# Patient Record
Sex: FEMALE | Race: BLACK OR AFRICAN AMERICAN | NOT HISPANIC OR LATINO | Employment: FULL TIME | URBAN - METROPOLITAN AREA
[De-identification: names, ages, dates, MRNs, and addresses within clinical notes are randomized per-mention and may not be internally consistent; named-entity substitution may affect disease eponyms.]

---

## 2017-05-05 ENCOUNTER — HOSPITAL ENCOUNTER (EMERGENCY)
Facility: OTHER | Age: 37
Discharge: HOME | End: 2017-05-05
Attending: EMERGENCY MEDICINE
Payer: COMMERCIAL

## 2017-05-05 VITALS
OXYGEN SATURATION: 100 % | HEIGHT: 66 IN | SYSTOLIC BLOOD PRESSURE: 208 MMHG | BODY MASS INDEX: 22.82 KG/M2 | RESPIRATION RATE: 20 BRPM | HEART RATE: 89 BPM | TEMPERATURE: 98 F | DIASTOLIC BLOOD PRESSURE: 126 MMHG | WEIGHT: 142 LBS

## 2017-05-05 DIAGNOSIS — F41.0 PANIC ATTACK: Primary | ICD-10-CM

## 2017-05-05 DIAGNOSIS — I10 ESSENTIAL HYPERTENSION: ICD-10-CM

## 2017-05-05 DIAGNOSIS — R20.0 NUMBNESS: ICD-10-CM

## 2017-05-05 LAB
B-HCG UR QL: NEGATIVE
CTP QC/QA: YES
POCT GLUCOSE: 105 MG/DL (ref 70–110)

## 2017-05-05 PROCEDURE — 82962 GLUCOSE BLOOD TEST: CPT

## 2017-05-05 PROCEDURE — 25000003 PHARM REV CODE 250: Performed by: EMERGENCY MEDICINE

## 2017-05-05 PROCEDURE — 99284 EMERGENCY DEPT VISIT MOD MDM: CPT | Mod: 25

## 2017-05-05 PROCEDURE — 81025 URINE PREGNANCY TEST: CPT | Performed by: EMERGENCY MEDICINE

## 2017-05-05 RX ORDER — LISINOPRIL 10 MG/1
10 TABLET ORAL
Status: COMPLETED | OUTPATIENT
Start: 2017-05-05 | End: 2017-05-05

## 2017-05-05 RX ADMIN — LISINOPRIL 10 MG: 10 TABLET ORAL at 07:05

## 2017-05-05 NOTE — ED PROVIDER NOTES
Encounter Date: 5/5/2017    SCRIBE #1 NOTE: I, Jingslime Garcia, am scribing for, and in the presence of,  Dr. Haines. I have scribed the entire note.       History     Chief Complaint   Patient presents with    Panic Attack     woke up and I felt panicky and tearful. pt reports her father was in a car accident yesteday.      Review of patient's allergies indicates:  No Known Allergies  HPI Comments: Time seen by provider: 6:56 AM    This is a 37 y.o. female who presents with complaint of panic attack. She reports onset of symptoms was about 1 hr ago. The patient states she woke this morning feeling anxious and tingling. She notes she has increased stress recently. The patient denies any associated chest pain, shortness of breath, dizziness, light headedness, nausea or vomiting. She denies any SI or HI. The patient denies any auditory or visual hallucinations. She reports she has experienced similar symptoms in the past. The patient denies taking medications in the past for anxiety.     The history is provided by the patient.     Past Medical History:   Diagnosis Date    Encounter for blood transfusion     Iron deficiency anemia     Menorrhagia      Past Surgical History:   Procedure Laterality Date    c section       Family History   Problem Relation Age of Onset    Hypertension Father     Hypertension Sister     Hypertension Maternal Grandmother     Cancer Maternal Grandfather     Hypertension Paternal Grandmother      Social History   Substance Use Topics    Smoking status: Never Smoker    Smokeless tobacco: None    Alcohol use Yes      Comment: Occasional; 3x /month     Review of Systems   Constitutional: Negative for chills and fever.   HENT: Negative for congestion and sore throat.    Eyes: Negative for redness and visual disturbance.   Respiratory: Negative for cough and shortness of breath.    Cardiovascular: Negative for chest pain and palpitations.   Gastrointestinal: Negative for abdominal  pain, diarrhea, nausea and vomiting.   Genitourinary: Negative for dysuria.   Musculoskeletal: Negative for back pain.   Skin: Negative for rash.   Neurological: Negative for weakness and headaches.   Psychiatric/Behavioral: Negative for confusion. The patient is nervous/anxious.        Physical Exam   Initial Vitals   BP Pulse Resp Temp SpO2   05/05/17 0620 05/05/17 0620 05/05/17 0620 05/05/17 0620 05/05/17 0620   208/126 89 20 97.9 °F (36.6 °C) 100 %     Physical Exam    Nursing note and vitals reviewed.  Constitutional: She appears well-developed and well-nourished. She is not diaphoretic. No distress.   HENT:   Head: Normocephalic and atraumatic.   Right Ear: External ear normal.   Left Ear: External ear normal.   Eyes: Conjunctivae and EOM are normal.   Neck: Normal range of motion. Neck supple.   Cardiovascular: Normal rate, regular rhythm and normal heart sounds. Exam reveals no gallop and no friction rub.    No murmur heard.  Pulmonary/Chest: Breath sounds normal. She has no wheezes. She has no rhonchi. She has no rales.   Abdominal: Soft. Bowel sounds are normal. There is no tenderness. There is no rebound and no guarding.   Musculoskeletal: Normal range of motion. She exhibits no edema or tenderness.   Lymphadenopathy:     She has no cervical adenopathy.   Neurological: She is alert and oriented to person, place, and time. She has normal strength.   Cranial nerves II through XII grossly intact.  5/5 motor strength all 4 extremities.  Sensation is normal.  Finger to nose normal.  Gait normal.  Speech and cognition is normal.  No focal neurologic deficit.   Skin: Skin is warm and dry. No rash noted.         ED Course   Procedures  Labs Reviewed   POCT URINE PREGNANCY   POCT GLUCOSE MONITORING CONTINUOUS             Medical Decision Making:   ED Management:  Tearful patient presents complaining of whole body numbness similar to her prior panic attacks.  She does have a history of paresthesias, have reviewed  "her chart.  She reports she is under "a lot of stress ".  She reports her father was in the proximal left leg.  She is not suicidal or homicidal.  She is not gravely disabled.  She denies any intoxicating drugs.  Her neuro exam is completely normal except for her subjective whole-body numbness.  No further workup indicated here.    I did have an extensive talk regarding signs to return for and need for follow up. Patient expressed understanding and will monitor symptoms closely and follow-up as needed.      JOHN PAUL Haines M.D.  05/05/2017  7:43 AM                Scribe Attestation:   Scribe #1: I performed the above scribed service and the documentation accurately describes the services I performed. I attest to the accuracy of the note.    Attending Attestation:           Physician Attestation for Scribe:  Physician Attestation Statement for Scribe #1: I, Dr. Haines, reviewed documentation, as scribed by Jing Garcia in my presence, and it is both accurate and complete.                 ED Course     Clinical Impression:     1. Panic attack    2. Essential hypertension    3. Numbness                Davon Haines MD  05/05/17 0743    "

## 2017-05-05 NOTE — ED AVS SNAPSHOT
[facility]  [address]  [facility] [address]               Pernell Ly   2017  6:20 AM   ED    Description:  Female : 1980   Department:  [facility]           Your Care was Coordinated By:     Provider Role From To    Cyndi Jones MD Attending Provider 17 0632 17 [phone]      Reason for Visit     Panic Attack           Diagnoses this Visit        Comments    Panic attack    -  Primary     Essential hypertension         Numbness           ED Disposition     None           To Do List           Follow-up Information     Follow up with [facility]. Schedule an appointment as soon as possible for a visit in 3 days.    Contact information:    [address]  [place] [address]  [phone]          Follow up with [facility].    Specialty:  Emergency Medicine    Why:  If symptoms worsen    Contact information:    [address]  [facility] [id number] [phone]      [facility] On Call     [facility] Nurse Care Line -  Assistance  Unless otherwise directed by your provider, please contact [facility], our nurse care line that is available for  assistance.     Registered nurses in the [facility] provide: appointment scheduling, clinical advisement, health education, and other advisory services.  Call: [phone] (toll free)               Medications           Message regarding Medications     Verify the changes and/or additions to your medication regime listed below are the same as discussed with your clinician today.  If any of these changes or additions are incorrect, please notify your healthcare provider.        These medications were administered today        Dose Freq    lisinopril tablet 10 mg 10 mg ED 1 Time    Sig: Take 1 tablet (10 mg total) by mouth ED 1 Time.    Class: Normal    Route: Oral      STOP taking these medications   "   amitriptyline (ELAVIL) 25 MG tablet Take 25 mg by mouth nightly as needed for Insomnia.    lisinopril (PRINIVIL,ZESTRIL) 5 MG tablet Take 5 mg by mouth once daily.    multivit-min-iron-CA-FA 27-0.4 mg Tab Take 1 tablet by mouth once daily.    ferrous sulfate 325 (65 FE) MG EC tablet Take 1 tablet (325 mg total) by mouth once daily.           Verify that the below list of medications is an accurate representation of the medications you are currently taking.  If none reported, the list may be blank. If incorrect, please contact your healthcare provider. Carry this list with you in case of emergency.           Current Medications     lisinopril tablet 10 mg Take 1 tablet (10 mg total) by mouth ED 1 Time.           Clinical Reference Information           Your Vitals Were     BP Pulse Temp Resp Height Weight    208/126 (BP Location: Left arm, Patient Position: Sitting, BP Method: Automatic) 89 97.9 °F (36.6 °C) (Oral) 20 5' 6" (1.676 m) 64.4 kg (142 lb)    Last Period SpO2 BMI          05/02/2017 100% 22.92 kg/m2        Allergies as of 5/5/2017     No Known Allergies      Immunizations Administered on Date of Encounter - 5/5/2017     None      ED Micro, Lab, POCT     Start Ordered       Status Ordering Provider    05/05/17 0703 05/05/17 0703  POCT glucose  Once      Final result     05/05/17 0640 05/05/17 0639  POCT urine pregnancy  Once      Final result     05/05/17 0640 05/05/17 0639  POCT glucose  Once      Acknowledged       ED Imaging Orders     None      Discharge References/Attachments     ANXIETY REACTION (ENGLISH)    PARAESTHESIAS (ENGLISH)    HIGH BLOOD PRESSURE, ESTABLISHED, OUT OF CONTROL (ENGLISH)      Your Scheduled Appointments     May 08, 2017  1:00 PM CDT   New Patient - Open Scheduling with Zoey Nelson MD   [facility] ([facility])    [address]  [facility] [address] [phone]              [facility] Sign-Up     Activating your [facility] account is as easy as " 1-2-3!     1) Visit [email or web address], select Sign Up Now, enter this activation code and your date of birth, then select Next.  [email or web address]  Expires: 6/15/2017  4:08 PM      2) Create a username and password to use when you visit [facility] in the future and select a security question in case you lose your password and select Next.    3) Enter your e-mail address and click Sign Up!    Additional Information  If you have questions, please e-mail [email or web address] or call [phone] to talk to our [employer] staff. Remember, [facility] is NOT to be used for urgent needs. For medical emergencies, dial 911.          [facility] complies with applicable Federal civil rights laws and does not discriminate on the basis of race, color, national origin, age, disability, or sex.        Language Assistance Services     ATTENTION: Language assistance services are available, free of charge. Please call [phone].      ATENCIÓN: Si habla español, tiene a williamson disposición servicios gratuitos de asistencia lingüística. Llame al [phone].     CHÚ Ý: N?u b?n nói Ti?ng Vi?t, có các d?ch v? h? tr? ngôn ng? mi?n phí dành cho b?n. G?i s? [phone].

## 2017-05-08 ENCOUNTER — OFFICE VISIT (OUTPATIENT)
Dept: INTERNAL MEDICINE | Facility: CLINIC | Age: 37
End: 2017-05-08
Attending: FAMILY MEDICINE
Payer: COMMERCIAL

## 2017-05-08 VITALS
HEART RATE: 78 BPM | SYSTOLIC BLOOD PRESSURE: 178 MMHG | WEIGHT: 157.63 LBS | HEIGHT: 65 IN | DIASTOLIC BLOOD PRESSURE: 120 MMHG | BODY MASS INDEX: 26.26 KG/M2

## 2017-05-08 DIAGNOSIS — Z23 NEED FOR TDAP VACCINATION: ICD-10-CM

## 2017-05-08 DIAGNOSIS — G43.511 INTRACTABLE PERSISTENT MIGRAINE AURA WITHOUT CEREBRAL INFARCTION AND WITH STATUS MIGRAINOSUS: ICD-10-CM

## 2017-05-08 DIAGNOSIS — D25.0 SUBMUCOUS LEIOMYOMA OF UTERUS: ICD-10-CM

## 2017-05-08 DIAGNOSIS — D64.9 ANEMIA, UNSPECIFIED TYPE: ICD-10-CM

## 2017-05-08 DIAGNOSIS — I10 ESSENTIAL HYPERTENSION: ICD-10-CM

## 2017-05-08 DIAGNOSIS — Z00.00 ANNUAL PHYSICAL EXAM: Primary | ICD-10-CM

## 2017-05-08 PROCEDURE — 3077F SYST BP >= 140 MM HG: CPT | Mod: S$GLB,,, | Performed by: FAMILY MEDICINE

## 2017-05-08 PROCEDURE — 99999 PR PBB SHADOW E&M-EST. PATIENT-LVL III: CPT | Mod: PBBFAC,,, | Performed by: FAMILY MEDICINE

## 2017-05-08 PROCEDURE — 99385 PREV VISIT NEW AGE 18-39: CPT | Mod: 25,S$GLB,, | Performed by: FAMILY MEDICINE

## 2017-05-08 PROCEDURE — 90715 TDAP VACCINE 7 YRS/> IM: CPT | Mod: S$GLB,,, | Performed by: FAMILY MEDICINE

## 2017-05-08 PROCEDURE — 90471 IMMUNIZATION ADMIN: CPT | Mod: S$GLB,,, | Performed by: FAMILY MEDICINE

## 2017-05-08 PROCEDURE — 3080F DIAST BP >= 90 MM HG: CPT | Mod: S$GLB,,, | Performed by: FAMILY MEDICINE

## 2017-05-08 RX ORDER — FERROUS SULFATE 324(65)MG
324 TABLET, DELAYED RELEASE (ENTERIC COATED) ORAL 2 TIMES DAILY
COMMUNITY
End: 2023-11-03

## 2017-05-08 RX ORDER — LISINOPRIL 20 MG/1
20 TABLET ORAL DAILY
Qty: 30 TABLET | Refills: 0 | Status: SHIPPED | OUTPATIENT
Start: 2017-05-08 | End: 2017-06-23

## 2017-05-08 RX ORDER — LISINOPRIL 20 MG/1
20 TABLET ORAL DAILY
COMMUNITY
End: 2017-05-08 | Stop reason: SDUPTHER

## 2017-05-08 RX ORDER — TIZANIDINE HYDROCHLORIDE 4 MG/1
CAPSULE, GELATIN COATED ORAL
Refills: 3 | COMMUNITY
Start: 2017-04-22 | End: 2017-06-23

## 2017-05-08 NOTE — MR AVS SNAPSHOT
[facility]  [address]  [place] [address]  Phone: [phone]  Fax: [phone]                  Pernell Ly   2017 1:00 PM   Office Visit    Description:  Female : 1980   Provider:  Zoey Nelson MD   Department:  [facility]           Reason for Visit     Establish Care           Diagnoses this Visit        Comments    Annual physical exam    -  Primary     Essential hypertension         Need for Tdap vaccination                To Do List           Future Appointments        Provider Department Dept Phone    2017 11:00 AM Agustin Hutson MD [facility] [address] [phone]      Goals (5 Years of Data)     None       These Medications        Disp Refills Start End    lisinopril (PRINIVIL,ZESTRIL) 20 MG tablet 30 tablet 0 2017     Take 1 tablet (20 mg total) by mouth once daily. - Oral    Pharmacy: [facility] [address] [place] [address] [place]  [place] Ph #: [phone]         [facility] On Call     [facility] On Call Nurse Care Line -  Assistance  Unless otherwise directed by your provider, please contact [facility], our nurse care line that is available for  assistance.     Registered nurses in the [facility] provide: appointment scheduling, clinical advisement, health education, and other advisory services.  Call: [phone] (toll free)               Medications           Message regarding Medications     Verify the changes and/or additions to your medication regime listed below are the same as discussed with your clinician today.  If any of these changes or additions are incorrect, please notify your healthcare provider.        START taking these NEW medications        Refills    lisinopril (PRINIVIL,ZESTRIL) 20 MG tablet 0    Sig: Take 1 tablet (20 mg total) by mouth once daily.    Class: Normal    Route: Oral           Verify that the below list of  "medications is an accurate representation of the medications you are currently taking.  If none reported, the list may be blank. If incorrect, please contact your healthcare provider. Carry this list with you in case of emergency.           Current Medications     ferrous sulfate 324 mg (65 mg iron) TbEC Take 324 mg by mouth.    MULTIVIT-IRON-MIN-FOLIC ACID 3,500-18-0.4 UNIT-MG-MG ORAL CHEW Take by mouth.    tizanidine 4 mg Cap TK ONE C PO NIGHTLY    lisinopril (PRINIVIL,ZESTRIL) 20 MG tablet Take 1 tablet (20 mg total) by mouth once daily.           Clinical Reference Information           Your Vitals Were     BP Pulse Height Weight Last Period BMI    178/120 (BP Location: Left arm, Patient Position: Sitting, BP Method: Manual) 78 5' 5" (1.651 m) 71.5 kg (157 lb 10.1 oz) 05/02/2017 26.23 kg/m2      Blood Pressure          Most Recent Value    BP  (!)  178/120      Allergies as of 5/8/2017     No Known Allergies      Immunizations Administered on Date of Encounter - 5/8/2017     Name Date Dose VIS Date Route    TDAP 5/8/2017 0.5 mL 2/24/2015 Intramuscular      Orders Placed During Today's Visit      Normal Orders This Visit    Tdap Vaccine     Future Labs/Procedures Expected by Expires    CBC auto differential  5/8/2017 (Approximate) 7/7/2018    Comprehensive metabolic panel  5/8/2017 5/8/2018    Hemoglobin A1c  5/8/2017 5/8/2018    Lipid panel  5/8/2017 7/7/2018    TSH  5/8/2017 7/7/2018      [facility] Sign-Up     Activating your [facility] account is as easy as 1-2-3!     1) Visit [email or web address], select Sign Up Now, enter this activation code and your date of birth, then select Next.  [email or web address]  Expires: 6/15/2017  4:08 PM      2) Create a username and password to use when you visit [facility] in the future and select a security question in case you lose your password and select Next.    3) Enter your e-mail address and click Sign Up!    Additional Information  If you have questions, please e-mail " [email or web address] or call [phone] to talk to our [facility] staff. Remember, [facility] is NOT to be used for urgent needs. For medical emergencies, dial 911.         Instructions    Your test results will be communicated to you via: [facility], Telephone or Letter.  If you have not received your test results within one week. Please contact the clinic.         Language Assistance Services     ATTENTION: Language assistance services are available, free of charge. Please call [phone].      ATENCIÓN: Si habla español, tiene a williamson disposición servicios gratuitos de asistencia lingüística. Llame al [phone].     CHÚ Ý: N?u b?n nói Ti?ng Vi?t, có các d?ch v? h? tr? ngôn ng? mi?n phí dành cho b?n. G?i s? [phone].         [facility] complies with applicable Federal civil rights laws and does not discriminate on the basis of race, color, national origin, age, disability, or sex.

## 2017-05-08 NOTE — PROGRESS NOTES
Patient given Tdap IM in the LD. Patient tolerated well and Band-Aid was applied. Lot#[id number] Exp:04/11/2019. Patient advised to wait in the lobby for 15 min to make sure no adverse reactions occur. Patient states verbal understanding and has no further questions.

## 2017-05-08 NOTE — PROGRESS NOTES
Subjective:       Patient ID: Pernell Ly is a 37 y.o. female.    Chief Complaint: Establish Care    HPI Comments: Pt presents today for annual exam and to [facility] care. Pt previously seen at [facility]. Here because I see her friend Magy Dyer. Pt states that she has a HA and thinks its bc of her elev BP. Pt did have a cousin that just passed from colon CA and pt thinks its also related to the stress of the death. Pt was on lisinopril 10 mg once daily. Had stopped taking it for over 4 mos due to no appts available w PCP per pt  Pt also needs labs, TDAP   fmhx unremarkable exc for dad with DM and HTN      Review of Systems   Constitutional: Negative for activity change, appetite change, chills, fatigue and fever.   HENT: Negative for congestion, ear pain, sinus pressure, sore throat and trouble swallowing.    Eyes: Negative for photophobia, pain and visual disturbance.   Respiratory: Negative for apnea, cough, chest tightness, shortness of breath and wheezing.    Cardiovascular: Negative for chest pain, palpitations and leg swelling.   Gastrointestinal: Negative for abdominal distention, abdominal pain, constipation, diarrhea, nausea and vomiting.   Genitourinary: Negative.    Musculoskeletal: Negative for back pain, joint swelling and neck pain.   Skin: Negative.    Neurological: Positive for headaches. Negative for dizziness, tremors, weakness and numbness.   Psychiatric/Behavioral: Negative for behavioral problems, decreased concentration and sleep disturbance. The patient is not nervous/anxious.        Objective:      Physical Exam   Constitutional: She is oriented to person, place, and time. She appears well-developed and well-nourished.   HENT:   Head: Normocephalic and atraumatic.   Right Ear: External ear normal.   Left Ear: External ear normal.   Nose: Nose normal.   Mouth/Throat: Oropharynx is clear and moist. No oropharyngeal exudate.   Eyes: EOM are normal. Pupils are equal, round, and reactive to light.    Neck: Normal range of motion. Neck supple. No thyromegaly present.   Cardiovascular: Normal rate, regular rhythm, normal heart sounds and intact distal pulses.    No murmur heard.  Pulmonary/Chest: Effort normal and breath sounds normal. No respiratory distress.   Abdominal: Soft. Bowel sounds are normal. She exhibits no distension and no mass. There is no tenderness. There is no rebound and no guarding.       Musculoskeletal: Normal range of motion. She exhibits no edema or tenderness.   Lymphadenopathy:     She has no cervical adenopathy.   Neurological: She is alert and oriented to person, place, and time. She has normal reflexes. No cranial nerve deficit. She exhibits normal muscle tone. Coordination normal.   Skin: Skin is warm and dry. No erythema.   Psychiatric: She has a normal mood and affect. Her behavior is normal. Judgment and thought content normal.       Assessment:       1. Annual physical exam    2. Essential hypertension    3. Need for Tdap vaccination      4. HTN  Plan:     annual PE wnl exc for fibroids noted at umbilicus --pt to see GYN here  HTN uncontrolled. Resume lisinopril at 20 mg once daily and RTC in 2 weeks for nurse visit BP check. If elev will increase to 40 mg daily. Pt amenable  Anemia: labs pending  Annual physical exam  -     Lipid panel; Future; Expected date: 5/8/17  -     CBC auto differential; Future; Expected date: 5/8/17  -     Comprehensive metabolic panel; Future; Expected date: 5/8/17  -     TSH; Future; Expected date: 5/8/17  -     Hemoglobin A1c; Future; Expected date: 5/8/17    Essential hypertension  -     lisinopril (PRINIVIL,ZESTRIL) 20 MG tablet; Take 1 tablet (20 mg total) by mouth once daily.  Dispense: 30 tablet; Refill: 0    Need for Tdap vaccination  -     Tdap Vaccine      Labs pending  Med resumed

## 2017-05-30 ENCOUNTER — OFFICE VISIT (OUTPATIENT)
Dept: OBSTETRICS AND GYNECOLOGY | Facility: CLINIC | Age: 37
End: 2017-05-30
Attending: OBSTETRICS & GYNECOLOGY
Payer: COMMERCIAL

## 2017-05-30 VITALS
DIASTOLIC BLOOD PRESSURE: 90 MMHG | WEIGHT: 157.63 LBS | BODY MASS INDEX: 26.26 KG/M2 | SYSTOLIC BLOOD PRESSURE: 130 MMHG | HEIGHT: 65 IN

## 2017-05-30 DIAGNOSIS — Z12.4 PAP SMEAR FOR CERVICAL CANCER SCREENING: ICD-10-CM

## 2017-05-30 DIAGNOSIS — Z01.419 WELL WOMAN EXAM WITH ROUTINE GYNECOLOGICAL EXAM: Primary | ICD-10-CM

## 2017-05-30 DIAGNOSIS — D25.1 INTRAMURAL LEIOMYOMA OF UTERUS: ICD-10-CM

## 2017-05-30 DIAGNOSIS — N93.9 ABNORMAL UTERINE BLEEDING (AUB): ICD-10-CM

## 2017-05-30 LAB
B-HCG UR QL: NEGATIVE
CTP QC/QA: YES

## 2017-05-30 PROCEDURE — 99385 PREV VISIT NEW AGE 18-39: CPT | Mod: S$GLB,,, | Performed by: OBSTETRICS & GYNECOLOGY

## 2017-05-30 PROCEDURE — 99999 PR PBB SHADOW E&M-EST. PATIENT-LVL III: CPT | Mod: PBBFAC,,, | Performed by: OBSTETRICS & GYNECOLOGY

## 2017-05-30 PROCEDURE — 88175 CYTOPATH C/V AUTO FLUID REDO: CPT

## 2017-05-30 PROCEDURE — 81025 URINE PREGNANCY TEST: CPT | Mod: QW,S$GLB,, | Performed by: OBSTETRICS & GYNECOLOGY

## 2017-05-30 NOTE — PROGRESS NOTES
Pernell Ly is a 37 y.o. female  who presents as a new patient for annual exam.  Patient's last menstrual period was 2017.  She has uterine fibroids and describes having heavy, irregular bleeding.  Recently, periods have been occurring every 2-3 weeks with bleeding lasting 10 days in duration.  The first two days are very heavy with clots - requiring Depends at night.  S/P transfusion several years ago secondary to severe anemia.  Endometrial ablation was attempted at [place] , but was unable to be performed secondary to the fibroids.  S/P BTL.  In the past, she had tried the Mirena IUD to help with cycle control but had prolonged bleeding.  Now, she feels that her bleeding is excessive and desire treatment.       Past Medical History:   Diagnosis Date    Encounter for blood transfusion     Hypertension     Iron deficiency anemia     Menorrhagia        Past Surgical History:   Procedure Laterality Date     SECTION      x1    TUBAL LIGATION         OB History      Para Term  AB Living    1 1    1    SAB TAB Ectopic Multiple Live Births        1          ROS:  GENERAL: Reports feeling tired with her menses.   SKIN: Denies rash or lesions.   HEAD: Denies head injury or headache.   NODES: Denies enlarged lymph nodes.   CHEST: Denies chest pain or shortness of breath.   CARDIOVASCULAR: Denies palpitations or left sided chest pain.   ABDOMEN: No abdominal pain, nausea, vomiting or rectal bleeding.   URINARY: No dysuria or hematuria.  REPRODUCTIVE: See HPI.   BREASTS: Denies pain, lumps, or nipple discharge.   HEMATOLOGIC: Reports excessive bleeding with her periods.   MUSCULOSKELETAL: Denies joint pain or swelling.   NEUROLOGIC: Denies syncope or weakness.   PSYCHIATRIC: Denies depression.    PE:   (chaperone present during entire exam)  APPEARANCE: Well nourished, well developed, in no acute distress.  BREASTS: Symmetrical, no skin changes or visible lesions. No palpable  masses, nipple discharge or adenopathy bilaterally.  ABDOMEN: Soft. No tenderness.  Fibroids palpable in lower abdomen.  No CVA tenderness.  VULVA: No lesions. Normal female genitalia.  URETHRAL MEATUS: Normal size and location, no lesions, no prolapse.  URETHRA: No masses, tenderness, prolapse or scarring.  VAGINA: Moist and well rugated, no discharge, no significant cystocele or rectocele.  CERVIX: No lesions and discharge. PAP done.  UTERUS: Enlarged in size with fibroids.  Non-tender, bladder base nontender.  ADNEXA: No masses, tenderness or CDS nodularity.  ANUS PERINEUM: Normal.      Diagnosis:  1. Well woman exam with routine gynecological exam    2. Pap smear for cervical cancer screening    3. Abnormal uterine bleeding (AUB)    4. Intramural leiomyoma of uterus          PLAN:    Orders Placed This Encounter    US Pelvis Comp with Transvag NON-OB (xpd    POCT Urine Pregnancy    Liquid-based pap smear, screening       Patient was counseled today on the need for annual gyn exams.  Patient was counseled today on hypermenorrhea and fibroids.  We reviewed the various treatment options:  1) no treatment  2) medical treatment (OCPs, Provera, Depo-Provera,  Mirena IUD, Lysteda).  With hypertension, she is not a good candidate for OCPs.  Likewise, she has used the Mirena and had prolonged bleeding with this devise.  3) Surgical treatment: D&C, hysteroscopy, endometrial ablation, myomectomy, hysterectomy.  With the size of her uterus / fibroids, she is not a good candidate for an ablation.  She is interested in definitive surgical treatment with hysterectomy.  She will have a pelvic sono performed for evaluation of fibroid size, then return for EMBX and to discuss treatment plan.      Follow-up after pelvic sono for EMBX and to discuss treatment plan.

## 2017-06-01 ENCOUNTER — HOSPITAL ENCOUNTER (OUTPATIENT)
Dept: RADIOLOGY | Facility: OTHER | Age: 37
Discharge: HOME | End: 2017-06-01
Attending: OBSTETRICS & GYNECOLOGY
Payer: COMMERCIAL

## 2017-06-01 DIAGNOSIS — D25.1 INTRAMURAL LEIOMYOMA OF UTERUS: ICD-10-CM

## 2017-06-01 DIAGNOSIS — N93.9 ABNORMAL UTERINE BLEEDING (AUB): ICD-10-CM

## 2017-06-01 PROCEDURE — 76830 TRANSVAGINAL US NON-OB: CPT | Mod: 26,,, | Performed by: RADIOLOGY

## 2017-06-01 PROCEDURE — 76856 US EXAM PELVIC COMPLETE: CPT | Mod: 26,,, | Performed by: RADIOLOGY

## 2017-06-01 PROCEDURE — 76856 US EXAM PELVIC COMPLETE: CPT | Mod: TC

## 2017-06-02 ENCOUNTER — PATIENT MESSAGE (OUTPATIENT)
Dept: OBSTETRICS AND GYNECOLOGY | Facility: CLINIC | Age: 37
End: 2017-06-02

## 2017-06-02 ENCOUNTER — TELEPHONE (OUTPATIENT)
Dept: OBSTETRICS AND GYNECOLOGY | Facility: CLINIC | Age: 37
End: 2017-06-02

## 2017-06-08 ENCOUNTER — TELEPHONE (OUTPATIENT)
Dept: OBSTETRICS AND GYNECOLOGY | Facility: CLINIC | Age: 37
End: 2017-06-08

## 2017-06-08 NOTE — TELEPHONE ENCOUNTER
Called patient:    Discussed results of pelvic sono from 6/1/17:    Results: Transabdominal followed by transvaginal ultrasound of the pelvis obtained.  The uterus  measures  13.2 x 7.3 x 8.3 cm .  The endometrium is obscured and cannot be measured.  There are to large uterine fibroid, one measures 9.6 x 9.3 cm lower uterine segment and one at the fundus measures 5.8 x 5.7 cm.  The right ovary measures 3.8 x 2.2 x 3.2 cm.     The left ovary measures 4.7 x 3.3 x 2.8 cm.     Normal flow is seen to the bilateral ovaries.  Trace of free fluid noted in the pelvis..      Impression       1.  2 sizable uterine fibroids.      We discussed the need for EMBX to rule out endometrial pathology for her abnormal bleeding- she needs to check her schedule and will call us.    We also discussed treatment options - she is interested in myomectomy- will refer to Dr. Francisco for evaluation.

## 2017-06-08 NOTE — TELEPHONE ENCOUNTER
----- Message from Jerzy Johnson sent at 6/8/2017 12:02 PM CDT -----  Contact: Pt  X_ 1st Request  _ 2nd Request  _ 3rd Request    Who:ETHAN PEREZ [[id number]]    Why: Patient is returning a call; states Thursday or Friday would be good days to schedule her appointment    What Number to Call Back: [phone]    When to Expect a call back: (Before the end of the day)  -- if call after 3:00 call back will be tomorrow.

## 2017-06-23 ENCOUNTER — PATIENT MESSAGE (OUTPATIENT)
Dept: INTERNAL MEDICINE | Facility: CLINIC | Age: 37
End: 2017-06-23

## 2017-06-23 ENCOUNTER — TELEPHONE (OUTPATIENT)
Dept: OBSTETRICS AND GYNECOLOGY | Facility: CLINIC | Age: 37
End: 2017-06-23

## 2017-06-23 ENCOUNTER — PROCEDURE VISIT (OUTPATIENT)
Dept: OBSTETRICS AND GYNECOLOGY | Facility: CLINIC | Age: 37
End: 2017-06-23
Attending: OBSTETRICS & GYNECOLOGY
Payer: COMMERCIAL

## 2017-06-23 VITALS
HEIGHT: 65 IN | DIASTOLIC BLOOD PRESSURE: 90 MMHG | WEIGHT: 149.94 LBS | BODY MASS INDEX: 24.98 KG/M2 | SYSTOLIC BLOOD PRESSURE: 138 MMHG

## 2017-06-23 DIAGNOSIS — N76.0 ACUTE VAGINITIS: ICD-10-CM

## 2017-06-23 DIAGNOSIS — D25.1 INTRAMURAL LEIOMYOMA OF UTERUS: ICD-10-CM

## 2017-06-23 DIAGNOSIS — I10 ESSENTIAL HYPERTENSION: ICD-10-CM

## 2017-06-23 DIAGNOSIS — N93.9 ABNORMAL UTERINE BLEEDING (AUB): Primary | ICD-10-CM

## 2017-06-23 LAB
CANDIDA RRNA VAG QL PROBE: NEGATIVE
G VAGINALIS RRNA GENITAL QL PROBE: NEGATIVE
T VAGINALIS RRNA GENITAL QL PROBE: NEGATIVE

## 2017-06-23 PROCEDURE — 87591 N.GONORRHOEAE DNA AMP PROB: CPT

## 2017-06-23 PROCEDURE — 88305 TISSUE EXAM BY PATHOLOGIST: CPT | Mod: 26,,, | Performed by: PATHOLOGY

## 2017-06-23 PROCEDURE — 58100 BIOPSY OF UTERUS LINING: CPT | Mod: S$GLB,,, | Performed by: OBSTETRICS & GYNECOLOGY

## 2017-06-23 PROCEDURE — 81025 URINE PREGNANCY TEST: CPT | Mod: QW,S$GLB,, | Performed by: OBSTETRICS & GYNECOLOGY

## 2017-06-23 PROCEDURE — 88305 TISSUE EXAM BY PATHOLOGIST: CPT | Performed by: PATHOLOGY

## 2017-06-23 PROCEDURE — 87480 CANDIDA DNA DIR PROBE: CPT

## 2017-06-23 RX ORDER — FERROUS SULFATE 325(65) MG
324 TABLET, DELAYED RELEASE (ENTERIC COATED) ORAL
COMMUNITY
End: 2017-06-23

## 2017-06-23 NOTE — TELEPHONE ENCOUNTER
----- Message from Agustin Hutson MD sent at 6/23/2017 12:31 PM CDT -----  Please refer patient to Dr. Francisco for evaluation for fibroids.

## 2017-06-23 NOTE — PROCEDURES
CC: ENDOMETRIAL BIOPSPY    Pernell Ly is a 37 y.o. female  presents for an endometrial biopsy secondary to abnormal bleeding.   She has uterine fibroids and describes having heavy, irregular bleeding.  Recently, periods have been occurring every 2-3 weeks with bleeding lasting 10 days in duration.  The first two days are very heavy with clots - requiring Depends at night.  S/P transfusion several years ago secondary to severe anemia.  Endometrial ablation was attempted at [facility] 2015, but was unable to be performed secondary to the fibroids.  S/P BTL.  In the past, she had tried the Mirena IUD to help with cycle control but had prolonged bleeding.  Now, she feels that her bleeding is excessive and desires treatment with myomectomy.    Pelvic sono 17:  Results: Transabdominal followed by transvaginal ultrasound of the pelvis obtained.  The uterus  measures  13.2 x 7.3 x 8.3 cm .  The endometrium is obscured and cannot be measured.  There are to large uterine fibroid, one measures 9.6 x 9.3 cm lower uterine segment and one at the fundus measures 5.8 x 5.7 cm.  The right ovary measures 3.8 x 2.2 x 3.2 cm.     The left ovary measures 4.7 x 3.3 x 2.8 cm.     Normal flow is seen to the bilateral ovaries.  Trace of free fluid noted in the pelvis..   Impression   1.  2 sizable uterine fibroids.          UPT is negative    PRE ENDOMETRIAL BIOPSY COUNSELING   The patient was informed of the risk of bleeding, infection, uterine perforation and pain and that the test will rule-out endometrial cancer with accuracy greater than 95%. She was counseled on the alternatives to endometrial biopsy and agrees to proceed.    TIME OUT PERFORMED.  The cervix was visualized with a speculum and prepped with betadine  A sterile endometrial pipelle was passed without difficulty to a depth of 9 cm.  Endometrial tissue was obtained.  The specimen was placed in formalyn and sent to Pathology for histology evaluation.   The  patient tolerated the procedure well.    After the procedure, she described noting an increased vaginal discharge monthly after her menses - Affirm performed.    ASSESSMENT and PLAN    1. Abnormal uterine bleeding (AUB)    2. Intramural leiomyoma of uterus          POST ENDOMETRIAL BIOPSY COUNSELING:  Manage post biopsy cramping with NSAIDs or Tylenol.  Expect spotting or light bleeding for a few days.  Report bleeding heavier than a period, fever > 101.0 F, worsening pain or a foul smelling vaginal discharge.    We discussed her abnormal bleeding and fibroids- treatment options.  She has considered her options and wants to discuss myomectomy with Dr. Francisco    FOLLOW-UP: Pending biopsy results.  We will contact her in several days to discuss biopsy results and treatment plan.  Refer to Dr. Francisco.

## 2017-06-23 NOTE — TELEPHONE ENCOUNTER
Called pt to schedule her consult for fibroids.  No answer.  Left VM message to return call to clinic.

## 2017-06-24 ENCOUNTER — PATIENT MESSAGE (OUTPATIENT)
Dept: OBSTETRICS AND GYNECOLOGY | Facility: CLINIC | Age: 37
End: 2017-06-24

## 2017-06-24 LAB
C TRACH DNA SPEC QL NAA+PROBE: NOT DETECTED
N GONORRHOEA DNA SPEC QL NAA+PROBE: NOT DETECTED

## 2017-06-24 NOTE — TELEPHONE ENCOUNTER
Your recent vaginal screening was normal - no sign of yeast or bacterial imbalance.    Also, your screening for cervical infection was also negative.    -Dr. Hutson

## 2017-06-26 RX ORDER — LISINOPRIL 20 MG/1
20 TABLET ORAL DAILY
Qty: 30 TABLET | Refills: 0 | Status: SHIPPED | OUTPATIENT
Start: 2017-06-26 | End: 2017-07-26

## 2017-06-29 ENCOUNTER — PATIENT MESSAGE (OUTPATIENT)
Dept: OBSTETRICS AND GYNECOLOGY | Facility: CLINIC | Age: 37
End: 2017-06-29

## 2017-06-29 ENCOUNTER — TELEPHONE (OUTPATIENT)
Dept: OBSTETRICS AND GYNECOLOGY | Facility: CLINIC | Age: 37
End: 2017-06-29

## 2017-06-29 NOTE — TELEPHONE ENCOUNTER
Called patient:    Message left to call us.    [facility] message sent to patient:  I have been unable to reach you by phone.  Your recent endometrial biopsy was normal.  Dr. Francisco's office has also been trying to contact you to set up the appointment to discuss the myomectomy.  Please give Dr. Francisco's office or our office a call to set this up.  -Dr. Hutson

## 2017-07-05 ENCOUNTER — TELEPHONE (OUTPATIENT)
Dept: OBSTETRICS AND GYNECOLOGY | Facility: CLINIC | Age: 37
End: 2017-07-05

## 2017-07-05 NOTE — TELEPHONE ENCOUNTER
Called pt to schedule a consult for fibroids.  No answer.  Left VM message to return call to clinic.

## 2017-07-07 ENCOUNTER — TELEPHONE (OUTPATIENT)
Dept: OBSTETRICS AND GYNECOLOGY | Facility: CLINIC | Age: 37
End: 2017-07-07

## 2017-07-07 NOTE — TELEPHONE ENCOUNTER
Returned pt call regarding scheduling her consult fibroids.  Informed pt of appt date and time.  Pt verbalized understanding.  Letter sent out.

## 2017-07-07 NOTE — TELEPHONE ENCOUNTER
----- Message from Sayra Posada sent at 7/6/2017  1:00 PM CDT -----  Contact: pt  X_  1st Request  _  2nd Request  _  3rd Request    Who:ETHAN PEREZ [[id number]]    Why: Patient states she is returning a call     What Number to Call Back: [phone]    When to Expect a call back: (Before the end of the day)   -- if call after 3:00 call back will be tomorrow.

## 2017-07-26 ENCOUNTER — LAB VISIT (OUTPATIENT)
Dept: LAB | Facility: OTHER | Age: 37
End: 2017-07-26
Attending: FAMILY MEDICINE
Payer: COMMERCIAL

## 2017-07-26 ENCOUNTER — OFFICE VISIT (OUTPATIENT)
Dept: INTERNAL MEDICINE | Facility: CLINIC | Age: 37
End: 2017-07-26
Attending: FAMILY MEDICINE
Payer: COMMERCIAL

## 2017-07-26 VITALS
BODY MASS INDEX: 25.97 KG/M2 | HEIGHT: 65 IN | WEIGHT: 155.88 LBS | DIASTOLIC BLOOD PRESSURE: 98 MMHG | SYSTOLIC BLOOD PRESSURE: 148 MMHG | HEART RATE: 74 BPM

## 2017-07-26 DIAGNOSIS — I10 ESSENTIAL HYPERTENSION: Primary | ICD-10-CM

## 2017-07-26 DIAGNOSIS — Z00.00 ANNUAL PHYSICAL EXAM: ICD-10-CM

## 2017-07-26 LAB
ALBUMIN SERPL BCP-MCNC: 3.7 G/DL
ALP SERPL-CCNC: 58 U/L
ALT SERPL W/O P-5'-P-CCNC: 13 U/L
ANION GAP SERPL CALC-SCNC: 10 MMOL/L
AST SERPL-CCNC: 16 U/L
BASOPHILS # BLD AUTO: 0.01 K/UL
BASOPHILS NFR BLD: 0.3 %
BILIRUB SERPL-MCNC: 0.3 MG/DL
BUN SERPL-MCNC: 10 MG/DL
CALCIUM SERPL-MCNC: 9.3 MG/DL
CHLORIDE SERPL-SCNC: 106 MMOL/L
CHOLEST/HDLC SERPL: 2.4 {RATIO}
CO2 SERPL-SCNC: 22 MMOL/L
CREAT SERPL-MCNC: 0.6 MG/DL
DIFFERENTIAL METHOD: ABNORMAL
EOSINOPHIL # BLD AUTO: 0.1 K/UL
EOSINOPHIL NFR BLD: 1.5 %
ERYTHROCYTE [DISTWIDTH] IN BLOOD BY AUTOMATED COUNT: 17.1 %
EST. GFR  (AFRICAN AMERICAN): >60 ML/MIN/1.73 M^2
EST. GFR  (NON AFRICAN AMERICAN): >60 ML/MIN/1.73 M^2
GLUCOSE SERPL-MCNC: 90 MG/DL
HCT VFR BLD AUTO: 31.9 %
HDL/CHOLESTEROL RATIO: 42.2 %
HDLC SERPL-MCNC: 147 MG/DL
HDLC SERPL-MCNC: 62 MG/DL
HGB BLD-MCNC: 10.1 G/DL
LDLC SERPL CALC-MCNC: 74.6 MG/DL
LYMPHOCYTES # BLD AUTO: 1.5 K/UL
LYMPHOCYTES NFR BLD: 37 %
MCH RBC QN AUTO: 27.2 PG
MCHC RBC AUTO-ENTMCNC: 31.7 G/DL
MCV RBC AUTO: 86 FL
MONOCYTES # BLD AUTO: 0.4 K/UL
MONOCYTES NFR BLD: 8.8 %
NEUTROPHILS # BLD AUTO: 2.1 K/UL
NEUTROPHILS NFR BLD: 52.4 %
NONHDLC SERPL-MCNC: 85 MG/DL
PLATELET # BLD AUTO: 319 K/UL
PMV BLD AUTO: 8.6 FL
POTASSIUM SERPL-SCNC: 4.2 MMOL/L
PROT SERPL-MCNC: 7.8 G/DL
RBC # BLD AUTO: 3.72 M/UL
SODIUM SERPL-SCNC: 138 MMOL/L
TRIGL SERPL-MCNC: 52 MG/DL
TSH SERPL DL<=0.005 MIU/L-ACNC: 0.85 UIU/ML
WBC # BLD AUTO: 4 K/UL

## 2017-07-26 PROCEDURE — 99999 PR PBB SHADOW E&M-EST. PATIENT-LVL III: CPT | Mod: PBBFAC,,, | Performed by: FAMILY MEDICINE

## 2017-07-26 PROCEDURE — 80061 LIPID PANEL: CPT

## 2017-07-26 PROCEDURE — 85025 COMPLETE CBC W/AUTO DIFF WBC: CPT

## 2017-07-26 PROCEDURE — 36415 COLL VENOUS BLD VENIPUNCTURE: CPT

## 2017-07-26 PROCEDURE — 83036 HEMOGLOBIN GLYCOSYLATED A1C: CPT

## 2017-07-26 PROCEDURE — 80053 COMPREHEN METABOLIC PANEL: CPT

## 2017-07-26 PROCEDURE — 84443 ASSAY THYROID STIM HORMONE: CPT

## 2017-07-26 PROCEDURE — 99213 OFFICE O/P EST LOW 20 MIN: CPT | Mod: S$GLB,,, | Performed by: FAMILY MEDICINE

## 2017-07-26 RX ORDER — AMLODIPINE BESYLATE 5 MG/1
5 TABLET ORAL DAILY
Qty: 30 TABLET | Refills: 1 | Status: SHIPPED | OUTPATIENT
Start: 2017-07-26 | End: 2017-09-22 | Stop reason: SDUPTHER

## 2017-07-26 NOTE — PROGRESS NOTES
Subjective:       Patient ID: Pernell Ly is a 37 y.o. female.    Chief Complaint: Hypertension and Medication Problem (hair loss)    Pt presents today for BP check. Per pt, she has noticed a hair loss while on the ACEI and prefers to try another med instead. BP'ss are lower from 170's to 140's. Pt states that her log at home has been in 140-150's/80's.  Pt feels well otherwise        Hypertension   This is a recurrent problem. The current episode started more than 1 year ago. The problem is unchanged. The problem is resistant. Associated symptoms include anxiety, headaches and sweats. Pertinent negatives include no chest pain, palpitations or shortness of breath. Agents associated with hypertension include NSAIDs. There are no known risk factors for coronary artery disease. Past treatments include nothing. The current treatment provides no improvement. There are no compliance problems.      Review of Systems   Constitutional: Negative.    Eyes: Negative.    Respiratory: Negative for cough, chest tightness and shortness of breath.    Cardiovascular: Negative for chest pain, palpitations and leg swelling.   Gastrointestinal: Negative.    Musculoskeletal: Negative.  Negative for joint swelling.   Skin: Negative.    Neurological: Positive for headaches. Negative for dizziness, weakness and light-headedness.       Objective:      Physical Exam   Constitutional: She is oriented to person, place, and time. She appears well-developed and well-nourished.   HENT:   Head: Normocephalic and atraumatic.   Eyes: Conjunctivae and EOM are normal. Pupils are equal, round, and reactive to light.   Neck: Normal range of motion. Neck supple. No thyromegaly present.   Cardiovascular: Normal rate, regular rhythm, normal heart sounds and intact distal pulses.    No murmur heard.  Pulmonary/Chest: Effort normal and breath sounds normal. No respiratory distress. She has no wheezes. She has no rales. She exhibits no tenderness.    Musculoskeletal: Normal range of motion. She exhibits no edema.   Lymphadenopathy:     She has no cervical adenopathy.   Neurological: She is alert and oriented to person, place, and time.   Skin: Skin is warm. No erythema.   Psychiatric: She has a normal mood and affect. Her behavior is normal. Judgment and thought content normal.       Assessment:       HTN  Plan:       HTN uncontrolled. Will stop ACEI and do trial of norvasc 5 mg daily. SE's d/w pt  RTC for 2 wk nurse visit

## 2017-07-27 LAB
ESTIMATED AVG GLUCOSE: 100 MG/DL
HBA1C MFR BLD HPLC: 5.1 %

## 2017-08-09 ENCOUNTER — TELEPHONE (OUTPATIENT)
Dept: INTERNAL MEDICINE | Facility: CLINIC | Age: 37
End: 2017-08-09

## 2017-08-09 ENCOUNTER — CLINICAL SUPPORT (OUTPATIENT)
Dept: INTERNAL MEDICINE | Facility: CLINIC | Age: 37
End: 2017-08-09
Payer: COMMERCIAL

## 2017-08-09 VITALS — HEART RATE: 81 BPM | DIASTOLIC BLOOD PRESSURE: 84 MMHG | SYSTOLIC BLOOD PRESSURE: 148 MMHG

## 2017-08-09 PROCEDURE — 99999 PR PBB SHADOW E&M-EST. PATIENT-LVL II: CPT | Mod: PBBFAC,,,

## 2017-08-09 NOTE — PROGRESS NOTES
Pernell Ly 37 y.o. female is here today for Blood Pressure check.   History of HTN yes.    Review of patient's allergies indicates:  No Known Allergies  Creatinine   Date Value Ref Range Status   07/26/2017 0.6 0.5 - 1.4 mg/dL Final     Sodium   Date Value Ref Range Status   07/26/2017 138 136 - 145 mmol/L Final     Potassium   Date Value Ref Range Status   07/26/2017 4.2 3.5 - 5.1 mmol/L Final   ]  Patient verifies taking blood pressure medications on a regular bases at the same time of the day.     Current Outpatient Prescriptions:     amlodipine (NORVASC) 5 MG tablet, Take 1 tablet (5 mg total) by mouth once daily., Disp: 30 tablet, Rfl: 1    ferrous sulfate 324 mg (65 mg iron) TbEC, Take 324 mg by mouth., Disp: , Rfl:     MULTIVIT-IRON-MIN-FOLIC ACID 3,500-18-0.4 UNIT-MG-MG ORAL CHEW, Take by mouth., Disp: , Rfl:   Does patient have record of home blood pressure readings yes. Readings have been averaging 130/80.   Last dose of blood pressure medication was taken at 8/9/2017.  Patient is asymptomatic.     BP: (!) 148/84 , Pulse: 81.    Blood pressure reading after 15 minutes was 134/86, Pulse 84.  Dr. Nelson notified.

## 2017-08-09 NOTE — TELEPHONE ENCOUNTER
Pt came into clinic for a scheduled bp check. Result was 134/86 Pulse 84. Please see nurse's note for further detail.

## 2017-08-14 ENCOUNTER — OFFICE VISIT (OUTPATIENT)
Dept: OBSTETRICS AND GYNECOLOGY | Facility: CLINIC | Age: 37
End: 2017-08-14
Attending: OBSTETRICS & GYNECOLOGY
Payer: COMMERCIAL

## 2017-08-14 ENCOUNTER — TELEPHONE (OUTPATIENT)
Dept: OBSTETRICS AND GYNECOLOGY | Facility: CLINIC | Age: 37
End: 2017-08-14

## 2017-08-14 VITALS
DIASTOLIC BLOOD PRESSURE: 98 MMHG | WEIGHT: 153 LBS | BODY MASS INDEX: 26.12 KG/M2 | HEIGHT: 64 IN | SYSTOLIC BLOOD PRESSURE: 142 MMHG

## 2017-08-14 DIAGNOSIS — D25.1 FIBROIDS, INTRAMURAL: Primary | ICD-10-CM

## 2017-08-14 DIAGNOSIS — N92.1 MENORRHAGIA WITH IRREGULAR CYCLE: ICD-10-CM

## 2017-08-14 DIAGNOSIS — D25.0 SUBMUCOUS LEIOMYOMA OF UTERUS: ICD-10-CM

## 2017-08-14 PROCEDURE — 99999 PR PBB SHADOW E&M-EST. PATIENT-LVL II: CPT | Mod: PBBFAC,,, | Performed by: OBSTETRICS & GYNECOLOGY

## 2017-08-14 PROCEDURE — 99244 OFF/OP CNSLTJ NEW/EST MOD 40: CPT | Mod: S$GLB,,, | Performed by: OBSTETRICS & GYNECOLOGY

## 2017-08-14 NOTE — LETTER
August 17, 2017      Agustin Hutson MD  [address]  [facility] [address]           [facility] - OB/GYN [address]  [address]  [facility] [address]  Phone: [phone]  Fax: [phone]          Patient: Pernell Ly   MR Number: [id number]   YOB: 1980   Date of Visit: 8/14/2017       Dear Dr. Agustin Hutson:    Thank you for referring Pernell Ly to me for evaluation. Attached you will find relevant portions of my assessment and plan of care.    If you have questions, please do not hesitate to call me. I look forward to following Pernell Ly along with you.    Sincerely,        Enclosure  CC:  No Recipients    If you would like to receive this communication electronically, please contact [email or web address] or [phone] to request more information on [facility] Link access.    For providers and/or their staff who would like to refer a patient to [facility], please contact us through our one-stop-shop provider referral line, [facility], at [phone].    If you feel you have received this communication in error or would no longer like to receive these types of communications, please e-mail [email or web address]

## 2017-08-14 NOTE — PROGRESS NOTES
CC: Symptoms related to fibroids    Pernell Ly is a 37 y.o. female  presents for a consultation from Dr. Hutson for management of fibroids.  She reports pelvic pressure.  She reports heavy bleeding to the point that she has to wear Depends.  She has a history of anemia requiring blood transfusion.  Reports clots with her cycles.  Endometrial ablation was attempted at [facility] but was unsuccessful due to the size of her fibroids.  Though ablation was not performed, they did a tubal ligation.      Ultrasound:    Results: Transabdominal followed by transvaginal ultrasound of the pelvis obtained.  The uterus  measures  13.2 x 7.3 x 8.3 cm .  The endometrium is obscured and cannot be measured.  There are to large uterine fibroid, one measures 9.6 x 9.3 cm lower uterine segment and one at the fundus measures 5.8 x 5.7 cm.  The right ovary measures 3.8 x 2.2 x 3.2 cm.     The left ovary measures 4.7 x 3.3 x 2.8 cm.     Normal flow is seen to the bilateral ovaries.  Trace of free fluid noted in the pelvis.    Patient is s/p tubal ligation.  She does not desire future fertility but her boyfriend is interested in them conceiving.        Past Medical History:   Diagnosis Date    Encounter for blood transfusion     Fibroids     Hypertension     Iron deficiency anemia     Menorrhagia        Past Surgical History:   Procedure Laterality Date     SECTION      x1    TUBAL LIGATION         Family History   Problem Relation Age of Onset    Hypertension Father     Hypertension Sister     Hypertension Maternal Grandmother     Breast cancer Maternal Grandmother     Cancer Maternal Grandfather     Hypertension Paternal Grandmother     Colon cancer Maternal Cousin     Ovarian cancer Neg Hx        Social History   Substance Use Topics    Smoking status: Never Smoker    Smokeless tobacco: Never Used    Alcohol use Yes      Comment: Occasional; 3x /month       OB History    Para Term  AB Living  "  1 1       1   SAB TAB Ectopic Multiple Live Births           1      # Outcome Date GA Lbr Joe/2nd Weight Sex Delivery Anes PTL Lv   1 Para 03/07/01   2.665 kg (5 lb 14 oz) M CS-Unspec   LEIDY          BP (!) 142/98   Ht 5' 4" (1.626 m)   Wt 69.4 kg (153 lb)   LMP 08/04/2017 (Exact Date)   BMI 26.26 kg/m²     ROS:  GENERAL: Denies weight gain or weight loss. Feeling well overall.   SKIN: Denies rash or lesions.   HEAD: Denies head injury or headache.   NODES: Denies enlarged lymph nodes.   CHEST: Denies chest pain or shortness of breath.   CARDIOVASCULAR: Denies palpitations or left sided chest pain.   ABDOMEN: No abdominal pain, constipation, diarrhea, nausea, vomiting or rectal bleeding.   URINARY: No frequency, dysuria, hematuria, or burning on urination.  REPRODUCTIVE: See HPI.   HEMATOLOGIC: No easy bruisability or excessive bleeding with the exception of menstrual cycles.  MUSCULOSKELETAL: Denies joint pain or swelling.   NEUROLOGIC: Denies syncope or weakness.   PSYCHIATRIC: Denies depression, anxiety or mood swings.    PHYSICAL EXAM:  APPEARANCE: Well nourished, well developed, in no acute distress.  AFFECT: WNL, alert and oriented x 3  SKIN: No acne or hirsutism  NECK: Neck symmetric without masses or thyromegaly  NODES: No inguinal, cervical, axillary, or femoral lymph node enlargement  CHEST: Good respiratory effect  ABDOMEN: Soft.  No tenderness or masses.  No hepatosplenomegaly.  No hernias.  PELVIC: Normal external genitalia without lesions.  Normal hair distribution.  Adequate perineal body, normal urethral meatus.  Vagina moist and well rugated without lesions or discharge.  Cervix pink, without lesions, discharge or tenderness.  No significant cystocele or rectocele.  Bimanual exam shows uterus to be approximately 16 week size, irregular, mobile and nontender.  Adnexa without masses or tenderness.    EXTREMITIES: No edema.      ICD-10-CM ICD-9-CM    1. Fibroids, intramural D25.1 218.1    2. " Menorrhagia with irregular cycle N92.1 626.2          Patient was counseled today on treatment options for fibroids including low dose oral contraceptive pills, DepoLupron, UFE, DaVinci assisted myomectomy, abdominal myomectomy, and hysterectomy.  Discussed that because of the tubal ligation, her only means of conceiving would be a tubal reversal or IVF.  Patient does not desire to pursue tubal reversal of IVF.  I have discussed UFE as another means of uterine conservation.  Patient, however, desires hysterectomy.  Recommended a robotic approach as the most likely option for achieving a minimally invasive approach given the size of her uterus.  Patient agrees with management choice.      Surgery scheduled Sept 19

## 2017-08-14 NOTE — TELEPHONE ENCOUNTER
Called pt to offer her a Sx date of 9/5 or 9/19.  Pt agreed to Sx on 9/19.  Informed pt of pre-op appt dates and times.  Pt verbalized understanding.  Letter sent out.      Please place orders for Sx on 9/19

## 2017-08-16 ENCOUNTER — PATIENT MESSAGE (OUTPATIENT)
Dept: OBSTETRICS AND GYNECOLOGY | Facility: CLINIC | Age: 37
End: 2017-08-16

## 2017-08-16 ENCOUNTER — TELEPHONE (OUTPATIENT)
Dept: OBSTETRICS AND GYNECOLOGY | Facility: CLINIC | Age: 37
End: 2017-08-16

## 2017-08-16 NOTE — TELEPHONE ENCOUNTER
I will share the message with Dr Hutson. Radha  ===View-only below this line===      ----- Message -----     From: Sabinendamerica Ly     Sent: 8/16/2017  1:29 PM CDT       To: Agustin Hutson MD  Subject: Visit Follow-Up    Hi Dr. Hutson how are you?  I met wit Dr. Porter and she suggested that I have a hysterectomy.  I decided to go through with it, just a little nervous.    
Statement Selected

## 2017-08-20 DIAGNOSIS — N92.1 MENORRHAGIA WITH IRREGULAR CYCLE: Primary | ICD-10-CM

## 2017-08-20 DIAGNOSIS — N92.0 MENORRHAGIA: ICD-10-CM

## 2017-08-20 RX ORDER — SODIUM CHLORIDE, SODIUM LACTATE, POTASSIUM CHLORIDE, CALCIUM CHLORIDE 600; 310; 30; 20 MG/100ML; MG/100ML; MG/100ML; MG/100ML
INJECTION, SOLUTION INTRAVENOUS CONTINUOUS
Status: CANCELLED | OUTPATIENT
Start: 2017-08-20

## 2017-09-15 ENCOUNTER — OFFICE VISIT (OUTPATIENT)
Dept: OBSTETRICS AND GYNECOLOGY | Facility: CLINIC | Age: 37
End: 2017-09-15
Attending: OBSTETRICS & GYNECOLOGY
Payer: COMMERCIAL

## 2017-09-15 ENCOUNTER — ANESTHESIA EVENT (OUTPATIENT)
Dept: SURGERY | Facility: OTHER | Age: 37
End: 2017-09-15
Payer: COMMERCIAL

## 2017-09-15 ENCOUNTER — HOSPITAL ENCOUNTER (OUTPATIENT)
Dept: PREADMISSION TESTING | Facility: OTHER | Age: 37
Discharge: HOME | End: 2017-09-15
Attending: OBSTETRICS & GYNECOLOGY
Payer: COMMERCIAL

## 2017-09-15 VITALS
BODY MASS INDEX: 26.34 KG/M2 | WEIGHT: 154.31 LBS | TEMPERATURE: 98 F | HEIGHT: 64 IN | OXYGEN SATURATION: 97 % | HEART RATE: 78 BPM

## 2017-09-15 VITALS
WEIGHT: 154.56 LBS | SYSTOLIC BLOOD PRESSURE: 148 MMHG | BODY MASS INDEX: 26.39 KG/M2 | HEIGHT: 64 IN | DIASTOLIC BLOOD PRESSURE: 100 MMHG

## 2017-09-15 DIAGNOSIS — I10 ESSENTIAL HYPERTENSION: ICD-10-CM

## 2017-09-15 DIAGNOSIS — N92.1 MENORRHAGIA WITH IRREGULAR CYCLE: ICD-10-CM

## 2017-09-15 DIAGNOSIS — Z01.818 PREOPERATIVE EXAM FOR GYNECOLOGIC SURGERY: Primary | ICD-10-CM

## 2017-09-15 DIAGNOSIS — D25.0 SUBMUCOUS LEIOMYOMA OF UTERUS: ICD-10-CM

## 2017-09-15 LAB
ABO + RH BLD: NORMAL
BLD GP AB SCN CELLS X3 SERPL QL: NORMAL
HCT VFR BLD AUTO: 33.9 %
HGB BLD-MCNC: 10.9 G/DL

## 2017-09-15 PROCEDURE — 99499 UNLISTED E&M SERVICE: CPT | Mod: S$GLB,,, | Performed by: OBSTETRICS & GYNECOLOGY

## 2017-09-15 PROCEDURE — 86850 RBC ANTIBODY SCREEN: CPT

## 2017-09-15 PROCEDURE — 85018 HEMOGLOBIN: CPT

## 2017-09-15 PROCEDURE — 85014 HEMATOCRIT: CPT

## 2017-09-15 PROCEDURE — 86900 BLOOD TYPING SEROLOGIC ABO: CPT

## 2017-09-15 PROCEDURE — 99999 PR PBB SHADOW E&M-EST. PATIENT-LVL II: CPT | Mod: PBBFAC,,, | Performed by: OBSTETRICS & GYNECOLOGY

## 2017-09-15 RX ORDER — LIDOCAINE HYDROCHLORIDE 10 MG/ML
1 INJECTION, SOLUTION EPIDURAL; INFILTRATION; INTRACAUDAL; PERINEURAL ONCE
Status: CANCELLED | OUTPATIENT
Start: 2017-09-15 | End: 2017-09-15

## 2017-09-15 RX ORDER — FAMOTIDINE 20 MG/1
20 TABLET, FILM COATED ORAL
Status: CANCELLED | OUTPATIENT
Start: 2017-09-15 | End: 2017-09-15

## 2017-09-15 RX ORDER — OXYCODONE HYDROCHLORIDE 5 MG/1
5 TABLET ORAL ONCE AS NEEDED
Status: CANCELLED | OUTPATIENT
Start: 2017-09-15 | End: 2017-09-15

## 2017-09-15 RX ORDER — SODIUM CHLORIDE, SODIUM LACTATE, POTASSIUM CHLORIDE, CALCIUM CHLORIDE 600; 310; 30; 20 MG/100ML; MG/100ML; MG/100ML; MG/100ML
INJECTION, SOLUTION INTRAVENOUS CONTINUOUS
Status: CANCELLED | OUTPATIENT
Start: 2017-09-15

## 2017-09-15 RX ORDER — MIDAZOLAM HYDROCHLORIDE 5 MG/ML
4 INJECTION INTRAMUSCULAR; INTRAVENOUS
Status: CANCELLED | OUTPATIENT
Start: 2017-09-15

## 2017-09-15 NOTE — ANESTHESIA PREPROCEDURE EVALUATION
09/15/2017  Pernell Ly is a 37 y.o., female.    Anesthesia Evaluation    I have reviewed the Patient Summary Reports.    I have reviewed the Nursing Notes.   I have reviewed the Medications.     Review of Systems  Anesthesia Hx:  No problems with previous Anesthesia    Social:  Social Alcohol Use, Non-Smoker    Hematology/Oncology:     Oncology Normal    -- Anemia:   EENT/Dental:EENT/Dental Normal   Cardiovascular:   Exercise tolerance: good Hypertension, well controlled    Pulmonary:  Pulmonary Normal    Renal/:  Renal/ Normal     Hepatic/GI:  Hepatic/GI Normal    Musculoskeletal:  Musculoskeletal Normal    Neurological:   Headaches Seizures, well controlled Childhood    Endocrine:  Endocrine Normal    Dermatological:  Skin Normal    Psych:  Psychiatric Normal           Physical Exam  General:  Well nourished    Airway/Jaw/Neck:  Airway Findings: Mouth Opening: Normal Tongue: Normal  General Airway Assessment: Adult, Good  Mallampati: I  TM Distance: Normal, at least 6 cm  Jaw/Neck Findings:  Neck ROM: Normal ROM      Dental:  Dental Findings: In tact        Mental Status:  Mental Status Findings:  Cooperative, Alert and Oriented         Anesthesia Plan  Type of Anesthesia, risks & benefits discussed:  Anesthesia Type:  general  Patient's Preference:   Intra-op Monitoring Plan: standard ASA monitors  Intra-op Monitoring Plan Comments:   Post Op Pain Control Plan:   Post Op Pain Control Plan Comments:   Induction:    Beta Blocker:         Informed Consent: Patient understands risks and agrees with Anesthesia plan.  Questions answered. Anesthesia consent signed with patient.  ASA Score: 2     Day of Surgery Review of History & Physical:    H&P update referred to the surgeon.     Anesthesia Plan Notes: H/H T&S.        Ready For Surgery From Anesthesia Perspective.

## 2017-09-15 NOTE — DISCHARGE INSTRUCTIONS
PRE-ADMIT TESTING -  [phone]    [address]  [facility]        OUTPATIENT SURGERY UNIT - [phone]    Your surgery has been scheduled at [facility]. We are pleased to have the opportunity to serve you. For Further Information please call [phone].    On the day of surgery please report to the Information Desk on the 1st floor.    · CONTACT YOUR PHYSICIAN'S OFFICE THE DAY PRIOR TO YOUR SURGERY TO OBTAIN YOUR ARRIVAL TIME.     · The evening before surgery do not eat anything after 9 p.m. ( this includes hard candy, chewing gum and mints).  You may only have GATORADE, POWERADE AND WATER  from 9 p.m. until you leave your home.   DO NOT DRINK ANY LIQUIDS ON THE WAY TO THE HOSPITAL.      SPECIAL MEDICATION INSTRUCTIONS: TAKE medications checked off by the Anesthesiologist on your Medication List.    Angiogram Patients: Take medications as instructed by your physician, including aspirin.     Surgery Patients:    If you take ASPIRIN - Your PHYSICIAN/SURGEON will need to inform you IF/OR when you need to stop taking aspirin prior to your surgery.     Do Not take any medications containing IBUPROFEN.  Do Not Wear any make-up or dark nail polish   (especially eye make-up) to surgery. If you come to surgery with makeup on you will be required to remove the makeup or nail polish.    Do not shave your surgical area at least 5 days prior to your surgery. The surgical prep will be performed at the hospital according to Infection Control regulations.    Leave all valuables at home.   Do Not wear any jewelry or watches, including any metal in body piercings.  Contact Lens must be removed before surgery. Either do not wear the contact lens or bring a case and solution for storage.  Please bring a container for eyeglasses or dentures as required.  Bring any paperwork your physician has provided, such as consent forms,  history and physicals, doctor's orders, etc.   Bring comfortable clothes  that are loose fitting to wear upon discharge. Take into consideration the type of surgery being performed.  Maintain your diet as advised per your physician the day prior to surgery.      Adequate rest the night before surgery is advised.   Park in the Parking lot behind the hospital or in the [facility] across the street from the parking lot. Parking is complimentary.  If you will be discharged the same day as your procedure, please arrange for a responsible adult to drive you home or to accompany you if traveling by taxi.   YOU WILL NOT BE PERMITTED TO DRIVE OR TO LEAVE THE HOSPITAL ALONE AFTER SURGERY.   It is strongly recommended that you arrange for someone to remain with you for the first 24 hrs following your surgery.       Thank you for your cooperation.  The Staff of [facility].        Bathing Instructions                                                                 Please shower the evening before and morning of your procedure with    ANTIBACTERIAL SOAP. ( DIAL, etc )  Concentrate on the surgical area   for at least 3 minutes and rinse completely. Dry off as usual.   Do not use any deodorant, powder, body lotions, perfume, after shave or    cologne.

## 2017-09-15 NOTE — PROGRESS NOTES
CC: Preop exam    Pernell Ly is a 37 y.o. female  presents for a pre-op exam for a DVH, bilateral salpingectomy scheduled on .    She reports pelvic pressure.  She reports heavy bleeding to the point that she has to wear Depends.  She has a history of anemia requiring blood transfusion.  Reports clots with her cycles.  Endometrial ablation was attempted at [facility] but was unsuccessful due to the size of her fibroids.  Though ablation was not performed, they did a tubal ligation.       Ultrasound:     Results: Transabdominal followed by transvaginal ultrasound of the pelvis obtained.  The uterus  measures  13.2 x 7.3 x 8.3 cm .  The endometrium is obscured and cannot be measured.  There are to large uterine fibroid, one measures 9.6 x 9.3 cm lower uterine segment and one at the fundus measures 5.8 x 5.7 cm.  The right ovary measures 3.8 x 2.2 x 3.2 cm.     The left ovary measures 4.7 x 3.3 x 2.8 cm.     Normal flow is seen to the bilateral ovaries.  Trace of free fluid noted in the pelvis.     Patient is s/p tubal ligation.  She does not desire future fertility.      Today BP is elevated.  She has been seeing her PCP.  Recently had BP medication change in July with normal BP.  She takes BP at home and reports they are normal.      Past Medical History:   Diagnosis Date    Encounter for blood transfusion     Fibroids     Hypertension     Iron deficiency anemia     Menorrhagia     Migraine     Seizures     as baby       Past Surgical History:   Procedure Laterality Date     SECTION      x1    TUBAL LIGATION         OB History    Para Term  AB Living   1 1       1   SAB TAB Ectopic Multiple Live Births           1      # Outcome Date GA Lbr Joe/2nd Weight Sex Delivery Anes PTL Lv   1 Para 01   2.665 kg (5 lb 14 oz) M CS-Unspec   LEIDY          Family History   Problem Relation Age of Onset    Hypertension Father     Hypertension Sister     Hypertension  "Maternal Grandmother     Breast cancer Maternal Grandmother     Cancer Maternal Grandfather     Hypertension Paternal Grandmother     Colon cancer Maternal Cousin     Ovarian cancer Neg Hx        Social History   Substance Use Topics    Smoking status: Never Smoker    Smokeless tobacco: Never Used    Alcohol use No      Comment: Occasional; 3x /month       BP (!) 148/100 (BP Location: Left arm, Patient Position: Sitting, BP Method: Large (Manual))   Ht 5' 4" (1.626 m)   Wt 70.1 kg (154 lb 8.7 oz)   LMP 08/30/2017 (Exact Date)   BMI 26.53 kg/m²     ROS:  GENERAL: Denies weight gain or weight loss. Feeling well overall.   SKIN: Denies rash or lesions.   HEAD: Denies head injury or headache.   NODES: Denies enlarged lymph nodes.   CHEST: Denies chest pain or shortness of breath.   CARDIOVASCULAR: Denies palpitations or left sided chest pain.   ABDOMEN: No abdominal pain, constipation, diarrhea, nausea, vomiting or rectal bleeding.   URINARY: No frequency, dysuria, hematuria, or burning on urination.  REPRODUCTIVE: See HPI.   HEMATOLOGIC: No easy bruisability or excessive bleeding with the exception of menstrual cycles.  MUSCULOSKELETAL: Denies joint pain or swelling.   NEUROLOGIC: Denies syncope or weakness.   PSYCHIATRIC: Denies depression, anxiety or mood swings.    PHYSICAL EXAM:  APPEARANCE: Well nourished, well developed, in no acute distress.  AFFECT: WNL, alert and oriented x 3  SKIN: No acne or hirsutism  NECK: Neck symmetric without masses or thyromegaly  NODES: No inguinal, cervical, axillary, or femoral lymph node enlargement  CHEST: Good respiratory effect  ABDOMEN: Soft.  No tenderness or masses.  No hepatosplenomegaly.  No hernias.  PELVIC: Deferred  EXTREMITIES: No edema.      ICD-10-CM ICD-9-CM    1. Preoperative exam for gynecologic surgery Z01.818 V72.83    2. Submucous leiomyoma of uterus D25.0 218.0    3. Menorrhagia with irregular cycle N92.1 626.2    4. Essential hypertension I10 401.9 "            I have discussed the risks, benefits, indications, and alternatives of the procedure in detail.  The patient verbalizes her understanding.  All questions answered.  Consents signed.  The patient agrees to proceed to proceed as planned.    Again discussed options for fibroids including low dose oral contraceptive pills, DepoLupron, UFE, DaVinci assisted myomectomy, abdominal myomectomy, and hysterectomy.  Discussed that because of the tubal ligation, her only means of conceiving would be a tubal reversal or IVF.  Patient does not desire to pursue tubal reversal of IVF.  I have discussed UFE as another means of uterine conservation.  Patient, however, desires hysterectomy.  Recommended a robotic approach as the most likely option for achieving a minimally invasive approach given the size of her uterus.  Patient agrees with management choice.      Also discussed the risks and alternatives to power morcellation.

## 2017-09-19 ENCOUNTER — HOSPITAL ENCOUNTER (OUTPATIENT)
Facility: OTHER | Age: 37
Discharge: HOME | End: 2017-09-20
Attending: OBSTETRICS & GYNECOLOGY | Admitting: OBSTETRICS & GYNECOLOGY
Payer: COMMERCIAL

## 2017-09-19 ENCOUNTER — ANESTHESIA (OUTPATIENT)
Dept: SURGERY | Facility: OTHER | Age: 37
End: 2017-09-19
Payer: COMMERCIAL

## 2017-09-19 DIAGNOSIS — N92.1 MENORRHAGIA WITH IRREGULAR CYCLE: ICD-10-CM

## 2017-09-19 DIAGNOSIS — N92.0 MENORRHAGIA: ICD-10-CM

## 2017-09-19 DIAGNOSIS — Z90.710 S/P LAPAROSCOPIC HYSTERECTOMY: Primary | ICD-10-CM

## 2017-09-19 LAB
B-HCG UR QL: NEGATIVE
CTP QC/QA: YES
POCT GLUCOSE: 106 MG/DL (ref 70–110)

## 2017-09-19 PROCEDURE — 25000003 PHARM REV CODE 250: Performed by: NURSE ANESTHETIST, CERTIFIED REGISTERED

## 2017-09-19 PROCEDURE — 82962 GLUCOSE BLOOD TEST: CPT | Performed by: OBSTETRICS & GYNECOLOGY

## 2017-09-19 PROCEDURE — 71000039 HC RECOVERY, EACH ADD'L HOUR: Performed by: OBSTETRICS & GYNECOLOGY

## 2017-09-19 PROCEDURE — C1782 MORCELLATOR: HCPCS | Performed by: OBSTETRICS & GYNECOLOGY

## 2017-09-19 PROCEDURE — 88307 TISSUE EXAM BY PATHOLOGIST: CPT | Mod: 26,,, | Performed by: PATHOLOGY

## 2017-09-19 PROCEDURE — 63600175 PHARM REV CODE 636 W HCPCS: Performed by: OBSTETRICS & GYNECOLOGY

## 2017-09-19 PROCEDURE — 94799 UNLISTED PULMONARY SVC/PX: CPT

## 2017-09-19 PROCEDURE — 25000003 PHARM REV CODE 250: Performed by: OBSTETRICS & GYNECOLOGY

## 2017-09-19 PROCEDURE — 63600175 PHARM REV CODE 636 W HCPCS: Performed by: SPECIALIST

## 2017-09-19 PROCEDURE — 58662 LAPAROSCOPY EXCISE LESIONS: CPT | Mod: 51,,, | Performed by: OBSTETRICS & GYNECOLOGY

## 2017-09-19 PROCEDURE — 25000003 PHARM REV CODE 250: Performed by: SPECIALIST

## 2017-09-19 PROCEDURE — 37000008 HC ANESTHESIA 1ST 15 MINUTES: Performed by: OBSTETRICS & GYNECOLOGY

## 2017-09-19 PROCEDURE — 36000712 HC OR TIME LEV V 1ST 15 MIN: Performed by: OBSTETRICS & GYNECOLOGY

## 2017-09-19 PROCEDURE — 71000033 HC RECOVERY, INTIAL HOUR: Performed by: OBSTETRICS & GYNECOLOGY

## 2017-09-19 PROCEDURE — 63600175 PHARM REV CODE 636 W HCPCS: Performed by: ANESTHESIOLOGY

## 2017-09-19 PROCEDURE — 86920 COMPATIBILITY TEST SPIN: CPT

## 2017-09-19 PROCEDURE — 25000003 PHARM REV CODE 250: Performed by: ANESTHESIOLOGY

## 2017-09-19 PROCEDURE — 58573 TLH W/T/O UTERUS OVER 250 G: CPT | Mod: ,,, | Performed by: OBSTETRICS & GYNECOLOGY

## 2017-09-19 PROCEDURE — 94761 N-INVAS EAR/PLS OXIMETRY MLT: CPT

## 2017-09-19 PROCEDURE — 36000713 HC OR TIME LEV V EA ADD 15 MIN: Performed by: OBSTETRICS & GYNECOLOGY

## 2017-09-19 PROCEDURE — 88307 TISSUE EXAM BY PATHOLOGIST: CPT | Performed by: PATHOLOGY

## 2017-09-19 PROCEDURE — 37000009 HC ANESTHESIA EA ADD 15 MINS: Performed by: OBSTETRICS & GYNECOLOGY

## 2017-09-19 PROCEDURE — 81025 URINE PREGNANCY TEST: CPT | Performed by: SPECIALIST

## 2017-09-19 PROCEDURE — 27201423 OPTIME MED/SURG SUP & DEVICES STERILE SUPPLY: Performed by: OBSTETRICS & GYNECOLOGY

## 2017-09-19 PROCEDURE — 63600175 PHARM REV CODE 636 W HCPCS: Performed by: NURSE ANESTHETIST, CERTIFIED REGISTERED

## 2017-09-19 RX ORDER — KETOROLAC TROMETHAMINE 30 MG/ML
INJECTION, SOLUTION INTRAMUSCULAR; INTRAVENOUS
Status: DISCONTINUED | OUTPATIENT
Start: 2017-09-19 | End: 2017-09-19

## 2017-09-19 RX ORDER — VECURONIUM BROMIDE FOR INJECTION 1 MG/ML
INJECTION, POWDER, LYOPHILIZED, FOR SOLUTION INTRAVENOUS
Status: DISCONTINUED | OUTPATIENT
Start: 2017-09-19 | End: 2017-09-19

## 2017-09-19 RX ORDER — MIDAZOLAM HYDROCHLORIDE 5 MG/ML
4 INJECTION INTRAMUSCULAR; INTRAVENOUS
Status: DISCONTINUED | OUTPATIENT
Start: 2017-09-19 | End: 2017-09-19 | Stop reason: HOSPADM

## 2017-09-19 RX ORDER — FAMOTIDINE 20 MG/1
20 TABLET, FILM COATED ORAL
Status: COMPLETED | OUTPATIENT
Start: 2017-09-19 | End: 2017-09-19

## 2017-09-19 RX ORDER — FENTANYL CITRATE 50 UG/ML
INJECTION, SOLUTION INTRAMUSCULAR; INTRAVENOUS
Status: DISCONTINUED | OUTPATIENT
Start: 2017-09-19 | End: 2017-09-19

## 2017-09-19 RX ORDER — PROPOFOL 10 MG/ML
VIAL (ML) INTRAVENOUS
Status: DISCONTINUED | OUTPATIENT
Start: 2017-09-19 | End: 2017-09-19

## 2017-09-19 RX ORDER — ONDANSETRON 2 MG/ML
INJECTION INTRAMUSCULAR; INTRAVENOUS
Status: DISCONTINUED | OUTPATIENT
Start: 2017-09-19 | End: 2017-09-19

## 2017-09-19 RX ORDER — CEFAZOLIN SODIUM 2 G/50ML
2 SOLUTION INTRAVENOUS
Status: COMPLETED | OUTPATIENT
Start: 2017-09-19 | End: 2017-09-19

## 2017-09-19 RX ORDER — LIDOCAINE HYDROCHLORIDE 10 MG/ML
1 INJECTION, SOLUTION EPIDURAL; INFILTRATION; INTRACAUDAL; PERINEURAL ONCE
Status: DISCONTINUED | OUTPATIENT
Start: 2017-09-19 | End: 2017-09-19 | Stop reason: HOSPADM

## 2017-09-19 RX ORDER — IBUPROFEN 600 MG/1
600 TABLET ORAL EVERY 6 HOURS PRN
Status: DISCONTINUED | OUTPATIENT
Start: 2017-09-19 | End: 2017-09-20 | Stop reason: HOSPADM

## 2017-09-19 RX ORDER — CALCIUM CARBONATE 200(500)MG
500 TABLET,CHEWABLE ORAL DAILY PRN
Status: DISCONTINUED | OUTPATIENT
Start: 2017-09-19 | End: 2017-09-20 | Stop reason: HOSPADM

## 2017-09-19 RX ORDER — SODIUM CHLORIDE 0.9 % (FLUSH) 0.9 %
3 SYRINGE (ML) INJECTION
Status: DISCONTINUED | OUTPATIENT
Start: 2017-09-19 | End: 2017-09-20 | Stop reason: HOSPADM

## 2017-09-19 RX ORDER — ROCURONIUM BROMIDE 10 MG/ML
INJECTION, SOLUTION INTRAVENOUS
Status: DISCONTINUED | OUTPATIENT
Start: 2017-09-19 | End: 2017-09-19

## 2017-09-19 RX ORDER — ONDANSETRON 8 MG/1
8 TABLET, ORALLY DISINTEGRATING ORAL EVERY 8 HOURS PRN
Status: DISCONTINUED | OUTPATIENT
Start: 2017-09-19 | End: 2017-09-20 | Stop reason: HOSPADM

## 2017-09-19 RX ORDER — GLYCOPYRROLATE 0.2 MG/ML
INJECTION INTRAMUSCULAR; INTRAVENOUS
Status: DISCONTINUED | OUTPATIENT
Start: 2017-09-19 | End: 2017-09-19

## 2017-09-19 RX ORDER — DEXTROSE MONOHYDRATE, SODIUM CHLORIDE, AND POTASSIUM CHLORIDE 50; 1.49; 4.5 G/1000ML; G/1000ML; G/1000ML
INJECTION, SOLUTION INTRAVENOUS CONTINUOUS
Status: DISCONTINUED | OUTPATIENT
Start: 2017-09-19 | End: 2017-09-20 | Stop reason: HOSPADM

## 2017-09-19 RX ORDER — DEXAMETHASONE SODIUM PHOSPHATE 4 MG/ML
INJECTION, SOLUTION INTRA-ARTICULAR; INTRALESIONAL; INTRAMUSCULAR; INTRAVENOUS; SOFT TISSUE
Status: DISCONTINUED | OUTPATIENT
Start: 2017-09-19 | End: 2017-09-19

## 2017-09-19 RX ORDER — AMOXICILLIN 250 MG
1 CAPSULE ORAL 2 TIMES DAILY
Status: DISCONTINUED | OUTPATIENT
Start: 2017-09-19 | End: 2017-09-20 | Stop reason: HOSPADM

## 2017-09-19 RX ORDER — MEPERIDINE HYDROCHLORIDE 50 MG/ML
12.5 INJECTION INTRAMUSCULAR; INTRAVENOUS; SUBCUTANEOUS ONCE AS NEEDED
Status: DISCONTINUED | OUTPATIENT
Start: 2017-09-19 | End: 2017-09-19 | Stop reason: HOSPADM

## 2017-09-19 RX ORDER — NEOSTIGMINE METHYLSULFATE 1 MG/ML
INJECTION, SOLUTION INTRAVENOUS
Status: DISCONTINUED | OUTPATIENT
Start: 2017-09-19 | End: 2017-09-19

## 2017-09-19 RX ORDER — OXYCODONE HYDROCHLORIDE 5 MG/1
5 TABLET ORAL
Status: DISCONTINUED | OUTPATIENT
Start: 2017-09-19 | End: 2017-09-19 | Stop reason: HOSPADM

## 2017-09-19 RX ORDER — HYDROCODONE BITARTRATE AND ACETAMINOPHEN 500; 5 MG/1; MG/1
TABLET ORAL
Status: DISCONTINUED | OUTPATIENT
Start: 2017-09-19 | End: 2017-09-19 | Stop reason: HOSPADM

## 2017-09-19 RX ORDER — SODIUM CHLORIDE, SODIUM LACTATE, POTASSIUM CHLORIDE, CALCIUM CHLORIDE 600; 310; 30; 20 MG/100ML; MG/100ML; MG/100ML; MG/100ML
INJECTION, SOLUTION INTRAVENOUS CONTINUOUS
Status: DISCONTINUED | OUTPATIENT
Start: 2017-09-19 | End: 2017-09-19

## 2017-09-19 RX ORDER — HYDROCODONE BITARTRATE AND ACETAMINOPHEN 10; 325 MG/1; MG/1
1 TABLET ORAL EVERY 4 HOURS PRN
Status: DISCONTINUED | OUTPATIENT
Start: 2017-09-19 | End: 2017-09-20 | Stop reason: HOSPADM

## 2017-09-19 RX ORDER — PHENYLEPHRINE HYDROCHLORIDE 10 MG/ML
INJECTION INTRAVENOUS
Status: DISCONTINUED | OUTPATIENT
Start: 2017-09-19 | End: 2017-09-19

## 2017-09-19 RX ORDER — LIDOCAINE HCL/PF 100 MG/5ML
SYRINGE (ML) INTRAVENOUS
Status: DISCONTINUED | OUTPATIENT
Start: 2017-09-19 | End: 2017-09-19

## 2017-09-19 RX ORDER — SIMETHICONE 80 MG
1 TABLET,CHEWABLE ORAL 3 TIMES DAILY PRN
Status: DISCONTINUED | OUTPATIENT
Start: 2017-09-19 | End: 2017-09-20 | Stop reason: HOSPADM

## 2017-09-19 RX ORDER — FENTANYL CITRATE 50 UG/ML
25 INJECTION, SOLUTION INTRAMUSCULAR; INTRAVENOUS EVERY 5 MIN PRN
Status: DISCONTINUED | OUTPATIENT
Start: 2017-09-19 | End: 2017-09-19 | Stop reason: HOSPADM

## 2017-09-19 RX ORDER — HYDROMORPHONE HYDROCHLORIDE 2 MG/ML
0.4 INJECTION, SOLUTION INTRAMUSCULAR; INTRAVENOUS; SUBCUTANEOUS EVERY 5 MIN PRN
Status: DISCONTINUED | OUTPATIENT
Start: 2017-09-19 | End: 2017-09-19 | Stop reason: HOSPADM

## 2017-09-19 RX ORDER — HYDROMORPHONE HYDROCHLORIDE 1 MG/ML
0.5 INJECTION, SOLUTION INTRAMUSCULAR; INTRAVENOUS; SUBCUTANEOUS
Status: DISCONTINUED | OUTPATIENT
Start: 2017-09-19 | End: 2017-09-20 | Stop reason: HOSPADM

## 2017-09-19 RX ORDER — HYDROCODONE BITARTRATE AND ACETAMINOPHEN 5; 325 MG/1; MG/1
1 TABLET ORAL EVERY 4 HOURS PRN
Status: DISCONTINUED | OUTPATIENT
Start: 2017-09-19 | End: 2017-09-20 | Stop reason: HOSPADM

## 2017-09-19 RX ADMIN — OXYCODONE HYDROCHLORIDE 5 MG: 5 TABLET ORAL at 02:09

## 2017-09-19 RX ADMIN — DEXTROSE MONOHYDRATE, SODIUM CHLORIDE, AND POTASSIUM CHLORIDE: 50; 4.5; 1.49 INJECTION, SOLUTION INTRAVENOUS at 03:09

## 2017-09-19 RX ADMIN — VECURONIUM BROMIDE FOR INJECTION 1 MG: 1 INJECTION, POWDER, LYOPHILIZED, FOR SOLUTION INTRAVENOUS at 12:09

## 2017-09-19 RX ADMIN — CEFAZOLIN SODIUM 2 G: 2 SOLUTION INTRAVENOUS at 12:09

## 2017-09-19 RX ADMIN — ONDANSETRON 8 MG: 8 TABLET, ORALLY DISINTEGRATING ORAL at 04:09

## 2017-09-19 RX ADMIN — SODIUM CHLORIDE, SODIUM LACTATE, POTASSIUM CHLORIDE, AND CALCIUM CHLORIDE: 600; 310; 30; 20 INJECTION, SOLUTION INTRAVENOUS at 11:09

## 2017-09-19 RX ADMIN — PHENYLEPHRINE HYDROCHLORIDE 100 MCG: 10 INJECTION INTRAVENOUS at 10:09

## 2017-09-19 RX ADMIN — HYDROMORPHONE HYDROCHLORIDE 0.4 MG: 2 INJECTION INTRAMUSCULAR; INTRAVENOUS; SUBCUTANEOUS at 02:09

## 2017-09-19 RX ADMIN — CARBOXYMETHYLCELLULOSE SODIUM 2 DROP: 2.5 SOLUTION/ DROPS OPHTHALMIC at 09:09

## 2017-09-19 RX ADMIN — FENTANYL CITRATE 50 MCG: 50 INJECTION, SOLUTION INTRAMUSCULAR; INTRAVENOUS at 12:09

## 2017-09-19 RX ADMIN — CEFAZOLIN SODIUM 2 G: 2 SOLUTION INTRAVENOUS at 09:09

## 2017-09-19 RX ADMIN — HYDROCODONE BITARTRATE AND ACETAMINOPHEN 1 TABLET: 10; 325 TABLET ORAL at 04:09

## 2017-09-19 RX ADMIN — ROCURONIUM BROMIDE 50 MG: 10 INJECTION, SOLUTION INTRAVENOUS at 09:09

## 2017-09-19 RX ADMIN — ONDANSETRON 4 MG: 2 INJECTION INTRAMUSCULAR; INTRAVENOUS at 09:09

## 2017-09-19 RX ADMIN — HYDROMORPHONE HYDROCHLORIDE 0.5 MG: 1 INJECTION, SOLUTION INTRAMUSCULAR; INTRAVENOUS; SUBCUTANEOUS at 06:09

## 2017-09-19 RX ADMIN — SODIUM CHLORIDE, SODIUM LACTATE, POTASSIUM CHLORIDE, AND CALCIUM CHLORIDE: 600; 310; 30; 20 INJECTION, SOLUTION INTRAVENOUS at 09:09

## 2017-09-19 RX ADMIN — SIMETHICONE CHEW TAB 80 MG 80 MG: 80 TABLET ORAL at 04:09

## 2017-09-19 RX ADMIN — PROMETHAZINE HYDROCHLORIDE 6.25 MG: 25 INJECTION INTRAMUSCULAR; INTRAVENOUS at 09:09

## 2017-09-19 RX ADMIN — KETOROLAC TROMETHAMINE 30 MG: 30 INJECTION, SOLUTION INTRAMUSCULAR; INTRAVENOUS at 12:09

## 2017-09-19 RX ADMIN — FENTANYL CITRATE 100 MCG: 50 INJECTION, SOLUTION INTRAMUSCULAR; INTRAVENOUS at 09:09

## 2017-09-19 RX ADMIN — NEOSTIGMINE METHYLSULFATE 5 MG: 1 INJECTION INTRAVENOUS at 01:09

## 2017-09-19 RX ADMIN — IBUPROFEN 600 MG: 600 TABLET, FILM COATED ORAL at 04:09

## 2017-09-19 RX ADMIN — FENTANYL CITRATE 50 MCG: 50 INJECTION, SOLUTION INTRAMUSCULAR; INTRAVENOUS at 10:09

## 2017-09-19 RX ADMIN — DEXTROSE MONOHYDRATE, SODIUM CHLORIDE, AND POTASSIUM CHLORIDE: 50; 4.5; 1.49 INJECTION, SOLUTION INTRAVENOUS at 11:09

## 2017-09-19 RX ADMIN — DEXAMETHASONE SODIUM PHOSPHATE 8 MG: 4 INJECTION, SOLUTION INTRAMUSCULAR; INTRAVENOUS at 09:09

## 2017-09-19 RX ADMIN — LIDOCAINE HYDROCHLORIDE 100 MG: 20 INJECTION, SOLUTION INTRAVENOUS at 09:09

## 2017-09-19 RX ADMIN — MIDAZOLAM HYDROCHLORIDE 4 MG: 5 INJECTION, SOLUTION INTRAMUSCULAR; INTRAVENOUS at 08:09

## 2017-09-19 RX ADMIN — FAMOTIDINE 20 MG: 20 TABLET, FILM COATED ORAL at 08:09

## 2017-09-19 RX ADMIN — PROPOFOL 150 MG: 10 INJECTION, EMULSION INTRAVENOUS at 09:09

## 2017-09-19 RX ADMIN — VECURONIUM BROMIDE FOR INJECTION 3 MG: 1 INJECTION, POWDER, LYOPHILIZED, FOR SOLUTION INTRAVENOUS at 10:09

## 2017-09-19 RX ADMIN — GLYCOPYRROLATE 0.6 MG: 0.2 INJECTION, SOLUTION INTRAMUSCULAR; INTRAVENOUS at 01:09

## 2017-09-19 NOTE — ANESTHESIA POSTPROCEDURE EVALUATION
"Anesthesia Post Evaluation    Patient: Pernell Ly    Procedure(s) Performed: Procedure(s) (LRB):  ROBOTIC ASSISTED LAPAROSCOPIC HYSTERECTOMY (N/A)  SALPINGECTOMY-ROBOTIC ASSISTED (Bilateral)  CYSTOSCOPY (N/A)    Final Anesthesia Type: general  Patient location during evaluation: PACU  Patient participation: Yes- Able to Participate  Level of consciousness: awake and alert  Post-procedure vital signs: reviewed and stable  Pain management: adequate  Airway patency: patent  PONV status at discharge: No PONV  Anesthetic complications: no      Cardiovascular status: blood pressure returned to baseline  Respiratory status: unassisted  Hydration status: euvolemic  Follow-up not needed.        Visit Vitals  /81   Pulse 71   Temp 36.7 °C (98 °F) (Oral)   Resp 16   Ht 5' 4" (1.626 m)   Wt 70 kg (154 lb 5.2 oz)   LMP 09/05/2017   SpO2 100%   Breastfeeding? No   BMI 26.49 kg/m²       Pain/Jimenez Score: Pain Assessment Performed: Yes (9/19/2017  3:00 PM)  Presence of Pain: complains of pain/discomfort (9/19/2017  3:00 PM)  Pain Rating Prior to Med Admin: 5 (9/19/2017  2:50 PM)  Pain Rating Post Med Admin: 4 (9/19/2017  3:00 PM)  Jimenez Score: 9 (9/19/2017  3:00 PM)      "

## 2017-09-19 NOTE — OR NURSING
Pt personal belongings in black [employer] bag, @ bedside, will transport with pt to inpatient room.    Pt sister, July, updated on pt status and wait for inpatient room, verbalized understanding

## 2017-09-19 NOTE — TRANSFER OF CARE
"Anesthesia Transfer of Care Note    Patient: Pernell Ly    Procedure(s) Performed: Procedure(s) (LRB):  ROBOTIC ASSISTED LAPAROSCOPIC HYSTERECTOMY (N/A)  SALPINGECTOMY-ROBOTIC ASSISTED (Bilateral)  CYSTOSCOPY (N/A)    Patient location: PACU    Anesthesia Type: general    Transport from OR: Transported from OR on 2-3 L/min O2 by NC with adequate spontaneous ventilation    Post pain: adequate analgesia    Post assessment: no apparent anesthetic complications    Post vital signs: stable    Level of consciousness: awake    Nausea/Vomiting: no nausea/vomiting    Complications: none    Transfer of care protocol was followed      Last vitals:   Visit Vitals  BP (!) 143/89 (BP Location: Left arm, Patient Position: Lying)   Pulse 83   Temp 36.8 °C (98.2 °F) (Oral)   Resp 18   Ht 5' 4" (1.626 m)   Wt 70 kg (154 lb 5.2 oz)   LMP 09/05/2017   SpO2 100%   Breastfeeding? No   BMI 26.49 kg/m²     "

## 2017-09-19 NOTE — OR NURSING
"Pt resting with eyes closed, awakens easily to verbal stimuli, states pain is "getting better", no c/o nausea and VSS, ready for transfer to inpatient room. Family updated and sent to room 370 and report called to JOSE[facility]. Upon arrival to floor: Side rails up, bed in lowest position, and call light within reach.  "

## 2017-09-19 NOTE — BRIEF OP NOTE
BRIEF OPERATIVE NOTE       SUMMARY      Surgery Date: 09/19/2017     SURGEON: Maryellen Francisco MD    ASSISTANT: Jason Aguiar, PGY-3    PREOP DIAGNOSIS:   1. Fibroids,   2.menorrhagia,   3. Anemia  POSTOP DIAGNOSIS:    1. Same  2. Left ovarian cyst (removed)  3. Adhesions of omentum to the anterior abdominal wall    PROCEDURES:   1. DaVinci assisted laparoscopic hysterectomy with bilateral salpingectomy, left-sided cystectomy  2. Cystoscopy  3. Lysis of adhesions    ANESTHESIA: general    FINDINGS/KEY COMPONENTS:   1. 20 week sized uterus, multiple fibroids  2. Ovarian cyst likely c/w hemorrhagic cyst  3. Grossly normal cystoscopy with bilateral ureteral efflux    ESTIMATED BLOOD LOSS: 250cc  Urine output: 300cc  IVF: 2000cc  COMPLICATIONS: None    PATHOLOGY:   Specimens     Start     Ordered    09/19/17 1243  Specimen to Pathology - Surgery  Once      09/19/17 1242

## 2017-09-19 NOTE — OP NOTE
OPERATIVE REPORT    DATE: (date: 2017)    PREOPERATIVE DIAGNOSIS  1. Fibroids  2. Menorrhagia  3. History of     POSTOPERATIVE DIAGNOSIS  1. Fibroids  2. Menorrhagia  3. History of   4. Left ovarian cyst  5. Adhesions of the omentum to the anterior abdominal wall    PROCEDURE:  1. Robotic assisted laparoscopic hysterectomy with bilateral salpingectomy  2. Cystoscopy  3. Left ovarian cystectomy  4. Lysis of adhesions (less than 5 minutes)    SURGEON: Dr. Maryellen Francisco    ASSISTANT: Dr. Jason Aguiar    ANESTHESIA: General    COMPLICATIONS: None    EBL: 250 cc    IV FLUIDS: 2000 cc    URINE OUTPUT: 300 cc    FINDINGS: Enlarged uterus measuring approximately 20 week size with multiple fibroids, including a large cervical fibroid.  Large left ovarian cyst most likely c/w hemorrhagic corpus luteal cyst.  Normal right ovary.  Bladder intact on cystoscopy with both ureters effluxing urine.      PROCEDURE: Patient was taking to the operating room where general anesthesia was administered and found to be adequate.  She was prepped and draped in the dorsal lithotomy position.  A weighted sterile speculum was placed in the vagina.  The anterior lip of the cervix was grasped with a single tooth tenaculum.  The uterus was sounded to approximately 12 cm.  A stay suture of 0-Vicryl was placed at 12 o'clock and 6 o'clock on the cervix.  A YOHANA manipulator with a 3.5 cm cup was placed inside the endometrial cavity.      Gloves were changed.  A Veress needle was inserted into the umbilicus under tenting of the anterior abdominal wall.  Placement into the peritoneal cavity was confirmed via saline drop test.  The abdomen was insufflated to 15mm Hg using Carbon dioxide.  A 8 mm supraumbilical skin incision was made with the scalpel approximately 8-10 cm above the fundus.  A 8 mm trocar was advanced through this incision.  The camera was placed through the trocar.  Excellent hemostasis was noted.  The patient  was placed in deep Trendelenburg.  An 8 mm left lateral skin incision was made with a scalpel and an 8 mm trocar was advanced through this incision under visualization of the camera.  A 12 mm left upper quadrant incision was made with the scalpel.  A 12 mm Air Seal trocar was advanced through the incision under visualization of the camera.  An 8 mm right lateral skin incision was made with the scalpel.  An 8 mm trocar was advanced through this incision under visualization of the camera.  Excellent hemostasis was noted.  The robot was docked into place.  Instruments were placed.  The surgeon moved to the console for the procedure.      The uterus was inspected and found to be quite large and a little difficult to manipulate.  However, posteriorly, the YOHANA cup was visible.  Anteriorly, the YOHANA cup was difficult to visualize due to large fibroid.  Attention was turned to the left round ligament.  This was cauterized and transected and continued anteriorly which was going over the anterior fibroid.  The bladder flap was unable to be created at that time as it was difficult to delineate where the bladder was and it appeared to be adherent in that area.  Attention was turned to the left fallopian tube.  The fimbria was identified.  The mesosalpinx was cauterized and transected to the level of the utero-ovarian ligament.  This was cauterized and transected and carried down to the level of the uterine vessels.  The vessels were cauterized but not transected.  It was difficult to carry this through the cardinal ligament due to distortion of the anatomy.  Attention was turned to the right round ligament.  It was cauterized and transected and continued anteriorly to finish creating the bladder flap.  Again, this was done with some difficulty due to the large anterior fibroid.  Attention was turned to the right fallopian tube.  The fimbria was identified.  The mesosalpinx was cauterized and transected to the level of the  utero-ovarian ligament.  This was cauterized and transected and carried down to the level of the uterine vessels.  The vessels were cauterized but not transected.  It was difficult to carry this through the cardinal ligament due to distortion of the anatomy.  Attention was turned to the posterior portion of the uterus.  The posterior colpotomy was created and carried around to the level of the uterine vessels bilaterally.  Dissection was continued with some difficulty until the serosa of the uterus overlying the fibroid was dissected down bilaterally.  Once the uterine vessels were better identified, they were cauterized and transected.  The anterior cervix was now visible.  The right portion of the bladder was dissected from the cervix while the left was still found to be adherent.  The posterior colpotomy was continued anteriorly on the right to the mid-line.  Careful dissection of the bladder was performed on the left.  Once this was believed to be adequately dissected, the anterior colpotomy was completed.      The robot was undocked.  The 12 mm umbilical trocar was removed and a morcellator was advanced through this incision under visualization of the camera.  It was determined that visualization was suboptimal so decision was made to remove the uterus vaginally.    Using the YOHANA, the uterus was pulled into the vagina. Using Leyhey clamps, the uterus was manipulated until it could be removed intact.  Decision was made to close the vaginal cuff vaginally.  The anterior and posterior portions of the cuff was grasped with Allis clamps.  The vaginal cuff was reapproximated in an interrupted fashion using 0-Vicryl suture.      Attention was turned to the abdomen.  The pelvis was copiously irrigated and suctioned.  Excellent hemostasis was noted.  Vitagel was placed over the vaginal cuff.      Attention was turned to the left ovary.  An incision was made over the ovary with the scissors.  Using blunt dissection, the  ovarian cyst was dissected away from the ovary.  This was sent to pathology.  Excellent hemostasis was noted but Sugiflow was placed over the ovary to ensure hemostasis.      The perez catheter was removed.  A cystoscope was advanced through the urethra while the robotic laparoscope was in the abdomen.  The bladder was inspected and found to be intact.  Both ureters were seen effluxing urine.  The cystoscope was removed and the perez catheter was again inserted through the urethra.      Attention was turned to the anterior abdominal wall.  The fascia of the 12mm trocars was reapproximated using 0-Vicryl suture with the Waldemar-Aye technique.    The abdomen was deflated and all trocars were removed.  The skin was closed with 4-0 Monocryl in a subcuticular fashion.  Sponge, lap, and needle counts were correct x 2.  The patient was taken to the recovery room in stable condition with the perez draining urine.

## 2017-09-19 NOTE — PLAN OF CARE
Problem: Patient Care Overview  Goal: Plan of Care Review  Outcome: Ongoing (interventions implemented as appropriate)  Patient on RA. IS instructed by nurse.  Patient not tolerating at this time asked to return per nurse.

## 2017-09-19 NOTE — PLAN OF CARE
5:00 PM  Received to room from PACU, RN w/ family at side.  Ox4, still slightly sedated--arousable to voice. Oriented to room, POC, & all equipment. Lap Sites x5l dressing CDI w/ Chan secure. Bowel sounds active, clear liquid diet initiated. Fall precautions implemented & call light in reach. Resting comfortably in low bed, in NAD.     6:07 PM  Resting comfortably in bed at this time.  HTN on RA and afebrile this shift.  CO pain, N/V tolerated w PRNs per MAR.  Lap sites x5 CDI with bandaids and steristrips and only scant red smudge to peripad.   WO appetite Good UOP this shift, dependent to Chan catheter- to be removed in AM.  Repositioned with encouragement in bed and has not ambulated this shift.   Free from injury or skin breakdown; Fall precautions maintained and call light in reach.   POC updated questions answered and comments acknowledged.

## 2017-09-19 NOTE — H&P (VIEW-ONLY)
CC: Preop exam    Pernell Ly is a 37 y.o. female  presents for a pre-op exam for a DVH, bilateral salpingectomy scheduled on .    She reports pelvic pressure.  She reports heavy bleeding to the point that she has to wear Depends.  She has a history of anemia requiring blood transfusion.  Reports clots with her cycles.  Endometrial ablation was attempted at [facility] but was unsuccessful due to the size of her fibroids.  Though ablation was not performed, they did a tubal ligation.       Ultrasound:     Results: Transabdominal followed by transvaginal ultrasound of the pelvis obtained.  The uterus  measures  13.2 x 7.3 x 8.3 cm .  The endometrium is obscured and cannot be measured.  There are to large uterine fibroid, one measures 9.6 x 9.3 cm lower uterine segment and one at the fundus measures 5.8 x 5.7 cm.  The right ovary measures 3.8 x 2.2 x 3.2 cm.     The left ovary measures 4.7 x 3.3 x 2.8 cm.     Normal flow is seen to the bilateral ovaries.  Trace of free fluid noted in the pelvis.     Patient is s/p tubal ligation.  She does not desire future fertility.      Today BP is elevated.  She has been seeing her PCP.  Recently had BP medication change in July with normal BP.  She takes BP at home and reports they are normal.      Past Medical History:   Diagnosis Date    Encounter for blood transfusion     Fibroids     Hypertension     Iron deficiency anemia     Menorrhagia     Migraine     Seizures     as baby       Past Surgical History:   Procedure Laterality Date     SECTION      x1    TUBAL LIGATION         OB History    Para Term  AB Living   1 1       1   SAB TAB Ectopic Multiple Live Births           1      # Outcome Date GA Lbr Joe/2nd Weight Sex Delivery Anes PTL Lv   1 Para 01   2.665 kg (5 lb 14 oz) M CS-Unspec   LEIDY          Family History   Problem Relation Age of Onset    Hypertension Father     Hypertension Sister     Hypertension  "Maternal Grandmother     Breast cancer Maternal Grandmother     Cancer Maternal Grandfather     Hypertension Paternal Grandmother     Colon cancer Maternal Cousin     Ovarian cancer Neg Hx        Social History   Substance Use Topics    Smoking status: Never Smoker    Smokeless tobacco: Never Used    Alcohol use No      Comment: Occasional; 3x /month       BP (!) 148/100 (BP Location: Left arm, Patient Position: Sitting, BP Method: Large (Manual))   Ht 5' 4" (1.626 m)   Wt 70.1 kg (154 lb 8.7 oz)   LMP 08/30/2017 (Exact Date)   BMI 26.53 kg/m²     ROS:  GENERAL: Denies weight gain or weight loss. Feeling well overall.   SKIN: Denies rash or lesions.   HEAD: Denies head injury or headache.   NODES: Denies enlarged lymph nodes.   CHEST: Denies chest pain or shortness of breath.   CARDIOVASCULAR: Denies palpitations or left sided chest pain.   ABDOMEN: No abdominal pain, constipation, diarrhea, nausea, vomiting or rectal bleeding.   URINARY: No frequency, dysuria, hematuria, or burning on urination.  REPRODUCTIVE: See HPI.   HEMATOLOGIC: No easy bruisability or excessive bleeding with the exception of menstrual cycles.  MUSCULOSKELETAL: Denies joint pain or swelling.   NEUROLOGIC: Denies syncope or weakness.   PSYCHIATRIC: Denies depression, anxiety or mood swings.    PHYSICAL EXAM:  APPEARANCE: Well nourished, well developed, in no acute distress.  AFFECT: WNL, alert and oriented x 3  SKIN: No acne or hirsutism  NECK: Neck symmetric without masses or thyromegaly  NODES: No inguinal, cervical, axillary, or femoral lymph node enlargement  CHEST: Good respiratory effect  ABDOMEN: Soft.  No tenderness or masses.  No hepatosplenomegaly.  No hernias.  PELVIC: Deferred  EXTREMITIES: No edema.      ICD-10-CM ICD-9-CM    1. Preoperative exam for gynecologic surgery Z01.818 V72.83    2. Submucous leiomyoma of uterus D25.0 218.0    3. Menorrhagia with irregular cycle N92.1 626.2    4. Essential hypertension I10 401.9 "            I have discussed the risks, benefits, indications, and alternatives of the procedure in detail.  The patient verbalizes her understanding.  All questions answered.  Consents signed.  The patient agrees to proceed to proceed as planned.    Again discussed options for fibroids including low dose oral contraceptive pills, DepoLupron, UFE, DaVinci assisted myomectomy, abdominal myomectomy, and hysterectomy.  Discussed that because of the tubal ligation, her only means of conceiving would be a tubal reversal or IVF.  Patient does not desire to pursue tubal reversal of IVF.  I have discussed UFE as another means of uterine conservation.  Patient, however, desires hysterectomy.  Recommended a robotic approach as the most likely option for achieving a minimally invasive approach given the size of her uterus.  Patient agrees with management choice.      Also discussed the risks and alternatives to power morcellation.

## 2017-09-20 VITALS
HEART RATE: 79 BPM | SYSTOLIC BLOOD PRESSURE: 138 MMHG | OXYGEN SATURATION: 100 % | HEIGHT: 64 IN | WEIGHT: 155.19 LBS | TEMPERATURE: 98 F | DIASTOLIC BLOOD PRESSURE: 71 MMHG | RESPIRATION RATE: 18 BRPM | BODY MASS INDEX: 26.49 KG/M2

## 2017-09-20 LAB
BASOPHILS # BLD AUTO: 0 K/UL
BASOPHILS NFR BLD: 0 %
DIFFERENTIAL METHOD: ABNORMAL
EOSINOPHIL # BLD AUTO: 0 K/UL
EOSINOPHIL NFR BLD: 0 %
ERYTHROCYTE [DISTWIDTH] IN BLOOD BY AUTOMATED COUNT: 16 %
HCT VFR BLD AUTO: 32.6 %
HGB BLD-MCNC: 10.6 G/DL
LYMPHOCYTES # BLD AUTO: 0.9 K/UL
LYMPHOCYTES NFR BLD: 8.1 %
MCH RBC QN AUTO: 26.8 PG
MCHC RBC AUTO-ENTMCNC: 32.5 G/DL
MCV RBC AUTO: 83 FL
MONOCYTES # BLD AUTO: 1 K/UL
MONOCYTES NFR BLD: 8.9 %
NEUTROPHILS # BLD AUTO: 9.5 K/UL
NEUTROPHILS NFR BLD: 82.8 %
PLATELET # BLD AUTO: 326 K/UL
PMV BLD AUTO: 8.7 FL
RBC # BLD AUTO: 3.95 M/UL
WBC # BLD AUTO: 11.52 K/UL

## 2017-09-20 PROCEDURE — 99024 POSTOP FOLLOW-UP VISIT: CPT | Mod: ,,, | Performed by: OBSTETRICS & GYNECOLOGY

## 2017-09-20 PROCEDURE — 85025 COMPLETE CBC W/AUTO DIFF WBC: CPT

## 2017-09-20 PROCEDURE — 36415 COLL VENOUS BLD VENIPUNCTURE: CPT

## 2017-09-20 PROCEDURE — 25000003 PHARM REV CODE 250: Performed by: OBSTETRICS & GYNECOLOGY

## 2017-09-20 RX ORDER — IBUPROFEN 600 MG/1
600 TABLET ORAL EVERY 6 HOURS PRN
Qty: 60 TABLET | Refills: 0 | Status: ON HOLD | OUTPATIENT
Start: 2017-09-20 | End: 2017-09-30

## 2017-09-20 RX ORDER — HYDROCODONE BITARTRATE AND ACETAMINOPHEN 5; 325 MG/1; MG/1
1 TABLET ORAL EVERY 4 HOURS PRN
Qty: 20 TABLET | Refills: 0 | Status: ON HOLD | OUTPATIENT
Start: 2017-09-20 | End: 2017-09-30

## 2017-09-20 RX ORDER — AMLODIPINE BESYLATE 5 MG/1
5 TABLET ORAL DAILY
Status: DISCONTINUED | OUTPATIENT
Start: 2017-09-20 | End: 2017-09-20 | Stop reason: HOSPADM

## 2017-09-20 RX ADMIN — STANDARDIZED SENNA CONCENTRATE AND DOCUSATE SODIUM 1 TABLET: 8.6; 5 TABLET, FILM COATED ORAL at 09:09

## 2017-09-20 RX ADMIN — HYDROMORPHONE HYDROCHLORIDE 0.5 MG: 1 INJECTION, SOLUTION INTRAMUSCULAR; INTRAVENOUS; SUBCUTANEOUS at 05:09

## 2017-09-20 RX ADMIN — IBUPROFEN 600 MG: 600 TABLET, FILM COATED ORAL at 04:09

## 2017-09-20 RX ADMIN — HYDROCODONE BITARTRATE AND ACETAMINOPHEN 1 TABLET: 10; 325 TABLET ORAL at 04:09

## 2017-09-20 RX ADMIN — HYDROCODONE BITARTRATE AND ACETAMINOPHEN 1 TABLET: 5; 325 TABLET ORAL at 02:09

## 2017-09-20 RX ADMIN — SIMETHICONE CHEW TAB 80 MG 80 MG: 80 TABLET ORAL at 04:09

## 2017-09-20 RX ADMIN — HYDROCODONE BITARTRATE AND ACETAMINOPHEN 1 TABLET: 5; 325 TABLET ORAL at 09:09

## 2017-09-20 RX ADMIN — SIMETHICONE CHEW TAB 80 MG 80 MG: 80 TABLET ORAL at 09:09

## 2017-09-20 RX ADMIN — CALCIUM CARBONATE 500 MG: 500 TABLET, CHEWABLE ORAL at 09:09

## 2017-09-20 RX ADMIN — AMLODIPINE BESYLATE 5 MG: 5 TABLET ORAL at 09:09

## 2017-09-20 RX ADMIN — ONDANSETRON 8 MG: 8 TABLET, ORALLY DISINTEGRATING ORAL at 04:09

## 2017-09-20 RX ADMIN — IBUPROFEN 600 MG: 600 TABLET, FILM COATED ORAL at 09:09

## 2017-09-20 NOTE — NURSING
OG/GYN med student & Dr. Rolon at bedside. Notified both of pts pain, BP and drainage to lap site. BP reassessed; returned to pt's baseline. Dr. Rolon ok w/ temp and new BP. Also notified MD of pt's home Norvasc not ordered. No new order received. Will d/c perez when they have completed assessment. Will cont to monitor.

## 2017-09-20 NOTE — ASSESSMENT & PLAN NOTE
- discontinue perez this AM  - ambulate TID ordered  - pain well controlled with po medication  - incisions c/d/i  - regular diet ordered  - Temp:  [98 °F (36.7 °C)-100.9 °F (38.3 °C)] 100.9 °F (38.3 °c)  - will continue to monitor temperatures   - CBC showed WBC 11.2  - H/H: 10/32 this Am, stable,   - BP: (133-173)/() 141/76, will restart home amlodipine pending discharge  - goal to void spontaneously, tolerate PO diet, and pass flatus with discharge planned for afternoon

## 2017-09-20 NOTE — SUBJECTIVE & OBJECTIVE
Interval History:     Patient states she is in a little pain this Am. States oral pain medications control her pain. States she vomited last night. Has not been able to tolerate Po. Chan still in, not ambulating. Denies passing flatus. Denies fever, chills, Cp, SOB, or vaginal bleeding.    Scheduled Meds:   senna-docusate 8.6-50 mg  1 tablet Oral BID     Continuous Infusions:   dextrose 5 % and 0.45 % NaCl with KCl 20 mEq 125 mL/hr at 09/19/17 2342     PRN Meds:calcium carbonate, hydrocodone-acetaminophen 10-325mg, hydrocodone-acetaminophen 5-325mg, HYDROmorphone, ibuprofen, ondansetron, promethazine (PHENERGAN) IVPB, simethicone, sodium chloride 0.9%    Review of patient's allergies indicates:  No Known Allergies    Objective:     Vital Signs (Most Recent):  Temp: (!) 100.9 °F (38.3 °C) (09/20/17 0501)  Pulse: 98 (09/20/17 0501)  Resp: 18 (09/20/17 0501)  BP: (!) 141/76 (09/20/17 0557)  SpO2: 97 % (09/20/17 0501) Vital Signs (24h Range):  Temp:  [98 °F (36.7 °C)-100.9 °F (38.3 °C)] 100.9 °F (38.3 °C)  Pulse:  [69-98] 98  Resp:  [16-20] 18  SpO2:  [96 %-100 %] 97 %  BP: (133-173)/() 141/76     Weight: 70.4 kg (155 lb 3.3 oz)  Body mass index is 26.64 kg/m².  Patient's last menstrual period was 09/05/2017.    I&O (Last 24H):      Intake/Output Summary (Last 24 hours) at 09/20/17 0635  Last data filed at 09/20/17 0500    Laboratory:  CBC:   Recent Labs  Lab 09/20/17  0440   WBC 11.52   RBC 3.95*   HGB 10.6*   HCT 32.6*      MCV 83   MCH 26.8*   MCHC 32.5         Physical Exam:   Constitutional: She is oriented to person, place, and time. She appears well-developed and well-nourished. No distress.    HENT:   Head: Normocephalic and atraumatic.      Cardiovascular: Normal rate, regular rhythm and normal heart sounds.     Pulmonary/Chest: Effort normal and breath sounds normal.        Abdominal: Soft. Bowel sounds are normal. She exhibits abdominal incision (c/d/i). She exhibits no distension. There is no  tenderness (appropriate post operatively).             Musculoskeletal: Normal range of motion. She exhibits no edema.       Neurological: She is alert and oriented to person, place, and time.    Skin: Skin is warm and dry.    Psychiatric: She has a normal mood and affect.

## 2017-09-20 NOTE — PROGRESS NOTES
[facility]  Obstetrics & Gynecology  Progress Note    Patient Name: Pernell Ly  MRN: [id number]  Admission Date: 9/19/2017  Primary Care Provider: Zoey Nelson MD  Principal Problem: S/P laparoscopic hysterectomy    Subjective:     HPI:  Patient admitted for RALVD-BS with Left ovarian cystectomy.     Interval History:   Patient states she is in a little pain this Am. States oral pain medications control her pain. States she vomited last night. Has not been able to tolerate Po. Chan still in, not ambulating. Denies passing flatus. Denies fever, chills, Cp, SOB, or vaginal bleeding.    Scheduled Meds:   senna-docusate 8.6-50 mg  1 tablet Oral BID     Continuous Infusions:   dextrose 5 % and 0.45 % NaCl with KCl 20 mEq 125 mL/hr at 09/19/17 2342     PRN Meds:calcium carbonate, hydrocodone-acetaminophen 10-325mg, hydrocodone-acetaminophen 5-325mg, HYDROmorphone, ibuprofen, ondansetron, promethazine (PHENERGAN) IVPB, simethicone, sodium chloride 0.9%    Review of patient's allergies indicates:  No Known Allergies    Objective:     Vital Signs (Most Recent):  Temp: (!) 100.9 °F (38.3 °C) (09/20/17 0501)  Pulse: 98 (09/20/17 0501)  Resp: 18 (09/20/17 0501)  BP: (!) 141/76 (09/20/17 0557)  SpO2: 97 % (09/20/17 0501) Vital Signs (24h Range):  Temp:  [98 °F (36.7 °C)-100.9 °F (38.3 °C)] 100.9 °F (38.3 °C)  Pulse:  [69-98] 98  Resp:  [16-20] 18  SpO2:  [96 %-100 %] 97 %  BP: (133-173)/() 141/76     Weight: 70.4 kg (155 lb 3.3 oz)  Body mass index is 26.64 kg/m².  Patient's last menstrual period was 09/05/2017.    I&O (Last 24H):      Intake/Output Summary (Last 24 hours) at 09/20/17 0604  Last data filed at 09/20/17 0500    Laboratory:  CBC:   Recent Labs  Lab 09/20/17  0440   WBC 11.52   RBC 3.95*   HGB 10.6*   HCT 32.6*      MCV 83   MCH 26.8*   MCHC 32.5         Physical Exam:   Constitutional: She is oriented to person, place, and time. She appears well-developed and well-nourished.  No distress.    HENT:   Head: Normocephalic and atraumatic.      Cardiovascular: Normal rate, regular rhythm and normal heart sounds.     Pulmonary/Chest: Effort normal and breath sounds normal.        Abdominal: Soft. Bowel sounds are normal. She exhibits abdominal incision (c/d/i). She exhibits no distension. There is no tenderness (appropriate post operatively).             Musculoskeletal: Normal range of motion. She exhibits no edema.       Neurological: She is alert and oriented to person, place, and time.    Skin: Skin is warm and dry.    Psychiatric: She has a normal mood and affect.       Assessment/Plan:     * S/P laparoscopic hysterectomy    - discontinue perez this AM  - ambulate TID ordered  - pain well controlled with po medication  - incisions c/d/i  - regular diet ordered  - Temp:  [98 °F (36.7 °C)-100.9 °F (38.3 °C)] 100.9 °F (38.3 °c)  - will continue to monitor temperatures   - CBC showed WBC 11.2  - H/H: 10/32 this Am, stable,   - BP: (133-173)/() 141/76, will restart home amlodipine pending discharge  - goal to void spontaneously, tolerate PO diet, and pass flatus with discharge planned for afternoon            Tere Madrigal MD  Obstetrics & Gynecology  [facility]

## 2017-09-20 NOTE — HOSPITAL COURSE
9/19/17 Patient s/p RALVD-BS with left ovarian cystectomy. Admitted overnight for pain control and observation.  09/20/2017 POD#1 Patient reports vomiting overnight. Not tolerating PO diet. Denies flatus, denies ambulating. Pain controlled with po medication. Plan to discontinue perez. Pending ambulating, tolerating po diet, and voiding spontaneously, plan to discharge later today.

## 2017-09-20 NOTE — NURSING
Called to pt's room. Sanguinous drainage noted to umbilical lap site. Steri-strip dressing reinforced with gauze and mepore island dressing. Pt tolerated well. Pt c/o abdominal discomfort; will admin prn meds per order. Will cont to monitor.

## 2017-09-20 NOTE — PLAN OF CARE
Problem: Patient Care Overview  Goal: Plan of Care Review  Outcome: Ongoing (interventions implemented as appropriate)  Patient in no apparent distress. Sat's  96 % on room air. IS done . Will continue to monitor.

## 2017-09-20 NOTE — PLAN OF CARE
3:18 PM  In agreement and eager for DC.  Tolerating good PO and ambulating well.  Lap sites CDI and no vaginal bleeding to peripad.  Voiding with good UOP.  VU of DC instructions; paperwork and prescriptions passed. IV removed with cath tip intact, WNL.  To be DCd home with family.  Will be escorted downstairs via  transport team once dressed and ready.  Free from falls or skin breakdown this hospital admission.

## 2017-09-20 NOTE — MEDICAL/APP STUDENT
The pt is a 38 yo F presented with heavy vaginal bleeding 2/2 to uterine fibroids.  Pt is POD #1 s/p RA laparoscopic hysterectomy with bilateral salpingectomy. Overnight, pt reports N/V, tolerated clear liquids but unable to tolerate advancing diet.  Pain controlled with Norco and Dilaudid.  Pt unable to ambulate much due to Perez.  Pt not passing flatus, no BM. Umbilical wound was draining sanguineous, dressing changed.     Exam  NAD  Heart: Normal HS  Lungs: Clear  ABD: LUQ tenderness, normal BS  Wounds: c/d/i    A/P  Continue Norco and Dilaudid for pain relief  PRN Phenergan for nausea  Start ambulating  d/c perez  Advance diet    Dispo: pending meeting milestones

## 2017-09-20 NOTE — DISCHARGE INSTRUCTIONS
Discharge Instructions for Laparoscopic Hysterectomy  You had a procedure called laparoscopic hysterectomy. A surgeon removed your uterus using instruments inserted through small incisions in your abdomen. These incisions may be tender or sore. You may also have pain in your upper back or shoulders. This is from the gas used to enlarge your abdomen to allow your doctor to see inside your pelvis and perform the procedure. This pain usually goes away in a day or two. It usually takes from 1 to 4 weeks to recover from laparoscopic hysterectomy. Remember, though, that recovery time varies from woman to woman. Here's what you can do to speed your recovery following surgery.  Home care   · Continue the coughing and deep breathing exercises that you learned in the hospital.  · Take your medications exactly as directed by your doctor.  · Avoid constipation.  ¨ Eat fruits, vegetables, and whole grains.  ¨ Drink 6 to 8 glasses of water a day, unless told to do otherwise.  ¨ Use a laxative or a mild stool softener if your doctor says it's OK.  · Shower as usual. Wash your incisions with mild soap and water. Pat dry.  · Don't use oils, powders, or lotions on your incisions.  · Don't put anything in your vagina until your doctor says it's safe to do so. Don't use tampons or douches. Don't have sex.  · If you had both ovaries removed, report hot flashes, mood swings, and irritability to your doctor. There may be medications that can help you.  Activity  · Ask your doctor when you can start driving again. It's usually okay to drive as soon as you are free of pain and able to move comfortably from side to side. Don't drive while you are still taking opioid pain medications.  · Ask others to help with chores and errands while you recover.  · Dont lift anything heavier than 10 pounds for 6 weeks.  · Dont vacuum or do other strenuous activities until the doctor says it's OK.  · Walk as often as you feel able.  · Don't drive for a  few days after the surgery. You may drive as soon as you are able to move comfortably from side to side and when you are no longer taking narcotics.  · Climb stairs slowly and pause after every few steps.  Follow-up care  Make a follow-up appointment as directed by our staff.     When to call your doctor  Call your doctor right away if you have any of the following:  · Fever above 100.4°F (38°C) or chills  · Bright red vaginal bleeding or vaginal bleeding that soaks more than one sanitary pad per hour  · A foul smelling discharge from the vagina  · Trouble urinating or burning when you urinate  · Severe pain or bloating in your abdomen  · Redness, swelling, or drainage at your incision sites  · Shortness of breath or chest pain  · Nausea and vomiting   Date Last Reviewed: 5/19/2015 © 2000-2017 [employer]. [address] [place] [address]. All rights reserved. This information is not intended as a substitute for professional medical care. Always follow your healthcare professional's instructions.      Acetaminophen; Hydrocodone tablets or capsules  What is this medicine?  ACETAMINOPHEN; HYDROCODONE (a set a KRYSTYNA kelly fen; benjamin droe KOE done) is a pain reliever. It is used to treat moderate to severe pain.  How should I use this medicine?  Take this medicine by mouth with a glass of water. Follow the directions on the prescription label. You can take it with or without food. If it upsets your stomach, take it with food. Do not take your medicine more often than directed.  A special MedGuide will be given to you by the pharmacist with each prescription and refill. Be sure to read this information carefully each time.  Talk to your pediatrician regarding the use of this medicine in children. Special care may be needed.  What side effects may I notice from receiving this medicine?  Side effects that you should report to your doctor or health care professional as soon as possible:  · allergic  reactions like skin rash, itching or hives, swelling of the face, lips, or tongue  · breathing problems  · confusion  · redness, blistering, peeling or loosening of the skin, including inside the mouth  · signs and symptoms of low blood pressure like dizziness; feeling faint or lightheaded, falls; unusually weak or tired  · trouble passing urine or change in the amount of urine  · yellowing of the eyes or skin  Side effects that usually do not require medical attention (report to your doctor or health care professional if they continue or are bothersome):  · constipation  · dry mouth  · nausea, vomiting  · tiredness  What may interact with this medicine?  This medicine may interact with the following medications:  · alcohol  · antiviral medicines for HIV or AIDS  · atropine  · antihistamines for allergy, cough and cold  · certain antibiotics like erythromycin, clarithromycin  · certain medicines for anxiety or sleep  · certain medicines for bladder problems like oxybutynin, tolterodine  · certain medicines for depression like amitriptyline, fluoxetine, sertraline  · certain medicines for fungal infections like ketoconazole and itraconazole  · certain medicines for Parkinson's disease like benztropine, trihexyphenidyl  · certain medicines for seizures like carbamazepine, phenobarbital, phenytoin, primidone  · certain medicines for stomach problems like dicyclomine, hyoscyamine  · certain medicines for travel sickness like scopolamine  · general anesthetics like halothane, isoflurane, methoxyflurane, propofol  · ipratropium  · local anesthetics like lidocaine, pramoxine, tetracaine  · MAOIs like Carbex, Eldepryl, Marplan, Nardil, and Parnate  · medicines that relax muscles for surgery  · other medicines with acetaminophen  · other narcotic medicines for pain or cough  · phenothiazines like chlorpromazine, mesoridazine, prochlorperazine, thioridazine  · rifampin  What if I miss a dose?  If you miss a dose, take it as  soon as you can. If it is almost time for your next dose, take only that dose. Do not take double or extra doses.  Where should I keep my medicine?  Keep out of the reach of children. This medicine can be abused. Keep your medicine in a safe place to protect it from theft. Do not share this medicine with anyone. Selling or giving away this medicine is dangerous and against the law.  This medicine may cause accidental overdose and death if it taken by other adults, children, or pets. Mix any unused medicine with a substance like cat litter or coffee grounds. Then throw the medicine away in a sealed container like a sealed bag or a coffee can with a lid. Do not use the medicine after the expiration date.  Store at room temperature between 15 and 30 degrees C (59 and 86 degrees F).  What should I tell my health care provider before I take this medicine?  They need to know if you have any of these conditions:  · brain tumor  · Crohn's disease, inflammatory bowel disease, or ulcerative colitis  · drug abuse or addiction  · head injury  · heart or circulation problems  · if you often drink alcohol  · kidney disease or problems going to the bathroom  · liver disease  · lung disease, asthma, or breathing problems  · an unusual or allergic reaction to acetaminophen, hydrocodone, other opioid analgesics, other medicines, foods, dyes, or preservatives  · pregnant or trying to get pregnant  · breast-feeding  What should I watch for while using this medicine?  Tell your doctor or health care professional if your pain does not go away, if it gets worse, or if you have new or a different type of pain. You may develop tolerance to the medicine. Tolerance means that you will need a higher dose of the medicine for pain relief. Tolerance is normal and is expected if you take the medicine for a long time.  Do not suddenly stop taking your medicine because you may develop a severe reaction. Your body becomes used to the medicine. This  does NOT mean you are addicted. Addiction is a behavior related to getting and using a drug for a non-medical reason. If you have pain, you have a medical reason to take pain medicine. Your doctor will tell you how much medicine to take. If your doctor wants you to stop the medicine, the dose will be slowly lowered over time to avoid any side effects.  There are different types of narcotic medicines (opiates). If you take more than one type at the same time or if you are taking another medicine that also causes drowsiness, you may have more side effects. Give your health care provider a list of all medicines you use. Your doctor will tell you how much medicine to take. Do not take more medicine than directed. Call emergency for help if you have problems breathing or unusual sleepiness.  Do not take other medicines that contain acetaminophen with this medicine. Always read labels carefully. If you have questions, ask your doctor or pharmacist.  If you take too much acetaminophen get medical help right away. Too much acetaminophen can be very dangerous and cause liver damage. Even if you do not have symptoms, it is important to get help right away.  You may get drowsy or dizzy. Do not drive, use machinery, or do anything that needs mental alertness until you know how this medicine affects you. Do not stand or sit up quickly, especially if you are an older patient. This reduces the risk of dizzy or fainting spells. Alcohol may interfere with the effect of this medicine. Avoid alcoholic drinks.  The medicine will cause constipation. Try to have a bowel movement at least every 2 to 3 days. If you do not have a bowel movement for 3 days, call your doctor or health care professional.  Your mouth may get dry. Chewing sugarless gum or sucking hard candy, and drinking plenty of water may help. Contact your doctor if the problem does not go away or is severe.  NOTE:This sheet is a summary. It may not cover all possible  information. If you have questions about this medicine, talk to your doctor, pharmacist, or health care provider. Copyright© 2017 [employer]        Ibuprofen tablets and capsules  What is this medicine?  IBUPROFEN (eye BYOO proe fen) is a non-steroidal anti-inflammatory drug (NSAID). It is used for dental pain, fever, headaches or migraines, osteoarthritis, rheumatoid arthritis, or painful monthly periods. It can also relieve minor aches and pains caused by a cold, flu, or sore throat.  How should I use this medicine?  Take this medicine by mouth with a glass of water. Follow the directions on the prescription label. Take this medicine with food if your stomach gets upset. Try to not lie down for at least 10 minutes after you take the medicine. Take your medicine at regular intervals. Do not take your medicine more often than directed.  A special MedGuide will be given to you by the pharmacist with each prescription and refill. Be sure to read this information carefully each time.  Talk to your pediatrician regarding the use of this medicine in children. Special care may be needed.  What side effects may I notice from receiving this medicine?  Side effects that you should report to your doctor or health care professional as soon as possible:  · allergic reactions like skin rash, itching or hives, swelling of the face, lips, or tongue  · severe stomach pain  · signs and symptoms of bleeding such as bloody or black, tarry stools; red or dark-brown urine; spitting up blood or brown material that looks like coffee grounds; red spots on the skin; unusual bruising or bleeding from the eye, gums, or nose  · signs and symptoms of a blood clot such as changes in vision; chest pain; severe, sudden headache; trouble speaking; sudden numbness or weakness of the face, arm, or leg  · unexplained weight gain or swelling  · unusually weak or tired  · yellowing of eyes or skin  Side effects that usually do not require medical  attention (report to your doctor or health care professional if they continue or are bothersome):  · bruising  · diarrhea  · dizziness, drowsiness  · headache  · nausea, vomiting  What may interact with this medicine?  Do not take this medicine with any of the following medications:  · cidofovir  · ketorolac  · methotrexate  · pemetrexed  This medicine may also interact with the following medications:  · alcohol  · aspirin  · diuretics  · lithium  · other drugs for inflammation like prednisone  · warfarin  What if I miss a dose?  If you miss a dose, take it as soon as you can. If it is almost time for your next dose, take only that dose. Do not take double or extra doses.  Where should I keep my medicine?  Keep out of the reach of children.  Store at room temperature between 15 and 30 degrees C (59 and 86 degrees F). Keep container tightly closed. Throw away any unused medicine after the expiration date.  What should I tell my health care provider before I take this medicine?  They need to know if you have any of these conditions:  · asthma  · cigarette smoker  · drink more than 3 alcohol containing drinks a day  · heart disease or circulation problems such as heart failure or leg edema (fluid retention)  · high blood pressure  · kidney disease  · liver disease  · stomach bleeding or ulcers  · an unusual or allergic reaction to ibuprofen, aspirin, other NSAIDS, other medicines, foods, dyes, or preservatives  · pregnant or trying to get pregnant  · breast-feeding  What should I watch for while using this medicine?  Tell your doctor or healthcare professional if your symptoms do not start to get better or if they get worse.  This medicine does not prevent heart attack or stroke. In fact, this medicine may increase the chance of a heart attack or stroke. The chance may increase with longer use of this medicine and in people who have heart disease. If you take aspirin to prevent heart attack or stroke, talk with your  doctor or health care professional.  Do not take other medicines that contain aspirin, ibuprofen, or naproxen with this medicine. Side effects such as stomach upset, nausea, or ulcers may be more likely to occur. Many medicines available without a prescription should not be taken with this medicine.  This medicine can cause ulcers and bleeding in the stomach and intestines at any time during treatment. Ulcers and bleeding can happen without warning symptoms and can cause death. To reduce your risk, do not smoke cigarettes or drink alcohol while you are taking this medicine.  You may get drowsy or dizzy. Do not drive, use machinery, or do anything that needs mental alertness until you know how this medicine affects you. Do not stand or sit up quickly, especially if you are an older patient. This reduces the risk of dizzy or fainting spells.  This medicine can cause you to bleed more easily. Try to avoid damage to your teeth and gums when you brush or floss your teeth.  This medicine may be used to treat migraines. If you take migraine medicines for 10 or more days a month, your migraines may get worse. Keep a diary of headache days and medicine use. Contact your healthcare professional if your migraine attacks occur more frequently.  NOTE:This sheet is a summary. It may not cover all possible information. If you have questions about this medicine, talk to your doctor, pharmacist, or health care provider. Copyright© 2017 [employer]

## 2017-09-20 NOTE — DISCHARGE SUMMARY
[facility]  Obstetrics & Gynecology  Discharge Summary    Patient Name: Pernell Ly  MRN: [id number]  Admission Date: 9/19/2017  Hospital Length of Stay: 0 days  Discharge Date and Time:  09/20/2017 4:30 PM  Attending Physician: Maryellen Francisco MD   Discharging Provider: Tere Madrigal MD  Primary Care Provider: Zoey Nelson MD    HPI:  Patient admitted for RALVD-BS with Left ovarian cystectomy.     Hospital Course:  9/19/17 Patient s/p RALVD-BS with left ovarian cystectomy. Admitted overnight for pain control and observation.  09/20/2017 POD#1 Patient reports vomiting overnight. Not tolerating PO diet. Denies flatus, denies ambulating. Pain controlled with po medication. Plan to discontinue perez. Pending ambulating, tolerating po diet, and voiding spontaneously, plan to discharge later today.     Procedure(s) (LRB):  ROBOTIC ASSISTED LAPAROSCOPIC HYSTERECTOMY (N/A)  SALPINGECTOMY-ROBOTIC ASSISTED (Bilateral)  CYSTOSCOPY (N/A)         Significant Diagnostic Studies: Labs:   CBC   Recent Labs  Lab 09/20/17  0440   WBC 11.52   HGB 10.6*   HCT 32.6*          Pending Diagnostic Studies:     None        Final Active Diagnoses:    Diagnosis Date Noted POA    PRINCIPAL PROBLEM:  S/P laparoscopic hysterectomy [Z90.710] 09/19/2017 No    Menorrhagia [N92.0] 08/31/2014 Yes      Problems Resolved During this Admission:    Diagnosis Date Noted Date Resolved POA        Discharged Condition: good    Disposition: Home or Self Care    Follow Up:  Follow-up Information     Maryellen Francisco MD In 6 weeks.    Specialties:  Obstetrics, Obstetrics and Gynecology  Why:  Post op  Contact information:  [address] [address]  [phone]                 Patient Instructions:     Diet general     Activity as tolerated     Call MD for:  temperature >100.4     Call MD for:  persistent nausea and vomiting or diarrhea     Call MD for:  severe uncontrolled pain     Call MD  for:  redness, tenderness, or signs of infection (pain, swelling, redness, odor or green/yellow discharge around incision site)     Call MD for:  difficulty breathing or increased cough     Call MD for:  severe persistent headache     Call MD for:  increased confusion or weakness       Medications:  Reconciled Home Medications:   Current Discharge Medication List      START taking these medications    Details   hydrocodone-acetaminophen 5-325mg (NORCO) 5-325 mg per tablet Take 1 tablet by mouth every 4 (four) hours as needed.  Qty: 20 tablet, Refills: 0      ibuprofen (ADVIL,MOTRIN) 600 MG tablet Take 1 tablet (600 mg total) by mouth every 6 (six) hours as needed.  Qty: 60 tablet, Refills: 0         CONTINUE these medications which have NOT CHANGED    Details   amlodipine (NORVASC) 5 MG tablet Take 1 tablet (5 mg total) by mouth once daily.  Qty: 30 tablet, Refills: 1      ferrous sulfate 324 mg (65 mg iron) TbEC Take 324 mg by mouth.      MULTIVIT-IRON-MIN-FOLIC ACID 3,500-18-0.4 UNIT-MG-MG ORAL CHEW Take by mouth.             eTre Madrigal MD  Obstetrics & Gynecology  [facility]

## 2017-09-22 RX ORDER — AMLODIPINE BESYLATE 5 MG/1
5 TABLET ORAL DAILY
Qty: 30 TABLET | Refills: 1 | Status: SHIPPED | OUTPATIENT
Start: 2017-09-22 | End: 2017-11-01

## 2017-09-23 LAB
BLD PROD TYP BPU: NORMAL
BLD PROD TYP BPU: NORMAL
BLOOD UNIT EXPIRATION DATE: NORMAL
BLOOD UNIT EXPIRATION DATE: NORMAL
BLOOD UNIT TYPE CODE: 5100
BLOOD UNIT TYPE CODE: 5100
BLOOD UNIT TYPE: NORMAL
BLOOD UNIT TYPE: NORMAL
CODING SYSTEM: NORMAL
CODING SYSTEM: NORMAL
DISPENSE STATUS: NORMAL
DISPENSE STATUS: NORMAL
TRANS ERYTHROCYTES VOL PATIENT: NORMAL ML
TRANS ERYTHROCYTES VOL PATIENT: NORMAL ML

## 2017-09-25 ENCOUNTER — PATIENT MESSAGE (OUTPATIENT)
Dept: INTERNAL MEDICINE | Facility: CLINIC | Age: 37
End: 2017-09-25

## 2017-09-26 ENCOUNTER — HOSPITAL ENCOUNTER (INPATIENT)
Facility: OTHER | Age: 37
LOS: 4 days | Discharge: HOME | DRG: 855 | End: 2017-10-01
Attending: EMERGENCY MEDICINE | Admitting: OBSTETRICS & GYNECOLOGY
Payer: COMMERCIAL

## 2017-09-26 DIAGNOSIS — R50.9 ACUTE FEBRILE ILLNESS: Primary | ICD-10-CM

## 2017-09-26 DIAGNOSIS — Z90.710 STATUS POST HYSTERECTOMY: ICD-10-CM

## 2017-09-26 PROBLEM — D25.0 SUBMUCOUS LEIOMYOMA OF UTERUS: Status: RESOLVED | Noted: 2017-05-08 | Resolved: 2017-09-26

## 2017-09-26 LAB
ALBUMIN SERPL BCP-MCNC: 3.3 G/DL
ALP SERPL-CCNC: 61 U/L
ALT SERPL W/O P-5'-P-CCNC: 9 U/L
ANION GAP SERPL CALC-SCNC: 12 MMOL/L
AST SERPL-CCNC: 12 U/L
BACTERIA #/AREA URNS HPF: ABNORMAL /HPF
BACTERIA GENITAL QL WET PREP: ABNORMAL
BASOPHILS # BLD AUTO: 0.02 K/UL
BASOPHILS NFR BLD: 0.2 %
BILIRUB SERPL-MCNC: 0.3 MG/DL
BILIRUB UR QL STRIP: NEGATIVE
BUN SERPL-MCNC: 9 MG/DL
CALCIUM SERPL-MCNC: 9.4 MG/DL
CHLORIDE SERPL-SCNC: 106 MMOL/L
CLARITY UR: CLEAR
CLUE CELLS VAG QL WET PREP: ABNORMAL
CO2 SERPL-SCNC: 19 MMOL/L
COLOR UR: YELLOW
CREAT SERPL-MCNC: 0.7 MG/DL
DIFFERENTIAL METHOD: ABNORMAL
EOSINOPHIL # BLD AUTO: 0.2 K/UL
EOSINOPHIL NFR BLD: 1.5 %
ERYTHROCYTE [DISTWIDTH] IN BLOOD BY AUTOMATED COUNT: 15.5 %
EST. GFR  (AFRICAN AMERICAN): >60 ML/MIN/1.73 M^2
EST. GFR  (NON AFRICAN AMERICAN): >60 ML/MIN/1.73 M^2
FILAMENT FUNGI VAG WET PREP-#/AREA: ABNORMAL
FLUAV AG SPEC QL IA: NEGATIVE
FLUBV AG SPEC QL IA: NEGATIVE
GLUCOSE SERPL-MCNC: 107 MG/DL
GLUCOSE UR QL STRIP: NEGATIVE
HCT VFR BLD AUTO: 29.4 %
HGB BLD-MCNC: 9.6 G/DL
HGB UR QL STRIP: ABNORMAL
KETONES UR QL STRIP: ABNORMAL
LEUKOCYTE ESTERASE UR QL STRIP: ABNORMAL
LYMPHOCYTES # BLD AUTO: 1.2 K/UL
LYMPHOCYTES NFR BLD: 10.4 %
MCH RBC QN AUTO: 26.4 PG
MCHC RBC AUTO-ENTMCNC: 32.7 G/DL
MCV RBC AUTO: 81 FL
MICROSCOPIC COMMENT: ABNORMAL
MONOCYTES # BLD AUTO: 1 K/UL
MONOCYTES NFR BLD: 8 %
NEUTROPHILS # BLD AUTO: 9.4 K/UL
NEUTROPHILS NFR BLD: 79.6 %
NITRITE UR QL STRIP: NEGATIVE
PH UR STRIP: 6 [PH] (ref 5–8)
PLATELET # BLD AUTO: 361 K/UL
PMV BLD AUTO: 8.1 FL
POTASSIUM SERPL-SCNC: 3.6 MMOL/L
PROT SERPL-MCNC: 8.3 G/DL
PROT UR QL STRIP: NEGATIVE
RBC # BLD AUTO: 3.63 M/UL
RBC #/AREA URNS HPF: 1 /HPF (ref 0–4)
SODIUM SERPL-SCNC: 137 MMOL/L
SP GR UR STRIP: 1.02 (ref 1–1.03)
SPECIMEN SOURCE: ABNORMAL
SPECIMEN SOURCE: NORMAL
T VAGINALIS GENITAL QL WET PREP: ABNORMAL
URN SPEC COLLECT METH UR: ABNORMAL
UROBILINOGEN UR STRIP-ACNC: NEGATIVE EU/DL
WBC # BLD AUTO: 11.82 K/UL
WBC #/AREA URNS HPF: 6 /HPF (ref 0–5)
WBC #/AREA VAG WET PREP: ABNORMAL
YEAST GENITAL QL WET PREP: ABNORMAL

## 2017-09-26 PROCEDURE — 99285 EMERGENCY DEPT VISIT HI MDM: CPT | Mod: 25

## 2017-09-26 PROCEDURE — 25000003 PHARM REV CODE 250: Performed by: STUDENT IN AN ORGANIZED HEALTH CARE EDUCATION/TRAINING PROGRAM

## 2017-09-26 PROCEDURE — G0378 HOSPITAL OBSERVATION PER HR: HCPCS

## 2017-09-26 PROCEDURE — 25500020 PHARM REV CODE 255: Performed by: EMERGENCY MEDICINE

## 2017-09-26 PROCEDURE — 96365 THER/PROPH/DIAG IV INF INIT: CPT

## 2017-09-26 PROCEDURE — 85025 COMPLETE CBC W/AUTO DIFF WBC: CPT

## 2017-09-26 PROCEDURE — 80053 COMPREHEN METABOLIC PANEL: CPT

## 2017-09-26 PROCEDURE — 36415 COLL VENOUS BLD VENIPUNCTURE: CPT

## 2017-09-26 PROCEDURE — 87040 BLOOD CULTURE FOR BACTERIA: CPT

## 2017-09-26 PROCEDURE — 99222 1ST HOSP IP/OBS MODERATE 55: CPT | Mod: ,,, | Performed by: OBSTETRICS & GYNECOLOGY

## 2017-09-26 PROCEDURE — 63600175 PHARM REV CODE 636 W HCPCS: Performed by: STUDENT IN AN ORGANIZED HEALTH CARE EDUCATION/TRAINING PROGRAM

## 2017-09-26 PROCEDURE — 81000 URINALYSIS NONAUTO W/SCOPE: CPT

## 2017-09-26 PROCEDURE — 87210 SMEAR WET MOUNT SALINE/INK: CPT

## 2017-09-26 PROCEDURE — 87400 INFLUENZA A/B EACH AG IA: CPT | Mod: 59

## 2017-09-26 PROCEDURE — 87086 URINE CULTURE/COLONY COUNT: CPT

## 2017-09-26 PROCEDURE — 25000003 PHARM REV CODE 250: Performed by: EMERGENCY MEDICINE

## 2017-09-26 PROCEDURE — 11000001 HC ACUTE MED/SURG PRIVATE ROOM

## 2017-09-26 PROCEDURE — 96361 HYDRATE IV INFUSION ADD-ON: CPT

## 2017-09-26 RX ORDER — FERROUS SULFATE 325(65) MG
325 TABLET, DELAYED RELEASE (ENTERIC COATED) ORAL DAILY
Status: DISCONTINUED | OUTPATIENT
Start: 2017-09-27 | End: 2017-10-01 | Stop reason: HOSPADM

## 2017-09-26 RX ORDER — OXYCODONE AND ACETAMINOPHEN 5; 325 MG/1; MG/1
1 TABLET ORAL EVERY 4 HOURS PRN
Status: DISCONTINUED | OUTPATIENT
Start: 2017-09-26 | End: 2017-09-29 | Stop reason: SDUPTHER

## 2017-09-26 RX ORDER — METRONIDAZOLE 250 MG/1
500 TABLET ORAL EVERY 8 HOURS
Status: DISCONTINUED | OUTPATIENT
Start: 2017-09-26 | End: 2017-10-01 | Stop reason: HOSPADM

## 2017-09-26 RX ORDER — HYDROMORPHONE HYDROCHLORIDE 1 MG/ML
0.5 INJECTION, SOLUTION INTRAMUSCULAR; INTRAVENOUS; SUBCUTANEOUS
Status: COMPLETED | OUTPATIENT
Start: 2017-09-26 | End: 2017-09-26

## 2017-09-26 RX ORDER — IBUPROFEN 600 MG/1
600 TABLET ORAL EVERY 6 HOURS
Status: DISCONTINUED | OUTPATIENT
Start: 2017-09-26 | End: 2017-09-27

## 2017-09-26 RX ORDER — AMLODIPINE BESYLATE 5 MG/1
5 TABLET ORAL DAILY
Status: DISCONTINUED | OUTPATIENT
Start: 2017-09-27 | End: 2017-10-01 | Stop reason: HOSPADM

## 2017-09-26 RX ORDER — ACETAMINOPHEN 500 MG
1000 TABLET ORAL
Status: COMPLETED | OUTPATIENT
Start: 2017-09-26 | End: 2017-09-26

## 2017-09-26 RX ORDER — ONDANSETRON 8 MG/1
8 TABLET, ORALLY DISINTEGRATING ORAL EVERY 8 HOURS PRN
Status: DISCONTINUED | OUTPATIENT
Start: 2017-09-26 | End: 2017-10-01 | Stop reason: HOSPADM

## 2017-09-26 RX ORDER — DOCUSATE SODIUM 100 MG/1
100 CAPSULE, LIQUID FILLED ORAL DAILY
Status: DISCONTINUED | OUTPATIENT
Start: 2017-09-26 | End: 2017-10-01 | Stop reason: HOSPADM

## 2017-09-26 RX ORDER — DIPHENHYDRAMINE HCL 25 MG
25 TABLET ORAL NIGHTLY PRN
COMMUNITY
End: 2017-11-01

## 2017-09-26 RX ORDER — HYDROCODONE BITARTRATE AND ACETAMINOPHEN 5; 325 MG/1; MG/1
1 TABLET ORAL EVERY 4 HOURS PRN
Status: DISCONTINUED | OUTPATIENT
Start: 2017-09-26 | End: 2017-09-26

## 2017-09-26 RX ORDER — DIPHENHYDRAMINE HCL 25 MG
25 CAPSULE ORAL EVERY 6 HOURS PRN
Status: DISCONTINUED | OUTPATIENT
Start: 2017-09-26 | End: 2017-10-01 | Stop reason: HOSPADM

## 2017-09-26 RX ORDER — SIMETHICONE 80 MG
1 TABLET,CHEWABLE ORAL 3 TIMES DAILY PRN
Status: DISCONTINUED | OUTPATIENT
Start: 2017-09-26 | End: 2017-10-01 | Stop reason: HOSPADM

## 2017-09-26 RX ORDER — OXYCODONE HYDROCHLORIDE 5 MG/1
5 TABLET ORAL ONCE AS NEEDED
Status: ACTIVE | OUTPATIENT
Start: 2017-09-26 | End: 2017-09-26

## 2017-09-26 RX ADMIN — AMPICILLIN SODIUM 2 G: 2 INJECTION, POWDER, FOR SOLUTION INTRAMUSCULAR; INTRAVENOUS at 08:09

## 2017-09-26 RX ADMIN — IBUPROFEN 600 MG: 600 TABLET, FILM COATED ORAL at 05:09

## 2017-09-26 RX ADMIN — ACETAMINOPHEN 1000 MG: 500 TABLET ORAL at 09:09

## 2017-09-26 RX ADMIN — OXYCODONE HYDROCHLORIDE AND ACETAMINOPHEN 1 TABLET: 5; 325 TABLET ORAL at 08:09

## 2017-09-26 RX ADMIN — SODIUM CHLORIDE 1000 ML: 0.9 INJECTION, SOLUTION INTRAVENOUS at 09:09

## 2017-09-26 RX ADMIN — OXYCODONE HYDROCHLORIDE AND ACETAMINOPHEN 1 TABLET: 5; 325 TABLET ORAL at 02:09

## 2017-09-26 RX ADMIN — IBUPROFEN 600 MG: 600 TABLET, FILM COATED ORAL at 11:09

## 2017-09-26 RX ADMIN — DOCUSATE SODIUM 100 MG: 100 CAPSULE, LIQUID FILLED ORAL at 02:09

## 2017-09-26 RX ADMIN — METRONIDAZOLE 500 MG: 250 TABLET ORAL at 11:09

## 2017-09-26 RX ADMIN — SIMETHICONE CHEW TAB 80 MG 80 MG: 80 TABLET ORAL at 06:09

## 2017-09-26 RX ADMIN — IOHEXOL 75 ML: 350 INJECTION, SOLUTION INTRAVENOUS at 10:09

## 2017-09-26 RX ADMIN — HYDROMORPHONE HYDROCHLORIDE 0.5 MG: 1 INJECTION, SOLUTION INTRAMUSCULAR; INTRAVENOUS; SUBCUTANEOUS at 11:09

## 2017-09-26 RX ADMIN — GENTAMICIN SULFATE 304 MG: 40 INJECTION, SOLUTION INTRAMUSCULAR; INTRAVENOUS at 07:09

## 2017-09-26 RX ADMIN — DIPHENHYDRAMINE HYDROCHLORIDE 25 MG: 25 CAPSULE ORAL at 08:09

## 2017-09-26 NOTE — H&P
"[facility]  Obstetrics & Gynecology  History & Physical    Patient Name: Pernell Ly  MRN: [id number]  Admission Date: 2017  Primary Care Provider: Zoey Nelson MD    Subjective:     Chief Complaint/Reason for Admission: Lower abdominal pain and fever.     History of Present Illness:  37 y.o. Female POD#6 s/p RA-TLH BS with left ovarian cystectomy who presents with complaint of fever that began yesterday. She reports "yellow and brown" malodorous discharge (began two days ago), pain to the lower abdomen and buttocks, dysuria, and light-headedness, but denies chills, nausea, vomiting, diarrhea, constipation, cough, SOB, vaginal bleeding, hematuria, frequency, urgency, drainage from incision sites, or myalgias. Highest fever at home was 100.9 Pain unchanged since discharge, well controlled with po pain medication. Symptoms are described as constant. Pt did not engage in sexual activity after surgery. Patient denies any sick contacts. She has been ambulating, voiding spontaneously, tolerating po, and passing flatus.          Obstetric History       T0      L1     SAB0   TAB0   Ectopic0   Multiple0   Live Births1       # Outcome Date GA Lbr Joe/2nd Weight Sex Delivery Anes PTL Lv   1 Para 01   2.665 kg (5 lb 14 oz) M CS-Unspec   LEIDY        Past Medical History:   Diagnosis Date    Encounter for blood transfusion     Fibroids     Hypertension     Iron deficiency anemia     Menorrhagia     Migraine     Seizures     as baby     Past Surgical History:   Procedure Laterality Date     SECTION      x1    TUBAL LIGATION           (Not in a hospital admission)    Review of patient's allergies indicates:  No Known Allergies     Family History     Problem Relation (Age of Onset)    Breast cancer Maternal Grandmother    Cancer Maternal Grandfather    Colon cancer Maternal Cousin    Hypertension Father, Sister, Maternal Grandmother, Paternal Grandmother        Social " History Main Topics    Smoking status: Never Smoker    Smokeless tobacco: Never Used    Alcohol use No      Comment: Occasional; 3x /month    Drug use: No    Sexual activity: Yes     Partners: Male     Birth control/ protection: See Surgical Hx     Review of Systems   Constitutional: Positive for appetite change and fever. Negative for chills.   Respiratory: Negative for cough and shortness of breath.    Cardiovascular: Negative for chest pain and leg swelling.   Gastrointestinal: Positive for abdominal pain (lower abdomen). Negative for bloating, constipation, diarrhea, nausea and vomiting.   Genitourinary: Positive for dysuria, vaginal discharge, vaginal pain and vaginal odor. Negative for vaginal bleeding.   Musculoskeletal: Negative for back pain and myalgias.        Buttock pain     Neurological: Negative for headaches.   Hematological: Does not bruise/bleed easily.      Objective:     Vital Signs (Most Recent):  Temp: 99.4 °F (37.4 °C) (09/26/17 1320)  Pulse: 96 (09/26/17 1340)  Resp: 18 (09/26/17 1340)  BP: (!) 150/84 (09/26/17 1341)  SpO2: 97 % (09/26/17 1341) Vital Signs (24h Range):  Temp:  [99.3 °F (37.4 °C)-101.9 °F (38.8 °C)] 99.4 °F (37.4 °C)  Pulse:  [] 96  Resp:  [13-21] 18  SpO2:  [97 %-100 %] 97 %  BP: (116-150)/(74-93) 150/84     Weight: 69.4 kg (153 lb)  Body mass index is 26.26 kg/m².    Patient's last menstrual period was 09/05/2017.    Physical Exam:   Constitutional: She is oriented to person, place, and time. She appears well-developed and well-nourished. No distress.    HENT:   Head: Normocephalic and atraumatic.      Cardiovascular: Normal rate, mild tachycardia, low 100s, and normal heart sounds.     Pulmonary/Chest: Effort normal and breath sounds normal.   CTAB.     Abdominal: Soft. Bowel sounds are normal. She exhibits laparoscopic port site abdominal incisions (c/d/i). She exhibits no distension. There is no tenderness. There is no rebound.     Genitourinary: Cervix  exhibits absence (vaginal cuff intact to cotton swab palpation and on bimanual, scant discharge seen). Pt with obvious tenderness on palpation of the cuff with both a cotton swab and on bimanual exam. There was no fullness evident during bimanual.  Musculoskeletal: Normal range of motion. She exhibits no edema.       Neurological: She is alert and oriented to person, place, and time.    Skin: Skin is warm and dry.    Psychiatric: She has a normal mood and affect.       Laboratory:  CBC:   Recent Labs  Lab 09/26/17  0947   WBC 11.82   RBC 3.63*   HGB 9.6*   HCT 29.4*   *   MCV 81*   MCH 26.4*   MCHC 32.7       Diagnostic Results:    Peritoneal Space: Small amount of free fluid is scattered throughout the abdomen or dependent within the pelvis.  Absence of oral contrast makes a focal fluid collection difficult to clearly delineate from bowel loops in the pelvis; no definite fluid collection.  There is a moderate amount of scattered extraperitoneal air with question of a few small dots of free intraperitoneal air    Postsurgical findings of recent hysterectomy. Absence of oral contrast makes a focal fluid collection difficult to clearly delineate from bowel loops in the pelvis; no definite fluid collection.     Assessment/Plan:     * Acute febrile illness - Fever unknown origin in the post op period    - POD#6 s/p RA TLH BS with left ovarian cystectomy  - vaginal cuff intact  - CT scan negative for free fluid in the pelvis/abdomen.  - vaginosis screen showed only scant clue cells  - patient denies anything per vagina since surgery  - patient complains of dysuria with UA showing few bacteria. Urine culture pending  - flu swab negative  - no evidence of DVT  - will get CXR  - Tmax/last 101.9 @0950  - Temp:  [99.3 °F (37.4 °C)-101.9 °F (38.8 °C)] 99.4 °F (37.4 °C)  Pulse:  [] 96  BP: (116-150)/(74-93) 150/84  - VSS  - WBC 11.82 unchanged from 6 days ago  - tylenol PRN fever, if pt spikes again would consider  blood cultures  - will continue to monitor         S/P laparoscopic hysterectomy    - POD#6 s/p RA-LTH  BS with left ovarian cystectomy  - meeting all post operative milestones  - passing flatus, voiding, tolerating po, pain well controlled with po pain medication, and ambulating  - will continue to monitor         Anemia    H/H today 9.6/30 down from 10/32 6 days ago  VSS  Will continue 325 fe daily             Tere Madrigal MD  Obstetrics & Gynecology  [facility]    I was present for patient evaluation and performed exam with Dr. Madrigal.    Suri Last MD  PGY 4 OB/GYN  [phone]

## 2017-09-26 NOTE — ED TRIAGE NOTES
Pt reports to ED c/o 6/10 lower pelvic discomfort with malodorous brown vaginal discharge x 2 days and fever. Pt had hysterectomy x 1 week ago with Dr. Sinclair. Pt took tylenol last night for fever, admits pain has increased over past 2 days. Pt denies chest pain, SOB, vaginal bleeding or discharge to abd incisions. 5 laparoscopic incision noted to abd, no discharge, small blood noted to naval incision. AAO x  4.

## 2017-09-26 NOTE — ED NOTES
Pt AAO X 3 & resting quietly, in no acute distress. Pt reports pain relief with decreased temp after receiving antipyretic. EKG NSR. VS stable with non labored resp. Family member at bedside. Pt & family inquiring as to care plan. Informed pt that she will be admitted to observation for further evaluation. Pt verbalized understanding. Side rails up X 2 with call bed placed within reach.

## 2017-09-26 NOTE — ED NOTES
Pt reporting 6/10 abd pain. Norco prescribed PRN for pt but pt requesting a different pain medication because the norco was making her feel jittery. Will discuss with OBGYN before admin.

## 2017-09-26 NOTE — PLAN OF CARE
Problem: Patient Care Overview  Goal: Plan of Care Review  Outcome: Ongoing (interventions implemented as appropriate)  AAOX4. Oral temperature (102.8) elevated; GYN notified, no new orders given at this time.Pt free of trauma, falls, injury and skin breakdown. Pt pain moderately controlled with PO analgesic.Pt tolerated evening meal. IV abx initiated. Pt ambulates and repositions self independently. Purposeful hourly rounding. Pt has call light in reach, side rails up X2, TEDs, bed in low position and nonskid socks on. Pt lying in bed in no distress. Will continue to monitor.

## 2017-09-26 NOTE — ASSESSMENT & PLAN NOTE
- POD#6 s/p RA-[facility]  BS with left ovarian cystectomy  - meeting all post operative milestones  - passing flatus, voiding, tolerating po, pain well controlled with po pain medication, and ambulating  - will continue to monitor

## 2017-09-26 NOTE — ED NOTES
"Pt reporting 9/10 abd pain, states "it feels like gas". Administered PO pain medications per order. VSS.   "

## 2017-09-26 NOTE — ED NOTES
Rounding completed on pt, pt reporting 2/10 abd pain. NAD with even, unlabored respirations. Bed in lowest, locked position, side rails up x 2. Call light within reach.

## 2017-09-26 NOTE — HPI
"37 y.o. Female POD#6 s/p RA-[facility] BS with left ovarian cystectomy who presents with complaint of fever that began yesterday. She reports "yellow and brown" malodorous discharge (began two days ago), pain to the lower abdomen and buttocks, dysuria, and light-headedness, but denies chills, nausea, vomiting, diarrhea, constipation, cough, SOB, vaginal bleeding, hematuria, frequency, urgency, drainage from incision sites, or myalgias. Highest fever at home was 100.9 Pain unchanged since discharge, well controlled with po pain medication. Symptoms are described as constant. Pt did not engage in sexual activity after surgery. Patient denies any sick contacts. She has been ambulating, voiding spontaneously, tolerating po, and passing flatus.    "

## 2017-09-26 NOTE — HOSPITAL COURSE
"09/26/2017 Admitted from the ED secondary to post operative fever of unknown origin. Patient stable. Last fever 101.9 @ 0950.  09/27/2017 POD#7 s/p RA-TLH-BS with left ovarian cystectomy. Spiked a fever of 102.9 @ 1952. Empiric abx started. C XRAY ordered. Patient pain improving this Am. Meeting all other post operative milestones.   09/28/2017 POD#8. Fever of 102.7 @2000 last night. Pain similar to previous day. Continues to meet post op milestones. Can tolerate PO.  9/29/2017: Afebrile, underwent diagnostic laparoscopy 2/2 CT scan with read as abscess however no abscesses noted, general surgery consulted and agreed  9/30/17: Pt feels "much better" today. Meeting post op milestones, afebrile.  10/1/17: Continues to remain afebrile, meeting milestones, minimal pain. Discharged home  "

## 2017-09-26 NOTE — ED PROVIDER NOTES
"Encounter Date: 2017    SCRIBE #1 NOTE: I, Monet Marcus , am scribing for, and in the presence of, Dr. Jiménez .       History     Chief Complaint   Patient presents with    Post-op Problem     hysterectomy last Tuesday with pelvic pain, smelly discharge, and 100.9 temp at home. last pain pill  last night. Dr. [place] perfomed sx.     Time seen by provider: 9:37 AM    This is a 37 y.o. female who presents with complaint of fever that began yesterday. Pt was discharged six days ago after undergoing hysterectomy one week ago. She reports "yellow and brown" malodorous discharge (began two days ago), pain to the lower abdomen and buttocks, dysuria, and light-headedness, but denies chills, nausea, vomiting, diarrhea, constipation, cough, SOB, vaginal bleeding, hematuria, frequency, urgency, drainage from incision sites, or myalgias. Symptoms are described as constant. Pt did not engage in sexual activity after surgery, and is compliant with HTN medication. She denies use of tobacco or illicit drugs, but admits to occasional use of alcohol.      The history is provided by the patient.     Review of patient's allergies indicates:  No Known Allergies  Past Medical History:   Diagnosis Date    Encounter for blood transfusion     Fibroids     Hypertension     Iron deficiency anemia     Menorrhagia     Migraine     Seizures     as baby     Past Surgical History:   Procedure Laterality Date     SECTION      x1    TUBAL LIGATION       Family History   Problem Relation Age of Onset    Hypertension Father     Hypertension Sister     Hypertension Maternal Grandmother     Breast cancer Maternal Grandmother     Cancer Maternal Grandfather     Hypertension Paternal Grandmother     Colon cancer Maternal Cousin     Ovarian cancer Neg Hx      Social History   Substance Use Topics    Smoking status: Never Smoker    Smokeless tobacco: Never Used    Alcohol use No      Comment: Occasional; 3x /month "     Review of Systems   Constitutional: Positive for fever. Negative for chills.   HENT: Negative for congestion and sore throat.    Eyes: Negative for photophobia and redness.   Respiratory: Negative for cough and shortness of breath.    Cardiovascular: Negative for chest pain.   Gastrointestinal: Positive for abdominal pain. Negative for constipation, diarrhea, nausea and vomiting.   Genitourinary: Positive for dysuria and vaginal discharge. Negative for frequency, hematuria, urgency and vaginal bleeding.   Musculoskeletal: Negative for back pain and myalgias.        Positive for pain to the buttocks.    Skin: Negative for rash.        Negative for drainage from incision sites.    Neurological: Positive for light-headedness. Negative for weakness and headaches.   Psychiatric/Behavioral: Negative for confusion.       Physical Exam     Initial Vitals [09/26/17 0920]   BP Pulse Resp Temp SpO2   (!) 140/87 (!) 127 18 99.4 °F (37.4 °C) 100 %      MAP       104.67         Physical Exam    Nursing note and vitals reviewed.  Constitutional: She appears well-developed and well-nourished. She is not diaphoretic. No distress.   Uncomfortable appearing, but not acutely ill appearing.    HENT:   Head: Normocephalic and atraumatic.   Right Ear: External ear normal.   Left Ear: External ear normal.   Mucous membranes are mildly dry.    Eyes: EOM are normal. Pupils are equal, round, and reactive to light. Right eye exhibits no discharge. Left eye exhibits no discharge. No scleral icterus.   No conjunctival pallor.    Neck: Normal range of motion.   Cardiovascular: Regular rhythm and normal heart sounds. Tachycardia present.  Exam reveals no gallop and no friction rub.    No murmur heard.  Pulmonary/Chest: Breath sounds normal. No respiratory distress. She has no wheezes. She has no rhonchi. She has no rales.   Abdominal: Soft. There is tenderness. There is no rebound and no guarding.   Laproscopic incisions are C/D/I. Mild  tenderness throughout the lower abdomen.   Genitourinary:   Genitourinary Comments: Deferred to OB-GYN.   Musculoskeletal: Normal range of motion. She exhibits no edema or tenderness.   Neurological: She is alert and oriented to person, place, and time.   Skin: Skin is warm and dry. No rash and no abscess noted. No erythema. No pallor.   Psychiatric: She has a normal mood and affect. Her behavior is normal. Judgment and thought content normal.         ED Course   Procedures  Labs Reviewed   URINALYSIS - Abnormal; Notable for the following:        Result Value    Ketones, UA Trace (*)     Occult Blood UA Trace (*)     Leukocytes, UA Trace (*)     All other components within normal limits   URINALYSIS MICROSCOPIC - Abnormal; Notable for the following:     WBC, UA 6 (*)     Bacteria, UA Few (*)     All other components within normal limits   CBC W/ AUTO DIFFERENTIAL - Abnormal; Notable for the following:     RBC 3.63 (*)     Hemoglobin 9.6 (*)     Hematocrit 29.4 (*)     MCV 81 (*)     MCH 26.4 (*)     RDW 15.5 (*)     Platelets 361 (*)     MPV 8.1 (*)     Gran # 9.4 (*)     Gran% 79.6 (*)     Lymph% 10.4 (*)     All other components within normal limits   COMPREHENSIVE METABOLIC PANEL - Abnormal; Notable for the following:     CO2 19 (*)     Albumin 3.3 (*)     ALT 9 (*)     All other components within normal limits   VAGINAL SCREEN - Abnormal; Notable for the following:     Clue Cells, Wet Prep Few (*)     WBC - Vaginal Screen Occasional (*)     Bacteria - Vaginal Screen Few (*)     All other components within normal limits   CULTURE, URINE   CULTURE, BLOOD   CULTURE, BLOOD   CULTURE, URINE   INFLUENZA A AND B ANTIGEN             Medical Decision Making:   Clinical Tests:   Lab Tests: Ordered and Reviewed  Radiological Study: Ordered and Reviewed  ED Management:  9:47 AM- Discussed and consulted case with Dr. Madrigal, who will discuss with upper level.     10:03 AM- GYN residents at the bedside.    12:54 PM- GYN called  back and will admit the patient for observation.             Scribe Attestation:   Scribe #1: I performed the above scribed service and the documentation accurately describes the services I performed. I attest to the accuracy of the note.    Attending Attestation:           Physician Attestation for Scribe:  Physician Attestation Statement for Scribe #1: I, Dr. Jiménez , reviewed documentation, as scribed by Monet Marcus  in my presence, and it is both accurate and complete.                 ED Course      Patient with recent laparoscopic hysterectomy presents with onset of malodorous yellowish discharge 2 days ago, some brown component but she has not noticed bright red blood or clots.  Then developed fevers yesterday.  Continue general malaise and fever today.  Exam appears mildly dehydrated but also tachycardic.  Abdominal wall incisions clean dry and intact.  Discharge present on exam her OB/GYN laboratory studies show mild anemia but no significant leukocytosis or other acute abnormalities urinalysis does not suggest UTI because of postoperative state, fever, concern for intra-abdominal or pelvic abscess CAT scan obtained this does not show any worrisome acute findings.  After discussion with GYN the decision was made to admit patient for observation, hold antibiotics in the absence of source of fever, is send a rapid flu test.  Patient hemodynamically stable while in the emergency department    Clinical Impression:     1. Acute febrile illness    2. Status post hysterectomy                                 Neeraj Jiménez II, MD  09/26/17 [id number]

## 2017-09-26 NOTE — ASSESSMENT & PLAN NOTE
- POD#6 s/p RA TLH BS with left ovarian cystectomy  - vaginal cuff intact  - vaginosis screen showed scant clue cells  - patient denies anything per vagina since surgery  - patient complains of dysuria with UA showing few bacteria. Urine culture pending  - flu swab negative  - no evidence of DVT  - Tmax/last 101.9 @0950  - Temp:  [99.3 °F (37.4 °C)-101.9 °F (38.8 °C)] 99.4 °F (37.4 °C)  Pulse:  [] 96  BP: (116-150)/(74-93) 150/84  - VSS  - WBC 11.82 unchanged from 6 days ago  - will continue to monitor

## 2017-09-26 NOTE — SUBJECTIVE & OBJECTIVE
Obstetric History       T0      L1     SAB0   TAB0   Ectopic0   Multiple0   Live Births1       # Outcome Date GA Lbr Joe/2nd Weight Sex Delivery Anes PTL Lv   1 Para 01   2.665 kg (5 lb 14 oz) M CS-Unspec   LEIDY        Past Medical History:   Diagnosis Date    Encounter for blood transfusion     Fibroids     Hypertension     Iron deficiency anemia     Menorrhagia     Migraine     Seizures     as baby     Past Surgical History:   Procedure Laterality Date     SECTION      x1    TUBAL LIGATION           (Not in a hospital admission)    Review of patient's allergies indicates:  No Known Allergies     Family History     Problem Relation (Age of Onset)    Breast cancer Maternal Grandmother    Cancer Maternal Grandfather    Colon cancer Maternal Cousin    Hypertension Father, Sister, Maternal Grandmother, Paternal Grandmother        Social History Main Topics    Smoking status: Never Smoker    Smokeless tobacco: Never Used    Alcohol use No      Comment: Occasional; 3x /month    Drug use: No    Sexual activity: Yes     Partners: Male     Birth control/ protection: See Surgical Hx     Review of Systems   Constitutional: Positive for appetite change and fever. Negative for chills.   Respiratory: Negative for cough and shortness of breath.    Cardiovascular: Negative for chest pain and leg swelling.   Gastrointestinal: Positive for abdominal pain (lower abdomen). Negative for bloating, constipation, diarrhea, nausea and vomiting.   Genitourinary: Positive for dysuria, vaginal discharge, vaginal pain and vaginal odor. Negative for vaginal bleeding.   Musculoskeletal: Negative for back pain and myalgias.        Buttock pain     Neurological: Negative for headaches.   Hematological: Does not bruise/bleed easily.      Objective:     Vital Signs (Most Recent):  Temp: 99.4 °F (37.4 °C) (17 1320)  Pulse: 96 (17 1340)  Resp: 18 (17 1340)  BP: (!) 150/84 (17  1341)  SpO2: 97 % (09/26/17 1341) Vital Signs (24h Range):  Temp:  [99.3 °F (37.4 °C)-101.9 °F (38.8 °C)] 99.4 °F (37.4 °C)  Pulse:  [] 96  Resp:  [13-21] 18  SpO2:  [97 %-100 %] 97 %  BP: (116-150)/(74-93) 150/84     Weight: 69.4 kg (153 lb)  Body mass index is 26.26 kg/m².    Patient's last menstrual period was 09/05/2017.    Physical Exam:   Constitutional: She is oriented to person, place, and time. She appears well-developed and well-nourished. No distress.    HENT:   Head: Normocephalic and atraumatic.      Cardiovascular: Normal rate, regular rhythm and normal heart sounds.     Pulmonary/Chest: Effort normal and breath sounds normal.        Abdominal: Soft. Bowel sounds are normal. She exhibits abdominal incision (c/d/i). She exhibits no distension. There is no tenderness. There is no rebound.     Genitourinary: Cervix exhibits absence (vaginal cuff intact, scant discharge seen).           Musculoskeletal: Normal range of motion. She exhibits no edema.       Neurological: She is alert and oriented to person, place, and time.    Skin: Skin is warm and dry.    Psychiatric: She has a normal mood and affect.       Laboratory:  CBC:   Recent Labs  Lab 09/26/17  0947   WBC 11.82   RBC 3.63*   HGB 9.6*   HCT 29.4*   *   MCV 81*   MCH 26.4*   MCHC 32.7       Diagnostic Results:    Peritoneal Space: Small amount of free fluid is scattered throughout the abdomen or dependent within the pelvis.  Absence of oral contrast makes a focal fluid collection difficult to clearly delineate from bowel loops in the pelvis; no definite fluid collection.  There is a moderate amount of scattered extraperitoneal air with question of a few small dots of free intraperitoneal air    Postsurgical findings of recent hysterectomy. Absence of oral contrast makes a focal fluid collection difficult to clearly delineate from bowel loops in the pelvis; no definite fluid collection.

## 2017-09-26 NOTE — PLAN OF CARE
09/26/17 1016   ED Admissions Case Approval   ED Admissions Case Approval CM Approved  (IP )

## 2017-09-26 NOTE — ED NOTES
Rounding completed on pt, pt reporting tolerable 5/10 lower abd pain. Restroom needs addressed. Pt ambulated to restroom with slow and steady gait. Bed in lowest, locked position, side rails up x 2. Call light within reach.

## 2017-09-27 LAB
ALBUMIN SERPL BCP-MCNC: 2.8 G/DL
ALP SERPL-CCNC: 60 U/L
ALT SERPL W/O P-5'-P-CCNC: 8 U/L
ANION GAP SERPL CALC-SCNC: 10 MMOL/L
ANISOCYTOSIS BLD QL SMEAR: SLIGHT
AST SERPL-CCNC: 9 U/L
BACTERIA UR CULT: NO GROWTH
BASOPHILS # BLD AUTO: 0.01 K/UL
BASOPHILS NFR BLD: 0.1 %
BILIRUB SERPL-MCNC: 0.4 MG/DL
BUN SERPL-MCNC: 7 MG/DL
CALCIUM SERPL-MCNC: 8.9 MG/DL
CHLORIDE SERPL-SCNC: 104 MMOL/L
CO2 SERPL-SCNC: 23 MMOL/L
CREAT SERPL-MCNC: 0.7 MG/DL
DIFFERENTIAL METHOD: ABNORMAL
EOSINOPHIL # BLD AUTO: 0.2 K/UL
EOSINOPHIL NFR BLD: 1.8 %
ERYTHROCYTE [DISTWIDTH] IN BLOOD BY AUTOMATED COUNT: 15.5 %
EST. GFR  (AFRICAN AMERICAN): >60 ML/MIN/1.73 M^2
EST. GFR  (NON AFRICAN AMERICAN): >60 ML/MIN/1.73 M^2
GLUCOSE SERPL-MCNC: 111 MG/DL
HCT VFR BLD AUTO: 27.4 %
HGB BLD-MCNC: 8.9 G/DL
HYPOCHROMIA BLD QL SMEAR: ABNORMAL
LYMPHOCYTES # BLD AUTO: 1 K/UL
LYMPHOCYTES NFR BLD: 8.6 %
MCH RBC QN AUTO: 26.3 PG
MCHC RBC AUTO-ENTMCNC: 32.5 G/DL
MCV RBC AUTO: 81 FL
MONOCYTES # BLD AUTO: 1.2 K/UL
MONOCYTES NFR BLD: 10.1 %
NEUTROPHILS # BLD AUTO: 9.3 K/UL
NEUTROPHILS NFR BLD: 79.4 %
OVALOCYTES BLD QL SMEAR: ABNORMAL
PLATELET # BLD AUTO: 301 K/UL
PLATELET BLD QL SMEAR: ABNORMAL
PMV BLD AUTO: ABNORMAL FL
POIKILOCYTOSIS BLD QL SMEAR: SLIGHT
POTASSIUM SERPL-SCNC: 3.4 MMOL/L
PROT SERPL-MCNC: 7.3 G/DL
RBC # BLD AUTO: 3.38 M/UL
SODIUM SERPL-SCNC: 137 MMOL/L
WBC # BLD AUTO: 11.73 K/UL

## 2017-09-27 PROCEDURE — 25000003 PHARM REV CODE 250: Performed by: STUDENT IN AN ORGANIZED HEALTH CARE EDUCATION/TRAINING PROGRAM

## 2017-09-27 PROCEDURE — 11000001 HC ACUTE MED/SURG PRIVATE ROOM

## 2017-09-27 PROCEDURE — 85025 COMPLETE CBC W/AUTO DIFF WBC: CPT

## 2017-09-27 PROCEDURE — G0378 HOSPITAL OBSERVATION PER HR: HCPCS

## 2017-09-27 PROCEDURE — 63600175 PHARM REV CODE 636 W HCPCS: Performed by: STUDENT IN AN ORGANIZED HEALTH CARE EDUCATION/TRAINING PROGRAM

## 2017-09-27 PROCEDURE — 80053 COMPREHEN METABOLIC PANEL: CPT

## 2017-09-27 PROCEDURE — 36415 COLL VENOUS BLD VENIPUNCTURE: CPT

## 2017-09-27 RX ORDER — BISACODYL 10 MG
10 SUPPOSITORY, RECTAL RECTAL DAILY PRN
Status: DISCONTINUED | OUTPATIENT
Start: 2017-09-27 | End: 2017-10-01 | Stop reason: HOSPADM

## 2017-09-27 RX ORDER — IBUPROFEN 600 MG/1
600 TABLET ORAL EVERY 6 HOURS PRN
Status: DISCONTINUED | OUTPATIENT
Start: 2017-09-27 | End: 2017-10-01 | Stop reason: HOSPADM

## 2017-09-27 RX ADMIN — AMPICILLIN SODIUM 2 G: 2 INJECTION, POWDER, FOR SOLUTION INTRAMUSCULAR; INTRAVENOUS at 03:09

## 2017-09-27 RX ADMIN — IBUPROFEN 600 MG: 600 TABLET ORAL at 08:09

## 2017-09-27 RX ADMIN — OXYCODONE HYDROCHLORIDE AND ACETAMINOPHEN 1 TABLET: 5; 325 TABLET ORAL at 01:09

## 2017-09-27 RX ADMIN — IBUPROFEN 600 MG: 600 TABLET, FILM COATED ORAL at 06:09

## 2017-09-27 RX ADMIN — AMPICILLIN SODIUM 2 G: 2 INJECTION, POWDER, FOR SOLUTION INTRAMUSCULAR; INTRAVENOUS at 08:09

## 2017-09-27 RX ADMIN — METRONIDAZOLE 500 MG: 250 TABLET ORAL at 10:09

## 2017-09-27 RX ADMIN — AMLODIPINE BESYLATE 5 MG: 5 TABLET ORAL at 08:09

## 2017-09-27 RX ADMIN — DOCUSATE SODIUM 100 MG: 100 CAPSULE, LIQUID FILLED ORAL at 08:09

## 2017-09-27 RX ADMIN — METRONIDAZOLE 500 MG: 250 TABLET ORAL at 08:09

## 2017-09-27 RX ADMIN — OXYCODONE HYDROCHLORIDE AND ACETAMINOPHEN 1 TABLET: 5; 325 TABLET ORAL at 06:09

## 2017-09-27 RX ADMIN — DIPHENHYDRAMINE HYDROCHLORIDE 25 MG: 25 CAPSULE ORAL at 08:09

## 2017-09-27 RX ADMIN — SIMETHICONE CHEW TAB 80 MG 80 MG: 80 TABLET ORAL at 11:09

## 2017-09-27 RX ADMIN — ONDANSETRON 8 MG: 8 TABLET, ORALLY DISINTEGRATING ORAL at 08:09

## 2017-09-27 RX ADMIN — FERROUS SULFATE TAB EC 324 MG (65 MG FE EQUIVALENT) 325 MG: 324 (65 FE) TABLET DELAYED RESPONSE at 08:09

## 2017-09-27 RX ADMIN — SIMETHICONE CHEW TAB 80 MG 80 MG: 80 TABLET ORAL at 06:09

## 2017-09-27 RX ADMIN — SIMETHICONE CHEW TAB 80 MG 80 MG: 80 TABLET ORAL at 08:09

## 2017-09-27 RX ADMIN — METRONIDAZOLE 500 MG: 250 TABLET ORAL at 06:09

## 2017-09-27 RX ADMIN — METRONIDAZOLE 500 MG: 250 TABLET ORAL at 01:09

## 2017-09-27 RX ADMIN — GENTAMICIN SULFATE 304 MG: 40 INJECTION, SOLUTION INTRAMUSCULAR; INTRAVENOUS at 06:09

## 2017-09-27 NOTE — PROGRESS NOTES
"[facility]  Obstetrics & Gynecology  Progress Note    Patient Name: Pernell Ly  MRN: [id number]  Admission Date: 9/26/2017  Primary Care Provider: Zoey Nelson MD  Principal Problem: Acute febrile illness    Subjective:     HPI:  37 y.o. Female POD#6 s/p -[facility] BS with left ovarian cystectomy who presents with complaint of fever that began yesterday. She reports "yellow and brown" malodorous discharge (began two days ago), pain to the lower abdomen and buttocks, dysuria, and light-headedness, but denies chills, nausea, vomiting, diarrhea, constipation, cough, SOB, vaginal bleeding, hematuria, frequency, urgency, drainage from incision sites, or myalgias. Highest fever at home was 100.9 Pain unchanged since discharge, well controlled with po pain medication. Symptoms are described as constant. Pt did not engage in sexual activity after surgery. Patient denies any sick contacts. She has been ambulating, voiding spontaneously, tolerating po, and passing flatus.      Interval History:   Patient sleeping comfortably upon arrival to room. Patient states she was able to sleep through the night. Her pain is improved from yesterday and she states she is feeling better. She has been able to tolerate po. She is ambulating, voiding spontaneous, and passing flatus. She denies any fever, chills, nausea/ vomiting, Cp, SOB or leg pain overnight. Dysuria improving. Denies hematuria.     Scheduled Meds:   amlodipine  5 mg Oral Daily    ampicillin IVPB  2 g Intravenous Q6H    docusate sodium  100 mg Oral Daily    ferrous sulfate  325 mg Oral Daily    gentamicin  5 mg/kg (Adjusted) Intravenous Q24H    ibuprofen  600 mg Oral Q6H    metronidazole  500 mg Oral Q8H     Continuous Infusions:   PRN Meds:diphenhydrAMINE, influenza, ondansetron, oxycodone-acetaminophen, simethicone    Review of patient's allergies indicates:  No Known Allergies    Objective:     Vital Signs (Most Recent):  Temp: 98.7 °F (37.1 " °C) (09/27/17 0435)  Pulse: 86 (09/27/17 0435)  Resp: 18 (09/27/17 0435)  BP: 107/71 (09/27/17 0435)  SpO2: 97 % (09/27/17 0435) Vital Signs (24h Range):  Temp:  [98.7 °F (37.1 °C)-102.9 °F (39.4 °C)] 98.7 °F (37.1 °C)  Pulse:  [] 86  Resp:  [12-21] 18  SpO2:  [97 %-100 %] 97 %  BP: (107-151)/(65-97) 107/71     Weight: 70.4 kg (155 lb 1.6 oz)  Body mass index is 26.62 kg/m².  Patient's last menstrual period was 09/05/2017.    I&O (Last 24H):      Intake/Output Summary (Last 24 hours) at 09/27/17 0618  Last data filed at 09/26/17 1149    Laboratory:  CBC:   Recent Labs  Lab 09/26/17  0947   WBC 11.82   RBC 3.63*   HGB 9.6*   HCT 29.4*   *   MCV 81*   MCH 26.4*   MCHC 32.7       Diagnostic Results:  CT Pelvis 9/26/17    Postsurgical findings of recent hysterectomy. Absence of oral contrast makes a focal fluid collection difficult to clearly delineate from bowel loops in the pelvis; no definite fluid collection.    CXR 9/26/17  Normal radiographic appearance of the chest.    Physical Exam:   Constitutional: She is oriented to person, place, and time. She appears well-developed and well-nourished. No distress.    HENT:   Head: Normocephalic and atraumatic.      Cardiovascular: Normal rate, regular rhythm and normal heart sounds.     Pulmonary/Chest: Effort normal and breath sounds normal. No respiratory distress. She has no wheezes.        Abdominal: Soft. Bowel sounds are normal. She exhibits abdominal incision (c /d/i). She exhibits no distension. There is no tenderness. There is no guarding.             Musculoskeletal: Normal range of motion. She exhibits no edema.       Neurological: She is alert and oriented to person, place, and time.    Skin: Skin is warm and dry.    Psychiatric: She has a normal mood and affect.       Assessment/Plan:     * Acute febrile illness    - POD#6 s/p RA TLH BS with left ovarian cystectomy  - vaginal cuff intact with tenderness but no fullness noted  - vaginosis screen  showed scant clue cells  - patient denies anything per vagina since surgery  - patient complains of dysuria with UA showing few bacteria. Urine culture pending  - flu swab negative  - no evidence of DVT  - Tmax/last 102.9 @1952  - Temp:  [98.7 °F (37.1 °C)-102.9 °F (39.4 °C)] 98.7 °F (37.1 °C)  Pulse:  [] 86  BP: (107-151)/(65-97) 107/71  - WBC 11.82 unchanged from 6 days ago  - empiric abx treatment started after last fever of 102.9. Currently on amp, gent, flagyl   - Patient improving with abx   - will continue to monitor until afebrile for 24 hours, pending urine culture         S/P laparoscopic hysterectomy    - POD#7 s/p RA-LT  BS with left ovarian cystectomy  - meeting all post operative milestones  - passing flatus, voiding, tolerating po, pain well controlled with po pain medication, and ambulating  - will continue to monitor         Anemia    H/H today 9.6/30 down from 10/32 6 days ago  Temp:  [98.7 °F (37.1 °C)-102.9 °F (39.4 °C)] 98.7 °F (37.1 °C)  Pulse:  [] 86  BP: (107-151)/(65-97) 107/71  VSS  Will continue 325 fe daily             Tere Madrigal MD  Obstetrics & Gynecology  [facility]

## 2017-09-27 NOTE — ASSESSMENT & PLAN NOTE
H/H today 9.6/30 down from 10/32 6 days ago  Temp:  [98.7 °F (37.1 °C)-102.9 °F (39.4 °C)] 98.7 °F (37.1 °C)  Pulse:  [] 86  BP: (107-151)/(65-97) 107/71  VSS  Will continue 325 fe daily

## 2017-09-27 NOTE — PROGRESS NOTES
Seen this Pm. Resting comfortably in bed texting her son. Patient states pain is much improved from this Am. She complains of gas pain. Patient has not been ambulating. She passed a BM this morning. Encouraged ambulation. Continue po pain medication and IV abx. Tmax/last 102.9@1952.

## 2017-09-27 NOTE — PLAN OF CARE
Problem: Patient Care Overview  Goal: Plan of Care Review  Outcome: Ongoing (interventions implemented as appropriate)  Reviewed plan of care with pt and family at bedside. IV & PO abx admin per order. Pain well controlled w/ po meds. Temp monitored and controlled w/ prn meds. All safety precautions in place, bed alarm on, side rails up x2, call light within reach/ Instructed pt to call for assistance. Pt remains free from falls/injury. Will continue to monitor.

## 2017-09-27 NOTE — ASSESSMENT & PLAN NOTE
- POD#6 s/p RA TLH BS with left ovarian cystectomy  - vaginal cuff intact with tenderness but no fullness noted  - vaginosis screen showed scant clue cells  - patient denies anything per vagina since surgery  - patient complains of dysuria with UA showing few bacteria. Urine culture pending  - flu swab negative  - no evidence of DVT  - Tmax/last 102.9 @1952  - Temp:  [98.7 °F (37.1 °C)-102.9 °F (39.4 °C)] 98.7 °F (37.1 °C)  Pulse:  [] 86  BP: (107-151)/(65-97) 107/71  - WBC 11.82 unchanged from 6 days ago  - empiric abx treatment started after last fever of 102.9. Currently on amp, gent, flagyl   - Patient improving with abx   - will continue to monitor until afebrile for 24 hours, pending urine culture

## 2017-09-27 NOTE — ASSESSMENT & PLAN NOTE
- POD#7 s/p RA-[facility]  BS with left ovarian cystectomy  - meeting all post operative milestones  - passing flatus, voiding, tolerating po, pain well controlled with po pain medication, and ambulating  - will continue to monitor

## 2017-09-27 NOTE — MEDICAL/APP STUDENT
Miss Henderson is a 38 yo female   POD #7 S/P RA-TL BS     S:    No acute overnight events.  Patient able to sleep ok.  No bowel movements, gas +  Able to urinate  Ambulates when going to bathroom  Pain controlled with PO meds    O:    T: 98.7  P 86  Resp 18  bp 107/71  SPO2 97    Patient alert, oriented x3    Heart: R/R/R, no added sounds, no murmurs  Lungs: Clear to auscultation    Abdomen: Bowel sounds present on auscultation  No distension, no tenderness.  Dressings c/d/i    No new labs    A/P:

## 2017-09-27 NOTE — PLAN OF CARE
Problem: Patient Care Overview  Goal: Plan of Care Review  Outcome: Ongoing (interventions implemented as appropriate)  Reviewed plan of care with pt and family at bedside. IV & po abx admin per order. Pain well controlled w/ po prn meds. Pt was febrile in beginning of shift; medicated per orders, temp down to 98.7 and has remained afebrile since. All safety precautions in place, side rails up x2, call light within reach/ Instructed pt to call for assistance. Pt remains free from falls/injury. Will continue to monitor.

## 2017-09-27 NOTE — PLAN OF CARE
SW met with pt at bedside to complete discharge assessment.  Pt lives with her 17 yo son, Luis.  No needs identified at this time by SW or pt.     09/27/17 1279   Discharge Assessment   Assessment Type Discharge Planning Assessment   Confirmed/corrected address and phone number on facesheet? Yes   Assessment information obtained from? Patient   Communicated expected length of stay with patient/caregiver no   Prior to hospitilization cognitive status: Alert/Oriented   Prior to hospitalization functional status: Independent   Current cognitive status: Alert/Oriented   Current Functional Status: Independent   Lives With child(lalo), dependent   Able to Return to Prior Arrangements yes   Is patient able to care for self after discharge? Yes   Patient's perception of discharge disposition home or selfcare   Readmission Within The Last 30 Days no previous admission in last 30 days   Patient currently being followed by outpatient case management? No   Patient currently receives any other outside agency services? No   Equipment Currently Used at Home none   Do you have any problems affording any of your prescribed medications? No   Is the patient taking medications as prescribed? yes   Does the patient have transportation home? Yes   Transportation Available family or friend will provide  ([place])   Does the patient receive services at the [facility]? No   Discharge Plan A Home   Patient/Family In Agreement With Plan yes

## 2017-09-27 NOTE — SUBJECTIVE & OBJECTIVE
Interval History:   Patient sleeping comfortably upon arrival to room. Patient states she was able to sleep through the night. Her pain is improved from yesterday and she states she is feeling better. She has been able to tolerate po. She is ambulating, voiding spontaneous, and passing flatus. She denies any fever, chills, nausea/ vomiting, Cp, SOB or leg pain overnight. Dysuria improving. Denies hematuria.     Scheduled Meds:   amlodipine  5 mg Oral Daily    ampicillin IVPB  2 g Intravenous Q6H    docusate sodium  100 mg Oral Daily    ferrous sulfate  325 mg Oral Daily    gentamicin  5 mg/kg (Adjusted) Intravenous Q24H    ibuprofen  600 mg Oral Q6H    metronidazole  500 mg Oral Q8H     Continuous Infusions:   PRN Meds:diphenhydrAMINE, influenza, ondansetron, oxycodone-acetaminophen, simethicone    Review of patient's allergies indicates:  No Known Allergies    Objective:     Vital Signs (Most Recent):  Temp: 98.7 °F (37.1 °C) (09/27/17 0435)  Pulse: 86 (09/27/17 0435)  Resp: 18 (09/27/17 0435)  BP: 107/71 (09/27/17 0435)  SpO2: 97 % (09/27/17 0435) Vital Signs (24h Range):  Temp:  [98.7 °F (37.1 °C)-102.9 °F (39.4 °C)] 98.7 °F (37.1 °C)  Pulse:  [] 86  Resp:  [12-21] 18  SpO2:  [97 %-100 %] 97 %  BP: (107-151)/(65-97) 107/71     Weight: 70.4 kg (155 lb 1.6 oz)  Body mass index is 26.62 kg/m².  Patient's last menstrual period was 09/05/2017.    I&O (Last 24H):      Intake/Output Summary (Last 24 hours) at 09/27/17 0618  Last data filed at 09/26/17 4992    Laboratory:  CBC:   Recent Labs  Lab 09/26/17  0947   WBC 11.82   RBC 3.63*   HGB 9.6*   HCT 29.4*   *   MCV 81*   MCH 26.4*   MCHC 32.7       Diagnostic Results:  CT Pelvis 9/26/17    Postsurgical findings of recent hysterectomy. Absence of oral contrast makes a focal fluid collection difficult to clearly delineate from bowel loops in the pelvis; no definite fluid collection.    CXR 9/26/17  Normal radiographic appearance of the  chest.    Physical Exam:   Constitutional: She is oriented to person, place, and time. She appears well-developed and well-nourished. No distress.    HENT:   Head: Normocephalic and atraumatic.      Cardiovascular: Normal rate, regular rhythm and normal heart sounds.     Pulmonary/Chest: Effort normal and breath sounds normal. No respiratory distress. She has no wheezes.        Abdominal: Soft. Bowel sounds are normal. She exhibits abdominal incision (c /d/i). She exhibits no distension. There is no tenderness. There is no guarding.             Musculoskeletal: Normal range of motion. She exhibits no edema.       Neurological: She is alert and oriented to person, place, and time.    Skin: Skin is warm and dry.    Psychiatric: She has a normal mood and affect.

## 2017-09-28 LAB
ALBUMIN SERPL BCP-MCNC: 2.7 G/DL
ALP SERPL-CCNC: 64 U/L
ALT SERPL W/O P-5'-P-CCNC: 9 U/L
ANION GAP SERPL CALC-SCNC: 9 MMOL/L
AST SERPL-CCNC: 10 U/L
BASOPHILS # BLD AUTO: 0.01 K/UL
BASOPHILS NFR BLD: 0.1 %
BILIRUB SERPL-MCNC: 0.3 MG/DL
BUN SERPL-MCNC: 5 MG/DL
CALCIUM SERPL-MCNC: 8.8 MG/DL
CHLORIDE SERPL-SCNC: 103 MMOL/L
CO2 SERPL-SCNC: 27 MMOL/L
CREAT SERPL-MCNC: 0.7 MG/DL
DIFFERENTIAL METHOD: ABNORMAL
EOSINOPHIL # BLD AUTO: 0.2 K/UL
EOSINOPHIL NFR BLD: 2.4 %
ERYTHROCYTE [DISTWIDTH] IN BLOOD BY AUTOMATED COUNT: 15.7 %
EST. GFR  (AFRICAN AMERICAN): >60 ML/MIN/1.73 M^2
EST. GFR  (NON AFRICAN AMERICAN): >60 ML/MIN/1.73 M^2
GLUCOSE SERPL-MCNC: 115 MG/DL
HCT VFR BLD AUTO: 25.8 %
HGB BLD-MCNC: 8.5 G/DL
LYMPHOCYTES # BLD AUTO: 0.8 K/UL
LYMPHOCYTES NFR BLD: 8.7 %
MCH RBC QN AUTO: 26.5 PG
MCHC RBC AUTO-ENTMCNC: 32.9 G/DL
MCV RBC AUTO: 80 FL
MONOCYTES # BLD AUTO: 1.2 K/UL
MONOCYTES NFR BLD: 12.6 %
NEUTROPHILS # BLD AUTO: 7.2 K/UL
NEUTROPHILS NFR BLD: 75.9 %
PLATELET # BLD AUTO: 298 K/UL
PMV BLD AUTO: 7.8 FL
POTASSIUM SERPL-SCNC: 3 MMOL/L
PROT SERPL-MCNC: 7.3 G/DL
RBC # BLD AUTO: 3.21 M/UL
SODIUM SERPL-SCNC: 139 MMOL/L
WBC # BLD AUTO: 9.49 K/UL

## 2017-09-28 PROCEDURE — 80053 COMPREHEN METABOLIC PANEL: CPT

## 2017-09-28 PROCEDURE — 85025 COMPLETE CBC W/AUTO DIFF WBC: CPT

## 2017-09-28 PROCEDURE — 25000003 PHARM REV CODE 250: Performed by: STUDENT IN AN ORGANIZED HEALTH CARE EDUCATION/TRAINING PROGRAM

## 2017-09-28 PROCEDURE — 36415 COLL VENOUS BLD VENIPUNCTURE: CPT

## 2017-09-28 PROCEDURE — 63600175 PHARM REV CODE 636 W HCPCS: Performed by: STUDENT IN AN ORGANIZED HEALTH CARE EDUCATION/TRAINING PROGRAM

## 2017-09-28 PROCEDURE — 11000001 HC ACUTE MED/SURG PRIVATE ROOM

## 2017-09-28 PROCEDURE — 99232 SBSQ HOSP IP/OBS MODERATE 35: CPT | Mod: ,,, | Performed by: OBSTETRICS & GYNECOLOGY

## 2017-09-28 RX ORDER — ACETAMINOPHEN 500 MG
1000 TABLET ORAL ONCE AS NEEDED
Status: ACTIVE | OUTPATIENT
Start: 2017-09-28 | End: 2017-09-28

## 2017-09-28 RX ADMIN — AMPICILLIN SODIUM 2 G: 2 INJECTION, POWDER, FOR SOLUTION INTRAMUSCULAR; INTRAVENOUS at 03:09

## 2017-09-28 RX ADMIN — OXYCODONE HYDROCHLORIDE AND ACETAMINOPHEN 1 TABLET: 5; 325 TABLET ORAL at 09:09

## 2017-09-28 RX ADMIN — AMPICILLIN SODIUM 2 G: 2 INJECTION, POWDER, FOR SOLUTION INTRAMUSCULAR; INTRAVENOUS at 09:09

## 2017-09-28 RX ADMIN — FERROUS SULFATE TAB EC 324 MG (65 MG FE EQUIVALENT) 325 MG: 324 (65 FE) TABLET DELAYED RESPONSE at 09:09

## 2017-09-28 RX ADMIN — AMLODIPINE BESYLATE 5 MG: 5 TABLET ORAL at 09:09

## 2017-09-28 RX ADMIN — METRONIDAZOLE 500 MG: 250 TABLET ORAL at 09:09

## 2017-09-28 RX ADMIN — DIPHENHYDRAMINE HYDROCHLORIDE 25 MG: 25 CAPSULE ORAL at 09:09

## 2017-09-28 RX ADMIN — DOCUSATE SODIUM 100 MG: 100 CAPSULE, LIQUID FILLED ORAL at 09:09

## 2017-09-28 RX ADMIN — METRONIDAZOLE 500 MG: 250 TABLET ORAL at 06:09

## 2017-09-28 RX ADMIN — GENTAMICIN SULFATE 304 MG: 40 INJECTION, SOLUTION INTRAMUSCULAR; INTRAVENOUS at 05:09

## 2017-09-28 RX ADMIN — METRONIDAZOLE 500 MG: 250 TABLET ORAL at 01:09

## 2017-09-28 RX ADMIN — IBUPROFEN 600 MG: 600 TABLET ORAL at 03:09

## 2017-09-28 NOTE — PLAN OF CARE
ATTN: TEAM DC PLANNING       PT HAS WOUND VAC - TO OR TODAY WASH OUT      PER WOUND CARE RN -Discussed patient with Dr. Alvarado.  He plans to continue to do vac changes with goal to close wound surgically.    NO NEEDS @ THIS TIME    IF ANY ARISE PLEASE CONTACT RN SUE /ABDIRIZAK Gerard RN  Case management 9/28/201712:44 PM  # [phone] (FAX) [phone]     09/28/17 1304   Discharge Assessment   Assessment Type Discharge Planning Reassessment

## 2017-09-28 NOTE — PLAN OF CARE
Problem: Patient Care Overview  Goal: Plan of Care Review  Pt has been stable today. All Iv antibiotics infused. Afebrile today. Pelvic exam done at bedside by Dr Sinclair this am. Pt has small amt of vaginal drainage and incisions to abdomen healing well. Medicated PRN for pain. Up ad ruchi ambulating frequently.

## 2017-09-28 NOTE — PLAN OF CARE
Problem: Patient Care Overview  Goal: Plan of Care Review  Outcome: Ongoing (interventions implemented as appropriate)  Reviewed plan of care with pt and family at bedside. IV & po abx admin per order. Pain well controlled w/ po prn meds. Max temp 102.9; medicated per orders, temp down to 98.7 and has remained afebrile since. All safety precautions in place, side rails up x2, call light within reach/ Instructed pt to call for assistance. Pt remains free from falls/injury. Will continue to monitor.

## 2017-09-28 NOTE — SUBJECTIVE & OBJECTIVE
Interval History:    Upon entering the room, Ms. Ly is sleeping with aunt at bedside. She reports a fever up to 102.7 overnight with occasional chills. She reports an on and off gas pain, bloating, and constipation. Last BM was yesterday am. She endorses dysuria at the end of her stream and denies hematuria. She reports a nonodorous scant brown vaginal discharge that she notices when she goes to the bathroom. Appetite has been good and food has stayed down with no n/v. She states that she walked around the unit for 15 minutes last night without problem.   Scheduled Meds:   amlodipine  5 mg Oral Daily    ampicillin IVPB  2 g Intravenous Q6H    docusate sodium  100 mg Oral Daily    ferrous sulfate  325 mg Oral Daily    gentamicin  5 mg/kg (Adjusted) Intravenous Q24H    metronidazole  500 mg Oral Q8H     Continuous Infusions:   PRN Meds:bisacodyl, diphenhydrAMINE, ibuprofen, influenza, ondansetron, oxycodone-acetaminophen, simethicone    Review of patient's allergies indicates:  No Known Allergies    Objective:     Vital Signs (Most Recent):  Temp: 98.7 °F (37.1 °C) (09/28/17 0349)  Pulse: 90 (09/28/17 0349)  Resp: 16 (09/28/17 0349)  BP: 113/67 (09/28/17 0349)  SpO2: 98 % (09/28/17 0349) Vital Signs (24h Range):  Temp:  [98.7 °F (37.1 °C)-102.7 °F (39.3 °C)] 98.7 °F (37.1 °C)  Pulse:  [] 90  Resp:  [16-18] 16  SpO2:  [98 %-100 %] 98 %  BP: (113-128)/(67-83) 113/67     Weight: 70.4 kg (155 lb 1.6 oz)  Body mass index is 26.62 kg/m².  Patient's last menstrual period was 09/05/2017.    I&O (Last 24H):  No intake or output data in the 24 hours ending 09/28/17 0605    Laboratory:  CBC:   Recent Labs  Lab 09/27/17  0855   WBC 11.73   RBC 3.38*   HGB 8.9*   HCT 27.4*      MCV 81*   MCH 26.3*   MCHC 32.5       Physical Exam:   Constitutional: She is oriented to person, place, and time. She appears well-developed and well-nourished. No distress.    HENT:   Head: Normocephalic and atraumatic.       Cardiovascular: Normal rate, regular rhythm and normal heart sounds.     Pulmonary/Chest: Effort normal and breath sounds normal.        Abdominal: Soft. Bowel sounds are normal. She exhibits abdominal incision (c/d/i). She exhibits no distension. There is tenderness (appropriate post operatively with deep palpation).   Possible crepitus felt and auscultated in right upper qudrant             Musculoskeletal: Normal range of motion. She exhibits no edema.       Neurological: She is alert and oriented to person, place, and time.    Skin: Skin is warm and dry.    Psychiatric: She has a normal mood and affect.

## 2017-09-28 NOTE — ASSESSMENT & PLAN NOTE
- POD#7s/p RA TLH BS with left ovarian cystectomy  - vaginosis screen showed scant clue cells  - patient denies anything per vagina since surgery  - flu swab negative  - no evidence of DVT  - Tmax/last 102.7 @2000  -Temp:  [98.7 °F (37.1 °C)-102.7 °F (39.3 °C)] 98.7 °F (37.1 °C)  -  WBC 11.82 unchanged from 6 days ago   - CBC and CMP this AM pending  - empiric abx treatment started after last fever of 102.9 on 9/26/17 Currently on amp, gent, flagyl   - urine culture negative  - prelim blood cultures negative   - CXR negative   - consider repeat CT scan but will discuss with staff

## 2017-09-28 NOTE — PROGRESS NOTES
"[facility]  Obstetrics & Gynecology  Progress Note    Patient Name: Pernell Ly  MRN: [id number]  Admission Date: 9/26/2017  Primary Care Provider: Zoey Nelson MD  Principal Problem: Acute febrile illness    Subjective:     HPI:  37 y.o. Female POD#6 s/p -[facility] BS with left ovarian cystectomy who presents with complaint of fever that began yesterday. She reports "yellow and brown" malodorous discharge (began two days ago), pain to the lower abdomen and buttocks, dysuria, and light-headedness, but denies chills, nausea, vomiting, diarrhea, constipation, cough, SOB, vaginal bleeding, hematuria, frequency, urgency, drainage from incision sites, or myalgias. Highest fever at home was 100.9 Pain unchanged since discharge, well controlled with po pain medication. Symptoms are described as constant. Pt did not engage in sexual activity after surgery. Patient denies any sick contacts. She has been ambulating, voiding spontaneously, tolerating po, and passing flatus.       Interval History:    Upon entering the room, Ms. Ly is sleeping with aunt at bedside. She reports a fever up to 102.7 overnight with occasional chills. She reports an on and off gas pain, bloating, and constipation. Last BM was yesterday am. She endorses dysuria at the end of her stream and denies hematuria. She reports a nonodorous scant brown vaginal discharge that she notices when she goes to the bathroom. Appetite has been good and food has stayed down with no n/v. She states that she walked around the unit for 15 minutes last night without problem.   Scheduled Meds:   amlodipine  5 mg Oral Daily    ampicillin IVPB  2 g Intravenous Q6H    docusate sodium  100 mg Oral Daily    ferrous sulfate  325 mg Oral Daily    gentamicin  5 mg/kg (Adjusted) Intravenous Q24H    metronidazole  500 mg Oral Q8H     Continuous Infusions:   PRN Meds:bisacodyl, diphenhydrAMINE, ibuprofen, influenza, ondansetron, oxycodone-acetaminophen, " simethicone    Review of patient's allergies indicates:  No Known Allergies    Objective:     Vital Signs (Most Recent):  Temp: 98.7 °F (37.1 °C) (09/28/17 0349)  Pulse: 90 (09/28/17 0349)  Resp: 16 (09/28/17 0349)  BP: 113/67 (09/28/17 0349)  SpO2: 98 % (09/28/17 0349) Vital Signs (24h Range):  Temp:  [98.7 °F (37.1 °C)-102.7 °F (39.3 °C)] 98.7 °F (37.1 °C)  Pulse:  [] 90  Resp:  [16-18] 16  SpO2:  [98 %-100 %] 98 %  BP: (113-128)/(67-83) 113/67     Weight: 70.4 kg (155 lb 1.6 oz)  Body mass index is 26.62 kg/m².  Patient's last menstrual period was 09/05/2017.    I&O (Last 24H):  No intake or output data in the 24 hours ending 09/28/17 0605    Laboratory:  CBC:   Recent Labs  Lab 09/27/17  0855   WBC 11.73   RBC 3.38*   HGB 8.9*   HCT 27.4*      MCV 81*   MCH 26.3*   MCHC 32.5       Physical Exam:   Constitutional: She is oriented to person, place, and time. She appears well-developed and well-nourished. No distress.    HENT:   Head: Normocephalic and atraumatic.      Cardiovascular: Normal rate, regular rhythm and normal heart sounds.     Pulmonary/Chest: Effort normal and breath sounds normal.        Abdominal: Soft. Bowel sounds are normal. She exhibits abdominal incision (c/d/i). She exhibits no distension. There is tenderness (appropriate post operatively with deep palpation).   Possible crepitus felt and auscultated in right upper qudrant             Musculoskeletal: Normal range of motion. She exhibits no edema.       Neurological: She is alert and oriented to person, place, and time.    Skin: Skin is warm and dry.    Psychiatric: She has a normal mood and affect.       Assessment/Plan:     * Acute febrile illness    - POD#8s/p RA [facility] BS with left ovarian cystectomy  - vaginosis screen showed scant clue cells  - patient denies anything per vagina since surgery  - flu swab negative  - no evidence of DVT  - Tmax/last 102.7 @2000  -Temp:  [98.7 °F (37.1 °C)-102.7 °F (39.3 °C)] 98.7 °F (37.1 °C)  -   WBC 11.82 unchanged from 6 days ago   - CBC and CMP this AM pending  - empiric abx treatment started after last fever of 102.9 on 9/26/17 Currently on amp, gent, flagyl   - urine culture negative  - prelim blood cultures negative   - CXR negative   - consider repeat CT scan but will discuss with staff         S/P laparoscopic hysterectomy    - POD#8 s/p RA-LT  BS with left ovarian cystectomy  - meeting all post operative milestones  - passing flatus, voiding, tolerating po, pain well controlled with po pain medication, and ambulating  - will continue to monitor         Anemia    H/H stable at 8.9/27.4  On 325 fe daily            Tere Madrigal MD  Obstetrics & Gynecology  [facility]

## 2017-09-29 ENCOUNTER — ANESTHESIA (OUTPATIENT)
Dept: SURGERY | Facility: OTHER | Age: 37
DRG: 855 | End: 2017-09-29
Payer: COMMERCIAL

## 2017-09-29 ENCOUNTER — ANESTHESIA EVENT (OUTPATIENT)
Dept: SURGERY | Facility: OTHER | Age: 37
DRG: 855 | End: 2017-09-29
Payer: COMMERCIAL

## 2017-09-29 ENCOUNTER — SURGERY (OUTPATIENT)
Age: 37
End: 2017-09-29

## 2017-09-29 ENCOUNTER — PATIENT MESSAGE (OUTPATIENT)
Dept: ADMINISTRATIVE | Facility: OTHER | Age: 37
End: 2017-09-29

## 2017-09-29 LAB
ABO GROUP BLD: NORMAL
ALBUMIN SERPL BCP-MCNC: 2.6 G/DL
ALP SERPL-CCNC: 66 U/L
ALT SERPL W/O P-5'-P-CCNC: 7 U/L
ANION GAP SERPL CALC-SCNC: 11 MMOL/L
AST SERPL-CCNC: 9 U/L
BASOPHILS # BLD AUTO: 0.02 K/UL
BASOPHILS NFR BLD: 0.3 %
BILIRUB SERPL-MCNC: 0.2 MG/DL
BLD GP AB SCN CELLS X3 SERPL QL: NORMAL
BUN SERPL-MCNC: 5 MG/DL
CALCIUM SERPL-MCNC: 8.9 MG/DL
CHLORIDE SERPL-SCNC: 103 MMOL/L
CO2 SERPL-SCNC: 25 MMOL/L
CREAT SERPL-MCNC: 0.7 MG/DL
DIFFERENTIAL METHOD: ABNORMAL
EOSINOPHIL # BLD AUTO: 0.3 K/UL
EOSINOPHIL NFR BLD: 3.8 %
ERYTHROCYTE [DISTWIDTH] IN BLOOD BY AUTOMATED COUNT: 15.9 %
EST. GFR  (AFRICAN AMERICAN): >60 ML/MIN/1.73 M^2
EST. GFR  (NON AFRICAN AMERICAN): >60 ML/MIN/1.73 M^2
GLUCOSE SERPL-MCNC: 104 MG/DL
HCT VFR BLD AUTO: 25.4 %
HGB BLD-MCNC: 8.3 G/DL
LYMPHOCYTES # BLD AUTO: 1.3 K/UL
LYMPHOCYTES NFR BLD: 17.2 %
MAGNESIUM SERPL-MCNC: 1.8 MG/DL
MCH RBC QN AUTO: 26.2 PG
MCHC RBC AUTO-ENTMCNC: 32.7 G/DL
MCV RBC AUTO: 80 FL
MONOCYTES # BLD AUTO: 0.9 K/UL
MONOCYTES NFR BLD: 11.5 %
NEUTROPHILS # BLD AUTO: 5.1 K/UL
NEUTROPHILS NFR BLD: 66.9 %
PHOSPHATE SERPL-MCNC: 3.2 MG/DL
PLATELET # BLD AUTO: 340 K/UL
PMV BLD AUTO: 8.1 FL
POTASSIUM SERPL-SCNC: 3.3 MMOL/L
PROT SERPL-MCNC: 7.2 G/DL
RBC # BLD AUTO: 3.17 M/UL
RH BLD: NORMAL
SODIUM SERPL-SCNC: 139 MMOL/L
WBC # BLD AUTO: 7.63 K/UL

## 2017-09-29 PROCEDURE — 25000003 PHARM REV CODE 250: Performed by: STUDENT IN AN ORGANIZED HEALTH CARE EDUCATION/TRAINING PROGRAM

## 2017-09-29 PROCEDURE — 87015 SPECIMEN INFECT AGNT CONCNTJ: CPT

## 2017-09-29 PROCEDURE — 36000709 HC OR TIME LEV III EA ADD 15 MIN: Performed by: OBSTETRICS & GYNECOLOGY

## 2017-09-29 PROCEDURE — 11000001 HC ACUTE MED/SURG PRIVATE ROOM

## 2017-09-29 PROCEDURE — 87205 SMEAR GRAM STAIN: CPT

## 2017-09-29 PROCEDURE — 63600175 PHARM REV CODE 636 W HCPCS: Performed by: NURSE ANESTHETIST, CERTIFIED REGISTERED

## 2017-09-29 PROCEDURE — 37000009 HC ANESTHESIA EA ADD 15 MINS: Performed by: OBSTETRICS & GYNECOLOGY

## 2017-09-29 PROCEDURE — 87116 MYCOBACTERIA CULTURE: CPT

## 2017-09-29 PROCEDURE — 37000008 HC ANESTHESIA 1ST 15 MINUTES: Performed by: OBSTETRICS & GYNECOLOGY

## 2017-09-29 PROCEDURE — 36000708 HC OR TIME LEV III 1ST 15 MIN: Performed by: OBSTETRICS & GYNECOLOGY

## 2017-09-29 PROCEDURE — 71000033 HC RECOVERY, INTIAL HOUR: Performed by: OBSTETRICS & GYNECOLOGY

## 2017-09-29 PROCEDURE — 49320 DIAG LAPARO SEPARATE PROC: CPT | Mod: 78,,, | Performed by: OBSTETRICS & GYNECOLOGY

## 2017-09-29 PROCEDURE — 36415 COLL VENOUS BLD VENIPUNCTURE: CPT

## 2017-09-29 PROCEDURE — 63600175 PHARM REV CODE 636 W HCPCS: Performed by: STUDENT IN AN ORGANIZED HEALTH CARE EDUCATION/TRAINING PROGRAM

## 2017-09-29 PROCEDURE — 86850 RBC ANTIBODY SCREEN: CPT

## 2017-09-29 PROCEDURE — 80053 COMPREHEN METABOLIC PANEL: CPT

## 2017-09-29 PROCEDURE — 0WJJ4ZZ INSPECTION OF PELVIC CAVITY, PERCUTANEOUS ENDOSCOPIC APPROACH: ICD-10-PCS | Performed by: OBSTETRICS & GYNECOLOGY

## 2017-09-29 PROCEDURE — 25500020 PHARM REV CODE 255: Performed by: OBSTETRICS & GYNECOLOGY

## 2017-09-29 PROCEDURE — 25000003 PHARM REV CODE 250: Performed by: NURSE ANESTHETIST, CERTIFIED REGISTERED

## 2017-09-29 PROCEDURE — 87070 CULTURE OTHR SPECIMN AEROBIC: CPT

## 2017-09-29 PROCEDURE — 25000003 PHARM REV CODE 250: Performed by: ANESTHESIOLOGY

## 2017-09-29 PROCEDURE — 87075 CULTR BACTERIA EXCEPT BLOOD: CPT

## 2017-09-29 PROCEDURE — 84100 ASSAY OF PHOSPHORUS: CPT

## 2017-09-29 PROCEDURE — 27201423 OPTIME MED/SURG SUP & DEVICES STERILE SUPPLY: Performed by: OBSTETRICS & GYNECOLOGY

## 2017-09-29 PROCEDURE — 83735 ASSAY OF MAGNESIUM: CPT

## 2017-09-29 PROCEDURE — 86900 BLOOD TYPING SEROLOGIC ABO: CPT

## 2017-09-29 PROCEDURE — 85025 COMPLETE CBC W/AUTO DIFF WBC: CPT

## 2017-09-29 RX ORDER — OXYCODONE HYDROCHLORIDE 5 MG/1
5 TABLET ORAL
Status: CANCELLED | OUTPATIENT
Start: 2017-09-29

## 2017-09-29 RX ORDER — PHENYLEPHRINE HYDROCHLORIDE 10 MG/ML
INJECTION INTRAVENOUS
Status: DISCONTINUED | OUTPATIENT
Start: 2017-09-29 | End: 2017-09-29

## 2017-09-29 RX ORDER — SODIUM CHLORIDE 0.9 % (FLUSH) 0.9 %
3 SYRINGE (ML) INJECTION
Status: DISCONTINUED | OUTPATIENT
Start: 2017-09-29 | End: 2017-09-29 | Stop reason: SDUPTHER

## 2017-09-29 RX ORDER — POTASSIUM CHLORIDE 20 MEQ/1
20 TABLET, EXTENDED RELEASE ORAL 2 TIMES DAILY
Status: COMPLETED | OUTPATIENT
Start: 2017-09-29 | End: 2017-09-29

## 2017-09-29 RX ORDER — ONDANSETRON 2 MG/ML
4 INJECTION INTRAMUSCULAR; INTRAVENOUS DAILY PRN
Status: DISCONTINUED | OUTPATIENT
Start: 2017-09-29 | End: 2017-09-29 | Stop reason: HOSPADM

## 2017-09-29 RX ORDER — NEOSTIGMINE METHYLSULFATE 1 MG/ML
INJECTION, SOLUTION INTRAVENOUS
Status: DISCONTINUED | OUTPATIENT
Start: 2017-09-29 | End: 2017-09-29

## 2017-09-29 RX ORDER — HYDROMORPHONE HYDROCHLORIDE 2 MG/ML
0.4 INJECTION, SOLUTION INTRAMUSCULAR; INTRAVENOUS; SUBCUTANEOUS EVERY 5 MIN PRN
Status: CANCELLED | OUTPATIENT
Start: 2017-09-29

## 2017-09-29 RX ORDER — ONDANSETRON 2 MG/ML
INJECTION INTRAMUSCULAR; INTRAVENOUS
Status: DISCONTINUED | OUTPATIENT
Start: 2017-09-29 | End: 2017-09-29

## 2017-09-29 RX ORDER — FENTANYL CITRATE 50 UG/ML
25 INJECTION, SOLUTION INTRAMUSCULAR; INTRAVENOUS EVERY 5 MIN PRN
Status: DISCONTINUED | OUTPATIENT
Start: 2017-09-29 | End: 2017-09-29 | Stop reason: HOSPADM

## 2017-09-29 RX ORDER — ACETAMINOPHEN 10 MG/ML
INJECTION, SOLUTION INTRAVENOUS
Status: DISCONTINUED | OUTPATIENT
Start: 2017-09-29 | End: 2017-09-29

## 2017-09-29 RX ORDER — FENTANYL CITRATE 50 UG/ML
INJECTION, SOLUTION INTRAMUSCULAR; INTRAVENOUS
Status: DISCONTINUED | OUTPATIENT
Start: 2017-09-29 | End: 2017-09-29

## 2017-09-29 RX ORDER — ONDANSETRON 2 MG/ML
4 INJECTION INTRAMUSCULAR; INTRAVENOUS DAILY PRN
Status: CANCELLED | OUTPATIENT
Start: 2017-09-29

## 2017-09-29 RX ORDER — SODIUM CHLORIDE 0.9 % (FLUSH) 0.9 %
3 SYRINGE (ML) INJECTION
Status: DISCONTINUED | OUTPATIENT
Start: 2017-09-29 | End: 2017-10-01 | Stop reason: HOSPADM

## 2017-09-29 RX ORDER — MEPERIDINE HYDROCHLORIDE 50 MG/ML
12.5 INJECTION INTRAMUSCULAR; INTRAVENOUS; SUBCUTANEOUS ONCE AS NEEDED
Status: DISCONTINUED | OUTPATIENT
Start: 2017-09-29 | End: 2017-09-29 | Stop reason: HOSPADM

## 2017-09-29 RX ORDER — PROPOFOL 10 MG/ML
VIAL (ML) INTRAVENOUS
Status: DISCONTINUED | OUTPATIENT
Start: 2017-09-29 | End: 2017-09-29

## 2017-09-29 RX ORDER — HYDROMORPHONE HYDROCHLORIDE 2 MG/ML
0.4 INJECTION, SOLUTION INTRAMUSCULAR; INTRAVENOUS; SUBCUTANEOUS EVERY 5 MIN PRN
Status: DISCONTINUED | OUTPATIENT
Start: 2017-09-29 | End: 2017-09-29 | Stop reason: HOSPADM

## 2017-09-29 RX ORDER — MEPERIDINE HYDROCHLORIDE 50 MG/ML
12.5 INJECTION INTRAMUSCULAR; INTRAVENOUS; SUBCUTANEOUS ONCE AS NEEDED
Status: CANCELLED | OUTPATIENT
Start: 2017-09-29 | End: 2017-09-29

## 2017-09-29 RX ORDER — ROCURONIUM BROMIDE 10 MG/ML
INJECTION, SOLUTION INTRAVENOUS
Status: DISCONTINUED | OUTPATIENT
Start: 2017-09-29 | End: 2017-09-29

## 2017-09-29 RX ORDER — LIDOCAINE HYDROCHLORIDE 10 MG/ML
INJECTION, SOLUTION INTRAVENOUS
Status: DISCONTINUED | OUTPATIENT
Start: 2017-09-29 | End: 2017-09-29

## 2017-09-29 RX ORDER — SUCCINYLCHOLINE CHLORIDE 20 MG/ML
INJECTION INTRAMUSCULAR; INTRAVENOUS
Status: DISCONTINUED | OUTPATIENT
Start: 2017-09-29 | End: 2017-09-29

## 2017-09-29 RX ORDER — GLYCOPYRROLATE 0.2 MG/ML
INJECTION INTRAMUSCULAR; INTRAVENOUS
Status: DISCONTINUED | OUTPATIENT
Start: 2017-09-29 | End: 2017-09-29

## 2017-09-29 RX ORDER — OXYCODONE AND ACETAMINOPHEN 5; 325 MG/1; MG/1
1 TABLET ORAL EVERY 4 HOURS PRN
Status: DISCONTINUED | OUTPATIENT
Start: 2017-09-29 | End: 2017-10-01 | Stop reason: HOSPADM

## 2017-09-29 RX ORDER — DEXAMETHASONE SODIUM PHOSPHATE 4 MG/ML
INJECTION, SOLUTION INTRA-ARTICULAR; INTRALESIONAL; INTRAMUSCULAR; INTRAVENOUS; SOFT TISSUE
Status: DISCONTINUED | OUTPATIENT
Start: 2017-09-29 | End: 2017-09-29

## 2017-09-29 RX ORDER — SODIUM CHLORIDE, SODIUM LACTATE, POTASSIUM CHLORIDE, CALCIUM CHLORIDE 600; 310; 30; 20 MG/100ML; MG/100ML; MG/100ML; MG/100ML
INJECTION, SOLUTION INTRAVENOUS CONTINUOUS
Status: DISCONTINUED | OUTPATIENT
Start: 2017-09-29 | End: 2017-10-01 | Stop reason: HOSPADM

## 2017-09-29 RX ORDER — FENTANYL CITRATE 50 UG/ML
25 INJECTION, SOLUTION INTRAMUSCULAR; INTRAVENOUS EVERY 5 MIN PRN
Status: CANCELLED | OUTPATIENT
Start: 2017-09-29

## 2017-09-29 RX ORDER — POTASSIUM CHLORIDE 14.9 MG/ML
INJECTION INTRAVENOUS CONTINUOUS PRN
Status: DISCONTINUED | OUTPATIENT
Start: 2017-09-29 | End: 2017-09-29

## 2017-09-29 RX ORDER — OXYCODONE HYDROCHLORIDE 5 MG/1
5 TABLET ORAL
Status: DISCONTINUED | OUTPATIENT
Start: 2017-09-29 | End: 2017-09-29 | Stop reason: HOSPADM

## 2017-09-29 RX ORDER — SODIUM CHLORIDE, SODIUM LACTATE, POTASSIUM CHLORIDE, CALCIUM CHLORIDE 600; 310; 30; 20 MG/100ML; MG/100ML; MG/100ML; MG/100ML
INJECTION, SOLUTION INTRAVENOUS CONTINUOUS PRN
Status: DISCONTINUED | OUTPATIENT
Start: 2017-09-29 | End: 2017-09-29

## 2017-09-29 RX ORDER — MIDAZOLAM HYDROCHLORIDE 1 MG/ML
INJECTION INTRAMUSCULAR; INTRAVENOUS
Status: DISCONTINUED | OUTPATIENT
Start: 2017-09-29 | End: 2017-09-29

## 2017-09-29 RX ADMIN — OXYCODONE HYDROCHLORIDE AND ACETAMINOPHEN 1 TABLET: 5; 325 TABLET ORAL at 04:09

## 2017-09-29 RX ADMIN — POTASSIUM CHLORIDE 20 MEQ: 1500 TABLET, EXTENDED RELEASE ORAL at 08:09

## 2017-09-29 RX ADMIN — ROCURONIUM BROMIDE 30 MG: 10 INJECTION, SOLUTION INTRAVENOUS at 04:09

## 2017-09-29 RX ADMIN — ACETAMINOPHEN 1000 MG: 10 INJECTION, SOLUTION INTRAVENOUS at 04:09

## 2017-09-29 RX ADMIN — FENTANYL CITRATE 50 MCG: 50 INJECTION, SOLUTION INTRAMUSCULAR; INTRAVENOUS at 05:09

## 2017-09-29 RX ADMIN — ROCURONIUM BROMIDE 10 MG: 10 INJECTION, SOLUTION INTRAVENOUS at 05:09

## 2017-09-29 RX ADMIN — SUCCINYLCHOLINE CHLORIDE 140 MG: 20 INJECTION, SOLUTION INTRAMUSCULAR; INTRAVENOUS at 04:09

## 2017-09-29 RX ADMIN — ONDANSETRON 4 MG: 2 INJECTION INTRAMUSCULAR; INTRAVENOUS at 05:09

## 2017-09-29 RX ADMIN — PROPOFOL 150 MG: 10 INJECTION, EMULSION INTRAVENOUS at 04:09

## 2017-09-29 RX ADMIN — MIDAZOLAM HYDROCHLORIDE 2 MG: 1 INJECTION, SOLUTION INTRAMUSCULAR; INTRAVENOUS at 03:09

## 2017-09-29 RX ADMIN — ROCURONIUM BROMIDE 5 MG: 10 INJECTION, SOLUTION INTRAVENOUS at 04:09

## 2017-09-29 RX ADMIN — FENTANYL CITRATE 50 MCG: 50 INJECTION, SOLUTION INTRAMUSCULAR; INTRAVENOUS at 04:09

## 2017-09-29 RX ADMIN — METRONIDAZOLE 500 MG: 250 TABLET ORAL at 05:09

## 2017-09-29 RX ADMIN — SODIUM CHLORIDE, SODIUM LACTATE, POTASSIUM CHLORIDE, AND CALCIUM CHLORIDE: 600; 310; 30; 20 INJECTION, SOLUTION INTRAVENOUS at 06:09

## 2017-09-29 RX ADMIN — OXYCODONE HYDROCHLORIDE AND ACETAMINOPHEN 1 TABLET: 5; 325 TABLET ORAL at 01:09

## 2017-09-29 RX ADMIN — IOHEXOL 15 ML: 350 INJECTION, SOLUTION INTRAVENOUS at 06:09

## 2017-09-29 RX ADMIN — NEOSTIGMINE METHYLSULFATE 5 MG: 1 INJECTION INTRAVENOUS at 05:09

## 2017-09-29 RX ADMIN — AMPICILLIN SODIUM 2 G: 2 INJECTION, POWDER, FOR SOLUTION INTRAMUSCULAR; INTRAVENOUS at 09:09

## 2017-09-29 RX ADMIN — OXYCODONE HYDROCHLORIDE 5 MG: 5 TABLET ORAL at 06:09

## 2017-09-29 RX ADMIN — POTASSIUM CHLORIDE: 14.9 INJECTION, SOLUTION INTRAVENOUS at 04:09

## 2017-09-29 RX ADMIN — AMPICILLIN SODIUM 2 G: 2 INJECTION, POWDER, FOR SOLUTION INTRAMUSCULAR; INTRAVENOUS at 04:09

## 2017-09-29 RX ADMIN — FENTANYL CITRATE 100 MCG: 50 INJECTION, SOLUTION INTRAMUSCULAR; INTRAVENOUS at 04:09

## 2017-09-29 RX ADMIN — AMLODIPINE BESYLATE 5 MG: 5 TABLET ORAL at 09:09

## 2017-09-29 RX ADMIN — FERROUS SULFATE TAB EC 324 MG (65 MG FE EQUIVALENT) 325 MG: 324 (65 FE) TABLET DELAYED RESPONSE at 09:09

## 2017-09-29 RX ADMIN — PHENYLEPHRINE HYDROCHLORIDE 200 MCG: 10 INJECTION INTRAVENOUS at 04:09

## 2017-09-29 RX ADMIN — DOCUSATE SODIUM 100 MG: 100 CAPSULE, LIQUID FILLED ORAL at 09:09

## 2017-09-29 RX ADMIN — LIDOCAINE HYDROCHLORIDE 75 MG: 10 INJECTION, SOLUTION INTRAVENOUS at 04:09

## 2017-09-29 RX ADMIN — IOHEXOL 75 ML: 350 INJECTION, SOLUTION INTRAVENOUS at 08:09

## 2017-09-29 RX ADMIN — GENTAMICIN SULFATE 304 MG: 40 INJECTION, SOLUTION INTRAMUSCULAR; INTRAVENOUS at 10:09

## 2017-09-29 RX ADMIN — SODIUM CHLORIDE, SODIUM LACTATE, POTASSIUM CHLORIDE, AND CALCIUM CHLORIDE: 600; 310; 30; 20 INJECTION, SOLUTION INTRAVENOUS at 03:09

## 2017-09-29 RX ADMIN — METRONIDAZOLE 500 MG: 250 TABLET ORAL at 10:09

## 2017-09-29 RX ADMIN — IBUPROFEN 600 MG: 600 TABLET ORAL at 08:09

## 2017-09-29 RX ADMIN — GLYCOPYRROLATE 0.8 MG: 0.2 INJECTION, SOLUTION INTRAMUSCULAR; INTRAVENOUS at 05:09

## 2017-09-29 RX ADMIN — AMPICILLIN SODIUM 2 G: 2 INJECTION, POWDER, FOR SOLUTION INTRAMUSCULAR; INTRAVENOUS at 03:09

## 2017-09-29 RX ADMIN — OXYCODONE HYDROCHLORIDE AND ACETAMINOPHEN 1 TABLET: 5; 325 TABLET ORAL at 10:09

## 2017-09-29 RX ADMIN — DEXAMETHASONE SODIUM PHOSPHATE 8 MG: 4 INJECTION, SOLUTION INTRAMUSCULAR; INTRAVENOUS at 04:09

## 2017-09-29 RX ADMIN — AMPICILLIN SODIUM 2 G: 2 INJECTION, POWDER, FOR SOLUTION INTRAMUSCULAR; INTRAVENOUS at 08:09

## 2017-09-29 RX ADMIN — DIPHENHYDRAMINE HYDROCHLORIDE 25 MG: 25 CAPSULE ORAL at 08:09

## 2017-09-29 RX ADMIN — POTASSIUM CHLORIDE 20 MEQ: 1500 TABLET, EXTENDED RELEASE ORAL at 09:09

## 2017-09-29 NOTE — TRANSFER OF CARE
"Anesthesia Transfer of Care Note    Patient: Pernell Ly    Procedure(s) Performed: Procedure(s) (LRB):  LAPAROSCOPY-DIAGNOSTIC (N/A)    Patient location: PACU    Anesthesia Type: general    Transport from OR: Transported from OR on 2-3 L/min O2 by NC with adequate spontaneous ventilation    Post pain: adequate analgesia    Post assessment: no apparent anesthetic complications    Post vital signs: stable    Level of consciousness: awake    Nausea/Vomiting: no nausea/vomiting    Complications: none    Transfer of care protocol was followed      Last vitals:   Visit Vitals  BP (!) 168/97 (BP Location: Right arm, Patient Position: Lying)   Pulse (!) 118   Temp 37.4 °C (99.4 °F) (Oral)   Resp 18   Ht 5' 4" (1.626 m)   Wt 70.4 kg (155 lb 1.6 oz)   LMP 09/05/2017   SpO2 95%   Breastfeeding? No   BMI 26.62 kg/m²     "

## 2017-09-29 NOTE — ANESTHESIA POSTPROCEDURE EVALUATION
"Anesthesia Post Evaluation    Patient: Pernell Ly    Procedure(s) Performed: Procedure(s) (LRB):  LAPAROSCOPY-DIAGNOSTIC (N/A)    Final Anesthesia Type: general  Patient location during evaluation: PACU  Patient participation: Yes- Able to Participate  Level of consciousness: awake and alert  Post-procedure vital signs: reviewed and stable  Pain management: adequate  Airway patency: patent  PONV status at discharge: No PONV  Anesthetic complications: no      Cardiovascular status: blood pressure returned to baseline  Respiratory status: unassisted  Hydration status: euvolemic  Follow-up not needed.        Visit Vitals  BP (!) 140/87   Pulse 98   Temp 36.6 °C (97.9 °F) (Oral)   Resp 16   Ht 5' 4" (1.626 m)   Wt 70.4 kg (155 lb 1.6 oz)   LMP 09/05/2017   SpO2 100%   Breastfeeding? No   BMI 26.62 kg/m²       Pain/Jimenez Score: Pain Assessment Performed: Yes (9/29/2017  5:52 PM)  Presence of Pain: non-verbal indicators absent (9/29/2017  5:52 PM)  Pain Rating Prior to Med Admin: 7 (9/29/2017  6:05 PM)  Pain Rating Post Med Admin: 0 (9/28/2017  4:57 PM)  Jimenez Score: 8 (9/29/2017  5:52 PM)  Modified Jimenez Score: 18 (9/29/2017  5:52 PM)      "

## 2017-09-29 NOTE — PLAN OF CARE
Problem: Patient Care Overview  Goal: Plan of Care Review  Outcome: Ongoing (interventions implemented as appropriate)  Pt free of trauma, falls, and injury. Pt VSS and afebrile throughout shift. Pt free of skin breakdown. Pt pain has been moderately controlled by PO pain meds and tolerated well. Pt has been eating and voiding adequately throughout shift. Purposeful rounding done. Pt has call light in reach, bed brakes on, side rails up x2, bed in low position, TEDs/SCDs on, and nonskid socks on. Pt lying in bed in no distress. Family at bedside. Will continue to monitor.

## 2017-09-29 NOTE — PROGRESS NOTES
Reviewed images with staff. Plan to take patient back to the OR for a diagnostic lap. General surgery notified and is aware. Pt NPO. Dr Francisco to discuss with patient.

## 2017-09-29 NOTE — OP NOTE
Operative Report    Procedure: Diagnostic Laparoscopy     Date of Surgery 09/29/2017    Surgeon: Dr. Maryellen Francisco    Assistant: Dr. Suri Last (res)    Intraoperative consult to general surgery: Dr. Albarran    Pre-Op Diagnosis: Continued fevers in the post op period despite tx with Amp/Gent/Flagyl, concern for loculated fluid collections on CT of pelvis    Post-op Diagnosis:  same    EBL: 10     IVF: 500    Urine Output: 160     Specimen:   1. Peritoneal exudate, for culture    Findings:    Very small amount of fluid was present in the pelvis on entry. Abdomen and pelvis with normal postoperative appearance. Though there was some appearance of inflammation, there was no obvious signs of infection and no purulent material. Dr. Albarran was consulted intraoperatively for evaluation. He did take a small sample of exudate for culture.          Procedure in Detail:  Pt was taken back to OR where general anesthesia was found to be adequate. Pt was placed in dorsal lithotomy position in yellow-fin stirrups. The patient was prepared in normal sterile fashion. A speculum was placed into the vagina.     An incision was made into the previous incision site 2cm superior to the umbilicus. The pneumoperitoneum was established with CO2 gas to a pressure of 15 mmHg. A 10 mm trocar was inserted into the abdomen. Intraabdominal placement was confirmed with laparoscope. A second 5 mm port was placed 3 cm above the left iliac crest and 3 cm medial. A third 5 mm port was placed 3 cm above the right iliac crest and 3 cm medial. Pt was placed in Trendelenburg position to aid visualization.     Very small amount of fluid was present in the pelvis on entry. Abdomen and pelvis with normal postoperative appearance. Adhesions involving the colon and pelvic sidewall were lysed with blunt dissection to allow for visualization into the pelvis to the cuff. Both ovaries were visualized and were adherent to the sidewalls. The left  ovary was adherent to both the sidewall and the colon. Though there was some appearance of inflammation, there was no obvious signs of infection and no purulent material. Dr. Albarran was consulted intraoperatively for evaluation. He agreed that no further lysis of bowel adhesions would be necessary. He did take a small sample of exudate for culture. Appendix was visualized and was normal. Bowel in the upper abdomen examined by Dr. Albarran was normal in appearance.    Trocars were removed. Hemostasis of incision was obtained with Bovie. Skin was closed with 4-0 Monocryl in subcuticular fashion. A speculum was placed in the vagina. The vaginal cuff was further evaluated. No purulent material noted. The cuff was noted to be intact and cotton swab could not be passed through the suture line. Chan was removed prior to patient being moved to recovery. Patient tolerated the procedure well. All instruments, needles and sponge counts were correct times 2.      Suri Last MD

## 2017-09-29 NOTE — PROGRESS NOTES
"[facility]  Obstetrics & Gynecology  Progress Note    Patient Name: Pernell Ly  MRN: [id number]  Admission Date: 9/26/2017  Primary Care Provider: Zoey Nelson MD  Principal Problem: Acute febrile illness    Subjective:     HPI:  37 y.o. Female POD#6 s/p -[facility] BS with left ovarian cystectomy who presents with complaint of fever that began yesterday. She reports "yellow and brown" malodorous discharge (began two days ago), pain to the lower abdomen and buttocks, dysuria, and light-headedness, but denies chills, nausea, vomiting, diarrhea, constipation, cough, SOB, vaginal bleeding, hematuria, frequency, urgency, drainage from incision sites, or myalgias. Highest fever at home was 100.9 Pain unchanged since discharge, well controlled with po pain medication. Symptoms are described as constant. Pt did not engage in sexual activity after surgery. Patient denies any sick contacts. She has been ambulating, voiding spontaneously, tolerating po, and passing flatus.       Interval History:   Ms. Ly is a 36 yo POD#10 s/p RATLH and BS with left ovarian cystectomy admitted 9/26 for lower abdominal pain and fever of unknown origin. She is resting comfortably in bed with aunt at bedside. [place] at bedside. She states that she had no fever overnight but is still experiencing lower abdominal pain, jd with movement. She rates her pain 5/10 which is relieved with norco 5-325. She endorses dysuria, fatigue, headache, and dizziness when getting up to use the bathroom. She reports feeling bloated and had a small, firm BM yesterday but finds it hard to pass flatus today. She is still having scant brown vaginal d/c which she says is lightening in color. Her appetite is good and she is able to eat a regular diet without n/v. She walked for 10 mins around the unit yesterday.    Scheduled Meds:   amlodipine  5 mg Oral Daily    ampicillin IVPB  2 g Intravenous Q6H    docusate sodium  100 mg Oral Daily    " ferrous sulfate  325 mg Oral Daily    gentamicin  5 mg/kg (Adjusted) Intravenous Q24H    metronidazole  500 mg Oral Q8H     Continuous Infusions:   PRN Meds:bisacodyl, diphenhydrAMINE, ibuprofen, influenza, ondansetron, oxycodone-acetaminophen, simethicone    Review of patient's allergies indicates:  No Known Allergies    Objective:     Vital Signs (Most Recent):  Temp: 98.7 °F (37.1 °C) (09/28/17 0349)  Pulse: 90 (09/28/17 0349)  Resp: 16 (09/28/17 0349)  BP: 113/67 (09/28/17 0349)  SpO2: 98 % (09/28/17 0349) Vital Signs (24h Range):  Temp:  [98.7 °F (37.1 °C)-102.7 °F (39.3 °C)] 98.7 °F (37.1 °C)  Pulse:  [] 90  Resp:  [16-18] 16  SpO2:  [98 %-100 %] 98 %  BP: (113-128)/(67-83) 113/67     Weight: 70.4 kg (155 lb 1.6 oz)  Body mass index is 26.62 kg/m².  Patient's last menstrual period was 09/05/2017.    I&O (Last 24H):  No intake or output data in the 24 hours ending 09/28/17 0605    Laboratory:  CBC:   Recent Labs    Recent Labs  Lab 09/29/17  0430   WBC 7.63   RBC 3.17*   HGB 8.3*   HCT 25.4*      MCV 80*   MCH 26.2*   MCHC 32.7         Physical Exam:   Constitutional: She is oriented to person, place, and time. She appears well-developed and well-nourished. No distress.    HENT:   Head: Normocephalic and atraumatic.      Cardiovascular: Tachycardic (104) regular rhythm and normal heart sounds.     Pulmonary/Chest: Effort normal and breath sounds normal.        Abdominal: Soft. Bowel sounds are normal. She exhibits abdominal incision (c/d/i). She exhibits no distension. There is tenderness (appropriate post operatively with deep palpation).   Crepitus palpated and auscultated in right upper quadrant extending to the midline         Musculoskeletal: Normal range of motion. She exhibits no edema.       Neurological: She is alert and oriented to person, place, and time.    Skin: Skin is warm and dry.    Psychiatric: She has a normal mood and affect.       Assessment/Plan:     * Acute febrile illness     - POD#10 s/p RA [facility] BS with left ovarian cystectomy  - vaginosis screen showed scant clue cells  - patient denies anything per vagina since surgery  - flu swab negative  - no evidence of DVT  - Tmax/last 102.4 @ 1653 9/28/17. MD not notified. Nursing note states patient was afebrile from 7am-7pm on 9/28/17. Will discuss with charge nurse.   -  WBC down to 7.63 from 11.62  - CMP showed K at 3.3, no replacement at this time.   -Currently on amp, gent, flagyl contineue abx  - urine culture negative  - prelim blood cultures negative   - CXR negative   - CT abdomen and pelvis ordered with contrast. Patient made NPO.   - will consider repeat blood cultures at the time of another fever.         S/P laparoscopic hysterectomy    - POD#10 s/p RA-[facility]  BS with left ovarian cystectomy  - meeting all post operative milestones  - passing flatus, voiding, tolerating po, pain well controlled with po pain medication, and ambulating  - will continue to monitor         Anemia    H/H stable at 8.9/27.4  On 325 fe daily            Tere Madrigal MD  Obstetrics & Gynecology  [facility]

## 2017-09-29 NOTE — PLAN OF CARE
ATTN: TEAM DC PLANNING       NO NEEDS @ THIS TIME    IF ANY ARISE PLEASE CONTACT RN SUE /ABDIRIZAK Gerard RN  Case management  # [phone] (FAX) [phone]     09/29/17 1130   Discharge Assessment   Assessment Type Discharge Planning Reassessment

## 2017-09-29 NOTE — ANESTHESIA PREPROCEDURE EVALUATION
09/29/2017  Pernell Ly is a 37 y.o., female.    Pre-op Assessment    I have reviewed the Patient Summary Reports.     I have reviewed the Nursing Notes.   I have reviewed the Medications.     Review of Systems  Anesthesia Hx:  No problems with previous Anesthesia    Social:  Social Alcohol Use, Non-Smoker    Hematology/Oncology:     Oncology Normal    -- Anemia:   EENT/Dental:EENT/Dental Normal   Cardiovascular:   Exercise tolerance: good Hypertension, well controlled    Pulmonary:  Pulmonary Normal    Renal/:  Renal/ Normal     Hepatic/GI:  Hepatic/GI Normal    Musculoskeletal:  Musculoskeletal Normal    Neurological:   Headaches Seizures, well controlled Childhood    Endocrine:  Endocrine Normal    Dermatological:  Skin Normal    Psych:  Psychiatric Normal           Physical Exam  General:  Well nourished    Airway/Jaw/Neck:  Airway Findings: Mouth Opening: Normal Tongue: Normal  General Airway Assessment: Adult, Good  Mallampati: I  TM Distance: Normal, at least 6 cm  Jaw/Neck Findings:  Neck ROM: Normal ROM      Dental:  Dental Findings: In tact        Mental Status:  Mental Status Findings:  Cooperative, Alert and Oriented         Anesthesia Plan  Type of Anesthesia, risks & benefits discussed:  Anesthesia Type:  general  Patient's Preference:   Intra-op Monitoring Plan: standard ASA monitors  Intra-op Monitoring Plan Comments:   Post Op Pain Control Plan:   Post Op Pain Control Plan Comments:   Induction:    Beta Blocker:         Informed Consent: Patient understands risks and agrees with Anesthesia plan.  Questions answered. Anesthesia consent signed with patient.  ASA Score: 2     Day of Surgery Review of History & Physical:    H&P update referred to the surgeon.     Anesthesia Plan Notes: H/H T&S.        Ready For Surgery From Anesthesia Perspective.

## 2017-09-29 NOTE — BRIEF OP NOTE
[facility]  Brief Operative Note     SUMMARY     Surgery Date: 9/29/2017     Surgeon(s) and Role:     * Maryellen Francisco MD - Primary     * Faisal Albarran MD - Consult    Assisting Surgeon: None    Pre-op Diagnosis:  Acute febrile illness [R50.9]    Post-op Diagnosis:  Post-Op Diagnosis Codes:     * Acute febrile illness [R50.9]    Procedure(s) (LRB):  LAPAROSCOPY-DIAGNOSTIC (N/A)    Anesthesia: General    Description of the findings of the procedure: Very small amount of fluid was present in the pelvis on entry. Abdomen and pelvis with normal postoperative appearance. Though there was some appearance of inflammation, there was no obvious signs of infection and no purulent material. Dr. Albarran was consulted intraoperatively for evaluation. He did take a small sample of exudate for culture.     Estimated Blood Loss: 10 mL         Specimens:   Specimen (12h ago through future)    None

## 2017-09-30 LAB
ALBUMIN SERPL BCP-MCNC: 2.6 G/DL
ALP SERPL-CCNC: 67 U/L
ALT SERPL W/O P-5'-P-CCNC: 6 U/L
ANION GAP SERPL CALC-SCNC: 8 MMOL/L
AST SERPL-CCNC: 9 U/L
BASOPHILS # BLD AUTO: 0 K/UL
BASOPHILS NFR BLD: 0 %
BILIRUB SERPL-MCNC: 0.1 MG/DL
BUN SERPL-MCNC: 7 MG/DL
CALCIUM SERPL-MCNC: 9.1 MG/DL
CHLORIDE SERPL-SCNC: 105 MMOL/L
CO2 SERPL-SCNC: 23 MMOL/L
CREAT SERPL-MCNC: 0.7 MG/DL
DIFFERENTIAL METHOD: ABNORMAL
EOSINOPHIL # BLD AUTO: 0 K/UL
EOSINOPHIL NFR BLD: 0 %
ERYTHROCYTE [DISTWIDTH] IN BLOOD BY AUTOMATED COUNT: 16.3 %
EST. GFR  (AFRICAN AMERICAN): >60 ML/MIN/1.73 M^2
EST. GFR  (NON AFRICAN AMERICAN): >60 ML/MIN/1.73 M^2
GLUCOSE SERPL-MCNC: 165 MG/DL
GRAM STN SPEC: NORMAL
GRAM STN SPEC: NORMAL
HCT VFR BLD AUTO: 25.5 %
HGB BLD-MCNC: 8.2 G/DL
LYMPHOCYTES # BLD AUTO: 0.6 K/UL
LYMPHOCYTES NFR BLD: 6.4 %
MCH RBC QN AUTO: 25.7 PG
MCHC RBC AUTO-ENTMCNC: 32.2 G/DL
MCV RBC AUTO: 80 FL
MONOCYTES # BLD AUTO: 0.2 K/UL
MONOCYTES NFR BLD: 2.3 %
NEUTROPHILS # BLD AUTO: 9 K/UL
NEUTROPHILS NFR BLD: 91.1 %
PLATELET # BLD AUTO: 395 K/UL
PMV BLD AUTO: 8 FL
POTASSIUM SERPL-SCNC: 4.3 MMOL/L
POTASSIUM SERPL-SCNC: 4.3 MMOL/L
PROT SERPL-MCNC: 7.7 G/DL
RBC # BLD AUTO: 3.19 M/UL
SODIUM SERPL-SCNC: 136 MMOL/L
WBC # BLD AUTO: 9.92 K/UL

## 2017-09-30 PROCEDURE — 99900035 HC TECH TIME PER 15 MIN (STAT)

## 2017-09-30 PROCEDURE — 11000001 HC ACUTE MED/SURG PRIVATE ROOM

## 2017-09-30 PROCEDURE — 99024 POSTOP FOLLOW-UP VISIT: CPT | Mod: ,,, | Performed by: OBSTETRICS & GYNECOLOGY

## 2017-09-30 PROCEDURE — 94761 N-INVAS EAR/PLS OXIMETRY MLT: CPT

## 2017-09-30 PROCEDURE — 84132 ASSAY OF SERUM POTASSIUM: CPT

## 2017-09-30 PROCEDURE — 85025 COMPLETE CBC W/AUTO DIFF WBC: CPT

## 2017-09-30 PROCEDURE — 80053 COMPREHEN METABOLIC PANEL: CPT

## 2017-09-30 PROCEDURE — 25000003 PHARM REV CODE 250: Performed by: STUDENT IN AN ORGANIZED HEALTH CARE EDUCATION/TRAINING PROGRAM

## 2017-09-30 PROCEDURE — 36415 COLL VENOUS BLD VENIPUNCTURE: CPT

## 2017-09-30 PROCEDURE — 63600175 PHARM REV CODE 636 W HCPCS: Performed by: STUDENT IN AN ORGANIZED HEALTH CARE EDUCATION/TRAINING PROGRAM

## 2017-09-30 RX ORDER — HYDROCODONE BITARTRATE AND ACETAMINOPHEN 5; 325 MG/1; MG/1
1 TABLET ORAL EVERY 4 HOURS PRN
Qty: 20 TABLET | Refills: 0 | Status: SHIPPED | OUTPATIENT
Start: 2017-09-30 | End: 2017-11-01

## 2017-09-30 RX ORDER — IBUPROFEN 600 MG/1
600 TABLET ORAL EVERY 6 HOURS PRN
Qty: 30 TABLET | Refills: 2 | Status: SHIPPED | OUTPATIENT
Start: 2017-09-30 | End: 2018-03-27 | Stop reason: SDUPTHER

## 2017-09-30 RX ADMIN — METRONIDAZOLE 500 MG: 250 TABLET ORAL at 02:09

## 2017-09-30 RX ADMIN — AMPICILLIN SODIUM 2 G: 2 INJECTION, POWDER, FOR SOLUTION INTRAMUSCULAR; INTRAVENOUS at 09:09

## 2017-09-30 RX ADMIN — OXYCODONE HYDROCHLORIDE AND ACETAMINOPHEN 1 TABLET: 5; 325 TABLET ORAL at 09:09

## 2017-09-30 RX ADMIN — METRONIDAZOLE 500 MG: 250 TABLET ORAL at 05:09

## 2017-09-30 RX ADMIN — METRONIDAZOLE 500 MG: 250 TABLET ORAL at 09:09

## 2017-09-30 RX ADMIN — AMPICILLIN SODIUM 2 G: 2 INJECTION, POWDER, FOR SOLUTION INTRAMUSCULAR; INTRAVENOUS at 02:09

## 2017-09-30 RX ADMIN — AMPICILLIN SODIUM 2 G: 2 INJECTION, POWDER, FOR SOLUTION INTRAMUSCULAR; INTRAVENOUS at 08:09

## 2017-09-30 RX ADMIN — DOCUSATE SODIUM 100 MG: 100 CAPSULE, LIQUID FILLED ORAL at 09:09

## 2017-09-30 RX ADMIN — BISACODYL 10 MG: 10 SUPPOSITORY RECTAL at 06:09

## 2017-09-30 RX ADMIN — SODIUM CHLORIDE, SODIUM LACTATE, POTASSIUM CHLORIDE, AND CALCIUM CHLORIDE: 600; 310; 30; 20 INJECTION, SOLUTION INTRAVENOUS at 02:09

## 2017-09-30 RX ADMIN — OXYCODONE HYDROCHLORIDE AND ACETAMINOPHEN 1 TABLET: 5; 325 TABLET ORAL at 12:09

## 2017-09-30 RX ADMIN — SIMETHICONE CHEW TAB 80 MG 80 MG: 80 TABLET ORAL at 09:09

## 2017-09-30 RX ADMIN — DIPHENHYDRAMINE HYDROCHLORIDE 25 MG: 25 CAPSULE ORAL at 10:09

## 2017-09-30 RX ADMIN — GENTAMICIN SULFATE 304 MG: 40 INJECTION, SOLUTION INTRAMUSCULAR; INTRAVENOUS at 06:09

## 2017-09-30 RX ADMIN — AMPICILLIN SODIUM 2 G: 2 INJECTION, POWDER, FOR SOLUTION INTRAMUSCULAR; INTRAVENOUS at 03:09

## 2017-09-30 RX ADMIN — FERROUS SULFATE TAB EC 324 MG (65 MG FE EQUIVALENT) 325 MG: 324 (65 FE) TABLET DELAYED RESPONSE at 09:09

## 2017-09-30 RX ADMIN — SIMETHICONE CHEW TAB 80 MG 80 MG: 80 TABLET ORAL at 12:09

## 2017-09-30 RX ADMIN — SODIUM CHLORIDE, SODIUM LACTATE, POTASSIUM CHLORIDE, AND CALCIUM CHLORIDE: 600; 310; 30; 20 INJECTION, SOLUTION INTRAVENOUS at 05:09

## 2017-09-30 NOTE — PROGRESS NOTES
"[facility]  Obstetrics & Gynecology  Progress Note    Patient Name: Pernell Ly  MRN: [id number]  Admission Date: 9/26/2017  Primary Care Provider: Zoey Nelson MD  Principal Problem: Acute febrile illness    Subjective:     HPI:  37 y.o. Female POD#6 s/p -[facility] BS with left ovarian cystectomy who presents with complaint of fever that began yesterday. She reports "yellow and brown" malodorous discharge (began two days ago), pain to the lower abdomen and buttocks, dysuria, and light-headedness, but denies chills, nausea, vomiting, diarrhea, constipation, cough, SOB, vaginal bleeding, hematuria, frequency, urgency, drainage from incision sites, or myalgias. Highest fever at home was 100.9 Pain unchanged since discharge, well controlled with po pain medication. Symptoms are described as constant. Pt did not engage in sexual activity after surgery. Patient denies any sick contacts. She has been ambulating, voiding spontaneously, tolerating po, and passing flatus.      Interval History:   Patient report she feels "much better" today. Tolerating PO, voiding, ambulating without difficulty. Reports stomach pain is minimal. Denies fevers, chills, malaise.    Scheduled Meds:   amlodipine  5 mg Oral Daily    ampicillin IVPB  2 g Intravenous Q6H    docusate sodium  100 mg Oral Daily    ferrous sulfate  325 mg Oral Daily    gentamicin  5 mg/kg (Adjusted) Intravenous Q24H    metronidazole  500 mg Oral Q8H     Continuous Infusions:   lactated ringers 125 mL/hr at 09/30/17 0533     PRN Meds:bisacodyl, diphenhydrAMINE, ibuprofen, influenza, ondansetron, oxycodone-acetaminophen, simethicone, sodium chloride 0.9%    Review of patient's allergies indicates:  No Known Allergies    Objective:     Vital Signs (Most Recent):  Temp: 98.2 °F (36.8 °C) (09/30/17 0448)  Pulse: 94 (09/30/17 0448)  Resp: 18 (09/29/17 2011)  BP: (!) 143/85 (09/30/17 0448)  SpO2: 98 % (09/30/17 0448) Vital Signs (24h Range):  Temp:  " "[97.9 °F (36.6 °C)-99.4 °F (37.4 °C)] 98.2 °F (36.8 °C)  Pulse:  [] 94  Resp:  [16-18] 18  SpO2:  [93 %-100 %] 98 %  BP: (122-168)/(75-97) 143/85     Weight: 70.4 kg (155 lb 1.6 oz)  Body mass index is 26.62 kg/m².  Patient's last menstrual period was 09/05/2017.    I&O (Last 24H):      Intake/Output Summary (Last 24 hours) at 09/30/17 0652  Last data filed at 09/29/17 9188    <table CELLSPACING=0 CELLPADDING=0 BORDER=1>  <tr><td TCGJS=330%></td>  <td TUILY=077%><b>Gross per 24 hour</b></td></tr>  <tr><td KZQAN=900%>Intake</td>  <td UNWVN=618%>              500 ml</td></tr>  <tr><td NOWNC=437%>Output</td>  <td SZXEE=046%>              170 ml</td></tr>  <tr><td ETPCQ=073%><b>Net</b></td>  <td PHGRL=295%>              330 ml</td></tr>  </table>      Laboratory:  CBC:   Recent Labs  Lab 09/30/17  0431   WBC 9.92   RBC 3.19*   HGB 8.2*   HCT 25.5*   *   MCV 80*   MCH 25.7*   MCHC 32.2         Physical Exam:   Constitutional: She is oriented to person, place, and time. She appears well-developed and well-nourished. No distress.    HENT:   Head: Normocephalic and atraumatic.      Cardiovascular: Normal rate, regular rhythm and normal heart sounds.     Pulmonary/Chest: Effort normal and breath sounds normal.        Abdominal: Soft. Bowel sounds are normal. She exhibits no distension. Abdominal incision: c/d/i.             Musculoskeletal: Normal range of motion. She exhibits no edema.       Neurological: She is alert and oriented to person, place, and time.    Skin: Skin is warm and dry.    Psychiatric: She has a normal mood and affect.       Assessment/Plan:     * Acute febrile illness    - No leukocystosis today on CBC 9.92  - last fever of 102.4 on 9/28/17 @1653 Currently on amp, gent, flagyl, will be afebrile for 48 hours this afternoon at 1700, will discuss possible de-escalation of antibiotics today with staff, patient reports she "feels much better", looks subjectively improved as well  - urine culture " negative  - prelim blood cultures negative   - Gram stain negative from abdominal washings, AFB aerobic and anaerobic cultures pending          S/P laparoscopic hysterectomy    - POD#9 s/p RA-[facility]  BS with left ovarian cystectomy, POD#1 s/p diagnostic laparoscopy  - meeting all post operative milestones  - passing flatus, voiding, tolerating po, pain well controlled with po pain medication, and ambulating  - will continue to monitor         Anemia    H/h stable 8.2/25.5 (8.3/25.5 yesterday)  VSS, asymptomatic  Will continue 325 fe daily             MAICOL Aguiar MD  Obstetrics & Gynecology  [facility]

## 2017-09-30 NOTE — PLAN OF CARE
Problem: Patient Care Overview  Goal: Plan of Care Review  Outcome: Ongoing (interventions implemented as appropriate)  Remains free from fall, injury, and skin breakdown. Ambulates independently to bathroom. Voided spontaneously clear yellow urine. VS stable on RA and afebrile. Positions self independently.  Pain controlled with PO PRN meds. Lap sitesx3, CDI. TEDs/SCDs maintained.Tolerating ordered diet. IV site WNL. Plan of care reviewed with patient and all questions answered. Bed low, locked w/ bed alarm on. Call light within reach. Purposeful rounding performed. No other complaints at this time.

## 2017-09-30 NOTE — ASSESSMENT & PLAN NOTE
"- No leukocystosis today on CBC 9.92  - last fever of 102.4 on 9/28/17 @1653 Currently on amp, gent, flagyl, will be afebrile for 48 hours this afternoon at 1700, will discuss possible de-escalation of antibiotics today with staff, patient reports she "feels much better", looks subjectively improved as well  - urine culture negative  - prelim blood cultures negative   - Gram stain negative from abdominal washings, AFB aerobic and anaerobic cultures pending    "

## 2017-09-30 NOTE — SUBJECTIVE & OBJECTIVE
"Interval History:   Patient report she feels "much better" today. Tolerating PO, voiding, ambulating without difficulty. Reports stomach pain is minimal. Denies fevers, chills, malaise.    Scheduled Meds:   amlodipine  5 mg Oral Daily    ampicillin IVPB  2 g Intravenous Q6H    docusate sodium  100 mg Oral Daily    ferrous sulfate  325 mg Oral Daily    gentamicin  5 mg/kg (Adjusted) Intravenous Q24H    metronidazole  500 mg Oral Q8H     Continuous Infusions:   lactated ringers 125 mL/hr at 09/30/17 0533     PRN Meds:bisacodyl, diphenhydrAMINE, ibuprofen, influenza, ondansetron, oxycodone-acetaminophen, simethicone, sodium chloride 0.9%    Review of patient's allergies indicates:  No Known Allergies    Objective:     Vital Signs (Most Recent):  Temp: 98.2 °F (36.8 °C) (09/30/17 0448)  Pulse: 94 (09/30/17 0448)  Resp: 18 (09/29/17 2011)  BP: (!) 143/85 (09/30/17 0448)  SpO2: 98 % (09/30/17 0448) Vital Signs (24h Range):  Temp:  [97.9 °F (36.6 °C)-99.4 °F (37.4 °C)] 98.2 °F (36.8 °C)  Pulse:  [] 94  Resp:  [16-18] 18  SpO2:  [93 %-100 %] 98 %  BP: (122-168)/(75-97) 143/85     Weight: 70.4 kg (155 lb 1.6 oz)  Body mass index is 26.62 kg/m².  Patient's last menstrual period was 09/05/2017.    I&O (Last 24H):      Intake/Output Summary (Last 24 hours) at 09/30/17 0608  Last data filed at 09/29/17 2171    <table CELLSPACING=0 CELLPADDING=0 BORDER=1>  <tr><td KHCIQ=784%></td>  <td DBKWO=629%><b>Gross per 24 hour</b></td></tr>  <tr><td MBGHE=184%>Intake</td>  <td OOQMY=937%>              500 ml</td></tr>  <tr><td NQXZD=486%>Output</td>  <td ZOUUN=006%>              170 ml</td></tr>  <tr><td AJIIU=060%><b>Net</b></td>  <td IBYOS=892%>              330 ml</td></tr>  </table>      Laboratory:  CBC:   Recent Labs  Lab 09/30/17  0431   WBC 9.92   RBC 3.19*   HGB 8.2*   HCT 25.5*   *   MCV 80*   MCH 25.7*   MCHC 32.2         Physical Exam:   Constitutional: She is oriented to person, place, and time. She appears " well-developed and well-nourished. No distress.    HENT:   Head: Normocephalic and atraumatic.      Cardiovascular: Normal rate, regular rhythm and normal heart sounds.     Pulmonary/Chest: Effort normal and breath sounds normal.        Abdominal: Soft. Bowel sounds are normal. She exhibits no distension. Abdominal incision: c/d/i.             Musculoskeletal: Normal range of motion. She exhibits no edema.       Neurological: She is alert and oriented to person, place, and time.    Skin: Skin is warm and dry.    Psychiatric: She has a normal mood and affect.

## 2017-09-30 NOTE — OP NOTE
DATE OF PROCEDURE:  09/29/2017.    This patient had been taken to surgery by Dr. Francisco for laparoscopy   following a hysterectomy.  I was called in to evaluate for possible abscess   fluid collection reported on CT, procedure had been started by Dr. Porter   who had advance laparoscope and inspection of the abdomen and the pelvis   revealed changes due to the hysterectomy that the patient had undergone.    Inspection of the area revealed no evidence of significant inflammatory changes.    There was minimal exudate present in the pelvis.  Some of this exudate was   removed and sent for culture and sensitivity.  Inspection in the right abdomen   revealed a normal-appearing appendix and cecum.  There was no evidence of any   inflammatory changes in the upper abdomen out of the pelvis.  The rectosigmoid   was identified and appeared normal.  There was one loop of the sigmoid colon   that was into the pelvis; however, this appeared completely normal with no   associated fluid collection or inflammatory changes.  At this point, the area   was irrigated.  Dr. Francisco then finished the procedure and the closure.      AMANDA/NIKOLAY  dd: 09/29/2017 17:37:03 (CDT)  td: 09/30/2017 01:21:22 (CDT)  Doc ID   #[id number]  Job ID #[id number]    CC:

## 2017-09-30 NOTE — ASSESSMENT & PLAN NOTE
- POD#9 s/p RA-[facility]  BS with left ovarian cystectomy, POD#1 s/p diagnostic laparoscopy  - meeting all post operative milestones  - passing flatus, voiding, tolerating po, pain well controlled with po pain medication, and ambulating  - will continue to monitor

## 2017-10-01 VITALS
OXYGEN SATURATION: 94 % | RESPIRATION RATE: 18 BRPM | SYSTOLIC BLOOD PRESSURE: 159 MMHG | WEIGHT: 155.13 LBS | HEIGHT: 64 IN | TEMPERATURE: 97 F | DIASTOLIC BLOOD PRESSURE: 103 MMHG | BODY MASS INDEX: 26.49 KG/M2 | HEART RATE: 89 BPM

## 2017-10-01 PROBLEM — E87.6 HYPOKALEMIA: Status: ACTIVE | Noted: 2017-09-30

## 2017-10-01 PROBLEM — E87.6 HYPOKALEMIA: Status: ACTIVE | Noted: 2017-10-01

## 2017-10-01 LAB
ALBUMIN SERPL BCP-MCNC: 2.4 G/DL
ALP SERPL-CCNC: 60 U/L
ALT SERPL W/O P-5'-P-CCNC: 7 U/L
ANION GAP SERPL CALC-SCNC: 10 MMOL/L
AST SERPL-CCNC: 9 U/L
BACTERIA BLD CULT: NORMAL
BACTERIA BLD CULT: NORMAL
BASOPHILS # BLD AUTO: 0.01 K/UL
BASOPHILS NFR BLD: 0.1 %
BILIRUB SERPL-MCNC: 0.1 MG/DL
BUN SERPL-MCNC: 4 MG/DL
CALCIUM SERPL-MCNC: 8.6 MG/DL
CHLORIDE SERPL-SCNC: 106 MMOL/L
CO2 SERPL-SCNC: 23 MMOL/L
CREAT SERPL-MCNC: 0.6 MG/DL
DIFFERENTIAL METHOD: ABNORMAL
EOSINOPHIL # BLD AUTO: 0.2 K/UL
EOSINOPHIL NFR BLD: 2.5 %
ERYTHROCYTE [DISTWIDTH] IN BLOOD BY AUTOMATED COUNT: 16.6 %
EST. GFR  (AFRICAN AMERICAN): >60 ML/MIN/1.73 M^2
EST. GFR  (NON AFRICAN AMERICAN): >60 ML/MIN/1.73 M^2
GLUCOSE SERPL-MCNC: 103 MG/DL
HCT VFR BLD AUTO: 24.6 %
HGB BLD-MCNC: 7.8 G/DL
LYMPHOCYTES # BLD AUTO: 2.1 K/UL
LYMPHOCYTES NFR BLD: 23.1 %
MAGNESIUM SERPL-MCNC: 1.5 MG/DL
MCH RBC QN AUTO: 25.3 PG
MCHC RBC AUTO-ENTMCNC: 31.7 G/DL
MCV RBC AUTO: 80 FL
MONOCYTES # BLD AUTO: 0.7 K/UL
MONOCYTES NFR BLD: 7.4 %
NEUTROPHILS # BLD AUTO: 6.1 K/UL
NEUTROPHILS NFR BLD: 66.7 %
PLATELET # BLD AUTO: 427 K/UL
PMV BLD AUTO: 8 FL
POTASSIUM SERPL-SCNC: 3.2 MMOL/L
PROT SERPL-MCNC: 6.8 G/DL
RBC # BLD AUTO: 3.08 M/UL
SODIUM SERPL-SCNC: 139 MMOL/L
WBC # BLD AUTO: 9.08 K/UL

## 2017-10-01 PROCEDURE — 63600175 PHARM REV CODE 636 W HCPCS: Performed by: OBSTETRICS & GYNECOLOGY

## 2017-10-01 PROCEDURE — 90686 IIV4 VACC NO PRSV 0.5 ML IM: CPT | Performed by: OBSTETRICS & GYNECOLOGY

## 2017-10-01 PROCEDURE — 99900035 HC TECH TIME PER 15 MIN (STAT)

## 2017-10-01 PROCEDURE — 90471 IMMUNIZATION ADMIN: CPT | Performed by: OBSTETRICS & GYNECOLOGY

## 2017-10-01 PROCEDURE — 94761 N-INVAS EAR/PLS OXIMETRY MLT: CPT

## 2017-10-01 PROCEDURE — 25000003 PHARM REV CODE 250: Performed by: STUDENT IN AN ORGANIZED HEALTH CARE EDUCATION/TRAINING PROGRAM

## 2017-10-01 PROCEDURE — 84100 ASSAY OF PHOSPHORUS: CPT

## 2017-10-01 PROCEDURE — 80053 COMPREHEN METABOLIC PANEL: CPT

## 2017-10-01 PROCEDURE — 85025 COMPLETE CBC W/AUTO DIFF WBC: CPT

## 2017-10-01 PROCEDURE — 3E0234Z INTRODUCTION OF SERUM, TOXOID AND VACCINE INTO MUSCLE, PERCUTANEOUS APPROACH: ICD-10-PCS | Performed by: OBSTETRICS & GYNECOLOGY

## 2017-10-01 PROCEDURE — 83735 ASSAY OF MAGNESIUM: CPT

## 2017-10-01 PROCEDURE — 63600175 PHARM REV CODE 636 W HCPCS: Performed by: STUDENT IN AN ORGANIZED HEALTH CARE EDUCATION/TRAINING PROGRAM

## 2017-10-01 PROCEDURE — 36415 COLL VENOUS BLD VENIPUNCTURE: CPT

## 2017-10-01 PROCEDURE — 99024 POSTOP FOLLOW-UP VISIT: CPT | Mod: ,,, | Performed by: OBSTETRICS & GYNECOLOGY

## 2017-10-01 RX ORDER — MAGNESIUM SULFATE HEPTAHYDRATE 40 MG/ML
2 INJECTION, SOLUTION INTRAVENOUS ONCE
Status: COMPLETED | OUTPATIENT
Start: 2017-10-01 | End: 2017-10-01

## 2017-10-01 RX ORDER — POTASSIUM CHLORIDE 20 MEQ/1
20 TABLET, EXTENDED RELEASE ORAL 2 TIMES DAILY
Status: DISCONTINUED | OUTPATIENT
Start: 2017-10-01 | End: 2017-10-01 | Stop reason: HOSPADM

## 2017-10-01 RX ADMIN — AMPICILLIN SODIUM 2 G: 2 INJECTION, POWDER, FOR SOLUTION INTRAMUSCULAR; INTRAVENOUS at 04:10

## 2017-10-01 RX ADMIN — METRONIDAZOLE 500 MG: 250 TABLET ORAL at 06:10

## 2017-10-01 RX ADMIN — POTASSIUM CHLORIDE 20 MEQ: 1500 TABLET, EXTENDED RELEASE ORAL at 11:10

## 2017-10-01 RX ADMIN — METRONIDAZOLE 500 MG: 250 TABLET ORAL at 01:10

## 2017-10-01 RX ADMIN — OXYCODONE HYDROCHLORIDE AND ACETAMINOPHEN 1 TABLET: 5; 325 TABLET ORAL at 11:10

## 2017-10-01 RX ADMIN — DOCUSATE SODIUM 100 MG: 100 CAPSULE, LIQUID FILLED ORAL at 08:10

## 2017-10-01 RX ADMIN — INFLUENZA A VIRUS A/MICHIGAN/45/2015 X-275 (H1N1) ANTIGEN (FORMALDEHYDE INACTIVATED), INFLUENZA A VIRUS A/HONG KONG/4801/2014 X-263B (H3N2) ANTIGEN (FORMALDEHYDE INACTIVATED), INFLUENZA B VIRUS B/PHUKET/3073/2013 ANTIGEN (FORMALDEHYDE INACTIVATED), AND INFLUENZA B VIRUS B/BRISBANE/60/2008 ANTIGEN (FORMALDEHYDE INACTIVATED) 0.5 ML: 15; 15; 15; 15 INJECTION, SUSPENSION INTRAMUSCULAR at 05:10

## 2017-10-01 RX ADMIN — AMLODIPINE BESYLATE 5 MG: 5 TABLET ORAL at 08:10

## 2017-10-01 RX ADMIN — AMPICILLIN SODIUM 2 G: 2 INJECTION, POWDER, FOR SOLUTION INTRAMUSCULAR; INTRAVENOUS at 08:10

## 2017-10-01 RX ADMIN — AMPICILLIN SODIUM 2 G: 2 INJECTION, POWDER, FOR SOLUTION INTRAMUSCULAR; INTRAVENOUS at 03:10

## 2017-10-01 RX ADMIN — MAGNESIUM SULFATE IN WATER 2 G: 40 INJECTION, SOLUTION INTRAVENOUS at 01:10

## 2017-10-01 RX ADMIN — FERROUS SULFATE TAB EC 324 MG (65 MG FE EQUIVALENT) 325 MG: 324 (65 FE) TABLET DELAYED RESPONSE at 08:10

## 2017-10-01 NOTE — CARE UPDATE
Patient discharge instructions given. Pt verbalized understanding. 22g dc'd from left wrist. Vital signs stable.

## 2017-10-01 NOTE — CARE UPDATE
Patient lying in bed watching television. Magnesium 2gm IV in progress to 22g left wrist without difficulty noted. Denies any c/o discomfort @ this time. Vital signs stable. Safety maintained. Call light in reach.

## 2017-10-01 NOTE — SUBJECTIVE & OBJECTIVE
Interval History:  who is POD#13 s/p RATLH and POD#2 s/p diagnostic scope. Pt reports she is feeling some increase in abdominal discomfort. She is eating regular without nausea or vomiting, but reports abdominal bloating. Pain is well controlled. Ambulating without difficulty. Denies fevers, chills, malaise. She is passing flatus since diagnostic scope.    Scheduled Meds:   amlodipine  5 mg Oral Daily    ampicillin IVPB  2 g Intravenous Q6H    docusate sodium  100 mg Oral Daily    ferrous sulfate  325 mg Oral Daily    gentamicin  5 mg/kg (Adjusted) Intravenous Q24H    metronidazole  500 mg Oral Q8H    potassium chloride  20 mEq Oral BID     Continuous Infusions:   lactated ringers 125 mL/hr at 17 1402     PRN Meds:bisacodyl, diphenhydrAMINE, ibuprofen, influenza, ondansetron, oxycodone-acetaminophen, simethicone, sodium chloride 0.9%    Review of patient's allergies indicates:  No Known Allergies    Objective:     Vital Signs (Most Recent):  Temp: 97.8 °F (36.6 °C) (10/01/17 0408)  Pulse: 84 (10/01/17 0737)  Resp: 16 (10/01/17 0737)  BP: (!) 155/97 (10/01/17 0737)  SpO2: 99 % (10/01/17 0737) Vital Signs (24h Range):  Temp:  [97.8 °F (36.6 °C)-98.7 °F (37.1 °C)] 97.8 °F (36.6 °C)  Pulse:  [84-95] 84  Resp:  [16-20] 16  SpO2:  [96 %-100 %] 99 %  BP: (117-155)/(68-97) 155/97     Weight: 70.4 kg (155 lb 1.6 oz)  Body mass index is 26.62 kg/m².  Patient's last menstrual period was 2017.    I&O (Last 24H):      Intake/Output Summary (Last 24 hours) at 10/01/17 2798  Last data filed at 17 8023    <table CELLSPACING=0 CELLPADDING=0 BORDER=1>  <tr><td NGPAX=466%></td>  <td VNVHQ=461%><b>Gross per 24 hour</b></td></tr>  <tr><td XUMVD=475%>Intake</td>  <td CXEXY=961%>             2672 ml</td></tr>  <tr><td EFTVQ=703%>Output</td>  <td YHLKA=742%>             1200 ml</td></tr>  <tr><td RFEZO=115%><b>Net</b></td>  <td BJKCR=451%>             1472 ml</td></tr>  </table>      Laboratory:  CBC:   Recent  Labs  Lab 10/01/17  0431   WBC 9.08   RBC 3.08*   HGB 7.8*   HCT 24.6*   *   MCV 80*   MCH 25.3*   MCHC 31.7*       Recent Labs  Lab 09/29/17  0430 09/30/17  0431 10/01/17  0431   WBC 7.63 9.92 9.08   HGB 8.3* 8.2* 7.8*   HCT 25.4* 25.5* 24.6*   MCV 80* 80* 80*    395* 427*        Diagnostic Results:  None    Physical Exam:   Constitutional: She is oriented to person, place, and time. She appears well-developed and well-nourished. No distress.    HENT:   Head: Normocephalic and atraumatic.     Neck: Normal range of motion. Neck supple.    Cardiovascular: Normal rate and regular rhythm.     Pulmonary/Chest: Effort normal and breath sounds normal.        Abdominal: Soft. Bowel sounds are normal. She exhibits abdominal incision (c/d/i). She exhibits no distension. There is no tenderness. There is no guarding.                 Neurological: She is alert and oriented to person, place, and time.    Skin: Skin is warm and dry.    Psychiatric: She has a normal mood and affect. Her behavior is normal.

## 2017-10-01 NOTE — PLAN OF CARE
Problem: Patient Care Overview  Goal: Plan of Care Review  Outcome: Ongoing (interventions implemented as appropriate)  Patient in no apparent distress. Sat's  99 % on room air . Will continue to monitor.

## 2017-10-01 NOTE — PROGRESS NOTES
"[facility]  Obstetrics & Gynecology  Progress Note    Patient Name: Pernell Ly  MRN: [id number]  Admission Date: 2017  Primary Care Provider: Zoey Nelson MD  Principal Problem: Acute febrile illness    Subjective:     HPI:  37 y.o. Female POD#6 s/p RA-TLH BS with left ovarian cystectomy who presents with complaint of fever that began yesterday. She reports "yellow and brown" malodorous discharge (began two days ago), pain to the lower abdomen and buttocks, dysuria, and light-headedness, but denies chills, nausea, vomiting, diarrhea, constipation, cough, SOB, vaginal bleeding, hematuria, frequency, urgency, drainage from incision sites, or myalgias. Highest fever at home was 100.9 Pain unchanged since discharge, well controlled with po pain medication. Symptoms are described as constant. Pt did not engage in sexual activity after surgery. Patient denies any sick contacts. She has been ambulating, voiding spontaneously, tolerating po, and passing flatus.      Interval History:  who is POD#13 s/p RATLH and POD#2 s/p diagnostic scope. Pt reports she is feeling some increase in abdominal discomfort. She is eating regular without nausea or vomiting, but reports abdominal bloating. Pain is well controlled. Ambulating without difficulty. Denies fevers, chills, malaise. She is passing flatus since diagnostic scope.    Scheduled Meds:   amlodipine  5 mg Oral Daily    ampicillin IVPB  2 g Intravenous Q6H    docusate sodium  100 mg Oral Daily    ferrous sulfate  325 mg Oral Daily    gentamicin  5 mg/kg (Adjusted) Intravenous Q24H    metronidazole  500 mg Oral Q8H    potassium chloride  20 mEq Oral BID     Continuous Infusions:   lactated ringers 125 mL/hr at 17 1402     PRN Meds:bisacodyl, diphenhydrAMINE, ibuprofen, influenza, ondansetron, oxycodone-acetaminophen, simethicone, sodium chloride 0.9%    Review of patient's allergies indicates:  No Known Allergies    Objective: "     Vital Signs (Most Recent):  Temp: 97.8 °F (36.6 °C) (10/01/17 0408)  Pulse: 84 (10/01/17 0737)  Resp: 16 (10/01/17 0737)  BP: (!) 155/97 (10/01/17 0737)  SpO2: 99 % (10/01/17 0737) Vital Signs (24h Range):  Temp:  [97.8 °F (36.6 °C)-98.7 °F (37.1 °C)] 97.8 °F (36.6 °C)  Pulse:  [84-95] 84  Resp:  [16-20] 16  SpO2:  [96 %-100 %] 99 %  BP: (117-155)/(68-97) 155/97     Weight: 70.4 kg (155 lb 1.6 oz)  Body mass index is 26.62 kg/m².  Patient's last menstrual period was 09/05/2017.    I&O (Last 24H):      Intake/Output Summary (Last 24 hours) at 10/01/17 0758  Last data filed at 09/30/17 1816    <table CELLSPACING=0 CELLPADDING=0 BORDER=1>  <tr><td OCZNU=789%></td>  <td YODLR=888%><b>Gross per 24 hour</b></td></tr>  <tr><td VZMMC=372%>Intake</td>  <td FLLJE=846%>             2672 ml</td></tr>  <tr><td RDMZC=094%>Output</td>  <td ATGWD=306%>             1200 ml</td></tr>  <tr><td VINKJ=336%><b>Net</b></td>  <td AOGGL=983%>             1472 ml</td></tr>  </table>      Laboratory:  CBC:   Recent Labs  Lab 10/01/17  0431   WBC 9.08   RBC 3.08*   HGB 7.8*   HCT 24.6*   *   MCV 80*   MCH 25.3*   MCHC 31.7*       Recent Labs  Lab 09/29/17  0430 09/30/17  0431 10/01/17  0431   WBC 7.63 9.92 9.08   HGB 8.3* 8.2* 7.8*   HCT 25.4* 25.5* 24.6*   MCV 80* 80* 80*    395* 427*        Diagnostic Results:  None    Physical Exam:   Constitutional: She is oriented to person, place, and time. She appears well-developed and well-nourished. No distress.    HENT:   Head: Normocephalic and atraumatic.     Neck: Normal range of motion. Neck supple.    Cardiovascular: Normal rate and regular rhythm.     Pulmonary/Chest: Effort normal and breath sounds normal.        Abdominal: Soft. Bowel sounds are normal. She exhibits abdominal incision (c/d/i). She exhibits no distension. There is no tenderness. There is no guarding.                 Neurological: She is alert and oriented to person, place, and time.    Skin: Skin is warm and  dry.    Psychiatric: She has a normal mood and affect. Her behavior is normal.       Assessment/Plan:         * Acute febrile illness    - No leukocystosis today on CBC   - last fever of 102.4 on 9/28/17 @1653 Currently on amp, gent, flagyl, afebrile x 60, will discuss possible de-escalation of antibiotics today with staff  - urine culture negative  - prelim blood cultures negative x4d  - Gram stain negative from abdominal washings, AFB aerobic and anaerobic cultures pending          S/P laparoscopic hysterectomy    - POD#9 s/p RA-[facility]  BS with left ovarian cystectomy, POD#1 s/p diagnostic laparoscopy  - meeting all post operative milestones  - passing flatus, voiding, tolerating po, pain well controlled with po pain medication, and ambulating  - will continue to monitor         Anemia    H/h stable 8.2/25.5>7.8/24  VSS, asymptomatic  Will continue 325 fe daily         Hypokalemia  - will replace         Suri Last MD  Obstetrics & Gynecology  [facility]

## 2017-10-01 NOTE — ASSESSMENT & PLAN NOTE
- No leukocystosis today on CBC   - last fever of 102.4 on 9/28/17 @1653 Currently on amp, gent, flagyl, afebrile x 60, will discuss possible de-escalation of antibiotics today with staff  - urine culture negative  - prelim blood cultures negative x4d  - Gram stain negative from abdominal washings, AFB aerobic and anaerobic cultures pending

## 2017-10-01 NOTE — PLAN OF CARE
Problem: Patient Care Overview  Goal: Plan of Care Review  Outcome: Ongoing (interventions implemented as appropriate)  Pt received on room air with adequate saturation. No acute distress noted.

## 2017-10-01 NOTE — DISCHARGE SUMMARY
"[facility]  Obstetrics & Gynecology  Discharge Summary    Patient Name: Pernell Ly  MRN: [id number]  Admission Date: 9/26/2017  Hospital Length of Stay: 4 days  Discharge Date and Time:  10/01/2017 5:33 PM  Attending Physician: Maryellen Francisco MD   Discharging Provider: MAICOL Aguiar MD  Primary Care Provider: Zoey Nelson MD    HPI:  37 y.o. Female POD#6 s/p RA-TLH BS with left ovarian cystectomy who presents with complaint of fever that began yesterday. She reports "yellow and brown" malodorous discharge (began two days ago), pain to the lower abdomen and buttocks, dysuria, and light-headedness, but denies chills, nausea, vomiting, diarrhea, constipation, cough, SOB, vaginal bleeding, hematuria, frequency, urgency, drainage from incision sites, or myalgias. Highest fever at home was 100.9 Pain unchanged since discharge, well controlled with po pain medication. Symptoms are described as constant. Pt did not engage in sexual activity after surgery. Patient denies any sick contacts. She has been ambulating, voiding spontaneously, tolerating po, and passing flatus.      Hospital Course:  09/26/2017 Admitted from the ED secondary to post operative fever of unknown origin. Patient stable. Last fever 101.9 @ 0950.  09/27/2017 POD#7 s/p RA-TLH-BS with left ovarian cystectomy. Spiked a fever of 102.9 @ 1952. Empiric abx started. C XRAY ordered. Patient pain improving this Am. Meeting all other post operative milestones.   09/28/2017 POD#8. Fever of 102.7 @2000 last night. Pain similar to previous day. Continues to meet post op milestones. Can tolerate PO.  9/29/2017: Afebrile, underwent diagnostic laparoscopy 2/2 CT scan with read as abscess however no abscesses noted, general surgery consulted and agreed  9/30/17: Pt feels "much better" today. Meeting post op milestones, afebrile.  10/1/17: Continues to remain afebrile, meeting milestones, minimal pain. Discharged home    Procedure(s) " (LRB):  LAPAROSCOPY-DIAGNOSTIC (N/A)           Pending Diagnostic Studies:     None        Final Active Diagnoses:    Diagnosis Date Noted POA    PRINCIPAL PROBLEM:  Acute febrile illness [R50.9] 09/26/2017 Yes    Hypokalemia [E87.6] 10/01/2017 No    S/P laparoscopic hysterectomy [Z90.710] 09/19/2017 Yes    Anemia [D64.9] 08/31/2014 Yes      Problems Resolved During this Admission:    Diagnosis Date Noted Date Resolved POA        Discharged Condition: good    Disposition: Home or Self Care    Follow Up:  Follow-up Information     Maryellen Francisco MD In 4 weeks.    Specialties:  Obstetrics, Obstetrics and Gynecology  Why:  Post op visit  Contact information:  [address] [place] [id number]  [phone]             Schedule an appointment as soon as possible for a visit with Maryellen Francisco MD.    Specialties:  Obstetrics, Obstetrics and Gynecology  Why:  F/U hospital visit  Contact information:  [address] [address]  [phone]                 Patient Instructions:     Diet general     Call MD for:  temperature >100.4     Call MD for:  persistent nausea and vomiting or diarrhea     Call MD for:  severe uncontrolled pain     Call MD for:  redness, tenderness, or signs of infection (pain, swelling, redness, odor or green/yellow discharge around incision site)     Call MD for:  difficulty breathing or increased cough     Call MD for:  severe persistent headache     Call MD for:  increased confusion or weakness     Lifting restrictions   Order Comments: Nothing heavy x 4 weeks     Other restrictions (specify):   Order Comments: Pelvic rest for 6 weeks (no tampons, intercourse, etc.)       Medications:  Reconciled Home Medications:   Current Discharge Medication List      CONTINUE these medications which have CHANGED    Details   hydrocodone-acetaminophen 5-325mg (NORCO) 5-325 mg per tablet Take 1 tablet by mouth every 4 (four) hours as needed.  Qty: 20 tablet,  Refills: 0      ibuprofen (ADVIL,MOTRIN) 600 MG tablet Take 1 tablet (600 mg total) by mouth every 6 (six) hours as needed.  Qty: 30 tablet, Refills: 2         CONTINUE these medications which have NOT CHANGED    Details   amlodipine (NORVASC) 5 MG tablet Take 1 tablet (5 mg total) by mouth once daily.  Qty: 30 tablet, Refills: 1      diphenhydrAMINE (SOMINEX) 25 mg tablet Take 25 mg by mouth nightly as needed for Insomnia.      ferrous sulfate 324 mg (65 mg iron) TbEC Take 324 mg by mouth 2 (two) times daily.       MULTIVIT-IRON-MIN-FOLIC ACID 3,500-18-0.4 UNIT-MG-MG ORAL CHEW Take 1 tablet by mouth once daily.              MAICOL Aguiar MD  Obstetrics & Gynecology  [facility]

## 2017-10-02 ENCOUNTER — TELEPHONE (OUTPATIENT)
Dept: OBSTETRICS AND GYNECOLOGY | Facility: CLINIC | Age: 37
End: 2017-10-02

## 2017-10-02 LAB — PHOSPHATE SERPL-MCNC: 3.4 MG/DL

## 2017-10-02 NOTE — TELEPHONE ENCOUNTER
----- Message from Magaly Vásquez sent at 10/2/2017  9:45 AM CDT -----  _x  1st Request  _  2nd Request  _  3rd Request        Who: marii    Why:  Pt. Returning phone call to speak with nurse.please call to discuss    What Number to Call Back:[phone]    When to Expect a call back: (Before the end of the day)   -- if the call is after 12:00, the call back will be tomorrow.

## 2017-10-02 NOTE — TELEPHONE ENCOUNTER
Returned the pt's call to the clinic. Pt informed me that she spoke with Dr. Francisco and that she told her to call and speak with her nurse to get a appointment this week for a Sx f/u. Informed the pt that I was Dr. Francisco's MA and gave the pt a appointment for Wednesday, October 5th. Pt agreed to appointment. Pt given appointment location, date, and time. Pt verbalized understanding. Letter sent out.

## 2017-10-03 LAB — BACTERIA SPEC AEROBE CULT: NO GROWTH

## 2017-10-05 ENCOUNTER — OFFICE VISIT (OUTPATIENT)
Dept: OBSTETRICS AND GYNECOLOGY | Facility: CLINIC | Age: 37
End: 2017-10-05
Attending: OBSTETRICS & GYNECOLOGY
Payer: COMMERCIAL

## 2017-10-05 VITALS
WEIGHT: 148.38 LBS | BODY MASS INDEX: 25.47 KG/M2 | DIASTOLIC BLOOD PRESSURE: 82 MMHG | SYSTOLIC BLOOD PRESSURE: 122 MMHG

## 2017-10-05 DIAGNOSIS — Z09 POSTOP CHECK: Primary | ICD-10-CM

## 2017-10-05 PROCEDURE — 99999 PR PBB SHADOW E&M-EST. PATIENT-LVL II: CPT | Mod: PBBFAC,,, | Performed by: OBSTETRICS & GYNECOLOGY

## 2017-10-05 PROCEDURE — 99024 POSTOP FOLLOW-UP VISIT: CPT | Mod: S$GLB,,, | Performed by: OBSTETRICS & GYNECOLOGY

## 2017-10-05 NOTE — PROGRESS NOTES
"CC: Postoperative visit    Pernell Ly is a 37 y.o. female  presents for a postoperative visit s/p DVH, bilateral salpingectomy, left ovarian cystectomy, cystoscopy on 2017.  Her postoperative course was complicated by development of fever on POD#6.      Hospital Course:  2017 Admitted from the ED secondary to post operative fever of unknown origin. Patient stable. Last fever 101.9 @ 0950.  2017 POD#7 s/p RA-TLH-BS with left ovarian cystectomy. Spiked a fever of 102.9 @ 1952. Empiric abx started. C XRAY ordered. Patient pain improving this Am. Meeting all other post operative milestones.   2017 POD#8. Fever of 102.7 @2000 last night. Pain similar to previous day. Continues to meet post op milestones. Can tolerate PO.  2017: CT Scan was obtained as she continued to have fevers.  It was read as: Ill-defined, multiloculated fluid abscess is in the deep left and right pelvis. Due to the location and ill-defined, multiloculated nature of the collections, this is not amenable to percutaneous drainage.  She underwent diagnostic laparoscopy 2/2 CT scan with read as abscess however no abscesses were noted.  General surgery was consulted and agreed  17: Pt feels "much better" today. Meeting post op milestones, afebrile.  10/1/17: Continues to remain afebrile, meeting milestones, minimal pain. Discharged home    She is doing well postoperative.    Pathology showed:  FINAL PATHOLOGIC DIAGNOSIS  UTERUS, 846g: CERVIX-SQUAMOUS METAPLASIA; NO OTHER SIGNIFICANT HISTOLOGICAL  ABNORMALITY, NO EVIDENCE OF DYSPLASIA;  ENDOMETRIUM-SECRETORY PHASE; SUBMUCOUS LEIOMYOMATA;  MYOMETRIUM-LEIOMYOMATA  SEROSA-SUBSEROSAL LEIOMYOMATA;  FALLOPIAN TUBES (2): PERITUBAL CYSTS, NO OTHER SIGNIFICANT HISTOLOGICAL ABNORMALITY;  OVARY (SEPARATE): HEMORRHAGIC CORPUS LUTEUM.  [facility] [place] [place]    /82   Wt 67.3 kg (148 lb 5.9 oz)   LMP 2017 (Exact Date)   BMI 25.47 kg/m²     ROS:  GENERAL: No fever, chills, " fatigability or weight loss.  VULVAR: No pain, no lesions and no itching.  VAGINAL: No relaxation, no itching, no discharge, no abnormal bleeding and no lesions.  ABDOMEN: No abdominal pain. Denies nausea. Denies vomiting. No diarrhea. No constipation  BREAST: Denies pain. No lumps. No discharge.  URINARY: No incontinence, no nocturia, no frequency and no dysuria.  CARDIOVASCULAR: No chest pain. No shortness of breath. No leg cramps.  NEUROLOGICAL: No headaches. No vision changes.    Physical Exam    INCISIONS: Clean, dry, well healed.    PELVIC: Normal external genitalia without lesions.  Normal hair distribution.  Adequate perineal body, normal urethral meatus.  Vagina moist and well rugated without lesions or discharge.  Vaginal cuff intact with no discharge, erythema.  Bimanual exam shows uterus and cervix to be surgically absent.  Adnexa without masses or tenderness.      1. Postop check       Continue current postop care.  RTC as scheduled.

## 2017-10-09 LAB — BACTERIA SPEC ANAEROBE CULT: NORMAL

## 2017-10-11 ENCOUNTER — PATIENT MESSAGE (OUTPATIENT)
Dept: OBSTETRICS AND GYNECOLOGY | Facility: CLINIC | Age: 37
End: 2017-10-11

## 2017-10-12 ENCOUNTER — TELEPHONE (OUTPATIENT)
Dept: OBSTETRICS AND GYNECOLOGY | Facility: CLINIC | Age: 37
End: 2017-10-12

## 2017-10-12 NOTE — TELEPHONE ENCOUNTER
She can't return to work until I see her for her postop visit which should be 4-6 weeks after surgery

## 2017-10-12 NOTE — TELEPHONE ENCOUNTER
Patient need a letter for her job that states when she can return to work.    Can you write a letter for this patient and I will inform her that it is ready?

## 2017-10-12 NOTE — LETTER
October 15, 2017    Pernell Ly  [address] [facility] [address] [facility] [address]         [facility] [address]  [address] [place] [address] [address]  [facility] [address]  Phone: [phone]  Fax: [phone] October 15, 2017     Patient: Pernell Ly   YOB: 1980   Date of Visit: 10/12/2017       To Whom It May Concern:    It is my medical opinion that Pernell Ly is under my medical supervision.  She underwent a surgery on September 19 and will require 6 weeks of recovery time.    If you have any questions or concerns, please don't hesitate to call.    Sincerely,        Maryellen Francisco MD

## 2017-11-01 ENCOUNTER — OFFICE VISIT (OUTPATIENT)
Dept: INTERNAL MEDICINE | Facility: CLINIC | Age: 37
End: 2017-11-01
Attending: FAMILY MEDICINE
Payer: COMMERCIAL

## 2017-11-01 ENCOUNTER — OFFICE VISIT (OUTPATIENT)
Dept: OBSTETRICS AND GYNECOLOGY | Facility: CLINIC | Age: 37
End: 2017-11-01
Attending: OBSTETRICS & GYNECOLOGY
Payer: COMMERCIAL

## 2017-11-01 VITALS
WEIGHT: 151.69 LBS | HEART RATE: 84 BPM | SYSTOLIC BLOOD PRESSURE: 152 MMHG | BODY MASS INDEX: 25.9 KG/M2 | HEIGHT: 64 IN | DIASTOLIC BLOOD PRESSURE: 90 MMHG

## 2017-11-01 VITALS — WEIGHT: 152.13 LBS | HEIGHT: 64 IN | BODY MASS INDEX: 25.97 KG/M2

## 2017-11-01 DIAGNOSIS — I10 ESSENTIAL HYPERTENSION: Primary | ICD-10-CM

## 2017-11-01 DIAGNOSIS — R51.9 WORSENING HEADACHES: ICD-10-CM

## 2017-11-01 DIAGNOSIS — Z09 POSTOP CHECK: Primary | ICD-10-CM

## 2017-11-01 PROCEDURE — 99999 PR PBB SHADOW E&M-EST. PATIENT-LVL II: CPT | Mod: PBBFAC,,, | Performed by: OBSTETRICS & GYNECOLOGY

## 2017-11-01 PROCEDURE — 99999 PR PBB SHADOW E&M-EST. PATIENT-LVL III: CPT | Mod: PBBFAC,,, | Performed by: FAMILY MEDICINE

## 2017-11-01 PROCEDURE — 99213 OFFICE O/P EST LOW 20 MIN: CPT | Mod: S$GLB,,, | Performed by: FAMILY MEDICINE

## 2017-11-01 PROCEDURE — 99024 POSTOP FOLLOW-UP VISIT: CPT | Mod: S$GLB,,, | Performed by: OBSTETRICS & GYNECOLOGY

## 2017-11-01 RX ORDER — AMLODIPINE BESYLATE 10 MG/1
10 TABLET ORAL DAILY
Qty: 30 TABLET | Refills: 2 | Status: SHIPPED | OUTPATIENT
Start: 2017-11-01 | End: 2018-03-27

## 2017-11-01 NOTE — PROGRESS NOTES
Subjective:       Patient ID: Pernell Ly is a 37 y.o. female.    Chief Complaint: Low-back Pain (Very Intense) and Migraine (Headache)    Pt presents today for headaches. Pt denies any n/v blurry vision. Pt is not aware that her BP's are up. States that this could be the reason and has not checked her BP's at home        Low-back Pain   Associated symptoms include headaches. Pertinent negatives include no arthralgias, chest pain, joint swelling, neck pain, vomiting or weakness.   Migraine    Pertinent negatives include no hearing loss, neck pain, rhinorrhea, vomiting or weakness.     Review of Systems   Constitutional: Negative for activity change and unexpected weight change.   HENT: Negative for hearing loss, rhinorrhea and trouble swallowing.    Eyes: Negative for discharge and visual disturbance.   Respiratory: Negative for chest tightness and wheezing.    Cardiovascular: Negative for chest pain and palpitations.   Gastrointestinal: Positive for constipation. Negative for blood in stool, diarrhea and vomiting.   Endocrine: Negative for polydipsia and polyuria.   Genitourinary: Negative for difficulty urinating, dysuria, hematuria and menstrual problem.   Musculoskeletal: Negative for arthralgias, joint swelling and neck pain.   Neurological: Positive for headaches. Negative for weakness.   Psychiatric/Behavioral: Negative for confusion and dysphoric mood.   All other systems reviewed and are negative.      Objective:      Physical Exam   Constitutional: She is oriented to person, place, and time. She appears well-developed and well-nourished.   HENT:   Head: Normocephalic and atraumatic.   Eyes: Conjunctivae and EOM are normal. Pupils are equal, round, and reactive to light.   Neck: Normal range of motion. Neck supple. No thyromegaly present.   Cardiovascular: Normal rate, regular rhythm, normal heart sounds and intact distal pulses.    No murmur heard.  Pulmonary/Chest: Effort normal and breath sounds  normal. No respiratory distress. She has no wheezes. She has no rales. She exhibits no tenderness.   Musculoskeletal: Normal range of motion. She exhibits no edema.   Lymphadenopathy:     She has no cervical adenopathy.   Neurological: She is alert and oriented to person, place, and time. She displays normal reflexes. No cranial nerve deficit or sensory deficit. She exhibits normal muscle tone. Coordination normal.   Skin: Skin is warm. No erythema.   Psychiatric: She has a normal mood and affect. Her behavior is normal. Judgment and thought content normal.       Assessment:       Headaches  HTN  Plan:     suspect that CROOK is related to her uncontrolled HTN  HTN trial of increasing norvasc 10 mg daily   RTC 2 weeks for BP check with nurse

## 2017-11-01 NOTE — LETTER
November 1, 2017    Pernell Ly  [address] [address]         [facility] [address]  [address]  [place] [address]  Phone: [phone]  Fax: [phone] November 1, 2017     Patient: Pernell Ly   YOB: 1980   Date of Visit: 11/1/2017       To Whom It May Concern:    It is my medical opinion that Pernell Ly may return to full duty immediately with no restrictions.    If you have any questions or concerns, please don't hesitate to call.    Sincerely,        Maryellen Francisco MD

## 2017-11-01 NOTE — PROGRESS NOTES
"CC: Postoperative visit    Pernell Ly is a 37 y.o. female  presents for a postoperative visit s/p DVH, bilateral salpingectomy, left ovarian cystectomy, cystoscopy on 2017.  Her postoperative course was complicated by development of fever on POD#6.       Hospital Course:  2017 Admitted from the ED secondary to post operative fever of unknown origin. Patient stable. Last fever 101.9 @ 0950.  2017 POD#7 s/p RA-TLH-BS with left ovarian cystectomy. Spiked a fever of 102.9 @ 1952. Empiric abx started. C XRAY ordered. Patient pain improving this Am. Meeting all other post operative milestones.   2017 POD#8. Fever of 102.7 @2000 last night. Pain similar to previous day. Continues to meet post op milestones. Can tolerate PO.  2017: CT Scan was obtained as she continued to have fevers.  It was read as: Ill-defined, multiloculated fluid abscess is in the deep left and right pelvis. Due to the location and ill-defined, multiloculated nature of the collections, this is not amenable to percutaneous drainage.  She underwent diagnostic laparoscopy 2/2 CT scan with read as abscess however no abscesses were noted.  General surgery was consulted and agreed  17: Pt feels "much better" today. Meeting post op milestones, afebrile.  10/1/17: Continues to remain afebrile, meeting milestones, minimal pain. Discharged home     She is doing well postoperative.  She reports some hot flashes and headaches, similar to her migraines.      Pathology shows    FINAL PATHOLOGIC DIAGNOSIS  UTERUS, 846g: CERVIX-SQUAMOUS METAPLASIA; NO OTHER SIGNIFICANT HISTOLOGICAL  ABNORMALITY, NO EVIDENCE OF DYSPLASIA;  ENDOMETRIUM-SECRETORY PHASE; SUBMUCOUS LEIOMYOMATA;  MYOMETRIUM-LEIOMYOMATA  SEROSA-SUBSEROSAL LEIOMYOMATA;  FALLOPIAN TUBES (2): PERITUBAL CYSTS, NO OTHER SIGNIFICANT HISTOLOGICAL ABNORMALITY;  OVARY (SEPARATE): HEMORRHAGIC CORPUS LUTEUM.    Ht 5' 4" (1.626 m)   Wt 69 kg (152 lb 1.9 oz)   LMP 2017 " (Exact Date)   BMI 26.11 kg/m²     ROS:  GENERAL: No fever, chills, fatigability or weight loss.  VULVAR: No pain, no lesions and no itching.  VAGINAL: No relaxation, no itching, no discharge, no abnormal bleeding and no lesions.  ABDOMEN: No abdominal pain. Denies nausea. Denies vomiting. No diarrhea. No constipation  BREAST: Denies pain. No lumps. No discharge.  URINARY: No incontinence, no nocturia, no frequency and no dysuria.  CARDIOVASCULAR: No chest pain. No shortness of breath. No leg cramps.  NEUROLOGICAL: No headaches. No vision changes.    Physical Exam    INCISIONS: Clean, dry, intact. Well healed.    PELVIC: Normal external genitalia without lesions.  Normal hair distribution.  Adequate perineal body, normal urethral meatus.  Vagina moist and well rugated without lesions or discharge.  Cervix pink, without lesions, discharge or tenderness.  No significant cystocele or rectocele.  Bimanual exam shows uterus to be surgically absent.  Adnexa without masses or tenderness.      1. Postop check         Patient can return to normal activities.  Discussed that if symptoms have not resolved in 2-3 months, contact the office as they may be due to exposure to anesthesia as we did not take ovaries during surgery.    Return to clinic in 1 year for well woman exam.

## 2017-11-16 ENCOUNTER — CLINICAL SUPPORT (OUTPATIENT)
Dept: INTERNAL MEDICINE | Facility: CLINIC | Age: 37
End: 2017-11-16
Payer: COMMERCIAL

## 2017-11-16 PROCEDURE — 99999 PR PBB SHADOW E&M-EST. PATIENT-LVL II: CPT | Mod: PBBFAC,,,

## 2017-11-16 RX ORDER — LOSARTAN POTASSIUM 50 MG/1
50 TABLET ORAL DAILY
Qty: 30 TABLET | Refills: 1 | Status: SHIPPED | OUTPATIENT
Start: 2017-11-16 | End: 2017-11-21

## 2017-11-16 RX ORDER — LANOLIN ALCOHOL/MO/W.PET/CERES
400 CREAM (GRAM) TOPICAL NIGHTLY
Qty: 90 TABLET | Refills: 1 | Status: SHIPPED | OUTPATIENT
Start: 2017-11-16 | End: 2018-03-27

## 2017-11-16 NOTE — PROGRESS NOTES
Kaylynnngoziamerica POLO Malachi 37 y.o. female is here today for Blood Pressure check.   History of HTN yes.    Review of patient's allergies indicates:  No Known Allergies  Creatinine   Date Value Ref Range Status   10/01/2017 0.6 0.5 - 1.4 mg/dL Final     Sodium   Date Value Ref Range Status   10/01/2017 139 136 - 145 mmol/L Final     Potassium   Date Value Ref Range Status   10/01/2017 3.2 (L) 3.5 - 5.1 mmol/L Final   ]  Patient verifies taking blood pressure medications on a regular bases at the same time of the day.     Current Outpatient Prescriptions:     amLODIPine (NORVASC) 10 MG tablet, Take 1 tablet (10 mg total) by mouth once daily., Disp: 30 tablet, Rfl: 2    ferrous sulfate 324 mg (65 mg iron) TbEC, Take 324 mg by mouth 2 (two) times daily. , Disp: , Rfl:     ibuprofen (ADVIL,MOTRIN) 600 MG tablet, Take 1 tablet (600 mg total) by mouth every 6 (six) hours as needed., Disp: 30 tablet, Rfl: 2    MULTIVIT-IRON-MIN-FOLIC ACID 3,500-18-0.4 UNIT-MG-MG ORAL CHEW, Take 1 tablet by mouth once daily. , Disp: , Rfl:   Does patient have record of home blood pressure readings yes. Readings have been averaging 140/97 - 150's.   Last dose of blood pressure medication was taken at 7:00.    Patient is symptomatic.   Complains of headaches since last office visit 11/01/17.    /88, Pulse: 90    Blood pressure reading after 15 minutes was 138/88 Pulse 88.  Dr. Mishra notified.

## 2017-11-21 ENCOUNTER — PATIENT MESSAGE (OUTPATIENT)
Dept: INTERNAL MEDICINE | Facility: CLINIC | Age: 37
End: 2017-11-21

## 2017-11-27 ENCOUNTER — TELEPHONE (OUTPATIENT)
Dept: INTERNAL MEDICINE | Facility: CLINIC | Age: 37
End: 2017-11-27

## 2017-11-27 NOTE — TELEPHONE ENCOUNTER
----- Message from Pooja Bianchi sent at 11/27/2017  9:20 AM CST -----  Contact: pt  _  1st Request  _  2nd Request  _  3rd Request        Who: pt    Why: Requesting a call back in regards to returned the nurse's phone call. Please call pt     What Number to Call Back:[phone]    When to Expect a call back: (Within 24 hours)    Please return the call at earliest convenience. Thanks!

## 2017-11-27 NOTE — TELEPHONE ENCOUNTER
Left another message for the pt informing her that I put her back in the same appointment slot on 11/29.

## 2017-12-02 LAB
ACID FAST MOD KINY STN SPEC: NORMAL
MYCOBACTERIUM SPEC QL CULT: NORMAL

## 2017-12-13 ENCOUNTER — LAB VISIT (OUTPATIENT)
Dept: LAB | Facility: OTHER | Age: 37
End: 2017-12-13
Attending: FAMILY MEDICINE
Payer: COMMERCIAL

## 2017-12-13 ENCOUNTER — OFFICE VISIT (OUTPATIENT)
Dept: INTERNAL MEDICINE | Facility: CLINIC | Age: 37
End: 2017-12-13
Attending: FAMILY MEDICINE
Payer: COMMERCIAL

## 2017-12-13 VITALS
HEIGHT: 64 IN | BODY MASS INDEX: 27.1 KG/M2 | DIASTOLIC BLOOD PRESSURE: 110 MMHG | WEIGHT: 158.75 LBS | SYSTOLIC BLOOD PRESSURE: 160 MMHG | HEART RATE: 64 BPM

## 2017-12-13 DIAGNOSIS — I10 ESSENTIAL HYPERTENSION: Primary | ICD-10-CM

## 2017-12-13 DIAGNOSIS — R53.83 FATIGUE, UNSPECIFIED TYPE: ICD-10-CM

## 2017-12-13 DIAGNOSIS — D64.9 ANEMIA, UNSPECIFIED TYPE: ICD-10-CM

## 2017-12-13 LAB
BASOPHILS # BLD AUTO: 0.01 K/UL
BASOPHILS NFR BLD: 0.3 %
DIFFERENTIAL METHOD: ABNORMAL
EOSINOPHIL # BLD AUTO: 0.1 K/UL
EOSINOPHIL NFR BLD: 2.1 %
ERYTHROCYTE [DISTWIDTH] IN BLOOD BY AUTOMATED COUNT: 17.4 %
HCT VFR BLD AUTO: 34.8 %
HGB BLD-MCNC: 11.3 G/DL
LYMPHOCYTES # BLD AUTO: 1.7 K/UL
LYMPHOCYTES NFR BLD: 45.8 %
MCH RBC QN AUTO: 26.5 PG
MCHC RBC AUTO-ENTMCNC: 32.5 G/DL
MCV RBC AUTO: 82 FL
MONOCYTES # BLD AUTO: 0.3 K/UL
MONOCYTES NFR BLD: 8.8 %
NEUTROPHILS # BLD AUTO: 1.6 K/UL
NEUTROPHILS NFR BLD: 43 %
PLATELET # BLD AUTO: 281 K/UL
PMV BLD AUTO: 8.8 FL
RBC # BLD AUTO: 4.26 M/UL
WBC # BLD AUTO: 3.73 K/UL

## 2017-12-13 PROCEDURE — 99999 PR PBB SHADOW E&M-EST. PATIENT-LVL III: CPT | Mod: PBBFAC,,, | Performed by: FAMILY MEDICINE

## 2017-12-13 PROCEDURE — 85025 COMPLETE CBC W/AUTO DIFF WBC: CPT

## 2017-12-13 PROCEDURE — 99213 OFFICE O/P EST LOW 20 MIN: CPT | Mod: S$GLB,,, | Performed by: FAMILY MEDICINE

## 2017-12-13 PROCEDURE — 36415 COLL VENOUS BLD VENIPUNCTURE: CPT

## 2017-12-13 RX ORDER — VALSARTAN 80 MG/1
80 TABLET ORAL DAILY
Qty: 90 TABLET | Refills: 0 | Status: SHIPPED | OUTPATIENT
Start: 2017-12-13 | End: 2018-01-02 | Stop reason: SDUPTHER

## 2017-12-19 ENCOUNTER — CLINICAL SUPPORT (OUTPATIENT)
Dept: INTERNAL MEDICINE | Facility: CLINIC | Age: 37
End: 2017-12-19
Payer: COMMERCIAL

## 2017-12-19 ENCOUNTER — TELEPHONE (OUTPATIENT)
Dept: INTERNAL MEDICINE | Facility: CLINIC | Age: 37
End: 2017-12-19

## 2017-12-19 DIAGNOSIS — B35.1 TOENAIL FUNGUS: Primary | ICD-10-CM

## 2017-12-19 NOTE — PROGRESS NOTES
Pernell Ly 37 y.o. female is here today for Blood Pressure check.   History of HTN yes.    Review of patient's allergies indicates:  No Active Allergies  Creatinine   Date Value Ref Range Status   10/01/2017 0.6 0.5 - 1.4 mg/dL Final     Sodium   Date Value Ref Range Status   10/01/2017 139 136 - 145 mmol/L Final     Potassium   Date Value Ref Range Status   10/01/2017 3.2 (L) 3.5 - 5.1 mmol/L Final   ]  Patient verifies taking blood pressure medications on a regular bases at the same time of the day.     Current Outpatient Prescriptions:     amLODIPine (NORVASC) 10 MG tablet, Take 1 tablet (10 mg total) by mouth once daily., Disp: 30 tablet, Rfl: 2    ferrous sulfate 324 mg (65 mg iron) TbEC, Take 324 mg by mouth 2 (two) times daily. , Disp: , Rfl:     ibuprofen (ADVIL,MOTRIN) 600 MG tablet, Take 1 tablet (600 mg total) by mouth every 6 (six) hours as needed., Disp: 30 tablet, Rfl: 2    magnesium oxide (MAG-OX) 400 mg tablet, Take 1 tablet (400 mg total) by mouth every evening., Disp: 90 tablet, Rfl: 1    MULTIVIT-IRON-MIN-FOLIC ACID 3,500-18-0.4 UNIT-MG-MG ORAL CHEW, Take 1 tablet by mouth once daily. , Disp: , Rfl:     valsartan (DIOVAN) 80 MG tablet, Take 1 tablet (80 mg total) by mouth once daily., Disp: 90 tablet, Rfl: 0  Does patient have record of home blood pressure readings yes. Readings have been averaging BP: 121/70  PULSE 78.   Last dose of blood pressure medication was taken at 5:00 am.  Patient is asymptomatic.   Complains of none.     /100 ,   PULSE  88.    Blood pressure reading after 15 minutes was 138/100, Pulse 82.  Dr. Nelson notified.

## 2017-12-19 NOTE — TELEPHONE ENCOUNTER
Pt seen here today for HTn f/u with nurse. BP's are high but home readings are good. Pt stable. Will ask her to RTC in 2 wks for nurse visit with BP log and cuff. Will compare and then increase if needed her meds. Pt also wants referral to podiatry for toenail fungus. Referral pl help pt make appt  ED prompts d/w pt  Pt amenable  PV

## 2017-12-19 NOTE — PROGRESS NOTES
Subjective:       Patient ID: Pernell Ly is a 37 y.o. female.    Chief Complaint: Hypertension and Discuss medication (D/C 11/21 losartan  , Norvasc )    Pt presents today for f/u on BP. Stopped ARB due to lip swelling, though pt states that she did not have this issue when she took ACEI. However, had a cough when she took ACEI.  Pt already on max dose of amlodipine        Hypertension   Pertinent negatives include no chest pain, headaches, palpitations or shortness of breath.     Review of Systems   Constitutional: Negative.    Eyes: Negative.    Respiratory: Negative for cough, chest tightness and shortness of breath.    Cardiovascular: Negative for chest pain, palpitations and leg swelling.   Gastrointestinal: Negative.    Musculoskeletal: Negative.  Negative for joint swelling.   Skin: Negative.    Neurological: Negative for dizziness, weakness, light-headedness and headaches.       Objective:      Physical Exam   Constitutional: She is oriented to person, place, and time. She appears well-developed and well-nourished.   HENT:   Head: Normocephalic and atraumatic.   Eyes: Conjunctivae and EOM are normal. Pupils are equal, round, and reactive to light.   Neck: Normal range of motion. Neck supple. No thyromegaly present.   Cardiovascular: Normal rate, regular rhythm, normal heart sounds and intact distal pulses.    No murmur heard.  Pulmonary/Chest: Effort normal and breath sounds normal. No respiratory distress. She has no wheezes. She has no rales. She exhibits no tenderness.   Musculoskeletal: Normal range of motion. She exhibits no edema.   Lymphadenopathy:     She has no cervical adenopathy.   Neurological: She is alert and oriented to person, place, and time.   Skin: Skin is warm. No erythema.   Psychiatric: She has a normal mood and affect. Her behavior is normal. Judgment and thought content normal.       Assessment:       1. Fatigue, unspecified type        Plan:       HTN elevated and  uncontrolled. Trial of diovan. Unclear if maybe this was allergic reaction with just the generic? SE's of med d/w pt. Willing to try valsartan  Pt c/o at end of visit of being tired. May be related to high BP's but will also check a CBC since pt is anemic and is taking iron tabs daily    Fatigue, unspecified type  -     CBC auto differential; Future; Expected date: 12/13/2017    Other orders  -     valsartan (DIOVAN) 80 MG tablet; Take 1 tablet (80 mg total) by mouth once daily.  Dispense: 90 tablet; Refill: 0    RTC 2 weeks for nurse visit for BP check

## 2017-12-20 NOTE — TELEPHONE ENCOUNTER
appts have been scheduled. Pt demonstrated verbal understanding of information and had no further questions or concerns at this time.

## 2017-12-26 ENCOUNTER — OFFICE VISIT (OUTPATIENT)
Dept: PODIATRY | Facility: CLINIC | Age: 37
End: 2017-12-26
Payer: COMMERCIAL

## 2017-12-26 VITALS
HEART RATE: 73 BPM | DIASTOLIC BLOOD PRESSURE: 92 MMHG | HEIGHT: 64 IN | BODY MASS INDEX: 26.95 KG/M2 | SYSTOLIC BLOOD PRESSURE: 147 MMHG | WEIGHT: 157.88 LBS

## 2017-12-26 DIAGNOSIS — S99.922A INJURY OF TOENAIL OF LEFT FOOT, INITIAL ENCOUNTER: ICD-10-CM

## 2017-12-26 DIAGNOSIS — M79.675 PAIN OF TOE OF LEFT FOOT: Primary | ICD-10-CM

## 2017-12-26 PROCEDURE — 99203 OFFICE O/P NEW LOW 30 MIN: CPT | Mod: 25,S$GLB,, | Performed by: PODIATRIST

## 2017-12-26 PROCEDURE — 11730 AVULSION NAIL PLATE SIMPLE 1: CPT | Mod: TA,S$GLB,, | Performed by: PODIATRIST

## 2017-12-26 PROCEDURE — 99999 PR PBB SHADOW E&M-EST. PATIENT-LVL III: CPT | Mod: PBBFAC,,, | Performed by: PODIATRIST

## 2017-12-26 NOTE — PROGRESS NOTES
"CC:    left great toe pain    HPI:   Patient is a 37 y.o. female with complaints of painful toenail on the left great toe.  The pain started about a week ago.  She noticed yellow pus.  She states that she frequently hit her foot on her bed frame.   Home treatment:  soaking    Past Medical History:   Diagnosis Date    Encounter for blood transfusion     Fibroids     Hypertension     Iron deficiency anemia     Menorrhagia     Migraine     Seizures     as baby       Current Outpatient Prescriptions on File Prior to Visit   Medication Sig Dispense Refill    amLODIPine (NORVASC) 10 MG tablet Take 1 tablet (10 mg total) by mouth once daily. 30 tablet 2    ferrous sulfate 324 mg (65 mg iron) TbEC Take 324 mg by mouth 2 (two) times daily.       ibuprofen (ADVIL,MOTRIN) 600 MG tablet Take 1 tablet (600 mg total) by mouth every 6 (six) hours as needed. 30 tablet 2    MULTIVIT-IRON-MIN-FOLIC ACID 3,500-18-0.4 UNIT-MG-MG ORAL CHEW Take 1 tablet by mouth once daily.       valsartan (DIOVAN) 80 MG tablet Take 1 tablet (80 mg total) by mouth once daily. 90 tablet 0    magnesium oxide (MAG-OX) 400 mg tablet Take 1 tablet (400 mg total) by mouth every evening. 90 tablet 1     No current facility-administered medications on file prior to visit.         ALLG:  Review of patient's allergies indicates:  No Known Allergies          ROS:  General ROS: negative for chills, fatigue or fever  Cardiovascular ROS: no chest pain or dyspnea on exertion  Musculoskeletal ROS: negative for - joint pain or joint stiffness.  Negative for loss of strength  Neuro ROS: Negative for syncope, numbness, or muscle weakness  Skin ROS: Negative for rash, itching or hair changes.  +Toenail changes      EXAM:   Vitals:    12/26/17 0756   BP: (!) 147/92   Pulse: 73   Weight: 71.6 kg (157 lb 14.4 oz)   Height: 5' 4" (1.626 m)       Left LOWER EXTREMITY EXAM:    Vascular:  Dorsalis pedis and posterior tibial pulses are palpable. capillary refill time " is within normal limits and toes are warm to touch.  There is  presence of digital hair.  There is  no localized edema to the affected area.     Neurological:  Light touch, proprioception, and sharp/dull sensation are all intact.     Dermatological:  The toenail is dystrophic, slightly loose from the nail bed, there appears to be some dried blood underneath the toe.   There is tenderness to palpation  no erythema noted to the affected area.     Absent paronychia.     Absent abscess    Musculoskeletal:  Muscle strength is 5/5 in all groups .    normal toes without deformities        ASSESSMENT/PLAN     I counseled the patient on the patient's  conditions, their implications and medical management.       Injury of great toenail - Left Foot    Pain of toe of left foot           Nail Removal  Date/Time: 12/26/2017 8:42 AM  Performed by: KAROL FERNANDEZ  Authorized by: KAROL FERNANDEZ     Consent Done?:  Yes (Written)    Location:  Left foot  Location detail:  Left big toe  Anesthesia:  Local infiltration  Local anesthetic: lidocaine 1% without epinephrine and bupivacaine 0.5% without epinephrine  Anesthetic total (ml):  3  Preparation:  Skin prepped with Betadine    Amount removed:  Complete  Wedge excision of skin of nail fold: No    Nail bed sutured?: No    Nail matrix removed:  None  Dressing applied:  Antibiotic ointment and dressing applied  Patient tolerance:  Patient tolerated the procedure well with no immediate complications          Verbal and written post operative instructions were provided to the patient.     Patient to monitor for any adverse reactions and follow up in 10-14 days.

## 2018-01-02 ENCOUNTER — CLINICAL SUPPORT (OUTPATIENT)
Dept: INTERNAL MEDICINE | Facility: CLINIC | Age: 38
End: 2018-01-02
Payer: COMMERCIAL

## 2018-01-02 VITALS — SYSTOLIC BLOOD PRESSURE: 142 MMHG | HEART RATE: 74 BPM | DIASTOLIC BLOOD PRESSURE: 92 MMHG

## 2018-01-02 PROCEDURE — 99999 PR PBB SHADOW E&M-EST. PATIENT-LVL III: CPT | Mod: PBBFAC,,,

## 2018-01-02 RX ORDER — VALSARTAN 80 MG/1
160 TABLET ORAL DAILY
Qty: 90 TABLET | Refills: 0
Start: 2018-01-02 | End: 2018-03-14 | Stop reason: SDUPTHER

## 2018-01-02 NOTE — PROGRESS NOTES
Pernell Ly 37 y.o. female is here today for Blood Pressure check.   History of HTN yes.    Review of patient's allergies indicates:  No Known Allergies  Creatinine   Date Value Ref Range Status   10/01/2017 0.6 0.5 - 1.4 mg/dL Final     Sodium   Date Value Ref Range Status   10/01/2017 139 136 - 145 mmol/L Final     Potassium   Date Value Ref Range Status   10/01/2017 3.2 (L) 3.5 - 5.1 mmol/L Final   ]  Patient verifies taking blood pressure medications on a regular bases at the same time of the day.     Current Outpatient Prescriptions:     amLODIPine (NORVASC) 10 MG tablet, Take 1 tablet (10 mg total) by mouth once daily., Disp: 30 tablet, Rfl: 2    valsartan (DIOVAN) 80 MG tablet, Take 1 tablet (80 mg total) by mouth once daily., Disp: 90 tablet, Rfl: 0    ferrous sulfate 324 mg (65 mg iron) TbEC, Take 324 mg by mouth 2 (two) times daily. , Disp: , Rfl:     ibuprofen (ADVIL,MOTRIN) 600 MG tablet, Take 1 tablet (600 mg total) by mouth every 6 (six) hours as needed., Disp: 30 tablet, Rfl: 2    magnesium oxide (MAG-OX) 400 mg tablet, Take 1 tablet (400 mg total) by mouth every evening., Disp: 90 tablet, Rfl: 1    MULTIVIT-IRON-MIN-FOLIC ACID 3,500-18-0.4 UNIT-MG-MG ORAL CHEW, Take 1 tablet by mouth once daily. , Disp: , Rfl:   Does patient have record of home blood pressure readings yes. Readings have been averaging 114-130/73-70-90.   Last dose of blood pressure medication was taken at 5:45am.  Patient is asymptomatic. Pt denies:numbness or weakness in the face, arm, or leg, especially on one side of the body, confusion, trouble speaking, or difficulty understanding speech,trouble seeing in one or both eyes,trouble walking, dizziness, loss of balance, or lack of coordination,or tingling     Complains of Headache rated as 7/10. Has hx of migraines.  1st BP: 150/94, P:72  Pt home BP monitor:148/105(relion arm cuff)  BP: (!) 142/92 , Pulse: 74.    Blood pressure reading after 15 minutes was 142/92, Pulse  74.    Pt request call to relate advise. Pt states she has medication to take for HA. Please advise/authorize?

## 2018-01-02 NOTE — PROGRESS NOTES
BP uncontrolled. Rec to increase valsartan to 160mg and f/u in 2 weeks for nurse visit for BP check.

## 2018-02-11 NOTE — PLAN OF CARE
Problem: Patient Care Overview  Goal: Plan of Care Review  Outcome: Ongoing (interventions implemented as appropriate)  Patient received on RA SAT 99%, will continue to monitor. No distress noted.       ADHD

## 2018-03-14 DIAGNOSIS — I10 ESSENTIAL HYPERTENSION: Primary | ICD-10-CM

## 2018-03-14 RX ORDER — VALSARTAN 80 MG/1
160 TABLET ORAL DAILY
Qty: 30 TABLET | Refills: 0 | Status: SHIPPED | OUTPATIENT
Start: 2018-03-14 | End: 2018-03-27

## 2018-03-27 ENCOUNTER — OFFICE VISIT (OUTPATIENT)
Dept: INTERNAL MEDICINE | Facility: CLINIC | Age: 38
End: 2018-03-27
Attending: FAMILY MEDICINE
Payer: COMMERCIAL

## 2018-03-27 VITALS
HEIGHT: 65 IN | BODY MASS INDEX: 26.96 KG/M2 | SYSTOLIC BLOOD PRESSURE: 162 MMHG | DIASTOLIC BLOOD PRESSURE: 102 MMHG | HEART RATE: 67 BPM | WEIGHT: 161.81 LBS

## 2018-03-27 DIAGNOSIS — F41.9 ANXIETY: ICD-10-CM

## 2018-03-27 DIAGNOSIS — I10 ESSENTIAL HYPERTENSION: Primary | ICD-10-CM

## 2018-03-27 PROCEDURE — 3077F SYST BP >= 140 MM HG: CPT | Mod: CPTII,S$GLB,, | Performed by: FAMILY MEDICINE

## 2018-03-27 PROCEDURE — 99214 OFFICE O/P EST MOD 30 MIN: CPT | Mod: S$GLB,,, | Performed by: FAMILY MEDICINE

## 2018-03-27 PROCEDURE — 99999 PR PBB SHADOW E&M-EST. PATIENT-LVL III: CPT | Mod: PBBFAC,,, | Performed by: FAMILY MEDICINE

## 2018-03-27 PROCEDURE — 3080F DIAST BP >= 90 MM HG: CPT | Mod: CPTII,S$GLB,, | Performed by: FAMILY MEDICINE

## 2018-03-27 RX ORDER — IBUPROFEN 600 MG/1
600 TABLET ORAL EVERY 6 HOURS PRN
Qty: 30 TABLET | Refills: 2 | Status: SHIPPED | OUTPATIENT
Start: 2018-03-27 | End: 2018-07-11 | Stop reason: SDUPTHER

## 2018-03-27 RX ORDER — VALSARTAN 80 MG/1
160 TABLET ORAL DAILY
Qty: 60 TABLET | Refills: 3 | Status: CANCELLED | OUTPATIENT
Start: 2018-03-27 | End: 2019-03-27

## 2018-03-27 RX ORDER — SERTRALINE HYDROCHLORIDE 50 MG/1
50 TABLET, FILM COATED ORAL DAILY
Qty: 30 TABLET | Refills: 1 | Status: SHIPPED | OUTPATIENT
Start: 2018-03-27 | End: 2018-05-01 | Stop reason: SINTOL

## 2018-03-27 RX ORDER — AMLODIPINE BESYLATE 10 MG/1
10 TABLET ORAL DAILY
Qty: 30 TABLET | Refills: 2 | Status: CANCELLED | OUTPATIENT
Start: 2018-03-27 | End: 2019-03-27

## 2018-03-27 RX ORDER — VALSARTAN 320 MG/1
320 TABLET ORAL DAILY
Qty: 90 TABLET | Refills: 0 | Status: SHIPPED | OUTPATIENT
Start: 2018-03-27 | End: 2018-09-24

## 2018-03-27 NOTE — PROGRESS NOTES
Subjective:       Patient ID: Pernell Ly is a 37 y.o. female.    Chief Complaint: Hypertension; Medication Refill; and Anxiety (Discuss)    Pt presents today for BP check. Per pt, she has noticed slight weight gain and thinks that this is due to her BP med, the amlodipine. Prefers to try another med instead or come up with a different regiment. However, today's BP's are still at 160/102.    Pt feels well otherwise and denies any HA.,SOB, CP, n,v        Hypertension   This is a recurrent problem. The current episode started more than 1 year ago. The problem is unchanged. The problem is resistant. Associated symptoms include anxiety, headaches and sweats. Pertinent negatives include no chest pain, palpitations or shortness of breath. Agents associated with hypertension include NSAIDs. There are no known risk factors for coronary artery disease. Past treatments include nothing. The current treatment provides no improvement. There are no compliance problems.    Medication Refill   Associated symptoms include headaches. Pertinent negatives include no chest pain, coughing, joint swelling or weakness.   Anxiety   Patient reports no chest pain, dizziness, palpitations or shortness of breath.         Review of Systems   Constitutional: Negative.    Eyes: Negative.    Respiratory: Negative for cough, chest tightness and shortness of breath.    Cardiovascular: Negative for chest pain, palpitations and leg swelling.   Gastrointestinal: Negative.    Musculoskeletal: Negative.  Negative for joint swelling.   Skin: Negative.    Neurological: Positive for headaches. Negative for dizziness, weakness and light-headedness.   All other systems reviewed and are negative.      Objective:      Physical Exam   Constitutional: She is oriented to person, place, and time. She appears well-developed and well-nourished.   HENT:   Head: Normocephalic and atraumatic.   Eyes: Conjunctivae and EOM are normal. Pupils are equal, round, and  reactive to light.   Neck: Normal range of motion. Neck supple. No thyromegaly present.   Cardiovascular: Normal rate, regular rhythm, normal heart sounds and intact distal pulses.    No murmur heard.  Pulmonary/Chest: Effort normal and breath sounds normal. No respiratory distress. She has no wheezes. She has no rales. She exhibits no tenderness.   Musculoskeletal: Normal range of motion. She exhibits no edema.   Lymphadenopathy:     She has no cervical adenopathy.   Neurological: She is alert and oriented to person, place, and time. She displays normal reflexes. No cranial nerve deficit or sensory deficit. She exhibits normal muscle tone. Coordination normal.   Skin: Skin is warm. No erythema.   Psychiatric: She has a normal mood and affect. Her behavior is normal. Judgment and thought content normal.       Assessment:       HTN  Plan:       HTN uncontrolled. Will stop ACEI and stop norvasc if pt suspects weight gain is linked. Will instead increase her diovan to 320 mg daily.  SE's d/w pt  RTC for 2 wk nurse visit Bp check

## 2018-04-30 ENCOUNTER — TELEPHONE (OUTPATIENT)
Dept: INTERNAL MEDICINE | Facility: CLINIC | Age: 38
End: 2018-04-30

## 2018-04-30 NOTE — TELEPHONE ENCOUNTER
Left a detailed message for the pt to call back and hugh her 8:40 appointment tomorrow due to Dr. Nelson will not be  In the office. Will send a message via my chart as well.

## 2018-05-01 ENCOUNTER — PATIENT MESSAGE (OUTPATIENT)
Dept: ADMINISTRATIVE | Facility: OTHER | Age: 38
End: 2018-05-01

## 2018-05-01 ENCOUNTER — OFFICE VISIT (OUTPATIENT)
Dept: INTERNAL MEDICINE | Facility: CLINIC | Age: 38
End: 2018-05-01
Attending: FAMILY MEDICINE
Payer: COMMERCIAL

## 2018-05-01 DIAGNOSIS — F41.9 ANXIETY: ICD-10-CM

## 2018-05-01 DIAGNOSIS — I10 ESSENTIAL HYPERTENSION: Primary | ICD-10-CM

## 2018-05-01 PROCEDURE — 99213 OFFICE O/P EST LOW 20 MIN: CPT | Mod: S$GLB,,, | Performed by: FAMILY MEDICINE

## 2018-05-01 PROCEDURE — 3077F SYST BP >= 140 MM HG: CPT | Mod: CPTII,S$GLB,, | Performed by: FAMILY MEDICINE

## 2018-05-01 PROCEDURE — 3080F DIAST BP >= 90 MM HG: CPT | Mod: CPTII,S$GLB,, | Performed by: FAMILY MEDICINE

## 2018-05-02 NOTE — PROGRESS NOTES
Subjective:       Patient ID: Pernell Ly is a 38 y.o. female.    Chief Complaint: No chief complaint on file.    Pt presents today for f/u on HTN. States that her med is working well and at home her readings are all in 120/80's. Only here does she seem to have higher values.  Pt denies any SOB/HA/vision changes/CP    Pt also stopped zoloft due to SE's. Prefers at this time to be off these meds and cope with stress on her own        Review of Systems   Constitutional: Negative.    Eyes: Negative.    Respiratory: Negative for cough, chest tightness and shortness of breath.    Cardiovascular: Negative for chest pain, palpitations and leg swelling.   Gastrointestinal: Negative.    Musculoskeletal: Negative.  Negative for joint swelling.   Skin: Negative.    Neurological: Negative for dizziness, weakness, light-headedness and headaches.   All other systems reviewed and are negative.      Objective:      Physical Exam   Constitutional: She is oriented to person, place, and time. She appears well-developed and well-nourished.   HENT:   Head: Normocephalic and atraumatic.   Eyes: Conjunctivae and EOM are normal. Pupils are equal, round, and reactive to light.   Neck: Normal range of motion. Neck supple. No thyromegaly present.   Cardiovascular: Normal rate, regular rhythm, normal heart sounds and intact distal pulses.    No murmur heard.  Pulmonary/Chest: Effort normal and breath sounds normal. No respiratory distress. She has no wheezes. She has no rales. She exhibits no tenderness.   Musculoskeletal: Normal range of motion. She exhibits no edema.   Lymphadenopathy:     She has no cervical adenopathy.   Neurological: She is alert and oriented to person, place, and time.   Skin: Skin is warm. No erythema.   Psychiatric: She has a normal mood and affect. Her behavior is normal. Judgment and thought content normal.       Assessment:       HTN  anxiety  Plan:       Based on pt home readings, BP's are controlled.  However, I would suggest that we have a repeat BP check with nurse in 2 weeks. Pt amenable    Anxity: stop zoloft and encouraged coping mechs for anxiety to be used    ED prompts dw pt  RTC prn symptoms or 3 mos if BP normal. Otherwise, make appt with me soon after to add another BP med

## 2018-05-09 ENCOUNTER — OFFICE VISIT (OUTPATIENT)
Dept: URGENT CARE | Facility: CLINIC | Age: 38
End: 2018-05-09
Payer: COMMERCIAL

## 2018-05-09 VITALS
SYSTOLIC BLOOD PRESSURE: 168 MMHG | OXYGEN SATURATION: 100 % | WEIGHT: 163 LBS | HEIGHT: 65 IN | DIASTOLIC BLOOD PRESSURE: 90 MMHG | HEART RATE: 80 BPM | TEMPERATURE: 98 F | RESPIRATION RATE: 18 BRPM | BODY MASS INDEX: 27.16 KG/M2

## 2018-05-09 DIAGNOSIS — N39.0 URINARY TRACT INFECTION WITHOUT HEMATURIA, SITE UNSPECIFIED: Primary | ICD-10-CM

## 2018-05-09 DIAGNOSIS — R30.0 DYSURIA: ICD-10-CM

## 2018-05-09 DIAGNOSIS — R03.0 ELEVATED BLOOD PRESSURE READING: ICD-10-CM

## 2018-05-09 LAB
B-HCG UR QL: NEGATIVE
BILIRUB UR QL STRIP: NEGATIVE
CTP QC/QA: YES
GLUCOSE UR QL STRIP: NEGATIVE
KETONES UR QL STRIP: NEGATIVE
LEUKOCYTE ESTERASE UR QL STRIP: POSITIVE
PH, POC UA: 7 (ref 5–8)
POC BLOOD, URINE: NEGATIVE
POC NITRATES, URINE: NEGATIVE
PROT UR QL STRIP: POSITIVE
SP GR UR STRIP: 1.01 (ref 1–1.03)
UROBILINOGEN UR STRIP-ACNC: NORMAL (ref 0.1–1.1)

## 2018-05-09 PROCEDURE — 3008F BODY MASS INDEX DOCD: CPT | Mod: CPTII,S$GLB,, | Performed by: NURSE PRACTITIONER

## 2018-05-09 PROCEDURE — 81025 URINE PREGNANCY TEST: CPT | Mod: S$GLB,,, | Performed by: NURSE PRACTITIONER

## 2018-05-09 PROCEDURE — 3080F DIAST BP >= 90 MM HG: CPT | Mod: CPTII,S$GLB,, | Performed by: NURSE PRACTITIONER

## 2018-05-09 PROCEDURE — 3077F SYST BP >= 140 MM HG: CPT | Mod: CPTII,S$GLB,, | Performed by: NURSE PRACTITIONER

## 2018-05-09 PROCEDURE — 87086 URINE CULTURE/COLONY COUNT: CPT

## 2018-05-09 PROCEDURE — 81003 URINALYSIS AUTO W/O SCOPE: CPT | Mod: QW,S$GLB,, | Performed by: NURSE PRACTITIONER

## 2018-05-09 PROCEDURE — 99214 OFFICE O/P EST MOD 30 MIN: CPT | Mod: 25,S$GLB,, | Performed by: NURSE PRACTITIONER

## 2018-05-09 RX ORDER — PHENAZOPYRIDINE HYDROCHLORIDE 200 MG/1
200 TABLET, FILM COATED ORAL 3 TIMES DAILY PRN
Qty: 6 TABLET | Refills: 0 | Status: SHIPPED | OUTPATIENT
Start: 2018-05-09 | End: 2018-05-11

## 2018-05-09 RX ORDER — FLUCONAZOLE 150 MG/1
150 TABLET ORAL ONCE
Qty: 1 TABLET | Refills: 1 | Status: SHIPPED | OUTPATIENT
Start: 2018-05-09 | End: 2018-05-09

## 2018-05-09 RX ORDER — NITROFURANTOIN 25; 75 MG/1; MG/1
100 CAPSULE ORAL 2 TIMES DAILY
Qty: 14 CAPSULE | Refills: 0 | Status: SHIPPED | OUTPATIENT
Start: 2018-05-09 | End: 2018-05-16

## 2018-05-09 NOTE — PROGRESS NOTES
"Subjective:       Patient ID: Pernell Ly is a 38 y.o. female.    Vitals:  height is 5' 5" (1.651 m) and weight is 73.9 kg (163 lb). Her oral temperature is 98.3 °F (36.8 °C). Her blood pressure is 168/90 (abnormal) and her pulse is 80. Her respiration is 18 and oxygen saturation is 100%.     Chief Complaint: Female  Problem and Abdominal Pain    Patient presents with c/o dysuria x 1 week, worse over the last 2 days with bladder/pelvic pressure.  She states that she went to her PCP 1 week ago with similar symptoms and was told that her urine was normal at that time.  Pt states that she is prone to yeast infections with antibiotics, she is requesting Diflucan prn.  She denies vaginal discharge or odor.      She states that she forgot to take her BP pill this morning before leaving for work.  She is normally compliant and does not need a refill on her medication.  BP reading in office last week within normal range.        Female  Problem   The patient's primary symptoms include pelvic pain. The patient's pertinent negatives include no genital itching, genital lesions, genital odor, genital rash, missed menses, vaginal bleeding or vaginal discharge. This is a new problem. The current episode started in the past 7 days (2 days ago). The problem occurs intermittently. The problem has been gradually worsening. The patient is experiencing no pain. The problem affects both sides. She is not pregnant. Associated symptoms include dysuria. Pertinent negatives include no abdominal pain, back pain, chills, constipation, diarrhea, discolored urine, fever, flank pain, frequency, hematuria, nausea, rash, urgency or vomiting. Nothing aggravates the symptoms. She has tried warm baths for the symptoms. The treatment provided no relief (made it worse). She is sexually active. No, her partner does not have an STD. She uses hysterectomy for contraception.     Review of Systems   Constitution: Negative for chills, fever and " malaise/fatigue.   Cardiovascular: Negative for chest pain.   Respiratory: Negative for shortness of breath.    Skin: Negative for itching, rash and suspicious lesions.   Musculoskeletal: Negative for back pain.   Gastrointestinal: Negative for abdominal pain, constipation, diarrhea, hematochezia, melena, nausea and vomiting.   Genitourinary: Positive for dysuria and pelvic pain. Negative for flank pain, frequency, genital sores, hematuria, missed menses, non-menstrual bleeding, urgency and vaginal discharge.       Objective:      Physical Exam   Constitutional: She is oriented to person, place, and time. She appears well-developed and well-nourished.   HENT:   Head: Normocephalic and atraumatic.   Right Ear: External ear normal.   Left Ear: External ear normal.   Nose: Nose normal. No nasal deformity. No epistaxis.   Mouth/Throat: Oropharynx is clear and moist and mucous membranes are normal.   Eyes: Conjunctivae and lids are normal.   Neck: Trachea normal, normal range of motion and phonation normal. Neck supple.   Cardiovascular: Normal rate, regular rhythm, normal heart sounds and normal pulses.    Pulmonary/Chest: Effort normal and breath sounds normal.   Abdominal: Soft. Normal appearance and bowel sounds are normal. She exhibits no distension and no mass. There is tenderness in the suprapubic area. There is no rigidity, no rebound, no guarding and no CVA tenderness.   Mild tenderness over bladder.   Genitourinary:   Genitourinary Comments: Patient deferred a pelvic exam at this time.   Neurological: She is alert and oriented to person, place, and time.   Skin: Skin is warm, dry and intact.   Psychiatric: She has a normal mood and affect. Her speech is normal and behavior is normal. Cognition and memory are normal.   Nursing note and vitals reviewed.      Results for orders placed or performed in visit on 05/09/18   POCT urine pregnancy   Result Value Ref Range    POC Preg Test, Ur Negative Negative    Quality  Control Acceptable Yes    POCT Urinalysis, Dipstick, Automated, W/O Scope   Result Value Ref Range    POC Blood, Urine Negative Negative    POC Bilirubin, Urine Negative Negative    POC Urobilinogen, Urine Normal 0.1 - 1.1    POC Ketones, Urine Negative Negative    POC Protein, Urine Positive (A) Negative    POC Nitrates, Urine Negative Negative    POC Glucose, Urine Negative Negative    pH, UA 7.0 5 - 8    POC Specific Gravity, Urine 1.015 1.003 - 1.029    POC Leukocytes, Urine Positive (A) Negative     Assessment:       1. Urinary tract infection without hematuria, site unspecified    2. Dysuria    3. Elevated blood pressure reading        Plan:         Urinary tract infection without hematuria, site unspecified  -     Urine culture  -     nitrofurantoin, macrocrystal-monohydrate, (MACROBID) 100 MG capsule; Take 1 capsule (100 mg total) by mouth 2 (two) times daily.  Dispense: 14 capsule; Refill: 0  -     fluconazole (DIFLUCAN) 150 MG Tab; Take 1 tablet (150 mg total) by mouth once. May repeat in 1 week if not improved  Dispense: 1 tablet; Refill: 1    Dysuria  -     POCT urine pregnancy  -     POCT Urinalysis, Dipstick, Automated, W/O Scope  -     phenazopyridine (PYRIDIUM) 200 MG tablet; Take 1 tablet (200 mg total) by mouth 3 (three) times daily as needed for Pain.  Dispense: 6 tablet; Refill: 0    Elevated blood pressure reading      Patient Instructions   Take your blood pressure medication when you get home.  Macrobid as directed, take until completion.  We will call you with results of your culture in the next 3-5 days.  Pyridium as directed.  Do not wear contacts with this medication.  Increase water intake.    Avoid caffeine, alcohol, and spicy foods as they may further irritate the bladder.  Follow up with OBGYN for continued symptoms.  Go to the ER for worsening symptoms.  Bladder Infection, Female (Adult)    Urine is normally doesn't have any bacteria in it. But bacteria can get into the urinary tract  "from the skin around the rectum. Or they can travel in the blood from elsewhere in the body. Once they are in your urinary tract, they can cause infection in the urethra (urethritis), the bladder (cystitis), or the kidneys (pyelonephritis).  The most common place for an infection is in the bladder. This is called a bladder infection. This is one of the most common infections in women. Most bladder infections are easily treated. They are not serious unless the infection spreads to the kidney.  The phrases "bladder infection," "UTI," and "cystitis" are often used to describe the same thing. But they are not always the same. Cystitis is an inflammation of the bladder. The most common cause of cystitis is an infection.  Symptoms  The infection causes inflammation in the urethra and bladder. This causes many of the symptoms. The most common symptoms of a bladder infection are:  · Pain or burning when urinating  · Having to urinate more often than usual  · Urgent need to urinate  · Only a small amount of urine comes out  · Blood in urine  · Abdominal discomfort. This is usually in the lower abdomen above the pubic bone.  · Cloudy urine  · Strong- or bad-smelling urine  · Unable to urinate (urinary retention)  · Unable to hold urine in (urinary incontinence)  · Fever  · Loss of appetite  · Confusion (in older adults)  Causes  Bladder infections are not contagious. You can't get one from someone else, from a toilet seat, or from sharing a bath.  The most common cause of bladder infections is bacteria from the bowels. The bacteria get onto the skin around the opening of the urethra. From there, they can get into the urine and travel up to the bladder, causing inflammation and infection. This usually happens because of:  · Wiping improperly after urinating. Always wipe from front to back.  · Bowel incontinence  · Pregnancy  · Procedures such as having a catheter inserted  · Older age  · Not emptying your bladder. This can " allow bacteria a chance to grow in your urine.  · Dehydration  · Constipation  · Sex  · Use of a diaphragm for birth control   Treatment  Bladder infections are diagnosed by a urine test. They are treated with antibiotics and usually clear up quickly without complications. Treatment helps prevent a more serious kidney infection.  Medicines  Medicines can help in the treatment of a bladder infection:  · Take antibiotics until they are used up, even if you feel better. It is important to finish them to make sure the infection has cleared.  · You can use acetaminophen or ibuprofen for pain, fever, or discomfort, unless another medicine was prescribed. If you have chronic liver or kidney disease, talk with your healthcare provider before using these medicines. Also talk with your provider if you've ever had a stomach ulcer or gastrointestinal bleeding, or are taking blood-thinner medicines.  · If you are given phenazopydridine to reduce burning with urination, it will cause your urine to become a bright orange color. This can stain clothing.  Care and prevention  These self-care steps can help prevent future infections:  · Drink plenty of fluids to prevent dehydration and flush out your bladder. Do this unless you must restrict fluids for other health reasons, or your doctor told you not to.  · Proper cleaning after going to the bathroom is important. Wipe from front to back after using the toilet to prevent the spread of bacteria.  · Urinate more often. Don't try to hold urine in for a long time.  · Wear loose-fitting clothes and cotton underwear. Avoid tight-fitting pants.  · Improve your diet and prevent constipation. Eat more fresh fruit and vegetables, and fiber, and less junk and fatty foods.  · Avoid sex until your symptoms are gone.  · Avoid caffeine, alcohol, and spicy foods. These can irritate your bladder.  · Urinate right after intercourse to flush out your bladder.  · If you use birth control pills and have  frequent bladder infections, discuss it with your doctor.  Follow-up care  Call your healthcare provider if all symptoms are not gone after 3 days of treatment. This is especially important if you have repeat infections.  If a culture was done, you will be told if your treatment needs to be changed. If directed, you can call to find out the results.  If X-rays were done, you will be told if the results will affect your treatment.  Call 911  Call 911 if any of the following occur:  · Trouble breathing  · Hard to wake up or confusion  · Fainting or loss of consciousness  · Rapid heart rate  When to seek medical advice  Call your healthcare provider right away if any of these occur:  · Fever of 100.4ºF (38.0ºC) or higher, or as directed by your healthcare provider  · Symptoms are not better by the third day of treatment  · Back or belly (abdominal) pain that gets worse  · Repeated vomiting, or unable to keep medicine down  · Weakness or dizziness  · Vaginal discharge  · Pain, redness, or swelling in the outer vaginal area (labia)  Date Last Reviewed: 10/1/2016  © 6612-9348 [employer]. [address] [place] [address]. All rights reserved. This information is not intended as a substitute for professional medical care. Always follow your healthcare professional's instructions.

## 2018-05-09 NOTE — PATIENT INSTRUCTIONS
"Take your blood pressure medication when you get home.  Macrobid as directed, take until completion.  We will call you with results of your culture in the next 3-5 days.  Pyridium as directed.  Do not wear contacts with this medication.  Increase water intake.    Avoid caffeine, alcohol, and spicy foods as they may further irritate the bladder.  Follow up with OBGYN for continued symptoms.  Go to the ER for worsening symptoms.  Bladder Infection, Female (Adult)    Urine is normally doesn't have any bacteria in it. But bacteria can get into the urinary tract from the skin around the rectum. Or they can travel in the blood from elsewhere in the body. Once they are in your urinary tract, they can cause infection in the urethra (urethritis), the bladder (cystitis), or the kidneys (pyelonephritis).  The most common place for an infection is in the bladder. This is called a bladder infection. This is one of the most common infections in women. Most bladder infections are easily treated. They are not serious unless the infection spreads to the kidney.  The phrases "bladder infection," "UTI," and "cystitis" are often used to describe the same thing. But they are not always the same. Cystitis is an inflammation of the bladder. The most common cause of cystitis is an infection.  Symptoms  The infection causes inflammation in the urethra and bladder. This causes many of the symptoms. The most common symptoms of a bladder infection are:  · Pain or burning when urinating  · Having to urinate more often than usual  · Urgent need to urinate  · Only a small amount of urine comes out  · Blood in urine  · Abdominal discomfort. This is usually in the lower abdomen above the pubic bone.  · Cloudy urine  · Strong- or bad-smelling urine  · Unable to urinate (urinary retention)  · Unable to hold urine in (urinary incontinence)  · Fever  · Loss of appetite  · Confusion (in older adults)  Causes  Bladder infections are not contagious. You " can't get one from someone else, from a toilet seat, or from sharing a bath.  The most common cause of bladder infections is bacteria from the bowels. The bacteria get onto the skin around the opening of the urethra. From there, they can get into the urine and travel up to the bladder, causing inflammation and infection. This usually happens because of:  · Wiping improperly after urinating. Always wipe from front to back.  · Bowel incontinence  · Pregnancy  · Procedures such as having a catheter inserted  · Older age  · Not emptying your bladder. This can allow bacteria a chance to grow in your urine.  · Dehydration  · Constipation  · Sex  · Use of a diaphragm for birth control   Treatment  Bladder infections are diagnosed by a urine test. They are treated with antibiotics and usually clear up quickly without complications. Treatment helps prevent a more serious kidney infection.  Medicines  Medicines can help in the treatment of a bladder infection:  · Take antibiotics until they are used up, even if you feel better. It is important to finish them to make sure the infection has cleared.  · You can use acetaminophen or ibuprofen for pain, fever, or discomfort, unless another medicine was prescribed. If you have chronic liver or kidney disease, talk with your healthcare provider before using these medicines. Also talk with your provider if you've ever had a stomach ulcer or gastrointestinal bleeding, or are taking blood-thinner medicines.  · If you are given phenazopydridine to reduce burning with urination, it will cause your urine to become a bright orange color. This can stain clothing.  Care and prevention  These self-care steps can help prevent future infections:  · Drink plenty of fluids to prevent dehydration and flush out your bladder. Do this unless you must restrict fluids for other health reasons, or your doctor told you not to.  · Proper cleaning after going to the bathroom is important. Wipe from front  to back after using the toilet to prevent the spread of bacteria.  · Urinate more often. Don't try to hold urine in for a long time.  · Wear loose-fitting clothes and cotton underwear. Avoid tight-fitting pants.  · Improve your diet and prevent constipation. Eat more fresh fruit and vegetables, and fiber, and less junk and fatty foods.  · Avoid sex until your symptoms are gone.  · Avoid caffeine, alcohol, and spicy foods. These can irritate your bladder.  · Urinate right after intercourse to flush out your bladder.  · If you use birth control pills and have frequent bladder infections, discuss it with your doctor.  Follow-up care  Call your healthcare provider if all symptoms are not gone after 3 days of treatment. This is especially important if you have repeat infections.  If a culture was done, you will be told if your treatment needs to be changed. If directed, you can call to find out the results.  If X-rays were done, you will be told if the results will affect your treatment.  Call 911  Call 911 if any of the following occur:  · Trouble breathing  · Hard to wake up or confusion  · Fainting or loss of consciousness  · Rapid heart rate  When to seek medical advice  Call your healthcare provider right away if any of these occur:  · Fever of 100.4ºF (38.0ºC) or higher, or as directed by your healthcare provider  · Symptoms are not better by the third day of treatment  · Back or belly (abdominal) pain that gets worse  · Repeated vomiting, or unable to keep medicine down  · Weakness or dizziness  · Vaginal discharge  · Pain, redness, or swelling in the outer vaginal area (labia)  Date Last Reviewed: 10/1/2016  © 4072-1499 [employer]. [address] [place] [address]. All rights reserved. This information is not intended as a substitute for professional medical care. Always follow your healthcare professional's instructions.

## 2018-05-10 ENCOUNTER — PATIENT MESSAGE (OUTPATIENT)
Dept: ADMINISTRATIVE | Facility: OTHER | Age: 38
End: 2018-05-10

## 2018-05-10 LAB — BACTERIA UR CULT: NORMAL

## 2018-05-11 ENCOUNTER — TELEPHONE (OUTPATIENT)
Dept: URGENT CARE | Facility: CLINIC | Age: 38
End: 2018-05-11

## 2018-05-11 NOTE — TELEPHONE ENCOUNTER
Called patient to let her know that her culture report showed no growth.  She did not start the medication and is already feeling somewhat better.  Advised against starting and to follow up with OBGYN.

## 2018-07-11 DIAGNOSIS — G43.511 INTRACTABLE PERSISTENT MIGRAINE AURA WITHOUT CEREBRAL INFARCTION AND WITH STATUS MIGRAINOSUS: Primary | ICD-10-CM

## 2018-07-11 RX ORDER — IBUPROFEN 600 MG/1
600 TABLET ORAL EVERY 6 HOURS PRN
Qty: 30 TABLET | Refills: 2 | Status: SHIPPED | OUTPATIENT
Start: 2018-07-11 | End: 2018-10-09 | Stop reason: SDUPTHER

## 2018-09-10 ENCOUNTER — PATIENT MESSAGE (OUTPATIENT)
Dept: ADMINISTRATIVE | Facility: HOSPITAL | Age: 38
End: 2018-09-10

## 2018-09-24 ENCOUNTER — OFFICE VISIT (OUTPATIENT)
Dept: PRIMARY CARE CLINIC | Facility: CLINIC | Age: 38
End: 2018-09-24
Attending: FAMILY MEDICINE
Payer: COMMERCIAL

## 2018-09-24 VITALS
HEIGHT: 65 IN | WEIGHT: 164.56 LBS | HEART RATE: 75 BPM | SYSTOLIC BLOOD PRESSURE: 180 MMHG | BODY MASS INDEX: 27.42 KG/M2 | DIASTOLIC BLOOD PRESSURE: 108 MMHG

## 2018-09-24 DIAGNOSIS — G43.511 INTRACTABLE PERSISTENT MIGRAINE AURA WITHOUT CEREBRAL INFARCTION AND WITH STATUS MIGRAINOSUS: ICD-10-CM

## 2018-09-24 DIAGNOSIS — I10 ESSENTIAL HYPERTENSION: ICD-10-CM

## 2018-09-24 DIAGNOSIS — Z91.148 NONCOMPLIANCE WITH MEDICATION REGIMEN: ICD-10-CM

## 2018-09-24 DIAGNOSIS — Z00.00 ANNUAL PHYSICAL EXAM: Primary | ICD-10-CM

## 2018-09-24 PROCEDURE — 90471 IMMUNIZATION ADMIN: CPT | Mod: S$GLB,,, | Performed by: FAMILY MEDICINE

## 2018-09-24 PROCEDURE — 3080F DIAST BP >= 90 MM HG: CPT | Mod: CPTII,S$GLB,, | Performed by: FAMILY MEDICINE

## 2018-09-24 PROCEDURE — 90686 IIV4 VACC NO PRSV 0.5 ML IM: CPT | Mod: S$GLB,,, | Performed by: FAMILY MEDICINE

## 2018-09-24 PROCEDURE — 3077F SYST BP >= 140 MM HG: CPT | Mod: CPTII,S$GLB,, | Performed by: FAMILY MEDICINE

## 2018-09-24 PROCEDURE — 99999 PR PBB SHADOW E&M-EST. PATIENT-LVL III: CPT | Mod: PBBFAC,,, | Performed by: FAMILY MEDICINE

## 2018-09-24 PROCEDURE — 99395 PREV VISIT EST AGE 18-39: CPT | Mod: 25,S$GLB,, | Performed by: FAMILY MEDICINE

## 2018-09-24 RX ORDER — CLONIDINE HYDROCHLORIDE 0.2 MG/1
0.2 TABLET ORAL
Status: COMPLETED | OUTPATIENT
Start: 2018-09-24 | End: 2018-09-24

## 2018-09-24 RX ORDER — IRBESARTAN 150 MG/1
150 TABLET ORAL NIGHTLY
Qty: 90 TABLET | Refills: 0 | Status: SHIPPED | OUTPATIENT
Start: 2018-09-24 | End: 2018-10-09

## 2018-09-24 RX ADMIN — CLONIDINE HYDROCHLORIDE 0.2 MG: 0.2 TABLET ORAL at 01:09

## 2018-09-24 NOTE — PATIENT INSTRUCTIONS
Your test results will be communicated to you via: [facility], Telephone or Letter.  If you have not received your test results within one week. Please contact the clinic.    [facility] policy,advises that you take your blood pressure medication two hours prior to coming to your scheduled appointment.  If at that time your blood pressure is elevated you will have to return within 2 -4 weeks for a nurse blood pressure follow up until control.    Thanks for choosing [facility].

## 2018-09-24 NOTE — PROGRESS NOTES
"Patient was given vaccine information sheet for the Flu Vaccine. The area of injection was palpated using the acromion process as a landmark. This area was cleaned with alcohol. Using a 25g 1" safety needle, 0.5mL of the vaccine was placed into the Left Deltoid muscle. The injection site was dressed with a bandage. Patient experienced no complications and was discharged in stable condition. Fluzone vaccine Lot: [id number] Exp: 06/30/19    "

## 2018-09-26 PROBLEM — R50.9 ACUTE FEBRILE ILLNESS: Status: RESOLVED | Noted: 2017-09-26 | Resolved: 2018-09-26

## 2018-09-26 PROBLEM — Z91.148 NONCOMPLIANCE WITH MEDICATION REGIMEN: Status: ACTIVE | Noted: 2018-09-26

## 2018-09-26 NOTE — PROGRESS NOTES
Subjective:       Patient ID: Pernell Ly is a 38 y.o. female.    Chief Complaint: Hypertension (no bp meds since recall); Anxiety; and Flu Vaccine    Pt presents today for annual exam. Pt states that she has a HA and thinks its bc of her elev BP. Pt is out of all her meds. This is not new and had happened in past. Pt with h/o noncompliance with meds and follow ups.  Pt also needs labs and flu shot  fmhx unremarkable exc for dad with DM and HTN      Review of Systems   Constitutional: Negative for activity change, appetite change, chills, fatigue and fever.   HENT: Negative for congestion, ear pain, sinus pressure, sore throat and trouble swallowing.    Eyes: Negative for photophobia, pain and visual disturbance.   Respiratory: Negative for apnea, cough, chest tightness, shortness of breath and wheezing.    Cardiovascular: Negative for chest pain, palpitations and leg swelling.   Gastrointestinal: Negative for abdominal distention, abdominal pain, constipation, diarrhea, nausea and vomiting.   Genitourinary: Negative.    Musculoskeletal: Negative for back pain, joint swelling and neck pain.   Skin: Negative.    Neurological: Positive for headaches. Negative for dizziness, tremors, weakness and numbness.   Psychiatric/Behavioral: Negative for behavioral problems, decreased concentration and sleep disturbance. The patient is not nervous/anxious.    All other systems reviewed and are negative.      Objective:      Physical Exam   Constitutional: She is oriented to person, place, and time. She appears well-developed and well-nourished.   HENT:   Head: Normocephalic and atraumatic.   Right Ear: External ear normal.   Left Ear: External ear normal.   Nose: Nose normal.   Mouth/Throat: Oropharynx is clear and moist. No oropharyngeal exudate.   Eyes: EOM are normal. Pupils are equal, round, and reactive to light.   Neck: Normal range of motion. Neck supple. No thyromegaly present.   Cardiovascular: Normal rate,  regular rhythm, normal heart sounds and intact distal pulses.   No murmur heard.  Pulmonary/Chest: Effort normal and breath sounds normal. No respiratory distress.   Abdominal: Soft. Bowel sounds are normal. She exhibits no distension and no mass. There is no tenderness. There is no rebound and no guarding.   Musculoskeletal: Normal range of motion. She exhibits no edema or tenderness.   Lymphadenopathy:     She has no cervical adenopathy.   Neurological: She is alert and oriented to person, place, and time. She has normal reflexes.   Skin: Skin is warm and dry. No erythema.   Psychiatric: She has a normal mood and affect. Her behavior is normal. Judgment and thought content normal.       Assessment:       1. Annual physical exam        Plan:       Annual physical exam  -     CBC auto differential; Future; Expected date: 09/24/2018  -     Comprehensive metabolic panel; Future; Expected date: 09/24/2018  -     Lipid panel; Future; Expected date: 09/24/2018  -     Hemoglobin A1c; Future; Expected date: 09/24/2018  -     TSH; Future; Expected date: 09/24/2018    Other orders  -     irbesartan (AVAPRO) 150 MG tablet; Take 1 tablet (150 mg total) by mouth every evening.  Dispense: 90 tablet; Refill: 0  -     Influenza - Quadrivalent (3 years & older) (PF)  -     cloNIDine tablet 0.2 mg; Take 1 tablet (0.2 mg total) by mouth one time.      HA's might be related to her BP's.  Will give clonidine in office to bring down BP's. SEs of med d/w pt  PT ADVISED TO MAKE APPTS AND TAKE HER MEDS DAILY.  ED prompts d/w pt  RTC 2 weeks nurse visit for BP check, if nl RTC 3 mos to see me  Annual PE otw wnl  Labs pending  Well woman UTD

## 2018-09-26 NOTE — PROGRESS NOTES
Subjective:       Patient ID: Pernell Ly is a 38 y.o. female.    Chief Complaint: Hypertension (no bp meds since recall); Anxiety; and Flu Vaccine            Hypertension   Associated symptoms include anxiety and headaches. Pertinent negatives include no chest pain, neck pain, palpitations or shortness of breath.   Anxiety   Patient reports no chest pain, decreased concentration, dizziness, nausea, nervous/anxious behavior, palpitations or shortness of breath.         Review of Systems   Constitutional: Negative for activity change, appetite change, chills, fatigue and fever.   HENT: Negative for congestion, ear pain, sinus pressure, sore throat and trouble swallowing.    Eyes: Negative for photophobia, pain and visual disturbance.   Respiratory: Negative for apnea, cough, chest tightness, shortness of breath and wheezing.    Cardiovascular: Negative for chest pain, palpitations and leg swelling.   Gastrointestinal: Negative for abdominal distention, abdominal pain, constipation, diarrhea, nausea and vomiting.   Genitourinary: Negative.    Musculoskeletal: Negative for back pain, joint swelling and neck pain.   Skin: Negative.    Neurological: Positive for headaches. Negative for dizziness, tremors, weakness and numbness.   Psychiatric/Behavioral: Negative for behavioral problems, decreased concentration and sleep disturbance. The patient is not nervous/anxious.    All other systems reviewed and are negative.        Objective:          Assessment:       1. Annual physical exam      4. HTN  Plan:     annual PE wnl exc for fibroids noted at umbilicus --pt to see GYN here  HTN uncontrolled. Resume lisinopril at 20 mg once daily and RTC in 2 weeks for nurse visit BP check. If elev will increase to 40 mg daily. Pt amenable  Anemia: labs pending  Annual physical exam  -     CBC auto differential; Future; Expected date: 09/24/2018  -     Comprehensive metabolic panel; Future; Expected date: 09/24/2018  -     Lipid  panel; Future; Expected date: 09/24/2018  -     Hemoglobin A1c; Future; Expected date: 09/24/2018  -     TSH; Future; Expected date: 09/24/2018    Other orders  -     irbesartan (AVAPRO) 150 MG tablet; Take 1 tablet (150 mg total) by mouth every evening.  Dispense: 90 tablet; Refill: 0  -     Influenza - Quadrivalent (3 years & older) (PF)  -     cloNIDine tablet 0.2 mg; Take 1 tablet (0.2 mg total) by mouth one time.      Labs pending  Med resumed

## 2018-10-02 ENCOUNTER — LAB VISIT (OUTPATIENT)
Dept: LAB | Facility: OTHER | Age: 38
End: 2018-10-02
Attending: FAMILY MEDICINE
Payer: COMMERCIAL

## 2018-10-02 DIAGNOSIS — Z00.00 ANNUAL PHYSICAL EXAM: ICD-10-CM

## 2018-10-02 LAB
ALBUMIN SERPL BCP-MCNC: 3.9 G/DL
ALP SERPL-CCNC: 64 U/L
ALT SERPL W/O P-5'-P-CCNC: 11 U/L
ANION GAP SERPL CALC-SCNC: 9 MMOL/L
AST SERPL-CCNC: 14 U/L
BASOPHILS # BLD AUTO: 0.01 K/UL
BASOPHILS NFR BLD: 0.3 %
BILIRUB SERPL-MCNC: 0.4 MG/DL
BUN SERPL-MCNC: 13 MG/DL
CALCIUM SERPL-MCNC: 9.7 MG/DL
CHLORIDE SERPL-SCNC: 105 MMOL/L
CHOLEST SERPL-MCNC: 140 MG/DL
CHOLEST/HDLC SERPL: 2.4 {RATIO}
CO2 SERPL-SCNC: 24 MMOL/L
CREAT SERPL-MCNC: 0.7 MG/DL
DIFFERENTIAL METHOD: ABNORMAL
EOSINOPHIL # BLD AUTO: 0 K/UL
EOSINOPHIL NFR BLD: 0.8 %
ERYTHROCYTE [DISTWIDTH] IN BLOOD BY AUTOMATED COUNT: 13.9 %
EST. GFR  (AFRICAN AMERICAN): >60 ML/MIN/1.73 M^2
EST. GFR  (NON AFRICAN AMERICAN): >60 ML/MIN/1.73 M^2
ESTIMATED AVG GLUCOSE: 103 MG/DL
GLUCOSE SERPL-MCNC: 94 MG/DL
HBA1C MFR BLD HPLC: 5.2 %
HCT VFR BLD AUTO: 39.1 %
HDLC SERPL-MCNC: 59 MG/DL
HDLC SERPL: 42.1 %
HGB BLD-MCNC: 12.9 G/DL
LDLC SERPL CALC-MCNC: 71.4 MG/DL
LYMPHOCYTES # BLD AUTO: 1.5 K/UL
LYMPHOCYTES NFR BLD: 42.5 %
MCH RBC QN AUTO: 29.1 PG
MCHC RBC AUTO-ENTMCNC: 33 G/DL
MCV RBC AUTO: 88 FL
MONOCYTES # BLD AUTO: 0.3 K/UL
MONOCYTES NFR BLD: 6.9 %
NEUTROPHILS # BLD AUTO: 1.8 K/UL
NEUTROPHILS NFR BLD: 49.5 %
NONHDLC SERPL-MCNC: 81 MG/DL
PLATELET # BLD AUTO: 269 K/UL
PMV BLD AUTO: 9.2 FL
POTASSIUM SERPL-SCNC: 3.8 MMOL/L
PROT SERPL-MCNC: 8.4 G/DL
RBC # BLD AUTO: 4.44 M/UL
SODIUM SERPL-SCNC: 138 MMOL/L
TRIGL SERPL-MCNC: 48 MG/DL
TSH SERPL DL<=0.005 MIU/L-ACNC: 1.03 UIU/ML
WBC # BLD AUTO: 3.62 K/UL

## 2018-10-02 PROCEDURE — 84443 ASSAY THYROID STIM HORMONE: CPT

## 2018-10-02 PROCEDURE — 85025 COMPLETE CBC W/AUTO DIFF WBC: CPT

## 2018-10-02 PROCEDURE — 80061 LIPID PANEL: CPT

## 2018-10-02 PROCEDURE — 36415 COLL VENOUS BLD VENIPUNCTURE: CPT

## 2018-10-02 PROCEDURE — 83036 HEMOGLOBIN GLYCOSYLATED A1C: CPT

## 2018-10-02 PROCEDURE — 80053 COMPREHEN METABOLIC PANEL: CPT

## 2018-10-03 ENCOUNTER — PATIENT MESSAGE (OUTPATIENT)
Dept: PRIMARY CARE CLINIC | Facility: CLINIC | Age: 38
End: 2018-10-03

## 2018-10-03 DIAGNOSIS — E87.6 HYPOKALEMIA: Primary | ICD-10-CM

## 2018-10-03 RX ORDER — POTASSIUM CHLORIDE 750 MG/1
10 CAPSULE, EXTENDED RELEASE ORAL ONCE
Qty: 90 CAPSULE | Refills: 0 | Status: SHIPPED | OUTPATIENT
Start: 2018-10-03 | End: 2018-12-29 | Stop reason: SDUPTHER

## 2018-10-03 NOTE — TELEPHONE ENCOUNTER
Informed pt about lower end of normal potassium. Would advise her to take low dose potassium at 10 MEQ daily. Can repeat in 8-12 weeks lab NONFASTING.  Please schedule for pt.  Thanks,  PV

## 2018-10-05 NOTE — TELEPHONE ENCOUNTER
Spoke wit the pt and scheduled lab per Dr. Nelson's note. Appointment scheduled for November 30,2018 8:00 per pt request, will mail reminder.

## 2018-10-09 ENCOUNTER — CLINICAL SUPPORT (OUTPATIENT)
Dept: PRIMARY CARE CLINIC | Facility: CLINIC | Age: 38
End: 2018-10-09
Payer: COMMERCIAL

## 2018-10-09 ENCOUNTER — TELEPHONE (OUTPATIENT)
Dept: PRIMARY CARE CLINIC | Facility: CLINIC | Age: 38
End: 2018-10-09

## 2018-10-09 VITALS — HEART RATE: 75 BPM | DIASTOLIC BLOOD PRESSURE: 95 MMHG | SYSTOLIC BLOOD PRESSURE: 154 MMHG

## 2018-10-09 DIAGNOSIS — G43.511 INTRACTABLE PERSISTENT MIGRAINE AURA WITHOUT CEREBRAL INFARCTION AND WITH STATUS MIGRAINOSUS: ICD-10-CM

## 2018-10-09 PROCEDURE — 99999 PR PBB SHADOW E&M-EST. PATIENT-LVL II: CPT | Mod: PBBFAC,,,

## 2018-10-09 RX ORDER — IBUPROFEN 600 MG/1
600 TABLET ORAL EVERY 6 HOURS PRN
Qty: 30 TABLET | Refills: 2 | Status: SHIPPED | OUTPATIENT
Start: 2018-10-09 | End: 2019-03-11

## 2018-10-09 RX ORDER — IRBESARTAN 300 MG/1
300 TABLET ORAL NIGHTLY
Qty: 90 TABLET | Refills: 1 | Status: SHIPPED | OUTPATIENT
Start: 2018-10-09 | End: 2018-10-24

## 2018-10-09 NOTE — TELEPHONE ENCOUNTER
----- Message from Zoey Nelson MD sent at 10/9/2018 11:31 AM CDT -----      ----- Message -----  From: Laureen Tobin LPN  Sent: 10/9/2018  11:16 AM  To: Zoey Nelson MD    Please advise. Pt also requests Ibuprofen refill for headache.

## 2018-10-09 NOTE — PROGRESS NOTES
Please let pt know that we are doubling her avapro to 300 mg daily. I sent in the script.  Thanks,  PV

## 2018-10-09 NOTE — PROGRESS NOTES
Pernell Ly 38 y.o. female is here today for Blood Pressure check.   History of HTN yes.    Review of patient's allergies indicates:  No Known Allergies  Creatinine   Date Value Ref Range Status   10/02/2018 0.7 0.5 - 1.4 mg/dL Final     Sodium   Date Value Ref Range Status   10/02/2018 138 136 - 145 mmol/L Final     Potassium   Date Value Ref Range Status   10/02/2018 3.8 3.5 - 5.1 mmol/L Final   ]  Patient verifies taking blood pressure medications on a regular bases at the same time of the day.     Current Outpatient Medications:     ferrous sulfate 324 mg (65 mg iron) TbEC, Take 324 mg by mouth 2 (two) times daily. , Disp: , Rfl:     ibuprofen (ADVIL,MOTRIN) 600 MG tablet, Take 1 tablet (600 mg total) by mouth every 6 (six) hours as needed., Disp: 30 tablet, Rfl: 2    irbesartan (AVAPRO) 150 MG tablet, Take 1 tablet (150 mg total) by mouth every evening., Disp: 90 tablet, Rfl: 0    MULTIVIT-IRON-MIN-FOLIC ACID 3,500-18-0.4 UNIT-MG-MG ORAL CHEW, Take by mouth., Disp: , Rfl:   Does patient have record of home blood pressure readings yes. Readings have been averaging 140/90.   Last dose of blood pressure medication was taken at 7am.  Patient is symptomatic.   Complains of Headache.    BP: 157/100 , PULSE: 67.    Blood pressure reading after 15 minutes was 154/95, Pulse 75.    Dr. Nelson notified.

## 2018-10-24 ENCOUNTER — OFFICE VISIT (OUTPATIENT)
Dept: PRIMARY CARE CLINIC | Facility: CLINIC | Age: 38
End: 2018-10-24
Attending: FAMILY MEDICINE
Payer: COMMERCIAL

## 2018-10-24 VITALS
BODY MASS INDEX: 27.56 KG/M2 | DIASTOLIC BLOOD PRESSURE: 102 MMHG | HEIGHT: 65 IN | WEIGHT: 165.44 LBS | SYSTOLIC BLOOD PRESSURE: 154 MMHG

## 2018-10-24 DIAGNOSIS — I10 ESSENTIAL HYPERTENSION: Primary | ICD-10-CM

## 2018-10-24 DIAGNOSIS — E87.6 HYPOKALEMIA: ICD-10-CM

## 2018-10-24 PROBLEM — Z91.148 NONCOMPLIANCE WITH MEDICATION REGIMEN: Status: RESOLVED | Noted: 2018-09-26 | Resolved: 2018-10-24

## 2018-10-24 PROCEDURE — 3008F BODY MASS INDEX DOCD: CPT | Mod: CPTII,S$GLB,, | Performed by: FAMILY MEDICINE

## 2018-10-24 PROCEDURE — 99213 OFFICE O/P EST LOW 20 MIN: CPT | Mod: S$GLB,,, | Performed by: FAMILY MEDICINE

## 2018-10-24 PROCEDURE — 3077F SYST BP >= 140 MM HG: CPT | Mod: CPTII,S$GLB,, | Performed by: FAMILY MEDICINE

## 2018-10-24 PROCEDURE — 3080F DIAST BP >= 90 MM HG: CPT | Mod: CPTII,S$GLB,, | Performed by: FAMILY MEDICINE

## 2018-10-24 PROCEDURE — 99999 PR PBB SHADOW E&M-EST. PATIENT-LVL III: CPT | Mod: PBBFAC,,, | Performed by: FAMILY MEDICINE

## 2018-10-24 RX ORDER — NIFEDIPINE 30 MG/1
60 TABLET, EXTENDED RELEASE ORAL DAILY
Qty: 60 TABLET | Refills: 1 | Status: SHIPPED | OUTPATIENT
Start: 2018-10-24 | End: 2018-11-20

## 2018-10-24 NOTE — PROGRESS NOTES
Subjective:       Patient ID: Pernell Ly is a 38 y.o. female.    Chief Complaint: No chief complaint on file.    Pt presents today for f/u on HTN. States that her med is working okay and at home her readings are all in 140/80's. Only here does she seem to have higher values than that. Pt does state that her HA's have diminished however.    Pt denies any SOB/HA/vision changes/CP          Review of Systems   Constitutional: Negative.    Eyes: Negative.    Respiratory: Negative for cough, chest tightness and shortness of breath.    Cardiovascular: Negative for chest pain, palpitations and leg swelling.   Gastrointestinal: Negative.    Musculoskeletal: Negative.  Negative for joint swelling.   Skin: Negative.    Neurological: Negative for dizziness, weakness, light-headedness and headaches.   All other systems reviewed and are negative.      Objective:      Physical Exam   Constitutional: She is oriented to person, place, and time. She appears well-developed and well-nourished.   HENT:   Head: Normocephalic and atraumatic.   Eyes: Conjunctivae and EOM are normal. Pupils are equal, round, and reactive to light.   Neck: Normal range of motion. Neck supple. No thyromegaly present.   Cardiovascular: Normal rate, regular rhythm, normal heart sounds and intact distal pulses.   No murmur heard.  Pulmonary/Chest: Effort normal and breath sounds normal. No respiratory distress. She has no wheezes. She has no rales. She exhibits no tenderness.   Musculoskeletal: Normal range of motion. She exhibits no edema.   Lymphadenopathy:     She has no cervical adenopathy.   Neurological: She is alert and oriented to person, place, and time.   Skin: Skin is warm. No erythema.   Psychiatric: She has a normal mood and affect. Her behavior is normal. Judgment and thought content normal.       Assessment:       HTN  anxiety  Plan:       Based on pt home readings, BP's are controlled. However, I would suggest that we change to procardia  and see how that works for pt. Pt amenable since having switched from valsartan does note higher readings with avapro. Will have a repeat BP check with nurse in 2 weeks. Pt amenable    Repeat potassium this week      ED prompts dw pt  RTC prn symptoms or 3 mos if BP normal. Otherwise, make appt with me soon after to add another BP med

## 2018-11-13 ENCOUNTER — HOSPITAL ENCOUNTER (EMERGENCY)
Facility: OTHER | Age: 38
Discharge: HOME | End: 2018-11-13
Attending: EMERGENCY MEDICINE
Payer: COMMERCIAL

## 2018-11-13 VITALS
SYSTOLIC BLOOD PRESSURE: 155 MMHG | BODY MASS INDEX: 27.31 KG/M2 | TEMPERATURE: 98 F | DIASTOLIC BLOOD PRESSURE: 91 MMHG | OXYGEN SATURATION: 100 % | RESPIRATION RATE: 13 BRPM | HEIGHT: 64 IN | WEIGHT: 160 LBS | HEART RATE: 104 BPM

## 2018-11-13 DIAGNOSIS — R56.9 SEIZURE: ICD-10-CM

## 2018-11-13 DIAGNOSIS — I10 HYPERTENSION, UNSPECIFIED TYPE: Primary | ICD-10-CM

## 2018-11-13 DIAGNOSIS — R51.9 ACUTE NONINTRACTABLE HEADACHE, UNSPECIFIED HEADACHE TYPE: ICD-10-CM

## 2018-11-13 LAB
ALBUMIN SERPL BCP-MCNC: 4.1 G/DL
ALP SERPL-CCNC: 73 U/L
ALT SERPL W/O P-5'-P-CCNC: 15 U/L
AMPHET+METHAMPHET UR QL: NEGATIVE
ANION GAP SERPL CALC-SCNC: 12 MMOL/L
AST SERPL-CCNC: 19 U/L
BACTERIA #/AREA URNS HPF: ABNORMAL /HPF
BARBITURATES UR QL SCN>200 NG/ML: NEGATIVE
BASOPHILS # BLD AUTO: 0.01 K/UL
BASOPHILS NFR BLD: 0.1 %
BENZODIAZ UR QL SCN>200 NG/ML: NEGATIVE
BILIRUB SERPL-MCNC: 0.2 MG/DL
BILIRUB UR QL STRIP: NEGATIVE
BUN SERPL-MCNC: 10 MG/DL
BZE UR QL SCN: NEGATIVE
CALCIUM SERPL-MCNC: 9.6 MG/DL
CANNABINOIDS UR QL SCN: NEGATIVE
CHLORIDE SERPL-SCNC: 102 MMOL/L
CLARITY UR: CLEAR
CO2 SERPL-SCNC: 23 MMOL/L
COLOR UR: YELLOW
CREAT SERPL-MCNC: 0.7 MG/DL
CREAT UR-MCNC: 52.5 MG/DL
DIFFERENTIAL METHOD: ABNORMAL
EOSINOPHIL # BLD AUTO: 0.1 K/UL
EOSINOPHIL NFR BLD: 0.6 %
ERYTHROCYTE [DISTWIDTH] IN BLOOD BY AUTOMATED COUNT: 14.2 %
EST. GFR  (AFRICAN AMERICAN): >60 ML/MIN/1.73 M^2
EST. GFR  (NON AFRICAN AMERICAN): >60 ML/MIN/1.73 M^2
GLUCOSE SERPL-MCNC: 105 MG/DL
GLUCOSE UR QL STRIP: NEGATIVE
HCT VFR BLD AUTO: 39.3 %
HGB BLD-MCNC: 13.3 G/DL
HGB UR QL STRIP: ABNORMAL
HYALINE CASTS #/AREA URNS LPF: 0 /LPF
KETONES UR QL STRIP: NEGATIVE
LEUKOCYTE ESTERASE UR QL STRIP: NEGATIVE
LYMPHOCYTES # BLD AUTO: 2.1 K/UL
LYMPHOCYTES NFR BLD: 26.1 %
MAGNESIUM SERPL-MCNC: 2.2 MG/DL
MCH RBC QN AUTO: 29.1 PG
MCHC RBC AUTO-ENTMCNC: 33.8 G/DL
MCV RBC AUTO: 86 FL
METHADONE UR QL SCN>300 NG/ML: NEGATIVE
MICROSCOPIC COMMENT: ABNORMAL
MONOCYTES # BLD AUTO: 0.6 K/UL
MONOCYTES NFR BLD: 7.2 %
NEUTROPHILS # BLD AUTO: 5.4 K/UL
NEUTROPHILS NFR BLD: 66 %
NITRITE UR QL STRIP: NEGATIVE
NON-SQ EPI CELLS #/AREA URNS HPF: 0 /HPF
OPIATES UR QL SCN: NEGATIVE
PCP UR QL SCN>25 NG/ML: NEGATIVE
PH UR STRIP: 6 [PH] (ref 5–8)
PLATELET # BLD AUTO: 340 K/UL
PMV BLD AUTO: 9 FL
POTASSIUM SERPL-SCNC: 3.6 MMOL/L
PROT SERPL-MCNC: 9 G/DL
PROT UR QL STRIP: ABNORMAL
RBC # BLD AUTO: 4.57 M/UL
RBC #/AREA URNS HPF: 2 /HPF (ref 0–4)
SODIUM SERPL-SCNC: 137 MMOL/L
SP GR UR STRIP: >=1.03 (ref 1–1.03)
SQUAMOUS #/AREA URNS HPF: 6 /HPF
TOXICOLOGY INFORMATION: NORMAL
TSH SERPL DL<=0.005 MIU/L-ACNC: 0.85 UIU/ML
URN SPEC COLLECT METH UR: ABNORMAL
UROBILINOGEN UR STRIP-ACNC: NEGATIVE EU/DL
WBC # BLD AUTO: 8.21 K/UL
WBC #/AREA URNS HPF: 8 /HPF (ref 0–5)
WBC CLUMPS URNS QL MICRO: ABNORMAL
YEAST URNS QL MICRO: ABNORMAL

## 2018-11-13 PROCEDURE — 85025 COMPLETE CBC W/AUTO DIFF WBC: CPT

## 2018-11-13 PROCEDURE — 96360 HYDRATION IV INFUSION INIT: CPT

## 2018-11-13 PROCEDURE — 84443 ASSAY THYROID STIM HORMONE: CPT

## 2018-11-13 PROCEDURE — 93010 ELECTROCARDIOGRAM REPORT: CPT | Mod: ,,, | Performed by: INTERNAL MEDICINE

## 2018-11-13 PROCEDURE — 36415 COLL VENOUS BLD VENIPUNCTURE: CPT

## 2018-11-13 PROCEDURE — 80053 COMPREHEN METABOLIC PANEL: CPT

## 2018-11-13 PROCEDURE — 80307 DRUG TEST PRSMV CHEM ANLYZR: CPT

## 2018-11-13 PROCEDURE — 99285 EMERGENCY DEPT VISIT HI MDM: CPT | Mod: 25

## 2018-11-13 PROCEDURE — 93005 ELECTROCARDIOGRAM TRACING: CPT

## 2018-11-13 PROCEDURE — 25000003 PHARM REV CODE 250: Performed by: EMERGENCY MEDICINE

## 2018-11-13 PROCEDURE — 83735 ASSAY OF MAGNESIUM: CPT

## 2018-11-13 PROCEDURE — 96361 HYDRATE IV INFUSION ADD-ON: CPT

## 2018-11-13 PROCEDURE — 81000 URINALYSIS NONAUTO W/SCOPE: CPT | Mod: 59

## 2018-11-13 RX ORDER — ACETAMINOPHEN 500 MG
1000 TABLET ORAL
Status: COMPLETED | OUTPATIENT
Start: 2018-11-13 | End: 2018-11-13

## 2018-11-13 RX ADMIN — SODIUM CHLORIDE 1000 ML: 0.9 INJECTION, SOLUTION INTRAVENOUS at 02:11

## 2018-11-13 RX ADMIN — ACETAMINOPHEN 1000 MG: 500 TABLET ORAL at 02:11

## 2018-11-13 NOTE — ED TRIAGE NOTES
"Pt presents to ED via EMS s/p witnessed seizure-like activity with subsequent fall hitting head. Pt denies Hx of seizures. Pt hit left forehead, no wounds noted, minor localized swelling noted to left superior forehead at hairline. Pt denies other s/s - denies CP, SOB, fever, chills, cough, N/V/D, dizziness. Pt states "I finished eating my lunch and opened up my window and that's all I remember." Pt reports change in BP medications from amlodipine to nifedipine 2 weeks ago and reports missing her F/U apptmt last week to evaluate change. Pt denies taking BP readings at home. Pt is AAOx4, tearful, cooperative, responding appropriately to questions, speaking in full sentences, respirations even and unlabored, skin warm and dry.  "

## 2018-11-13 NOTE — ED NOTES
Pt transferred to BSC with standby assist, returned back to bed, bed locked and in lowest position, side rails x 2, call light in reach. Pt remains connected to cardiac, continuous pulse ox and BP monitoring.

## 2018-11-13 NOTE — ED PROVIDER NOTES
Encounter Date: 2018    SCRIBE #1 NOTE: I, Soy Ale, am scribing for, and in the presence of, Dr. Rogers.       History     Chief Complaint   Patient presents with    Seizures     per EMs, pt had a witnessed seizure at work, pt hit her head on the ground. No hx of seizures.      Time seen by provider: 2:04 PM    This is a 38 y.o. Female, with a history of iron deficiency, and HTN, who presents with complaint of, per EMS, the patient's coworkers witnessed her have a seizure. The coworkers also told EMS the patient began foaming at the mouth, and hit her head after blacking out. EMS reports patient was post-ictal upon arrival. The patient reports the last thing she remembers is eating her lunch. The patient denies feeling dizzy or lightheaded before seizure occurred. She reports to currently have a headache. The patient reports the only medications she takes is her HTN medicine, and her iron deficiency medication, but she has not taken her iron deficiency medication in two days. The patient reports she has been waking up in sweats recently, but denies any other changes in health. The patient denies having seizures in the past.  No history of alcohol abuse. The patient denies recent cold, sickness, or any type of stress. The patient denies recent weight change. She also denies cocaine or marijuana use. The patient denies a family history of seizures.      The history is provided by the patient and the EMS personnel.     Review of patient's allergies indicates:  No Known Allergies  Past Medical History:   Diagnosis Date    Encounter for blood transfusion     Fibroids     Hypertension     Iron deficiency anemia     Menorrhagia     Migraine     Seizures     as baby     Past Surgical History:   Procedure Laterality Date     SECTION      x1    CYSTOSCOPY N/A 2017    [facility]    HYSTERECTOMY  2017    LAPAROSCOPY-DIAGNOSTIC (ADD ON) N/A  9/29/2017    [facility]    ROBOTIC ASSISTED LAPAROSCOPIC HYSTERECTOMY N/A 9/19/2017    [facility] OR    SALPINGECTOMY-ROBOTIC ASSISTED Bilateral 9/19/2017    [facility] OR    TUBAL LIGATION       Family History   Problem Relation Age of Onset    Hypertension Father     Hypertension Sister     Hypertension Maternal Grandmother     Breast cancer Maternal Grandmother     Cancer Maternal Grandfather     Hypertension Paternal Grandmother     Colon cancer Maternal Cousin     Ovarian cancer Neg Hx      Social History     Tobacco Use    Smoking status: Never Smoker    Smokeless tobacco: Never Used   Substance Use Topics    Alcohol use: No     Comment: Occasional; 3x /month    Drug use: No     Review of Systems   Constitutional: Negative for chills, diaphoresis, fever and unexpected weight change.   HENT: Negative for congestion and sore throat.    Respiratory: Negative for cough and shortness of breath.    Cardiovascular: Negative for chest pain.   Gastrointestinal: Negative for diarrhea, nausea and vomiting.   Genitourinary: Negative for dysuria.   Musculoskeletal: Negative for back pain.   Skin: Negative for pallor and rash.   Neurological: Positive for seizures and headaches. Negative for dizziness, weakness, light-headedness and numbness.   Hematological: Does not bruise/bleed easily.       Physical Exam     Initial Vitals   BP Pulse Resp Temp SpO2   11/13/18 1352 11/13/18 1404 11/13/18 1352 11/13/18 1352 11/13/18 1352   (!) 181/100 (!) 136 18 98.2 °F (36.8 °C) 98 %      MAP       --                Physical Exam    Nursing note and vitals reviewed.  Constitutional: She is not diaphoretic. She appears distressed.   Distressed. Tearful. Non-toxic.   HENT:   Head: Normocephalic and atraumatic.   Eyes: Conjunctivae and EOM are normal. Pupils are equal, round, and reactive to light.   Neck: Normal  range of motion. Neck supple.   No meningismus.   Cardiovascular: Tachycardia present.    Pulmonary/Chest: Breath sounds normal. No respiratory distress.   Abdominal: Soft.   Musculoskeletal: Normal range of motion.   Lymphadenopathy:     She has no cervical adenopathy.   Neurological: She is alert and oriented to person, place, and time. She has normal strength. GCS score is 15. GCS eye subscore is 4. GCS verbal subscore is 5. GCS motor subscore is 6.   Normal finger to nose. No facial droop. Symmetric strength in upper and lower extremities.   Skin: Skin is warm and dry.   Psychiatric: Her behavior is normal.         ED Course   Procedures  Labs Reviewed   CBC W/ AUTO DIFFERENTIAL - Abnormal; Notable for the following components:       Result Value    MPV 9.0 (*)     All other components within normal limits   COMPREHENSIVE METABOLIC PANEL - Abnormal; Notable for the following components:    Total Protein 9.0 (*)     All other components within normal limits    Narrative:     Recoll. [id number] by JACK at 11/13/2018 14:44, reason: Specimen   hemolyzed, Called Estela Marshall RN   URINALYSIS, REFLEX TO URINE CULTURE - Abnormal; Notable for the following components:    Specific Gravity, UA >=1.030 (*)     Protein, UA 2+ (*)     Occult Blood UA 2+ (*)     All other components within normal limits    Narrative:     Preferred Collection Type->Urine, Clean Catch   URINALYSIS MICROSCOPIC - Abnormal; Notable for the following components:    WBC, UA 8 (*)     Bacteria, UA Moderate (*)     All other components within normal limits    Narrative:     Preferred Collection Type->Urine, Clean Catch   TSH    Narrative:     Recoll. [id number] by JACK at 11/13/2018 14:44, reason: Specimen   hemolyzed, Called Estela Marshall RN   MAGNESIUM    Narrative:     Recoll. [id number] by JACK at 11/13/2018 14:44, reason: Specimen   hemolyzed, Called Estela Marshall RN   DRUG SCREEN PANEL, URINE EMERGENCY    Narrative:     Preferred  "Collection Type->Urine, Clean Catch     EKG Readings: (Independently Interpreted)   Sinus tachycardia with a heart rate of 129 bpm. Normal axis. No STEMI. Compared to 2015, unchanged.       Imaging Results          CT Head Without Contrast (Final result)  Result time 11/13/18 15:49:10    Final result by Russel Smith MD (11/13/18 15:49:10)                 Impression:      Left frontal extracranial soft tissue swelling.  No evidence of underlying fracture or acute intracranial hemorrhage.      Electronically signed by: Russel Smith MD  Date:    11/13/2018  Time:    15:49             Narrative:    EXAMINATION:  CT HEAD WITHOUT CONTRAST    CLINICAL HISTORY:  Confusion/delirium, altered LOC, unexplained;    TECHNIQUE:  Low dose axial CT images obtained throughout the head without the use of intravenous contrast.  Axial, sagittal and coronal reconstructions were performed.    COMPARISON:  11/02/2014    FINDINGS:  Intracranial compartment:    Ventricles and sulci are normal in size for age without evidence of hydrocephalus.    The brain parenchyma appears within normal limits.  No parenchymal mass, hemorrhage, edema or major vascular distribution infarct.    No extra-axial blood or fluid collections.    Skull/extracranial contents (limited evaluation):    Left frontal extracranial soft tissue swelling.  No subjacent calvarial fracture.  Mastoid air cells and paranasal sinuses are essentially clear.                                 Medical Decision Making:   Initial Assessment:   Urgent evaluation after a 38-year-old female with hypertension and anemia here brought in by EMS given concern for witnessed reported seizure activity.  On arrival patient is tearful, reports that she "does not remember what happened" denies preceding symptoms, had returned from lunch prior to reported fall from her chair and tonic clonic movements.  Patient denies illicit drug use, has started Procardia after stopping avapro, but has felt " generalized fatigue over the last 2 days and not taking her iron pills.  Patient denies sick contacts, no recent travel, no fevers.  Patient does endorse mild frontal headache, on exam no meningismus, patient is tachycardic, non focal neuro exam. Broad ddx for first time seizure v syncope episode, dehydration, adverse medication reaction, metabolic disturbance. Do not suspect menningitis or SAH, but will obtain CTH and reasses.  Clinical Tests:   Lab Tests: Ordered and Reviewed  Radiological Study: Ordered and Reviewed  Medical Tests: Ordered and Reviewed  ED Management:  Labs with no gross metabolic disturbances, urine without drugs, no UTI, no anemia, normal TSH, normal magnesium 2.2  CT without intracranial hemorrhage    Patient had no seizure activity in the emergency department, heart rate improved 104 prior to discharge home.  Patient given seizure precautions including no driving or operating machinery, no swimming alone, and agreeable to plan to follow up with her PCP and Neurology.            Scribe Attestation:   Scribe #1: I performed the above scribed service and the documentation accurately describes the services I performed. I attest to the accuracy of the note.    Attending Attestation:           Physician Attestation for Scribe:  Physician Attestation Statement for Scribe #1: I, Dr. De Paz, reviewed documentation, as scribed by Soy Aguilera in my presence, and it is both accurate and complete.                    Clinical Impression:     1. Hypertension, unspecified type    2. Seizure    3. Acute nonintractable headache, unspecified headache type            Disposition:   Disposition: Discharged  Condition: Stable                        Marycarmen De Paz MD  11/13/18 [id number]

## 2018-11-13 NOTE — DISCHARGE INSTRUCTIONS
Patient was instructed about seizure precautions:     -Do not drive in 6 month from the last seizure episode.   -If you ride a bicycle use a helmet  -Never swim alone   -Use showers, do not use bath tube when unsupervised.  -If possible cook with someone else in the nearby. Use the back burners.  -Avoid operating heavy machinery or equipment.

## 2018-11-14 ENCOUNTER — PATIENT MESSAGE (OUTPATIENT)
Dept: PRIMARY CARE CLINIC | Facility: CLINIC | Age: 38
End: 2018-11-14

## 2018-11-14 DIAGNOSIS — R56.9 SEIZURES: Primary | ICD-10-CM

## 2018-11-15 ENCOUNTER — OFFICE VISIT (OUTPATIENT)
Dept: NEUROLOGY | Facility: CLINIC | Age: 38
End: 2018-11-15
Payer: COMMERCIAL

## 2018-11-15 VITALS
BODY MASS INDEX: 28.86 KG/M2 | HEART RATE: 80 BPM | DIASTOLIC BLOOD PRESSURE: 95 MMHG | SYSTOLIC BLOOD PRESSURE: 142 MMHG | WEIGHT: 169.06 LBS | HEIGHT: 64 IN

## 2018-11-15 DIAGNOSIS — R56.9 SEIZURE: Primary | ICD-10-CM

## 2018-11-15 DIAGNOSIS — G40.409 EPILEPSY SYMPTOMATIC, GENERALIZED: ICD-10-CM

## 2018-11-15 PROCEDURE — 3008F BODY MASS INDEX DOCD: CPT | Mod: CPTII,S$GLB,, | Performed by: PSYCHIATRY & NEUROLOGY

## 2018-11-15 PROCEDURE — 3077F SYST BP >= 140 MM HG: CPT | Mod: CPTII,S$GLB,, | Performed by: PSYCHIATRY & NEUROLOGY

## 2018-11-15 PROCEDURE — 99999 PR PBB SHADOW E&M-EST. PATIENT-LVL III: CPT | Mod: PBBFAC,,, | Performed by: PSYCHIATRY & NEUROLOGY

## 2018-11-15 PROCEDURE — 3080F DIAST BP >= 90 MM HG: CPT | Mod: CPTII,S$GLB,, | Performed by: PSYCHIATRY & NEUROLOGY

## 2018-11-15 PROCEDURE — 99205 OFFICE O/P NEW HI 60 MIN: CPT | Mod: S$GLB,,, | Performed by: PSYCHIATRY & NEUROLOGY

## 2018-11-15 NOTE — PROGRESS NOTES
Name: Pernell Ly  MRN: [id number]   CSN: [id number]      Date: 11/15/2018    HISTORY OF PRESENT ILLNESS (HPI)  The patient is a 38 y.o.   The patient was initially referred for consultation by ED  The patient was unaccompanied today.     Pernell Ly presents today in clinic after being seen in the Emergency Room   two days ago with an apparent first seizure.  This was apparently a generalized   tonic-clonic seizure that had occurred at work with a loud sound followed by   stiffening and generalized shaking witnessed by coworkers.  She had loss of   consciousness and was either seizing or postictal for about 5 minutes.  She   reports waking up when the paramedics had arrived and remembers going to the   ambulance in the Emergency Room.  She was stable by the time she arrived in the   Emergency Room and not placed on medications.  She was given only a pain pill   and a CT scan of the head was unremarkable.  She did not lose continence of her   bowel or bladder during the seizure.    Regarding her prior history, she has no adult history of seizures, but there is   some question that she may have had some type of seizure disorder as a young   child, although she remembers very little about it.  She does not know if this   was a febrile seizure or series of febrile seizures or if she had ongoing   seizures.  According to her, it probably resolved by early grade school and she   is going to look into this with her family.  I have no further records of that   at this point.  There are no other known further seizure risk factors.      NBB/IN  dd: 11/20/2018 17:16:35 (CST)  td: 11/20/2018 17:28:04 (CST)  Doc ID   #[id number]  Job ID #[id number]    CC:       [id number]          Seizure Triggers  Sleep Deprivation - None  Other medications - None  Psych/stress - None  Photic stimulation - None  Hyperventilation - None  Medical Problems - None  Menses - No  Sensory Stimulation (light, sound, etc) - None  Missed dose of meds -  None    AED Treatments  Present regimen  None    Prior treatments      Not tried  acetazolamide (Diamox, AZM)  amantadine  carbamazepine (Tegretol, CBZ)  clobezam (Onfi or Frizium, CLB)  ethosuximide (Zarontin, ESM)  eslicarbazine (Aptiom, ESL)  felbamate (felbatol, FBM)  gabapentin (Neurontin, GBP)  lacosamide (Vimpat, LCM)   lamotrigine (Lamictal, LTG)   levetiracetam (Keppra, LEV)  methsuximide (Celontin, MSM)  methyphenytoin (Mesantion, MHT)  oxcarbazepine (Trileptal OXC)  perampanel (Fycompa, FCP)   phenobarbital (Pb)  phenytoin (Dilantin, PHT)  pregabalin (Lyrica, PGB)  primidone (Mysoline, PRM)  retigabine (Potiga, RTG)  rufinamide (Banzel, RUF)  tiagabine (Gabatril,  TGB)  topiramate (Topamax, TPM)  viagabatrin, (Sabril, VGB)  vagal nerve stimulator (VNS)  valproic acid (Depakote, VPA)  zonisamide (Zonegran, ZNS)  Benzodiazepines  diazepam - rectal (Diastatl)  diazepam - oral (Valium, DZ)  clonazepam (Klonopin, CZP)  clorazepate (Tranxene, CLZ)  Ativan  Brain Stimulation  Vagal Nerve Stimulation-n/a  DBS- n/a    Compliance method  Memory - yes  Mom or Spouse - Yes  Pill Box - no  Rex calendar - no  Turn over medication bottle - no  Phone alarm - no    Seizure Evaluation  EEG Routine -   EEG Ambulatory -   EEG\Video Monitoring -   MRI/MRA -   CT/CTA Scan -   PET Scan -   Neuropsychological evaluation -   DEXA Scan    Potential Epilepsy Risk Factors:   Pregnancy/Labor/Delivery - full term uncomplicated pregnancy labor and vaginal delivery  Febrile seizures - none  Head injury  - none  CNS infection - none     Stroke - none  Family Hx of Sz - none    PAST MEDICAL HISTORY:   Active Ambulatory Problems     Diagnosis Date Noted    Anemia 08/31/2014    Essential hypertension 05/08/2017    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 05/08/2017    S/P laparoscopic hysterectomy 09/19/2017    Hypokalemia 10/01/2017    Anxiety 03/27/2018     Resolved Ambulatory  Problems     Diagnosis Date Noted    Palpitations 2014    Menorrhagia 2014    Submucous leiomyoma of uterus 2017    Acute febrile illness 2017    Noncompliance with medication regimen 2018     Past Medical History:   Diagnosis Date    Encounter for blood transfusion     Fibroids     Hypertension     Iron deficiency anemia     Menorrhagia     Migraine     Seizures         PAST SURGICAL HISTORY:   Past Surgical History:   Procedure Laterality Date     SECTION      x1    CYSTOSCOPY N/A 2017    [facility]    HYSTERECTOMY  2017    LAPAROSCOPY-DIAGNOSTIC (ADD ON) N/A 2017    [facility]    ROBOTIC ASSISTED LAPAROSCOPIC HYSTERECTOMY N/A 2017    [facility]    SALPINGECTOMY-ROBOTIC ASSISTED Bilateral 2017    [facility]    TUBAL LIGATION          FAMILY HISTORY:   Family History   Problem Relation Age of Onset    Hypertension Father     Hypertension Sister     Hypertension Maternal Grandmother     Breast cancer Maternal Grandmother     Cancer Maternal Grandfather     Hypertension Paternal Grandmother     Colon cancer Maternal Cousin     Ovarian cancer Neg Hx          SOCIAL HISTORY:   Social History     Socioeconomic History    Marital status: Single     Spouse name: Not on file    Number of children: Not on file    Years of education: Not on file    Highest education level: Not on file   Social Needs    Financial resource strain: Not on file    Food insecurity - worry: Not on file    Food insecurity - inability: Not on file    Transportation needs - medical: Not on file    Transportation needs - non-medical: Not on file   Occupational History    Not on file   Tobacco Use    Smoking status: Never Smoker    Smokeless tobacco: Never Used   Substance and Sexual Activity     Alcohol use: No     Comment: Occasional; 3x /month    Drug use: No    Sexual activity: Yes     Partners: Male     Birth control/protection: See Surgical Hx   Other Topics Concern    Not on file   Social History Narrative    Not on file        SUBSTANCE USE:  Social History     Tobacco Use    Smoking status: Never Smoker    Smokeless tobacco: Never Used   Substance and Sexual Activity    Alcohol use: No     Comment: Occasional; 3x /month    Drug use: No    Sexual activity: Yes     Partners: Male     Birth control/protection: See Surgical Hx      Social History     Tobacco Use    Smoking status: Never Smoker    Smokeless tobacco: Never Used   Substance Use Topics    Alcohol use: No     Comment: Occasional; 3x /month        ALLERGIES: Patient has no known allergies.       Review of Systems   Constitutional: Negative for chills, diaphoresis, fever, malaise/fatigue and weight loss.   HENT: Negative for ear pain, hearing loss, nosebleeds and tinnitus.    Eyes: Negative for blurred vision, double vision, photophobia and pain.   Respiratory: Negative for cough, hemoptysis and shortness of breath.    Cardiovascular: Negative for chest pain, palpitations, orthopnea and leg swelling.   Gastrointestinal: Negative for abdominal pain, blood in stool, constipation, diarrhea, heartburn, nausea and vomiting.   Genitourinary: Negative for dysuria and hematuria.   Musculoskeletal: Negative for falls, joint pain and myalgias.   Skin: Negative for itching and rash.   Neurological: Negative for dizziness, tingling, tremors, sensory change, speech change, focal weakness, loss of consciousness, weakness and headaches.   Endo/Heme/Allergies: Negative for environmental allergies. Does not bruise/bleed easily.   Psychiatric/Behavioral: Negative for depression, hallucinations, memory loss and substance abuse. The patient is not nervous/anxious and does not have insomnia.          PHYSICAL EXAM:    BP (!) 142/95   Pulse 80   Ht 5'  "4" (1.626 m)   Wt 76.7 kg (169 lb 1.5 oz)   LMP 11/01/2017   BMI 29.02 kg/m²       Neurologic Exam      Higher Cortical Function:    Patient is a well developed, pleasant, well groomed individual appearing their stated age  Oriented - intact to person, place and time    Fund of knowledge was appropriate.    Language - Speech was fluent without evidence for an aphasia.  R-L Orientation - Intact   Agnosias, agraphesthesia, or astereognosis - not present.   Recent and remote memory intact  Attention span and concentration intact  Shoulder shrug normal   Cranial Nerves II - XII:    EOMs were intact with normal smooth and no nystagmus.    PERRLA. Funduscopic exam - disc were flat with normal A/V ratio and no exudates or hemorrhages.   Visual fields were full to confrontation.    Motor - facial movement was symmetrical and normal.    Facial sensory - Light touch and pin prick sensations were normal.    Hearing was normal.  Palate moved well and was symmetrical    Tongue movement was full & the patient could speak without difficulty.  Motor: Power, bulk and tone were normal in all extremities.  Coordination:  Normal  Gait:  Normal    Deep tendon reflexes:    Reflex L R   Bicpets 2+ 2+   Tricepts 2+ 2+   Brachio-radialis 2+ 2+   Knee 2+ 2+   Ankle 2+ 2+   Babinski No No     Tremor: resting, postural, intentional - none  Cardiovascular:  No edema  Skin: No rash         IMPRESSION  1. New onset spell. Apparent first seizure. GTC at work.      DISPOSITION:   EEG > 1hr  2. MRI brain  3. Maintain no treatment for now since single seizure. Consider treatment based on MRI/EEG if abnormal, or if 2nd seizure occurs.  4. Seizure precautions.  5. State driving laws explained to patient. Pt to not drive for 6 months min.    "

## 2018-11-15 NOTE — LETTER
November 20, 2018      Zoey Nelson MD  [address]   [address]  [facility] [address]           [facility] - Neurology Epilepsy  [address], [address]  [facility] [address]  Phone: [phone]  Fax: [phone]          Patient: Pernell Ly   MR Number: [id number]   YOB: 1980   Date of Visit: 11/15/2018       Dear Dr. Zoey Nelson:    Thank you for referring Pernell Ly to me for evaluation. Attached you will find relevant portions of my assessment and plan of care.    If you have questions, please do not hesitate to call me. I look forward to following Pernell Ly along with you.    Sincerely,    Damien Nolasco MD    Enclosure  CC:  No Recipients    If you would like to receive this communication electronically, please contact [email or web address] or [phone] to request more information on [employer] Link access.    For providers and/or their staff who would like to refer a patient to [facility], please contact us through our one-stop-shop provider referral line, [facility], at [phone].    If you feel you have received this communication in error or would no longer like to receive these types of communications, please e-mail [email or web address]

## 2018-11-15 NOTE — LETTER
November 15, 2018      [facility] - Neurology Epilepsy  [address]  [facility] [address]  Phone: [phone]  Fax: [phone]       Patient: Pernell Ly   YOB: 1980  Date of Visit: 11/15/2018    To Whom It May Concern:    Minh Ly  was at [facility] on 11/15/2018. She may return to work on 11/19/2018 with no restrictions. If you have any questions or concerns, or if I can be of further assistance, please do not hesitate to contact me.    Sincerely,    Damien Nolasco MD

## 2018-11-15 NOTE — LETTER
November 15, 2018      [facility] - Neurology Epilepsy  [address]  [facility] [address]  Phone: [phone]  Fax: [phone]       Patient: Pernell Ly   YOB: 1980  Date of Visit: 11/15/2018    To Whom It May Concern:    Pernell Ly  was at [facility] on 11/15/2018. She may return to work on 11/19/2018 with no restrictions. If you have any questions or concerns, or if I can be of further assistance, please do not hesitate to contact me.    Sincerely,    Damien Maldonado MD

## 2018-11-20 ENCOUNTER — OFFICE VISIT (OUTPATIENT)
Dept: PRIMARY CARE CLINIC | Facility: CLINIC | Age: 38
End: 2018-11-20
Attending: FAMILY MEDICINE
Payer: COMMERCIAL

## 2018-11-20 VITALS
HEART RATE: 70 BPM | BODY MASS INDEX: 27.77 KG/M2 | HEIGHT: 65 IN | WEIGHT: 166.69 LBS | DIASTOLIC BLOOD PRESSURE: 102 MMHG | SYSTOLIC BLOOD PRESSURE: 138 MMHG

## 2018-11-20 DIAGNOSIS — R56.9 SEIZURE: Primary | ICD-10-CM

## 2018-11-20 DIAGNOSIS — I10 ESSENTIAL HYPERTENSION: ICD-10-CM

## 2018-11-20 PROCEDURE — 3008F BODY MASS INDEX DOCD: CPT | Mod: CPTII,S$GLB,, | Performed by: FAMILY MEDICINE

## 2018-11-20 PROCEDURE — 3075F SYST BP GE 130 - 139MM HG: CPT | Mod: CPTII,S$GLB,, | Performed by: FAMILY MEDICINE

## 2018-11-20 PROCEDURE — 99999 PR PBB SHADOW E&M-EST. PATIENT-LVL III: CPT | Mod: PBBFAC,,, | Performed by: FAMILY MEDICINE

## 2018-11-20 PROCEDURE — 99214 OFFICE O/P EST MOD 30 MIN: CPT | Mod: S$GLB,,, | Performed by: FAMILY MEDICINE

## 2018-11-20 PROCEDURE — 3080F DIAST BP >= 90 MM HG: CPT | Mod: CPTII,S$GLB,, | Performed by: FAMILY MEDICINE

## 2018-11-20 RX ORDER — NIFEDIPINE 90 MG/1
90 TABLET, EXTENDED RELEASE ORAL DAILY
Qty: 90 TABLET | Refills: 1 | Status: SHIPPED | OUTPATIENT
Start: 2018-11-20 | End: 2019-06-21 | Stop reason: SDUPTHER

## 2018-11-20 RX ORDER — SULFAMETHOXAZOLE AND TRIMETHOPRIM 800; 160 MG/1; MG/1
1 TABLET ORAL 2 TIMES DAILY
Qty: 6 TABLET | Refills: 0 | Status: SHIPPED | OUTPATIENT
Start: 2018-11-20 | End: 2018-11-23

## 2018-11-20 NOTE — PROGRESS NOTES
Subjective:       Patient ID: Pernell Ly is a 38 y.o. female.    Chief Complaint: Follow-up and Seizures (last in childhood)    Pt presents today for f/u from ED visit for seziure that she had while at work last week. Per pt, she did have them in childhood but it has been years since last one. None as adult.  Pt worried and does not want to have another one. Met with neuro and has an EEG and MRI scheduled for next week. CT head was normal done in ED    Aware that her pressures are increased today. Denies any HA's, SOB, CP. States that she feels good today.          Review of Systems   All other systems reviewed and are negative.      Objective:      Physical Exam   Constitutional: She is oriented to person, place, and time. She appears well-developed and well-nourished.   HENT:   Head: Normocephalic and atraumatic.   Right Ear: External ear normal.   Left Ear: External ear normal.   Nose: Nose normal.   Mouth/Throat: Oropharynx is clear and moist. No oropharyngeal exudate.   Eyes: EOM are normal. Pupils are equal, round, and reactive to light.   Neck: Normal range of motion. Neck supple. No thyromegaly present.   Cardiovascular: Normal rate, regular rhythm, normal heart sounds and intact distal pulses.   No murmur heard.  Pulmonary/Chest: Effort normal and breath sounds normal. No respiratory distress.   Abdominal: Soft. Bowel sounds are normal. She exhibits no distension and no mass. There is no tenderness. There is no rebound and no guarding.   Musculoskeletal: Normal range of motion. She exhibits no edema or tenderness.   Lymphadenopathy:     She has no cervical adenopathy.   Neurological: She is alert and oriented to person, place, and time. She has normal reflexes. She displays normal reflexes. No cranial nerve deficit. She exhibits normal muscle tone. Coordination normal.   Skin: Skin is warm and dry. No erythema.   Psychiatric: She has a normal mood and affect. Her behavior is normal. Judgment and  thought content normal.       Assessment:   Seizures  Uncontrolled HTN  Plan:       Seizures: followed by neuro--stable  elev BP's: increase procardia to 90 mg daily  UTI noted on tests done in ED: will treat with bactrim x 3 days. Pt is presently asymptomatic      ED prompts d/w pt  RTC 1 month, pt will let me know her BP's at neuro appt next week so we can adjust meds accordingly

## 2018-11-23 ENCOUNTER — HOSPITAL ENCOUNTER (OUTPATIENT)
Dept: RADIOLOGY | Facility: HOSPITAL | Age: 38
Discharge: HOME | End: 2018-11-23
Attending: PSYCHIATRY & NEUROLOGY
Payer: COMMERCIAL

## 2018-11-23 ENCOUNTER — HOSPITAL ENCOUNTER (OUTPATIENT)
Dept: NEUROLOGY | Facility: CLINIC | Age: 38
Discharge: HOME | End: 2018-11-23
Payer: COMMERCIAL

## 2018-11-23 DIAGNOSIS — R56.9 SEIZURE: ICD-10-CM

## 2018-11-23 DIAGNOSIS — G40.409 EPILEPSY SYMPTOMATIC, GENERALIZED: ICD-10-CM

## 2018-11-23 PROCEDURE — 70551 MRI BRAIN STEM W/O DYE: CPT | Mod: TC

## 2018-11-23 PROCEDURE — 70551 MRI BRAIN STEM W/O DYE: CPT | Mod: 26,,, | Performed by: RADIOLOGY

## 2018-11-23 PROCEDURE — 95813 EEG EXTND MNTR 61-119 MIN: CPT | Mod: S$GLB,,, | Performed by: PSYCHIATRY & NEUROLOGY

## 2018-11-26 ENCOUNTER — PATIENT MESSAGE (OUTPATIENT)
Dept: NEUROLOGY | Facility: CLINIC | Age: 38
End: 2018-11-26

## 2018-11-26 NOTE — PROCEDURES
EEG >1 hr  Date/Time: 11/26/2018 4:36 PM  Performed by: Yoana Dunham MD  Authorized by: Damien Nolasco MD       EXTENDED  ELECTROENCEPHALOGRAM  REPORT    Pernell Ly  [id number]  1980    DATE OF SERVICE: 11/23/18    DATE OF ADMISSION: 11/23/2018  9:55 AM    REQUESTING PROVIDER: Damien Nolasco MD    REASON FOR CONSULT: 37yo F with 1st seizure    METHODOLOGY   Electroencephalographic (EEG) recording is with electrodes placed according to the International 10-20 placement system.  Thirty two (32) channels of digital signal (sampling rate of 512/sec) including T1 and T2 was simultaneously recorded from the scalp and may include  EKG, EMG, and/or eye monitors.  Recording band pass was 0.1 to 512 hz.  Digital video recording of the patient is simultaneously recorded with the EEG.  The patient is instructed report clinical symptoms which may occur during the recording session.  EEG and video recording is stored and archived in digital format.  Activation procedures which include photic stimulation, hyperventilation and instructing patients to perform simple task are done in selected patients.   The EEG is displayed on a monitor screen and can be reviewed using different montages.  Computer assisted analysis is employed to detect spike and electrographic seizure activity.   The entire record is submitted for computer analysis.  The entire recording is visually reviewed and the times identified by computer analysis as being spikes or seizures are reviewed again.  Compresses spectral analysis (CSA) is also performed on the activity recorded from each individual channel.  This is displayed as a power display of frequencies from 0 to 30 Hz over time.   The CSA is reviewed looking for asymmetries in power between homologous areas of the scalp and then compared with the original EEG recording.     [employer] software was also utilized in the review of this study.  This software suite analyzes the EEG recording in multiple  domains.  Coherence and rhythmicity is computed to identify EEG sections which may contain organized seizures.  Each channel undergoes analysis to detect presence of spike and sharp waves which have special and morphological characteristic of epileptic activity.  The routine EEG recording is converted from spacial into frequency domain.  This is then displayed comparing homologous areas to identify areas of significant asymmetry.  Algorithm to identify non-cortically generated artifact is used to separate eye movement, EMG and other artifact from the EEG.      RECORDING TIMES  Start on 11/23/18, 11:01  Stop on 11/23/18, 12:14    A total of 1 hour and 13 minutes of EEG recording was obtained.    EEG FINDINGS  Background activity:   The background rhythm was characterized by theta-alpha (7-11 Hz) activity with a 11 Hz posterior dominant alpha rhythm at 30-70 microvolts.   Symmetry and continuity:  the background was continuous and symmetric    Sleep:    Normal sleep transients including sleep spindles, K complexes, vertex waves and POSTS were seen.    Activation procedures:   Hyperventilation was performed with no abnormalities seen  Photic stimulation was performed with no abnormalities seen    Abnormal activity:   No epileptiform discharges, periodic discharges, lateralized rhythmic delta activity or electrographic seizures were seen.    IMPRESSION:   This is a normal extended (1 hour and 13 minutes) EEG in awake and asleep states.    A normal EEG does not rule out seizures/epilepsy.    CLINICAL CORRELATION IS RECOMMENDED    Yoana Dunham MD, CRISTINE(), GUTIERREZ HARDING.  Neurology-Epilepsy.  [facility].

## 2018-11-28 ENCOUNTER — OFFICE VISIT (OUTPATIENT)
Dept: NEUROLOGY | Facility: CLINIC | Age: 38
End: 2018-11-28
Payer: COMMERCIAL

## 2018-11-28 VITALS
SYSTOLIC BLOOD PRESSURE: 143 MMHG | HEIGHT: 65 IN | HEART RATE: 69 BPM | DIASTOLIC BLOOD PRESSURE: 100 MMHG | WEIGHT: 164.44 LBS | BODY MASS INDEX: 27.4 KG/M2

## 2018-11-28 DIAGNOSIS — R56.9 SEIZURE: Primary | ICD-10-CM

## 2018-11-28 PROCEDURE — 3077F SYST BP >= 140 MM HG: CPT | Mod: CPTII,S$GLB,, | Performed by: PHYSICIAN ASSISTANT

## 2018-11-28 PROCEDURE — 3080F DIAST BP >= 90 MM HG: CPT | Mod: CPTII,S$GLB,, | Performed by: PHYSICIAN ASSISTANT

## 2018-11-28 PROCEDURE — 99214 OFFICE O/P EST MOD 30 MIN: CPT | Mod: S$GLB,,, | Performed by: PHYSICIAN ASSISTANT

## 2018-11-28 PROCEDURE — 99999 PR PBB SHADOW E&M-EST. PATIENT-LVL III: CPT | Mod: PBBFAC,,, | Performed by: PHYSICIAN ASSISTANT

## 2018-11-28 PROCEDURE — 3008F BODY MASS INDEX DOCD: CPT | Mod: CPTII,S$GLB,, | Performed by: PHYSICIAN ASSISTANT

## 2018-11-28 RX ORDER — LAMOTRIGINE 25 MG/1
200 TABLET, EXTENDED RELEASE ORAL DAILY
Qty: 240 TABLET | Refills: 1 | Status: SHIPPED | OUTPATIENT
Start: 2018-11-28 | End: 2019-06-27

## 2018-11-28 NOTE — PATIENT INSTRUCTIONS
Lamotrigine (Lamictal) schedule:    Take 25 mg (1 tablet) once daily for 1 week, then take   50 mg (2 tablets) once daily for 1 week, then take   100 mg (4 tablets) once daily for 1 week, then take   150 mg (6 tablets) once daily for 1 week, then take   200 mg (8 tablets) once daily and stay on this dose

## 2018-11-28 NOTE — PROGRESS NOTES
"Name: Pernell Ly  MRN: [id number]   [facility]: [id number]      Date: 11/28/2018     Interval History  Ms. Ly presents as follow up from prior clinic visit. Since last visit, she has had MRI brain and EEG completed. EEG normal, MRI brain showed evidence of left mesial temporal sclerosis. She reports no further episodes since clinic visit. States she occasionally has periods of "wandering off/staring" that last a few seconds. She does not drive. 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 She does not believe she was treated with an AED, and they spontaneously resolved. Family did not describe the events. Given MRI abnormality, discussed initiation of an AED with patient, and she is amenable.    HISTORY OF PRESENT ILLNESS (HPI)  The patient is a 38 y.o.   The patient was initially referred for consultation by ED  The patient was unaccompanied today.     Pernell Ly presents today in clinic after being seen in the Emergency Room   two days ago with an apparent first seizure.  This was apparently a generalized   tonic-clonic seizure that had occurred at work with a loud sound followed by   stiffening and generalized shaking witnessed by coworkers.  She had loss of   consciousness and was either seizing or postictal for about 5 minutes.  She   reports waking up when the paramedics had arrived and remembers going to the   ambulance in the Emergency Room.  She was stable by the time she arrived in the   Emergency Room and not placed on medications.  She was given only a pain pill   and a CT scan of the head was unremarkable.  She did not lose continence of her   bowel or bladder during the seizure.    Regarding her prior history, she has no adult history of seizures, but there is   some question that she may have had some type of seizure disorder as a young   child, although she remembers very little about it.  She does not know if this "   was a febrile seizure or series of febrile seizures or if she had ongoing   seizures.  According to her, it probably resolved by early grade school and she   is going to look into this with her family.  I have no further records of that   at this point.  There are no other known further seizure risk factors.      NBB/IN  dd: 11/20/2018 17:16:35 (CST)  td: 11/20/2018 17:28:04 (CST)  Doc ID   #[id number]  Job ID #[id number]    CC:       [id number]      Seizure Triggers  Sleep Deprivation - None  Other medications - None  Psych/stress - None  Photic stimulation - None  Hyperventilation - None  Medical Problems - None  Menses - No  Sensory Stimulation (light, sound, etc) - None  Missed dose of meds - None    AED Treatments  Present regimen  None    Prior treatments      Not tried  acetazolamide (Diamox, AZM)  amantadine  carbamazepine (Tegretol, CBZ)  clobezam (Onfi or Frizium, CLB)  ethosuximide (Zarontin, ESM)  eslicarbazine (Aptiom, ESL)  felbamate (felbatol, FBM)  gabapentin (Neurontin, GBP)  lacosamide (Vimpat, LCM)   lamotrigine (Lamictal, LTG)   levetiracetam (Keppra, LEV)  methsuximide (Celontin, MSM)  methyphenytoin (Mesantion, MHT)  oxcarbazepine (Trileptal OXC)  perampanel (Fycompa, FCP)   phenobarbital (Pb)  phenytoin (Dilantin, PHT)  pregabalin (Lyrica, PGB)  primidone (Mysoline, PRM)  retigabine (Potiga, RTG)  rufinamide (Banzel, RUF)  tiagabine (Gabatril,  TGB)  topiramate (Topamax, TPM)  viagabatrin, (Sabril, VGB)  vagal nerve stimulator (VNS)  valproic acid (Depakote, VPA)  zonisamide (Zonegran, ZNS)  Benzodiazepines  diazepam - rectal (Diastatl)  diazepam - oral (Valium, DZ)  clonazepam (Klonopin, CZP)  clorazepate (Tranxene, CLZ)  Ativan  Brain Stimulation  Vagal Nerve Stimulation-n/a  DBS- n/a    Compliance method  Memory - yes  Mom or Spouse - Yes  Pill Box - no  Rex calendar - no  Turn over medication bottle - no  Phone alarm - no    Seizure Evaluation  EEG Routine - 11/23/18: normal extended (1 hour and  13 minutes) EEG in awake and asleep states.  EEG Ambulatory -   EEG\Video Monitoring -   MRI/MRA - 18: Abnormal T2/FLAIR signal with volume loss in the left hippocampus when compared to the right which is suggestive of mesial temporal sclerosis in this patient with known history of seizures. 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 2017    S/P laparoscopic hysterectomy 2017    Hypokalemia 10/01/2017    Anxiety 2018     Resolved Ambulatory Problems     Diagnosis Date Noted    Palpitations 2014    Menorrhagia 2014    Submucous leiomyoma of uterus 2017    Acute febrile illness 2017    Noncompliance with medication regimen 2018     Past Medical History:   Diagnosis Date    Encounter for blood transfusion     Fibroids     Hypertension     Iron deficiency anemia     Menorrhagia     Migraine     Seizures         PAST SURGICAL HISTORY:   Past Surgical History:   Procedure Laterality Date     SECTION      x1    CYSTOSCOPY N/A 2017    [facility]    HYSTERECTOMY  2017    LAPAROSCOPY-DIAGNOSTIC (ADD ON) N/A 2017    [facility]    ROBOTIC ASSISTED LAPAROSCOPIC HYSTERECTOMY N/A 2017    [facility] OR    SALPINGECTOMY-ROBOTIC ASSISTED Bilateral 2017    [facility]    TUBAL LIGATION          FAMILY HISTORY:   Family History   Problem Relation Age of Onset    Hypertension Father     Hypertension Sister     Hypertension Maternal Grandmother     Breast cancer Maternal Grandmother     Cancer Maternal Grandfather     Hypertension Paternal Grandmother     Colon cancer Maternal Cousin     Ovarian cancer Neg Hx          SOCIAL HISTORY:   Social History     Socioeconomic History    Marital status: Single     Spouse name: Not on file    Number of children: Not on file    Years of education: Not on file    Highest education level: Not on file   Social Needs    Financial resource strain: Not on file    Food insecurity - worry: Not on file    Food insecurity - inability: Not on file    Transportation needs - medical: Not on file    Transportation needs - non-medical: Not on file   Occupational History    Not on file   Tobacco Use    Smoking status: Never Smoker    Smokeless tobacco: Never Used   Substance and Sexual Activity    Alcohol use: No     Comment: Occasional; 3x /month    Drug use: No    Sexual activity: Yes     Partners: Male     Birth control/protection: See Surgical Hx   Other Topics Concern    Not on file   Social History Narrative    Not on file        SUBSTANCE USE:  Social History     Tobacco Use    Smoking status: Never Smoker    Smokeless tobacco: Never Used   Substance and Sexual Activity    Alcohol use: No     Comment: Occasional; 3x /month    Drug use: No    Sexual activity: Yes     Partners: Male     Birth control/protection: See Surgical Hx      Social History     Tobacco Use    Smoking status: Never Smoker    Smokeless tobacco: Never Used   Substance Use Topics    Alcohol use: No     Comment: Occasional; 3x /month        ALLERGIES: Patient has no known allergies.       Review of Systems   Constitutional: Negative for chills, diaphoresis, fever, malaise/fatigue and weight loss.   HENT: Negative  "for ear pain, hearing loss, nosebleeds and tinnitus.    Eyes: Negative for blurred vision, double vision, photophobia and pain.   Respiratory: Negative for cough, hemoptysis and shortness of breath.    Cardiovascular: Negative for chest pain, palpitations, orthopnea and leg swelling.   Gastrointestinal: Negative for abdominal pain, blood in stool, constipation, diarrhea, heartburn, nausea and vomiting.   Genitourinary: Negative for dysuria and hematuria.   Musculoskeletal: Negative for falls, joint pain and myalgias.   Skin: Negative for itching and rash.   Neurological: Negative for dizziness, tingling, tremors, sensory change, speech change, focal weakness, loss of consciousness, weakness and headaches.   Endo/Heme/Allergies: Negative for environmental allergies. Does not bruise/bleed easily.   Psychiatric/Behavioral: Negative for depression, hallucinations, memory loss and substance abuse. The patient is not nervous/anxious and does not have insomnia.          PHYSICAL EXAM:    BP (!) 143/100   Pulse 69   Ht 5' 5" (1.651 m)   Wt 74.6 kg (164 lb 7.4 oz)   LMP 11/01/2017   BMI 27.37 kg/m²       Neurologic Exam      Higher Cortical Function:    Patient is a well developed, pleasant, well groomed individual appearing their stated age  Oriented - intact to person, place and time    Fund of knowledge was appropriate.    Language - Speech was fluent without evidence for an aphasia.  R-L Orientation - Intact    Recent and remote memory intact  Attention span and concentration intact  Shoulder shrug normal   Cranial Nerves II - XII:    EOMs were intact with normal smooth and no nystagmus.    PERRLA.   Visual fields were full to confrontation.    Motor - facial movement was symmetrical and normal.    Hearing was normal.  Palate moved well and was symmetrical    Tongue movement was full & the patient could speak without difficulty.  Motor: Power, bulk and tone were normal in all extremities.  Coordination:  " Normal  Gait:  Normal    Deep tendon reflexes:    Reflex L R   Bicpets 2+ 2+   Tricepts 2+ 2+   Brachio-radialis 2+ 2+   Knee 2+ 2+   Ankle 2+ 2+   Babinski No No     Tremor: resting, postural, intentional - none  Cardiovascular:  No edema  Skin: No rash         IMPRESSION  1. New onset spell. Apparent first seizure. GTC at work.      DISPOSITION:   1. MRI brain revealed left mesial temporal sclerosis - given increased risk for second seizure with noted MRI abnormality, plan to start AED  2. Start extended release Lamictal - taper schedule given to patient, plan to taper up to 200 mg daily goal dose  3. Check Lamictal level after at goal dose for a couple weeks  4. Seizure precautions.  5. State driving laws explained to patient. Pt to not drive for 6 months min. Patient does not drive.  6. Seizure triggers, precautions discussed with patient in detail  7. Follow up with Dr. Nolasco in 3 months, or sooner as needed  8. Patient seen with Dr. Nolasco

## 2018-11-29 ENCOUNTER — TELEPHONE (OUTPATIENT)
Dept: NEUROLOGY | Facility: CLINIC | Age: 38
End: 2018-11-29

## 2018-11-29 NOTE — TELEPHONE ENCOUNTER
----- Message from Beulah Boyle sent at 11/29/2018 10:53 AM CST -----  Contact: Matthew    [facility]    [phone]  Calling to get clarification on pts prescription of lamotrigine XR (LAMICTAL XR) 25 mg TR24.  Pls call asap.   This is a pt of Teresa Zavala.

## 2018-11-30 ENCOUNTER — LAB VISIT (OUTPATIENT)
Dept: LAB | Facility: OTHER | Age: 38
End: 2018-11-30
Attending: FAMILY MEDICINE
Payer: COMMERCIAL

## 2018-11-30 DIAGNOSIS — E87.6 HYPOKALEMIA: ICD-10-CM

## 2018-11-30 LAB — POTASSIUM SERPL-SCNC: 3.2 MMOL/L

## 2018-11-30 PROCEDURE — 84132 ASSAY OF SERUM POTASSIUM: CPT

## 2018-11-30 PROCEDURE — 36415 COLL VENOUS BLD VENIPUNCTURE: CPT

## 2018-12-03 ENCOUNTER — PATIENT MESSAGE (OUTPATIENT)
Dept: PRIMARY CARE CLINIC | Facility: CLINIC | Age: 38
End: 2018-12-03

## 2018-12-03 RX ORDER — POTASSIUM CHLORIDE 750 MG/1
10 CAPSULE, EXTENDED RELEASE ORAL ONCE
Qty: 90 CAPSULE | Refills: 1 | Status: SHIPPED | OUTPATIENT
Start: 2018-12-03 | End: 2018-12-03

## 2018-12-03 NOTE — TELEPHONE ENCOUNTER
Spoke with pt. Advised per Dr. Nelson. Patient states she still has potassium, and has still been taking it daily. I advised that she continue this, and will let her know if Dr. Nelson has any other advice. Pt. verbalized understanding.

## 2018-12-03 NOTE — TELEPHONE ENCOUNTER
Please let pt know that I have started her on potassium since it has gone back down again. One pill a day. We can repeat potassium again in 3 mos.  Thanks,  PV

## 2018-12-29 DIAGNOSIS — E87.6 HYPOKALEMIA: ICD-10-CM

## 2018-12-31 RX ORDER — NIFEDIPINE 30 MG/1
TABLET, EXTENDED RELEASE ORAL
Qty: 60 TABLET | Refills: 0 | OUTPATIENT
Start: 2018-12-31

## 2018-12-31 RX ORDER — POTASSIUM CHLORIDE 750 MG/1
TABLET, EXTENDED RELEASE ORAL
Qty: 90 TABLET | Refills: 0 | Status: SHIPPED | OUTPATIENT
Start: 2018-12-31 | End: 2019-05-06 | Stop reason: SDUPTHER

## 2018-12-31 RX ORDER — POTASSIUM CHLORIDE 750 MG/1
TABLET, EXTENDED RELEASE ORAL
Qty: 90 TABLET | Refills: 0 | OUTPATIENT
Start: 2018-12-31

## 2019-01-10 ENCOUNTER — TELEPHONE (OUTPATIENT)
Dept: PRIMARY CARE CLINIC | Facility: CLINIC | Age: 39
End: 2019-01-10

## 2019-01-10 DIAGNOSIS — R10.32 LEFT LOWER QUADRANT PAIN: ICD-10-CM

## 2019-01-10 DIAGNOSIS — K92.1 BLOOD IN STOOL: Primary | ICD-10-CM

## 2019-01-10 NOTE — TELEPHONE ENCOUNTER
Spoke with the in regards to appointment scheduled 1/16. Pt was asked how often she's having light red blood in stool every time she have a bowel movement x 1 week along with side abd pain and constipation. Have been using laxatives with minimal relief.

## 2019-01-16 ENCOUNTER — TELEPHONE (OUTPATIENT)
Dept: PRIMARY CARE CLINIC | Facility: CLINIC | Age: 39
End: 2019-01-16

## 2019-01-16 NOTE — TELEPHONE ENCOUNTER
Spoke with Pernell in regards to her still c/o blood in her stool. Asked what happen with trying to be seen at [facility] ? Pernell states that she missed a call from [facility] and then finally spoke with [facility] on yesterday scheduled for next Thursday. Pernell was advise that if she still c/o blood in stool and abd pain she needs to go the ED for evaluation. Conversation was understood and it ended.

## 2019-02-25 ENCOUNTER — PATIENT MESSAGE (OUTPATIENT)
Dept: PRIMARY CARE CLINIC | Facility: CLINIC | Age: 39
End: 2019-02-25

## 2019-02-27 ENCOUNTER — OFFICE VISIT (OUTPATIENT)
Dept: URGENT CARE | Facility: CLINIC | Age: 39
End: 2019-02-27
Payer: COMMERCIAL

## 2019-02-27 VITALS
RESPIRATION RATE: 17 BRPM | BODY MASS INDEX: 27.32 KG/M2 | DIASTOLIC BLOOD PRESSURE: 106 MMHG | OXYGEN SATURATION: 98 % | HEART RATE: 97 BPM | SYSTOLIC BLOOD PRESSURE: 157 MMHG | WEIGHT: 164 LBS | TEMPERATURE: 98 F | HEIGHT: 65 IN

## 2019-02-27 DIAGNOSIS — K59.00 CONSTIPATION, UNSPECIFIED CONSTIPATION TYPE: ICD-10-CM

## 2019-02-27 DIAGNOSIS — B34.9 VIRAL SYNDROME: ICD-10-CM

## 2019-02-27 DIAGNOSIS — R39.15 URGENCY OF URINATION: Primary | ICD-10-CM

## 2019-02-27 LAB
BILIRUB UR QL STRIP: NEGATIVE
GLUCOSE UR QL STRIP: NEGATIVE
KETONES UR QL STRIP: NEGATIVE
LEUKOCYTE ESTERASE UR QL STRIP: NEGATIVE
PH, POC UA: 6.5 (ref 5–8)
POC BLOOD, URINE: NEGATIVE
POC NITRATES, URINE: NEGATIVE
PROT UR QL STRIP: NEGATIVE
SP GR UR STRIP: 1.01 (ref 1–1.03)
UROBILINOGEN UR STRIP-ACNC: NORMAL (ref 0.1–1.1)

## 2019-02-27 PROCEDURE — 96372 THER/PROPH/DIAG INJ SC/IM: CPT | Mod: S$GLB,,, | Performed by: FAMILY MEDICINE

## 2019-02-27 PROCEDURE — 3008F PR BODY MASS INDEX (BMI) DOCUMENTED: ICD-10-PCS | Mod: CPTII,S$GLB,, | Performed by: NURSE PRACTITIONER

## 2019-02-27 PROCEDURE — 96372 PR INJECTION,THERAP/PROPH/DIAG2ST, IM OR SUBCUT: ICD-10-PCS | Mod: S$GLB,,, | Performed by: FAMILY MEDICINE

## 2019-02-27 PROCEDURE — 3080F PR MOST RECENT DIASTOLIC BLOOD PRESSURE >= 90 MM HG: ICD-10-PCS | Mod: CPTII,S$GLB,, | Performed by: NURSE PRACTITIONER

## 2019-02-27 PROCEDURE — 3080F DIAST BP >= 90 MM HG: CPT | Mod: CPTII,S$GLB,, | Performed by: NURSE PRACTITIONER

## 2019-02-27 PROCEDURE — 81003 POCT URINALYSIS, DIPSTICK, AUTOMATED, W/O SCOPE: ICD-10-PCS | Mod: QW,S$GLB,, | Performed by: NURSE PRACTITIONER

## 2019-02-27 PROCEDURE — 3008F BODY MASS INDEX DOCD: CPT | Mod: CPTII,S$GLB,, | Performed by: NURSE PRACTITIONER

## 2019-02-27 PROCEDURE — 3077F PR MOST RECENT SYSTOLIC BLOOD PRESSURE >= 140 MM HG: ICD-10-PCS | Mod: CPTII,S$GLB,, | Performed by: NURSE PRACTITIONER

## 2019-02-27 PROCEDURE — 99214 OFFICE O/P EST MOD 30 MIN: CPT | Mod: 25,S$GLB,, | Performed by: NURSE PRACTITIONER

## 2019-02-27 PROCEDURE — 81003 URINALYSIS AUTO W/O SCOPE: CPT | Mod: QW,S$GLB,, | Performed by: NURSE PRACTITIONER

## 2019-02-27 PROCEDURE — 99214 PR OFFICE/OUTPT VISIT, EST, LEVL IV, 30-39 MIN: ICD-10-PCS | Mod: 25,S$GLB,, | Performed by: NURSE PRACTITIONER

## 2019-02-27 PROCEDURE — 3077F SYST BP >= 140 MM HG: CPT | Mod: CPTII,S$GLB,, | Performed by: NURSE PRACTITIONER

## 2019-02-27 PROCEDURE — 87086 URINE CULTURE/COLONY COUNT: CPT

## 2019-02-27 RX ORDER — BETAMETHASONE SODIUM PHOSPHATE AND BETAMETHASONE ACETATE 3; 3 MG/ML; MG/ML
6 INJECTION, SUSPENSION INTRA-ARTICULAR; INTRALESIONAL; INTRAMUSCULAR; SOFT TISSUE
Status: COMPLETED | OUTPATIENT
Start: 2019-02-27 | End: 2019-02-27

## 2019-02-27 RX ORDER — POLYETHYLENE GLYCOL 3350 17 G/17G
17 POWDER, FOR SOLUTION ORAL DAILY PRN
Qty: 119 G | Refills: 0 | Status: SHIPPED | OUTPATIENT
Start: 2019-02-27 | End: 2021-05-25

## 2019-02-27 RX ORDER — LISINOPRIL 10 MG/1
10 TABLET ORAL
COMMUNITY
End: 2019-03-27

## 2019-02-27 RX ORDER — BENZONATATE 100 MG/1
100 CAPSULE ORAL EVERY 6 HOURS PRN
Qty: 30 CAPSULE | Refills: 1 | Status: SHIPPED | OUTPATIENT
Start: 2019-02-27 | End: 2019-03-27 | Stop reason: SDUPTHER

## 2019-02-27 RX ORDER — LEVOCETIRIZINE DIHYDROCHLORIDE 5 MG/1
5 TABLET, FILM COATED ORAL NIGHTLY
Qty: 30 TABLET | Refills: 0 | Status: SHIPPED | OUTPATIENT
Start: 2019-02-27 | End: 2022-06-01 | Stop reason: ALTCHOICE

## 2019-02-27 RX ORDER — TRIAMCINOLONE ACETONIDE 55 UG/1
2 SPRAY, METERED NASAL DAILY
Qty: 10.8 ML | Refills: 0 | Status: SHIPPED | OUTPATIENT
Start: 2019-02-27 | End: 2019-07-23 | Stop reason: ALTCHOICE

## 2019-02-27 RX ADMIN — BETAMETHASONE SODIUM PHOSPHATE AND BETAMETHASONE ACETATE 6 MG: 3; 3 INJECTION, SUSPENSION INTRA-ARTICULAR; INTRALESIONAL; INTRAMUSCULAR; SOFT TISSUE at 10:02

## 2019-02-27 NOTE — PATIENT INSTRUCTIONS
Constipation (Adult)  Constipation means that you have bowel movements that are less frequent than usual. Stools often become very hard and difficult to pass.  Constipation is very common. At some point in life it affects almost everyone. Since everyone's bowel habits are different, what is constipation to one person may not be to another. Your healthcare provider may do tests to diagnose constipation. It depends on what he or she finds when evaluating you.    Symptoms of constipation include:  · Abdominal pain  · Bloating  · Vomiting  · Painful bowel movements  · Itching, swelling, bleeding, or pain around the anus  Causes  Constipation can have many causes. These include:  · Diet low in fiber  · Too much dairy  · Not drinking enough liquids  · Lack of exercise or physical activity. This is especially true for older adults.  · Changes in lifestyle or daily routine, including pregnancy, aging, work, and travel  · Frequent use or misuse of laxatives  · Ignoring the urge to have a bowel movement or delaying it until later  · Medicines, such as certain prescription pain medicines, iron supplements, antacids, certain antidepressants, and calcium supplements  · Diseases like irritable bowel syndrome, bowel obstructions, stroke, diabetes, thyroid disease, Parkinson disease, hemorrhoids, and colon cancer  Complications  Potential complications of constipation can include:  · Hemorrhoids  · Rectal bleeding from hemorrhoids or anal fissures (skin tears)  · Hernias  · Dependency on laxatives  · Chronic constipation  · Fecal impaction  · Bowel obstruction or perforation  Home care  All treatment should be done after talking with your healthcare provider. This is especially true if you have another medical problems, are taking prescription medicines, or are an older adult. Treatment most often involves lifestyle changes. You may also need medicines. Your healthcare provider will tell you which will work best for you. Follow  the advice below to help avoid this problem in the future.  Lifestyle changes  These lifestyle changes can help prevent constipation:  · Diet. Eat a high-fiber diet, with fresh fruit and vegetables, and reduce dairy intake, meats, and processed foods  · Fluids. It's important to get enough fluids each day. Drink plenty of water when you eat more fiber. If you are on diet that limits the amount of fluid you can have, talk about this with your healthcare provider.  · Regular exercise. Check with your healthcare provider first.  Medications  Take any medicines as directed. Some laxatives are safe to use only every now and then. Others can be taken on a regular basis. Talk with your doctor or pharmacist if you have questions.  Prescription pain medicines can cause constipation. If you are taking this kind of medicine, ask your healthcare provider if you should also take a stool softener.  Medicines you may take to treat constipation include:  · Fiber supplements  · Stool softeners  · Laxatives  · Enemas  · Rectal suppositories  Follow-up care  Follow up with your healthcare provider if symptoms don't get better in the next few days. You may need to have more tests or see a specialist.  Call 911  Call 911 if any of these occur:  · Trouble breathing  · Stiff, rigid abdomen that is severely painful to touch  · Confusion  · Fainting or loss of consciousness  · Rapid heart rate  · Chest pain  When to seek medical advice  Call your healthcare provider right away if any of these occur:  · Fever over 100.4°F (38°C)  · Failure to resume normal bowel movements  · Pain in your abdomen or back gets worse  · Nausea or vomiting  · Swelling in your abdomen  · Blood in the stool  · Black, tarry stool  · Involuntary weight loss  · Weakness  Date Last Reviewed: 12/30/2015  © 7551-2489 [employer]. [address] [place] [address], [place] [address]. All rights reserved. This information is not intended as a substitute for  professional medical care. Always follow your healthcare professional's instructions.        Eating a High-Fiber Diet  Fiber is what gives strength and structure to plants. Most grains, beans, vegetables, and fruits contain fiber. Foods rich in fiber are often low in calories and fat, and they fill you up more. They may also reduce your risks for certain health problems. To find out the amount of fiber in canned, packaged, or frozen foods, read the Nutrition Facts label. It tells you how much fiber is in a serving.    Types of fiber and their benefits  There are two types of fiber: insoluble and soluble. They both aid digestion and help you maintain a healthy weight.  · Insoluble fiber. This is found in whole grains, cereals, certain fruits and vegetables such as apple skin, corn, and carrots. Insoluble fiber may prevent constipation and reduce the risk for certain types of cancer.  · Soluble fiber. This type of fiber is in oats, beans, and certain fruits and vegetables such as strawberries and peas. Soluble fiber can reduce cholesterol, which may help lower the risk for heart disease. It also helps control blood sugar levels.  Look for high-fiber foods  Try these foods to add fiber to your diet:  · Whole-grain breads and cereals. Try to eat 6 to 8 ounces a day. Include wheat and oat bran cereals, whole-wheat muffins or toast, and corn tortillas in your meals.  · Fruits. Try to eat 2 cups a day. Apples, oranges, strawberries, pears, and bananas are good sources. (Note: Fruit juice is low in fiber.)  · Vegetables. Try to eat at least 2.5 cups a day. Add asparagus, carrots, broccoli, peas, and corn to your meals.  · Beans. One cup of cooked lentils gives you over 15 grams of fiber. Try navy beans, lentils, and chickpeas.  · Seeds. A small handful of seeds gives you about 3 grams of fiber. Try sunflower seeds.  Keep track of your fiber  Keep track of how much fiber you eat. Start by reading food labels. Then eat a  "variety of foods high in fiber. As you begin to eat more fiber, ask your healthcare provider how much water you should be drinking to keep your digestive system working smoothly.  You should aim for a certain amount of fiber in your diet each day. If you are a woman, that amount is between 25 and 28 grams per day. Men should aim for 30 to 33 grams per day. After age 50, your daily fiber needs drop to 22 grams for women and 28 grams for men.  Before you reach for the fiber supplements, think about this. Fiber is found naturally in healthy whole foods. It gives you that feeling of fullness after you eat. Taking fiber supplements or eating fiber-enriched foods will not give you this full feeling.  Your fiber intake is a good measure for the quality of your overall diet. If you are missing out on your daily amount of fiber, you may be lacking other important nutrients as well.  Date Last Reviewed: 5/11/2015  © 2955-7036 [employer]. [address] [place] [address]. All rights reserved. This information is not intended as a substitute for professional medical care. Always follow your healthcare professional's instructions.        Viral Syndrome (Adult)  A viral illness may cause a number of symptoms. The symptoms depend on the part of the body that the virus affects. If it settles in your nose, throat, and lungs, it may cause cough, sore throat, congestion, and sometimes headache. If it settles in your stomach and intestinal tract, it may cause vomiting and diarrhea. Sometimes it causes vague symptoms like "aching all over," feeling tired, loss of appetite, or fever.  A viral illness usually lasts 1 to 2 weeks, but sometimes it lasts longer. In some cases, a more serious infection can look like a viral syndrome in the first few days of the illness. You may need another exam and additional tests to know the difference. Watch for the warning signs listed below.  Home care  Follow these guidelines " for taking care of yourself at home:  · If symptoms are severe, rest at home for the first 2 to 3 days.  · Stay away from cigarette smoke - both your smoke and the smoke from others.  · You may use over-the-counter acetaminophen or ibuprofen for fever, muscle aching, and headache, unless another medicine was prescribed for this. If you have chronic liver or kidney disease or ever had a stomach ulcer or GI bleeding, talk with your doctor before using these medicines. No one who is younger than 18 and ill with a fever should take aspirin. It may cause severe disease or death.  · Your appetite may be poor, so a light diet is fine. Avoid dehydration by drinking 8 to 12 8-ounce glasses of fluids each day. This may include water; orange juice; lemonade; apple, grape, and cranberry juice; clear fruit drinks; electrolyte replacement and sports drinks; and decaffeinated teas and coffee. If you have been diagnosed with a kidney disease, ask your doctor how much and what types of fluids you should drink to prevent dehydration. If you have kidney disease, drinking too much fluid can cause it build up in the your body and be dangerous to your health.  · Over-the-counter remedies won't shorten the length of the illness but may be helpful for cough, sore throat; and nasal and sinus congestion. Don't use decongestants if you have high blood pressure.  Follow-up care  Follow up with your healthcare provider if you do not improve over the next week.  Call 911  Get emergency medical care if any of the following occur:  · Convulsion  · Feeling weak, dizzy, or like you are going to faint  · Chest pain, shortness of breath, wheezing, or difficulty breathing  When to seek medical advice  Call your healthcare provider right away if any of these occur:  · Cough with lots of colored sputum (mucus) or blood in your sputum  · Chest pain, shortness of breath, wheezing, or difficulty breathing  · Severe headache; face, neck, or ear  pain  · Severe, constant pain in the lower right side of your belly (abdominal)  · Continued vomiting (cant keep liquids down)  · Frequent diarrhea (more than 5 times a day); blood (red or black color) or mucus in diarrhea  · Feeling weak, dizzy, or like you are going to faint  · Extreme thirst  · Fever of 100.4°F (38°C) or higher, or as directed by your healthcare provider  Date Last Reviewed: 9/25/2015 © 2000-2017 [employer]. [address] [place] [address]. All rights reserved. This information is not intended as a substitute for professional medical care. Always follow your healthcare professional's instructions.      Please return here or go to the Emergency Department for any concerns or worsening of condition.  If you were prescribed a narcotic medication, do not drive or operate heavy equipment or machinery while taking these medications.  Please follow up with your primary care doctor or specialist as needed.    If you  smoke, please stop smoking.

## 2019-02-27 NOTE — PROGRESS NOTES
"Subjective:       Patient ID: Pernell Ly is a 38 y.o. female.    Vitals:  height is 5' 5" (1.651 m) and weight is 74.4 kg (164 lb). Her temperature is 98.3 °F (36.8 °C). Her blood pressure is 157/106 (abnormal) and her pulse is 97. Her respiration is 17 and oxygen saturation is 98%.     Chief Complaint: Cough and Pelvic Pain (when urinating)    Pt presents with a productive cough since 2 weeks ago. She states that she has been taking dayquil, theraflu, vicks for her symptoms, but no change.  She also presents with pelvic pain when urinating, denies hematuria or dysuria. She states she does have an urgency/frequency to urinate. This started on 2 days ago. She has had a UTI before. She also reports constipation, pt was supposed to have a colonoscopy on last week but could not go due to personal reasons.       Cough   This is a new problem. The current episode started in the past 7 days. The problem has been unchanged. The cough is productive of sputum. Associated symptoms include nasal congestion, postnasal drip and rhinorrhea. Pertinent negatives include no chills, ear pain, eye redness, fever, headaches, hemoptysis, myalgias, rash, sore throat, shortness of breath or wheezing. Nothing aggravates the symptoms. There is no history of bronchitis or pneumonia.   Pelvic Pain   The patient's primary symptoms include pelvic pain. The current episode started yesterday. The problem has been unchanged. Associated symptoms include frequency and urgency. Pertinent negatives include no chills, diarrhea, dysuria, fever, headaches, nausea, rash, sore throat or vomiting.       Constitution: Negative for chills, sweating, fatigue and fever.   HENT: Positive for congestion and postnasal drip. Negative for ear pain, sinus pain, sinus pressure, sore throat and voice change.    Neck: Negative for painful lymph nodes.   Eyes: Negative for eye redness.   Respiratory: Positive for chest tightness and cough. Negative for sputum " production, bloody sputum, COPD, shortness of breath, stridor, wheezing and asthma.    Gastrointestinal: Negative for nausea, vomiting and diarrhea.   Genitourinary: Positive for frequency, urgency and pelvic pain. Negative for dysuria.   Musculoskeletal: Negative for muscle ache.   Skin: Negative for rash.   Allergic/Immunologic: Negative for seasonal allergies and asthma.   Neurological: Negative for headaches.   Hematologic/Lymphatic: Negative for swollen lymph nodes.       Results for orders placed or performed in visit on 02/27/19   POCT Urinalysis, Dipstick, Automated, W/O Scope   Result Value Ref Range    POC Blood, Urine Negative Negative    POC Bilirubin, Urine Negative Negative    POC Urobilinogen, Urine norm 0.1 - 1.1    POC Ketones, Urine Negative Negative    POC Protein, Urine Negative Negative    POC Nitrates, Urine Negative Negative    POC Glucose, Urine Negative Negative    pH, UA 6.5 5 - 8    POC Specific Gravity, Urine 1.015 1.003 - 1.029    POC Leukocytes, Urine Negative Negative       Objective:      Physical Exam   Constitutional: She is oriented to person, place, and time. She appears well-developed and well-nourished. She is cooperative.  Non-toxic appearance. She does not appear ill. No distress.   HENT:   Head: Normocephalic and atraumatic.   Right Ear: Hearing, external ear and ear canal normal. A middle ear effusion is present.   Left Ear: Hearing, external ear and ear canal normal. A middle ear effusion is present.   Nose: Rhinorrhea present. No mucosal edema or nasal deformity. No epistaxis. Right sinus exhibits no maxillary sinus tenderness and no frontal sinus tenderness. Left sinus exhibits no maxillary sinus tenderness and no frontal sinus tenderness.   Mouth/Throat: Uvula is midline, oropharynx is clear and moist and mucous membranes are normal. No trismus in the jaw. Normal dentition. No uvula swelling. No posterior oropharyngeal erythema.   PND, cough   Eyes: Conjunctivae and lids  are normal. No scleral icterus.   Sclera clear bilat   Neck: Trachea normal, full passive range of motion without pain and phonation normal. Neck supple.   Cardiovascular: Normal rate, regular rhythm, normal heart sounds, intact distal pulses and normal pulses.   Pulmonary/Chest: Effort normal and breath sounds normal. No stridor. No respiratory distress. She has no decreased breath sounds. She has no wheezes. She has no rhonchi. She has no rales.   Abdominal: Soft. Normal appearance and bowel sounds are normal. She exhibits no distension. There is no tenderness.   Musculoskeletal: Normal range of motion. She exhibits no edema or deformity.   Neurological: She is alert and oriented to person, place, and time. She exhibits normal muscle tone. Coordination normal.   Skin: Skin is warm, dry and intact. She is not diaphoretic. No pallor.   Psychiatric: She has a normal mood and affect. Her speech is normal and behavior is normal. Judgment and thought content normal. Cognition and memory are normal.   Nursing note and vitals reviewed.      Assessment:       1. Urgency of urination    2. Constipation, unspecified constipation type    3. Viral syndrome        Plan:         Urgency of urination  -     POCT Urinalysis, Dipstick, Automated, W/O Scope  -     Ambulatory referral to Urology  -     Urine culture    Constipation, unspecified constipation type  -     Cancel: XR ABDOMEN FLAT AND ERECT; Future; Expected date: 02/27/2019    Viral syndrome    Other orders  -     betamethasone acetate-betamethasone sodium phosphate injection 6 mg  -     levocetirizine (XYZAL) 5 MG tablet; Take 1 tablet (5 mg total) by mouth every evening.  Dispense: 30 tablet; Refill: 0  -     triamcinolone (NASACORT) 55 mcg nasal inhaler; 2 sprays by Nasal route once daily.  Dispense: 10.8 mL; Refill: 0  -     polyethylene glycol (GLYCOLAX) 17 gram/dose powder; Take 17 g by mouth daily as needed (constipation).  Dispense: 119 g; Refill: 0  -      benzonatate (TESSALON PERLES) 100 MG capsule; Take 1 capsule (100 mg total) by mouth every 6 (six) hours as needed for Cough.  Dispense: 30 capsule; Refill: 1        Constipation (Adult)  Constipation means that you have bowel movements that are less frequent than usual. Stools often become very hard and difficult to pass.  Constipation is very common. At some point in life it affects almost everyone. Since everyone's bowel habits are different, what is constipation to one person may not be to another. Your healthcare provider may do tests to diagnose constipation. It depends on what he or she finds when evaluating you.    Symptoms of constipation include:  · Abdominal pain  · Bloating  · Vomiting  · Painful bowel movements  · Itching, swelling, bleeding, or pain around the anus  Causes  Constipation can have many causes. These include:  · Diet low in fiber  · Too much dairy  · Not drinking enough liquids  · Lack of exercise or physical activity. This is especially true for older adults.  · Changes in lifestyle or daily routine, including pregnancy, aging, work, and travel  · Frequent use or misuse of laxatives  · Ignoring the urge to have a bowel movement or delaying it until later  · Medicines, such as certain prescription pain medicines, iron supplements, antacids, certain antidepressants, and calcium supplements  · Diseases like irritable bowel syndrome, bowel obstructions, stroke, diabetes, thyroid disease, Parkinson disease, hemorrhoids, and colon cancer  Complications  Potential complications of constipation can include:  · Hemorrhoids  · Rectal bleeding from hemorrhoids or anal fissures (skin tears)  · Hernias  · Dependency on laxatives  · Chronic constipation  · Fecal impaction  · Bowel obstruction or perforation  Home care  All treatment should be done after talking with your healthcare provider. This is especially true if you have another medical problems, are taking prescription medicines, or are an  older adult. Treatment most often involves lifestyle changes. You may also need medicines. Your healthcare provider will tell you which will work best for you. Follow the advice below to help avoid this problem in the future.  Lifestyle changes  These lifestyle changes can help prevent constipation:  · Diet. Eat a high-fiber diet, with fresh fruit and vegetables, and reduce dairy intake, meats, and processed foods  · Fluids. It's important to get enough fluids each day. Drink plenty of water when you eat more fiber. If you are on diet that limits the amount of fluid you can have, talk about this with your healthcare provider.  · Regular exercise. Check with your healthcare provider first.  Medications  Take any medicines as directed. Some laxatives are safe to use only every now and then. Others can be taken on a regular basis. Talk with your doctor or pharmacist if you have questions.  Prescription pain medicines can cause constipation. If you are taking this kind of medicine, ask your healthcare provider if you should also take a stool softener.  Medicines you may take to treat constipation include:  · Fiber supplements  · Stool softeners  · Laxatives  · Enemas  · Rectal suppositories  Follow-up care  Follow up with your healthcare provider if symptoms don't get better in the next few days. You may need to have more tests or see a specialist.  Call 911  Call 911 if any of these occur:  · Trouble breathing  · Stiff, rigid abdomen that is severely painful to touch  · Confusion  · Fainting or loss of consciousness  · Rapid heart rate  · Chest pain  When to seek medical advice  Call your healthcare provider right away if any of these occur:  · Fever over 100.4°F (38°C)  · Failure to resume normal bowel movements  · Pain in your abdomen or back gets worse  · Nausea or vomiting  · Swelling in your abdomen  · Blood in the stool  · Black, tarry stool  · Involuntary weight loss  · Weakness  Date Last Reviewed:  12/30/2015 © 2000-2017 [employer] [address] [place] [address], [place] [address]. All rights reserved. This information is not intended as a substitute for professional medical care. Always follow your healthcare professional's instructions.        Eating a High-Fiber Diet  Fiber is what gives strength and structure to plants. Most grains, beans, vegetables, and fruits contain fiber. Foods rich in fiber are often low in calories and fat, and they fill you up more. They may also reduce your risks for certain health problems. To find out the amount of fiber in canned, packaged, or frozen foods, read the Nutrition Facts label. It tells you how much fiber is in a serving.    Types of fiber and their benefits  There are two types of fiber: insoluble and soluble. They both aid digestion and help you maintain a healthy weight.  · Insoluble fiber. This is found in whole grains, cereals, certain fruits and vegetables such as apple skin, corn, and carrots. Insoluble fiber may prevent constipation and reduce the risk for certain types of cancer.  · Soluble fiber. This type of fiber is in oats, beans, and certain fruits and vegetables such as strawberries and peas. Soluble fiber can reduce cholesterol, which may help lower the risk for heart disease. It also helps control blood sugar levels.  Look for high-fiber foods  Try these foods to add fiber to your diet:  · Whole-grain breads and cereals. Try to eat 6 to 8 ounces a day. Include wheat and oat bran cereals, whole-wheat muffins or toast, and corn tortillas in your meals.  · Fruits. Try to eat 2 cups a day. Apples, oranges, strawberries, pears, and bananas are good sources. (Note: Fruit juice is low in fiber.)  · Vegetables. Try to eat at least 2.5 cups a day. Add asparagus, carrots, broccoli, peas, and corn to your meals.  · Beans. One cup of cooked lentils gives you over 15 grams of fiber. Try navy beans, lentils, and chickpeas.  · Seeds. A small handful of  "seeds gives you about 3 grams of fiber. Try sunflower seeds.  Keep track of your fiber  Keep track of how much fiber you eat. Start by reading food labels. Then eat a variety of foods high in fiber. As you begin to eat more fiber, ask your healthcare provider how much water you should be drinking to keep your digestive system working smoothly.  You should aim for a certain amount of fiber in your diet each day. If you are a woman, that amount is between 25 and 28 grams per day. Men should aim for 30 to 33 grams per day. After age 50, your daily fiber needs drop to 22 grams for women and 28 grams for men.  Before you reach for the fiber supplements, think about this. Fiber is found naturally in healthy whole foods. It gives you that feeling of fullness after you eat. Taking fiber supplements or eating fiber-enriched foods will not give you this full feeling.  Your fiber intake is a good measure for the quality of your overall diet. If you are missing out on your daily amount of fiber, you may be lacking other important nutrients as well.  Date Last Reviewed: 5/11/2015  © 3142-6665 [employer] [address] [place] [address]. All rights reserved. This information is not intended as a substitute for professional medical care. Always follow your healthcare professional's instructions.        Viral Syndrome (Adult)  A viral illness may cause a number of symptoms. The symptoms depend on the part of the body that the virus affects. If it settles in your nose, throat, and lungs, it may cause cough, sore throat, congestion, and sometimes headache. If it settles in your stomach and intestinal tract, it may cause vomiting and diarrhea. Sometimes it causes vague symptoms like "aching all over," feeling tired, loss of appetite, or fever.  A viral illness usually lasts 1 to 2 weeks, but sometimes it lasts longer. In some cases, a more serious infection can look like a viral syndrome in the first few days " of the illness. You may need another exam and additional tests to know the difference. Watch for the warning signs listed below.  Home care  Follow these guidelines for taking care of yourself at home:  · If symptoms are severe, rest at home for the first 2 to 3 days.  · Stay away from cigarette smoke - both your smoke and the smoke from others.  · You may use over-the-counter acetaminophen or ibuprofen for fever, muscle aching, and headache, unless another medicine was prescribed for this. If you have chronic liver or kidney disease or ever had a stomach ulcer or GI bleeding, talk with your doctor before using these medicines. No one who is younger than 18 and ill with a fever should take aspirin. It may cause severe disease or death.  · Your appetite may be poor, so a light diet is fine. Avoid dehydration by drinking 8 to 12 8-ounce glasses of fluids each day. This may include water; orange juice; lemonade; apple, grape, and cranberry juice; clear fruit drinks; electrolyte replacement and sports drinks; and decaffeinated teas and coffee. If you have been diagnosed with a kidney disease, ask your doctor how much and what types of fluids you should drink to prevent dehydration. If you have kidney disease, drinking too much fluid can cause it build up in the your body and be dangerous to your health.  · Over-the-counter remedies won't shorten the length of the illness but may be helpful for cough, sore throat; and nasal and sinus congestion. Don't use decongestants if you have high blood pressure.  Follow-up care  Follow up with your healthcare provider if you do not improve over the next week.  Call 911  Get emergency medical care if any of the following occur:  · Convulsion  · Feeling weak, dizzy, or like you are going to faint  · Chest pain, shortness of breath, wheezing, or difficulty breathing  When to seek medical advice  Call your healthcare provider right away if any of these occur:  · Cough with lots of  colored sputum (mucus) or blood in your sputum  · Chest pain, shortness of breath, wheezing, or difficulty breathing  · Severe headache; face, neck, or ear pain  · Severe, constant pain in the lower right side of your belly (abdominal)  · Continued vomiting (cant keep liquids down)  · Frequent diarrhea (more than 5 times a day); blood (red or black color) or mucus in diarrhea  · Feeling weak, dizzy, or like you are going to faint  · Extreme thirst  · Fever of 100.4°F (38°C) or higher, or as directed by your healthcare provider  Date Last Reviewed: 9/25/2015  © 3880-7661 [employer] [address] [place] [address], [place] [address]. All rights reserved. This information is not intended as a substitute for professional medical care. Always follow your healthcare professional's instructions.      Please return here or go to the Emergency Department for any concerns or worsening of condition.  If you were prescribed a narcotic medication, do not drive or operate heavy equipment or machinery while taking these medications.  Please follow up with your primary care doctor or specialist as needed.    If you  smoke, please stop smoking.

## 2019-02-28 LAB — BACTERIA UR CULT: NO GROWTH

## 2019-03-08 ENCOUNTER — OFFICE VISIT (OUTPATIENT)
Dept: URGENT CARE | Facility: CLINIC | Age: 39
End: 2019-03-08
Payer: COMMERCIAL

## 2019-03-08 ENCOUNTER — PATIENT MESSAGE (OUTPATIENT)
Dept: PRIMARY CARE CLINIC | Facility: CLINIC | Age: 39
End: 2019-03-08

## 2019-03-08 VITALS
OXYGEN SATURATION: 99 % | WEIGHT: 164 LBS | DIASTOLIC BLOOD PRESSURE: 77 MMHG | BODY MASS INDEX: 27.32 KG/M2 | HEART RATE: 74 BPM | SYSTOLIC BLOOD PRESSURE: 124 MMHG | HEIGHT: 65 IN | TEMPERATURE: 98 F | RESPIRATION RATE: 17 BRPM

## 2019-03-08 DIAGNOSIS — J40 BRONCHITIS: ICD-10-CM

## 2019-03-08 DIAGNOSIS — R05.9 COUGH: Primary | ICD-10-CM

## 2019-03-08 PROCEDURE — 99214 OFFICE O/P EST MOD 30 MIN: CPT | Mod: 25,S$GLB,, | Performed by: NURSE PRACTITIONER

## 2019-03-08 PROCEDURE — 3078F DIAST BP <80 MM HG: CPT | Mod: CPTII,S$GLB,, | Performed by: NURSE PRACTITIONER

## 2019-03-08 PROCEDURE — 3008F BODY MASS INDEX DOCD: CPT | Mod: CPTII,S$GLB,, | Performed by: NURSE PRACTITIONER

## 2019-03-08 PROCEDURE — 94640 AIRWAY INHALATION TREATMENT: CPT | Mod: S$GLB,,, | Performed by: FAMILY MEDICINE

## 2019-03-08 PROCEDURE — 3078F PR MOST RECENT DIASTOLIC BLOOD PRESSURE < 80 MM HG: ICD-10-PCS | Mod: CPTII,S$GLB,, | Performed by: NURSE PRACTITIONER

## 2019-03-08 PROCEDURE — 3074F PR MOST RECENT SYSTOLIC BLOOD PRESSURE < 130 MM HG: ICD-10-PCS | Mod: CPTII,S$GLB,, | Performed by: NURSE PRACTITIONER

## 2019-03-08 PROCEDURE — 96372 THER/PROPH/DIAG INJ SC/IM: CPT | Mod: 59,S$GLB,, | Performed by: FAMILY MEDICINE

## 2019-03-08 PROCEDURE — 96372 PR INJECTION,THERAP/PROPH/DIAG2ST, IM OR SUBCUT: ICD-10-PCS | Mod: 59,S$GLB,, | Performed by: FAMILY MEDICINE

## 2019-03-08 PROCEDURE — 99214 PR OFFICE/OUTPT VISIT, EST, LEVL IV, 30-39 MIN: ICD-10-PCS | Mod: 25,S$GLB,, | Performed by: NURSE PRACTITIONER

## 2019-03-08 PROCEDURE — 94640 PR INHAL RX, AIRWAY OBST/DX SPUTUM INDUCT: ICD-10-PCS | Mod: S$GLB,,, | Performed by: FAMILY MEDICINE

## 2019-03-08 PROCEDURE — 3074F SYST BP LT 130 MM HG: CPT | Mod: CPTII,S$GLB,, | Performed by: NURSE PRACTITIONER

## 2019-03-08 PROCEDURE — 3008F PR BODY MASS INDEX (BMI) DOCUMENTED: ICD-10-PCS | Mod: CPTII,S$GLB,, | Performed by: NURSE PRACTITIONER

## 2019-03-08 RX ORDER — DEXAMETHASONE SODIUM PHOSPHATE 100 MG/10ML
8 INJECTION INTRAMUSCULAR; INTRAVENOUS ONCE
Status: COMPLETED | OUTPATIENT
Start: 2019-03-08 | End: 2019-03-08

## 2019-03-08 RX ORDER — AMOXICILLIN AND CLAVULANATE POTASSIUM 875; 125 MG/1; MG/1
1 TABLET, FILM COATED ORAL 2 TIMES DAILY
Qty: 20 TABLET | Refills: 0 | Status: SHIPPED | OUTPATIENT
Start: 2019-03-08 | End: 2019-03-18

## 2019-03-08 RX ORDER — CEFTRIAXONE 500 MG/1
500 INJECTION, POWDER, FOR SOLUTION INTRAMUSCULAR; INTRAVENOUS ONCE
Status: COMPLETED | OUTPATIENT
Start: 2019-03-08 | End: 2019-03-08

## 2019-03-08 RX ORDER — ALBUTEROL SULFATE 90 UG/1
2 AEROSOL, METERED RESPIRATORY (INHALATION) EVERY 6 HOURS PRN
Qty: 6.7 INHALER | Refills: 1 | Status: SHIPPED | OUTPATIENT
Start: 2019-03-08 | End: 2019-06-27

## 2019-03-08 RX ORDER — IPRATROPIUM BROMIDE 0.5 MG/2.5ML
0.5 SOLUTION RESPIRATORY (INHALATION)
Status: COMPLETED | OUTPATIENT
Start: 2019-03-08 | End: 2019-03-08

## 2019-03-08 RX ORDER — ALBUTEROL SULFATE 0.83 MG/ML
2.5 SOLUTION RESPIRATORY (INHALATION)
Status: COMPLETED | OUTPATIENT
Start: 2019-03-08 | End: 2019-03-08

## 2019-03-08 RX ORDER — PROMETHAZINE HYDROCHLORIDE AND DEXTROMETHORPHAN HYDROBROMIDE 6.25; 15 MG/5ML; MG/5ML
5 SYRUP ORAL
Qty: 118 ML | Refills: 0 | Status: SHIPPED | OUTPATIENT
Start: 2019-03-08 | End: 2019-03-18

## 2019-03-08 RX ADMIN — CEFTRIAXONE 500 MG: 500 INJECTION, POWDER, FOR SOLUTION INTRAMUSCULAR; INTRAVENOUS at 02:03

## 2019-03-08 RX ADMIN — DEXAMETHASONE SODIUM PHOSPHATE 8 MG: 100 INJECTION INTRAMUSCULAR; INTRAVENOUS at 02:03

## 2019-03-08 RX ADMIN — ALBUTEROL SULFATE 2.5 MG: 0.83 SOLUTION RESPIRATORY (INHALATION) at 02:03

## 2019-03-08 RX ADMIN — IPRATROPIUM BROMIDE 0.5 MG: 0.5 SOLUTION RESPIRATORY (INHALATION) at 02:03

## 2019-03-08 NOTE — PROGRESS NOTES
"Subjective:       Patient ID: Pernell Ly is a 38 y.o. female.    Vitals:  height is 5' 5" (1.651 m) and weight is 74.4 kg (164 lb). Her temperature is 98 °F (36.7 °C). Her blood pressure is 124/77 and her pulse is 74. Her respiration is 17 and oxygen saturation is 99%.     Chief Complaint: Cough    Here not long ago with similar symptoms. Says she got better shortly but came back even worse. Took treatment plan and still using. Says cough is worse in the afternoon but constant all day./      Cough   Associated symptoms include shortness of breath and wheezing. Pertinent negatives include no chills, ear pain, eye redness, fever, hemoptysis, myalgias, rash or sore throat.       Constitution: Negative for chills, sweating, fatigue and fever.   HENT: Positive for congestion. Negative for ear pain, ear discharge, sinus pain, sinus pressure, sore throat and voice change.    Neck: Negative for painful lymph nodes.   Eyes: Negative for eye redness.   Respiratory: Positive for chest tightness, cough, sputum production, shortness of breath and wheezing. Negative for bloody sputum, COPD, stridor and asthma.    Gastrointestinal: Positive for abdominal pain. Negative for nausea, vomiting and constipation.   Genitourinary: Negative for dysuria, frequency and urgency.   Musculoskeletal: Negative for muscle ache.   Skin: Negative for rash.   Allergic/Immunologic: Negative for seasonal allergies and asthma.   Hematologic/Lymphatic: Negative for swollen lymph nodes.       Objective:      Physical Exam   Constitutional: She is oriented to person, place, and time. She appears well-developed and well-nourished. She is cooperative.  Non-toxic appearance. She does not appear ill. No distress.   HENT:   Head: Normocephalic and atraumatic.   Right Ear: Hearing, tympanic membrane, external ear and ear canal normal.   Left Ear: Hearing, tympanic membrane, external ear and ear canal normal.   Nose: Mucosal edema and rhinorrhea present. " No nasal deformity. No epistaxis. Right sinus exhibits maxillary sinus tenderness. Right sinus exhibits no frontal sinus tenderness. Left sinus exhibits maxillary sinus tenderness. Left sinus exhibits no frontal sinus tenderness.   Mouth/Throat: Uvula is midline and mucous membranes are normal. No trismus in the jaw. Normal dentition. No uvula swelling. Posterior oropharyngeal edema and posterior oropharyngeal erythema present.   Eyes: Conjunctivae, EOM and lids are normal. Pupils are equal, round, and reactive to light. No scleral icterus.   Sclera clear bilat   Neck: Trachea normal, normal range of motion, full passive range of motion without pain and phonation normal. Neck supple.   Cardiovascular: Normal rate, regular rhythm, normal heart sounds, intact distal pulses and normal pulses.   Pulmonary/Chest: Effort normal. No respiratory distress. She has wheezes. She has rhonchi.   coughing   Abdominal: Soft. Normal appearance and bowel sounds are normal. She exhibits no distension. There is no tenderness.   Musculoskeletal: Normal range of motion. She exhibits no edema or deformity.   Neurological: She is alert and oriented to person, place, and time. She exhibits normal muscle tone. Coordination normal.   Skin: Skin is warm, dry and intact. She is not diaphoretic. No pallor.   Psychiatric: She has a normal mood and affect. Her speech is normal and behavior is normal. Judgment and thought content normal. Cognition and memory are normal.   Nursing note and vitals reviewed.      Assessment:       1. Cough    2. Bronchitis        Plan:         Cough    Bronchitis  -     amoxicillin-clavulanate 875-125mg (AUGMENTIN) 875-125 mg per tablet; Take 1 tablet by mouth 2 (two) times daily. for 10 days  Dispense: 20 tablet; Refill: 0    Other orders  -     ipratropium 0.02 % nebulizer solution 0.5 mg  -     albuterol nebulizer solution 2.5 mg  -     promethazine-dextromethorphan (PROMETHAZINE-DM) 6.25-15 mg/5 mL Syrp; Take 5  mLs by mouth every 4 to 6 hours as needed.  Dispense: 118 mL; Refill: 0  -     albuterol (PROVENTIL/VENTOLIN HFA) 90 mcg/actuation inhaler; Inhale 2 puffs into the lungs every 6 (six) hours as needed for Wheezing. Cough and Shortness of Breath. Rescue  Dispense: 6.7 Inhaler; Refill: 1  -     cefTRIAXone injection 500 mg  -     dexamethasone injection 8 mg

## 2019-03-08 NOTE — PATIENT INSTRUCTIONS
RETURN TO CLINIC IF SYMPTOMS WORSEN OR CALL 911 IMMEDIATELY FOR SHORTNESS OF BREATH, CHEST PAIN, DIZZINESS, WORSENING PAIN, NAUSEA AND VOMITING, HEART PALPITATIONS, FEVER AND/OR NECK STIFFNESS.  Bronchitis, Antibiotic Treatment (Adult)    Bronchitis is an infection of the air passages (bronchial tubes) in your lungs. It often occurs when you have a cold. This illness is contagious during the first few days and is spread through the air by coughing and sneezing, or by direct contact (touching the sick person and then touching your own eyes, nose, or mouth).  Symptoms of bronchitis include cough with mucus (phlegm) and low-grade fever. Bronchitis usually lasts 7 to 14 days. Mild cases can be treated with simple home remedies. More severe infection is treated with an antibiotic.  Home care  Follow these guidelines when caring for yourself at home:  · If your symptoms are severe, rest at home for the first 2 to 3 days. When you go back to your usual activities, don't let yourself get too tired.  · Do not smoke. Also avoid being exposed to secondhand smoke.  · You may use over-the-counter medicines to control fever or pain, unless another medicine was prescribed. (Note: If you have chronic liver or kidney disease or have ever had a stomach ulcer or gastrointestinal bleeding, talk with your healthcare provider before using these medicines. Also talk to your provider if you are taking medicine to prevent blood clots.) Aspirin should never be given to anyone younger than 18 years of age who is ill with a viral infection or fever. It may cause severe liver or brain damage.  · Your appetite may be poor, so a light diet is fine. Avoid dehydration by drinking 6 to 8 glasses of fluids per day (such as water, soft drinks, sports drinks, juices, tea, or soup). Extra fluids will help loosen secretions in the nose and lungs.  · Over-the-counter cough, cold, and sore-throat medicines will not shorten the length of the illness, but  they may be helpful to reduce symptoms. (Note: Do not use decongestants if you have high blood pressure.)  · Finish all antibiotic medicine. Do this even if you are feeling better after only a few days.  Follow-up care  Follow up with your healthcare provider, or as advised. If you had an X-ray or ECG (electrocardiogram), a specialist will review it. You will be notified of any new findings that may affect your care.  Note: If you are age 65 or older, or if you have a chronic lung disease or condition that affects your immune system, or you smoke, talk to your healthcare provider about having pneumococcal vaccinations and a yearly influenza vaccination (flu shot).  When to seek medical advice  Call your healthcare provider right away if any of these occur:  · Fever of 100.4°F (38°C) or higher  · Coughing up increased amounts of colored sputum  · Weakness, drowsiness, headache, facial pain, ear pain, or a stiff neck  Call 911, or get immediate medical care  Contact emergency services right away if any of these occur.  · Coughing up blood  · Worsening weakness, drowsiness, headache, or stiff neck  · Trouble breathing, wheezing, or pain with breathing  Date Last Reviewed: 9/13/2015  © 5590-4396 [employer]. [address] [place] [address], [place] [address]. All rights reserved. This information is not intended as a substitute for professional medical care. Always follow your healthcare professional's instructions.

## 2019-03-08 NOTE — TELEPHONE ENCOUNTER
Pt called still c/o cough since her urgent care visit on 2/27/19 haven't gotten better it's worse. Pt was made aware that  isn't in on Thursday or Friday . Pt states she will go back to urgent care when she get off. Declined an appointment for today. Conversation was understood and it ended.

## 2019-03-10 ENCOUNTER — NURSE TRIAGE (OUTPATIENT)
Dept: ADMINISTRATIVE | Facility: CLINIC | Age: 39
End: 2019-03-10

## 2019-03-10 DIAGNOSIS — G43.511 INTRACTABLE PERSISTENT MIGRAINE AURA WITHOUT CEREBRAL INFARCTION AND WITH STATUS MIGRAINOSUS: ICD-10-CM

## 2019-03-10 NOTE — TELEPHONE ENCOUNTER
Reason for Disposition   Taking antibiotic for other infection   [1] Taking antibiotic < 72 hours (3 days) AND [2] symptoms are SAME (not improved)    Protocols used: RECENT MEDICAL VISIT FOR ILLNESS FOLLOW-UP CALL-P-, INFECTION ON ANTIBIOTIC FOLLOW-UP CALL-P-    -Been to urgent care x2 in the last 2 weeks  pain near ribs everytime cough it hurts  -Denies sob, fever  -Still able to go about activities  -Starting on augmentin on 3/8/19, tessalon perles q 6 h  -Phenergan dm is on back order  -Recommended she take robitussin dm until the phenergan comes in  -Advised if symptoms do not improve after 72 hours of antibiotic therapy or any worsening symptoms including fever, sob she should call back

## 2019-03-11 ENCOUNTER — HOSPITAL ENCOUNTER (EMERGENCY)
Facility: OTHER | Age: 39
Discharge: HOME | End: 2019-03-11
Attending: EMERGENCY MEDICINE
Payer: COMMERCIAL

## 2019-03-11 VITALS
HEIGHT: 64 IN | OXYGEN SATURATION: 99 % | HEART RATE: 80 BPM | DIASTOLIC BLOOD PRESSURE: 78 MMHG | SYSTOLIC BLOOD PRESSURE: 127 MMHG | WEIGHT: 170 LBS | RESPIRATION RATE: 18 BRPM | BODY MASS INDEX: 29.02 KG/M2 | TEMPERATURE: 99 F

## 2019-03-11 DIAGNOSIS — B34.9 ACUTE VIRAL SYNDROME: Primary | ICD-10-CM

## 2019-03-11 DIAGNOSIS — R05.9 COUGH: ICD-10-CM

## 2019-03-11 DIAGNOSIS — S29.011A CHEST WALL MUSCLE STRAIN, INITIAL ENCOUNTER: ICD-10-CM

## 2019-03-11 LAB
ALBUMIN SERPL BCP-MCNC: 3.8 G/DL
ALP SERPL-CCNC: 72 U/L
ALT SERPL W/O P-5'-P-CCNC: 16 U/L
ANION GAP SERPL CALC-SCNC: 8 MMOL/L
AST SERPL-CCNC: 15 U/L
B-HCG UR QL: NEGATIVE
BASOPHILS # BLD AUTO: 0.02 K/UL
BASOPHILS NFR BLD: 0.4 %
BILIRUB SERPL-MCNC: 0.3 MG/DL
BILIRUB UR QL STRIP: NEGATIVE
BUN SERPL-MCNC: 12 MG/DL
CALCIUM SERPL-MCNC: 9.5 MG/DL
CHLORIDE SERPL-SCNC: 99 MMOL/L
CLARITY UR: CLEAR
CO2 SERPL-SCNC: 31 MMOL/L
COLOR UR: YELLOW
CREAT SERPL-MCNC: 0.7 MG/DL
CTP QC/QA: YES
DIFFERENTIAL METHOD: ABNORMAL
EOSINOPHIL # BLD AUTO: 0.4 K/UL
EOSINOPHIL NFR BLD: 8 %
ERYTHROCYTE [DISTWIDTH] IN BLOOD BY AUTOMATED COUNT: 14.2 %
EST. GFR  (AFRICAN AMERICAN): >60 ML/MIN/1.73 M^2
EST. GFR  (NON AFRICAN AMERICAN): >60 ML/MIN/1.73 M^2
GLUCOSE SERPL-MCNC: 94 MG/DL
GLUCOSE UR QL STRIP: NEGATIVE
HCT VFR BLD AUTO: 37.6 %
HGB BLD-MCNC: 12.6 G/DL
HGB UR QL STRIP: NEGATIVE
KETONES UR QL STRIP: NEGATIVE
LEUKOCYTE ESTERASE UR QL STRIP: NEGATIVE
LIPASE SERPL-CCNC: 6 U/L
LYMPHOCYTES # BLD AUTO: 1.5 K/UL
LYMPHOCYTES NFR BLD: 29.9 %
MCH RBC QN AUTO: 29.4 PG
MCHC RBC AUTO-ENTMCNC: 33.5 G/DL
MCV RBC AUTO: 88 FL
MONOCYTES # BLD AUTO: 0.4 K/UL
MONOCYTES NFR BLD: 8.8 %
NEUTROPHILS # BLD AUTO: 2.6 K/UL
NEUTROPHILS NFR BLD: 52.7 %
NITRITE UR QL STRIP: NEGATIVE
PH UR STRIP: 7 [PH] (ref 5–8)
PLATELET # BLD AUTO: 370 K/UL
PMV BLD AUTO: 8.6 FL
POTASSIUM SERPL-SCNC: 3.3 MMOL/L
PROT SERPL-MCNC: 8.3 G/DL
PROT UR QL STRIP: NEGATIVE
RBC # BLD AUTO: 4.29 M/UL
SODIUM SERPL-SCNC: 138 MMOL/L
SP GR UR STRIP: 1.01 (ref 1–1.03)
URN SPEC COLLECT METH UR: NORMAL
UROBILINOGEN UR STRIP-ACNC: NEGATIVE EU/DL
WBC # BLD AUTO: 4.89 K/UL

## 2019-03-11 PROCEDURE — 81025 URINE PREGNANCY TEST: CPT | Performed by: EMERGENCY MEDICINE

## 2019-03-11 PROCEDURE — 99284 EMERGENCY DEPT VISIT MOD MDM: CPT | Mod: 25

## 2019-03-11 PROCEDURE — 96372 THER/PROPH/DIAG INJ SC/IM: CPT

## 2019-03-11 PROCEDURE — 80053 COMPREHEN METABOLIC PANEL: CPT

## 2019-03-11 PROCEDURE — 83690 ASSAY OF LIPASE: CPT

## 2019-03-11 PROCEDURE — 85025 COMPLETE CBC W/AUTO DIFF WBC: CPT

## 2019-03-11 PROCEDURE — 63600175 PHARM REV CODE 636 W HCPCS: Performed by: EMERGENCY MEDICINE

## 2019-03-11 PROCEDURE — 81003 URINALYSIS AUTO W/O SCOPE: CPT

## 2019-03-11 RX ORDER — IBUPROFEN 600 MG/1
TABLET ORAL
Qty: 30 TABLET | Refills: 0 | OUTPATIENT
Start: 2019-03-11

## 2019-03-11 RX ORDER — IBUPROFEN 600 MG/1
600 TABLET ORAL EVERY 6 HOURS PRN
Qty: 9 TABLET | Refills: 0 | Status: SHIPPED | OUTPATIENT
Start: 2019-03-11 | End: 2019-03-27 | Stop reason: SDUPTHER

## 2019-03-11 RX ORDER — KETOROLAC TROMETHAMINE 30 MG/ML
30 INJECTION, SOLUTION INTRAMUSCULAR; INTRAVENOUS
Status: COMPLETED | OUTPATIENT
Start: 2019-03-11 | End: 2019-03-11

## 2019-03-11 RX ADMIN — KETOROLAC TROMETHAMINE 30 MG: 30 INJECTION, SOLUTION INTRAMUSCULAR; INTRAVENOUS at 02:03

## 2019-03-11 NOTE — ED NOTES
RUQ pain, reproducible with movement or breathing, denies N/V    LOC: The patient is awake, alert and aware of environment with an appropriate affect, the patient is oriented x 3 and speaking appropriately.  APPEARANCE: Patient resting comfortably and in no acute distress, patient is clean and well groomed, patient's clothing is properly fastened.  SKIN: The skin is warm and dry, patient has normal skin turgor and moist mucus membranes, skin intact, no breakdown or brusing noted.  MUSKULOSKELETAL: Patient moving all extremities well, no obvious swelling or deformities noted.  RESPIRATORY: Airway is open and patent, respirations are spontaneous, patient has a normal effort and rate. Breath sounds are clear and equal bilaterally.  CARDIAC: Normal heart sounds. No peripheral edema.  ABDOMEN: tender to palpation RUQ, no distention noted. Bowel sounds present.

## 2019-03-11 NOTE — ED PROVIDER NOTES
Encounter Date: 3/11/2019    SCRIBE #1 NOTE: ISherwin, am scribing for, and in the presence of, Dr. Richardson.       History     Chief Complaint   Patient presents with    Abdominal Pain     + right sided upper abdominal pains w/ multiple episodes of diarrhea x 4 days. Denies fever or chills.      Seen by provider: 2:20 PM    Patient is a 38 y.o. female who presents to the ED with complaint of right lower chest wall pain, which began about three days ago. She states her pain is located just below her right breast and is worse when coughing. She reports an associated cough, which began around the same time her chest began to hurt. She denies nausea, vomiting, abdominal pain, fever, or chills.  She reports taking aleve without relief to chest pain. She admits to occasional use of alcohol. She denies use of tobacco or illicit drugs. She has no additional complaints at this time.       The history is provided by the patient.     Review of patient's allergies indicates:  No Known Allergies  Past Medical History:   Diagnosis Date    Encounter for blood transfusion     Fibroids     Hypertension     Iron deficiency anemia     Menorrhagia     Migraine     Seizures     as baby     Past Surgical History:   Procedure Laterality Date     SECTION      x1    CYSTOSCOPY N/A 2017    [facility]    HYSTERECTOMY  2017    LAPAROSCOPY-DIAGNOSTIC (ADD ON) N/A 2017    [facility]    ROBOTIC ASSISTED LAPAROSCOPIC HYSTERECTOMY N/A 2017    [facility]    SALPINGECTOMY-ROBOTIC ASSISTED Bilateral 2017    [facility]    TUBAL LIGATION       Family History   Problem Relation Age of Onset    Hypertension Father     Hypertension Sister     Hypertension Maternal Grandmother     Breast cancer Maternal Grandmother     Cancer Maternal  Grandfather     Hypertension Paternal Grandmother     Colon cancer Maternal Cousin     Ovarian cancer Neg Hx      Social History     Tobacco Use    Smoking status: Never Smoker    Smokeless tobacco: Never Used   Substance Use Topics    Alcohol use: No     Comment: Occasional; 3x /month    Drug use: No     Review of Systems   Constitutional: Negative for chills and fever.   HENT: Negative for congestion and sore throat.    Eyes: Negative for photophobia and redness.   Respiratory: Positive for cough. Negative for chest tightness, shortness of breath and wheezing.    Cardiovascular: Positive for chest pain (right lower chest wall). Negative for leg swelling.   Gastrointestinal: Negative for abdominal pain, nausea and vomiting.   Genitourinary: Negative for dysuria.   Musculoskeletal: Negative for back pain.   Skin: Negative for rash.   Neurological: Negative for weakness, light-headedness and headaches.   Psychiatric/Behavioral: Negative for confusion.       Physical Exam     Initial Vitals [03/11/19 1116]   BP Pulse Resp Temp SpO2   (!) 140/84 100 18 99.1 °F (37.3 °C) 100 %      MAP       --         Physical Exam    Nursing note and vitals reviewed.  Constitutional: She appears well-developed and well-nourished. She is not diaphoretic. No distress.   HENT:   Head: Normocephalic and atraumatic.   Mouth/Throat: Oropharynx is clear and moist.   Eyes: Conjunctivae and EOM are normal. Pupils are equal, round, and reactive to light. No scleral icterus.   Neck: Normal range of motion. Neck supple.   Cardiovascular: Normal rate, regular rhythm, S1 normal, S2 normal and normal heart sounds. Exam reveals no gallop and no friction rub.    No murmur heard.  Pulmonary/Chest: Breath sounds normal. No respiratory distress. She has no wheezes. She has no rhonchi. She has no rales.   Abdominal: Soft. Bowel sounds are normal. There is no tenderness. There is no rebound and no guarding.   Musculoskeletal: Normal range of motion.  She exhibits no edema or tenderness.   No lower extremity edema.    Lymphadenopathy:     She has no cervical adenopathy.   Neurological: She is alert and oriented to person, place, and time.   Skin: Skin is warm and dry. Capillary refill takes less than 2 seconds. No rash noted. No pallor.   No visible rashes or vesicular lesions.    Psychiatric: She has a normal mood and affect. Her behavior is normal. Judgment and thought content normal.         ED Course   Procedures  Labs Reviewed   CBC W/ AUTO DIFFERENTIAL - Abnormal; Notable for the following components:       Result Value    Platelets 370 (*)     MPV 8.6 (*)     All other components within normal limits    Narrative:     For upper or mid abdominal pain.   COMPREHENSIVE METABOLIC PANEL - Abnormal; Notable for the following components:    Potassium 3.3 (*)     CO2 31 (*)     All other components within normal limits    Narrative:     For upper or mid abdominal pain.   URINALYSIS, REFLEX TO URINE CULTURE    Narrative:     Preferred Collection Type->Urine, Clean Catch   LIPASE    Narrative:     For upper or mid abdominal pain.   POCT URINE PREGNANCY          Imaging Results          X-Ray Chest PA And Lateral (Final result)  Result time 03/11/19 14:42:14    Final result by Dennis Alberts MD (03/11/19 14:42:14)                 Impression:      1. No acute cardiopulmonary process.      Electronically signed by: Dennis Alberst MD  Date:    03/11/2019  Time:    14:42             Narrative:    EXAMINATION:  XR CHEST PA AND LATERAL    CLINICAL HISTORY:  Cough    TECHNIQUE:  PA and lateral views of the chest were performed.    COMPARISON:  09/26/2017    FINDINGS:  The cardiomediastinal silhouette is not enlarged.  There is no pleural effusion.  The trachea is midline.  The lungs are symmetrically expanded bilaterally without evidence of acute parenchymal process. No large focal consolidation seen.  There is no pneumothorax.  The osseous structures are  unremarkable.                              X-Rays:   Independently Interpreted Readings:   Chest X-Ray: Normal heart size.  No acute abnormalities.  No infiltrates.     Medical Decision Making:   Independently Interpreted Test(s):   I have ordered and independently interpreted X-rays - see prior notes.  Clinical Tests:   Lab Tests: Ordered and Reviewed  Radiological Study: Ordered and Reviewed            Scribe Attestation:   Scribe #1: I performed the above scribed service and the documentation accurately describes the services I performed. I attest to the accuracy of the note.    Attending Attestation:           Physician Attestation for Scribe:  Physician Attestation Statement for Scribe #1: I, Sherwin Walker, reviewed documentation, as scribed by Dr. Richardson in my presence, and it is both accurate and complete.         Attending ED Notes:   Emergent evaluation a 38-year-old female with complaint of cough, nasal congestion and right-sided chest pain.  Patient is afebrile, nontoxic-appearing stable vital signs. No acute findings in urinary analysis.  Patient has no flank tenderness to palpation. No elevation white blood cell count.  H&H within normal limits. Platelets level is 370.  On CMP patient has potassium of 3.3.  CO2 of 31.  Lipase is 6.  No acute findings on chest x-ray.  The patient is extensively counseled on her diagnosis and treatment including all diagnostic, laboratory and physical exam findings.  The patient discharged good condition and directed follow-up with her primary care physician in the next 24-48 hours.             Clinical Impression:     1. Acute viral syndrome    2. Cough    3. Chest wall muscle strain, initial encounter                                  Kyle Richardson MD  03/11/19 9522

## 2019-03-17 ENCOUNTER — NURSE TRIAGE (OUTPATIENT)
Dept: ADMINISTRATIVE | Facility: CLINIC | Age: 39
End: 2019-03-17

## 2019-03-17 NOTE — TELEPHONE ENCOUNTER
Reason for Disposition   Message left on identified voice mail    Protocols used: NO CONTACT OR DUPLICATE CONTACT CALL-A-AH

## 2019-03-26 ENCOUNTER — PATIENT OUTREACH (OUTPATIENT)
Dept: ADMINISTRATIVE | Facility: HOSPITAL | Age: 39
End: 2019-03-26

## 2019-03-26 PROBLEM — R56.9 CONVULSION: Status: ACTIVE | Noted: 2019-03-26

## 2019-03-27 ENCOUNTER — APPOINTMENT (OUTPATIENT)
Dept: RADIOLOGY | Facility: OTHER | Age: 39
End: 2019-03-27
Attending: FAMILY MEDICINE
Payer: COMMERCIAL

## 2019-03-27 ENCOUNTER — OFFICE VISIT (OUTPATIENT)
Dept: PRIMARY CARE CLINIC | Facility: CLINIC | Age: 39
End: 2019-03-27
Attending: FAMILY MEDICINE
Payer: COMMERCIAL

## 2019-03-27 VITALS
HEART RATE: 75 BPM | DIASTOLIC BLOOD PRESSURE: 112 MMHG | SYSTOLIC BLOOD PRESSURE: 166 MMHG | WEIGHT: 166.88 LBS | BODY MASS INDEX: 28.65 KG/M2

## 2019-03-27 DIAGNOSIS — R05.9 COUGH: Primary | ICD-10-CM

## 2019-03-27 DIAGNOSIS — E87.6 HYPOKALEMIA: ICD-10-CM

## 2019-03-27 DIAGNOSIS — I10 ESSENTIAL HYPERTENSION: ICD-10-CM

## 2019-03-27 DIAGNOSIS — R05.9 COUGH: ICD-10-CM

## 2019-03-27 PROCEDURE — 3008F PR BODY MASS INDEX (BMI) DOCUMENTED: ICD-10-PCS | Mod: CPTII,S$GLB,, | Performed by: FAMILY MEDICINE

## 2019-03-27 PROCEDURE — 3008F BODY MASS INDEX DOCD: CPT | Mod: CPTII,S$GLB,, | Performed by: FAMILY MEDICINE

## 2019-03-27 PROCEDURE — 99999 PR PBB SHADOW E&M-EST. PATIENT-LVL III: ICD-10-PCS | Mod: PBBFAC,,, | Performed by: FAMILY MEDICINE

## 2019-03-27 PROCEDURE — 99214 PR OFFICE/OUTPT VISIT, EST, LEVL IV, 30-39 MIN: ICD-10-PCS | Mod: S$GLB,,, | Performed by: FAMILY MEDICINE

## 2019-03-27 PROCEDURE — 99214 OFFICE O/P EST MOD 30 MIN: CPT | Mod: S$GLB,,, | Performed by: FAMILY MEDICINE

## 2019-03-27 PROCEDURE — 3080F PR MOST RECENT DIASTOLIC BLOOD PRESSURE >= 90 MM HG: ICD-10-PCS | Mod: CPTII,S$GLB,, | Performed by: FAMILY MEDICINE

## 2019-03-27 PROCEDURE — 3077F SYST BP >= 140 MM HG: CPT | Mod: CPTII,S$GLB,, | Performed by: FAMILY MEDICINE

## 2019-03-27 PROCEDURE — 3077F PR MOST RECENT SYSTOLIC BLOOD PRESSURE >= 140 MM HG: ICD-10-PCS | Mod: CPTII,S$GLB,, | Performed by: FAMILY MEDICINE

## 2019-03-27 PROCEDURE — 3080F DIAST BP >= 90 MM HG: CPT | Mod: CPTII,S$GLB,, | Performed by: FAMILY MEDICINE

## 2019-03-27 PROCEDURE — 71046 XR CHEST PA AND LATERAL: ICD-10-PCS | Mod: 26,,, | Performed by: RADIOLOGY

## 2019-03-27 PROCEDURE — 99999 PR PBB SHADOW E&M-EST. PATIENT-LVL III: CPT | Mod: PBBFAC,,, | Performed by: FAMILY MEDICINE

## 2019-03-27 PROCEDURE — 71046 X-RAY EXAM CHEST 2 VIEWS: CPT | Mod: TC

## 2019-03-27 PROCEDURE — 71046 X-RAY EXAM CHEST 2 VIEWS: CPT | Mod: 26,,, | Performed by: RADIOLOGY

## 2019-03-27 RX ORDER — PROMETHAZINE HYDROCHLORIDE AND CODEINE PHOSPHATE 6.25; 1 MG/5ML; MG/5ML
5 SOLUTION ORAL NIGHTLY PRN
Qty: 118 ML | Refills: 0 | Status: SHIPPED | OUTPATIENT
Start: 2019-03-27 | End: 2019-04-06

## 2019-03-27 RX ORDER — BENZONATATE 100 MG/1
100 CAPSULE ORAL EVERY 6 HOURS PRN
Qty: 60 CAPSULE | Refills: 1 | Status: SHIPPED | OUTPATIENT
Start: 2019-03-27 | End: 2019-06-27

## 2019-03-27 RX ORDER — IBUPROFEN 600 MG/1
600 TABLET ORAL EVERY 6 HOURS PRN
Qty: 30 TABLET | Refills: 1 | Status: SHIPPED | OUTPATIENT
Start: 2019-03-27 | End: 2019-09-03

## 2019-03-27 RX ORDER — AZITHROMYCIN 250 MG/1
TABLET, FILM COATED ORAL
Qty: 6 TABLET | Refills: 0 | Status: SHIPPED | OUTPATIENT
Start: 2019-03-27 | End: 2019-04-01

## 2019-03-27 NOTE — PROGRESS NOTES
Subjective:       Patient ID: Pernell Ly is a 38 y.o. female.    Vitals:  weight is 75.7 kg (166 lb 14.2 oz). Her blood pressure is 166/112 (abnormal) and her pulse is 75.     Chief Complaint: Cough; Nasal Congestion; and Hypertension (bp out of stock per pharm.)    Pt presents with cough and congestion that has still not resolved. Went to  x 2 for this and was treated 3/11 with steroid shot and augmentin x 10 days. States that she got better shortly but came back. Today cough is better but pt still feels congested. Chest xray done 3/1  Normal.  Took treatment plan and still using. Pt also states that she is taking all of her allergy meds as indicated    Pt STATES THAT PHARMACY HAS RUN OUT OF HER PROCARDIA. Did not take x 4 days    Cough   This is a recurrent problem. The current episode started 1 to 4 weeks ago. The problem has been waxing and waning. The problem occurs hourly. The cough is productive of sputum. Associated symptoms include nasal congestion, postnasal drip, shortness of breath and wheezing. Pertinent negatives include no chills, ear pain, eye redness, fever, hemoptysis, myalgias, rash or sore throat. The symptoms are aggravated by lying down and pollens. She has tried OTC cough suppressant, body position changes and cool air for the symptoms. The treatment provided moderate relief. Her past medical history is significant for bronchitis.   Hypertension   Associated symptoms include shortness of breath.       Constitution: Negative for chills, sweating, fatigue and fever.   HENT: Positive for congestion and postnasal drip. Negative for ear pain, ear discharge, sinus pain, sinus pressure, sore throat and voice change.    Neck: Negative for painful lymph nodes.   Eyes: Negative for eye redness.   Respiratory: Positive for chest tightness, cough, sputum production, shortness of breath and wheezing. Negative for bloody sputum, COPD, stridor and asthma.    Gastrointestinal: Positive for abdominal  pain. Negative for nausea, vomiting and constipation.   Genitourinary: Negative for dysuria, frequency and urgency.   Musculoskeletal: Negative for muscle ache.   Skin: Negative for rash.   Allergic/Immunologic: Negative for seasonal allergies and asthma.   Hematologic/Lymphatic: Negative for swollen lymph nodes.       Objective:      Physical Exam   Constitutional: She is oriented to person, place, and time. She appears well-developed and well-nourished. She is cooperative.  Non-toxic appearance. She does not appear ill. No distress.   HENT:   Head: Normocephalic and atraumatic.   Right Ear: Hearing, tympanic membrane, external ear and ear canal normal.   Left Ear: Hearing, tympanic membrane, external ear and ear canal normal.   Nose: Mucosal edema and rhinorrhea present. No nasal deformity. No epistaxis. Right sinus exhibits maxillary sinus tenderness. Right sinus exhibits no frontal sinus tenderness. Left sinus exhibits maxillary sinus tenderness. Left sinus exhibits no frontal sinus tenderness.   Mouth/Throat: Uvula is midline and mucous membranes are normal. No trismus in the jaw. Normal dentition. No uvula swelling. Posterior oropharyngeal edema and posterior oropharyngeal erythema present.   Eyes: Pupils are equal, round, and reactive to light. Conjunctivae, EOM and lids are normal. No scleral icterus.   Sclera clear bilat   Neck: Trachea normal, normal range of motion, full passive range of motion without pain and phonation normal. Neck supple.   Cardiovascular: Normal rate, regular rhythm, normal heart sounds, intact distal pulses and normal pulses.   Pulmonary/Chest: Effort normal. No respiratory distress. She has decreased breath sounds in the right lower field. She has wheezes in the right lower field. She has rhonchi in the right upper field.   coughing   Abdominal: Soft. Normal appearance and bowel sounds are normal. She exhibits no distension. There is no tenderness.   Musculoskeletal: Normal range of  motion. She exhibits no edema or deformity.   Neurological: She is alert and oriented to person, place, and time. She exhibits normal muscle tone. Coordination normal.   Skin: Skin is warm, dry and intact. She is not diaphoretic. No pallor.   Psychiatric: She has a normal mood and affect. Her speech is normal and behavior is normal. Judgment and thought content normal. Cognition and memory are normal.   Nursing note and vitals reviewed.      Assessment:       1. Hypokalemia    2. Essential hypertension    3. Cough        Plan:         Hypokalemia  -     Potassium; Future; Expected date: 03/27/2019    Essential hypertension  -     Hypertension Digital Medicine ([facility]) Enrollment Order  -     Hypertension Digital Medicine ([facility]): Assign Onboarding Questionnaires    Cough  -     X-Ray Chest PA And Lateral; Future; Expected date: 03/27/2019    Other orders  -     ibuprofen (ADVIL,MOTRIN) 600 MG tablet; Take 1 tablet (600 mg total) by mouth every 6 (six) hours as needed for Pain.  Dispense: 30 tablet; Refill: 1  -     benzonatate (TESSALON PERLES) 100 MG capsule; Take 1 capsule (100 mg total) by mouth every 6 (six) hours as needed for Cough.  Dispense: 60 capsule; Refill: 1  -     azithromycin (Z-LORRI) 250 MG tablet; Take 2 tablets by mouth on day 1; Take 1 tablet by mouth on days 2-5  Dispense: 6 tablet; Refill: 0  -     promethazine-codeine 6.25-10 mg/5 ml (PHENERGAN WITH CODEINE) 6.25-10 mg/5 mL syrup; Take 5 mLs by mouth nightly as needed for Cough.  Dispense: 118 mL; Refill: 0    PT STRONGLY ADVISED TO TAKE ALL OF HER MEDS AND IF PHARMACY OUT HAVE THEM DIRECT HER TO A [facility] THAT HAS THE MEDS!    Pt is non compliant and although BP control discussed at every visit, pt still not fully aware of adverse outcome from not taking her meds.     RTC 1 week for BP control

## 2019-04-09 ENCOUNTER — PATIENT MESSAGE (OUTPATIENT)
Dept: ADMINISTRATIVE | Facility: OTHER | Age: 39
End: 2019-04-09

## 2019-04-30 ENCOUNTER — PATIENT OUTREACH (OUTPATIENT)
Dept: OTHER | Facility: OTHER | Age: 39
End: 2019-04-30

## 2019-04-30 NOTE — LETTER
May 7, 2019     Pernell Ly  [address] [facility] [address]       Dear Pernell POLO,    Welcome to [facility]! Our goal is to make care effective, proactive and convenient by using data you send us from home to better treat your chronic conditions.          My name is Mireya Stringer, and I am your dedicated Digital Medicine clinician. As an expert in medication management, I will help ensure that the medications you are taking continue to provide the intended benefits and help you reach your goals. You can reach me directly at [phone] or by sending me a message directly through your [facility] account.      I am Tj Burroughs and I will be your health . My job is to help you identify lifestyle changes to improve your disease control. We will talk about nutrition, exercise, and other ways you may be able to adjust your current habits to better your health. Additionally, we will help ensure you are completing the tests and screenings that are necessary to help manage your conditions. You can reach me directly at [phone] or by sending me a message directly through your [facility] account.    Most importantly, YOU are at the center of this team. Together, we will work to improve your overall health and encourage you to meet your goals for a healthier lifestyle.     What we expect from YOU:  · Please take frequent home blood pressure measurements. We ask that you take at least 1 blood pressure reading per week, but more information will better help us get you know you. Be sure you rest for a few minutes before taking the reading in a quiet, comfortable place.     Be available to receive phone calls or [facility] messages, when appropriate, from your care team. Please let us know if there are any specific days or times that work best for us to reach you via phone.     Complete routine tests and screenings. Dont worry, we will help keep you on track!           What you should  expect from your Digital Medicine Care Team:   We will work with you to create a personalized plan of care and provide you with encouragement and education, including regarding lifestyle changes, that could help you manage your disease states.     We will adjust your current medications, if needed, and continue to monitor your long-term progress.     We will provide you and your physician with monthly progress reports after you have been in the program for more than 30 days.     We will send you reminders through [facility] and text messages to help ensure you do not miss any testing deadlines to help manage your disease states.    You will be able to reach us by phone or through your [facility] account by clicking our names under Care Team on the right side of the home screen.    I look forward to working with you to achieve your blood pressure goals!    We look forward to working with you to help manage your health,    Sincerely,    Your Digital Medicine Team    Please visit our websites to learn more:   · Hypertension: [email or web address]      Remember, we are not available for emergencies. If you have an emergency, please contact your doctors office directly or call [facility] on-call ([phone] or [phone]) or [phone].

## 2019-04-30 NOTE — LETTER
May 7, 2019     Pernell Ly  [address] [facility] [address]       Dear Pernell POLO,    Welcome to [email or web address]! Our goal is to make care effective, proactive and convenient by using data you send us from home to better treat your chronic conditions.          My name is Mireya Stringer, and I am your dedicated Digital Medicine clinician. As an expert in medication management, I will help ensure that the medications you are taking continue to provide the intended benefits and help you reach your goals. You can reach me directly at [phone] or by sending me a message directly through your [facility] account.      I am Tj Burroughs and I will be your health . My job is to help you identify lifestyle changes to improve your disease control. We will talk about nutrition, exercise, and other ways you may be able to adjust your current habits to better your health. Additionally, we will help ensure you are completing the tests and screenings that are necessary to help manage your conditions. You can reach me directly at [phone] or by sending me a message directly through your [facility] account.    Most importantly, YOU are at the center of this team. Together, we will work to improve your overall health and encourage you to meet your goals for a healthier lifestyle.     What we expect from YOU:  · Please take frequent home blood pressure measurements. We ask that you take at least 1 blood pressure reading per week, but more information will better help us get you know you. Be sure you rest for a few minutes before taking the reading in a quiet, comfortable place.     Be available to receive phone calls or [facility] messages, when appropriate, from your care team. Please let us know if there are any specific days or times that work best for us to reach you via phone.     Complete routine tests and screenings. Dont worry, we will help keep you on track!           What you should  expect from your Digital Medicine Care Team:   We will work with you to create a personalized plan of care and provide you with encouragement and education, including regarding lifestyle changes, that could help you manage your disease states.     We will adjust your current medications, if needed, and continue to monitor your long-term progress.     We will provide you and your physician with monthly progress reports after you have been in the program for more than 30 days.     We will send you reminders through [facility] and text messages to help ensure you do not miss any testing deadlines to help manage your disease states.    You will be able to reach us by phone or through your [facility] account by clicking our names under Care Team on the right side of the home screen.    I look forward to working with you to achieve your blood pressure goals!    We look forward to working with you to help manage your health,    Sincerely,    Your Digital Medicine Team    Please visit our websites to learn more:   · Hypertension: [email or web address]      Remember, we are not available for emergencies. If you have an emergency, please contact your doctors office directly or call [facility] on-call ([phone] or [phone]) or [phone].

## 2019-05-06 DIAGNOSIS — E87.6 HYPOKALEMIA: Primary | ICD-10-CM

## 2019-05-06 RX ORDER — POTASSIUM CHLORIDE 750 MG/1
10 TABLET, EXTENDED RELEASE ORAL DAILY
Qty: 90 TABLET | Refills: 1 | Status: SHIPPED | OUTPATIENT
Start: 2019-05-06 | End: 2020-10-13

## 2019-05-14 ENCOUNTER — PATIENT OUTREACH (OUTPATIENT)
Dept: OTHER | Facility: OTHER | Age: 39
End: 2019-05-14

## 2019-05-14 NOTE — PROGRESS NOTES
"Last 5 Patient Entered Readings                                      Current 30 Day Average: 134/89     Recent Readings 5/13/2019 5/9/2019 5/8/2019 5/7/2019 5/6/2019    SBP (mmHg) 122 142 124 137 136    DBP (mmHg) 81 87 81 83 85    Pulse 78 75 70 65 78          LVM @ 7:45 AM - patient request was to receive calls "the earlier the better". Will follow up in a week if patient doesn't return call.  5/22 @ 7:40 AM - LVM    Digital Medicine: Health  Follow Up    Lifestyle Modifications:    1.Dietary Modifications (Sodium intake <2,000mg/day, food labels, dining out): Patient noticed that her diastolic number has been higher, yet gradually getting lower. We discussed that diet can play a role in the diastolic number, and especially salt intake. She drinks cold peach iced tea drinks, and we discussed the sugar, caffeine, and possible salt content of it and that caffeine can affect the systolic number.     2.Physical Activity: deferred    3.Medication Therapy: Patient has been compliant with the medication regimen.    4.Patient has the following medication side effects/concerns: She didn't report any side effects or concerns.  (Frequency/Alleviating factors/Precipitating factors, etc.)     Patient reports that she is "up and down" because her "nerves" are affected by her job. She has been taking her blood pressure readings during a quick break at work. We discussed that stress management can affect the systolic number. She reported that she "takes a breather" to help herself to relax from her stress. We discussed taking her blood pressure reading during a time when she is more relaxed, after work. We also reviewed the "five minute" rule of resting before taking the reading.    Follow up with Ms. Kaylynnhilda Ly completed. No further questions or concerns. Will continue to follow up to achieve health goals.    "

## 2019-06-20 ENCOUNTER — PATIENT OUTREACH (OUTPATIENT)
Dept: OTHER | Facility: OTHER | Age: 39
End: 2019-06-20

## 2019-06-20 NOTE — PROGRESS NOTES
"Last 5 Patient Entered Readings                                      Current 30 Day Average: 135/85     Recent Readings 6/18/2019 6/13/2019 6/11/2019 6/9/2019 6/9/2019    SBP (mmHg) 135 135 143 122 126    DBP (mmHg) 87 84 95 85 80    Pulse 73 82 77 88 83        LVM to complete coaching check in call. - LVM to complete coaching follow up call 7/3@ 4:25pm. 7/24@3:00pm - ". 8/14@3:45pm - ".    "

## 2019-06-21 DIAGNOSIS — I10 ESSENTIAL HYPERTENSION: Primary | ICD-10-CM

## 2019-06-21 RX ORDER — NIFEDIPINE 90 MG/1
90 TABLET, EXTENDED RELEASE ORAL DAILY
Qty: 90 TABLET | Refills: 0 | Status: SHIPPED | OUTPATIENT
Start: 2019-06-21 | End: 2019-09-16 | Stop reason: SDUPTHER

## 2019-06-27 ENCOUNTER — OFFICE VISIT (OUTPATIENT)
Dept: PRIMARY CARE CLINIC | Facility: CLINIC | Age: 39
End: 2019-06-27
Attending: FAMILY MEDICINE
Payer: COMMERCIAL

## 2019-06-27 VITALS
DIASTOLIC BLOOD PRESSURE: 82 MMHG | HEART RATE: 84 BPM | BODY MASS INDEX: 27.88 KG/M2 | SYSTOLIC BLOOD PRESSURE: 132 MMHG | HEIGHT: 65 IN | WEIGHT: 167.31 LBS

## 2019-06-27 DIAGNOSIS — J32.1 CHRONIC FRONTAL SINUSITIS: Primary | ICD-10-CM

## 2019-06-27 DIAGNOSIS — I10 ESSENTIAL HYPERTENSION: ICD-10-CM

## 2019-06-27 PROCEDURE — 99999 PR PBB SHADOW E&M-EST. PATIENT-LVL IV: ICD-10-PCS | Mod: PBBFAC,,, | Performed by: FAMILY MEDICINE

## 2019-06-27 PROCEDURE — 3075F SYST BP GE 130 - 139MM HG: CPT | Mod: CPTII,S$GLB,, | Performed by: FAMILY MEDICINE

## 2019-06-27 PROCEDURE — 99999 PR PBB SHADOW E&M-EST. PATIENT-LVL IV: CPT | Mod: PBBFAC,,, | Performed by: FAMILY MEDICINE

## 2019-06-27 PROCEDURE — 96372 PR INJECTION,THERAP/PROPH/DIAG2ST, IM OR SUBCUT: ICD-10-PCS | Mod: S$GLB,,, | Performed by: FAMILY MEDICINE

## 2019-06-27 PROCEDURE — 3008F PR BODY MASS INDEX (BMI) DOCUMENTED: ICD-10-PCS | Mod: CPTII,S$GLB,, | Performed by: FAMILY MEDICINE

## 2019-06-27 PROCEDURE — 3075F PR MOST RECENT SYSTOLIC BLOOD PRESS GE 130-139MM HG: ICD-10-PCS | Mod: CPTII,S$GLB,, | Performed by: FAMILY MEDICINE

## 2019-06-27 PROCEDURE — 99214 OFFICE O/P EST MOD 30 MIN: CPT | Mod: 25,S$GLB,, | Performed by: FAMILY MEDICINE

## 2019-06-27 PROCEDURE — 3079F PR MOST RECENT DIASTOLIC BLOOD PRESSURE 80-89 MM HG: ICD-10-PCS | Mod: CPTII,S$GLB,, | Performed by: FAMILY MEDICINE

## 2019-06-27 PROCEDURE — 3079F DIAST BP 80-89 MM HG: CPT | Mod: CPTII,S$GLB,, | Performed by: FAMILY MEDICINE

## 2019-06-27 PROCEDURE — 99214 PR OFFICE/OUTPT VISIT, EST, LEVL IV, 30-39 MIN: ICD-10-PCS | Mod: 25,S$GLB,, | Performed by: FAMILY MEDICINE

## 2019-06-27 PROCEDURE — 3008F BODY MASS INDEX DOCD: CPT | Mod: CPTII,S$GLB,, | Performed by: FAMILY MEDICINE

## 2019-06-27 PROCEDURE — 96372 THER/PROPH/DIAG INJ SC/IM: CPT | Mod: S$GLB,,, | Performed by: FAMILY MEDICINE

## 2019-06-27 RX ORDER — TRIAMCINOLONE ACETONIDE 40 MG/ML
40 INJECTION, SUSPENSION INTRA-ARTICULAR; INTRAMUSCULAR
Status: COMPLETED | OUTPATIENT
Start: 2019-06-27 | End: 2019-06-27

## 2019-06-27 RX ADMIN — TRIAMCINOLONE ACETONIDE 40 MG: 40 INJECTION, SUSPENSION INTRA-ARTICULAR; INTRAMUSCULAR at 08:06

## 2019-06-27 NOTE — PROGRESS NOTES
"Per order, patient to receive 1mL of Kenalog (triamcinolone acetonide) 40mg/mL. The area of injection was palpated using the medial fold and the iliac crest as anatomical landmarks. Patient was advised to relax the muscle. The area was cleaned with alcohol and allowed to dry. Using a 25g 1.5" needle, 1mL of Kenalog (triamcinolone acetonide) 40mg/mL was placed intramuscularly into the left upper outer gluteal quadrant. Patient experienced no complications and was discharged in stable condition. Kenalog Lot: [id number] Exp: 01/2021.  Erin Bethea LPN      "

## 2019-06-27 NOTE — PATIENT INSTRUCTIONS
Allegra 12 hr    [facility] policy,advises that you take your blood pressure medication two hours prior to coming to your scheduled appointment.  If at that time your blood pressure is elevated you will have to return within 2 -4 weeks for a nurse blood pressure follow up until control.    Thanks for choosing [facility].

## 2019-06-27 NOTE — PROGRESS NOTES
"Subjective:       Patient ID: Pernell Ly is a 39 y.o. female.    Vitals:  height is 5' 5" (1.651 m) and weight is 75.9 kg (167 lb 5.3 oz). Her blood pressure is 132/82 and her pulse is 84.     Chief Complaint: Hypertension and Sinus Problem (nasal congestion x )    Here not long ago with similar symptoms. Says she got better shortly but came back even worse. Took treatment plan and still using. Says cough is worse in the afternoon but constant all day./    Cough   Associated symptoms include shortness of breath and wheezing. Pertinent negatives include no chills, ear pain, eye redness, fever, hemoptysis, myalgias, rash or sore throat.   Hypertension   The current episode started more than 1 year ago. The problem has been gradually improving since onset. The problem is uncontrolled. Associated symptoms include shortness of breath.   Sinus Problem   Associated symptoms include congestion, coughing and shortness of breath. Pertinent negatives include no chills, diaphoresis, ear pain, sinus pressure or sore throat.       Constitution: Negative for chills, sweating, fatigue and fever.   HENT: Positive for congestion. Negative for ear pain, ear discharge, sinus pain, sinus pressure, sore throat and voice change.    Neck: Negative for painful lymph nodes.   Eyes: Negative for eye redness.   Respiratory: Positive for chest tightness, cough, sputum production, shortness of breath and wheezing. Negative for bloody sputum, COPD, stridor and asthma.    Gastrointestinal: Positive for abdominal pain. Negative for nausea, vomiting and constipation.   Genitourinary: Negative for dysuria, frequency and urgency.   Musculoskeletal: Negative for muscle ache.   Skin: Negative for rash.   Allergic/Immunologic: Negative for seasonal allergies and asthma.   Hematologic/Lymphatic: Negative for swollen lymph nodes.       Objective:      Physical Exam   Constitutional: She is oriented to person, place, and time. She appears " well-developed and well-nourished. She is cooperative.  Non-toxic appearance. She does not appear ill. No distress.   HENT:   Head: Normocephalic and atraumatic.   Right Ear: Hearing, external ear and ear canal normal. Tympanic membrane is bulging. A middle ear effusion is present.   Left Ear: Hearing, external ear and ear canal normal. Tympanic membrane is bulging. A middle ear effusion is present.   Nose: Mucosal edema and rhinorrhea present. No nasal deformity. No epistaxis. Right sinus exhibits maxillary sinus tenderness. Right sinus exhibits no frontal sinus tenderness. Left sinus exhibits maxillary sinus tenderness. Left sinus exhibits no frontal sinus tenderness.   Mouth/Throat: Uvula is midline and mucous membranes are normal. No trismus in the jaw. Normal dentition. No uvula swelling. Posterior oropharyngeal edema and posterior oropharyngeal erythema present.   Eyes: Pupils are equal, round, and reactive to light. Conjunctivae, EOM and lids are normal. No scleral icterus.   Sclera clear bilat   Neck: Trachea normal, normal range of motion, full passive range of motion without pain and phonation normal. Neck supple.   Cardiovascular: Normal rate, regular rhythm, normal heart sounds, intact distal pulses and normal pulses.   Pulmonary/Chest: Effort normal. No respiratory distress. She has no wheezes. She has no rhonchi.   coughing   Abdominal: Soft. Normal appearance and bowel sounds are normal. She exhibits no distension. There is no tenderness.   Musculoskeletal: Normal range of motion. She exhibits no edema or deformity.   Neurological: She is alert and oriented to person, place, and time. She exhibits normal muscle tone. Coordination normal.   Skin: Skin is warm, dry and intact. She is not diaphoretic. No pallor.   Psychiatric: She has a normal mood and affect. Her speech is normal and behavior is normal. Judgment and thought content normal. Cognition and memory are normal.   Nursing note and vitals  reviewed.      Assessment:       1. Chronic frontal sinusitis    2. Essential hypertension        Plan:         Chronic frontal sinusitis  -     Ambulatory Referral to ENT  -     triamcinolone acetonide injection 40 mg    Essential hypertension    controlled on meds  RTC prn symptoms  encouraged to take allegra 12 hr BID as well as refer to ENT should she have this level of congestion again. No obvious ssx of an infection at this time

## 2019-07-23 ENCOUNTER — OFFICE VISIT (OUTPATIENT)
Dept: OTOLARYNGOLOGY | Facility: CLINIC | Age: 39
End: 2019-07-23
Payer: COMMERCIAL

## 2019-07-23 VITALS
WEIGHT: 165 LBS | BODY MASS INDEX: 27.46 KG/M2 | SYSTOLIC BLOOD PRESSURE: 145 MMHG | HEART RATE: 82 BPM | DIASTOLIC BLOOD PRESSURE: 101 MMHG

## 2019-07-23 DIAGNOSIS — K08.9 POOR DENTITION: ICD-10-CM

## 2019-07-23 DIAGNOSIS — J31.0 CHRONIC RHINITIS: Primary | ICD-10-CM

## 2019-07-23 PROCEDURE — 3080F PR MOST RECENT DIASTOLIC BLOOD PRESSURE >= 90 MM HG: ICD-10-PCS | Mod: CPTII,S$GLB,, | Performed by: NURSE PRACTITIONER

## 2019-07-23 PROCEDURE — 3008F BODY MASS INDEX DOCD: CPT | Mod: CPTII,S$GLB,, | Performed by: NURSE PRACTITIONER

## 2019-07-23 PROCEDURE — 3008F PR BODY MASS INDEX (BMI) DOCUMENTED: ICD-10-PCS | Mod: CPTII,S$GLB,, | Performed by: NURSE PRACTITIONER

## 2019-07-23 PROCEDURE — 3077F SYST BP >= 140 MM HG: CPT | Mod: CPTII,S$GLB,, | Performed by: NURSE PRACTITIONER

## 2019-07-23 PROCEDURE — 3077F PR MOST RECENT SYSTOLIC BLOOD PRESSURE >= 140 MM HG: ICD-10-PCS | Mod: CPTII,S$GLB,, | Performed by: NURSE PRACTITIONER

## 2019-07-23 PROCEDURE — 99999 PR PBB SHADOW E&M-EST. PATIENT-LVL III: CPT | Mod: PBBFAC,,, | Performed by: NURSE PRACTITIONER

## 2019-07-23 PROCEDURE — 99203 PR OFFICE/OUTPT VISIT, NEW, LEVL III, 30-44 MIN: ICD-10-PCS | Mod: S$GLB,,, | Performed by: NURSE PRACTITIONER

## 2019-07-23 PROCEDURE — 3080F DIAST BP >= 90 MM HG: CPT | Mod: CPTII,S$GLB,, | Performed by: NURSE PRACTITIONER

## 2019-07-23 PROCEDURE — 99999 PR PBB SHADOW E&M-EST. PATIENT-LVL III: ICD-10-PCS | Mod: PBBFAC,,, | Performed by: NURSE PRACTITIONER

## 2019-07-23 PROCEDURE — 99203 OFFICE O/P NEW LOW 30 MIN: CPT | Mod: S$GLB,,, | Performed by: NURSE PRACTITIONER

## 2019-07-23 RX ORDER — FLUTICASONE PROPIONATE 50 MCG
2 SPRAY, SUSPENSION (ML) NASAL DAILY
Qty: 1 BOTTLE | Refills: 5 | Status: SHIPPED | OUTPATIENT
Start: 2019-07-23 | End: 2019-08-22

## 2019-07-23 NOTE — LETTER
July 23, 2019      Zoey Nelson MD  [address] [address]           [facility] - Otorhinolaryngology  [address]  [place] [address]  Phone: [phone]  Fax: [phone]          Patient: Pernell Ly   MR Number: [id number]   YOB: 1980   Date of Visit: 7/23/2019       Dear Dr. Zoey Nelson:    Thank you for referring Pernell Ly to me for evaluation. Attached you will find relevant portions of my assessment and plan of care.    If you have questions, please do not hesitate to call me. I look forward to following Pernell Ly along with you.    Sincerely,    Antonio Oviedo, NP    Enclosure  CC:  No Recipients    If you would like to receive this communication electronically, please contact [email or web address] or [phone] to request more information on [facility] access.    For providers and/or their staff who would like to refer a patient to [facility], please contact us through our one-stop-shop provider referral line, [facility], at [phone].    If you feel you have received this communication in error or would no longer like to receive these types of communications, please e-mail [email or web address]

## 2019-07-23 NOTE — PROGRESS NOTES
Subjective:      Pernell Ly is a 39 y.o. female who was referred to me by Dr. Zoey Nelson in consultation for sinusitis.    Ms. Ly reports nasal congestion for the past 4 months. She has associated itchy nose and eyes, sneezing. She denies fever, sinus pressure or pain, hyposmia or halitosis. She has tried Nasacort, Allegra, Xyzal and Mucinex without benefit. She reports significant improvement following a steroid injection two weeks ago. She denies any recent antibiotics.    Current sinonasal medications as above.  She does not regularly use nasal decongestant sprays.    She does not recall previously having allergy testing.    She denies a history of asthma.    She relates a history of reflux symptoms which is currently managed with antacids as needed..  She has not previously had an EGD.    She denies have a diagnosis of obstructive sleep apnea.     She has not had sinonasal surgery.    She does not recall a prior history of nasal trauma.    SNOT-22 score = 36, NOSE score = 90%        Past Medical History  She has a past medical history of Encounter for blood transfusion, Fibroids, Hypertension, Iron deficiency anemia, Menorrhagia, Migraine, and Seizures.    Past Surgical History  She has a past surgical history that includes Tubal ligation;  section; and Hysterectomy (2017).    Family History  Her family history includes Breast cancer in her maternal grandmother; Cancer in her maternal grandfather; Colon cancer in her maternal cousin; Hypertension in her father, maternal grandmother, paternal grandmother, and sister.    Social History  She reports that she has never smoked. She has never used smokeless tobacco. She reports that she does not drink alcohol or use drugs.    Allergies  She has No Known Allergies.    Medications   She has a current medication list which includes the following prescription(s): ferrous sulfate, ibuprofen, levocetirizine, multivit-iron-min-folic acid,  nifedipine, polyethylene glycol, potassium chloride, and fluticasone propionate.    Review of Systems  Review of Systems   Constitutional: Negative for chills, fatigue and fever.   HENT: Positive for congestion, dental problem, postnasal drip, rhinorrhea and sneezing. Negative for facial swelling, nosebleeds, sinus pressure, sinus pain, sore throat and tinnitus.    Eyes: Positive for itching. Negative for photophobia, redness and visual disturbance.   Respiratory: Negative for apnea, cough, shortness of breath, wheezing and stridor.    Cardiovascular: Negative for chest pain and palpitations.   Gastrointestinal: Negative for diarrhea, nausea and vomiting.   Endocrine: Negative.    Genitourinary: Negative for decreased urine volume, dysuria and frequency.   Musculoskeletal: Negative for arthralgias, myalgias and neck stiffness.   Skin: Negative for rash and wound.   Allergic/Immunologic: Positive for environmental allergies. Negative for food allergies and immunocompromised state.   Neurological: Negative for dizziness, syncope, weakness, light-headedness and headaches.   Hematological: Negative for adenopathy. Does not bruise/bleed easily.   Psychiatric/Behavioral: Negative for confusion, decreased concentration and sleep disturbance.          Objective:     BP (!) 145/101   Pulse 82   Wt 74.8 kg (165 lb)   LMP 11/01/2017   BMI 27.46 kg/m²        Constitutional:   She is oriented to person, place, and time. Vital signs are normal. She appears well-developed and well-nourished. She appears alert. Normal speech.      Head:  Normocephalic and atraumatic.     Ears:    Right Ear: No lacerations. No drainage, swelling or tenderness. No foreign bodies. No mastoid tenderness. Tympanic membrane is not injected, not scarred, not perforated, not erythematous, not retracted and not bulging. Tympanic membrane mobility is normal. No middle ear effusion. No hemotympanum.   Left Ear: No lacerations. No drainage, swelling or  tenderness. No foreign bodies. No mastoid tenderness. Tympanic membrane is not injected, not scarred, not perforated, not erythematous, not retracted and not bulging. Tympanic membrane mobility is normal.  No middle ear effusion. No hemotympanum.     Nose:  Mucosal edema and rhinorrhea present. No nose lacerations, sinus tenderness, septal deviation, nasal septal hematoma or polyps. No epistaxis.  No foreign bodies. Turbinate hypertrophy.  Right sinus exhibits no maxillary sinus tenderness and no frontal sinus tenderness. Left sinus exhibits no maxillary sinus tenderness and no frontal sinus tenderness.     Mouth/Throat  Oropharynx clear and moist without lesions or asymmetry and normal uvula midline. Abnormal dentition. Dental caries present. No uvula swelling, oral lesions, trismus, mucous membrane lesions or xerostomia. No oropharyngeal exudate, posterior oropharyngeal edema or posterior oropharyngeal erythema.   Tonsils 1+ bilaterally    Multiple fractured teeth with erythematous gums      Neck:  Neck normal without thyromegaly masses, asymmetry, normal tracheal structure, crepitus, and tenderness and no adenopathy.     Cardiovascular:   Normal pulses.      Pulmonary/Chest:   Effort normal.     Psychiatric:   She has a normal mood and affect. Her speech is normal and behavior is normal.     Neurological:   She is alert and oriented to person, place, and time. No cranial nerve deficit.     Skin:   No abrasions, lacerations, lesions, or rashes.       Procedure    None      Data Reviewed    WBC (K/uL)   Date Value   03/11/2019 4.89     Eosinophil% (%)   Date Value   03/11/2019 8.0     Eos # (K/uL)   Date Value   03/11/2019 0.4     Platelets (K/uL)   Date Value   03/11/2019 370 (H)     Glucose (mg/dL)   Date Value   03/11/2019 94     No results found for: IGE    No sinus imaging available.       Assessment:     1. Chronic rhinitis         Plan:     I had a long discussion with the patient regarding her condition and  the further workup and management options.   Ms. Ly has chronic rhinitis.   I ordered fluticasone nasal spray - two sprays each nostrils once daily.  Stop Nasacort.  Continue Allegra for allergic rhinitis.  Continue Mucinex for thickened secretions.    I recommended she see a dentist for poor dentition.    Follow up in 1-2 months.    No follow-ups on file.

## 2019-08-17 ENCOUNTER — HOSPITAL ENCOUNTER (EMERGENCY)
Facility: OTHER | Age: 39
Discharge: HOME | End: 2019-08-17
Attending: EMERGENCY MEDICINE
Payer: COMMERCIAL

## 2019-08-17 VITALS
DIASTOLIC BLOOD PRESSURE: 83 MMHG | HEART RATE: 96 BPM | TEMPERATURE: 99 F | HEIGHT: 64 IN | OXYGEN SATURATION: 97 % | RESPIRATION RATE: 19 BRPM | BODY MASS INDEX: 27.31 KG/M2 | WEIGHT: 160 LBS | SYSTOLIC BLOOD PRESSURE: 128 MMHG

## 2019-08-17 DIAGNOSIS — R56.9 SEIZURE: Primary | ICD-10-CM

## 2019-08-17 LAB
ALBUMIN SERPL BCP-MCNC: 3.9 G/DL (ref 3.5–5.2)
ALP SERPL-CCNC: 92 U/L (ref 55–135)
ALT SERPL W/O P-5'-P-CCNC: 11 U/L (ref 10–44)
AMPHET+METHAMPHET UR QL: NEGATIVE
ANION GAP SERPL CALC-SCNC: 11 MMOL/L (ref 8–16)
AST SERPL-CCNC: 13 U/L (ref 10–40)
B-HCG UR QL: NEGATIVE
BARBITURATES UR QL SCN>200 NG/ML: NEGATIVE
BASOPHILS # BLD AUTO: 0.02 K/UL (ref 0–0.2)
BASOPHILS NFR BLD: 0.2 % (ref 0–1.9)
BENZODIAZ UR QL SCN>200 NG/ML: NEGATIVE
BILIRUB SERPL-MCNC: 0.5 MG/DL (ref 0.1–1)
BUN SERPL-MCNC: 12 MG/DL (ref 6–20)
BZE UR QL SCN: NEGATIVE
CALCIUM SERPL-MCNC: 9.6 MG/DL (ref 8.7–10.5)
CANNABINOIDS UR QL SCN: NEGATIVE
CHLORIDE SERPL-SCNC: 103 MMOL/L (ref 95–110)
CO2 SERPL-SCNC: 25 MMOL/L (ref 23–29)
CREAT SERPL-MCNC: 0.7 MG/DL (ref 0.5–1.4)
CREAT UR-MCNC: 76.5 MG/DL (ref 15–325)
CTP QC/QA: YES
DIFFERENTIAL METHOD: ABNORMAL
EOSINOPHIL # BLD AUTO: 0 K/UL (ref 0–0.5)
EOSINOPHIL NFR BLD: 0.3 % (ref 0–8)
ERYTHROCYTE [DISTWIDTH] IN BLOOD BY AUTOMATED COUNT: 14 % (ref 11.5–14.5)
EST. GFR  (AFRICAN AMERICAN): >60 ML/MIN/1.73 M^2
EST. GFR  (NON AFRICAN AMERICAN): >60 ML/MIN/1.73 M^2
GLUCOSE SERPL-MCNC: 122 MG/DL (ref 70–110)
HCT VFR BLD AUTO: 37.3 % (ref 37–48.5)
HGB BLD-MCNC: 12.4 G/DL (ref 12–16)
IMM GRANULOCYTES # BLD AUTO: 0.03 K/UL (ref 0–0.04)
IMM GRANULOCYTES NFR BLD AUTO: 0.3 % (ref 0–0.5)
LYMPHOCYTES # BLD AUTO: 1.2 K/UL (ref 1–4.8)
LYMPHOCYTES NFR BLD: 13.7 % (ref 18–48)
MCH RBC QN AUTO: 28.6 PG (ref 27–31)
MCHC RBC AUTO-ENTMCNC: 33.2 G/DL (ref 32–36)
MCV RBC AUTO: 86 FL (ref 82–98)
METHADONE UR QL SCN>300 NG/ML: NEGATIVE
MONOCYTES # BLD AUTO: 0.6 K/UL (ref 0.3–1)
MONOCYTES NFR BLD: 6.2 % (ref 4–15)
NEUTROPHILS # BLD AUTO: 7.1 K/UL (ref 1.8–7.7)
NEUTROPHILS NFR BLD: 79.3 % (ref 38–73)
NRBC BLD-RTO: 0 /100 WBC
OPIATES UR QL SCN: NEGATIVE
PCP UR QL SCN>25 NG/ML: NEGATIVE
PLATELET # BLD AUTO: 312 K/UL (ref 150–350)
PMV BLD AUTO: 8.5 FL (ref 9.2–12.9)
POTASSIUM SERPL-SCNC: 3.3 MMOL/L (ref 3.5–5.1)
PROT SERPL-MCNC: 8.5 G/DL (ref 6–8.4)
RBC # BLD AUTO: 4.33 M/UL (ref 4–5.4)
SODIUM SERPL-SCNC: 139 MMOL/L (ref 136–145)
TOXICOLOGY INFORMATION: NORMAL
WBC # BLD AUTO: 8.9 K/UL (ref 3.9–12.7)

## 2019-08-17 PROCEDURE — 85025 COMPLETE CBC W/AUTO DIFF WBC: CPT

## 2019-08-17 PROCEDURE — 80307 DRUG TEST PRSMV CHEM ANLYZR: CPT

## 2019-08-17 PROCEDURE — 81025 URINE PREGNANCY TEST: CPT | Performed by: EMERGENCY MEDICINE

## 2019-08-17 PROCEDURE — 96365 THER/PROPH/DIAG IV INF INIT: CPT

## 2019-08-17 PROCEDURE — 96366 THER/PROPH/DIAG IV INF ADDON: CPT

## 2019-08-17 PROCEDURE — 99284 EMERGENCY DEPT VISIT MOD MDM: CPT | Mod: 25

## 2019-08-17 PROCEDURE — 63600175 PHARM REV CODE 636 W HCPCS: Performed by: EMERGENCY MEDICINE

## 2019-08-17 PROCEDURE — 80053 COMPREHEN METABOLIC PANEL: CPT

## 2019-08-17 RX ORDER — LEVETIRACETAM 500 MG/1
500 TABLET ORAL 2 TIMES DAILY
Qty: 60 TABLET | Refills: 0 | Status: SHIPPED | OUTPATIENT
Start: 2019-08-17 | End: 2021-05-25

## 2019-08-17 RX ORDER — LEVETIRACETAM 10 MG/ML
1000 INJECTION INTRAVASCULAR
Status: COMPLETED | OUTPATIENT
Start: 2019-08-17 | End: 2019-08-17

## 2019-08-17 RX ADMIN — LEVETIRACETAM 1000 MG: 10 INJECTION INTRAVENOUS at 02:08

## 2019-08-17 NOTE — ED NOTES
Pt c/o seizure activity while trying to go to sleep. Pt's  states pt had the seizure in bed and no trauma. Pt is awake slightly confused. Pt states she had one other seizure Oct 2018. Pt states no source of seizure activity has been found. No respiratory distress observed. Skin is warm and dry w/ pink mucosa. VS. MICHAELA x 3mm. BBS- CTA. Abd- SNT. PSM x 4 exts. Pt is connected to the pulse ox and B/P cuff. Bed is locked and in the low position w/ the side rails up and locked for pt safety. Call bell and  at the BS. Pt is in direct line of view of the RN station. Will continue to monitor closely.

## 2019-08-17 NOTE — ED PROVIDER NOTES
Encounter Date: 2019    SCRIBE #1 NOTE: Sari FINCH, am scribing for, and in the presence of, Dr. Hinojosa.       History     Chief Complaint   Patient presents with    Seizures     Pt came to the ED fouzia c.o. seizure wittnessed by family. no hx. pt is postictal GCS 13     Time seen by provider: 2:31 AM    This is a 39 y.o. female who presents with complaint of s/p seizure via EMS. Pt states she woke up confused and unaware of what happened. She reports current headache, right arm pain, and left leg pain. She did not hit her head; family caught her before she fell. Pt reports she experienced seizures as a child and her last seizure prior to this episode was about 7 months ago. She was seen by a Neurologist and was prescribed medication, but she never filled the prescription.     The history is provided by the patient.     Review of patient's allergies indicates:  No Known Allergies  Past Medical History:   Diagnosis Date    Encounter for blood transfusion     Fibroids     Hypertension     Iron deficiency anemia     Menorrhagia     Migraine     Seizures     as baby     Past Surgical History:   Procedure Laterality Date     SECTION      x1    CYSTOSCOPY N/A 2017    [facility]    HYSTERECTOMY  2017    LAPAROSCOPY-DIAGNOSTIC (ADD ON) N/A 2017    [facility] at [facility] OR    ROBOTIC ASSISTED LAPAROSCOPIC HYSTERECTOMY N/A 2017    Performed by [facility] at [facility] OR    SALPINGECTOMY-ROBOTIC ASSISTED Bilateral 2017    [facility] at [facility] OR    TUBAL LIGATION       Family History   Problem Relation Age of Onset    Hypertension Father     Hypertension Sister     Hypertension Maternal Grandmother     Breast cancer Maternal Grandmother     Cancer Maternal Grandfather     Hypertension Paternal Grandmother     Colon cancer Maternal Cousin     Ovarian  cancer Neg Hx      Social History     Tobacco Use    Smoking status: Never Smoker    Smokeless tobacco: Never Used   Substance Use Topics    Alcohol use: No     Comment: Occasional; 3x /month    Drug use: No     Review of Systems   Constitutional: Negative for chills and fever.   HENT: Negative for congestion, rhinorrhea and sore throat.    Eyes: Negative for visual disturbance.   Respiratory: Negative for cough and shortness of breath.    Cardiovascular: Negative for chest pain.   Gastrointestinal: Negative for abdominal pain, diarrhea, nausea and vomiting.   Genitourinary: Negative for dysuria.   Musculoskeletal: Negative for back pain.        Positive right arm pain. Positive left leg pain.   Skin: Negative for rash.   Neurological: Positive for seizures and headaches. Negative for dizziness, weakness and light-headedness.   Psychiatric/Behavioral: Positive for confusion.   All other systems reviewed and are negative.      Physical Exam     Initial Vitals [08/17/19 0142]   BP Pulse Resp Temp SpO2   135/72 (!) 122 20 98.5 °F (36.9 °C) 99 %      MAP       --         Physical Exam    Nursing note and vitals reviewed.  Constitutional: She appears well-developed and well-nourished. She is not diaphoretic. No distress.   HENT:   Head: Normocephalic and atraumatic.   Nose: Nose normal.   Eyes: Conjunctivae and EOM are normal. Pupils are equal, round, and reactive to light.   Neck: Normal range of motion. Neck supple. No tracheal deviation present. No JVD present.   Cardiovascular: Normal rate, regular rhythm, normal heart sounds and intact distal pulses. Exam reveals no gallop and no friction rub.    No murmur heard.  Pulmonary/Chest: Breath sounds normal. No respiratory distress. She has no wheezes. She has no rhonchi. She has no rales. She exhibits no tenderness.   Abdominal: Soft. Bowel sounds are normal. She exhibits no distension and no mass. There is no tenderness. There is no rebound and no guarding.    Musculoskeletal: Normal range of motion. She exhibits no edema or tenderness.   Neurological: She is alert and oriented to person, place, and time. She has normal strength. She displays normal reflexes. No cranial nerve deficit or sensory deficit.   Skin: Skin is warm and dry. Capillary refill takes less than 2 seconds. No rash noted. No erythema.   Psychiatric: She has a normal mood and affect. Thought content normal.         ED Course   Procedures  Labs Reviewed   CBC W/ AUTO DIFFERENTIAL - Abnormal; Notable for the following components:       Result Value    MPV 8.5 (*)     Gran% 79.3 (*)     Lymph% 13.7 (*)     All other components within normal limits   COMPREHENSIVE METABOLIC PANEL - Abnormal; Notable for the following components:    Potassium 3.3 (*)     Glucose 122 (*)     Total Protein 8.5 (*)     All other components within normal limits   POCT URINE PREGNANCY - Normal   DRUG SCREEN PANEL, URINE EMERGENCY          Imaging Results    None          Medical Decision Making:   Differential Diagnosis:   CVA, TIA, epilepsy, status epilepticus, meningitis, electrolyte abnormality, hypoglycemia  Clinical Tests:   Lab Tests: Ordered and Reviewed            Scribe Attestation:   Scribe #1: I performed the above scribed service and the documentation accurately describes the services I performed. I attest to the accuracy of the note.    Attending Attestation:           Physician Attestation for Scribe:  Physician Attestation Statement for Scribe #1: I, Dr. Hinojosa, reviewed documentation, as scribed by Sari Celaya in my presence, and it is both accurate and complete.                 ED Course as of Aug 18 0607   Sat Aug 17, 2019   0340 Discussed with patient starting Keppra.  Patient is amenable to trying to medication.  Patient reports that she has been under large amount of stress recently.  Specifically her son left for college today.  Discussed with patient seizure triggers and other things which lower  seizure threshold.  Discussed with her seizure precautions.  Educated her on warning signs and symptoms which she must seek immediate medical attention.    [MA]      ED Course User Index  [MA] Ross Hinojosa MD     Clinical Impression:     1. Seizure                                   Ross Hinojosa MD  08/18/19 0608

## 2019-09-03 ENCOUNTER — OFFICE VISIT (OUTPATIENT)
Dept: PRIMARY CARE CLINIC | Facility: CLINIC | Age: 39
End: 2019-09-03
Attending: FAMILY MEDICINE
Payer: COMMERCIAL

## 2019-09-03 VITALS
DIASTOLIC BLOOD PRESSURE: 86 MMHG | SYSTOLIC BLOOD PRESSURE: 122 MMHG | WEIGHT: 161.69 LBS | HEART RATE: 77 BPM | HEIGHT: 65 IN | BODY MASS INDEX: 26.94 KG/M2

## 2019-09-03 DIAGNOSIS — I10 ESSENTIAL HYPERTENSION: ICD-10-CM

## 2019-09-03 DIAGNOSIS — R56.9 SEIZURES: Primary | ICD-10-CM

## 2019-09-03 PROCEDURE — 99999 PR PBB SHADOW E&M-EST. PATIENT-LVL III: CPT | Mod: PBBFAC,,, | Performed by: FAMILY MEDICINE

## 2019-09-03 PROCEDURE — 90686 FLU VACCINE (QUAD) GREATER THAN OR EQUAL TO 3YO PRESERVATIVE FREE IM: ICD-10-PCS | Mod: S$GLB,,, | Performed by: FAMILY MEDICINE

## 2019-09-03 PROCEDURE — 90686 IIV4 VACC NO PRSV 0.5 ML IM: CPT | Mod: S$GLB,,, | Performed by: FAMILY MEDICINE

## 2019-09-03 PROCEDURE — 90471 IMMUNIZATION ADMIN: CPT | Mod: S$GLB,,, | Performed by: FAMILY MEDICINE

## 2019-09-03 PROCEDURE — 99214 OFFICE O/P EST MOD 30 MIN: CPT | Mod: 25,S$GLB,, | Performed by: FAMILY MEDICINE

## 2019-09-03 PROCEDURE — 99214 PR OFFICE/OUTPT VISIT, EST, LEVL IV, 30-39 MIN: ICD-10-PCS | Mod: 25,S$GLB,, | Performed by: FAMILY MEDICINE

## 2019-09-03 PROCEDURE — 90471 FLU VACCINE (QUAD) GREATER THAN OR EQUAL TO 3YO PRESERVATIVE FREE IM: ICD-10-PCS | Mod: S$GLB,,, | Performed by: FAMILY MEDICINE

## 2019-09-03 PROCEDURE — 99999 PR PBB SHADOW E&M-EST. PATIENT-LVL III: ICD-10-PCS | Mod: PBBFAC,,, | Performed by: FAMILY MEDICINE

## 2019-09-03 RX ORDER — FLUTICASONE PROPIONATE 50 UG/1
POWDER, METERED RESPIRATORY (INHALATION)
COMMUNITY
End: 2019-10-22

## 2019-09-03 NOTE — PROGRESS NOTES
"Patient was given vaccine information sheet for the Flu Vaccine. The area of injection was palpated using the acromion process as a landmark. This area was cleaned with alcohol. Using a 25g 1" safety needle, 0.5mL of the vaccine was placed into the left muscle. The injection site was dressed with a bandage. Patient experienced no complications and was discharged in stable condition. Fluzone vaccine Lot: [id number] Exp: 06/30/2020.  Erin Bethea LPN      "

## 2019-09-03 NOTE — LETTER
September 3, 2019      [facility]  [address] [facility] [address]  Phone: [phone]  Fax: [phone]       Patient: Pernell Ly   YOB: 1980  Date of Visit: 09/03/2019    To Whom It May Concern:    Henry Ly  was at [facility] on 09/03/2019.  She  may return to work on 09/03/2019.With no restrictions. If you have any questions or concerns, or if I can be of further assistance, please do not hesitate to contact me.    Sincerely,     Dr. JENI Nelson

## 2019-09-03 NOTE — PROGRESS NOTES
Transitional Care Note  Subjective:       Patient ID: Pernell Ly is a 39 y.o. female.  Chief Complaint: Seizures (x 2 ) and Follow-up    Family and/or Caretaker present at visit?  No.  Diagnostic tests reviewed/disposition: I have reviewed all completed as well as pending diagnostic tests at the time of discharge.  Disease/illness education: reviewed  Home health/community services discussion/referrals: does not have home health.   Establishment or re-establishment of referral orders for community resources: No other necessary community resources.   Discussion with other health care providers: No discussion with other health care providers necessary.   Pt presents after being admitted for seizures. Pt does have child mata h/o this. Had one few mos back, saw neuro placed on Keppra and opted not to take this med.  Pt is now taking after recent hospitalization.    Pt denies any n/v/HA/cp/sob    Pt found by her BF to be having a seizure      Review of Systems   Constitutional: Negative for activity change, appetite change, chills, fatigue, fever and unexpected weight change.   HENT: Negative for congestion, ear pain, hearing loss, rhinorrhea, sinus pressure, sore throat and trouble swallowing.    Eyes: Negative for photophobia, pain, discharge and visual disturbance.   Respiratory: Negative for apnea, cough, chest tightness, shortness of breath and wheezing.    Cardiovascular: Negative for chest pain, palpitations and leg swelling.   Gastrointestinal: Negative for abdominal distention, abdominal pain, blood in stool, constipation, diarrhea, nausea and vomiting.   Endocrine: Negative for polydipsia and polyuria.   Genitourinary: Negative.  Negative for difficulty urinating, dysuria, hematuria and menstrual problem.   Musculoskeletal: Negative for arthralgias, back pain, joint swelling and neck pain.   Skin: Negative.    Neurological: Negative for dizziness, tremors, weakness, numbness and headaches.    Psychiatric/Behavioral: Positive for dysphoric mood. Negative for behavioral problems, confusion, decreased concentration and sleep disturbance. The patient is not nervous/anxious.    All other systems reviewed and are negative.      Objective:      Physical Exam   Constitutional: She is oriented to person, place, and time. She appears well-developed and well-nourished.   HENT:   Head: Normocephalic and atraumatic.   Right Ear: External ear normal.   Left Ear: External ear normal.   Nose: Nose normal.   Mouth/Throat: Oropharynx is clear and moist. No oropharyngeal exudate.   Eyes: Pupils are equal, round, and reactive to light. EOM are normal.   Neck: Normal range of motion. Neck supple. No thyromegaly present.   Cardiovascular: Normal rate, regular rhythm, normal heart sounds and intact distal pulses.   No murmur heard.  Pulmonary/Chest: Effort normal and breath sounds normal. No stridor. No respiratory distress. She has no wheezes. She has no rales. She exhibits no tenderness.   Abdominal: Soft. Bowel sounds are normal. She exhibits no distension and no mass. There is no tenderness. There is no rebound and no guarding.   Musculoskeletal: Normal range of motion. She exhibits no edema or tenderness.   Lymphadenopathy:     She has no cervical adenopathy.   Neurological: She is alert and oriented to person, place, and time. She has normal reflexes. She displays normal reflexes. No cranial nerve deficit or sensory deficit. She exhibits normal muscle tone. Coordination normal.   Skin: Skin is warm and dry. No erythema.   Psychiatric: She has a normal mood and affect. Her behavior is normal. Judgment and thought content normal.       Assessment:       1. Seizures        Plan:     seizures: pt advised to stay with keppra. Advised to see neuro that she has seen in the past, appt made today for pt  ED prompts d/w pt  Advised NOT to drive until cleared by neuro    Seizures  -     Ambulatory Referral to Neurology    Other  orders  -     Influenza - Quadrivalent (6 months+) (PF)      RTC prn 6 mos

## 2019-09-10 RX ORDER — IBUPROFEN 600 MG/1
TABLET ORAL
Qty: 30 TABLET | Refills: 0 | Status: SHIPPED | OUTPATIENT
Start: 2019-09-10 | End: 2019-10-22

## 2019-09-12 ENCOUNTER — OFFICE VISIT (OUTPATIENT)
Dept: NEUROLOGY | Facility: CLINIC | Age: 39
End: 2019-09-12
Payer: COMMERCIAL

## 2019-09-12 VITALS
HEART RATE: 84 BPM | HEIGHT: 65 IN | WEIGHT: 160.69 LBS | BODY MASS INDEX: 26.77 KG/M2 | SYSTOLIC BLOOD PRESSURE: 138 MMHG | DIASTOLIC BLOOD PRESSURE: 92 MMHG

## 2019-09-12 DIAGNOSIS — R56.9 SEIZURE: Primary | ICD-10-CM

## 2019-09-12 PROCEDURE — 3080F PR MOST RECENT DIASTOLIC BLOOD PRESSURE >= 90 MM HG: ICD-10-PCS | Mod: CPTII,S$GLB,, | Performed by: PSYCHIATRY & NEUROLOGY

## 2019-09-12 PROCEDURE — 3075F PR MOST RECENT SYSTOLIC BLOOD PRESS GE 130-139MM HG: ICD-10-PCS | Mod: CPTII,S$GLB,, | Performed by: PSYCHIATRY & NEUROLOGY

## 2019-09-12 PROCEDURE — 3008F PR BODY MASS INDEX (BMI) DOCUMENTED: ICD-10-PCS | Mod: CPTII,S$GLB,, | Performed by: PSYCHIATRY & NEUROLOGY

## 2019-09-12 PROCEDURE — 99999 PR PBB SHADOW E&M-EST. PATIENT-LVL III: CPT | Mod: PBBFAC,,, | Performed by: PSYCHIATRY & NEUROLOGY

## 2019-09-12 PROCEDURE — 3075F SYST BP GE 130 - 139MM HG: CPT | Mod: CPTII,S$GLB,, | Performed by: PSYCHIATRY & NEUROLOGY

## 2019-09-12 PROCEDURE — 99215 OFFICE O/P EST HI 40 MIN: CPT | Mod: S$GLB,,, | Performed by: PSYCHIATRY & NEUROLOGY

## 2019-09-12 PROCEDURE — 99999 PR PBB SHADOW E&M-EST. PATIENT-LVL III: ICD-10-PCS | Mod: PBBFAC,,, | Performed by: PSYCHIATRY & NEUROLOGY

## 2019-09-12 PROCEDURE — 3008F BODY MASS INDEX DOCD: CPT | Mod: CPTII,S$GLB,, | Performed by: PSYCHIATRY & NEUROLOGY

## 2019-09-12 PROCEDURE — 3080F DIAST BP >= 90 MM HG: CPT | Mod: CPTII,S$GLB,, | Performed by: PSYCHIATRY & NEUROLOGY

## 2019-09-12 PROCEDURE — 99215 PR OFFICE/OUTPT VISIT, EST, LEVL V, 40-54 MIN: ICD-10-PCS | Mod: S$GLB,,, | Performed by: PSYCHIATRY & NEUROLOGY

## 2019-09-12 RX ORDER — ZONISAMIDE 100 MG/1
300 CAPSULE ORAL NIGHTLY
Qty: 90 CAPSULE | Refills: 11 | Status: SHIPPED | OUTPATIENT
Start: 2019-09-12 | End: 2020-08-24

## 2019-09-12 NOTE — LETTER
September 17, 2019      Zoey Nelson MD  [address]   [address]  [facility] [address]           [facility] Epile[facility]  [address], [address]  [facility] [address]  Phone: [phone]  Fax: [phone]          Patient: Pernell Ly   MR Number: [id number]   YOB: 1980   Date of Visit: 9/12/2019       Dear Dr. Zoey Nelson:    Thank you for referring Pernell Ly to me for evaluation. Attached you will find relevant portions of my assessment and plan of care.    If you have questions, please do not hesitate to call me. I look forward to following Pernell Ly along with you.    Sincerely,    Damien Nolasco MD    Enclosure  CC:  No Recipients    If you would like to receive this communication electronically, please contact [email or web address] or [phone] to request more information on [employer] access.    For providers and/or their staff who would like to refer a patient to [facility], please contact us through our one-stop-shop provider referral line, [facility], at [phone].    If you feel you have received this communication in error or would no longer like to receive these types of communications, please e-mail [email or web address]

## 2019-09-12 NOTE — PROGRESS NOTES
Name: Pernell Ly  MRN: [id number]   CSN: [id number]      Date: 2019    HISTORY OF PRESENT ILLNESS (HPI)  The patient is a 39 y.o. BF  The patient was initially referred for consultation by ER    -----------------------------------------------------------------------------------------------------------------------------------------------------------------------------------------------------------------------------------------------------------------------   Seizures       Pt came to the ED [facility].o. seizure wittnessed by family. no hx. pt is postictal GCS 13      Time seen by provider: 2:31 AM     This is a 39 y.o. female who presents with complaint of s/p seizure via EMS. Pt states she woke up confused and unaware of what happened. She reports current headache, right arm pain, and left leg pain. She did not hit her head; family caught her before she fell. Pt reports she experienced seizures as a child and her last seizure prior to this episode was about 7 months ago. She was seen by a Neurologist and was prescribed medication, but she never filled the prescription.      The history is provided by the patient.      Review of patient's allergies indicates:  No Known Allergies       Past Medical History:   Diagnosis Date    Encounter for blood transfusion      Fibroids      Hypertension      Iron deficiency anemia      Menorrhagia      Migraine      Seizures       as baby            Past Surgical History:   Procedure Laterality Date     SECTION         x1    CYSTOSCOPY N/A 2017     [facility]    HYSTERECTOMY   2017    LAPAROSCOPY-DIAGNOSTIC (ADD ON) N/A 2017     [facility]    ROBOTIC ASSISTED LAPAROSCOPIC HYSTERECTOMY N/A 2017     [facility] OR    SALPINGECTOMY-ROBOTIC ASSISTED Bilateral 2017     [facility] at [facility] OR     TUBAL LIGATION                Family History   Problem Relation Age of Onset    Hypertension Father      Hypertension Sister      Hypertension Maternal Grandmother      Breast cancer Maternal Grandmother      Cancer Maternal Grandfather      Hypertension Paternal Grandmother      Colon cancer Maternal Cousin      Ovarian cancer Neg Hx        Social History            Tobacco Use    Smoking status: Never Smoker    Smokeless tobacco: Never Used   Substance Use Topics    Alcohol use: No       Comment: Occasional; 3x /month    Drug use: No      Review of Systems   Constitutional: Negative for chills and fever.   HENT: Negative for congestion, rhinorrhea and sore throat.    Eyes: Negative for visual disturbance.   Respiratory: Negative for cough and shortness of breath.    Cardiovascular: Negative for chest pain.   Gastrointestinal: Negative for abdominal pain, diarrhea, nausea and vomiting.   Genitourinary: Negative for dysuria.   Musculoskeletal: Negative for back pain.        Positive right arm pain. Positive left leg pain.   Skin: Negative for rash.   Neurological: Positive for seizures and headaches. Negative for dizziness, weakness and light-headedness.   Psychiatric/Behavioral: Positive for confusion.   All other systems reviewed and are negative.        Physical Exam             Initial Vitals [08/17/19 0142]   BP Pulse Resp Temp SpO2   135/72 (!) 122 20 98.5 °F (36.9 °C) 99 %       MAP           --              Physical Exam     Nursing note and vitals reviewed.  Constitutional: She appears well-developed and well-nourished. She is not diaphoretic. No distress.   HENT:   Head: Normocephalic and atraumatic.   Nose: Nose normal.   Eyes: Conjunctivae and EOM are normal. Pupils are equal, round, and reactive to light.   Neck: Normal range of motion. Neck supple. No tracheal deviation present. No JVD present.   Cardiovascular: Normal rate, regular rhythm, normal heart sounds and intact distal pulses. Exam  reveals no gallop and no friction rub.    No murmur heard.  Pulmonary/Chest: Breath sounds normal. No respiratory distress. She has no wheezes. She has no rhonchi. She has no rales. She exhibits no tenderness.   Abdominal: Soft. Bowel sounds are normal. She exhibits no distension and no mass. There is no tenderness. There is no rebound and no guarding.   Musculoskeletal: Normal range of motion. She exhibits no edema or tenderness.   Neurological: She is alert and oriented to person, place, and time. She has normal strength. She displays normal reflexes. No cranial nerve deficit or sensory deficit.   Skin: Skin is warm and dry. Capillary refill takes less than 2 seconds. No rash noted. No erythema.   Psychiatric: She has a normal mood and affect. Thought content normal.            ED Course   Procedures        Labs Reviewed   CBC W/ AUTO DIFFERENTIAL - Abnormal; Notable for the following components:       Result Value      MPV 8.5 (*)       Gran% 79.3 (*)       Lymph% 13.7 (*)       All other components within normal limits   COMPREHENSIVE METABOLIC PANEL - Abnormal; Notable for the following components:     Potassium 3.3 (*)       Glucose 122 (*)       Total Protein 8.5 (*)       All other components within normal limits   POCT URINE PREGNANCY - Normal   DRUG SCREEN PANEL, URINE EMERGENCY         Imaging Results    None            Medical Decision Making:   Differential Diagnosis:   CVA, TIA, epilepsy, status epilepticus, meningitis, electrolyte abnormality, hypoglycemia  Clinical Tests:   Lab Tests: Ordered and Reviewed              Scribe Attestation:   Scribe #1: I performed the above scribed service and the documentation accurately describes the services I performed. I attest to the accuracy of the note.     Attending Attestation:               Physician Attestation for Scribe:  Physician Attestation Statement for Scribe #1: I, Dr. Hinojosa, reviewed documentation, as scribed by Sari Celaya in my presence,  and it is both accurate and complete.                   ED Course as of Aug 18 0607   Sat Aug 17, 2019   0340 Discussed with patient starting Keppra.  Patient is amenable to trying to medication.  Patient reports that she has been under large amount of stress recently.  Specifically her son left for college today.  Discussed with patient seizure triggers and other things which lower seizure threshold.  Discussed with her seizure precautions.  Educated her on warning signs and symptoms which she must seek immediate medical attention.    [MA]       Confirmed above...  GTC 3 weeks ago unprovoked to ER as above  This was second such occurrence  I actually saw pt one year ago with similar episode  W/U was neg--no meds started because first event with clean W/U  Now on Keppra 500mg BID per ER  Agree with AED now that happened twice           Seizure Triggers  Sleep Deprivation - None  Other medications - None  Psych/stress - None  Photic stimulation - None  Hyperventilation - None  Medical Problems - None  Menses - No  Sensory Stimulation (light, sound, etc) - None  Missed dose of meds - None    AED Treatments  Present regimen      Prior treatments      Not tried  acetazolamide (Diamox, AZM)  amantadine  carbamazepine (Tegretol, CBZ)  clobezam (Onfi or Frizium, CLB)  ethosuximide (Zarontin, ESM)  eslicarbazine (Aptiom, ESL)  felbamate (felbatol, FBM)  gabapentin (Neurontin, GBP)  lacosamide (Vimpat, LCM)   lamotrigine (Lamictal, LTG)   levetiracetam (Keppra, LEV)  methsuximide (Celontin, MSM)  methyphenytoin (Mesantion, MHT)  oxcarbazepine (Trileptal OXC)  perampanel (Fycompa, FCP)   phenobarbital (Pb)  phenytoin (Dilantin, PHT)  pregabalin (Lyrica, PGB)  primidone (Mysoline, PRM)  retigabine (Potiga, RTG)  rufinamide (Banzel, RUF)  tiagabine (Gabatril,  TGB)  topiramate (Topamax, TPM)  viagabatrin, (Sabril, VGB)  vagal nerve stimulator (VNS)  valproic acid (Depakote, VPA)  zonisamide (Zonegran,  ZNS)  Benzodiazepines  diazepam - rectal (Diastatl)  diazepam - oral (Valium, DZ)  clonazepam (Klonopin, CZP)  clorazepate (Tranxene, CLZ)  Ativan  Brain Stimulation  Vagal Nerve Stimulation-n/a  DBS- n/a    Compliance method  Memory - yes  Mom or Spouse - Yes  Pill Box - no  Rex calendar - no  Turn over medication bottle - no  Phone alarm - no    Seizure Evaluation  EEG Routine -   EEG Ambulatory -   EEG\Video Monitoring -   MRI/MRA -   CT/CTA Scan -   PET Scan -   Neuropsychological evaluation -   DEXA Scan    Potential Epilepsy Risk Factors:   Pregnancy/Labor/Delivery - full term uncomplicated pregnancy labor and vaginal delivery  Febrile seizures - none  Head injury  - none  CNS infection - none     Stroke - none  Family Hx of Sz - none    PAST MEDICAL HISTORY:   Active Ambulatory Problems     Diagnosis Date Noted    Anemia 2014    Essential hypertension 2017    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 2017    S/P laparoscopic hysterectomy 2017    Hypokalemia 10/01/2017    Anxiety 2018    Seizure 2018    Atypical migraine 2015    Headache disorder 10/12/2016    HTN (hypertension) 2014    Menorrhagia 10/13/2014    Convulsion 2019     Resolved Ambulatory Problems     Diagnosis Date Noted    Palpitations 2014    Menorrhagia 2014    Submucous leiomyoma of uterus 2017    Acute febrile illness 2017    Noncompliance with medication regimen 2018     Past Medical History:   Diagnosis Date    Encounter for blood transfusion     Fibroids     Hypertension     Iron deficiency anemia     Menorrhagia     Migraine     Seizures         PAST SURGICAL HISTORY:   Past Surgical History:   Procedure Laterality Date     SECTION      x1    CYSTOSCOPY N/A 2017    [facility]    HYSTERECTOMY  2017     LAPAROSCOPY-DIAGNOSTIC (ADD ON) N/A 9/29/2017    [facility]    ROBOTIC ASSISTED LAPAROSCOPIC HYSTERECTOMY N/A 9/19/2017    [facility]    SALPINGECTOMY-ROBOTIC ASSISTED Bilateral 9/19/2017    [facility]    TUBAL LIGATION          FAMILY HISTORY:   Family History   Problem Relation Age of Onset    Hypertension Father     Hypertension Sister     Hypertension Maternal Grandmother     Breast cancer Maternal Grandmother     Cancer Maternal Grandfather     Hypertension Paternal Grandmother     Colon cancer Maternal Cousin     Ovarian cancer Neg Hx          SOCIAL HISTORY:   Social History     Socioeconomic History    Marital status: Single     Spouse name: Not on file    Number of children: Not on file    Years of education: Not on file    Highest education level: Not on file   Occupational History    Not on file   Social Needs    Financial resource strain: Not on file    Food insecurity:     Worry: Not on file     Inability: Not on file    Transportation needs:     Medical: Not on file     Non-medical: Not on file   Tobacco Use    Smoking status: Never Smoker    Smokeless tobacco: Never Used   Substance and Sexual Activity    Alcohol use: No     Comment: Occasional; 3x /month    Drug use: No    Sexual activity: Yes     Partners: Male     Birth control/protection: See Surgical Hx   Lifestyle    Physical activity:     Days per week: Not on file     Minutes per session: Not on file    Stress: Not on file   Relationships    Social connections:     Talks on phone: Not on file     Gets together: Not on file     Attends Religion service: Not on file     Active member of club or organization: Not on file     Attends meetings of clubs or organizations: Not on file     Relationship status: Not on file   Other Topics Concern    Not on file   Social History Narrative    Not on file         SUBSTANCE USE:  Social History     Tobacco Use    Smoking status: Never Smoker    Smokeless tobacco: Never Used   Substance and Sexual Activity    Alcohol use: No     Comment: Occasional; 3x /month    Drug use: No    Sexual activity: Yes     Partners: Male     Birth control/protection: See Surgical Hx      Social History     Tobacco Use    Smoking status: Never Smoker    Smokeless tobacco: Never Used   Substance Use Topics    Alcohol use: No     Comment: Occasional; 3x /month        ALLERGIES: Patient has no known allergies.       Review of Systems   Constitutional: Negative for chills, diaphoresis, fever, malaise/fatigue and weight loss.   HENT: Negative for ear pain, hearing loss, nosebleeds and tinnitus.    Eyes: Negative for blurred vision, double vision, photophobia and pain.   Respiratory: Negative for cough, hemoptysis and shortness of breath.    Cardiovascular: Negative for chest pain, palpitations, orthopnea and leg swelling.   Gastrointestinal: Negative for abdominal pain, blood in stool, constipation, diarrhea, heartburn, nausea and vomiting.   Genitourinary: Negative for dysuria and hematuria.   Musculoskeletal: Negative for falls, joint pain and myalgias.   Skin: Negative for itching and rash.   Neurological: Negative for dizziness, tingling, tremors, sensory change, speech change, focal weakness, loss of consciousness, weakness and headaches.   Endo/Heme/Allergies: Negative for environmental allergies. Does not bruise/bleed easily.   Psychiatric/Behavioral: Negative for depression, hallucinations, memory loss and substance abuse. The patient is not nervous/anxious and does not have insomnia.          PHYSICAL EXAM:    [facility] 11/01/2017       Neurologic Exam      Higher Cortical Function:    Patient is a well developed, pleasant, well groomed individual appearing their stated age  Oriented - intact to person, place and time    Fund of knowledge was appropriate.    Language - Speech was fluent  without evidence for an aphasia.  R-L Orientation - Intact   Agnosias, agraphesthesia, or astereognosis - not present.   Cranial Nerves II - XII:    EOMs were intact with normal smooth and no nystagmus.    PERRLA. Funduscopic exam - disc were flat with normal A/V ratio and no exudates or hemorrhages.   Visual fields were full to confrontation.    Motor - facial movement was symmetrical and normal.    Facial sensory - Light touch and pin prick sensations were normal.    Hearing was normal.  Palate moved well and was symmetrical    Tongue movement was full & the patient could speak without difficulty.  Motor: Power, bulk and tone were normal in all extremities.  Coordination:  Normal  Gait:  Normal    Deep tendon reflexes:    Reflex L R   Bicpets 2+ 2+   Tricepts 2+ 2+   Brachio-radialis 2+ 2+   Knee 2+ 2+   Ankle 2+ 2+   Babinski No No     Tremor: resting, postural, intentional - none  Cardiovascular:  No edema  Skin: No rash         IMPRESSION  GTC 3 weeks ago unprovoked to ER as above  This was second such occurrence  I actually saw pt one year ago with similar episode  W/U was neg--no meds started because first event with clean W/U  Now on Keppra 500mg BID per ER  Agree with AED now that happened twice  But drowsy on low dose Keppra so will convert to ZNS 300mg HS over 2 weeks...  Then titrate and check levels as needed  RTC 6m

## 2019-09-16 DIAGNOSIS — I10 ESSENTIAL HYPERTENSION: ICD-10-CM

## 2019-09-17 RX ORDER — NIFEDIPINE 90 MG/1
TABLET, EXTENDED RELEASE ORAL
Qty: 90 TABLET | Refills: 0 | Status: SHIPPED | OUTPATIENT
Start: 2019-09-17 | End: 2020-03-10 | Stop reason: SDUPTHER

## 2019-10-10 ENCOUNTER — PATIENT MESSAGE (OUTPATIENT)
Dept: PHARMACY | Facility: CLINIC | Age: 39
End: 2019-10-10

## 2019-10-22 ENCOUNTER — LAB VISIT (OUTPATIENT)
Dept: LAB | Facility: HOSPITAL | Age: 39
End: 2019-10-22
Payer: COMMERCIAL

## 2019-10-22 ENCOUNTER — OFFICE VISIT (OUTPATIENT)
Dept: PRIMARY CARE CLINIC | Facility: CLINIC | Age: 39
End: 2019-10-22
Attending: FAMILY MEDICINE
Payer: COMMERCIAL

## 2019-10-22 VITALS
HEIGHT: 65 IN | BODY MASS INDEX: 25.56 KG/M2 | DIASTOLIC BLOOD PRESSURE: 86 MMHG | SYSTOLIC BLOOD PRESSURE: 128 MMHG | HEART RATE: 72 BPM | WEIGHT: 153.44 LBS

## 2019-10-22 DIAGNOSIS — Z00.00 ANNUAL PHYSICAL EXAM: Primary | ICD-10-CM

## 2019-10-22 DIAGNOSIS — I10 ESSENTIAL HYPERTENSION: ICD-10-CM

## 2019-10-22 DIAGNOSIS — R53.82 CHRONIC FATIGUE: ICD-10-CM

## 2019-10-22 DIAGNOSIS — R56.9 SEIZURE: ICD-10-CM

## 2019-10-22 DIAGNOSIS — Z00.00 ANNUAL PHYSICAL EXAM: ICD-10-CM

## 2019-10-22 LAB
25(OH)D3+25(OH)D2 SERPL-MCNC: 9 NG/ML (ref 30–96)
ALBUMIN SERPL BCP-MCNC: 4.2 G/DL (ref 3.5–5.2)
ALP SERPL-CCNC: 106 U/L (ref 55–135)
ALT SERPL W/O P-5'-P-CCNC: 9 U/L (ref 10–44)
ANION GAP SERPL CALC-SCNC: 8 MMOL/L (ref 8–16)
AST SERPL-CCNC: 14 U/L (ref 10–40)
BASOPHILS # BLD AUTO: 0.02 K/UL (ref 0–0.2)
BASOPHILS NFR BLD: 0.6 % (ref 0–1.9)
BILIRUB SERPL-MCNC: 0.2 MG/DL (ref 0.1–1)
BUN SERPL-MCNC: 11 MG/DL (ref 6–20)
CALCIUM SERPL-MCNC: 10.1 MG/DL (ref 8.7–10.5)
CHLORIDE SERPL-SCNC: 106 MMOL/L (ref 95–110)
CHOLEST SERPL-MCNC: 189 MG/DL (ref 120–199)
CHOLEST/HDLC SERPL: 2.7 {RATIO} (ref 2–5)
CO2 SERPL-SCNC: 23 MMOL/L (ref 23–29)
CREAT SERPL-MCNC: 0.8 MG/DL (ref 0.5–1.4)
DIFFERENTIAL METHOD: ABNORMAL
EOSINOPHIL # BLD AUTO: 0.1 K/UL (ref 0–0.5)
EOSINOPHIL NFR BLD: 1.4 % (ref 0–8)
ERYTHROCYTE [DISTWIDTH] IN BLOOD BY AUTOMATED COUNT: 15.3 % (ref 11.5–14.5)
EST. GFR  (AFRICAN AMERICAN): >60 ML/MIN/1.73 M^2
EST. GFR  (NON AFRICAN AMERICAN): >60 ML/MIN/1.73 M^2
GLUCOSE SERPL-MCNC: 96 MG/DL (ref 70–110)
HCT VFR BLD AUTO: 40.8 % (ref 37–48.5)
HDLC SERPL-MCNC: 69 MG/DL (ref 40–75)
HDLC SERPL: 36.5 % (ref 20–50)
HGB BLD-MCNC: 12.9 G/DL (ref 12–16)
IMM GRANULOCYTES # BLD AUTO: 0.01 K/UL (ref 0–0.04)
IMM GRANULOCYTES NFR BLD AUTO: 0.3 % (ref 0–0.5)
LDLC SERPL CALC-MCNC: 107.2 MG/DL (ref 63–159)
LYMPHOCYTES # BLD AUTO: 1.6 K/UL (ref 1–4.8)
LYMPHOCYTES NFR BLD: 43.8 % (ref 18–48)
MCH RBC QN AUTO: 28.2 PG (ref 27–31)
MCHC RBC AUTO-ENTMCNC: 31.6 G/DL (ref 32–36)
MCV RBC AUTO: 89 FL (ref 82–98)
MONOCYTES # BLD AUTO: 0.3 K/UL (ref 0.3–1)
MONOCYTES NFR BLD: 7.8 % (ref 4–15)
NEUTROPHILS # BLD AUTO: 1.7 K/UL (ref 1.8–7.7)
NEUTROPHILS NFR BLD: 46.1 % (ref 38–73)
NONHDLC SERPL-MCNC: 120 MG/DL
NRBC BLD-RTO: 0 /100 WBC
PLATELET # BLD AUTO: 405 K/UL (ref 150–350)
PMV BLD AUTO: 9.2 FL (ref 9.2–12.9)
POTASSIUM SERPL-SCNC: 3.2 MMOL/L (ref 3.5–5.1)
PROT SERPL-MCNC: 8.9 G/DL (ref 6–8.4)
RBC # BLD AUTO: 4.57 M/UL (ref 4–5.4)
SODIUM SERPL-SCNC: 137 MMOL/L (ref 136–145)
TRIGL SERPL-MCNC: 64 MG/DL (ref 30–150)
TSH SERPL DL<=0.005 MIU/L-ACNC: 0.64 UIU/ML (ref 0.4–4)
WBC # BLD AUTO: 3.61 K/UL (ref 3.9–12.7)

## 2019-10-22 PROCEDURE — 99999 PR PBB SHADOW E&M-EST. PATIENT-LVL III: CPT | Mod: PBBFAC,,, | Performed by: FAMILY MEDICINE

## 2019-10-22 PROCEDURE — 99999 PR PBB SHADOW E&M-EST. PATIENT-LVL III: ICD-10-PCS | Mod: PBBFAC,,, | Performed by: FAMILY MEDICINE

## 2019-10-22 PROCEDURE — 3074F SYST BP LT 130 MM HG: CPT | Mod: CPTII,S$GLB,, | Performed by: FAMILY MEDICINE

## 2019-10-22 PROCEDURE — 85025 COMPLETE CBC W/AUTO DIFF WBC: CPT

## 2019-10-22 PROCEDURE — 99395 PREV VISIT EST AGE 18-39: CPT | Mod: S$GLB,,, | Performed by: FAMILY MEDICINE

## 2019-10-22 PROCEDURE — 3079F DIAST BP 80-89 MM HG: CPT | Mod: CPTII,S$GLB,, | Performed by: FAMILY MEDICINE

## 2019-10-22 PROCEDURE — 80061 LIPID PANEL: CPT

## 2019-10-22 PROCEDURE — 84443 ASSAY THYROID STIM HORMONE: CPT

## 2019-10-22 PROCEDURE — 3079F PR MOST RECENT DIASTOLIC BLOOD PRESSURE 80-89 MM HG: ICD-10-PCS | Mod: CPTII,S$GLB,, | Performed by: FAMILY MEDICINE

## 2019-10-22 PROCEDURE — 36415 COLL VENOUS BLD VENIPUNCTURE: CPT | Mod: PN

## 2019-10-22 PROCEDURE — 3074F PR MOST RECENT SYSTOLIC BLOOD PRESSURE < 130 MM HG: ICD-10-PCS | Mod: CPTII,S$GLB,, | Performed by: FAMILY MEDICINE

## 2019-10-22 PROCEDURE — 99395 PR PREVENTIVE VISIT,EST,18-39: ICD-10-PCS | Mod: S$GLB,,, | Performed by: FAMILY MEDICINE

## 2019-10-22 PROCEDURE — 82306 VITAMIN D 25 HYDROXY: CPT

## 2019-10-22 PROCEDURE — 80053 COMPREHEN METABOLIC PANEL: CPT

## 2019-10-22 NOTE — PATIENT INSTRUCTIONS
Your test results will be communicated to you via: [facility], Telephone or Letter.  If you have not received your test results within one week. Please contact the clinic.    [facility] policy,requires the patient to take your blood pressure medication two hours prior to coming to your scheduled appointment.  If at that time your blood pressure is elevated you will have to return within 2 -4 weeks for a nurse blood pressure follow up until blood pressure is control. And the provider clear you.    Thanks for choosing [facility].

## 2019-10-23 ENCOUNTER — PATIENT MESSAGE (OUTPATIENT)
Dept: PRIMARY CARE CLINIC | Facility: CLINIC | Age: 39
End: 2019-10-23

## 2019-10-23 DIAGNOSIS — E87.6 HYPOKALEMIA: Primary | ICD-10-CM

## 2019-10-25 ENCOUNTER — PATIENT MESSAGE (OUTPATIENT)
Dept: PRIMARY CARE CLINIC | Facility: CLINIC | Age: 39
End: 2019-10-25

## 2019-10-27 NOTE — PROGRESS NOTES
Subjective:       Patient ID: Pernell Ly is a 39 y.o. female.    Chief Complaint: Annual Exam    Pt presents today for annual exam. Pt states that she has been feeling very tired, has had weight loss and noticed more acne recently. She does think it might be her new meds for seizures.   Pt also needs labs and flu shot  fmhx unremarkable exc for dad with DM and HTN    Review of Systems   Constitutional: Positive for activity change, fatigue and unexpected weight change. Negative for appetite change, chills, diaphoresis and fever.   HENT: Negative for congestion, ear pain, hearing loss, rhinorrhea, sinus pressure, sore throat and trouble swallowing.    Eyes: Negative for photophobia, pain, discharge and visual disturbance.   Respiratory: Negative for apnea, cough, chest tightness, shortness of breath and wheezing.    Cardiovascular: Negative for chest pain, palpitations and leg swelling.   Gastrointestinal: Negative for abdominal distention, abdominal pain, blood in stool, constipation, diarrhea, nausea and vomiting.   Endocrine: Negative for polydipsia and polyuria.   Genitourinary: Negative.  Negative for difficulty urinating, dysuria, hematuria and menstrual problem.   Musculoskeletal: Negative for arthralgias, back pain, joint swelling and neck pain.   Skin: Negative.    Neurological: Negative for dizziness, tremors, weakness, numbness and headaches.   Psychiatric/Behavioral: Negative for behavioral problems, confusion, decreased concentration, dysphoric mood and sleep disturbance. The patient is not nervous/anxious.    All other systems reviewed and are negative.      Objective:      Physical Exam   Constitutional: She is oriented to person, place, and time. She appears well-developed and well-nourished. No distress.   HENT:   Head: Normocephalic and atraumatic.   Right Ear: External ear normal.   Left Ear: External ear normal.   Nose: Nose normal.   Mouth/Throat: Oropharynx is clear and moist. No  oropharyngeal exudate.   Eyes: Pupils are equal, round, and reactive to light. Conjunctivae and EOM are normal.   Neck: Normal range of motion. Neck supple. No thyromegaly present.   Cardiovascular: Normal rate, regular rhythm, normal heart sounds and intact distal pulses.   No murmur heard.  Pulmonary/Chest: Effort normal and breath sounds normal. No stridor. No respiratory distress. She has no wheezes. She has no rales. She exhibits no tenderness.   Abdominal: Soft. Bowel sounds are normal. She exhibits no distension and no mass. There is no tenderness. There is no rebound and no guarding.   Musculoskeletal: Normal range of motion. She exhibits no edema or tenderness.   Lymphadenopathy:     She has no cervical adenopathy.   Neurological: She is alert and oriented to person, place, and time. She has normal reflexes. She displays normal reflexes. No cranial nerve deficit or sensory deficit. She exhibits normal muscle tone. Coordination normal.   Skin: Skin is warm and dry. She is not diaphoretic. No erythema.   Psychiatric: She has a normal mood and affect. Her behavior is normal. Judgment and thought content normal.       Assessment:       1. Annual physical exam    2. Essential hypertension        Plan:       Annual physical exam  -     Lipid panel; Future; Expected date: 10/22/2019  -     CBC auto differential; Future; Expected date: 10/22/2019  -     Comprehensive metabolic panel; Future; Expected date: 10/22/2019  -     TSH; Future; Expected date: 10/22/2019  -     Vitamin D; Future; Expected date: 10/22/2019    Essential hypertension      Suspect that intense fatigue may be related to her Keppra. Pt asked to contact her neuro to advise her. In interim will order labs that pertain to fatigue work up.  ED prompts d/w pt  RTC 6 mos pending symptoms/labs  Annual PE otw wnl  Labs pending  Well woman UTD per pt

## 2019-10-30 ENCOUNTER — PATIENT MESSAGE (OUTPATIENT)
Dept: NEUROLOGY | Facility: CLINIC | Age: 39
End: 2019-10-30

## 2019-11-08 ENCOUNTER — PATIENT MESSAGE (OUTPATIENT)
Dept: PRIMARY CARE CLINIC | Facility: CLINIC | Age: 39
End: 2019-11-08

## 2019-11-11 ENCOUNTER — LAB VISIT (OUTPATIENT)
Dept: LAB | Facility: OTHER | Age: 39
End: 2019-11-11
Attending: FAMILY MEDICINE
Payer: COMMERCIAL

## 2019-11-11 DIAGNOSIS — E87.6 HYPOKALEMIA: ICD-10-CM

## 2019-11-11 LAB — POTASSIUM SERPL-SCNC: 3.8 MMOL/L (ref 3.5–5.1)

## 2019-11-11 PROCEDURE — 36415 COLL VENOUS BLD VENIPUNCTURE: CPT

## 2019-11-11 PROCEDURE — 84132 ASSAY OF SERUM POTASSIUM: CPT

## 2019-11-21 ENCOUNTER — TELEPHONE (OUTPATIENT)
Dept: OBSTETRICS AND GYNECOLOGY | Facility: CLINIC | Age: 39
End: 2019-11-21

## 2019-11-26 ENCOUNTER — OFFICE VISIT (OUTPATIENT)
Dept: OBSTETRICS AND GYNECOLOGY | Facility: CLINIC | Age: 39
End: 2019-11-26
Payer: COMMERCIAL

## 2019-11-26 ENCOUNTER — PATIENT MESSAGE (OUTPATIENT)
Dept: OBSTETRICS AND GYNECOLOGY | Facility: CLINIC | Age: 39
End: 2019-11-26

## 2019-11-26 VITALS
WEIGHT: 145.31 LBS | HEIGHT: 64 IN | SYSTOLIC BLOOD PRESSURE: 132 MMHG | DIASTOLIC BLOOD PRESSURE: 80 MMHG | BODY MASS INDEX: 24.81 KG/M2

## 2019-11-26 DIAGNOSIS — Z20.2 ENCOUNTER FOR ASSESSMENT OF SEXUALLY TRANSMITTED DISEASE EXPOSURE: ICD-10-CM

## 2019-11-26 DIAGNOSIS — R82.90 CLOUDY URINE: ICD-10-CM

## 2019-11-26 DIAGNOSIS — N76.1 SUBACUTE VAGINITIS: Primary | ICD-10-CM

## 2019-11-26 LAB
BILIRUB SERPL-MCNC: NEGATIVE MG/DL
BLOOD URINE, POC: NEGATIVE
COLOR, POC UA: NORMAL
GLUCOSE UR QL STRIP: NORMAL
KETONES UR QL STRIP: NEGATIVE
LEUKOCYTE ESTERASE URINE, POC: NEGATIVE
NITRITE, POC UA: NEGATIVE
PH, POC UA: 7
PROTEIN, POC: 30
SPECIFIC GRAVITY, POC UA: 1.01
UROBILINOGEN, POC UA: NORMAL

## 2019-11-26 PROCEDURE — 3079F PR MOST RECENT DIASTOLIC BLOOD PRESSURE 80-89 MM HG: ICD-10-PCS | Mod: CPTII,S$GLB,, | Performed by: NURSE PRACTITIONER

## 2019-11-26 PROCEDURE — 81002 POCT URINE DIPSTICK WITHOUT MICROSCOPE: ICD-10-PCS | Mod: S$GLB,,, | Performed by: NURSE PRACTITIONER

## 2019-11-26 PROCEDURE — 3079F DIAST BP 80-89 MM HG: CPT | Mod: CPTII,S$GLB,, | Performed by: NURSE PRACTITIONER

## 2019-11-26 PROCEDURE — 3008F PR BODY MASS INDEX (BMI) DOCUMENTED: ICD-10-PCS | Mod: CPTII,S$GLB,, | Performed by: NURSE PRACTITIONER

## 2019-11-26 PROCEDURE — 99999 PR PBB SHADOW E&M-EST. PATIENT-LVL III: ICD-10-PCS | Mod: PBBFAC,,, | Performed by: NURSE PRACTITIONER

## 2019-11-26 PROCEDURE — 87491 CHLMYD TRACH DNA AMP PROBE: CPT

## 2019-11-26 PROCEDURE — 99213 PR OFFICE/OUTPT VISIT, EST, LEVL III, 20-29 MIN: ICD-10-PCS | Mod: 25,S$GLB,, | Performed by: NURSE PRACTITIONER

## 2019-11-26 PROCEDURE — 87661 TRICHOMONAS VAGINALIS AMPLIF: CPT

## 2019-11-26 PROCEDURE — 99999 PR PBB SHADOW E&M-EST. PATIENT-LVL III: CPT | Mod: PBBFAC,,, | Performed by: NURSE PRACTITIONER

## 2019-11-26 PROCEDURE — 3075F SYST BP GE 130 - 139MM HG: CPT | Mod: CPTII,S$GLB,, | Performed by: NURSE PRACTITIONER

## 2019-11-26 PROCEDURE — 3008F BODY MASS INDEX DOCD: CPT | Mod: CPTII,S$GLB,, | Performed by: NURSE PRACTITIONER

## 2019-11-26 PROCEDURE — 3075F PR MOST RECENT SYSTOLIC BLOOD PRESS GE 130-139MM HG: ICD-10-PCS | Mod: CPTII,S$GLB,, | Performed by: NURSE PRACTITIONER

## 2019-11-26 PROCEDURE — 99213 OFFICE O/P EST LOW 20 MIN: CPT | Mod: 25,S$GLB,, | Performed by: NURSE PRACTITIONER

## 2019-11-26 PROCEDURE — 81002 URINALYSIS NONAUTO W/O SCOPE: CPT | Mod: S$GLB,,, | Performed by: NURSE PRACTITIONER

## 2019-11-26 RX ORDER — TINIDAZOLE 500 MG/1
2 TABLET ORAL
Qty: 8 TABLET | Refills: 0 | Status: SHIPPED | OUTPATIENT
Start: 2019-11-26 | End: 2019-11-28

## 2019-11-26 NOTE — PROGRESS NOTES
"Patient ID: Pernell Ly 39 y.o. female    CC: Vaginal discharge    HPI:  Pernell Ly is a 39 y.o. female  presents with complaint of vaginal discharge for 2 weeks. She reports the discharge is thin and white with a fishy odor. She also reports cloudy urine but denies dysuria, frequency, and urgency. She denies fever, chills, vaginal bleeding, vaginal pain, itching, pelvic/abodminal pain. She reports having a hysterectomy in 2017 (ovaries left intact). She is currently sexually active with 1 long term male partner, denies new partners. She intermittently uses condoms for protection and is requesting STD testing today.       Past Medical History:   Diagnosis Date    Encounter for blood transfusion     Fibroids     Hypertension     Iron deficiency anemia     Menorrhagia     Migraine     Seizures     as baby     Past Surgical History:   Procedure Laterality Date     SECTION      x1    HYSTERECTOMY  2017    TUBAL LIGATION       Social History     Tobacco Use    Smoking status: Never Smoker    Smokeless tobacco: Never Used   Substance Use Topics    Alcohol use: No     Comment: Occasional; 3x /month    Drug use: No     Family History   Problem Relation Age of Onset    Hypertension Father     Hypertension Sister     Hypertension Maternal Grandmother     Breast cancer Maternal Grandmother     Cancer Maternal Grandfather     Hypertension Paternal Grandmother     Colon cancer Maternal Cousin     Ovarian cancer Neg Hx      OB History    Para Term  AB Living   1 1       1   SAB TAB Ectopic Multiple Live Births           1      # Outcome Date GA Lbr Joe/2nd Weight Sex Delivery Anes PTL Lv   1 Para 01   2.665 kg (5 lb 14 oz) M CS-Unspec   LEIDY       /80   Ht 5' 4" (1.626 m)   Wt 65.9 kg (145 lb 4.5 oz)   LMP 2017   BMI 24.94 kg/m²     ROS:  GENERAL: No fever, chills, fatigability or weight loss.  VULVAR: No pain, no lesions and no " itching.  VAGINAL: No relaxation, no itching, discharge, no abnormal bleeding and no lesions.  ABDOMEN: No abdominal pain. Denies nausea. Denies vomiting. No diarrhea. No constipation  BREAST: Denies pain. No lumps. No discharge.  URINARY: Cloudy appearance. No incontinence, no nocturia, no frequency, no urgency, and no dysuria.  CARDIOVASCULAR: No chest pain. No shortness of breath. No leg cramps.  NEUROLOGICAL: No headaches. No vision changes.    PHYSICAL EXAM:  VULVA: normal appearing vulva with no masses, tenderness or lesions   VAGINA: normal appearing vagina with normal color. Thin, white discharge noted in vaginal vault, no lesions   CERVIX: not seen on exam (s/p hystectomy 2017)  UTERUS: not seen on exam (s/p hystectomy 2017)  ADNEXA: normal adnexa in size, nontender and no masses      ASSESSMENT:    ICD-10-CM ICD-9-CM    1. Subacute vaginitis N76.1 616.10 Vaginosis Screen by DNA Probe   2. Encounter for assessment of sexually transmitted disease exposure Z76.89 V72.85 C. trachomatis/N. gonorrhoeae by AMP DNA [facility]; Urine   3. Cloudy urine R82.90 791.9 POCT URINE DIPSTICK WITHOUT MICROSCOPE         PLAN:    Tinidazole 2g for 2 days   Affirm  Urine gonorrhea/chlamydia       Urine dip in clinic shows no signs of UTI. Complaints and physical exam consist with BV, will treat with Tinidamax x2 days. Will test urine for gonorrhea/chlamydia as patient had hysterectomy.       Patient was counseled today on vaginitis prevention including :  a. avoiding feminine products such as deoderant soaps, body wash, bubble bath, douches, scented toilet paper, deoderant tampons or pads, feminine wipes, chronic pad use, etc.  b. avoiding other vulvovaginal irritants such as long hot baths, humidity, tight, synthetic clothing, chlorine and sitting around in wet bathing suits  c. wearing cotton underwear, avoiding thong underwear and no underwear to bed  d. taking showers instead of baths and use a hair dryer on cool setting  afterwards to dry  e. wearing cotton to exercise and shower immediately after exercise and change clothes  f. using polyurethane condoms without spermicide if sexually active and symptoms are triggered by intercourse    FOLLOW UP:   RTC if symptoms worsen or do not improve  RTC as needed

## 2019-11-27 LAB
BACTERIAL VAGINOSIS DNA: ABNORMAL
C TRACH DNA SPEC QL NAA+PROBE: NOT DETECTED
CANDIDA RRNA VAG QL PROBE: NEGATIVE
G VAGINALIS RRNA GENITAL QL PROBE: POSITIVE
N GONORRHOEA DNA SPEC QL NAA+PROBE: NOT DETECTED
T VAGINALIS RRNA GENITAL QL PROBE: NEGATIVE

## 2019-12-24 ENCOUNTER — TELEPHONE (OUTPATIENT)
Dept: INTERNAL MEDICINE | Facility: CLINIC | Age: 39
End: 2019-12-24

## 2020-01-03 ENCOUNTER — PATIENT MESSAGE (OUTPATIENT)
Dept: NEUROLOGY | Facility: CLINIC | Age: 40
End: 2020-01-03

## 2020-01-03 DIAGNOSIS — R56.9 SEIZURES: Primary | ICD-10-CM

## 2020-01-21 ENCOUNTER — PATIENT MESSAGE (OUTPATIENT)
Dept: NEUROLOGY | Facility: CLINIC | Age: 40
End: 2020-01-21

## 2020-01-23 ENCOUNTER — TELEPHONE (OUTPATIENT)
Dept: NEUROLOGY | Facility: CLINIC | Age: 40
End: 2020-01-23

## 2020-01-23 ENCOUNTER — OFFICE VISIT (OUTPATIENT)
Dept: NEUROLOGY | Facility: CLINIC | Age: 40
End: 2020-01-23
Payer: COMMERCIAL

## 2020-01-23 VITALS
BODY MASS INDEX: 22.2 KG/M2 | DIASTOLIC BLOOD PRESSURE: 79 MMHG | HEIGHT: 64 IN | HEART RATE: 81 BPM | SYSTOLIC BLOOD PRESSURE: 116 MMHG | WEIGHT: 130 LBS

## 2020-01-23 DIAGNOSIS — G40.409 EPILEPSY SYMPTOMATIC, GENERALIZED: Primary | ICD-10-CM

## 2020-01-23 PROCEDURE — 3074F SYST BP LT 130 MM HG: CPT | Mod: CPTII,S$GLB,, | Performed by: PSYCHIATRY & NEUROLOGY

## 2020-01-23 PROCEDURE — 99999 PR PBB SHADOW E&M-EST. PATIENT-LVL III: ICD-10-PCS | Mod: PBBFAC,,, | Performed by: PSYCHIATRY & NEUROLOGY

## 2020-01-23 PROCEDURE — 3008F BODY MASS INDEX DOCD: CPT | Mod: CPTII,S$GLB,, | Performed by: PSYCHIATRY & NEUROLOGY

## 2020-01-23 PROCEDURE — 99999 PR PBB SHADOW E&M-EST. PATIENT-LVL III: CPT | Mod: PBBFAC,,, | Performed by: PSYCHIATRY & NEUROLOGY

## 2020-01-23 PROCEDURE — 3074F PR MOST RECENT SYSTOLIC BLOOD PRESSURE < 130 MM HG: ICD-10-PCS | Mod: CPTII,S$GLB,, | Performed by: PSYCHIATRY & NEUROLOGY

## 2020-01-23 PROCEDURE — 99213 OFFICE O/P EST LOW 20 MIN: CPT | Mod: S$GLB,,, | Performed by: PSYCHIATRY & NEUROLOGY

## 2020-01-23 PROCEDURE — 3078F DIAST BP <80 MM HG: CPT | Mod: CPTII,S$GLB,, | Performed by: PSYCHIATRY & NEUROLOGY

## 2020-01-23 PROCEDURE — 3008F PR BODY MASS INDEX (BMI) DOCUMENTED: ICD-10-PCS | Mod: CPTII,S$GLB,, | Performed by: PSYCHIATRY & NEUROLOGY

## 2020-01-23 PROCEDURE — 99213 PR OFFICE/OUTPT VISIT, EST, LEVL III, 20-29 MIN: ICD-10-PCS | Mod: S$GLB,,, | Performed by: PSYCHIATRY & NEUROLOGY

## 2020-01-23 PROCEDURE — 3078F PR MOST RECENT DIASTOLIC BLOOD PRESSURE < 80 MM HG: ICD-10-PCS | Mod: CPTII,S$GLB,, | Performed by: PSYCHIATRY & NEUROLOGY

## 2020-01-23 RX ORDER — ZOLPIDEM TARTRATE 5 MG/1
5 TABLET ORAL NIGHTLY PRN
Qty: 15 TABLET | Refills: 2 | Status: SHIPPED | OUTPATIENT
Start: 2020-01-23 | End: 2021-05-25

## 2020-01-23 NOTE — TELEPHONE ENCOUNTER
Per Dr. Nolasco, [facility]    lamictal 25mg-2 po BID plus 1 refill  lamictal 100mg 1 BID plus 5 refills  zonisamide 100mg daily plus 1 refill  ambien 5mg prn sleep for 15 tabs plus 1 refill
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
## 2020-03-10 DIAGNOSIS — I10 ESSENTIAL HYPERTENSION: ICD-10-CM

## 2020-03-10 RX ORDER — NIFEDIPINE 90 MG/1
90 TABLET, EXTENDED RELEASE ORAL DAILY
Qty: 90 TABLET | Refills: 0 | Status: SHIPPED | OUTPATIENT
Start: 2020-03-10 | End: 2020-07-16

## 2020-03-24 ENCOUNTER — PATIENT MESSAGE (OUTPATIENT)
Dept: ADMINISTRATIVE | Facility: OTHER | Age: 40
End: 2020-03-24

## 2020-03-24 ENCOUNTER — TELEPHONE (OUTPATIENT)
Dept: FAMILY MEDICINE | Facility: CLINIC | Age: 40
End: 2020-03-24

## 2020-03-24 ENCOUNTER — PATIENT MESSAGE (OUTPATIENT)
Dept: PRIMARY CARE CLINIC | Facility: CLINIC | Age: 40
End: 2020-03-24

## 2020-03-24 ENCOUNTER — OFFICE VISIT (OUTPATIENT)
Dept: PRIMARY CARE CLINIC | Facility: CLINIC | Age: 40
End: 2020-03-24
Attending: FAMILY MEDICINE
Payer: COMMERCIAL

## 2020-03-24 DIAGNOSIS — R07.9 LEFT-SIDED CHEST PAIN: Primary | ICD-10-CM

## 2020-03-24 DIAGNOSIS — I10 ESSENTIAL HYPERTENSION: ICD-10-CM

## 2020-03-24 PROCEDURE — 99213 OFFICE O/P EST LOW 20 MIN: CPT | Mod: 95,,, | Performed by: FAMILY MEDICINE

## 2020-03-24 PROCEDURE — 99213 PR OFFICE/OUTPT VISIT, EST, LEVL III, 20-29 MIN: ICD-10-PCS | Mod: 95,,, | Performed by: FAMILY MEDICINE

## 2020-03-24 RX ORDER — TIZANIDINE 4 MG/1
4 TABLET ORAL EVERY 8 HOURS
Qty: 30 TABLET | Refills: 0 | Status: SHIPPED | OUTPATIENT
Start: 2020-03-24 | End: 2020-04-03

## 2020-03-24 RX ORDER — IBUPROFEN 800 MG/1
TABLET ORAL
Qty: 60 TABLET | Refills: 1 | Status: SHIPPED | OUTPATIENT
Start: 2020-03-24 | End: 2021-03-08 | Stop reason: SDUPTHER

## 2020-03-24 NOTE — PROGRESS NOTES
Subjective:       Patient ID: Pernell Ly is a 39 y.o. female.    Chief Complaint: No chief complaint on file.    Pt presents today via telemed that she has had some pain under her left rib cage x 3 days. Pain worsens only when she lies down and when she gets up. Does notice it on movement and does improve when heat is on it and with ibu. Pt worried bc its been few days and denies any trauma.     Pt denies any CP/SOB/HA/urination issues/breast pain        Review of Systems   Constitutional: Negative.  Negative for activity change, appetite change, chills, diaphoresis, fatigue, fever and unexpected weight change.   HENT: Negative for congestion.    Respiratory: Negative.  Negative for apnea, cough, choking, chest tightness, shortness of breath, wheezing and stridor.    Cardiovascular: Negative.  Negative for chest pain, palpitations and leg swelling.   Gastrointestinal: Negative for abdominal pain, anal bleeding, blood in stool, constipation, diarrhea, nausea, rectal pain and vomiting.   Genitourinary: Negative.    Musculoskeletal: Positive for arthralgias. Negative for back pain, gait problem, joint swelling, myalgias, neck pain and neck stiffness.   Neurological: Negative.    Psychiatric/Behavioral: Negative for decreased concentration.   All other systems reviewed and are negative.      Objective:      Physical Exam   Constitutional: She is oriented to person, place, and time. She appears well-developed and well-nourished. No distress.   HENT:   Head: Normocephalic and atraumatic.   Eyes: Conjunctivae and EOM are normal.   Neck: Normal range of motion. Neck supple.   Pulmonary/Chest: Effort normal. No respiratory distress.   Musculoskeletal: Normal range of motion.   Pt asked to rotate her torso to the left and to the right. Pt c/o pain under her left rib cage that causes her to discomfort as she moves. Pt denies trauma and recent heavy lifting that she can recall   Neurological: She is alert and oriented to  person, place, and time.   Skin: She is not diaphoretic.   Psychiatric: She has a normal mood and affect. Her behavior is normal. Judgment and thought content normal.       Assessment:       Musculoskeletal sprain flank  Plan:       Suspect that this is either costochondritis versus muscle sprain. Lean towards a muscle sprain that seems to worsen with movement based on exam  Recommend ice, heat and NSAIDS prn along with muscle relaxants SE's of med d/w pt    HTN per pt appears to be controlled on meds    The patient location is: [facility]  The chief complaint leading to consultation is: left side rib/flank pain  Visit type: Virtual visit with synchronous audio and video  Total time spent with patient: 10 mins  Each patient to whom he or she provides medical services by telemedicine is:  (1) informed of the relationship between the physician and patient and the respective role of any other health care provider with respect to management of the patient; and (2) notified that he or she may decline to receive medical services by telemedicine and may withdraw from such care at any time.    Notes above       ED prompts d/w pt in depth

## 2020-04-24 RX ORDER — LAMOTRIGINE 100 MG/1
TABLET ORAL
COMMUNITY
Start: 2020-03-14 | End: 2020-08-24 | Stop reason: SDUPTHER

## 2020-04-29 ENCOUNTER — OFFICE VISIT (OUTPATIENT)
Dept: PRIMARY CARE CLINIC | Facility: CLINIC | Age: 40
End: 2020-04-29
Attending: FAMILY MEDICINE
Payer: MEDICAID

## 2020-04-29 ENCOUNTER — PATIENT MESSAGE (OUTPATIENT)
Dept: ADMINISTRATIVE | Facility: OTHER | Age: 40
End: 2020-04-29

## 2020-04-29 DIAGNOSIS — J30.2 SEASONAL ALLERGIES: ICD-10-CM

## 2020-04-29 DIAGNOSIS — I10 ESSENTIAL HYPERTENSION: Primary | ICD-10-CM

## 2020-04-29 PROCEDURE — 99213 OFFICE O/P EST LOW 20 MIN: CPT | Mod: 95,,, | Performed by: FAMILY MEDICINE

## 2020-04-29 PROCEDURE — 99213 PR OFFICE/OUTPT VISIT, EST, LEVL III, 20-29 MIN: ICD-10-PCS | Mod: 95,,, | Performed by: FAMILY MEDICINE

## 2020-04-29 RX ORDER — FLUTICASONE PROPIONATE 50 MCG
1 SPRAY, SUSPENSION (ML) NASAL DAILY
Qty: 9.9 ML | Refills: 2 | Status: SHIPPED | OUTPATIENT
Start: 2020-04-29 | End: 2021-11-23 | Stop reason: SDUPTHER

## 2020-05-05 NOTE — PROGRESS NOTES
Subjective:       Patient ID: Pernell Ly is a 40 y.o. female.    Chief Complaint: No chief complaint on file.    Pt presents today for f/u post COVID. States that she feels well overall. No complaints. Pt states that she is having still some sinus congestion but does think that this is more due to cough than anything else  Pt otw doing well and wants to return to work. Does not hv a fever, asymptomatic aside from cough, congestion    Review of Systems   Constitutional: Positive for activity change and fatigue. Negative for appetite change, chills, diaphoresis and fever.   HENT: Positive for congestion, postnasal drip, rhinorrhea, sinus pressure and sore throat.    Eyes: Negative.    Respiratory: Negative for cough, chest tightness and shortness of breath.    Cardiovascular: Negative for chest pain, palpitations and leg swelling.   Gastrointestinal: Negative.    Musculoskeletal: Negative.  Negative for arthralgias and joint swelling.   Skin: Negative.    Neurological: Negative for dizziness, weakness, light-headedness and headaches.   All other systems reviewed and are negative.      Objective:      Physical Exam   Constitutional: She is oriented to person, place, and time. She appears well-developed and well-nourished.   HENT:   Head: Normocephalic and atraumatic.   Eyes: Pupils are equal, round, and reactive to light. EOM are normal.   Neck: Neck supple.   Pulmonary/Chest: Effort normal.   Musculoskeletal: Normal range of motion.   Neurological: She is oriented to person, place, and time.   Psychiatric: She has a normal mood and affect. Her behavior is normal. Judgment and thought content normal.       Assessment:       1. Essential hypertension        Plan:       Essential hypertension    Other orders  -     fluticasone propionate (FLONASE) 50 mcg/actuation nasal spray; 1 spray (50 mcg total) by Each Nostril route once daily.  Dispense: 9.9 mL; Refill: 2    upper resp suspect that this is seasonal allergies  and wants to have flonase and will take OTC allergy meds  Pt asymptomatic and afebrile otw for COVID and can return to work since it has been greater than 3 weeks      The patient location is: HOME  The chief complaint leading to consultation is: HTN, seasonal allergies  Visit type: audiovisual  Total time spent with patient: 10 mins  Each patient to whom he or she provides medical services by telemedicine is:  (1) informed of the relationship between the physician and patient and the respective role of any other health care provider with respect to management of the patient; and (2) notified that he or she may decline to receive medical services by telemedicine and may withdraw from such care at any time.    Notes: above    HTN controlled

## 2020-05-27 ENCOUNTER — HOSPITAL ENCOUNTER (EMERGENCY)
Facility: OTHER | Age: 40
Discharge: HOME | End: 2020-05-27
Attending: EMERGENCY MEDICINE
Payer: MEDICAID

## 2020-05-27 VITALS
HEIGHT: 64 IN | TEMPERATURE: 98 F | HEART RATE: 91 BPM | WEIGHT: 160 LBS | RESPIRATION RATE: 18 BRPM | DIASTOLIC BLOOD PRESSURE: 100 MMHG | OXYGEN SATURATION: 99 % | SYSTOLIC BLOOD PRESSURE: 151 MMHG | BODY MASS INDEX: 27.31 KG/M2

## 2020-05-27 DIAGNOSIS — I10 ESSENTIAL HYPERTENSION: ICD-10-CM

## 2020-05-27 DIAGNOSIS — F41.1 ANXIETY REACTION: Primary | ICD-10-CM

## 2020-05-27 DIAGNOSIS — R73.9 HYPERGLYCEMIA: ICD-10-CM

## 2020-05-27 DIAGNOSIS — R07.89 CHEST TIGHTNESS: ICD-10-CM

## 2020-05-27 DIAGNOSIS — E87.6 HYPOKALEMIA: ICD-10-CM

## 2020-05-27 LAB
ALBUMIN SERPL BCP-MCNC: 4 G/DL (ref 3.5–5.2)
ALP SERPL-CCNC: 65 U/L (ref 55–135)
ALT SERPL W/O P-5'-P-CCNC: 12 U/L (ref 10–44)
ANION GAP SERPL CALC-SCNC: 12 MMOL/L (ref 8–16)
AST SERPL-CCNC: 13 U/L (ref 10–40)
B-HCG UR QL: NEGATIVE
BASOPHILS # BLD AUTO: 0.02 K/UL (ref 0–0.2)
BASOPHILS NFR BLD: 0.3 % (ref 0–1.9)
BILIRUB SERPL-MCNC: 0.1 MG/DL (ref 0.1–1)
BUN SERPL-MCNC: 9 MG/DL (ref 6–20)
CALCIUM SERPL-MCNC: 9 MG/DL (ref 8.7–10.5)
CHLORIDE SERPL-SCNC: 101 MMOL/L (ref 95–110)
CO2 SERPL-SCNC: 24 MMOL/L (ref 23–29)
CREAT SERPL-MCNC: 0.7 MG/DL (ref 0.5–1.4)
CTP QC/QA: YES
DIFFERENTIAL METHOD: ABNORMAL
EOSINOPHIL # BLD AUTO: 0 K/UL (ref 0–0.5)
EOSINOPHIL NFR BLD: 0.7 % (ref 0–8)
ERYTHROCYTE [DISTWIDTH] IN BLOOD BY AUTOMATED COUNT: 13.9 % (ref 11.5–14.5)
EST. GFR  (AFRICAN AMERICAN): >60 ML/MIN/1.73 M^2
EST. GFR  (NON AFRICAN AMERICAN): >60 ML/MIN/1.73 M^2
GLUCOSE SERPL-MCNC: 164 MG/DL (ref 70–110)
HCT VFR BLD AUTO: 37 % (ref 37–48.5)
HGB BLD-MCNC: 12.2 G/DL (ref 12–16)
IMM GRANULOCYTES # BLD AUTO: 0.02 K/UL (ref 0–0.04)
IMM GRANULOCYTES NFR BLD AUTO: 0.3 % (ref 0–0.5)
LYMPHOCYTES # BLD AUTO: 1.3 K/UL (ref 1–4.8)
LYMPHOCYTES NFR BLD: 22.6 % (ref 18–48)
MCH RBC QN AUTO: 29.2 PG (ref 27–31)
MCHC RBC AUTO-ENTMCNC: 33 G/DL (ref 32–36)
MCV RBC AUTO: 89 FL (ref 82–98)
MONOCYTES # BLD AUTO: 0.4 K/UL (ref 0.3–1)
MONOCYTES NFR BLD: 6.3 % (ref 4–15)
NEUTROPHILS # BLD AUTO: 4.1 K/UL (ref 1.8–7.7)
NEUTROPHILS NFR BLD: 69.8 % (ref 38–73)
NRBC BLD-RTO: 0 /100 WBC
PLATELET # BLD AUTO: 330 K/UL (ref 150–350)
PMV BLD AUTO: 8.5 FL (ref 9.2–12.9)
POTASSIUM SERPL-SCNC: 2.9 MMOL/L (ref 3.5–5.1)
PROT SERPL-MCNC: 8.3 G/DL (ref 6–8.4)
RBC # BLD AUTO: 4.18 M/UL (ref 4–5.4)
SODIUM SERPL-SCNC: 137 MMOL/L (ref 136–145)
TROPONIN I SERPL DL<=0.01 NG/ML-MCNC: 0.01 NG/ML (ref 0–0.03)
WBC # BLD AUTO: 5.89 K/UL (ref 3.9–12.7)

## 2020-05-27 PROCEDURE — 80053 COMPREHEN METABOLIC PANEL: CPT

## 2020-05-27 PROCEDURE — 25000003 PHARM REV CODE 250: Performed by: PHYSICIAN ASSISTANT

## 2020-05-27 PROCEDURE — 99285 EMERGENCY DEPT VISIT HI MDM: CPT | Mod: 25

## 2020-05-27 PROCEDURE — 93005 ELECTROCARDIOGRAM TRACING: CPT

## 2020-05-27 PROCEDURE — 96361 HYDRATE IV INFUSION ADD-ON: CPT

## 2020-05-27 PROCEDURE — 96360 HYDRATION IV INFUSION INIT: CPT

## 2020-05-27 PROCEDURE — 81025 URINE PREGNANCY TEST: CPT | Performed by: PHYSICIAN ASSISTANT

## 2020-05-27 PROCEDURE — 85025 COMPLETE CBC W/AUTO DIFF WBC: CPT

## 2020-05-27 PROCEDURE — 84484 ASSAY OF TROPONIN QUANT: CPT

## 2020-05-27 RX ORDER — HYDROXYZINE PAMOATE 25 MG/1
25 CAPSULE ORAL
Status: COMPLETED | OUTPATIENT
Start: 2020-05-27 | End: 2020-05-27

## 2020-05-27 RX ORDER — HYDROXYZINE HYDROCHLORIDE 25 MG/1
25 TABLET, FILM COATED ORAL EVERY 6 HOURS PRN
Qty: 10 TABLET | Refills: 0 | Status: SHIPPED | OUTPATIENT
Start: 2020-05-27 | End: 2021-05-25

## 2020-05-27 RX ORDER — POTASSIUM CHLORIDE 750 MG/1
TABLET, EXTENDED RELEASE ORAL
Qty: 30 TABLET | Refills: 0 | Status: SHIPPED | OUTPATIENT
Start: 2020-05-27 | End: 2020-06-26 | Stop reason: SDUPTHER

## 2020-05-27 RX ORDER — POTASSIUM CHLORIDE 20 MEQ/1
40 TABLET, EXTENDED RELEASE ORAL
Status: COMPLETED | OUTPATIENT
Start: 2020-05-27 | End: 2020-05-27

## 2020-05-27 RX ADMIN — POTASSIUM CHLORIDE 40 MEQ: 1500 TABLET, EXTENDED RELEASE ORAL at 07:05

## 2020-05-27 RX ADMIN — HYDROXYZINE PAMOATE 25 MG: 25 CAPSULE ORAL at 07:05

## 2020-05-27 RX ADMIN — SODIUM CHLORIDE 1000 ML: 0.9 INJECTION, SOLUTION INTRAVENOUS at 06:05

## 2020-05-27 NOTE — ED NOTES
"Pt to ER with c/o anxiety due to bestfriends son passing away last week. Pt reports " today I just broke down." Pt denies SOB,CP, weakness. Pt reports just feeling shaky.   "

## 2020-05-28 ENCOUNTER — TELEPHONE (OUTPATIENT)
Dept: INTERNAL MEDICINE | Facility: CLINIC | Age: 40
End: 2020-05-28

## 2020-05-28 NOTE — ED NOTES
Patient rounding complete. Pt reports pain 0/10. Restroom and comfort needs addressed. Pt updated on POC. Call light in reach. Will continue to monitor.

## 2020-05-28 NOTE — TELEPHONE ENCOUNTER
----- Message from Rufus Curtis PA-C sent at 5/27/2020  7:21 PM CDT -----  Please help patient schedule an appointment.  She was seen in the emergency department after a panic attack.  Has been feeling more sad lately.  Would like to discuss options of anxiolytics.   Thanks- Rufus Curtis

## 2020-05-28 NOTE — ED PROVIDER NOTES
Encounter Date: 2020       History     Chief Complaint   Patient presents with    Anxiety     called EMS for anxiety symptoms, hx of panic attacks, EMS gave IV metoprolol 5 mg.      Patient is a 40-year-old female with hypertension, anemia, iron deficiency anemia and anxiety presents to the emergency department by EMS with a possible panic attack.  Patient reports she began to feel very anxious and began to hyperventilate and feels that her heart was racing.  She asked her friend to call EMS.  Patient states she has been under stress lately, stating her friend's son recently passed away.  She also states that everyone is matted me .  She states that her sisters are not speaking to her because she did not go on a trip with some this weekend.  She denies suicidal or homicidal thoughts.  She denies hallucinations.  She has taken anxiolytics in the past but she did not like the way this makes her feel.  She is also stating she has been feeling more sad lately.  Per EMS, heart rate was in the 140s and she was given 5 of metoprolol IV.  She states she is feeling better but is still feeling slightly anxious.  She is reporting some mild chest tightness and tingling to her bilateral hands.  She denies shortness of breath, nausea, or vomiting.    The history is provided by the patient.     Review of patient's allergies indicates:  No Known Allergies  Past Medical History:   Diagnosis Date    Encounter for blood transfusion     Fibroids     Hypertension     Iron deficiency anemia     Menorrhagia     Migraine     Seizures     as baby     Past Surgical History:   Procedure Laterality Date     SECTION      x1    HYSTERECTOMY  2017    TUBAL LIGATION       Family History   Problem Relation Age of Onset    Hypertension Father     Hypertension Sister     Hypertension Maternal Grandmother     Breast cancer Maternal Grandmother     Cancer Maternal Grandfather     Hypertension Paternal Grandmother      Colon cancer Maternal Cousin     Ovarian cancer Neg Hx      Social History     Tobacco Use    Smoking status: Never Smoker    Smokeless tobacco: Never Used   Substance Use Topics    Alcohol use: No     Comment: Occasional; 3x /month    Drug use: No     Review of Systems   Constitutional: Negative for chills and fever.   HENT: Negative for congestion.    Eyes: Negative for visual disturbance.   Respiratory: Positive for chest tightness. Negative for shortness of breath.    Cardiovascular: Positive for palpitations.   Gastrointestinal: Negative for abdominal pain, nausea and vomiting.   Musculoskeletal: Negative for arthralgias.   Skin: Negative for rash.   Allergic/Immunologic: Negative for immunocompromised state.   Neurological: Negative for weakness and headaches.   Psychiatric/Behavioral: Negative for suicidal ideas. The patient is nervous/anxious.        Physical Exam     Initial Vitals [05/27/20 1822]   BP Pulse Resp Temp SpO2   (!) 175/115 107 20 97.8 °F (36.6 °C) 99 %      MAP       --         Physical Exam    Vitals reviewed.  Constitutional: She is cooperative. No distress.   HENT:   Head: Normocephalic and atraumatic.   Eyes: Conjunctivae and EOM are normal.   Neck: Normal range of motion. Neck supple.   Cardiovascular: Regular rhythm and intact distal pulses. Tachycardia present.    Pulmonary/Chest: Breath sounds normal. No respiratory distress. She has no wheezes. She has no rales.   Abdominal: Soft. Bowel sounds are normal. There is no tenderness.   Musculoskeletal: Normal range of motion.   Neurological: She is alert and oriented to person, place, and time. She has normal strength. GCS eye subscore is 4. GCS verbal subscore is 5. GCS motor subscore is 6.   Skin: Skin is warm and dry. No rash noted.   Psychiatric: She has a normal mood and affect. Her behavior is normal. Thought content normal.         ED Course   Procedures  Labs Reviewed   CBC W/ AUTO DIFFERENTIAL - Abnormal; Notable for the  following components:       Result Value    MPV 8.5 (*)     All other components within normal limits   COMPREHENSIVE METABOLIC PANEL - Abnormal; Notable for the following components:    Potassium 2.9 (*)     Glucose 164 (*)     All other components within normal limits   TROPONIN I   POCT URINE PREGNANCY     EKG Readings: (Independently Interpreted)   Sinus tachycardia rate of 103 beats per minute.  No STEMI. No ischemic changes. No acute changes from prior ekg from 2018.        Imaging Results          X-Ray Chest AP Portable (Final result)  Result time 05/27/20 19:54:37    Final result by Rosenda Gonzalez MD (05/27/20 19:54:37)                 Impression:      No acute intrathoracic abnormality detected.      Electronically signed by: Rosenda Gonzalez  Date:    05/27/2020  Time:    19:54             Narrative:    EXAMINATION:  AP PORTABLE CHEST    CLINICAL HISTORY:  Other chest pain    TECHNIQUE:  AP portable chest radiograph was submitted.    COMPARISON:  03/27/2019    FINDINGS:  AP portable chest radiograph demonstrates a cardiac silhouette within normal limits.  There is no focal consolidation, pneumothorax, or pleural effusion.                                 Medical Decision Making:   Initial Assessment:   Urgent evaluation of a 40 y.o. female presenting to the emergency department after activating EMS for hyperventilating, chest tightness, and feeling anxious. Patient is afebrile, nontoxic appearing and hemodynamically stable.  -She is mildly tachycardic and blood pressure is elevated.  She is calm and not tachypneic.  -will obtain basic labs screening troponin.  EKG without ischemic changes.  -patient's presentation is consistent with anxiety reaction.  Clinical Tests:   Radiological Study: Ordered and Reviewed  ED Management:  Blood work significant for hypokalemia of 2.9.  This is a chronic issue.  Patient been prescribed potassium supplements in the past. - She is taking 99 mg, equivalent to 2.5 meq,  over-the-counter.  Will give 40 mEq potassium here in the ER and sent home with a prescription.  Glucose is also elevated without history of diabetes.  Patient made aware of this finding.  -upon reassessment after patient received IV fluids and hydroxyzine, she is feeling better.  She is no longer tachycardic.  She is not experiencing chest pain.  -she is strongly encouraged follow-up with PCP or return to the ER for new worsening symptoms.                                 Clinical Impression:     1. Anxiety reaction    2. Chest tightness    3. Hypokalemia    4. Essential hypertension    5. Hyperglycemia                               Rufus Curtis PA-C  05/27/20 2031

## 2020-06-03 ENCOUNTER — PATIENT MESSAGE (OUTPATIENT)
Dept: PRIMARY CARE CLINIC | Facility: CLINIC | Age: 40
End: 2020-06-03

## 2020-06-05 ENCOUNTER — OFFICE VISIT (OUTPATIENT)
Dept: INTERNAL MEDICINE | Facility: CLINIC | Age: 40
End: 2020-06-05
Payer: MEDICAID

## 2020-06-05 VITALS
HEIGHT: 64 IN | OXYGEN SATURATION: 98 % | WEIGHT: 152.56 LBS | DIASTOLIC BLOOD PRESSURE: 85 MMHG | SYSTOLIC BLOOD PRESSURE: 130 MMHG | HEART RATE: 75 BPM | BODY MASS INDEX: 26.05 KG/M2

## 2020-06-05 DIAGNOSIS — R61 UNEXPLAINED NIGHT SWEATS: ICD-10-CM

## 2020-06-05 DIAGNOSIS — R07.89 LEFT-SIDED CHEST WALL PAIN: Primary | ICD-10-CM

## 2020-06-05 PROCEDURE — 99999 PR PBB SHADOW E&M-EST. PATIENT-LVL IV: CPT | Mod: PBBFAC,,, | Performed by: STUDENT IN AN ORGANIZED HEALTH CARE EDUCATION/TRAINING PROGRAM

## 2020-06-05 PROCEDURE — 99999 PR PBB SHADOW E&M-EST. PATIENT-LVL IV: ICD-10-PCS | Mod: PBBFAC,,, | Performed by: STUDENT IN AN ORGANIZED HEALTH CARE EDUCATION/TRAINING PROGRAM

## 2020-06-05 PROCEDURE — 99213 PR OFFICE/OUTPT VISIT, EST, LEVL III, 20-29 MIN: ICD-10-PCS | Mod: S$PBB,,, | Performed by: STUDENT IN AN ORGANIZED HEALTH CARE EDUCATION/TRAINING PROGRAM

## 2020-06-05 PROCEDURE — 99214 OFFICE O/P EST MOD 30 MIN: CPT | Mod: PBBFAC | Performed by: STUDENT IN AN ORGANIZED HEALTH CARE EDUCATION/TRAINING PROGRAM

## 2020-06-05 PROCEDURE — 99213 OFFICE O/P EST LOW 20 MIN: CPT | Mod: S$PBB,,, | Performed by: STUDENT IN AN ORGANIZED HEALTH CARE EDUCATION/TRAINING PROGRAM

## 2020-06-05 RX ORDER — DICLOFENAC SODIUM 10 MG/G
2 GEL TOPICAL 4 TIMES DAILY
Qty: 100 G | Refills: 0 | Status: SHIPPED | OUTPATIENT
Start: 2020-06-05 | End: 2022-06-01

## 2020-06-05 NOTE — PROGRESS NOTES
Clinic Note  6/5/2020      Subjective:       Patient ID:  Pernell is a 40 y.o. female being seen for an urgent care visit.    Chief Complaint: Flank Pain (left) and Night Sweats    Pernell Ly is a 40 year old F with seizure disorder, HTN presenting for an urgent care visit. Patient states she has been having left sided chest pain which started a month and half ago, described as sharp, intermittent in nature, sometimes aggravated when she lays on her back. She saw her PCP who prescribed ibuprofen 800mg and  A short course of flexeril, heat/cold packs however her symptoms persist. She also reports a month duration of constant drenching night sweats. She denies fever, weight changes, cough, chills, SOB, dysuria, diarrhea, skin rash. She has had no sick exposures, travel history or pets. Of note, the patient was seen in the ED on 05/27 for anxiety during which a CBC and CXR obtained were unremarkable; CMP revealed hypokalemia for which she was prescribed potassium supplements. She also had a normal TSH as of October 2019.       Review of Systems   Constitutional: Negative for chills, fever and malaise/fatigue.   Respiratory: Negative for cough, sputum production and shortness of breath.    Cardiovascular: Negative for chest pain, palpitations and leg swelling.        Left sided subcostal pain   Gastrointestinal: Negative for abdominal pain, diarrhea, nausea and vomiting.   Genitourinary: Negative for dysuria and urgency.   Musculoskeletal: Negative for joint pain.   Neurological: Negative for dizziness, weakness and headaches.   Psychiatric/Behavioral: The patient is not nervous/anxious.        Medication List with Changes/Refills   New Medications    DICLOFENAC SODIUM (VOLTAREN) 1 % GEL    Apply 2 g topically 4 (four) times daily.   Current Medications    FERROUS SULFATE 324 MG (65 MG IRON) TBEC    Take 324 mg by mouth 2 (two) times daily.     FLUTICASONE PROPIONATE (FLONASE) 50 MCG/ACTUATION NASAL SPRAY    1  "spray (50 mcg total) by Each Nostril route once daily.    HYDROXYZINE HCL (ATARAX) 25 MG TABLET    Take 1 tablet (25 mg total) by mouth every 6 (six) hours as needed for Anxiety.    IBUPROFEN (ADVIL,MOTRIN) 800 MG TABLET    One tab po TID prn pain    LAMOTRIGINE (LAMICTAL) 100 MG TABLET        LEVETIRACETAM (KEPPRA) 500 MG TAB    Take 1 tablet (500 mg total) by mouth 2 (two) times daily.    LEVOCETIRIZINE (XYZAL) 5 MG TABLET    Take 1 tablet (5 mg total) by mouth every evening.    MULTIVITAMIN WITH MINERALS TABLET    Take by mouth.    NIFEDIPINE (PROCARDIA-XL) 90 MG (OSM) 24 HR TABLET    Take 1 tablet (90 mg total) by mouth once daily.    POLYETHYLENE GLYCOL (GLYCOLAX) 17 GRAM/DOSE POWDER    Take 17 g by mouth daily as needed (constipation).    POTASSIUM CHLORIDE (KLOR-CON) 10 MEQ TBSR    Take 1 tablet (10 mEq total) by mouth once daily.    POTASSIUM CHLORIDE (KLOR-CON) 10 MEQ TBSR    Take 2 tablets for the next 3 days.  Then take 1 tablet daily.    ZOLPIDEM (AMBIEN) 5 MG TAB    Take 1 tablet (5 mg total) by mouth nightly as needed.    ZONISAMIDE (ZONEGRAN) 100 MG CAP    Take 3 capsules (300 mg total) by mouth every evening.       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    Weight as of this encounter: 69.2 kg (152 lb 8.9 oz).  Physical Exam   Constitutional: She is " oriented to person, place, and time and well-developed, well-nourished, and in no distress. No distress.   HENT:   Head: Normocephalic and atraumatic.   Eyes: EOM are normal.   Neck: Normal range of motion. No thyromegaly present.   Cardiovascular: Normal rate, regular rhythm, normal heart sounds and intact distal pulses. Exam reveals no friction rub.   No murmur heard.  Pulmonary/Chest: Effort normal and breath sounds normal. No respiratory distress. She has no wheezes. She has no rales. She exhibits no tenderness.   Abdominal: Soft. Bowel sounds are normal. She exhibits no distension. There is tenderness (Mild LUQ tenderness, just below the subcoastal area). There is no rebound and no guarding.   Musculoskeletal: Normal range of motion. She exhibits no edema or deformity.   Lymphadenopathy:     She has no cervical adenopathy.   Neurological: She is alert and oriented to person, place, and time.   Skin: Skin is warm. No rash noted. She is not diaphoretic. No pallor.   Psychiatric: Memory, affect and judgment normal.         Assessment and Plan:   Pernell POLO was seen today for flank pain and night sweats.    Diagnoses and all orders for this visit:    Left-sided chest wall pain   Likely 2/2 to costochondritis. Advised to continue conservative measures and begin topical voltaren.   -     diclofenac sodium (VOLTAREN) 1 % Gel; Apply 2 g topically 4 (four) times daily.    Unexplained night sweats   1 month duration of isolated night sweats without any other symptoms. No weight loss or other B-symptoms reported. CXR, CBC, CMP a week ago were unrevealing for any underlying illness. TSH level from last year was unremarkable. No thyromegaly or other symptoms reported. Reasonable to watch and wait for now. Advised the patient to take a temperature log at night and bring to next visit with PCP. Can consider repeating TSH and further work up then if night sweats persist.                Follow Up:   Follow up in about 1 month  (around 7/5/2020).        Plan discussed with attending Dr. Cooper, further recommendations as per attending addendum. Please feel free to call with any questions or concerns.        Margaux Taylor MD  Internal Medicine  Resident Physician - PGY1

## 2020-06-05 NOTE — PATIENT INSTRUCTIONS
Take your temperature every night and log in the numbers. Bring log to your follow up appointment with Dr. Nelson.     Begin applying diclofenac gel to the affected chest wall area.

## 2020-06-26 DIAGNOSIS — E87.6 HYPOKALEMIA: ICD-10-CM

## 2020-06-26 RX ORDER — POTASSIUM CHLORIDE 750 MG/1
10 TABLET, EXTENDED RELEASE ORAL DAILY
Qty: 90 TABLET | Refills: 1 | OUTPATIENT
Start: 2020-06-26

## 2020-06-29 RX ORDER — POTASSIUM CHLORIDE 750 MG/1
TABLET, EXTENDED RELEASE ORAL
Qty: 90 TABLET | Refills: 1 | Status: SHIPPED | OUTPATIENT
Start: 2020-06-29 | End: 2020-10-13

## 2020-07-29 ENCOUNTER — TELEPHONE (OUTPATIENT)
Dept: PRIMARY CARE CLINIC | Facility: CLINIC | Age: 40
End: 2020-07-29

## 2020-07-29 ENCOUNTER — OFFICE VISIT (OUTPATIENT)
Dept: PRIMARY CARE CLINIC | Facility: CLINIC | Age: 40
End: 2020-07-29
Attending: FAMILY MEDICINE
Payer: MEDICAID

## 2020-07-29 DIAGNOSIS — I10 ESSENTIAL HYPERTENSION: ICD-10-CM

## 2020-07-29 DIAGNOSIS — R10.9 LEFT-SIDED ABDOMINAL PAIN OF UNKNOWN ETIOLOGY: Primary | ICD-10-CM

## 2020-07-29 PROCEDURE — 99213 OFFICE O/P EST LOW 20 MIN: CPT | Mod: 95,,, | Performed by: FAMILY MEDICINE

## 2020-07-29 PROCEDURE — 99213 PR OFFICE/OUTPT VISIT, EST, LEVL III, 20-29 MIN: ICD-10-PCS | Mod: 95,,, | Performed by: FAMILY MEDICINE

## 2020-07-29 RX ORDER — PANTOPRAZOLE SODIUM 40 MG/1
40 TABLET, DELAYED RELEASE ORAL DAILY
Qty: 30 TABLET | Refills: 0 | Status: SHIPPED | OUTPATIENT
Start: 2020-07-29 | End: 2020-09-28

## 2020-07-29 NOTE — TELEPHONE ENCOUNTER
----- Message from Zoey Nelson MD sent at 7/29/2020  9:25 AM CDT -----  Pl schedule US-Aubree dietz

## 2020-07-29 NOTE — PROGRESS NOTES
Subjective:       Patient ID: Pernell Ly is a 40 y.o. female.    Chief Complaint: No chief complaint on file.    Pt presents today via telemed that she has had some pain under her left rib cage x 3 dmonth. Pain worsens only when she lies down and when she gets up. Does notice it on movement and does improve when heat is on it and with ibu. Pt worried bc its been few days and denies any trauma. This pain is NOT new and per pt, she feels that it does at times radiate to the right side as well    Pt did go to  6/5 and was diagnosed with costochondritis. Took NSAIDS and voltaren gel and but does not feel that it has helped    Pt denies any CP/SOB/HA/urination issues/breast pain        Review of Systems   Constitutional: Negative.  Negative for activity change, appetite change, chills, diaphoresis, fatigue, fever and unexpected weight change.   HENT: Negative for congestion, hearing loss, rhinorrhea and trouble swallowing.    Eyes: Negative for discharge and visual disturbance.   Respiratory: Negative.  Negative for apnea, cough, choking, chest tightness, shortness of breath, wheezing and stridor.    Cardiovascular: Negative.  Negative for chest pain, palpitations and leg swelling.   Gastrointestinal: Negative for abdominal pain, anal bleeding, blood in stool, constipation, diarrhea, nausea, rectal pain and vomiting.   Endocrine: Negative for polydipsia and polyuria.   Genitourinary: Negative.  Negative for difficulty urinating, dysuria, hematuria and menstrual problem.   Musculoskeletal: Positive for arthralgias. Negative for back pain, gait problem, joint swelling, myalgias, neck pain and neck stiffness.   Neurological: Negative.  Negative for weakness.   Psychiatric/Behavioral: Negative for confusion, decreased concentration and dysphoric mood.   All other systems reviewed and are negative.      Objective:      Physical Exam  Constitutional:       General: She is not in acute distress.     Appearance: She is  well-developed. She is not diaphoretic.   HENT:      Head: Normocephalic and atraumatic.   Eyes:      Conjunctiva/sclera: Conjunctivae normal.   Neck:      Musculoskeletal: Normal range of motion and neck supple.   Pulmonary:      Effort: Pulmonary effort is normal. No respiratory distress.   Musculoskeletal: Normal range of motion.         General: Tenderness (left side below ribcage TTP when pt presses down on area) present.      Comments: Pt asked to rotate her torso to the left and to the right. Pt c/o pain under her left rib cage that causes her to discomfort as she moves. Pt denies trauma and recent heavy lifting that she can recall   Neurological:      Mental Status: She is alert and oriented to person, place, and time.   Psychiatric:         Behavior: Behavior normal.         Thought Content: Thought content normal.         Judgment: Judgment normal.         Assessment:       Musculoskeletal sprain flank left  Plan:       Suspect that this is either costochondritis versus muscle sprain. Lean towards a muscle sprain that seems to worsen with movement based on exam  Recommend ice, heat and NSAIDS prn along with muscle relaxants SE's of med d/w pt    Left-sided abdominal pain of unknown etiology  -     US Abdomen Complete; Future; Expected date: 07/29/2020    Other orders  -     pantoprazole (PROTONIX) 40 MG tablet; Take 1 tablet (40 mg total) by mouth once daily.  Dispense: 30 tablet; Refill: 0    possible this could be GERD, low suspicion for GB but will order US to assess  ED prompts d/w pt      HTN per pt appears to be controlled on meds    The patient location is: [facility]  The chief complaint leading to consultation is: left side rib/flank pain  Visit type: Virtual visit with synchronous audio and video  Total time spent with patient: 10 mins  Each patient to whom he or she provides medical services by telemedicine is:  (1) informed of the relationship between the physician and patient and the respective role  of any other health care provider with respect to management of the patient; and (2) notified that he or she may decline to receive medical services by telemedicine and may withdraw from such care at any time.    Notes above       ED prompts d/w pt in depth

## 2020-08-03 ENCOUNTER — HOSPITAL ENCOUNTER (OUTPATIENT)
Dept: RADIOLOGY | Facility: OTHER | Age: 40
Discharge: HOME | End: 2020-08-03
Attending: FAMILY MEDICINE
Payer: MEDICAID

## 2020-08-03 DIAGNOSIS — R10.9 LEFT-SIDED ABDOMINAL PAIN OF UNKNOWN ETIOLOGY: ICD-10-CM

## 2020-08-03 PROCEDURE — 76700 US ABDOMEN COMPLETE: ICD-10-PCS | Mod: 26,,, | Performed by: RADIOLOGY

## 2020-08-03 PROCEDURE — 76700 US EXAM ABDOM COMPLETE: CPT | Mod: 26,,, | Performed by: RADIOLOGY

## 2020-08-03 PROCEDURE — 76700 US EXAM ABDOM COMPLETE: CPT | Mod: TC

## 2020-08-05 ENCOUNTER — OFFICE VISIT (OUTPATIENT)
Dept: PRIMARY CARE CLINIC | Facility: CLINIC | Age: 40
End: 2020-08-05
Attending: FAMILY MEDICINE
Payer: MEDICAID

## 2020-08-05 DIAGNOSIS — R93.89 ABNORMAL ULTRASOUND: Primary | ICD-10-CM

## 2020-08-05 DIAGNOSIS — B96.89 BACTERIAL SINUSITIS: ICD-10-CM

## 2020-08-05 DIAGNOSIS — J32.9 BACTERIAL SINUSITIS: ICD-10-CM

## 2020-08-05 PROCEDURE — 99213 PR OFFICE/OUTPT VISIT, EST, LEVL III, 20-29 MIN: ICD-10-PCS | Mod: 95,,, | Performed by: FAMILY MEDICINE

## 2020-08-05 PROCEDURE — 99213 OFFICE O/P EST LOW 20 MIN: CPT | Mod: 95,,, | Performed by: FAMILY MEDICINE

## 2020-08-05 RX ORDER — AZITHROMYCIN 250 MG/1
TABLET, FILM COATED ORAL
Qty: 6 TABLET | Refills: 0 | Status: SHIPPED | OUTPATIENT
Start: 2020-08-05 | End: 2020-08-10

## 2020-08-05 NOTE — PROGRESS NOTES
Established Patient - Audio Only Telehealth Visit     The patient location is: HOME  The chief complaint leading to consultation is: abnormal ultrasound  Visit type: Virtual visit with audio only (telephone)  Total time spent with patient: 10 mins       The reason for the audio only service rather than synchronous audio and video virtual visit was related to technical difficulties or patient preference/necessity.     Each patient to whom I provide medical services by telemedicine is:  (1) informed of the relationship between the physician and patient and the respective role of any other health care provider with respect to management of the patient; and (2) notified that they may decline to receive medical services by telemedicine and may withdraw from such care at any time. Patient verbally consented to receive this service via voice-only telephone call.       HPI: pt with abnormal results on US abd- for abd pain     Assessment and plan:  Pt reassured and plan discussed with pt. pending results but will still need GI consult  Pt also with sinus congestion. With h/o seizures and HTN will stay away from decogestants and have decided to do trial of zpak since symptoms have persisted for few weeks and pt has used all avail OTC options                        This service was not originating from a related E/M service provided within the previous 7 days nor will  to an E/M service or procedure within the next 24 hours or my soonest available appointment.  Prevailing standard of care was able to be met in this audio-only visit.

## 2020-08-10 ENCOUNTER — TELEPHONE (OUTPATIENT)
Dept: PRIMARY CARE CLINIC | Facility: CLINIC | Age: 40
End: 2020-08-10

## 2020-08-10 ENCOUNTER — HOSPITAL ENCOUNTER (OUTPATIENT)
Dept: RADIOLOGY | Facility: OTHER | Age: 40
Discharge: HOME | End: 2020-08-10
Attending: FAMILY MEDICINE
Payer: MEDICAID

## 2020-08-10 DIAGNOSIS — R93.89 ABNORMAL ULTRASOUND: Primary | ICD-10-CM

## 2020-08-10 DIAGNOSIS — R10.31 RIGHT LOWER QUADRANT ABDOMINAL PAIN: ICD-10-CM

## 2020-08-11 ENCOUNTER — HOSPITAL ENCOUNTER (OUTPATIENT)
Dept: RADIOLOGY | Facility: OTHER | Age: 40
Discharge: HOME | End: 2020-08-11
Attending: FAMILY MEDICINE
Payer: MEDICAID

## 2020-08-11 ENCOUNTER — PATIENT MESSAGE (OUTPATIENT)
Dept: PRIMARY CARE CLINIC | Facility: CLINIC | Age: 40
End: 2020-08-11

## 2020-08-11 DIAGNOSIS — K76.9 HEPATIC LESION: Primary | ICD-10-CM

## 2020-08-11 PROCEDURE — 25500020 PHARM REV CODE 255: Performed by: FAMILY MEDICINE

## 2020-08-11 PROCEDURE — 74178 CT ABDOMEN PELVIS W WO CONTRAST: ICD-10-PCS | Mod: 26,,, | Performed by: RADIOLOGY

## 2020-08-11 PROCEDURE — 74178 CT ABD&PLV WO CNTR FLWD CNTR: CPT | Mod: 26,,, | Performed by: RADIOLOGY

## 2020-08-11 PROCEDURE — 74178 CT ABD&PLV WO CNTR FLWD CNTR: CPT | Mod: TC

## 2020-08-11 RX ADMIN — IOHEXOL 75 ML: 350 INJECTION, SOLUTION INTRAVENOUS at 09:08

## 2020-08-12 ENCOUNTER — DOCUMENTATION ONLY (OUTPATIENT)
Dept: TRANSPLANT | Facility: CLINIC | Age: 40
End: 2020-08-12

## 2020-08-12 NOTE — NURSING
Pt records reviewed.   Pt will be referred to Hepatology.  Liver lesion    Initial referral received  from the [facility]que.   Referring Provider/diagnosis  Zoey Nelson MD      Referral letter sent to patient.

## 2020-08-12 NOTE — LETTER
August 12, 2020    Pernell Ly  [address] [facility] [id number]      Dear Pernell Ly:    Your doctor has referred you to the [facility]. We are sending this letter to remind you to make an appointment with us to complete the referral process.     Please call us at [phone] to schedule an appointment. We look forward to seeing you soon.     If you received a call and have been scheduled, please disregard this letter.       Sincerely,        [facility]   [address] [place] [id number]  [phone]

## 2020-08-24 ENCOUNTER — PATIENT MESSAGE (OUTPATIENT)
Dept: PRIMARY CARE CLINIC | Facility: CLINIC | Age: 40
End: 2020-08-24

## 2020-08-24 DIAGNOSIS — R56.9 SEIZURES: Primary | ICD-10-CM

## 2020-08-24 RX ORDER — LAMOTRIGINE 100 MG/1
100 TABLET ORAL 2 TIMES DAILY
Qty: 60 TABLET | Refills: 0 | Status: CANCELLED | OUTPATIENT
Start: 2020-08-24

## 2020-08-24 RX ORDER — LAMOTRIGINE 100 MG/1
100 TABLET ORAL 2 TIMES DAILY
Qty: 60 TABLET | Refills: 0 | Status: SHIPPED | OUTPATIENT
Start: 2020-08-24 | End: 2020-09-22 | Stop reason: SDUPTHER

## 2020-08-24 NOTE — TELEPHONE ENCOUNTER
----- Message from Nohemy Martinez sent at 8/24/2020  2:03 PM CDT -----  Pt need a refill on medication she was a pt of Dr. Nolasco asking for a call back.    lamoTRIgine (LAMICTAL) 100 MG tablet        Contact info [phone]      Pharmacy [facility]

## 2020-08-24 NOTE — TELEPHONE ENCOUNTER
----- Message from Nohemy Martinez sent at 8/24/2020  2:03 PM CDT -----  Pt need a refill on medication she was a pt of Dr. Nolasco asking for a call back.    lamoTRIgine (LAMICTAL) 100 MG tablet        Contact info [phone]      Pharmacy [facility]

## 2020-08-31 ENCOUNTER — LAB VISIT (OUTPATIENT)
Dept: LAB | Facility: HOSPITAL | Age: 40
End: 2020-08-31
Attending: STUDENT IN AN ORGANIZED HEALTH CARE EDUCATION/TRAINING PROGRAM
Payer: MEDICAID

## 2020-08-31 ENCOUNTER — OFFICE VISIT (OUTPATIENT)
Dept: HEPATOLOGY | Facility: CLINIC | Age: 40
End: 2020-08-31
Payer: MEDICAID

## 2020-08-31 ENCOUNTER — OFFICE VISIT (OUTPATIENT)
Dept: PRIMARY CARE CLINIC | Facility: CLINIC | Age: 40
End: 2020-08-31
Attending: FAMILY MEDICINE
Payer: MEDICAID

## 2020-08-31 VITALS
RESPIRATION RATE: 20 BRPM | SYSTOLIC BLOOD PRESSURE: 154 MMHG | HEART RATE: 72 BPM | DIASTOLIC BLOOD PRESSURE: 97 MMHG | BODY MASS INDEX: 26.61 KG/M2 | HEIGHT: 64 IN | OXYGEN SATURATION: 100 % | WEIGHT: 155.88 LBS

## 2020-08-31 DIAGNOSIS — R05.9 COUGH IN ADULT: Primary | ICD-10-CM

## 2020-08-31 DIAGNOSIS — R93.5 ABNORMAL CT OF THE ABDOMEN: ICD-10-CM

## 2020-08-31 DIAGNOSIS — J30.2 SEASONAL ALLERGIES: ICD-10-CM

## 2020-08-31 DIAGNOSIS — K76.9 HEPATIC LESION: ICD-10-CM

## 2020-08-31 LAB
AFP SERPL-MCNC: 3.2 NG/ML (ref 0–8.4)
ALBUMIN SERPL BCP-MCNC: 4.1 G/DL (ref 3.5–5.2)
ALP SERPL-CCNC: 68 U/L (ref 55–135)
ALT SERPL W/O P-5'-P-CCNC: 10 U/L (ref 10–44)
AST SERPL-CCNC: 15 U/L (ref 10–40)
BILIRUB DIRECT SERPL-MCNC: <0.1 MG/DL (ref 0.1–0.3)
BILIRUB SERPL-MCNC: 0.2 MG/DL (ref 0.1–1)
CANCER AG19-9 SERPL-ACNC: 31 U/ML (ref 2–40)
CEA SERPL-MCNC: 1.8 NG/ML (ref 0–5)
PROT SERPL-MCNC: 8.2 G/DL (ref 6–8.4)

## 2020-08-31 PROCEDURE — 82105 ALPHA-FETOPROTEIN SERUM: CPT

## 2020-08-31 PROCEDURE — 86301 IMMUNOASSAY TUMOR CA 19-9: CPT

## 2020-08-31 PROCEDURE — 99204 OFFICE O/P NEW MOD 45 MIN: CPT | Mod: S$PBB,,, | Performed by: STUDENT IN AN ORGANIZED HEALTH CARE EDUCATION/TRAINING PROGRAM

## 2020-08-31 PROCEDURE — 99215 OFFICE O/P EST HI 40 MIN: CPT | Mod: PBBFAC | Performed by: STUDENT IN AN ORGANIZED HEALTH CARE EDUCATION/TRAINING PROGRAM

## 2020-08-31 PROCEDURE — 82378 CARCINOEMBRYONIC ANTIGEN: CPT

## 2020-08-31 PROCEDURE — 99214 PR OFFICE/OUTPT VISIT, EST, LEVL IV, 30-39 MIN: ICD-10-PCS | Mod: 95,,, | Performed by: FAMILY MEDICINE

## 2020-08-31 PROCEDURE — 99214 OFFICE O/P EST MOD 30 MIN: CPT | Mod: 95,,, | Performed by: FAMILY MEDICINE

## 2020-08-31 PROCEDURE — 80076 HEPATIC FUNCTION PANEL: CPT

## 2020-08-31 PROCEDURE — 99204 PR OFFICE/OUTPT VISIT, NEW, LEVL IV, 45-59 MIN: ICD-10-PCS | Mod: S$PBB,,, | Performed by: STUDENT IN AN ORGANIZED HEALTH CARE EDUCATION/TRAINING PROGRAM

## 2020-08-31 PROCEDURE — 99999 PR PBB SHADOW E&M-EST. PATIENT-LVL V: CPT | Mod: PBBFAC,,, | Performed by: STUDENT IN AN ORGANIZED HEALTH CARE EDUCATION/TRAINING PROGRAM

## 2020-08-31 PROCEDURE — 36415 COLL VENOUS BLD VENIPUNCTURE: CPT

## 2020-08-31 PROCEDURE — 99999 PR PBB SHADOW E&M-EST. PATIENT-LVL V: ICD-10-PCS | Mod: PBBFAC,,, | Performed by: STUDENT IN AN ORGANIZED HEALTH CARE EDUCATION/TRAINING PROGRAM

## 2020-08-31 RX ORDER — ALBUTEROL SULFATE 90 UG/1
2 AEROSOL, METERED RESPIRATORY (INHALATION) EVERY 6 HOURS PRN
Qty: 18 G | Refills: 0 | Status: SHIPPED | OUTPATIENT
Start: 2020-08-31 | End: 2020-08-31 | Stop reason: SDUPTHER

## 2020-08-31 RX ORDER — ALBUTEROL SULFATE 90 UG/1
2 AEROSOL, METERED RESPIRATORY (INHALATION) EVERY 6 HOURS PRN
Qty: 18 G | Refills: 0 | Status: SHIPPED | OUTPATIENT
Start: 2020-08-31 | End: 2023-06-28

## 2020-08-31 RX ORDER — BENZONATATE 100 MG/1
100 CAPSULE ORAL 3 TIMES DAILY PRN
Qty: 90 CAPSULE | Refills: 0 | Status: SHIPPED | OUTPATIENT
Start: 2020-08-31 | End: 2020-09-10

## 2020-08-31 NOTE — PROGRESS NOTES
Hepatology Consult Note    Referring provider: Dr. Zoey Nelson  PCP: Zoey Nelson MD    Chief complaint: liver lesions    HPI:  Pernell Ly is a 40 y.o. female who was referred to Hepatology Clinic for consultation of liver lesions.  Approximately 4 months ago she developed intermittent LUQ pain. She saw her PCP and underwent ultrasound showing 3 liver masses.  She subsequently underwent CT showing multiple liver lesions the largest of which was 3.1 cm.  Prior to this she had never been told that she had liver disease.  She rarely drinks alcohol and has never been a heavy drinker.  She has had blood transfusions in the past for bleeding uterine fibroids.  She has 2 tattoos.  She denies history of IV drug use.  She has no known family history of liver disease. She denies recent hematemesis, coffee ground emesis, melena, hematochezia, jaundice, confusion, LE edema, abdominal distension.    Past Medical History:   Diagnosis Date    Encounter for blood transfusion     Fibroids     Hypertension     Iron deficiency anemia     Menorrhagia     Migraine     Seizures     as baby       Past Surgical History:   Procedure Laterality Date     SECTION      x1    HYSTERECTOMY  2017    TUBAL LIGATION         Family History   Problem Relation Age of Onset    Hypertension Father     Hypertension Sister     Hypertension Maternal Grandmother     Breast cancer Maternal Grandmother     Cancer Maternal Grandfather     Hypertension Paternal Grandmother     Colon cancer Maternal Cousin     Ovarian cancer Neg Hx        Social History     Socioeconomic History    Marital status: Single     Spouse name: Not on file    Number of children: Not on file    Years of education: Not on file    Highest education level: Not on file   Occupational History    Not on file   Social Needs    Financial resource strain: Not on file    Food insecurity     Worry: Not on file     Inability: Not on file     Transportation needs     Medical: Not on file     Non-medical: Not on file   Tobacco Use    Smoking status: Never Smoker    Smokeless tobacco: Never Used   Substance and Sexual Activity    Alcohol use: No     Comment: Occasional; 3x /month    Drug use: No    Sexual activity: Yes     Partners: Male     Birth control/protection: See Surgical Hx   Lifestyle    Physical activity     Days per week: Not on file     Minutes per session: Not on file    Stress: Not on file   Relationships    Social connections     Talks on phone: Not on file     Gets together: Not on file     Attends [employer] service: Not on file     Active member of club or organization: Not on file     Attends meetings of clubs or organizations: Not on file     Relationship status: Not on file   Other Topics Concern    Not on file   Social History Narrative    Not on file       Current Outpatient Medications   Medication Sig Dispense Refill    diclofenac sodium (VOLTAREN) 1 % Gel Apply 2 g topically 4 (four) times daily. 100 g 0    ferrous sulfate 324 mg (65 mg iron) TbEC Take 324 mg by mouth 2 (two) times daily.       fluticasone propionate (FLONASE) 50 mcg/actuation nasal spray 1 spray (50 mcg total) by Each Nostril route once daily. 9.9 mL 2    hydrOXYzine HCL (ATARAX) 25 MG tablet Take 1 tablet (25 mg total) by mouth every 6 (six) hours as needed for Anxiety. 10 tablet 0    ibuprofen (ADVIL,MOTRIN) 800 MG tablet One tab po TID prn pain 60 tablet 1    lamoTRIgine (LAMICTAL) 100 MG tablet Take 1 tablet (100 mg total) by mouth 2 (two) times daily. 60 tablet 0    levETIRAcetam (KEPPRA) 500 MG Tab Take 1 tablet (500 mg total) by mouth 2 (two) times daily. 60 tablet 0    levocetirizine (XYZAL) 5 MG tablet Take 1 tablet (5 mg total) by mouth every evening. 30 tablet 0    multivitamin with minerals tablet Take by mouth.      NIFEdipine (PROCARDIA-XL) 90 MG (OSM) 24 hr tablet TAKE 1 TABLET(90 MG) BY MOUTH EVERY DAY 90 tablet 0     "pantoprazole (PROTONIX) 40 MG tablet Take 1 tablet (40 mg total) by mouth once daily. 30 tablet 0    polyethylene glycol (GLYCOLAX) 17 gram/dose powder Take 17 g by mouth daily as needed (constipation). 119 g 0    potassium chloride (KLOR-CON) 10 MEQ TbSR Take 1 tablet (10 mEq total) by mouth once daily. 90 tablet 1    potassium chloride (KLOR-CON) 10 MEQ TbSR Take 2 tablets for the next 3 days.  Then take 1 tablet daily. 90 tablet 1    zolpidem (AMBIEN) 5 MG Tab Take 1 tablet (5 mg total) by mouth nightly as needed. 15 tablet 2     No current facility-administered medications for this visit.        Review of patient's allergies indicates:  No Known Allergies    Review of Systems   Constitutional: Negative for chills, fever and weight loss.   HENT: Negative for congestion and sore throat.    Eyes: Negative for blurred vision and double vision.   Respiratory: Negative for cough, sputum production and shortness of breath.    Cardiovascular: Negative for chest pain, orthopnea and leg swelling.   Gastrointestinal: Positive for abdominal pain (intermittent, LUQ). Negative for blood in stool, constipation, diarrhea, melena, nausea and vomiting.   Genitourinary: Negative for dysuria and hematuria.   Musculoskeletal: Negative for falls, joint pain and myalgias.   Skin: Negative for itching and rash.   Neurological: Negative for tingling, focal weakness, seizures and headaches.   Endo/Heme/Allergies: Does not bruise/bleed easily.   Psychiatric/Behavioral: Negative for memory loss and substance abuse. The patient does not have insomnia.        Vitals:    08/31/20 1435   BP: (!) 154/97   Pulse: 72   Resp: 20   SpO2: 100%   Weight: 70.7 kg (155 lb 13.8 oz)   Height: 5' 4" (1.626 m)       Physical Exam  Vitals signs reviewed.   Constitutional:       General: She is not in acute distress.  Eyes:      General: No scleral icterus.     Extraocular Movements: Extraocular movements intact.      Conjunctiva/sclera: Conjunctivae " normal.   Cardiovascular:      Rate and Rhythm: Normal rate and regular rhythm.   Pulmonary:      Effort: Pulmonary effort is normal.      Breath sounds: Normal breath sounds. No wheezing, rhonchi or rales.   Abdominal:      General: Bowel sounds are normal. There is no distension.      Palpations: Abdomen is soft.      Tenderness: There is no abdominal tenderness.   Musculoskeletal:         General: No swelling or deformity.      Right lower leg: No edema.      Left lower leg: No edema.   Skin:     General: Skin is warm and dry.      Coloration: Skin is not jaundiced.   Neurological:      General: No focal deficit present.      Mental Status: She is alert and oriented to person, place, and time.   Psychiatric:         Mood and Affect: Mood normal.         Behavior: Behavior normal.         LABS:     Lab Results   Component Value Date    WBC 5.89 05/27/2020    HGB 12.2 05/27/2020    HCT 37.0 05/27/2020    MCV 89 05/27/2020     05/27/2020       Lab Results   Component Value Date     08/11/2020    K 3.4 (L) 08/11/2020     08/11/2020    CO2 25 08/11/2020    BUN 10 08/11/2020    CREATININE 0.8 08/11/2020    CALCIUM 9.5 08/11/2020    ANIONGAP 13 08/11/2020    ESTGFRAFRICA >60 08/11/2020    EGFRNONAA >60 08/11/2020       Lab Results   Component Value Date    ALT 12 05/27/2020    AST 13 05/27/2020    ALKPHOS 65 05/27/2020    BILITOT 0.1 05/27/2020       IMAGING:   US abdomen 8/3/2020  Impression:     1. Three hepatic masses.  When reviewing the prior CTs from 2017, these findings are extremely subtle on those exams but present.  Dedicated CT or MRI of the liver is recommended to fully characterize these foci.  2. Sludge within the gallbladder lumen.  3.  This report was flagged in Epic as abnormal.    CT abdomen/pelvis 8/11/2020  Impression:     Multiple liver lesions with the larger lesions appearing hypodense on the noncontrast study with relatively homogeneous enhancement on the hepatic arterial phase  of enhancement which become isodense on the portal venous and delayed imaging.  At least the 2 larger lesions were present on a prior examination dated 09/29/2017.  Imaging findings are most consistent with probably benign liver lesions with differential including multiple FNH, hepatic adenomas, or rapidly filling hemangiomas.     S/p hysterectomy.     Probable subcentimeter left renal cyst.      Assessment:  40 y.o. female who was referred to Hepatology Clinic for consultation of liver lesions. She has no known underlying liver disease. CT showed multiple liver lesions the largest of which was 3.1 cm with DDx including FNH, hepatic adenomas, or rapidly filling hemangiomas.    Recommendation(s):  - Repeating LFTs and checking AFP, CA 19-9, and CEA  - Will review patient's images at multidisciplinary liver radiology conference  - Tentatively plan to repeat cross-sectional imaging in 3-6 months pending radiology conference consensus    Return to clinic in 3 months.    I have sent communication to the referring physician and/or primary care provider.    Luis Beltran MD  Staff Physician  Hepatology and Liver Transplant  [facility]  [facility] [place]

## 2020-08-31 NOTE — PROGRESS NOTES
Established Patient - Audio Only Telehealth Visit     The patient location is: HOME  The chief complaint leading to consultation is: cough, abnormal CT abd  Visit type: Virtual visit with audio only (telephone)  Total time spent with patient: 10 mins       The reason for the audio only service rather than synchronous audio and video virtual visit was related to technical difficulties or patient preference/necessity.     Each patient to whom I provide medical services by telemedicine is:  (1) informed of the relationship between the physician and patient and the respective role of any other health care provider with respect to management of the patient; and (2) notified that they may decline to receive medical services by telemedicine and may withdraw from such care at any time. Patient verbally consented to receive this service via voice-only telephone call.       HPI: pt today states that she has bee having a cough. Thinks its allergies but due to pressures and seizures stays away from decongestants.. pt has been taking flonase, allegra. Not helping. Pt states that she has been afebrile and cough has been nonproductive. Pt with long h.o allergies    Pt also updates me on hepatology appt. States that she was told most likely benign. But, needs to be followed. Pt feels relieved but still nervous         Assessment and plan:    Suspect that this is allergies and is not new to pt during this time of the year.  Trial of alb inh and tessalon perles  SE's of med d/w pt  If no improvement pt needs to come in to be seen    Hepatic lesions: followed by hepatology. Pt reassured and emphasized that she is in good hands    Cough in adult  -     Discontinue: albuterol (PROVENTIL HFA) 90 mcg/actuation inhaler; Inhale 2 puffs into the lungs every 6 (six) hours as needed for Wheezing. Rescue  Dispense: 18 g; Refill: 0  -     albuterol (PROVENTIL HFA) 90 mcg/actuation inhaler; Inhale 2 puffs into the lungs every 6 (six) hours as  needed for Wheezing. Rescue  Dispense: 18 g; Refill: 0    Other orders  -     benzonatate (TESSALON) 100 MG capsule; Take 1 capsule (100 mg total) by mouth 3 (three) times daily as needed for Cough.  Dispense: 90 capsule; Refill: 0      RTC prn                         This service was not originating from a related E/M service provided within the previous 7 days nor will  to an E/M service or procedure within the next 24 hours or my soonest available appointment.  Prevailing standard of care was able to be met in this audio-only visit.

## 2020-08-31 NOTE — LETTER
August 31, 2020      Zoey Nelson MD  [address]   [address]  [facility] [address]           [address]  [address]  [facility] [address]  Phone: [phone]  Fax: [phone]          Patient: Pernell Ly   MR Number: [id number]   YOB: 1980   Date of Visit: 8/31/2020       Dear Dr. Zoey Nelson:    Thank you for referring Pernell Ly to me for evaluation. Attached you will find relevant portions of my assessment and plan of care.    If you have questions, please do not hesitate to call me. I look forward to following Pernell Ly along with you.    Sincerely,    Luis Beltran MD    Enclosure  CC:  No Recipients    If you would like to receive this communication electronically, please contact [email or web address] or [phone] to request more information on [employer] Link access.    For providers and/or their staff who would like to refer a patient to [facility], please contact us through our one-stop-shop provider referral line, [facility], at [phone].    If you feel you have received this communication in error or would no longer like to receive these types of communications, please e-mail [email or web address]

## 2020-09-02 ENCOUNTER — TELEPHONE (OUTPATIENT)
Dept: HEPATOLOGY | Facility: CLINIC | Age: 40
End: 2020-09-02

## 2020-09-02 NOTE — TELEPHONE ENCOUNTER
Patient: Pernell Ly       MRN: [id number]      : 1980     Age: 40 y.o.  [address] [facility] [address]    Provider: Hepatologist Alta Beltran    Urgency of review: non-urgent    Patient Transplant Status: Other N/A    Reason for presentation: Reassessment    Clinical Summary: 40 y.o. female referred to Hepatology Clinic for liver lesions. No known underlying liver disease. CT done for LUQ abdominal pain incidentally showed multiple liver lesions the largest of which was 3.1 cm with DDx including FNH, hepatic adenomas, or rapidly filling hemangiomas. At least 2 were present on CT 2017 though were not commented on at that time.    Imaging to be reviewed: CT 2020    HCC Treatment History: NA    ABO: O    Platelets:   Lab Results   Component Value Date/Time     2020 07:04 PM     Creatinine:   Lab Results   Component Value Date/Time    CREATININE 0.8 2020 07:04 AM     Bilirubin:   Lab Results   Component Value Date/Time    BILITOT 0.2 2020 03:15 PM     AFP Last 3 each if available:   Lab Results   Component Value Date/Time    AFP 3.2 2020 03:15 PM       MELD: Computed MELD-Na score unavailable. Necessary lab results were not found in the last year.  Computed MELD score unavailable. Necessary lab results were not found in the last year.    Plan:     Follow-up Provider:

## 2020-09-07 ENCOUNTER — PATIENT MESSAGE (OUTPATIENT)
Dept: PRIMARY CARE CLINIC | Facility: CLINIC | Age: 40
End: 2020-09-07

## 2020-09-09 ENCOUNTER — HOSPITAL ENCOUNTER (EMERGENCY)
Facility: OTHER | Age: 40
Discharge: HOME | End: 2020-09-09
Attending: EMERGENCY MEDICINE
Payer: MEDICAID

## 2020-09-09 VITALS
WEIGHT: 157 LBS | DIASTOLIC BLOOD PRESSURE: 85 MMHG | RESPIRATION RATE: 18 BRPM | OXYGEN SATURATION: 100 % | HEIGHT: 64 IN | HEART RATE: 92 BPM | BODY MASS INDEX: 26.8 KG/M2 | SYSTOLIC BLOOD PRESSURE: 129 MMHG | TEMPERATURE: 98 F

## 2020-09-09 DIAGNOSIS — J06.9 URI WITH COUGH AND CONGESTION: Primary | ICD-10-CM

## 2020-09-09 DIAGNOSIS — R05.9 COUGH: ICD-10-CM

## 2020-09-09 PROCEDURE — 25000242 PHARM REV CODE 250 ALT 637 W/ HCPCS: Performed by: EMERGENCY MEDICINE

## 2020-09-09 PROCEDURE — 94761 N-INVAS EAR/PLS OXIMETRY MLT: CPT

## 2020-09-09 PROCEDURE — 94640 AIRWAY INHALATION TREATMENT: CPT

## 2020-09-09 PROCEDURE — 99283 EMERGENCY DEPT VISIT LOW MDM: CPT | Mod: 25

## 2020-09-09 RX ORDER — IPRATROPIUM BROMIDE AND ALBUTEROL SULFATE 2.5; .5 MG/3ML; MG/3ML
3 SOLUTION RESPIRATORY (INHALATION)
Status: COMPLETED | OUTPATIENT
Start: 2020-09-09 | End: 2020-09-09

## 2020-09-09 RX ORDER — GUAIFENESIN/DEXTROMETHORPHAN 100-10MG/5
5 SYRUP ORAL 4 TIMES DAILY PRN
Qty: 120 ML | Refills: 0 | Status: SHIPPED | OUTPATIENT
Start: 2020-09-09 | End: 2020-09-19

## 2020-09-09 RX ADMIN — IPRATROPIUM BROMIDE AND ALBUTEROL SULFATE 3 ML: .5; 3 SOLUTION RESPIRATORY (INHALATION) at 08:09

## 2020-09-09 NOTE — ED PROVIDER NOTES
Encounter Date: 2020    SCRIBE #1 NOTE: I, Severiano Mack, am scribing for, and in the presence of, Dr. De Paz.       History     Chief Complaint   Patient presents with    URI     cough/congestion for 2 weeks. denies any fever.      Time seen by provider: 8:43 AM    This is a 40 y.o. female who presents with persistent cough. Pt says she has been having an unbearable cough for the last few wks that has intensified since being seen 2 wks ago by her PCP with similar symptoms. She states that she was prescribed Flonase and Albuterol inhaler at that time with some relief. She reports no history of asthma. Today, pt says the cough is worse when she is lying down and she has noticed mild wheezing. Pt also reports that her ears have been itching recently. She says she is also currently taking Mucinex and seltzers for her symptoms with little relief. Denies fever or chills.    The history is provided by the patient.     Review of patient's allergies indicates:  No Known Allergies  Past Medical History:   Diagnosis Date    Encounter for blood transfusion     Fibroids     Hypertension     Iron deficiency anemia     Menorrhagia     Migraine     Seizures     as baby     Past Surgical History:   Procedure Laterality Date     SECTION      x1    HYSTERECTOMY  2017    TUBAL LIGATION       Family History   Problem Relation Age of Onset    Hypertension Father     Hypertension Sister     Hypertension Maternal Grandmother     Breast cancer Maternal Grandmother     Cancer Maternal Grandfather     Hypertension Paternal Grandmother     Colon cancer Maternal Cousin     Ovarian cancer Neg Hx      Social History     Tobacco Use    Smoking status: Never Smoker    Smokeless tobacco: Never Used   Substance Use Topics    Alcohol use: No     Comment: Occasional; 3x /month    Drug use: No     Review of Systems   Constitutional: Negative for chills and fever.   HENT: Negative for sore throat.          Positive for bilateral ear pruritus.   Respiratory: Positive for cough and wheezing. Negative for shortness of breath.    Cardiovascular: Negative for chest pain.   Gastrointestinal: Negative for nausea.   Genitourinary: Negative for dysuria.   Musculoskeletal: Negative for back pain.   Skin: Negative for color change, rash and wound.   Neurological: Negative for weakness.   Hematological: Does not bruise/bleed easily.   All other systems reviewed and are negative.      Physical Exam     Initial Vitals [09/09/20 0826]   BP Pulse Resp Temp SpO2   135/88 93 18 98.3 °F (36.8 °C) 99 %      MAP       --         Physical Exam    Nursing note and vitals reviewed.  Constitutional: She appears well-developed and well-nourished. She is cooperative.  Non-toxic appearance. No distress.   HENT:   Head: Normocephalic and atraumatic.   Mouth/Throat: Oropharynx is clear and moist.   Cobblestoning present to posterior oropharynx   Eyes: Conjunctivae are normal. Pupils are equal, round, and reactive to light.   Neck: Full passive range of motion without pain. Neck supple.   Cardiovascular: Normal rate, regular rhythm, normal heart sounds and normal pulses.   Pulmonary/Chest: Effort normal and breath sounds normal. No respiratory distress.   No wheezing   Abdominal: Normal appearance.   Musculoskeletal: Normal range of motion.   Neurological: She is alert and oriented to person, place, and time. She has normal strength. No cranial nerve deficit or sensory deficit.   Skin: Skin is warm, dry and intact. No rash noted.   Psychiatric: She has a normal mood and affect. Her speech is normal and behavior is normal. Judgment and thought content normal.         ED Course   Procedures  Labs Reviewed - No data to display       Imaging Results          X-Ray Chest PA And Lateral (Final result)  Result time 09/09/20 09:49:50    Final result by Franky Barfield III, MD (09/09/20 09:49:50)                 Impression:      No acute process  seen.      Electronically signed by: Franky Barfield MD  Date:    09/09/2020  Time:    09:49             Narrative:    EXAMINATION:  XR CHEST PA AND LATERAL    CLINICAL HISTORY:  Cough    FINDINGS:  Heart size is normal.  Lungs are clear.                              X-Rays:   Independently Interpreted Readings:   Chest X-Ray: Normal heart size.  No acute abnormalities.  No infiltrates. Normal vessels.     Medical Decision Making:   History:   Old Medical Records: I decided to obtain old medical records.  Initial Assessment:     39 yo F with cough x 1 month. Already receiving flonase and inhaler though reports persistent cough. No wheezing on exam though minimal air entry, + posterior OP cobblestoning. Suspect post nasal drip v allergies, though given duration will obtain imaging and admin neb tx and reassess, likely dc home.   Independently Interpreted Test(s):   I have ordered and independently interpreted X-rays - see prior notes.  Clinical Tests:   Radiological Study: Ordered and Reviewed            Scribe Attestation:   Scribe #1: I performed the above scribed service and the documentation accurately describes the services I performed. I attest to the accuracy of the note.    Attending Attestation:           Physician Attestation for Scribe:  Physician Attestation Statement for Scribe #1: I, Dr. De Paz, reviewed documentation, as scribed by Severiano Mack in my presence, and it is both accurate and complete.                           Clinical Impression:     1. URI with cough and congestion    2. Cough          Disposition:   Disposition: Discharged  Condition: Stable     ED Disposition Condition    Discharge Stable        ED Prescriptions     Medication Sig Dispense Start Date End Date Auth. Provider    dextromethorphan-guaifenesin  mg/5 ml (ROBITUSSIN-DM)  mg/5 mL liquid Take 5 mLs by mouth 4 (four) times daily as needed (cough). 120 mL 9/9/2020 9/19/2020 Marycarmen De Paz MD        Follow-up  Information     Follow up With Specialties Details Why Contact Info    Zoey Nelson MD Family Medicine Schedule an appointment as soon as possible for a visit   [address] [address]  [phone]                                         Marycarmen De Paz MD  09/09/20 6689

## 2020-09-09 NOTE — ED NOTES
RN IN ROOM TO CHECK ON PT, VS OBTAINED. NEEDS ASSESSED. PT STATES FEELING BETTER AFTER RESP TX, FEELS CONGESTION IN CHEST BREAKING UP, AND COUGHING UP SPUTUM. PT SITTING ON STRETCHER AT THIS TIME, DENIES PAIN OR SOB AT THIS TIME. WILL CONTINUE TO MONITOR.

## 2020-09-09 NOTE — ED NOTES
Pt arrived to room #7 with c/o coughing, SOB r/t her allergies. States this happens every summer, this season seems worse. Gradually getting worse over longer period of time, breathing worse last few days. Pt a&ox4,

## 2020-09-10 DIAGNOSIS — K76.9 HEPATIC LESION: Primary | ICD-10-CM

## 2020-09-10 DIAGNOSIS — K76.9 LIVER DISEASE, UNSPECIFIED: ICD-10-CM

## 2020-09-11 ENCOUNTER — PATIENT MESSAGE (OUTPATIENT)
Dept: PRIMARY CARE CLINIC | Facility: CLINIC | Age: 40
End: 2020-09-11

## 2020-09-11 NOTE — TELEPHONE ENCOUNTER
Informed pt that the covering provider recommends that she f/u ASAP with a provider. Pt requested to see Dr. Nelson. Informed pt that the first opening with Dr. Nelson is not until Monday. Pt verbalized understanding. Schedule pt for 9/14/20 with Dr. Nelson.

## 2020-09-14 ENCOUNTER — OFFICE VISIT (OUTPATIENT)
Dept: PRIMARY CARE CLINIC | Facility: CLINIC | Age: 40
End: 2020-09-14
Attending: FAMILY MEDICINE
Payer: MEDICAID

## 2020-09-14 DIAGNOSIS — Z91.09 ENVIRONMENTAL ALLERGIES: Primary | ICD-10-CM

## 2020-09-14 PROCEDURE — 99213 OFFICE O/P EST LOW 20 MIN: CPT | Mod: 95,,, | Performed by: FAMILY MEDICINE

## 2020-09-14 PROCEDURE — 99213 PR OFFICE/OUTPT VISIT, EST, LEVL III, 20-29 MIN: ICD-10-PCS | Mod: 95,,, | Performed by: FAMILY MEDICINE

## 2020-09-14 RX ORDER — PROMETHAZINE HYDROCHLORIDE AND CODEINE PHOSPHATE 6.25; 1 MG/5ML; MG/5ML
5 SOLUTION ORAL 2 TIMES DAILY PRN
Qty: 118 ML | Refills: 0 | Status: SHIPPED | OUTPATIENT
Start: 2020-09-14 | End: 2020-09-19

## 2020-09-14 RX ORDER — AZELASTINE 1 MG/ML
1 SPRAY, METERED NASAL 2 TIMES DAILY
Qty: 30 ML | Refills: 0 | Status: SHIPPED | OUTPATIENT
Start: 2020-09-14 | End: 2021-08-09

## 2020-09-14 NOTE — PROGRESS NOTES
Subjective:       Patient ID: Pernell Ly is a 40 y.o. female.    Vitals:  vitals were not taken for this visit.     Chief Complaint: No chief complaint on file.    Here not long ago with similar symptoms. Says she got better shortly but came back even worse. Took treatment plan and still using. Says cough is worse in the afternoon but constant all day. Went to ED few days back. Chest xray normal. Neg for pneumo or findings indicative of infection. Was told by ED that her symptoms were post nasal drip and allergy related.   Was given a duoneb treatment that did help pt but not for long. Still coughing    Cough  This is a recurrent problem. The current episode started 1 to 4 weeks ago. The problem has been unchanged. The problem occurs every few hours. The cough is productive of sputum. Associated symptoms include chest pain, nasal congestion and sweats. Pertinent negatives include no chills, ear congestion, ear pain, eye redness, fever, headaches, heartburn, hemoptysis, myalgias, rash, rhinorrhea, sore throat, shortness of breath, weight loss or wheezing. The symptoms are aggravated by cold air. She has tried OTC cough suppressant for the symptoms. The treatment provided mild relief. There is no history of asthma, bronchiectasis, bronchitis, COPD, emphysema, environmental allergies or pneumonia.       Constitution: Negative for chills, sweating, fatigue and fever.   HENT: Positive for congestion. Negative for ear pain, ear discharge, sinus pain, sinus pressure, sore throat and voice change.    Neck: Negative for painful lymph nodes.   Cardiovascular: Positive for chest pain.   Eyes: Negative for eye redness.   Respiratory: Positive for cough. Negative for chest tightness, sputum production, bloody sputum, COPD, shortness of breath, stridor, wheezing and asthma.    Gastrointestinal: Negative for abdominal pain, nausea, vomiting, constipation and heartburn.   Genitourinary: Negative for dysuria, frequency and  urgency.   Musculoskeletal: Negative for muscle ache.   Skin: Negative for rash.   Allergic/Immunologic: Negative for environmental allergies, seasonal allergies and asthma.   Neurological: Negative for headaches.   Hematologic/Lymphatic: Negative for swollen lymph nodes.       Objective:      Physical Exam  Constitutional:       General: She is not in acute distress.     Appearance: Normal appearance. She is well-developed. She is not ill-appearing, toxic-appearing or diaphoretic.   HENT:      Head: Normocephalic and atraumatic.   Eyes:      Pupils: Pupils are equal, round, and reactive to light.   Neck:      Musculoskeletal: Normal range of motion and neck supple.   Pulmonary:      Effort: Pulmonary effort is normal.      Comments: Neg cough during call noted-PV  Musculoskeletal: Normal range of motion.   Neurological:      Mental Status: She is alert and oriented to person, place, and time.   Psychiatric:         Behavior: Behavior normal.         Thought Content: Thought content normal.         Judgment: Judgment normal.         Assessment:       1. Environmental allergies        Plan:         Environmental allergies  -     azelastine (ASTELIN) 137 mcg (0.1 %) nasal spray; 1 spray (137 mcg total) by Nasal route 2 (two) times daily.  Dispense: 30 mL; Refill: 0  -     ciclesonide (OMNARIS) 50 mcg Spry; 2 sprays by Each Nostril route once daily.  Dispense: 12.5 g; Refill: 0    Other orders  -     promethazine-codeine 6.25-10 mg/5 ml (PHENERGAN WITH CODEINE) 6.25-10 mg/5 mL syrup; Take 5 mLs by mouth 2 (two) times daily as needed for Cough.  Dispense: 118 mL; Refill: 0    if cough still persists, pt will need to come for appt or proceed to   Pt amenable

## 2020-09-15 ENCOUNTER — TELEPHONE (OUTPATIENT)
Dept: PRIMARY CARE CLINIC | Facility: CLINIC | Age: 40
End: 2020-09-15

## 2020-09-22 ENCOUNTER — OFFICE VISIT (OUTPATIENT)
Dept: NEUROLOGY | Facility: CLINIC | Age: 40
End: 2020-09-22
Payer: MEDICAID

## 2020-09-22 DIAGNOSIS — R56.9 SEIZURES: Primary | ICD-10-CM

## 2020-09-22 DIAGNOSIS — G40.409 EPILEPSY SYMPTOMATIC, GENERALIZED: ICD-10-CM

## 2020-09-22 PROCEDURE — 99214 OFFICE O/P EST MOD 30 MIN: CPT | Mod: 95,,, | Performed by: PSYCHIATRY & NEUROLOGY

## 2020-09-22 PROCEDURE — 99214 PR OFFICE/OUTPT VISIT, EST, LEVL IV, 30-39 MIN: ICD-10-PCS | Mod: 95,,, | Performed by: PSYCHIATRY & NEUROLOGY

## 2020-09-22 RX ORDER — LAMOTRIGINE 100 MG/1
100 TABLET ORAL 2 TIMES DAILY
Qty: 60 TABLET | Refills: 11 | Status: SHIPPED | OUTPATIENT
Start: 2020-09-22 | End: 2021-09-14 | Stop reason: SDUPTHER

## 2020-09-22 NOTE — PROGRESS NOTES
[facility]:   INITIAL VISIT    The patient location is: home  The chief complaint leading to consultation is: evaluation and management of seizures    Visit type: audiovisual    60 minutes of total time spent on the encounter, which includes face to face time and non-face to face time preparing to see the patient (eg, review of tests), Obtaining and/or reviewing separately obtained history, Documenting clinical information in the electronic or other health record, Independently interpreting results (not separately reported) and communicating results to the patient/family/caregiver, or Care coordination (not separately reported).     Each patient to whom he or she provides medical services by telemedicine is:  (1) informed of the relationship between the physician and patient and the respective role of any other health care provider with respect to management of the patient; and (2) notified that he or she may decline to receive medical services by telemedicine and may withdraw from such care at any time.    Name: Pernell Ly  MRN:[id number]   CSN: [id number]    Date of service: 9/22/2020  Last clinic visit - with : 1/23/2020    Age:40 y.o.   Gender:female     Referring Physician/Service: Damien Nolasco MD  [id number] [place] [address]   The patient is here today alone - poor historian  History obtained from patient    CHIEF COMPLAINT: evaluation and management of seizures    PRESENT ILLNESS:    This is a 40 y.o.  right handed female who presents with a chief complaint of evaluation and management of seizures    - onset x infancy - resolved: limited information available  - none until 2 years ago - reports 3 episodes in 2 years    Seizure log: none    Current AEDs:  - LTG 100mg bid - compliant   - ZNS 100mg q day - not taking     Typical seizures:  1st episode at work  - onset with a emmett vu feeling followed by LOC  - reportedly fell off the chair with shaking   - no other details  available    One episode: BF and son was talking to her and she could not comprehend, lasting 15 min  - patient reports that she was cooking at the time - cannot recall any other details  - recalls that her son brought her to lay down; was confused after (unclear duration)  - no hx of associated tongue bite or b/b incontinence    2nd episode - major sz - during sleep:  6 mo ago  - went to bed with a slight HA; around midnight (asleep for a few hours) had the episode  - BF woke up bcos she was jerking and foaming at the mouth   - associated with tongue bite  - no recollection until waking up with EMS around her   - shoulder pain after from 'twisting it' but no dislocation or #    Notes from last clinic visit with , 1/23/20:  Had a seizure as passenger in truck with her BF a few weeks ago  From description very suggestive of complex partial sz..she didn't remember..was unable to communicate  Only recent event shes aware of  ZNS does make her sleepy  Having difficulty with insomnia also     Date: 09/12/2019  HISTORY OF PRESENT ILLNESS (HPI)  The patient is a 39 y.o. BF  The patient was initially referred for consultation by ER     -----------------------------------------------------------------------------------------------------------------------------------------------------------------------------------------------------------------------------------------------------------------------            Seizures       Pt came to the ED fouzia gonzales seizure wittnessed by family. no hx. pt is postictal GCS 13      Time seen by provider: 2:31 AM     This is a 39 y.o. female who presents with complaint of s/p seizure via EMS. Pt states she woke up confused and unaware of what happened. She reports current headache, right arm pain, and left leg pain. She did not hit her head; family caught her before she fell. Pt reports she experienced seizures as a child and her last seizure prior to this episode was about 7  months ago. She was seen by a Neurologist and was prescribed medication, but she never filled the prescription.      The history is provided by the patient.     A/P (Gaurav): GTC 3 weeks ago unprovoked to ER as above  This was second such occurrence  I actually saw pt one year ago with similar episode  W/U was neg--no meds started because first event with clean W/U  Now on Keppra 500mg BID per ER  Agree with AED now that happened twice  But drowsy on low dose Keppra so will convert to ZNS 300mg HS over 2 weeks...  Then titrate and check levels as needed     Will convert from ZNS to LTG 100mg BID over 1 month for better control and less SE  Will prescribe Ambien 5mg prn for insomnia -- alos counseled on sleep hygeine/light    Any other relevant information:  - none    PREVIOUS EVALUATIONS:    Previous EEGs: Ext-EEG (1h 13m), 11/23/18: Normal    CT Head:  Results for orders placed or performed during the hospital encounter of 11/13/18   CT Head Without Contrast    Narrative    EXAMINATION:  CT HEAD WITHOUT CONTRAST    CLINICAL HISTORY:  Confusion/delirium, altered LOC, unexplained;    TECHNIQUE:  Low dose axial CT images obtained throughout the head without the use of intravenous contrast.  Axial, sagittal and coronal reconstructions were performed.    COMPARISON:  11/02/2014    FINDINGS:  Intracranial compartment:    Ventricles and sulci are normal in size for age without evidence of hydrocephalus.    The brain parenchyma appears within normal limits.  No parenchymal mass, hemorrhage, edema or major vascular distribution infarct.    No extra-axial blood or fluid collections.    Skull/extracranial contents (limited evaluation):    Left frontal extracranial soft tissue swelling.  No subjacent calvarial fracture.  Mastoid air cells and paranasal sinuses are essentially clear.      Impression    Left frontal extracranial soft tissue swelling.  No evidence of underlying fracture or acute intracranial  hemorrhage.      Electronically signed by: Russel Smith MD  Date:    11/13/2018  Time:    15:49      Previous MRIs: 11/15/18  Abnormal T2/FLAIR signal with volume loss in the left hippocampus when compared to the right which is suggestive of mesial temporal sclerosis in this patient with known history of seizures.    Additional tests:  1)CT Scan: see above  2) EEG\Video Monitoring: no  3) PET Scan: no  4) Neuropsychological evaluation: no  5) DEXA Scan: no  6) Others: no    RISK FACTORS FOR SEIZURES:    1. Head Trauma:  No    2. CNS Infections:  No  3. CNS Tumors: No     4. CNS Vascular Disease: No     5. Febrile Seizures: unknown    6. Developmental Delay: No       7. Family History of Seizures: No    8. Birth history: FTNVD - but had several hospital visit as an infant     Pregnancy/Labor/Delivery:    CURRENT MEDICATIONS:   Current Outpatient Medications   Medication Sig Dispense Refill    albuterol (PROVENTIL HFA) 90 mcg/actuation inhaler Inhale 2 puffs into the lungs every 6 (six) hours as needed for Wheezing. Rescue 18 g 0    azelastine (ASTELIN) 137 mcg (0.1 %) nasal spray 1 spray (137 mcg total) by Nasal route 2 (two) times daily. 30 mL 0    ciclesonide (OMNARIS) 50 mcg Spry 2 sprays by Each Nostril route once daily. 12.5 g 0    diclofenac sodium (VOLTAREN) 1 % Gel Apply 2 g topically 4 (four) times daily. 100 g 0    ferrous sulfate 324 mg (65 mg iron) TbEC Take 324 mg by mouth 2 (two) times daily.       fluticasone propionate (FLONASE) 50 mcg/actuation nasal spray 1 spray (50 mcg total) by Each Nostril route once daily. 9.9 mL 2    hydrOXYzine HCL (ATARAX) 25 MG tablet Take 1 tablet (25 mg total) by mouth every 6 (six) hours as needed for Anxiety. 10 tablet 0    ibuprofen (ADVIL,MOTRIN) 800 MG tablet One tab po TID prn pain 60 tablet 1    lamoTRIgine (LAMICTAL) 100 MG tablet Take 1 tablet (100 mg total) by mouth 2 (two) times daily. 60 tablet 0    levETIRAcetam (KEPPRA) 500 MG Tab Take 1 tablet  (500 mg total) by mouth 2 (two) times daily. 60 tablet 0    levocetirizine (XYZAL) 5 MG tablet Take 1 tablet (5 mg total) by mouth every evening. 30 tablet 0    multivitamin with minerals tablet Take by mouth.      NIFEdipine (PROCARDIA-XL) 90 MG (OSM) 24 hr tablet TAKE 1 TABLET(90 MG) BY MOUTH EVERY DAY 90 tablet 0    pantoprazole (PROTONIX) 40 MG tablet Take 1 tablet (40 mg total) by mouth once daily. 30 tablet 0    polyethylene glycol (GLYCOLAX) 17 gram/dose powder Take 17 g by mouth daily as needed (constipation). 119 g 0    potassium chloride (KLOR-CON) 10 MEQ TbSR Take 1 tablet (10 mEq total) by mouth once daily. 90 tablet 1    potassium chloride (KLOR-CON) 10 MEQ TbSR Take 2 tablets for the next 3 days.  Then take 1 tablet daily. 90 tablet 1    zolpidem (AMBIEN) 5 MG Tab Take 1 tablet (5 mg total) by mouth nightly as needed. 15 tablet 2     No current facility-administered medications for this visit.       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 05/08/2017    S/P laparoscopic hysterectomy 09/19/2017    Hypokalemia 10/01/2017    Anxiety 03/27/2018    Seizure 11/28/2018    Atypical migraine 01/26/2015    Headache disorder 10/12/2016    HTN (hypertension) 11/11/2014    Menorrhagia 10/13/2014    Convulsion 03/26/2019     Resolved Ambulatory Problems     Diagnosis Date Noted    Palpitations 08/31/2014    Menorrhagia 08/31/2014    Submucous leiomyoma of uterus 05/08/2017    Acute febrile illness 09/26/2017    Noncompliance with medication regimen 09/26/2018     Past Medical History:   Diagnosis Date    Encounter for blood transfusion      Fibroids     Hypertension     Iron deficiency anemia     Migraine     Seizures       PAST PSYCHIATRIC HISTORY: depression - not currently    PAST SURGICAL HISTORY including Epilepsy surgery:   Past Surgical History:   Procedure Laterality Date     SECTION      x1    HYSTERECTOMY  2017    TUBAL LIGATION          FAMILY HISTORY:   Family History   Problem Relation Age of Onset    Hypertension Father     Hypertension Sister     Hypertension Maternal Grandmother     Breast cancer Maternal Grandmother     Cancer Maternal Grandfather     Hypertension Paternal Grandmother     Colon cancer Maternal Cousin     Ovarian cancer Neg Hx          SOCIAL HISTORY:   Social History     Socioeconomic History    Marital status: Single     Spouse name: Not on file    Number of children: Not on file    Years of education: Not on file    Highest education level: Not on file   Occupational History    Not on file   Social Needs    Financial resource strain: Not on file    Food insecurity     Worry: Not on file     Inability: Not on file    Transportation needs     Medical: Not on file     Non-medical: Not on file   Tobacco Use    Smoking status: Never Smoker    Smokeless tobacco: Never Used   Substance and Sexual Activity    Alcohol use: No     Comment: Occasional; 3x /month    Drug use: No    Sexual activity: Yes     Partners: Male     Birth control/protection: See Surgical Hx   Lifestyle    Physical activity     Days per week: Not on file     Minutes per session: Not on file    Stress: Not on file   Relationships    Social connections     Talks on phone: Not on file     Gets together: Not on file     Attends Restoration service: Not on file     Active member of club or organization: Not on file     Attends meetings of clubs or organizations: Not on file     Relationship status: Not on file   Other Topics Concern    Not on file   Social History Narrative    Not on file      a) Marital status:  Single                                                    b) Living situation: patient lives with BF and father  c) Employed/Unemployed/Other: 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     REVIEW OF SYSTEMS:  Review of Systems    GENERAL EXAMINATION:  [facility] 11/01/2017      General Appearance:    This is an average built female who appears well.  HEENT: There are no dysmorphic features  Skin: There are no obvious stigmata for neurocutaneous disorders.  Neck: not assessed  Cardiovascular:not assessed  Lungs:not assessed  Abdomen: deferred  Spine: not assessed  Extremities: not assessed    NEUROLOGICAL EXAMINATION:   Mental status: Alert and oriented x 4; pleasant and cooperative with exam  Memory: Recent memory intact, Remote memory intact, Age correct, Month correct  Attention and concentration: intact  Fund of knowledge: adequate  Speech: normal  Dysarthria: No   Eyes: PERRL; EOM intact; No nystagmus.The visual pursuits  were smooth with normal saccadic eye movements.   Fundoscopic Exam: not assessed  No facial asymmetry. Facial sensation: not assessed  Hearing was intact bilaterally  Tongue and palate; SCM and trapezii bilaterally: not assessed     Motor examination:   Normal bulk and tone bilaterally. Power: no obvious deficits  Abnormal movements: none  Deep tendon reflexes: not assessed  Dysmetria: No     Sensory examination:   - not assessed    Gait:  - not assessed    IMPRESSION:  The patient's history is consistent with:  Seizures  41yo F with hx of seizures x possibly infancy  - last episode was ~ 6 mo ago    Current AED: LTG 100mg bid    Plan:  - continue LTG 100mg bid  - if sz not controlled will need to increase dose and consider EMU evaluation    Seizure log  Seizure/fall  precautions/restrictions    The patient was asked to call me/the clinic 1 week after the test(s) are done to obtain results.    More than 50% of the 60 minutes spent with the patient (as well as family/caregiver(s) was spent on face-to-face counseling about:    1. Diagnosis, plans, prognosis, medications and possible side-effects, risks and benefits of treatment, other alternatives to AEDs.  2. Risks related to continued seizures, status epilepticus, SUDEP, driving restrictions and seizure precautions ( no baths but showers are ok, no swimming unsupervised, no use of heavy machinery, no use of sharp moving objects, avoid heights).   3. Issues related to pregnancy, OCP and breast feeding as it relates to epilepsy.  4. The potential of teratogenicity and suicidal risks of anti-epileptic medications.  5.Avoid any activity that compromise patient safety related to seizures.     Questions and concerns raised by the patient and family/care-giver(s) were addressed and they indicated understanding of everything discussed and agreed to plans as above.    Return in 1 year or earlier prn    Yoana Dunham MD, CRISTINE(), MEAGHAN, GUTIERREZ.  Neurology-Epilepsy.  [facility].

## 2020-09-25 PROBLEM — R56.9 SEIZURES: Status: ACTIVE | Noted: 2020-09-25

## 2020-09-25 NOTE — ASSESSMENT & PLAN NOTE
41yo F with hx of seizures x possibly infancy  - last episode was ~ 6 mo ago    Current AED: LTG 100mg bid    Plan:  - continue LTG 100mg bid  - if sz not controlled will need to increase dose and consider EMU evaluation    Seizure log  Seizure/fall precautions/restrictions

## 2020-09-30 NOTE — PROGRESS NOTES
HISTORY OF PRESENT ILLNESS:    Pernell Ly is a 40 y.o. female,   presents today for her routine visit . She is s/p hysterectomy BS with left ovarian cystectomy . Pt reports lower abdominal pain; she does want STD screening.  Pt reports a lesion on her left nipple, states it appeared recently. Denies breast pain, mass, skin changes other than lesion, or nipple discharge.  The patient participates in regular exercise: no.  The patient does not smoke.  The patient wears seatbelts.   Pt denies any domestic violence.      SCREENING:  PAP: 2017, no longer indicated (hysterectomy), denies abn pap history  MAMMOGRAM: not indicated at this age   TC:  n/a  COLONOSCOPY: not indicated at this age    FH:  Breast cancer: Mgma (dx age 85, alive)   Colon cancer: none  Ovarian cancer: none  Endometrial cancer: none      Past Medical History:   Diagnosis Date    Encounter for blood transfusion     Fibroids     Hypertension     Iron deficiency anemia     Menorrhagia     Migraine     Seizures     as baby       Past Surgical History:   Procedure Laterality Date     SECTION      x1    HYSTERECTOMY  2017    TUBAL LIGATION         MEDICATIONS AND ALLERGIES:      Current Outpatient Medications:     albuterol (PROVENTIL HFA) 90 mcg/actuation inhaler, Inhale 2 puffs into the lungs every 6 (six) hours as needed for Wheezing. Rescue, Disp: 18 g, Rfl: 0    azelastine (ASTELIN) 137 mcg (0.1 %) nasal spray, 1 spray (137 mcg total) by Nasal route 2 (two) times daily., Disp: 30 mL, Rfl: 0    ciclesonide (OMNARIS) 50 mcg Spry, 2 sprays by Each Nostril route once daily., Disp: 12.5 g, Rfl: 0    diclofenac sodium (VOLTAREN) 1 % Gel, Apply 2 g topically 4 (four) times daily., Disp: 100 g, Rfl: 0    ferrous sulfate 324 mg (65 mg iron) TbEC, Take 324 mg by mouth 2 (two) times daily. , Disp: , Rfl:     fluticasone propionate (FLONASE) 50 mcg/actuation nasal spray, 1 spray (50 mcg total) by Each Nostril route once  daily., Disp: 9.9 mL, Rfl: 2    hydrOXYzine HCL (ATARAX) 25 MG tablet, Take 1 tablet (25 mg total) by mouth every 6 (six) hours as needed for Anxiety., Disp: 10 tablet, Rfl: 0    ibuprofen (ADVIL,MOTRIN) 800 MG tablet, One tab po TID prn pain, Disp: 60 tablet, Rfl: 1    lamoTRIgine (LAMICTAL) 100 MG tablet, Take 1 tablet (100 mg total) by mouth 2 (two) times daily., Disp: 60 tablet, Rfl: 11    levETIRAcetam (KEPPRA) 500 MG Tab, Take 1 tablet (500 mg total) by mouth 2 (two) times daily., Disp: 60 tablet, Rfl: 0    levocetirizine (XYZAL) 5 MG tablet, Take 1 tablet (5 mg total) by mouth every evening., Disp: 30 tablet, Rfl: 0    multivitamin with minerals tablet, Take by mouth., Disp: , Rfl:     NIFEdipine (PROCARDIA-XL) 90 MG (OSM) 24 hr tablet, TAKE 1 TABLET(90 MG) BY MOUTH EVERY DAY, Disp: 90 tablet, Rfl: 0    pantoprazole (PROTONIX) 40 MG tablet, TAKE 1 TABLET(40 MG) BY MOUTH EVERY DAY, Disp: 30 tablet, Rfl: 0    polyethylene glycol (GLYCOLAX) 17 gram/dose powder, Take 17 g by mouth daily as needed (constipation)., Disp: 119 g, Rfl: 0    potassium chloride (KLOR-CON) 10 MEQ TbSR, Take 1 tablet (10 mEq total) by mouth once daily., Disp: 90 tablet, Rfl: 1    potassium chloride (KLOR-CON) 10 MEQ TbSR, Take 2 tablets for the next 3 days.  Then take 1 tablet daily., Disp: 90 tablet, Rfl: 1    zolpidem (AMBIEN) 5 MG Tab, Take 1 tablet (5 mg total) by mouth nightly as needed., Disp: 15 tablet, Rfl: 2    Review of patient's allergies indicates:  No Known Allergies    Family History   Problem Relation Age of Onset    Hypertension Father     Hypertension Sister     Hypertension Maternal Grandmother     Breast cancer Maternal Grandmother     Cancer Maternal Grandfather     Hypertension Paternal Grandmother     Colon cancer Maternal Cousin     Ovarian cancer Neg Hx        Social History     Socioeconomic History    Marital status: Single     Spouse name: Not on file    Number of children: Not on file     Years of education: Not on file    Highest education level: Not on file   Occupational History    Not on file   Social Needs    Financial resource strain: Not on file    Food insecurity     Worry: Not on file     Inability: Not on file    Transportation needs     Medical: Not on file     Non-medical: Not on file   Tobacco Use    Smoking status: Never Smoker    Smokeless tobacco: Never Used   Substance and Sexual Activity    Alcohol use: No     Comment: Occasional; 3x /month    Drug use: No    Sexual activity: Yes     Partners: Male     Birth control/protection: See Surgical Hx   Lifestyle    Physical activity     Days per week: Not on file     Minutes per session: Not on file    Stress: Not on file   Relationships    Social connections     Talks on phone: Not on file     Gets together: Not on file     Attends [employer] service: Not on file     Active member of club or organization: Not on file     Attends meetings of clubs or organizations: Not on file     Relationship status: Not on file   Other Topics Concern    Not on file   Social History Narrative    Not on file       COMPREHENSIVE GYN HISTORY:  PAP History: Denies abnormal Paps except as noted above.  Infection History: Denies STDs. Denies PID.  Benign History: Denies uterine fibroids. Denies ovarian cysts. Denies endometriosis. Denies other conditions.  Cancer History: Denies cervical cancer. Denies uterine cancer or hyperplasia. Denies ovarian cancer. Denies vulvar cancer or pre-cancer. Denies vaginal cancer or pre-cancer. Denies breast cancer. Denies colon cancer.  Menstrual History: Denies menses.     ROS:  GENERAL: Feeling well overall. No weight changes. No swelling. No fatigue. No fever.  CARDIOVASCULAR: No chest pain. No shortness of breath. No leg cramps.   NEUROLOGICAL: No headaches. No vision changes.  BREASTS: No pain. No lumps. No discharge.  ABDOMEN:  No nausea. No vomiting. No diarrhea. No constipation. + pain  REPRODUCTIVE: No  "abnormal bleeding. See HPI  VULVA: No pain. No lesions. No itching.  VAGINA: No relaxation. No itching. No odor. No discharge. No lesions.  URINARY: No incontinence. No nocturia. No frequency. No dysuria.  PSYCHIATRIC: Denies depression.    BP (!) 144/84   Ht 5' 4.02" (1.626 m)   Wt 69 kg (152 lb 1.9 oz)   LMP 11/01/2017   BMI 26.10 kg/m²     PE:  APPEARANCE: Well nourished, well developed, in no acute distress.  AFFECT: WNL, alert and oriented x 3.  SKIN: No acne or hirsutism.  NECK: Neck symmetric without masses or thyromegaly.  NODES: No inguinal, cervical, axillary or femoral lymph node enlargement.  CHEST: Good respiratory effort.   ABDOMEN: Soft. No tenderness or masses. No hepatosplenomegaly. No hernias.  BREASTS: Symmetrical, no skin changes, 0.5 cm brown raised, superficial papule on left outer areola. No palpable masses, nipple discharge bilaterally.  PELVIC: ATROPHIC EXTERNAL FEMALE GENITALIA without lesions. Normal hair distribution. Adequate perineal body, normal urethral meatus. VAGINA DRY without lesions, +white discharge. No significant cystocele or rectocele. Bimanual exam shows CERVIX and UTERUS to be SURGICALLY ABSENT. Right adnexa without masses or tenderness, left adnexa surgically absent.  EXTREMITIES: No edema.    DIAGNOSIS:  1. Women's annual routine gynecological examination     2. Screen for STD (sexually transmitted disease)  C. trachomatis/N. gonorrhoeae by AMP DNA    HIV-1 and HIV-2 antibodies    RPR    Hepatitis B surface antigen    Vaginosis Screen by DNA Probe    Hepatitis C Antibody   3. Nipple lesion  Ambulatory referral/consult to Dermatology   4. Lower abdominal pain         LABS AND TESTS ORDERED:  STD blood work   GCCT-Urine  Affirm     MEDICATIONS PRESCRIBED:  N/A     COUNSELING:  -Referral to dermatology for breast lesion   -The patient was counseled today on osteoporosis prevention, calcium supplementation, and regular weight bearing exercise. The patient was also " counseled today on ACS PAP guidelines, with recommendations for yearly pelvic exams unless their uterus, cervix, and ovaries were removed for benign reasons; in that case, examinations every 3-5 years are recommended.  The patient was also counseled regarding monthly breast self-examination, routine STD screening for at-risk populations, prophylactic immunizations for transmitted infections such as  HPV, Pertussis, or Influenza as appropriate, and yearly mammograms when indicated by ACS guidelines.  She was advised to see her primary care physician for all other health maintenance.  -FOLLOW-UP with OBGYN if abdominal pain persists with negative testing   - Follow up in a year for annual wellness exam

## 2020-10-01 ENCOUNTER — OFFICE VISIT (OUTPATIENT)
Dept: OBSTETRICS AND GYNECOLOGY | Facility: CLINIC | Age: 40
End: 2020-10-01
Payer: MEDICAID

## 2020-10-01 VITALS
BODY MASS INDEX: 25.97 KG/M2 | HEIGHT: 64 IN | SYSTOLIC BLOOD PRESSURE: 144 MMHG | DIASTOLIC BLOOD PRESSURE: 84 MMHG | WEIGHT: 152.13 LBS

## 2020-10-01 DIAGNOSIS — R10.30 LOWER ABDOMINAL PAIN: ICD-10-CM

## 2020-10-01 DIAGNOSIS — Z01.419 WOMEN'S ANNUAL ROUTINE GYNECOLOGICAL EXAMINATION: Primary | ICD-10-CM

## 2020-10-01 DIAGNOSIS — Z11.3 SCREEN FOR STD (SEXUALLY TRANSMITTED DISEASE): ICD-10-CM

## 2020-10-01 DIAGNOSIS — I10 ESSENTIAL HYPERTENSION: ICD-10-CM

## 2020-10-01 DIAGNOSIS — N64.9 NIPPLE LESION: ICD-10-CM

## 2020-10-01 PROCEDURE — 87480 CANDIDA DNA DIR PROBE: CPT

## 2020-10-01 PROCEDURE — 99213 OFFICE O/P EST LOW 20 MIN: CPT | Mod: PBBFAC | Performed by: PHYSICIAN ASSISTANT

## 2020-10-01 PROCEDURE — 99395 PREV VISIT EST AGE 18-39: CPT | Mod: S$PBB,,, | Performed by: PHYSICIAN ASSISTANT

## 2020-10-01 PROCEDURE — 99999 PR PBB SHADOW E&M-EST. PATIENT-LVL III: ICD-10-PCS | Mod: PBBFAC,,, | Performed by: PHYSICIAN ASSISTANT

## 2020-10-01 PROCEDURE — 99999 PR PBB SHADOW E&M-EST. PATIENT-LVL III: CPT | Mod: PBBFAC,,, | Performed by: PHYSICIAN ASSISTANT

## 2020-10-01 PROCEDURE — 87510 GARDNER VAG DNA DIR PROBE: CPT

## 2020-10-01 PROCEDURE — 99395 PR PREVENTIVE VISIT,EST,18-39: ICD-10-PCS | Mod: S$PBB,,, | Performed by: PHYSICIAN ASSISTANT

## 2020-10-02 ENCOUNTER — TELEPHONE (OUTPATIENT)
Dept: OBSTETRICS AND GYNECOLOGY | Facility: CLINIC | Age: 40
End: 2020-10-02

## 2020-10-02 LAB
CANDIDA RRNA VAG QL PROBE: NEGATIVE
G VAGINALIS RRNA GENITAL QL PROBE: POSITIVE
T VAGINALIS RRNA GENITAL QL PROBE: NEGATIVE

## 2020-10-02 NOTE — TELEPHONE ENCOUNTER
----- Message from Kindra JULES Gutierrez sent at 10/2/2020  8:22 AM CDT -----  Regarding: Lab Client Services  Contact: [phone]  Good Morning,    My name is Kindra Gutierrez I work in the Lab Client Services. We had a problem with some lab work on this patient. If someone from your office could call us at [phone] or ext. [phone] that would be great. Anyone in my department can help.     Thank you

## 2020-10-02 NOTE — TELEPHONE ENCOUNTER
----- Message from Kindra JULES Gutierrez sent at 10/2/2020  8:22 AM CDT -----  Regarding: Lab Client Services  Contact: [phone]  Good Morning,    My name is Kindra Gutierrez I work in the Lab Client Services. We had a problem with some lab work on this patient. If someone from your office could call us at [phone] or ext. [phone] that would be great. Anyone in my department can help.     Thank you

## 2020-10-02 NOTE — TELEPHONE ENCOUNTER
Spoke with pt they advised urine culture was not enough o result GCCT advised clinic will reach out to lab for another sample.

## 2020-10-03 ENCOUNTER — PATIENT MESSAGE (OUTPATIENT)
Dept: OBSTETRICS AND GYNECOLOGY | Facility: CLINIC | Age: 40
End: 2020-10-03

## 2020-10-03 DIAGNOSIS — N76.0 BACTERIAL VAGINOSIS: Primary | ICD-10-CM

## 2020-10-03 DIAGNOSIS — B96.89 BACTERIAL VAGINOSIS: Primary | ICD-10-CM

## 2020-10-03 RX ORDER — METRONIDAZOLE 500 MG/1
500 TABLET ORAL 2 TIMES DAILY
Qty: 14 TABLET | Refills: 0 | Status: SHIPPED | OUTPATIENT
Start: 2020-10-03 | End: 2020-10-10

## 2020-10-13 DIAGNOSIS — E87.6 HYPOKALEMIA: ICD-10-CM

## 2020-10-13 RX ORDER — POTASSIUM CHLORIDE 750 MG/1
10 TABLET, EXTENDED RELEASE ORAL DAILY
Qty: 90 TABLET | Refills: 1 | Status: SHIPPED | OUTPATIENT
Start: 2020-10-13 | End: 2020-11-04 | Stop reason: SDUPTHER

## 2020-10-14 ENCOUNTER — PATIENT MESSAGE (OUTPATIENT)
Dept: PRIMARY CARE CLINIC | Facility: CLINIC | Age: 40
End: 2020-10-14

## 2020-10-14 DIAGNOSIS — I10 ESSENTIAL HYPERTENSION: ICD-10-CM

## 2020-10-14 RX ORDER — NIFEDIPINE 90 MG/1
TABLET, EXTENDED RELEASE ORAL
Qty: 90 TABLET | Refills: 1 | Status: SHIPPED | OUTPATIENT
Start: 2020-10-14 | End: 2021-04-06

## 2020-10-23 ENCOUNTER — PATIENT MESSAGE (OUTPATIENT)
Dept: OBSTETRICS AND GYNECOLOGY | Facility: CLINIC | Age: 40
End: 2020-10-23

## 2020-10-23 RX ORDER — METRONIDAZOLE 500 MG/1
500 TABLET ORAL 2 TIMES DAILY
Qty: 14 TABLET | Refills: 0 | Status: SHIPPED | OUTPATIENT
Start: 2020-10-23 | End: 2020-10-30

## 2020-11-02 ENCOUNTER — PATIENT MESSAGE (OUTPATIENT)
Dept: ADMINISTRATIVE | Facility: OTHER | Age: 40
End: 2020-11-02

## 2020-11-04 ENCOUNTER — PATIENT MESSAGE (OUTPATIENT)
Dept: NEUROLOGY | Facility: CLINIC | Age: 40
End: 2020-11-04

## 2020-11-04 DIAGNOSIS — E87.6 HYPOKALEMIA: ICD-10-CM

## 2020-11-09 RX ORDER — POTASSIUM CHLORIDE 750 MG/1
10 TABLET, EXTENDED RELEASE ORAL DAILY
Qty: 90 TABLET | Refills: 1 | Status: SHIPPED | OUTPATIENT
Start: 2020-11-09 | End: 2021-06-04 | Stop reason: SDUPTHER

## 2020-11-27 ENCOUNTER — PATIENT MESSAGE (OUTPATIENT)
Dept: PRIMARY CARE CLINIC | Facility: CLINIC | Age: 40
End: 2020-11-27

## 2020-12-02 ENCOUNTER — PATIENT MESSAGE (OUTPATIENT)
Dept: NEUROLOGY | Facility: CLINIC | Age: 40
End: 2020-12-02

## 2021-01-03 ENCOUNTER — PATIENT OUTREACH (OUTPATIENT)
Dept: ADMINISTRATIVE | Facility: OTHER | Age: 41
End: 2021-01-03

## 2021-01-05 ENCOUNTER — PATIENT MESSAGE (OUTPATIENT)
Dept: DERMATOLOGY | Facility: CLINIC | Age: 41
End: 2021-01-05

## 2021-01-05 ENCOUNTER — TELEPHONE (OUTPATIENT)
Dept: DERMATOLOGY | Facility: CLINIC | Age: 41
End: 2021-01-05

## 2021-01-05 ENCOUNTER — PATIENT MESSAGE (OUTPATIENT)
Dept: PRIMARY CARE CLINIC | Facility: CLINIC | Age: 41
End: 2021-01-05

## 2021-02-10 ENCOUNTER — TELEPHONE (OUTPATIENT)
Dept: HEPATOLOGY | Facility: CLINIC | Age: 41
End: 2021-02-10

## 2021-03-01 ENCOUNTER — HOSPITAL ENCOUNTER (OUTPATIENT)
Dept: RADIOLOGY | Facility: OTHER | Age: 41
Discharge: HOME | End: 2021-03-01
Attending: STUDENT IN AN ORGANIZED HEALTH CARE EDUCATION/TRAINING PROGRAM
Payer: MEDICAID

## 2021-03-01 DIAGNOSIS — K76.9 HEPATIC LESION: ICD-10-CM

## 2021-03-01 DIAGNOSIS — K76.9 LIVER DISEASE, UNSPECIFIED: ICD-10-CM

## 2021-03-01 PROCEDURE — 74183 MRI ABD W/O CNTR FLWD CNTR: CPT | Mod: TC

## 2021-03-01 PROCEDURE — 25500020 PHARM REV CODE 255: Performed by: STUDENT IN AN ORGANIZED HEALTH CARE EDUCATION/TRAINING PROGRAM

## 2021-03-01 PROCEDURE — A9581 GADOXETATE DISODIUM INJ: HCPCS | Performed by: STUDENT IN AN ORGANIZED HEALTH CARE EDUCATION/TRAINING PROGRAM

## 2021-03-01 PROCEDURE — 74183 MRI ABDOMEN W WO CONTRAST: ICD-10-PCS | Mod: 26,,, | Performed by: RADIOLOGY

## 2021-03-01 PROCEDURE — 74183 MRI ABD W/O CNTR FLWD CNTR: CPT | Mod: 26,,, | Performed by: RADIOLOGY

## 2021-03-01 RX ADMIN — GADOXETATE DISODIUM 7 ML: 181.43 INJECTION, SOLUTION INTRAVENOUS at 06:03

## 2021-03-02 ENCOUNTER — PATIENT MESSAGE (OUTPATIENT)
Dept: NEUROLOGY | Facility: CLINIC | Age: 41
End: 2021-03-02

## 2021-03-03 ENCOUNTER — PATIENT MESSAGE (OUTPATIENT)
Dept: PRIMARY CARE CLINIC | Facility: CLINIC | Age: 41
End: 2021-03-03

## 2021-03-08 ENCOUNTER — OFFICE VISIT (OUTPATIENT)
Dept: PRIMARY CARE CLINIC | Facility: CLINIC | Age: 41
End: 2021-03-08
Attending: FAMILY MEDICINE
Payer: MEDICAID

## 2021-03-08 ENCOUNTER — PATIENT OUTREACH (OUTPATIENT)
Dept: ADMINISTRATIVE | Facility: OTHER | Age: 41
End: 2021-03-08

## 2021-03-08 DIAGNOSIS — R07.82 INTERCOSTAL PAIN: ICD-10-CM

## 2021-03-08 DIAGNOSIS — M25.50 ARTHRALGIA, UNSPECIFIED JOINT: Primary | ICD-10-CM

## 2021-03-08 PROCEDURE — 99214 OFFICE O/P EST MOD 30 MIN: CPT | Mod: 95,,, | Performed by: FAMILY MEDICINE

## 2021-03-08 PROCEDURE — 99214 PR OFFICE/OUTPT VISIT, EST, LEVL IV, 30-39 MIN: ICD-10-PCS | Mod: 95,,, | Performed by: FAMILY MEDICINE

## 2021-03-08 RX ORDER — IBUPROFEN 800 MG/1
TABLET ORAL
Qty: 60 TABLET | Refills: 1 | Status: SHIPPED | OUTPATIENT
Start: 2021-03-08 | End: 2021-08-05

## 2021-03-09 ENCOUNTER — OFFICE VISIT (OUTPATIENT)
Dept: HEPATOLOGY | Facility: CLINIC | Age: 41
End: 2021-03-09
Payer: MEDICAID

## 2021-03-09 DIAGNOSIS — K76.9 LIVER LESION: Primary | ICD-10-CM

## 2021-03-09 PROCEDURE — 99213 PR OFFICE/OUTPT VISIT, EST, LEVL III, 20-29 MIN: ICD-10-PCS | Mod: 95,,, | Performed by: STUDENT IN AN ORGANIZED HEALTH CARE EDUCATION/TRAINING PROGRAM

## 2021-03-09 PROCEDURE — 99213 OFFICE O/P EST LOW 20 MIN: CPT | Mod: 95,,, | Performed by: STUDENT IN AN ORGANIZED HEALTH CARE EDUCATION/TRAINING PROGRAM

## 2021-03-11 ENCOUNTER — TELEPHONE (OUTPATIENT)
Dept: HEPATOLOGY | Facility: CLINIC | Age: 41
End: 2021-03-11

## 2021-03-19 ENCOUNTER — LAB VISIT (OUTPATIENT)
Dept: LAB | Facility: HOSPITAL | Age: 41
End: 2021-03-19
Attending: FAMILY MEDICINE
Payer: MEDICAID

## 2021-03-19 DIAGNOSIS — M25.50 ARTHRALGIA, UNSPECIFIED JOINT: ICD-10-CM

## 2021-03-19 DIAGNOSIS — Z11.3 SCREEN FOR STD (SEXUALLY TRANSMITTED DISEASE): ICD-10-CM

## 2021-03-19 LAB
HCV AB SERPL QL IA: NEGATIVE
HGB S BLD QL SOLY: NEGATIVE
HIV 1+2 AB+HIV1 P24 AG SERPL QL IA: NEGATIVE

## 2021-03-19 PROCEDURE — 86803 HEPATITIS C AB TEST: CPT | Performed by: PHYSICIAN ASSISTANT

## 2021-03-19 PROCEDURE — 85660 RBC SICKLE CELL TEST: CPT | Performed by: FAMILY MEDICINE

## 2021-03-19 PROCEDURE — 36415 COLL VENOUS BLD VENIPUNCTURE: CPT | Mod: PO | Performed by: FAMILY MEDICINE

## 2021-03-19 PROCEDURE — 86703 HIV-1/HIV-2 1 RESULT ANTBDY: CPT | Performed by: PHYSICIAN ASSISTANT

## 2021-03-20 ENCOUNTER — IMMUNIZATION (OUTPATIENT)
Dept: PRIMARY CARE CLINIC | Facility: CLINIC | Age: 41
End: 2021-03-20
Payer: MEDICAID

## 2021-03-20 DIAGNOSIS — Z23 NEED FOR VACCINATION: Primary | ICD-10-CM

## 2021-03-20 PROCEDURE — 0001A PR IMMUNIZ ADMIN, SARS-COV-2 COVID-19 VACC, 30MCG/0.3ML, 1ST DOSE: CPT | Mod: CV19,S$GLB,, | Performed by: INTERNAL MEDICINE

## 2021-03-20 PROCEDURE — 91300 PR SARS-COV- 2 COVID-19 VACCINE, NO PRSV, 30MCG/0.3ML, IM: CPT | Mod: S$GLB,,, | Performed by: INTERNAL MEDICINE

## 2021-03-20 PROCEDURE — 0001A PR IMMUNIZ ADMIN, SARS-COV-2 COVID-19 VACC, 30MCG/0.3ML, 1ST DOSE: ICD-10-PCS | Mod: CV19,S$GLB,, | Performed by: INTERNAL MEDICINE

## 2021-03-20 PROCEDURE — 91300 PR SARS-COV- 2 COVID-19 VACCINE, NO PRSV, 30MCG/0.3ML, IM: ICD-10-PCS | Mod: S$GLB,,, | Performed by: INTERNAL MEDICINE

## 2021-03-20 RX ADMIN — Medication 0.3 ML: at 12:03

## 2021-03-22 ENCOUNTER — PATIENT MESSAGE (OUTPATIENT)
Dept: PRIMARY CARE CLINIC | Facility: CLINIC | Age: 41
End: 2021-03-22

## 2021-03-31 ENCOUNTER — OFFICE VISIT (OUTPATIENT)
Dept: OBSTETRICS AND GYNECOLOGY | Facility: CLINIC | Age: 41
End: 2021-03-31
Payer: MEDICAID

## 2021-03-31 VITALS
SYSTOLIC BLOOD PRESSURE: 124 MMHG | BODY MASS INDEX: 27.67 KG/M2 | HEIGHT: 64 IN | WEIGHT: 162.06 LBS | DIASTOLIC BLOOD PRESSURE: 76 MMHG

## 2021-03-31 DIAGNOSIS — N64.4 PAIN OF BOTH BREASTS: Primary | ICD-10-CM

## 2021-03-31 DIAGNOSIS — N89.8 VAGINAL DISCHARGE: ICD-10-CM

## 2021-03-31 DIAGNOSIS — R30.0 DYSURIA: ICD-10-CM

## 2021-03-31 PROCEDURE — 99214 PR OFFICE/OUTPT VISIT, EST, LEVL IV, 30-39 MIN: ICD-10-PCS | Mod: S$PBB,,, | Performed by: OBSTETRICS & GYNECOLOGY

## 2021-03-31 PROCEDURE — 87481 CANDIDA DNA AMP PROBE: CPT | Mod: 59 | Performed by: STUDENT IN AN ORGANIZED HEALTH CARE EDUCATION/TRAINING PROGRAM

## 2021-03-31 PROCEDURE — 87086 URINE CULTURE/COLONY COUNT: CPT | Performed by: STUDENT IN AN ORGANIZED HEALTH CARE EDUCATION/TRAINING PROGRAM

## 2021-03-31 PROCEDURE — 99999 PR PBB SHADOW E&M-EST. PATIENT-LVL IV: ICD-10-PCS | Mod: PBBFAC,,, | Performed by: OBSTETRICS & GYNECOLOGY

## 2021-03-31 PROCEDURE — 99214 OFFICE O/P EST MOD 30 MIN: CPT | Mod: PBBFAC,PN | Performed by: OBSTETRICS & GYNECOLOGY

## 2021-03-31 PROCEDURE — 99214 OFFICE O/P EST MOD 30 MIN: CPT | Mod: S$PBB,,, | Performed by: OBSTETRICS & GYNECOLOGY

## 2021-03-31 PROCEDURE — 99999 PR PBB SHADOW E&M-EST. PATIENT-LVL IV: CPT | Mod: PBBFAC,,, | Performed by: OBSTETRICS & GYNECOLOGY

## 2021-03-31 PROCEDURE — 87661 TRICHOMONAS VAGINALIS AMPLIF: CPT | Performed by: STUDENT IN AN ORGANIZED HEALTH CARE EDUCATION/TRAINING PROGRAM

## 2021-04-01 ENCOUNTER — PATIENT MESSAGE (OUTPATIENT)
Dept: OBSTETRICS AND GYNECOLOGY | Facility: CLINIC | Age: 41
End: 2021-04-01

## 2021-04-01 DIAGNOSIS — N76.0 ACUTE VAGINITIS: Primary | ICD-10-CM

## 2021-04-01 LAB
BACTERIA UR CULT: NO GROWTH
BACTERIAL VAGINOSIS DNA: POSITIVE
CANDIDA GLABRATA DNA: NEGATIVE
CANDIDA KRUSEI DNA: NEGATIVE
CANDIDA RRNA VAG QL PROBE: NEGATIVE
T VAGINALIS RRNA GENITAL QL PROBE: NEGATIVE

## 2021-04-01 RX ORDER — METRONIDAZOLE 500 MG/1
500 TABLET ORAL EVERY 12 HOURS
Qty: 14 TABLET | Refills: 0 | Status: SHIPPED | OUTPATIENT
Start: 2021-04-01 | End: 2021-04-08

## 2021-04-05 ENCOUNTER — HOSPITAL ENCOUNTER (OUTPATIENT)
Dept: RADIOLOGY | Facility: OTHER | Age: 41
Discharge: HOME | End: 2021-04-05
Attending: STUDENT IN AN ORGANIZED HEALTH CARE EDUCATION/TRAINING PROGRAM
Payer: MEDICAID

## 2021-04-05 DIAGNOSIS — N64.4 BREAST PAIN: ICD-10-CM

## 2021-04-05 DIAGNOSIS — N64.4 PAIN OF BOTH BREASTS: ICD-10-CM

## 2021-04-05 PROCEDURE — 77063 MAMMO DIGITAL SCREENING BILAT WITH TOMO: ICD-10-PCS | Mod: 26,,, | Performed by: RADIOLOGY

## 2021-04-05 PROCEDURE — 77067 SCR MAMMO BI INCL CAD: CPT | Mod: 26,,, | Performed by: RADIOLOGY

## 2021-04-05 PROCEDURE — 77063 BREAST TOMOSYNTHESIS BI: CPT | Mod: 26,,, | Performed by: RADIOLOGY

## 2021-04-05 PROCEDURE — 77067 SCR MAMMO BI INCL CAD: CPT | Mod: TC

## 2021-04-05 PROCEDURE — 77067 MAMMO DIGITAL SCREENING BILAT WITH TOMO: ICD-10-PCS | Mod: 26,,, | Performed by: RADIOLOGY

## 2021-04-11 ENCOUNTER — IMMUNIZATION (OUTPATIENT)
Dept: PRIMARY CARE CLINIC | Facility: CLINIC | Age: 41
End: 2021-04-11
Payer: MEDICAID

## 2021-04-11 DIAGNOSIS — Z23 NEED FOR VACCINATION: Primary | ICD-10-CM

## 2021-04-11 PROCEDURE — 91300 PR SARS-COV- 2 COVID-19 VACCINE, NO PRSV, 30MCG/0.3ML, IM: ICD-10-PCS | Mod: S$GLB,,, | Performed by: INTERNAL MEDICINE

## 2021-04-11 PROCEDURE — 0002A PR IMMUNIZ ADMIN, SARS-COV-2 COVID-19 VACC, 30MCG/0.3ML, 2ND DOSE: ICD-10-PCS | Mod: CV19,S$GLB,, | Performed by: INTERNAL MEDICINE

## 2021-04-11 PROCEDURE — 91300 PR SARS-COV- 2 COVID-19 VACCINE, NO PRSV, 30MCG/0.3ML, IM: CPT | Mod: S$GLB,,, | Performed by: INTERNAL MEDICINE

## 2021-04-11 PROCEDURE — 0002A PR IMMUNIZ ADMIN, SARS-COV-2 COVID-19 VACC, 30MCG/0.3ML, 2ND DOSE: CPT | Mod: CV19,S$GLB,, | Performed by: INTERNAL MEDICINE

## 2021-04-11 RX ADMIN — Medication 0.3 ML: at 03:04

## 2021-05-23 ENCOUNTER — PATIENT MESSAGE (OUTPATIENT)
Dept: PRIMARY CARE CLINIC | Facility: CLINIC | Age: 41
End: 2021-05-23

## 2021-05-25 ENCOUNTER — LAB VISIT (OUTPATIENT)
Dept: LAB | Facility: HOSPITAL | Age: 41
End: 2021-05-25
Payer: MEDICAID

## 2021-05-25 ENCOUNTER — OFFICE VISIT (OUTPATIENT)
Dept: PRIMARY CARE CLINIC | Facility: CLINIC | Age: 41
End: 2021-05-25
Attending: FAMILY MEDICINE
Payer: MEDICAID

## 2021-05-25 VITALS
OXYGEN SATURATION: 99 % | WEIGHT: 167.69 LBS | HEIGHT: 64 IN | DIASTOLIC BLOOD PRESSURE: 84 MMHG | BODY MASS INDEX: 28.63 KG/M2 | SYSTOLIC BLOOD PRESSURE: 124 MMHG | HEART RATE: 84 BPM

## 2021-05-25 DIAGNOSIS — Z00.00 ANNUAL PHYSICAL EXAM: ICD-10-CM

## 2021-05-25 DIAGNOSIS — R56.9 SEIZURES: ICD-10-CM

## 2021-05-25 DIAGNOSIS — Z00.00 ANNUAL PHYSICAL EXAM: Primary | ICD-10-CM

## 2021-05-25 DIAGNOSIS — I10 ESSENTIAL HYPERTENSION: ICD-10-CM

## 2021-05-25 DIAGNOSIS — R07.9 CHEST PAIN, UNSPECIFIED TYPE: ICD-10-CM

## 2021-05-25 LAB
ALBUMIN SERPL BCP-MCNC: 3.8 G/DL (ref 3.5–5.2)
ALP SERPL-CCNC: 81 U/L (ref 55–135)
ALT SERPL W/O P-5'-P-CCNC: 14 U/L (ref 10–44)
ANION GAP SERPL CALC-SCNC: 9 MMOL/L (ref 8–16)
AST SERPL-CCNC: 17 U/L (ref 10–40)
BASOPHILS # BLD AUTO: 0.02 K/UL (ref 0–0.2)
BASOPHILS NFR BLD: 0.4 % (ref 0–1.9)
BILIRUB SERPL-MCNC: 0.2 MG/DL (ref 0.1–1)
BUN SERPL-MCNC: 10 MG/DL (ref 6–20)
CALCIUM SERPL-MCNC: 9.9 MG/DL (ref 8.7–10.5)
CHLORIDE SERPL-SCNC: 103 MMOL/L (ref 95–110)
CHOLEST SERPL-MCNC: 165 MG/DL (ref 120–199)
CHOLEST/HDLC SERPL: 2.2 {RATIO} (ref 2–5)
CO2 SERPL-SCNC: 25 MMOL/L (ref 23–29)
CREAT SERPL-MCNC: 0.7 MG/DL (ref 0.5–1.4)
DIFFERENTIAL METHOD: NORMAL
EOSINOPHIL # BLD AUTO: 0.1 K/UL (ref 0–0.5)
EOSINOPHIL NFR BLD: 1.7 % (ref 0–8)
ERYTHROCYTE [DISTWIDTH] IN BLOOD BY AUTOMATED COUNT: 13.3 % (ref 11.5–14.5)
EST. GFR  (AFRICAN AMERICAN): >60 ML/MIN/1.73 M^2
EST. GFR  (NON AFRICAN AMERICAN): >60 ML/MIN/1.73 M^2
ESTIMATED AVG GLUCOSE: 111 MG/DL (ref 68–131)
GLUCOSE SERPL-MCNC: 102 MG/DL (ref 70–110)
HBA1C MFR BLD: 5.5 % (ref 4–5.6)
HCT VFR BLD AUTO: 38.3 % (ref 37–48.5)
HDLC SERPL-MCNC: 76 MG/DL (ref 40–75)
HDLC SERPL: 46.1 % (ref 20–50)
HGB BLD-MCNC: 12.6 G/DL (ref 12–16)
IMM GRANULOCYTES # BLD AUTO: 0.01 K/UL (ref 0–0.04)
IMM GRANULOCYTES NFR BLD AUTO: 0.2 % (ref 0–0.5)
LDLC SERPL CALC-MCNC: 78 MG/DL (ref 63–159)
LYMPHOCYTES # BLD AUTO: 1.6 K/UL (ref 1–4.8)
LYMPHOCYTES NFR BLD: 34.4 % (ref 18–48)
MCH RBC QN AUTO: 29.4 PG (ref 27–31)
MCHC RBC AUTO-ENTMCNC: 32.9 G/DL (ref 32–36)
MCV RBC AUTO: 89 FL (ref 82–98)
MONOCYTES # BLD AUTO: 0.5 K/UL (ref 0.3–1)
MONOCYTES NFR BLD: 9.9 % (ref 4–15)
NEUTROPHILS # BLD AUTO: 2.5 K/UL (ref 1.8–7.7)
NEUTROPHILS NFR BLD: 53.4 % (ref 38–73)
NONHDLC SERPL-MCNC: 89 MG/DL
NRBC BLD-RTO: 0 /100 WBC
PLATELET # BLD AUTO: 346 K/UL (ref 150–450)
PMV BLD AUTO: 9.3 FL (ref 9.2–12.9)
POTASSIUM SERPL-SCNC: 3.6 MMOL/L (ref 3.5–5.1)
PROT SERPL-MCNC: 8.5 G/DL (ref 6–8.4)
RBC # BLD AUTO: 4.29 M/UL (ref 4–5.4)
SODIUM SERPL-SCNC: 137 MMOL/L (ref 136–145)
TRIGL SERPL-MCNC: 55 MG/DL (ref 30–150)
TSH SERPL DL<=0.005 MIU/L-ACNC: 1.3 UIU/ML (ref 0.4–4)
WBC # BLD AUTO: 4.65 K/UL (ref 3.9–12.7)

## 2021-05-25 PROCEDURE — 99999 PR PBB SHADOW E&M-EST. PATIENT-LVL IV: ICD-10-PCS | Mod: PBBFAC,,, | Performed by: FAMILY MEDICINE

## 2021-05-25 PROCEDURE — 83036 HEMOGLOBIN GLYCOSYLATED A1C: CPT | Performed by: FAMILY MEDICINE

## 2021-05-25 PROCEDURE — 80061 LIPID PANEL: CPT | Performed by: FAMILY MEDICINE

## 2021-05-25 PROCEDURE — 36415 COLL VENOUS BLD VENIPUNCTURE: CPT | Mod: PN | Performed by: FAMILY MEDICINE

## 2021-05-25 PROCEDURE — 99999 PR PBB SHADOW E&M-EST. PATIENT-LVL IV: CPT | Mod: PBBFAC,,, | Performed by: FAMILY MEDICINE

## 2021-05-25 PROCEDURE — 99396 PR PREVENTIVE VISIT,EST,40-64: ICD-10-PCS | Mod: S$PBB,,, | Performed by: FAMILY MEDICINE

## 2021-05-25 PROCEDURE — 80053 COMPREHEN METABOLIC PANEL: CPT | Performed by: FAMILY MEDICINE

## 2021-05-25 PROCEDURE — 85025 COMPLETE CBC W/AUTO DIFF WBC: CPT | Performed by: FAMILY MEDICINE

## 2021-05-25 PROCEDURE — 99396 PREV VISIT EST AGE 40-64: CPT | Mod: S$PBB,,, | Performed by: FAMILY MEDICINE

## 2021-05-25 PROCEDURE — 84443 ASSAY THYROID STIM HORMONE: CPT | Performed by: FAMILY MEDICINE

## 2021-05-25 PROCEDURE — 99214 OFFICE O/P EST MOD 30 MIN: CPT | Mod: PBBFAC,PN | Performed by: FAMILY MEDICINE

## 2021-05-25 RX ORDER — PANTOPRAZOLE SODIUM 40 MG/1
40 TABLET, DELAYED RELEASE ORAL DAILY
Qty: 30 TABLET | Refills: 0 | Status: SHIPPED | OUTPATIENT
Start: 2021-05-25 | End: 2022-06-01

## 2021-05-28 ENCOUNTER — PATIENT MESSAGE (OUTPATIENT)
Dept: PRIMARY CARE CLINIC | Facility: CLINIC | Age: 41
End: 2021-05-28

## 2021-05-31 ENCOUNTER — HOSPITAL ENCOUNTER (OUTPATIENT)
Dept: CARDIOLOGY | Facility: OTHER | Age: 41
Discharge: HOME | End: 2021-05-31
Attending: FAMILY MEDICINE
Payer: MEDICAID

## 2021-05-31 DIAGNOSIS — R07.9 CHEST PAIN, UNSPECIFIED TYPE: ICD-10-CM

## 2021-05-31 PROCEDURE — 93005 ELECTROCARDIOGRAM TRACING: CPT

## 2021-05-31 PROCEDURE — 93010 EKG 12-LEAD: ICD-10-PCS | Mod: ,,, | Performed by: INTERNAL MEDICINE

## 2021-05-31 PROCEDURE — 93010 ELECTROCARDIOGRAM REPORT: CPT | Mod: ,,, | Performed by: INTERNAL MEDICINE

## 2021-06-04 ENCOUNTER — PATIENT MESSAGE (OUTPATIENT)
Dept: INTERNAL MEDICINE | Facility: CLINIC | Age: 41
End: 2021-06-04

## 2021-06-04 DIAGNOSIS — E87.6 HYPOKALEMIA: ICD-10-CM

## 2021-06-04 RX ORDER — POTASSIUM CHLORIDE 750 MG/1
10 TABLET, EXTENDED RELEASE ORAL DAILY
Qty: 90 TABLET | Refills: 1 | Status: SHIPPED | OUTPATIENT
Start: 2021-06-04 | End: 2021-12-10

## 2021-07-09 ENCOUNTER — PATIENT MESSAGE (OUTPATIENT)
Dept: OBSTETRICS AND GYNECOLOGY | Facility: CLINIC | Age: 41
End: 2021-07-09

## 2021-07-09 DIAGNOSIS — N89.8 VAGINAL DISCHARGE: Primary | ICD-10-CM

## 2021-07-09 RX ORDER — METRONIDAZOLE 500 MG/1
500 TABLET ORAL EVERY 12 HOURS
Qty: 14 TABLET | Refills: 0 | Status: SHIPPED | OUTPATIENT
Start: 2021-07-09 | End: 2021-07-16

## 2021-07-10 ENCOUNTER — PATIENT MESSAGE (OUTPATIENT)
Dept: OBSTETRICS AND GYNECOLOGY | Facility: CLINIC | Age: 41
End: 2021-07-10

## 2021-07-12 ENCOUNTER — PATIENT MESSAGE (OUTPATIENT)
Dept: PRIMARY CARE CLINIC | Facility: CLINIC | Age: 41
End: 2021-07-12

## 2021-07-15 ENCOUNTER — PATIENT OUTREACH (OUTPATIENT)
Dept: ADMINISTRATIVE | Facility: OTHER | Age: 41
End: 2021-07-15

## 2021-09-13 ENCOUNTER — PATIENT MESSAGE (OUTPATIENT)
Dept: INTERNAL MEDICINE | Facility: CLINIC | Age: 41
End: 2021-09-13

## 2021-09-14 ENCOUNTER — PATIENT MESSAGE (OUTPATIENT)
Dept: PRIMARY CARE CLINIC | Facility: CLINIC | Age: 41
End: 2021-09-14

## 2021-09-14 DIAGNOSIS — G40.409 EPILEPSY SYMPTOMATIC, GENERALIZED: ICD-10-CM

## 2021-09-14 DIAGNOSIS — R56.9 SEIZURES: Primary | ICD-10-CM

## 2021-09-15 ENCOUNTER — PATIENT MESSAGE (OUTPATIENT)
Dept: PRIMARY CARE CLINIC | Facility: CLINIC | Age: 41
End: 2021-09-15

## 2021-09-15 RX ORDER — LAMOTRIGINE 100 MG/1
100 TABLET ORAL 2 TIMES DAILY
Qty: 180 TABLET | Refills: 0 | Status: SHIPPED | OUTPATIENT
Start: 2021-09-15 | End: 2021-12-21

## 2021-10-29 ENCOUNTER — OFFICE VISIT (OUTPATIENT)
Dept: CARDIOLOGY | Facility: CLINIC | Age: 41
End: 2021-10-29
Attending: FAMILY MEDICINE
Payer: MEDICAID

## 2021-10-29 VITALS
SYSTOLIC BLOOD PRESSURE: 163 MMHG | BODY MASS INDEX: 28.99 KG/M2 | HEIGHT: 64 IN | HEART RATE: 72 BPM | WEIGHT: 169.81 LBS | DIASTOLIC BLOOD PRESSURE: 115 MMHG

## 2021-10-29 DIAGNOSIS — I15.8 OTHER SECONDARY HYPERTENSION: Primary | ICD-10-CM

## 2021-10-29 DIAGNOSIS — E66.3 OVER WEIGHT: ICD-10-CM

## 2021-10-29 DIAGNOSIS — R07.9 CHEST PAIN, UNSPECIFIED TYPE: ICD-10-CM

## 2021-10-29 PROCEDURE — 93000 EKG 12-LEAD: ICD-10-PCS | Mod: S$GLB,,, | Performed by: INTERNAL MEDICINE

## 2021-10-29 PROCEDURE — 99204 PR OFFICE/OUTPT VISIT, NEW, LEVL IV, 45-59 MIN: ICD-10-PCS | Mod: S$GLB,,, | Performed by: INTERNAL MEDICINE

## 2021-10-29 PROCEDURE — 99204 OFFICE O/P NEW MOD 45 MIN: CPT | Mod: S$GLB,,, | Performed by: INTERNAL MEDICINE

## 2021-10-29 PROCEDURE — 93000 ELECTROCARDIOGRAM COMPLETE: CPT | Mod: S$GLB,,, | Performed by: INTERNAL MEDICINE

## 2021-10-29 RX ORDER — HYDROCHLOROTHIAZIDE 12.5 MG/1
12.5 TABLET ORAL DAILY
Qty: 90 TABLET | Refills: 3 | Status: SHIPPED | OUTPATIENT
Start: 2021-10-29 | End: 2022-11-08

## 2021-11-03 ENCOUNTER — PATIENT MESSAGE (OUTPATIENT)
Dept: PRIMARY CARE CLINIC | Facility: CLINIC | Age: 41
End: 2021-11-03
Payer: MEDICAID

## 2021-11-10 ENCOUNTER — OFFICE VISIT (OUTPATIENT)
Dept: PRIMARY CARE CLINIC | Facility: CLINIC | Age: 41
End: 2021-11-10
Attending: FAMILY MEDICINE
Payer: MEDICAID

## 2021-11-10 VITALS
OXYGEN SATURATION: 98 % | SYSTOLIC BLOOD PRESSURE: 131 MMHG | BODY MASS INDEX: 29.28 KG/M2 | HEART RATE: 81 BPM | HEIGHT: 64 IN | WEIGHT: 171.5 LBS | DIASTOLIC BLOOD PRESSURE: 81 MMHG

## 2021-11-10 DIAGNOSIS — R30.0 DYSURIA: ICD-10-CM

## 2021-11-10 DIAGNOSIS — I10 PRIMARY HYPERTENSION: ICD-10-CM

## 2021-11-10 DIAGNOSIS — F41.9 ANXIETY: ICD-10-CM

## 2021-11-10 DIAGNOSIS — E87.6 HYPOKALEMIA: ICD-10-CM

## 2021-11-10 DIAGNOSIS — F32.0 CURRENT MILD EPISODE OF MAJOR DEPRESSIVE DISORDER, UNSPECIFIED WHETHER RECURRENT: ICD-10-CM

## 2021-11-10 DIAGNOSIS — J01.10 ACUTE FRONTAL SINUSITIS, RECURRENCE NOT SPECIFIED: Primary | ICD-10-CM

## 2021-11-10 LAB
BILIRUB SERPL-MCNC: NEGATIVE MG/DL
BLOOD URINE, POC: NEGATIVE
CLARITY, POC UA: CLEAR
COLOR, POC UA: YELLOW
GLUCOSE UR QL STRIP: NORMAL
KETONES UR QL STRIP: NEGATIVE
LEUKOCYTE ESTERASE URINE, POC: NEGATIVE
NITRITE, POC UA: NEGATIVE
PH, POC UA: 5
PROTEIN, POC: NEGATIVE
SPECIFIC GRAVITY, POC UA: 1.01
UROBILINOGEN, POC UA: NORMAL

## 2021-11-10 PROCEDURE — 99999 PR PBB SHADOW E&M-EST. PATIENT-LVL III: ICD-10-PCS | Mod: PBBFAC,,, | Performed by: FAMILY MEDICINE

## 2021-11-10 PROCEDURE — 99213 OFFICE O/P EST LOW 20 MIN: CPT | Mod: PBBFAC,PN | Performed by: FAMILY MEDICINE

## 2021-11-10 PROCEDURE — 99999 PR PBB SHADOW E&M-EST. PATIENT-LVL III: CPT | Mod: PBBFAC,,, | Performed by: FAMILY MEDICINE

## 2021-11-10 PROCEDURE — 99214 OFFICE O/P EST MOD 30 MIN: CPT | Mod: S$PBB,,, | Performed by: FAMILY MEDICINE

## 2021-11-10 PROCEDURE — 81002 URINALYSIS NONAUTO W/O SCOPE: CPT | Mod: PBBFAC,PN | Performed by: FAMILY MEDICINE

## 2021-11-10 PROCEDURE — 99214 PR OFFICE/OUTPT VISIT, EST, LEVL IV, 30-39 MIN: ICD-10-PCS | Mod: S$PBB,,, | Performed by: FAMILY MEDICINE

## 2021-11-10 RX ORDER — PHENAZOPYRIDINE HYDROCHLORIDE 100 MG/1
100 TABLET, FILM COATED ORAL
Qty: 30 TABLET | Refills: 0 | Status: SHIPPED | OUTPATIENT
Start: 2021-11-10 | End: 2021-11-20

## 2021-11-11 ENCOUNTER — PATIENT MESSAGE (OUTPATIENT)
Dept: PRIMARY CARE CLINIC | Facility: CLINIC | Age: 41
End: 2021-11-11
Payer: MEDICAID

## 2021-11-11 ENCOUNTER — TELEPHONE (OUTPATIENT)
Dept: INTERNAL MEDICINE | Facility: CLINIC | Age: 41
End: 2021-11-11
Payer: MEDICAID

## 2021-11-23 ENCOUNTER — OFFICE VISIT (OUTPATIENT)
Dept: OTOLARYNGOLOGY | Facility: CLINIC | Age: 41
End: 2021-11-23
Payer: MEDICAID

## 2021-11-23 VITALS
BODY MASS INDEX: 29.7 KG/M2 | WEIGHT: 173 LBS | HEART RATE: 85 BPM | SYSTOLIC BLOOD PRESSURE: 135 MMHG | DIASTOLIC BLOOD PRESSURE: 91 MMHG

## 2021-11-23 DIAGNOSIS — T48.5X5A RHINITIS MEDICAMENTOSA: Primary | ICD-10-CM

## 2021-11-23 DIAGNOSIS — J31.0 RHINITIS MEDICAMENTOSA: Primary | ICD-10-CM

## 2021-11-23 DIAGNOSIS — J34.3 HYPERTROPHY OF BOTH INFERIOR NASAL TURBINATES: ICD-10-CM

## 2021-11-23 DIAGNOSIS — J01.10 ACUTE FRONTAL SINUSITIS, RECURRENCE NOT SPECIFIED: ICD-10-CM

## 2021-11-23 PROCEDURE — 31231 NASAL ENDOSCOPY DX: CPT | Mod: S$PBB,,, | Performed by: STUDENT IN AN ORGANIZED HEALTH CARE EDUCATION/TRAINING PROGRAM

## 2021-11-23 PROCEDURE — 99214 OFFICE O/P EST MOD 30 MIN: CPT | Mod: 25,S$PBB,, | Performed by: STUDENT IN AN ORGANIZED HEALTH CARE EDUCATION/TRAINING PROGRAM

## 2021-11-23 PROCEDURE — 99999 PR PBB SHADOW E&M-EST. PATIENT-LVL IV: CPT | Mod: PBBFAC,,, | Performed by: STUDENT IN AN ORGANIZED HEALTH CARE EDUCATION/TRAINING PROGRAM

## 2021-11-23 PROCEDURE — 99214 OFFICE O/P EST MOD 30 MIN: CPT | Mod: PBBFAC | Performed by: STUDENT IN AN ORGANIZED HEALTH CARE EDUCATION/TRAINING PROGRAM

## 2021-11-23 PROCEDURE — 31231 PR NASAL ENDOSCOPY, DX: ICD-10-PCS | Mod: S$PBB,,, | Performed by: STUDENT IN AN ORGANIZED HEALTH CARE EDUCATION/TRAINING PROGRAM

## 2021-11-23 PROCEDURE — 99214 PR OFFICE/OUTPT VISIT, EST, LEVL IV, 30-39 MIN: ICD-10-PCS | Mod: 25,S$PBB,, | Performed by: STUDENT IN AN ORGANIZED HEALTH CARE EDUCATION/TRAINING PROGRAM

## 2021-11-23 PROCEDURE — 99999 PR PBB SHADOW E&M-EST. PATIENT-LVL IV: ICD-10-PCS | Mod: PBBFAC,,, | Performed by: STUDENT IN AN ORGANIZED HEALTH CARE EDUCATION/TRAINING PROGRAM

## 2021-11-23 RX ORDER — METHYLPREDNISOLONE 4 MG/1
TABLET ORAL
Qty: 21 EACH | Refills: 0 | Status: SHIPPED | OUTPATIENT
Start: 2021-11-23 | End: 2022-04-19

## 2021-11-23 RX ORDER — FLUTICASONE PROPIONATE 50 MCG
1 SPRAY, SUSPENSION (ML) NASAL DAILY
Qty: 9.9 ML | Refills: 0 | Status: SHIPPED | OUTPATIENT
Start: 2021-11-23 | End: 2022-03-28

## 2021-11-26 ENCOUNTER — PATIENT MESSAGE (OUTPATIENT)
Dept: BEHAVIORAL HEALTH | Facility: OTHER | Age: 41
End: 2021-11-26
Payer: MEDICAID

## 2021-12-10 DIAGNOSIS — E87.6 HYPOKALEMIA: ICD-10-CM

## 2021-12-10 RX ORDER — POTASSIUM CHLORIDE 750 MG/1
10 TABLET, EXTENDED RELEASE ORAL DAILY
Qty: 90 TABLET | Refills: 1 | Status: SHIPPED | OUTPATIENT
Start: 2021-12-10 | End: 2022-08-11

## 2021-12-15 ENCOUNTER — PATIENT MESSAGE (OUTPATIENT)
Dept: PRIMARY CARE CLINIC | Facility: CLINIC | Age: 41
End: 2021-12-15
Payer: MEDICAID

## 2021-12-20 ENCOUNTER — OFFICE VISIT (OUTPATIENT)
Dept: PRIMARY CARE CLINIC | Facility: CLINIC | Age: 41
End: 2021-12-20
Attending: FAMILY MEDICINE
Payer: MEDICAID

## 2021-12-20 DIAGNOSIS — J45.31 MILD PERSISTENT REACTIVE AIRWAY DISEASE WITH ACUTE EXACERBATION: Primary | ICD-10-CM

## 2021-12-20 PROCEDURE — 1160F PR REVIEW ALL MEDS BY PRESCRIBER/CLIN PHARMACIST DOCUMENTED: ICD-10-PCS | Mod: CPTII,95,, | Performed by: FAMILY MEDICINE

## 2021-12-20 PROCEDURE — 99213 OFFICE O/P EST LOW 20 MIN: CPT | Mod: 95,,, | Performed by: FAMILY MEDICINE

## 2021-12-20 PROCEDURE — 99213 PR OFFICE/OUTPT VISIT, EST, LEVL III, 20-29 MIN: ICD-10-PCS | Mod: 95,,, | Performed by: FAMILY MEDICINE

## 2021-12-20 PROCEDURE — 1159F MED LIST DOCD IN RCRD: CPT | Mod: CPTII,95,, | Performed by: FAMILY MEDICINE

## 2021-12-20 PROCEDURE — 1159F PR MEDICATION LIST DOCUMENTED IN MEDICAL RECORD: ICD-10-PCS | Mod: CPTII,95,, | Performed by: FAMILY MEDICINE

## 2021-12-20 PROCEDURE — 1160F RVW MEDS BY RX/DR IN RCRD: CPT | Mod: CPTII,95,, | Performed by: FAMILY MEDICINE

## 2021-12-20 RX ORDER — FLUTICASONE PROPIONATE 110 UG/1
1 AEROSOL, METERED RESPIRATORY (INHALATION) 2 TIMES DAILY
Qty: 12 G | Refills: 0 | Status: SHIPPED | OUTPATIENT
Start: 2021-12-20 | End: 2022-01-07 | Stop reason: SDUPTHER

## 2021-12-21 ENCOUNTER — TELEPHONE (OUTPATIENT)
Dept: CARDIOLOGY | Facility: CLINIC | Age: 41
End: 2021-12-21
Payer: MEDICAID

## 2021-12-22 DIAGNOSIS — G43.511 INTRACTABLE PERSISTENT MIGRAINE AURA WITHOUT CEREBRAL INFARCTION AND WITH STATUS MIGRAINOSUS: Primary | ICD-10-CM

## 2021-12-22 DIAGNOSIS — J45.21 MILD INTERMITTENT REACTIVE AIRWAY DISEASE WITH ACUTE EXACERBATION: ICD-10-CM

## 2022-01-07 DIAGNOSIS — J45.31 MILD PERSISTENT REACTIVE AIRWAY DISEASE WITH ACUTE EXACERBATION: ICD-10-CM

## 2022-01-07 RX ORDER — FLUTICASONE PROPIONATE 110 UG/1
1 AEROSOL, METERED RESPIRATORY (INHALATION) 2 TIMES DAILY
Qty: 12 G | Refills: 1 | Status: SHIPPED | OUTPATIENT
Start: 2022-01-07 | End: 2022-02-18 | Stop reason: SDUPTHER

## 2022-01-07 NOTE — TELEPHONE ENCOUNTER
No new care gaps identified.  Powered by [employer]. Reference number: [id number].   1/07/2022 1:54:54 PM CST

## 2022-01-07 NOTE — TELEPHONE ENCOUNTER
Qvar RediHaler 80MCG/ACT Aerosol is not covered by Ms. Ly's insurance.   They have sent us a list that the insurance will cover:  Fluticasone-Salmeterol  Flovent HFA  Symbicort  Asmanex 60 metered doses  Dulera  Asmanex 120 metered doses  Asmanex 14 metered doses

## 2022-01-07 NOTE — TELEPHONE ENCOUNTER
Pt states that she only has a blue inhaler at home and does not know what medication is in it. Pt states that she is in need of a refill of her inhaler and is requesting that we sent in the Flovent HFA.

## 2022-01-07 NOTE — TELEPHONE ENCOUNTER
It appears patient already has Flovent ordered, please confirm what daily inhaled corticosteroid the patient is currently using/needs. Thank you!

## 2022-01-12 DIAGNOSIS — K76.9 LIVER DISEASE, UNSPECIFIED: ICD-10-CM

## 2022-01-12 DIAGNOSIS — K76.9 LIVER LESION: Primary | ICD-10-CM

## 2022-01-13 ENCOUNTER — PATIENT MESSAGE (OUTPATIENT)
Dept: BEHAVIORAL HEALTH | Facility: OTHER | Age: 42
End: 2022-01-13
Payer: MEDICAID

## 2022-01-17 PROBLEM — J45.901 REACTIVE AIRWAY DISEASE WITH ACUTE EXACERBATION: Status: ACTIVE | Noted: 2022-01-17

## 2022-01-18 NOTE — PROGRESS NOTES
Established Patient - Audio Only Telehealth Visit     The patient location is: HOME  The chief complaint leading to consultation is: cough  Visit type: Virtual visit with audio only (telephone)  Total time spent with patient: 15 mins       The reason for the audio only service rather than synchronous audio and video virtual visit was related to technical difficulties or patient preference/necessity.     Each patient to whom I provide medical services by telemedicine is:  (1) informed of the relationship between the physician and patient and the respective role of any other health care provider with respect to management of the patient; and (2) notified that they may decline to receive medical services by telemedicine and may withdraw from such care at any time. Patient verbally consented to receive this service via voice-only telephone call.       HPI: pt with cough that is worse at night and flares during the week intermittently. Pt with h/o asthma. States that alb inh not helping     Assessment and plan:    Mild persistent reactive airway disease with acute exacerbation  -     Discontinue: fluticasone propionate (FLOVENT HFA) 110 mcg/actuation inhaler; Inhale 1 puff into the lungs 2 (two) times daily. Controller  Dispense: 12 g; Refill: 0      SEs of med d/w pt. Call if no improvement                          This service was not originating from a related E/M service provided within the previous 7 days nor will  to an E/M service or procedure within the next 24 hours or my soonest available appointment.  Prevailing standard of care was able to be met in this audio-only visit.

## 2022-02-18 DIAGNOSIS — J45.31 MILD PERSISTENT REACTIVE AIRWAY DISEASE WITH ACUTE EXACERBATION: ICD-10-CM

## 2022-02-18 NOTE — TELEPHONE ENCOUNTER
No new care gaps identified.  Powered by [employer]. Reference number: [id number].   2/18/2022 4:35:07 PM CST

## 2022-02-21 RX ORDER — FLUTICASONE PROPIONATE 110 UG/1
1 AEROSOL, METERED RESPIRATORY (INHALATION) 2 TIMES DAILY
Qty: 12 G | Refills: 1 | Status: SHIPPED | OUTPATIENT
Start: 2022-02-21 | End: 2023-06-28

## 2022-02-22 ENCOUNTER — PATIENT MESSAGE (OUTPATIENT)
Dept: INTERNAL MEDICINE | Facility: CLINIC | Age: 42
End: 2022-02-22
Payer: MEDICAID

## 2022-03-17 DIAGNOSIS — G40.409 EPILEPSY SYMPTOMATIC, GENERALIZED: ICD-10-CM

## 2022-03-17 RX ORDER — LAMOTRIGINE 100 MG/1
TABLET ORAL
Qty: 180 TABLET | Refills: 0 | Status: SHIPPED | OUTPATIENT
Start: 2022-03-17 | End: 2022-06-20

## 2022-03-19 ENCOUNTER — PATIENT MESSAGE (OUTPATIENT)
Dept: INTERNAL MEDICINE | Facility: CLINIC | Age: 42
End: 2022-03-19
Payer: MEDICAID

## 2022-03-24 ENCOUNTER — OFFICE VISIT (OUTPATIENT)
Dept: HEPATOLOGY | Facility: CLINIC | Age: 42
End: 2022-03-24
Payer: MEDICAID

## 2022-03-24 DIAGNOSIS — K76.9 LIVER DISEASE, UNSPECIFIED: ICD-10-CM

## 2022-03-24 DIAGNOSIS — K76.9 LIVER LESION: Primary | ICD-10-CM

## 2022-03-24 PROCEDURE — 99213 PR OFFICE/OUTPT VISIT, EST, LEVL III, 20-29 MIN: ICD-10-PCS | Mod: 95,,, | Performed by: STUDENT IN AN ORGANIZED HEALTH CARE EDUCATION/TRAINING PROGRAM

## 2022-03-24 PROCEDURE — 99213 OFFICE O/P EST LOW 20 MIN: CPT | Mod: 95,,, | Performed by: STUDENT IN AN ORGANIZED HEALTH CARE EDUCATION/TRAINING PROGRAM

## 2022-03-24 PROCEDURE — 1160F PR REVIEW ALL MEDS BY PRESCRIBER/CLIN PHARMACIST DOCUMENTED: ICD-10-PCS | Mod: CPTII,95,, | Performed by: STUDENT IN AN ORGANIZED HEALTH CARE EDUCATION/TRAINING PROGRAM

## 2022-03-24 PROCEDURE — 1159F PR MEDICATION LIST DOCUMENTED IN MEDICAL RECORD: ICD-10-PCS | Mod: CPTII,95,, | Performed by: STUDENT IN AN ORGANIZED HEALTH CARE EDUCATION/TRAINING PROGRAM

## 2022-03-24 PROCEDURE — 1160F RVW MEDS BY RX/DR IN RCRD: CPT | Mod: CPTII,95,, | Performed by: STUDENT IN AN ORGANIZED HEALTH CARE EDUCATION/TRAINING PROGRAM

## 2022-03-24 PROCEDURE — 1159F MED LIST DOCD IN RCRD: CPT | Mod: CPTII,95,, | Performed by: STUDENT IN AN ORGANIZED HEALTH CARE EDUCATION/TRAINING PROGRAM

## 2022-03-24 NOTE — PROGRESS NOTES
Hepatology Follow Up Note    The patient location is: home ([place])  The chief complaint leading to consultation is: liver lesions    Visit type: audiovisual    Face to Face time with patient: 10  20 minutes of total time spent on the encounter, which includes face to face time and non-face to face time preparing to see the patient (eg, review of tests), Obtaining and/or reviewing separately obtained history, Documenting clinical information in the electronic or other health record, Independently interpreting results (not separately reported) and communicating results to the patient/family/caregiver, or Care coordination (not separately reported).     Each patient to whom he or she provides medical services by telemedicine is:  (1) informed of the relationship between the physician and patient and the respective role of any other health care provider with respect to management of the patient; and (2) notified that he or she may decline to receive medical services by telemedicine and may withdraw from such care at any time.      Referring provider: No ref. provider found  PCP: Zoey Nelson MD    Chief complaint: follow up liver lesions    HPI:  Pernell Ly is a 41 y.o. female with history of indeterminate liver lesions who presents for scheduled follow up.    She is without complaints today.  No recent hospitalizations or ED visits. She denies signs of decompensated liver disease including no recent abdominal distension, encephalopathy, jaundice, GI bleeding.    Background 8/31/20: Approximately 4 months ago she developed intermittent LUQ pain. She saw her PCP and underwent ultrasound showing 3 liver masses.  She subsequently underwent CT showing multiple liver lesions the largest of which was 3.1 cm.  Prior to this she had never been told that she had liver disease.  She rarely drinks alcohol and has never been a heavy drinker.  She has had blood transfusions in the past for bleeding uterine fibroids.  She has 2  tattoos.  She denies history of IV drug use.  She has no known family history of liver disease.       Past Medical History:   Diagnosis Date    Encounter for blood transfusion     Fibroids     Hypertension     Iron deficiency anemia     Menorrhagia     Migraine     Seizures     as baby       Past Surgical History:   Procedure Laterality Date     SECTION      x1    HYSTERECTOMY  2017    TUBAL LIGATION         Family History   Problem Relation Age of Onset    Hypertension Father     Hypertension Sister     Hypertension Maternal Grandmother     Breast cancer Maternal Grandmother     Cancer Maternal Grandfather     Hypertension Paternal Grandmother     Colon cancer Maternal Cousin     Ovarian cancer Neg Hx        Social History     Tobacco Use    Smoking status: Never Smoker    Smokeless tobacco: Never Used   Substance Use Topics    Alcohol use: No     Comment: Occasional; 3x /month    Drug use: No       Current Outpatient Medications   Medication Sig Dispense Refill    albuterol (PROVENTIL HFA) 90 mcg/actuation inhaler Inhale 2 puffs into the lungs every 6 (six) hours as needed for Wheezing. Rescue 18 g 0    azelastine (ASTELIN) 137 mcg (0.1 %) nasal spray USE 1 SPRAY NASALLY TWICE DAILY 30 mL 0    ciclesonide (OMNARIS) 50 mcg Spry 2 sprays by Each Nostril route once daily. 12.5 g 0    diclofenac sodium (VOLTAREN) 1 % Gel Apply 2 g topically 4 (four) times daily. (Patient not taking: Reported on 11/10/2021) 100 g 0    ferrous sulfate 324 mg (65 mg iron) TbEC Take 324 mg by mouth 2 (two) times daily.       fluticasone propionate (FLONASE) 50 mcg/actuation nasal spray 1 spray (50 mcg total) by Each Nostril route once daily. 9.9 mL 0    fluticasone propionate (FLOVENT HFA) 110 mcg/actuation inhaler Inhale 1 puff into the lungs 2 (two) times daily. Controller 12 g 1    hydroCHLOROthiazide (HYDRODIURIL) 12.5 MG Tab Take 1 tablet (12.5 mg total) by mouth once daily. (Patient not  taking: Reported on 11/10/2021) 90 tablet 3    ibuprofen (ADVIL,MOTRIN) 800 MG tablet TAKE 1 TABLET BY MOUTH THREE TIMES DAILY AS NEEDED FOR PAIN 60 tablet 2    lamoTRIgine (LAMICTAL) 100 MG tablet TAKE 1 TABLET(100 MG) BY MOUTH TWICE DAILY 180 tablet 0    levocetirizine (XYZAL) 5 MG tablet Take 1 tablet (5 mg total) by mouth every evening. (Patient not taking: Reported on 11/10/2021) 30 tablet 0    methylPREDNISolone (MEDROL DOSEPACK) 4 mg tablet use as directed 21 each 0    multivitamin with minerals tablet Take by mouth.      NIFEdipine (PROCARDIA-XL) 90 MG (OSM) 24 hr tablet TAKE 1 TABLET(90 MG) BY MOUTH EVERY DAY 90 tablet 1    pantoprazole (PROTONIX) 40 MG tablet Take 1 tablet (40 mg total) by mouth once daily. (Patient not taking: Reported on 11/10/2021) 30 tablet 0    potassium chloride (KLOR-CON) 10 MEQ TbSR Take 1 tablet (10 mEq total) by mouth once daily. 90 tablet 1     No current facility-administered medications for this visit.       Review of patient's allergies indicates:  No Known Allergies    Review of Systems   Constitutional: Negative for fever and weight loss.   Cardiovascular: Negative for leg swelling.   Gastrointestinal: Negative for abdominal pain, blood in stool, constipation, diarrhea, heartburn, melena, nausea and vomiting.       There were no vitals filed for this visit.    Physical Exam  Constitutional:       General: She is not in acute distress.     Appearance: She is not ill-appearing.   HENT:      Head: Normocephalic and atraumatic.   Eyes:      General: No scleral icterus.     Extraocular Movements: Extraocular movements intact.   Pulmonary:      Effort: No respiratory distress.   Skin:     Coloration: Skin is not jaundiced.   Neurological:      Mental Status: She is alert.         LABS: I personally reviewed pertinent laboratory findings.    Lab Results   Component Value Date    WBC 4.65 05/25/2021    HGB 12.6 05/25/2021    HCT 38.3 05/25/2021    MCV 89 05/25/2021    PLT  346 05/25/2021       Lab Results   Component Value Date     05/25/2021    K 3.6 05/25/2021     05/25/2021    CO2 25 05/25/2021    BUN 10 05/25/2021    CREATININE 0.7 05/25/2021    CALCIUM 9.9 05/25/2021    ANIONGAP 9 05/25/2021    ESTGFRAFRICA >60.0 05/25/2021    EGFRNONAA >60.0 05/25/2021       Lab Results   Component Value Date    ALT 14 05/25/2021    AST 17 05/25/2021    ALKPHOS 81 05/25/2021    BILITOT 0.2 05/25/2021         IMAGING: I personally reviewed imaging studies.    Assessment/Recommendations:  41 y.o. female who presents for follow up of liver lesions. She has no known underlying liver disease. CT 08/2020 showed multiple liver lesions the largest of which was 3.1 cm with DDx including FNH, hepatic adenomas, or rapidly filling hemangiomas.    This was reviewed in multidisciplinary liver radiology conference with lesions felt to represent FNH or adenoma. Recommendations were for MRI with [facility] in 6 months. 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     Surveillance MRI 03/2022 again showed indeterminate liver lesions the largest of which was 4.4 cm favored to represent FNH or adenoma.  Given slightly increased size and inability to rule out adenoma, will repeat MRI in 6 months.    Luis Beltran MD  Staff Physician  Hepatology and Liver Transplant  [facility]  [facility] [place]

## 2022-03-25 ENCOUNTER — TELEPHONE (OUTPATIENT)
Dept: HEPATOLOGY | Facility: CLINIC | Age: 42
End: 2022-03-25
Payer: MEDICAID

## 2022-03-25 ENCOUNTER — PATIENT MESSAGE (OUTPATIENT)
Dept: PRIMARY CARE CLINIC | Facility: CLINIC | Age: 42
End: 2022-03-25
Payer: MEDICAID

## 2022-03-25 DIAGNOSIS — R56.9 SEIZURES: Primary | ICD-10-CM

## 2022-03-25 DIAGNOSIS — G40.409 EPILEPSY SYMPTOMATIC, GENERALIZED: ICD-10-CM

## 2022-03-25 NOTE — TELEPHONE ENCOUNTER
----- Message from Luis Beltran MD sent at 3/24/2022  8:01 PM CDT -----  Return 6 months with MRI

## 2022-04-06 DIAGNOSIS — I10 ESSENTIAL HYPERTENSION: ICD-10-CM

## 2022-04-06 RX ORDER — NIFEDIPINE 90 MG/1
TABLET, EXTENDED RELEASE ORAL
Qty: 90 TABLET | Refills: 1 | Status: SHIPPED | OUTPATIENT
Start: 2022-04-06 | End: 2022-09-19

## 2022-04-06 NOTE — TELEPHONE ENCOUNTER
Refill Routing Note   Medication(s) are not appropriate for processing by [facility] for the following reason(s):      - Required vitals are abnormal    [facility] action(s):  Defer Medication-related problems identified: Requires labs        Medication reconciliation completed: No     Appointments  past 12m or future 3m with PCP    Date Provider   Last Visit   12/20/2021 Zoey Nelson MD   Next Visit   Visit date not found Zoey Nelson MD   ED visits in past 90 days: 0        Note composed:11:56 AM 04/06/2022

## 2022-04-11 NOTE — NURSING
19:00 addendum to plan of care note. Pt was afebrile during day shift hrs  7Am unitl  7pm/  Pt had spiked temp during early hrs of am prior to this shift.  MERY   decreased strength

## 2022-04-13 DIAGNOSIS — Z12.31 OTHER SCREENING MAMMOGRAM: ICD-10-CM

## 2022-04-19 ENCOUNTER — LAB VISIT (OUTPATIENT)
Dept: LAB | Facility: HOSPITAL | Age: 42
End: 2022-04-19
Attending: PSYCHIATRY & NEUROLOGY
Payer: MEDICAID

## 2022-04-19 ENCOUNTER — OFFICE VISIT (OUTPATIENT)
Dept: NEUROLOGY | Facility: CLINIC | Age: 42
End: 2022-04-19
Payer: MEDICAID

## 2022-04-19 VITALS
WEIGHT: 165.38 LBS | BODY MASS INDEX: 28.24 KG/M2 | DIASTOLIC BLOOD PRESSURE: 80 MMHG | HEIGHT: 64 IN | SYSTOLIC BLOOD PRESSURE: 122 MMHG | HEART RATE: 87 BPM

## 2022-04-19 DIAGNOSIS — R56.9 SEIZURES: ICD-10-CM

## 2022-04-19 DIAGNOSIS — G40.409 EPILEPSY SYMPTOMATIC, GENERALIZED: ICD-10-CM

## 2022-04-19 DIAGNOSIS — G44.40 MEDICATION OVERUSE HEADACHE: Primary | ICD-10-CM

## 2022-04-19 PROCEDURE — 36415 COLL VENOUS BLD VENIPUNCTURE: CPT | Performed by: PSYCHIATRY & NEUROLOGY

## 2022-04-19 PROCEDURE — 1159F MED LIST DOCD IN RCRD: CPT | Mod: CPTII,,, | Performed by: PSYCHIATRY & NEUROLOGY

## 2022-04-19 PROCEDURE — 3074F SYST BP LT 130 MM HG: CPT | Mod: CPTII,,, | Performed by: PSYCHIATRY & NEUROLOGY

## 2022-04-19 PROCEDURE — 99214 OFFICE O/P EST MOD 30 MIN: CPT | Mod: S$PBB,,, | Performed by: PSYCHIATRY & NEUROLOGY

## 2022-04-19 PROCEDURE — 3008F BODY MASS INDEX DOCD: CPT | Mod: CPTII,,, | Performed by: PSYCHIATRY & NEUROLOGY

## 2022-04-19 PROCEDURE — 99214 PR OFFICE/OUTPT VISIT, EST, LEVL IV, 30-39 MIN: ICD-10-PCS | Mod: S$PBB,,, | Performed by: PSYCHIATRY & NEUROLOGY

## 2022-04-19 PROCEDURE — 99999 PR PBB SHADOW E&M-EST. PATIENT-LVL III: ICD-10-PCS | Mod: PBBFAC,,, | Performed by: PSYCHIATRY & NEUROLOGY

## 2022-04-19 PROCEDURE — 99213 OFFICE O/P EST LOW 20 MIN: CPT | Mod: PBBFAC | Performed by: PSYCHIATRY & NEUROLOGY

## 2022-04-19 PROCEDURE — 99999 PR PBB SHADOW E&M-EST. PATIENT-LVL III: CPT | Mod: PBBFAC,,, | Performed by: PSYCHIATRY & NEUROLOGY

## 2022-04-19 PROCEDURE — 3079F PR MOST RECENT DIASTOLIC BLOOD PRESSURE 80-89 MM HG: ICD-10-PCS | Mod: CPTII,,, | Performed by: PSYCHIATRY & NEUROLOGY

## 2022-04-19 PROCEDURE — 80175 DRUG SCREEN QUAN LAMOTRIGINE: CPT | Performed by: PSYCHIATRY & NEUROLOGY

## 2022-04-19 PROCEDURE — 3074F PR MOST RECENT SYSTOLIC BLOOD PRESSURE < 130 MM HG: ICD-10-PCS | Mod: CPTII,,, | Performed by: PSYCHIATRY & NEUROLOGY

## 2022-04-19 PROCEDURE — 3008F PR BODY MASS INDEX (BMI) DOCUMENTED: ICD-10-PCS | Mod: CPTII,,, | Performed by: PSYCHIATRY & NEUROLOGY

## 2022-04-19 PROCEDURE — 3079F DIAST BP 80-89 MM HG: CPT | Mod: CPTII,,, | Performed by: PSYCHIATRY & NEUROLOGY

## 2022-04-19 PROCEDURE — 1159F PR MEDICATION LIST DOCUMENTED IN MEDICAL RECORD: ICD-10-PCS | Mod: CPTII,,, | Performed by: PSYCHIATRY & NEUROLOGY

## 2022-04-19 RX ORDER — METHYLPREDNISOLONE 4 MG/1
TABLET ORAL
Qty: 1 EACH | Refills: 0 | Status: SHIPPED | OUTPATIENT
Start: 2022-04-19 | End: 2022-11-08

## 2022-04-19 NOTE — PROGRESS NOTES
[facility]  Epilepsy Clinic Progress Note      [facility] - NEUROLOGY 7TH FL  [facility], [place]    Date: 4/19/22  Patient Name: Pernell Ly   MRN: [id number]   PCP: Zoey Nelson  Referring Provider: Zoey Nelson MD    Assessment:   Pernell Ly is a 41 y.o. female presenting for evaluation of longstanding well-controlled epilepsy.  Patient with new event type episodes of loss of awareness/time.  Concerning for focal onset seizure.  Episodes are all unwitnessed.  Favor ambulatory EEG for event capture and assessment of seizure burden.  Obtaining baseline Lamictal level today.  Discussed ibuprofen overuse with patient contributing to likely rebound headache.  Patient expressed understanding of this.  Patient is to discontinue over-the-counter analgesics.  Have provided Medrol Dosepak for withdrawal period.  Plan:     Problem List Items Addressed This Visit        Neuro    Seizures    Relevant Orders    Ambulatory EEG monitoring    Lamotrigine level      Other Visit Diagnoses     Medication overuse headache    -  Primary    Relevant Medications    methylPREDNISolone (MEDROL DOSEPACK) 4 mg tablet    Epilepsy symptomatic, generalized        Relevant Orders    Ambulatory EEG monitoring    Lamotrigine level        I completed education on seizure first aid and safety. I recommended seizure precautions with regards to avoiding unsupervised water recreational activity or bathing in tubs, climbing or working at heights, operation of heavy or dangerous machinery, caution around fire and sources of high heat, as well as any other activity which could put a patient at danger in case of a seizure.  I also reviewed the [place] DMV law and recommended that the patient not drive for 6 months in the event of breakthrough seizures.    As the patient is a female of childbearing age, I have counseled the patient on issues pertaining to pregnancy and epilepsy and recommended folic acid  supplementation. I have reviewed and recommended the AAN guideline of folic acid supplementation prior to and during pregnancy.      Johnathan Weir MD  [facility]   Department of Neurology    Patient note was created using MModal Dictation.  Any errors in syntax or even information may not have been identified and edited on initial review prior to signing this note.  Subjective:   Patient seen in consultation at the request of Zoey Nelson MD for the evaluation of epilepsy. A copy of this note will be sent to the referring physician.      HPI:   Ms. Pernell Ly is a 41 y.o. female who presents with a chief complaint of a longstanding seizure disorder.  Patient presents today to transfer her care from Dr. Dunham after being lost to follow-up for 2 years.  The patient reports that she had been doing well until the last few months when she has begun to experience episodes of loss of time.  The patient states that she will be alone and realized that several seconds have passed and she has lost awareness for a few minutes.  She endorses expressed compliance with her Lamictal.  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  She states that she believes is contributing to chronic daily tension headaches.  She states the headaches have become worse over the past few weeks and notes that she is taking ibuprofen twice a day every day for management of headaches.  She denies associated migrainous features or focal neurologic deficits.    Seizure Type: Supsected focal onset  Seizure Etiology:   L MTS  Current AEDs: Lamictal 100 mg BID    The patient is not accompanied by family who contribute to the history. This patient has 3 types of seizure as described below. The patient reports having seizures for years The patient reports to have stable seizure control. The seizure frequency is variable. The last seizure was last  "week . The patient does not report side effects from seizure medication.     Seizure Type 1:   Seizure Description: LOC, GTC (one out of sleep)  Aura:  De ja vu feeling  Associated Symptoms:  none  Seizure Frequency: 2 in lifetime  Last seizure: -    Seizure Type 2:   Seizure Description: Incomprehensible speech lasting 14 minutes with loss of time and confusion  Aura: None  Associated Symptoms:  none  Seizure Frequency: One in lifetime  Last seizure:     Seizure Type 3:   Seizure Description: "Goes out into space" for a minute, always happens when she's alone  Aura: None  Associated Symptoms: none  Seizure Frequency: Variable, often clusters every few months "seldom"   Last seizure: 1.5 weeks ago    Handedness: right  Seizure Onset Age: infancy  Seizure/ Epilepsy Risk Factors: childhood seizure  Birth/Developmental History: normal birth history and Normal developmental history  Seizure Triggers/ Provoking Features: stress  Previous Seizure Medications: lamotrigine (Lamictal, LTG) and zonisamide (Zonegran, ZNA)  Recent Med Changes: None  Other Treatments: None  Prior Episodes of Status: None  Psychiatric/Behavioral Comorbitidies: Anxiety  Surgical Candidacy:  Pending    Prior Studies:  EEG : Not done  vEEG/ EMU evaluation: Left MTS 2018, personally reviewed  MRI of brain: Not done  AED levels:  Not done  CT/CTA Scan: Unremarkable - personally reviewed  PET Scan: Not done  Neuropsychological Evaluation: Not done  DEXA Scan: Not done  Other studies: Not done    PAST MEDICAL HISTORY:  Past Medical History:   Diagnosis Date    Encounter for blood transfusion     Fibroids     Hypertension     Iron deficiency anemia     Menorrhagia     Migraine     Seizures     as baby       PAST SURGICAL HISTORY:  Past Surgical History:   Procedure Laterality Date     SECTION      x1    HYSTERECTOMY  2017    TUBAL LIGATION         CURRENT MEDS:  Current Outpatient Medications   Medication Sig " Dispense Refill    albuterol (PROVENTIL HFA) 90 mcg/actuation inhaler Inhale 2 puffs into the lungs every 6 (six) hours as needed for Wheezing. Rescue 18 g 0    azelastine (ASTELIN) 137 mcg (0.1 %) nasal spray USE 1 SPRAY NASALLY TWICE DAILY 30 mL 0    fluticasone propionate (FLOVENT HFA) 110 mcg/actuation inhaler Inhale 1 puff into the lungs 2 (two) times daily. Controller 12 g 1    hydroCHLOROthiazide (HYDRODIURIL) 12.5 MG Tab Take 1 tablet (12.5 mg total) by mouth once daily. 90 tablet 3    ibuprofen (ADVIL,MOTRIN) 800 MG tablet TAKE 1 TABLET BY MOUTH THREE TIMES DAILY AS NEEDED FOR PAIN 60 tablet 2    lamoTRIgine (LAMICTAL) 100 MG tablet TAKE 1 TABLET(100 MG) BY MOUTH TWICE DAILY 180 tablet 0    multivitamin with minerals tablet Take by mouth.      NIFEdipine (PROCARDIA-XL) 90 MG (OSM) 24 hr tablet TAKE 1 TABLET(90 MG) BY MOUTH EVERY DAY 90 tablet 1    potassium chloride (KLOR-CON) 10 MEQ TbSR Take 1 tablet (10 mEq total) by mouth once daily. 90 tablet 1    ciclesonide (OMNARIS) 50 mcg Spry 2 sprays by Each Nostril route once daily. (Patient not taking: Reported on 4/19/2022) 12.5 g 0    diclofenac sodium (VOLTAREN) 1 % Gel Apply 2 g topically 4 (four) times daily. (Patient not taking: Reported on 11/10/2021) 100 g 0    ferrous sulfate 324 mg (65 mg iron) TbEC Take 324 mg by mouth 2 (two) times daily.       fluticasone propionate (FLONASE) 50 mcg/actuation nasal spray SHAKE LIQUID AND USE 1 SPRAY(50 MCG) IN EACH NOSTRIL EVERY DAY (Patient not taking: Reported on 4/19/2022) 16 g 12    levocetirizine (XYZAL) 5 MG tablet Take 1 tablet (5 mg total) by mouth every evening. (Patient not taking: No sig reported) 30 tablet 0    methylPREDNISolone (MEDROL DOSEPACK) 4 mg tablet use as directed (Patient not taking: Reported on 4/19/2022) 21 each 0    pantoprazole (PROTONIX) 40 MG tablet Take 1 tablet (40 mg total) by mouth once daily. (Patient not taking: No sig reported) 30 tablet 0     No current  "facility-administered medications for this visit.       ALLERGIES:  Review of patient's allergies indicates:  No Known Allergies    FAMILY HISTORY:  Family History   Problem Relation Age of Onset    Hypertension Father     Hypertension Sister     Hypertension Maternal Grandmother     Breast cancer Maternal Grandmother     Cancer Maternal Grandfather     Hypertension Paternal Grandmother     Colon cancer Maternal Cousin     Ovarian cancer Neg Hx        SOCIAL HISTORY:  Social History     Tobacco Use    Smoking status: Never Smoker    Smokeless tobacco: Never Used   Substance Use Topics    Alcohol use: No     Comment: Occasional; 3x /month    Drug use: No       Review of Systems:  12 system review of systems is negative except for the symptoms mentioned in HPI.      Objective:     Vitals:    04/19/22 1352   Weight: 75 kg (165 lb 5.5 oz)   Height: 5' 4" (1.626 m)     General: NAD, well nourished   Eyes: no tearing, discharge, no erythema   ENT: moist mucous membranes of the oral cavity, nares patent    Neck: Supple, full range of motion  Cardiovascular: Warm and well perfused, pulses equal and symmetrical  Lungs: Normal work of breathing, normal chest wall excursions  Skin: No rash, lesions, or breakdown on exposed skin  Psychiatry: Mood and affect are appropriate   Abdomen: soft, non tender, non distended  Extremeties: No cyanosis, clubbing or edema.    Neurological   MENTAL STATUS: Alert and oriented to person, place, and time. Attention and concentration within normal limits. Speech without dysarthria, able to name and repeat without difficulty. Recent and remote memory within normal limits   CRANIAL NERVES: Visual fields intact. PERRL. EOMI. Facial sensation intact. Face symmetrical. Hearing grossly intact. Full shoulder shrug bilaterally. Tongue protrudes midline   SENSORY: Sensation is intact to light touch throughout.    MOTOR: Normal bulk and tone. 5/5 deltoid, biceps, triceps, interosseous, hand "  bilaterally. 5/5 iliopsoas, knee extension/flexion, foot dorsi/plantarflexion bilaterally.    REFLEXES: Symmetric and 2+ throughout.   CEREBELLAR/COORDINATION/GAIT: Gait steady. Finger to nose intact. Normal rapid alternating movements.

## 2022-04-22 LAB — LAMOTRIGINE SERPL-MCNC: 5 UG/ML (ref 2–15)

## 2022-05-26 ENCOUNTER — PATIENT MESSAGE (OUTPATIENT)
Dept: PRIMARY CARE CLINIC | Facility: CLINIC | Age: 42
End: 2022-05-26
Payer: MEDICAID

## 2022-05-30 ENCOUNTER — PATIENT MESSAGE (OUTPATIENT)
Dept: ADMINISTRATIVE | Facility: HOSPITAL | Age: 42
End: 2022-05-30
Payer: MEDICAID

## 2022-06-01 ENCOUNTER — OFFICE VISIT (OUTPATIENT)
Dept: PRIMARY CARE CLINIC | Facility: CLINIC | Age: 42
End: 2022-06-01
Payer: MEDICAID

## 2022-06-01 DIAGNOSIS — B96.89 ACUTE BACTERIAL RHINOSINUSITIS: Primary | ICD-10-CM

## 2022-06-01 DIAGNOSIS — R05.9 COUGH IN ADULT: ICD-10-CM

## 2022-06-01 DIAGNOSIS — J01.90 ACUTE BACTERIAL RHINOSINUSITIS: Primary | ICD-10-CM

## 2022-06-01 DIAGNOSIS — J30.2 SEASONAL ALLERGIES: ICD-10-CM

## 2022-06-01 PROCEDURE — 99213 PR OFFICE/OUTPT VISIT, EST, LEVL III, 20-29 MIN: ICD-10-PCS | Mod: 95,,, | Performed by: NURSE PRACTITIONER

## 2022-06-01 PROCEDURE — 1159F MED LIST DOCD IN RCRD: CPT | Mod: CPTII,95,, | Performed by: NURSE PRACTITIONER

## 2022-06-01 PROCEDURE — 1160F PR REVIEW ALL MEDS BY PRESCRIBER/CLIN PHARMACIST DOCUMENTED: ICD-10-PCS | Mod: CPTII,95,, | Performed by: NURSE PRACTITIONER

## 2022-06-01 PROCEDURE — 1159F PR MEDICATION LIST DOCUMENTED IN MEDICAL RECORD: ICD-10-PCS | Mod: CPTII,95,, | Performed by: NURSE PRACTITIONER

## 2022-06-01 PROCEDURE — 1160F RVW MEDS BY RX/DR IN RCRD: CPT | Mod: CPTII,95,, | Performed by: NURSE PRACTITIONER

## 2022-06-01 PROCEDURE — 99213 OFFICE O/P EST LOW 20 MIN: CPT | Mod: 95,,, | Performed by: NURSE PRACTITIONER

## 2022-06-01 RX ORDER — PROMETHAZINE HYDROCHLORIDE 6.25 MG/5ML
5 SYRUP ORAL EVERY 6 HOURS PRN
Qty: 118 ML | Refills: 5 | Status: SHIPPED | OUTPATIENT
Start: 2022-06-01 | End: 2023-04-12

## 2022-06-01 RX ORDER — CETIRIZINE HYDROCHLORIDE 10 MG/1
10 TABLET ORAL DAILY
Qty: 30 TABLET | Refills: 1 | Status: SHIPPED | OUTPATIENT
Start: 2022-06-01 | End: 2023-06-28

## 2022-06-01 RX ORDER — AMOXICILLIN AND CLAVULANATE POTASSIUM 875; 125 MG/1; MG/1
1 TABLET, FILM COATED ORAL EVERY 12 HOURS
Qty: 20 TABLET | Refills: 0 | Status: SHIPPED | OUTPATIENT
Start: 2022-06-01 | End: 2022-06-11

## 2022-06-01 NOTE — PROGRESS NOTES
The patient location is: [place]  The chief complaint leading to consultation is: sinus issues     Visit type: audiovisual    Face to Face time with patient: 12  22 minutes of total time spent on the encounter, which includes face to face time and non-face to face time preparing to see the patient (eg, review of tests), Obtaining and/or reviewing separately obtained history, Documenting clinical information in the electronic or other health record, Independently interpreting results (not separately reported) and communicating results to the patient/family/caregiver, or Care coordination (not separately reported).       Each patient to whom he or she provides medical services by telemedicine is:  (1) informed of the relationship between the physician and patient and the respective role of any other health care provider with respect to management of the patient; and (2) notified that he or she may decline to receive medical services by telemedicine and may withdraw from such care at any time.    Notes:   Subjective:       Patient ID: Pernell Ly is a 42 y.o. female.    Chief Complaint: sinus issues    Ms. Pernell Ly is a 42 year old female, new to me, presents via telemedicine with sinus issues. PCP is Zoey Nelson. Medical and surgical history in addition to problem list reviewed as listed below.    Patient presents with cough, nasal obstruction, purulent, green, thick nasal drainage, ear pressure/fullness, and expectorant of green, thick phlegm that started 2022. OTC Coricidin with no relief.       Past Medical History:   Diagnosis Date    Encounter for blood transfusion     Fibroids     Hypertension     Iron deficiency anemia     Menorrhagia     Migraine     Seizures     as baby        Past Surgical History:   Procedure Laterality Date     SECTION      x1    HYSTERECTOMY  2017    TUBAL LIGATION          Family History   Problem Relation Age of Onset    Hypertension  Father     Hypertension Sister     Hypertension Maternal Grandmother     Breast cancer Maternal Grandmother     Cancer Maternal Grandfather     Hypertension Paternal Grandmother     Colon cancer Maternal Cousin     Ovarian cancer Neg Hx        Social History     Tobacco Use   Smoking Status Never Smoker   Smokeless Tobacco Never Used       Social History     Social History Narrative    Not on file       Review of patient's allergies indicates:  No Known Allergies     Review of Systems   Constitutional: Negative for chills and fever.   HENT: Positive for congestion, postnasal drip, rhinorrhea, sneezing and sore throat.    Respiratory: Positive for cough.    Cardiovascular: Negative.    Gastrointestinal: Positive for constipation. Negative for nausea and vomiting.   Genitourinary: Negative.    Musculoskeletal: Positive for back pain.   Skin: Negative.    Neurological: Positive for weakness. Negative for dizziness, light-headedness and headaches.         Objective:        There were no vitals filed for this visit.     Limited physical examination due to nature of virtual/telemedicine visit      Physical Exam  Constitutional:       Appearance: She is ill-appearing.   Pulmonary:      Effort: Pulmonary effort is normal.   Neurological:      Mental Status: She is alert and oriented to person, place, and time.   Psychiatric:         Behavior: Behavior normal.         Assessment:       1. Acute bacterial rhinosinusitis    2. Cough in adult    3. Seasonal allergies        Plan:       Acute bacterial rhinosinusitis  -     amoxicillin-clavulanate 875-125 mg (AUGMENTIN) 875-125 mg per tablet; Take 1 tablet by mouth every 12 (twelve) hours. for 10 days  Dispense: 20 tablet; Refill: 0  -     promethazine (PHENERGAN) 6.25 mg/5 mL syrup; Take 4 mL's (5 mg total) by mouth every 6 (six) hours as needed for Nausea (cough). May cause drowsiness  Dispense: 118 mL; Refill: 5  -     cetirizine (ZYRTEC) 10 MG tablet; Take 1 tablet  (10 mg total) by mouth once daily. Dispense: 30 tablet; Refill: 1   Continue Azelastine and Fluticasone as needed for nasal congestion.  Cough in adult  -     promethazine (PHENERGAN) 6.25 mg/5 mL syrup; Take 4 mL's (5 mg total) by mouth every 6 (six) hours as needed for Nausea (cough). May cause drowsiness  Dispense: 118 mL; Refill: 5  Seasonal allergies  -     cetirizine (ZYRTEC) 10 MG tablet; Take 1 tablet (10 mg total) by mouth once daily.  Dispense: 30 tablet; Refill: 1       Medication List with Changes/Refills   New Medications    AMOXICILLIN-CLAVULANATE 875-125MG (AUGMENTIN) 875-125 MG PER TABLET    Take 1 tablet by mouth every 12 (twelve) hours. for 10 days    CETIRIZINE (ZYRTEC) 10 MG TABLET    Take 1 tablet (10 mg total) by mouth once daily.    PROMETHAZINE (PHENERGAN) 6.25 MG/5 ML SYRUP    Take 4 mLs (5 mg total) by mouth every 6 (six) hours as needed for Nausea (cough). May cause drowsiness   Current Medications    ALBUTEROL (PROVENTIL HFA) 90 MCG/ACTUATION INHALER    Inhale 2 puffs into the lungs every 6 (six) hours as needed for Wheezing. Rescue    AZELASTINE (ASTELIN) 137 MCG (0.1 %) NASAL SPRAY    USE 1 SPRAY NASALLY TWICE DAILY    FERROUS SULFATE 324 MG (65 MG IRON) TBEC    Take 324 mg by mouth 2 (two) times daily.     FLUTICASONE PROPIONATE (FLOVENT HFA) 110 MCG/ACTUATION INHALER    Inhale 1 puff into the lungs 2 (two) times daily. Controller    HYDROCHLOROTHIAZIDE (HYDRODIURIL) 12.5 MG TAB    Take 1 tablet (12.5 mg total) by mouth once daily.    IBUPROFEN (ADVIL,MOTRIN) 800 MG TABLET    TAKE 1 TABLET BY MOUTH THREE TIMES DAILY AS NEEDED FOR PAIN    LAMOTRIGINE (LAMICTAL) 100 MG TABLET    TAKE 1 TABLET(100 MG) BY MOUTH TWICE DAILY    METHYLPREDNISOLONE (MEDROL DOSEPACK) 4 MG TABLET    use as directed    MULTIVITAMIN WITH MINERALS TABLET    Take by mouth.    NIFEDIPINE (PROCARDIA-XL) 90 MG (OSM) 24 HR TABLET    TAKE 1 TABLET(90 MG) BY MOUTH EVERY DAY    POTASSIUM CHLORIDE (KLOR-CON) 10 MEQ TBSR     Take 1 tablet (10 mEq total) by mouth once daily.   Discontinued Medications    CICLESONIDE (OMNARIS) 50 MCG SPRY    2 sprays by Each Nostril route once daily.    DICLOFENAC SODIUM (VOLTAREN) 1 % GEL    Apply 2 g topically 4 (four) times daily.    FLUTICASONE PROPIONATE (FLONASE) 50 MCG/ACTUATION NASAL SPRAY    SHAKE LIQUID AND USE 1 SPRAY(50 MCG) IN EACH NOSTRIL EVERY DAY    LEVOCETIRIZINE (XYZAL) 5 MG TABLET    Take 1 tablet (5 mg total) by mouth every evening.    PANTOPRAZOLE (PROTONIX) 40 MG TABLET    Take 1 tablet (40 mg total) by mouth once daily.          Follow up if symptoms worsen or fail to improve.    Maryellen Ward APRN, MSN, FNP-C

## 2022-06-13 ENCOUNTER — PATIENT MESSAGE (OUTPATIENT)
Dept: OBSTETRICS AND GYNECOLOGY | Facility: CLINIC | Age: 42
End: 2022-06-13
Payer: MEDICAID

## 2022-08-24 ENCOUNTER — PATIENT MESSAGE (OUTPATIENT)
Dept: INTERNAL MEDICINE | Facility: CLINIC | Age: 42
End: 2022-08-24
Payer: MEDICAID

## 2022-08-31 DIAGNOSIS — I10 HTN (HYPERTENSION): ICD-10-CM

## 2022-09-05 PROBLEM — B96.89 ACUTE BACTERIAL RHINOSINUSITIS: Status: RESOLVED | Noted: 2022-06-01 | Resolved: 2022-09-05

## 2022-09-05 PROBLEM — J01.90 ACUTE BACTERIAL RHINOSINUSITIS: Status: RESOLVED | Noted: 2022-06-01 | Resolved: 2022-09-05

## 2022-09-27 ENCOUNTER — TELEPHONE (OUTPATIENT)
Dept: FAMILY MEDICINE | Facility: CLINIC | Age: 42
End: 2022-09-27
Payer: MEDICAID

## 2022-09-27 NOTE — TELEPHONE ENCOUNTER
----- Message from Sayra Margi sent at 9/27/2022  8:58 AM CDT -----  Contact: ETHAN PEREZ [[id number]]  Type: Call Back      Who called: ETHAN PEREZ [[id number]]      What is the request in detail: Patient is requesting a call back in regards to scheduling her annual. Please advise.       [facility] Yes      Would the patient rather a call back or a response via [facility]? Call back       Best call back number: [phone] (mobile)      Additional Information:

## 2022-10-05 ENCOUNTER — OFFICE VISIT (OUTPATIENT)
Dept: PRIMARY CARE CLINIC | Facility: CLINIC | Age: 42
End: 2022-10-05
Attending: FAMILY MEDICINE
Payer: MEDICAID

## 2022-10-05 DIAGNOSIS — D50.8 IRON DEFICIENCY ANEMIA SECONDARY TO INADEQUATE DIETARY IRON INTAKE: Primary | ICD-10-CM

## 2022-10-05 PROCEDURE — 1160F RVW MEDS BY RX/DR IN RCRD: CPT | Mod: CPTII,95,, | Performed by: FAMILY MEDICINE

## 2022-10-05 PROCEDURE — 99214 OFFICE O/P EST MOD 30 MIN: CPT | Mod: 95,,, | Performed by: FAMILY MEDICINE

## 2022-10-05 PROCEDURE — 1159F PR MEDICATION LIST DOCUMENTED IN MEDICAL RECORD: ICD-10-PCS | Mod: CPTII,95,, | Performed by: FAMILY MEDICINE

## 2022-10-05 PROCEDURE — 1160F PR REVIEW ALL MEDS BY PRESCRIBER/CLIN PHARMACIST DOCUMENTED: ICD-10-PCS | Mod: CPTII,95,, | Performed by: FAMILY MEDICINE

## 2022-10-05 PROCEDURE — 99214 PR OFFICE/OUTPT VISIT, EST, LEVL IV, 30-39 MIN: ICD-10-PCS | Mod: 95,,, | Performed by: FAMILY MEDICINE

## 2022-10-05 PROCEDURE — 1159F MED LIST DOCD IN RCRD: CPT | Mod: CPTII,95,, | Performed by: FAMILY MEDICINE

## 2022-10-10 ENCOUNTER — PATIENT MESSAGE (OUTPATIENT)
Dept: INTERNAL MEDICINE | Facility: CLINIC | Age: 42
End: 2022-10-10
Payer: MEDICAID

## 2022-10-10 ENCOUNTER — LAB VISIT (OUTPATIENT)
Dept: LAB | Facility: HOSPITAL | Age: 42
End: 2022-10-10
Attending: FAMILY MEDICINE
Payer: MEDICAID

## 2022-10-10 DIAGNOSIS — D50.8 IRON DEFICIENCY ANEMIA SECONDARY TO INADEQUATE DIETARY IRON INTAKE: ICD-10-CM

## 2022-10-10 DIAGNOSIS — I10 HTN (HYPERTENSION): ICD-10-CM

## 2022-10-10 LAB
ALBUMIN SERPL BCP-MCNC: 3.8 G/DL (ref 3.5–5.2)
ALP SERPL-CCNC: 55 U/L (ref 55–135)
ALT SERPL W/O P-5'-P-CCNC: 10 U/L (ref 10–44)
ANION GAP SERPL CALC-SCNC: 9 MMOL/L (ref 8–16)
AST SERPL-CCNC: 14 U/L (ref 10–40)
BASOPHILS # BLD AUTO: 0.02 K/UL (ref 0–0.2)
BASOPHILS NFR BLD: 0.5 % (ref 0–1.9)
BILIRUB SERPL-MCNC: 0.3 MG/DL (ref 0.1–1)
BUN SERPL-MCNC: 12 MG/DL (ref 6–20)
CALCIUM SERPL-MCNC: 9.5 MG/DL (ref 8.7–10.5)
CHLORIDE SERPL-SCNC: 103 MMOL/L (ref 95–110)
CO2 SERPL-SCNC: 27 MMOL/L (ref 23–29)
CREAT SERPL-MCNC: 0.7 MG/DL (ref 0.5–1.4)
DIFFERENTIAL METHOD: ABNORMAL
EOSINOPHIL # BLD AUTO: 0.1 K/UL (ref 0–0.5)
EOSINOPHIL NFR BLD: 1.2 % (ref 0–8)
ERYTHROCYTE [DISTWIDTH] IN BLOOD BY AUTOMATED COUNT: 13.1 % (ref 11.5–14.5)
EST. GFR  (NO RACE VARIABLE): >60 ML/MIN/1.73 M^2
FERRITIN SERPL-MCNC: 51 NG/ML (ref 20–300)
GLUCOSE SERPL-MCNC: 120 MG/DL (ref 70–110)
HCT VFR BLD AUTO: 37 % (ref 37–48.5)
HGB BLD-MCNC: 12.1 G/DL (ref 12–16)
IMM GRANULOCYTES # BLD AUTO: 0 K/UL (ref 0–0.04)
IMM GRANULOCYTES NFR BLD AUTO: 0 % (ref 0–0.5)
IRON SERPL-MCNC: 46 UG/DL (ref 30–160)
LYMPHOCYTES # BLD AUTO: 2.1 K/UL (ref 1–4.8)
LYMPHOCYTES NFR BLD: 48.4 % (ref 18–48)
MCH RBC QN AUTO: 29.7 PG (ref 27–31)
MCHC RBC AUTO-ENTMCNC: 32.7 G/DL (ref 32–36)
MCV RBC AUTO: 91 FL (ref 82–98)
MONOCYTES # BLD AUTO: 0.3 K/UL (ref 0.3–1)
MONOCYTES NFR BLD: 6.7 % (ref 4–15)
NEUTROPHILS # BLD AUTO: 1.9 K/UL (ref 1.8–7.7)
NEUTROPHILS NFR BLD: 43.2 % (ref 38–73)
NRBC BLD-RTO: 0 /100 WBC
PLATELET # BLD AUTO: 384 K/UL (ref 150–450)
PMV BLD AUTO: 9.2 FL (ref 9.2–12.9)
POTASSIUM SERPL-SCNC: 3.3 MMOL/L (ref 3.5–5.1)
PROT SERPL-MCNC: 8 G/DL (ref 6–8.4)
RBC # BLD AUTO: 4.08 M/UL (ref 4–5.4)
SATURATED IRON: 13 % (ref 20–50)
SODIUM SERPL-SCNC: 139 MMOL/L (ref 136–145)
TOTAL IRON BINDING CAPACITY: 355 UG/DL (ref 250–450)
TRANSFERRIN SERPL-MCNC: 240 MG/DL (ref 200–375)
WBC # BLD AUTO: 4.34 K/UL (ref 3.9–12.7)

## 2022-10-10 PROCEDURE — 36415 COLL VENOUS BLD VENIPUNCTURE: CPT | Mod: PO | Performed by: FAMILY MEDICINE

## 2022-10-10 PROCEDURE — 85025 COMPLETE CBC W/AUTO DIFF WBC: CPT | Performed by: FAMILY MEDICINE

## 2022-10-10 PROCEDURE — 84466 ASSAY OF TRANSFERRIN: CPT | Performed by: FAMILY MEDICINE

## 2022-10-10 PROCEDURE — 80053 COMPREHEN METABOLIC PANEL: CPT | Performed by: FAMILY MEDICINE

## 2022-10-10 PROCEDURE — 82728 ASSAY OF FERRITIN: CPT | Performed by: FAMILY MEDICINE

## 2022-10-31 NOTE — PROGRESS NOTES
Subjective:       Patient ID: Pernell Ly is a 42 y.o. female.    Chief Complaint: anemia    The patient location is: home  The chief complaint leading to consultation is: anemia    Visit type: audiovisual    Face to Face time with patient: 15 minutes 20 minutes of total time spent on the encounter, which includes face to face time and non-face to face time preparing to see the patient (eg, review of tests), Obtaining and/or reviewing separately obtained history, Documenting clinical information in the electronic or other health record, Independently interpreting results (not separately reported) and communicating results to the patient/family/caregiver, or Care coordination (not separately reported).     Each patient to whom he or she provides medical services by telemedicine is:  (1) informed of the relationship between the physician and patient and the respective role of any other health care provider with respect to management of the patient; and (2) notified that he or she may decline to receive medical services by telemedicine and may withdraw from such care at any time.    Notes:     Pt presents for anemia. Is taking iron and needs repeat CBC    Review of Systems   Constitutional:  Negative for activity change and unexpected weight change.   HENT:  Negative for hearing loss, rhinorrhea and trouble swallowing.    Eyes:  Negative for discharge and visual disturbance.   Respiratory:  Negative for chest tightness and wheezing.    Cardiovascular:  Negative for chest pain and palpitations.   Gastrointestinal:  Negative for blood in stool, constipation, diarrhea and vomiting.   Endocrine: Negative for polydipsia and polyuria.   Genitourinary:  Negative for difficulty urinating, dysuria, hematuria and menstrual problem.   Musculoskeletal:  Negative for arthralgias, joint swelling and neck pain.   Neurological:  Positive for weakness. Negative for headaches.   Psychiatric/Behavioral:  Negative for confusion and  dysphoric mood.    All other systems reviewed and are negative.      Objective:      Physical Exam  Vitals reviewed.   Constitutional:       Appearance: She is well-developed.   HENT:      Head: Normocephalic and atraumatic.   Eyes:      Extraocular Movements: Extraocular movements intact.   Pulmonary:      Effort: Pulmonary effort is normal.   Musculoskeletal:         General: Normal range of motion.      Cervical back: Neck supple.      Right lower leg: No edema.      Left lower leg: No edema.   Neurological:      Mental Status: She is oriented to person, place, and time.   Psychiatric:         Mood and Affect: Mood normal.         Behavior: Behavior normal.         Thought Content: Thought content normal.         Judgment: Judgment normal.       Assessment:       1. Iron deficiency anemia secondary to inadequate dietary iron intake          Plan:   1. Iron deficiency anemia secondary to inadequate dietary iron intake  -     FERRITIN; Future; Expected date: 10/05/2022  -     IRON AND TIBC; Future; Expected date: 10/05/2022  -     CBC Auto Differential; Future; Expected date: 10/05/2022      Iron labs pending  Cont taking iron suppl

## 2022-11-08 ENCOUNTER — OFFICE VISIT (OUTPATIENT)
Dept: PRIMARY CARE CLINIC | Facility: CLINIC | Age: 42
End: 2022-11-08
Attending: FAMILY MEDICINE
Payer: MEDICAID

## 2022-11-08 ENCOUNTER — LAB VISIT (OUTPATIENT)
Dept: LAB | Facility: HOSPITAL | Age: 42
End: 2022-11-08
Attending: FAMILY MEDICINE
Payer: MEDICAID

## 2022-11-08 VITALS
SYSTOLIC BLOOD PRESSURE: 131 MMHG | OXYGEN SATURATION: 98 % | DIASTOLIC BLOOD PRESSURE: 85 MMHG | WEIGHT: 152.13 LBS | HEART RATE: 85 BPM | BODY MASS INDEX: 25.97 KG/M2 | HEIGHT: 64 IN

## 2022-11-08 DIAGNOSIS — E87.6 HYPOKALEMIA: ICD-10-CM

## 2022-11-08 DIAGNOSIS — Z23 NEED FOR PNEUMOCOCCAL VACCINATION: ICD-10-CM

## 2022-11-08 DIAGNOSIS — Z00.00 ANNUAL PHYSICAL EXAM: Primary | ICD-10-CM

## 2022-11-08 DIAGNOSIS — R53.83 OTHER FATIGUE: ICD-10-CM

## 2022-11-08 DIAGNOSIS — Z00.00 ANNUAL PHYSICAL EXAM: ICD-10-CM

## 2022-11-08 LAB
ANION GAP SERPL CALC-SCNC: 12 MMOL/L (ref 8–16)
BUN SERPL-MCNC: 9 MG/DL (ref 6–20)
CALCIUM SERPL-MCNC: 9.6 MG/DL (ref 8.7–10.5)
CHLORIDE SERPL-SCNC: 102 MMOL/L (ref 95–110)
CHOLEST SERPL-MCNC: 159 MG/DL (ref 120–199)
CHOLEST/HDLC SERPL: 2.4 {RATIO} (ref 2–5)
CO2 SERPL-SCNC: 25 MMOL/L (ref 23–29)
CREAT SERPL-MCNC: 0.7 MG/DL (ref 0.5–1.4)
EST. GFR  (NO RACE VARIABLE): >60 ML/MIN/1.73 M^2
ESTIMATED AVG GLUCOSE: 111 MG/DL (ref 68–131)
GLUCOSE SERPL-MCNC: 99 MG/DL (ref 70–110)
HBA1C MFR BLD: 5.5 % (ref 4–5.6)
HDLC SERPL-MCNC: 66 MG/DL (ref 40–75)
HDLC SERPL: 41.5 % (ref 20–50)
LDLC SERPL CALC-MCNC: 85.6 MG/DL (ref 63–159)
NONHDLC SERPL-MCNC: 93 MG/DL
POTASSIUM SERPL-SCNC: 3.1 MMOL/L (ref 3.5–5.1)
SODIUM SERPL-SCNC: 139 MMOL/L (ref 136–145)
TRIGL SERPL-MCNC: 37 MG/DL (ref 30–150)
TSH SERPL DL<=0.005 MIU/L-ACNC: 1.33 UIU/ML (ref 0.4–4)

## 2022-11-08 PROCEDURE — 3079F DIAST BP 80-89 MM HG: CPT | Mod: CPTII,,, | Performed by: FAMILY MEDICINE

## 2022-11-08 PROCEDURE — 84443 ASSAY THYROID STIM HORMONE: CPT | Performed by: FAMILY MEDICINE

## 2022-11-08 PROCEDURE — 3008F PR BODY MASS INDEX (BMI) DOCUMENTED: ICD-10-PCS | Mod: CPTII,,, | Performed by: FAMILY MEDICINE

## 2022-11-08 PROCEDURE — 90677 PCV20 VACCINE IM: CPT | Mod: PBBFAC,PN

## 2022-11-08 PROCEDURE — 99999 PR PBB SHADOW E&M-EST. PATIENT-LVL III: CPT | Mod: PBBFAC,,, | Performed by: FAMILY MEDICINE

## 2022-11-08 PROCEDURE — 90472 IMMUNIZATION ADMIN EACH ADD: CPT | Mod: PBBFAC,PN

## 2022-11-08 PROCEDURE — 90471 IMMUNIZATION ADMIN: CPT | Mod: PBBFAC,PN

## 2022-11-08 PROCEDURE — 99213 OFFICE O/P EST LOW 20 MIN: CPT | Mod: PBBFAC,PN | Performed by: FAMILY MEDICINE

## 2022-11-08 PROCEDURE — 3079F PR MOST RECENT DIASTOLIC BLOOD PRESSURE 80-89 MM HG: ICD-10-PCS | Mod: CPTII,,, | Performed by: FAMILY MEDICINE

## 2022-11-08 PROCEDURE — 99396 PREV VISIT EST AGE 40-64: CPT | Mod: S$PBB,,, | Performed by: FAMILY MEDICINE

## 2022-11-08 PROCEDURE — 83036 HEMOGLOBIN GLYCOSYLATED A1C: CPT | Performed by: FAMILY MEDICINE

## 2022-11-08 PROCEDURE — 80061 LIPID PANEL: CPT | Performed by: FAMILY MEDICINE

## 2022-11-08 PROCEDURE — 3075F SYST BP GE 130 - 139MM HG: CPT | Mod: CPTII,,, | Performed by: FAMILY MEDICINE

## 2022-11-08 PROCEDURE — 3008F BODY MASS INDEX DOCD: CPT | Mod: CPTII,,, | Performed by: FAMILY MEDICINE

## 2022-11-08 PROCEDURE — 3075F PR MOST RECENT SYSTOLIC BLOOD PRESS GE 130-139MM HG: ICD-10-PCS | Mod: CPTII,,, | Performed by: FAMILY MEDICINE

## 2022-11-08 PROCEDURE — 99999 PR PBB SHADOW E&M-EST. PATIENT-LVL III: ICD-10-PCS | Mod: PBBFAC,,, | Performed by: FAMILY MEDICINE

## 2022-11-08 PROCEDURE — 99396 PR PREVENTIVE VISIT,EST,40-64: ICD-10-PCS | Mod: S$PBB,,, | Performed by: FAMILY MEDICINE

## 2022-11-08 PROCEDURE — 36415 COLL VENOUS BLD VENIPUNCTURE: CPT | Mod: PN | Performed by: FAMILY MEDICINE

## 2022-11-08 PROCEDURE — 80048 BASIC METABOLIC PNL TOTAL CA: CPT | Performed by: FAMILY MEDICINE

## 2022-11-08 NOTE — PROGRESS NOTES
After obtaining verbal consent from the patient, and per orders of Dr. Nelson, injection of prevnar 20 Lot [id number] 8/30/23 given in the LD by LENIN BUSCH. Patient tolerated well and band aid applied. Patient instructed to remain in clinic for 15 minutes afterwards, and to report any adverse reaction to me immediately.    After obtaining verbal consent from the patient, and per orders of Dr. Nelson, injection of fluarix Lot [id number] 6/30/23 given in the RD by LENIN BUSCH. Patient tolerated well and band aid applied. Patient instructed to remain in clinic for 15 minutes afterwards, and to report any adverse reaction to me immediately.

## 2022-11-09 ENCOUNTER — PATIENT MESSAGE (OUTPATIENT)
Dept: PRIMARY CARE CLINIC | Facility: CLINIC | Age: 42
End: 2022-11-09
Payer: MEDICAID

## 2022-11-09 DIAGNOSIS — E87.1 HYPONATREMIA: Primary | ICD-10-CM

## 2022-12-07 ENCOUNTER — LAB VISIT (OUTPATIENT)
Dept: LAB | Facility: OTHER | Age: 42
End: 2022-12-07
Attending: FAMILY MEDICINE
Payer: MEDICAID

## 2022-12-07 ENCOUNTER — PATIENT MESSAGE (OUTPATIENT)
Dept: PRIMARY CARE CLINIC | Facility: CLINIC | Age: 42
End: 2022-12-07
Payer: MEDICAID

## 2022-12-07 DIAGNOSIS — E87.1 HYPONATREMIA: ICD-10-CM

## 2022-12-07 LAB
ANION GAP SERPL CALC-SCNC: 11 MMOL/L (ref 8–16)
BUN SERPL-MCNC: 15 MG/DL (ref 6–20)
CALCIUM SERPL-MCNC: 9.5 MG/DL (ref 8.7–10.5)
CHLORIDE SERPL-SCNC: 103 MMOL/L (ref 95–110)
CO2 SERPL-SCNC: 27 MMOL/L (ref 23–29)
CREAT SERPL-MCNC: 0.7 MG/DL (ref 0.5–1.4)
EST. GFR  (NO RACE VARIABLE): >60 ML/MIN/1.73 M^2
GLUCOSE SERPL-MCNC: 113 MG/DL (ref 70–110)
POTASSIUM SERPL-SCNC: 3.3 MMOL/L (ref 3.5–5.1)
SODIUM SERPL-SCNC: 141 MMOL/L (ref 136–145)

## 2022-12-07 PROCEDURE — 36415 COLL VENOUS BLD VENIPUNCTURE: CPT | Performed by: FAMILY MEDICINE

## 2022-12-07 PROCEDURE — 80048 BASIC METABOLIC PNL TOTAL CA: CPT | Performed by: FAMILY MEDICINE

## 2022-12-12 ENCOUNTER — TELEPHONE (OUTPATIENT)
Dept: PRIMARY CARE CLINIC | Facility: CLINIC | Age: 42
End: 2022-12-12
Payer: MEDICAID

## 2022-12-14 ENCOUNTER — PATIENT MESSAGE (OUTPATIENT)
Dept: PRIMARY CARE CLINIC | Facility: CLINIC | Age: 42
End: 2022-12-14
Payer: MEDICAID

## 2022-12-31 ENCOUNTER — NURSE TRIAGE (OUTPATIENT)
Dept: ADMINISTRATIVE | Facility: CLINIC | Age: 42
End: 2022-12-31
Payer: MEDICAID

## 2022-12-31 ENCOUNTER — HOSPITAL ENCOUNTER (EMERGENCY)
Facility: OTHER | Age: 42
Discharge: HOME | End: 2023-01-01
Attending: EMERGENCY MEDICINE
Payer: MEDICAID

## 2022-12-31 DIAGNOSIS — G40.919 BREAKTHROUGH SEIZURE: Primary | ICD-10-CM

## 2022-12-31 LAB
ALBUMIN SERPL BCP-MCNC: 4 G/DL (ref 3.5–5.2)
ALP SERPL-CCNC: 55 U/L (ref 55–135)
ALT SERPL W/O P-5'-P-CCNC: 9 U/L (ref 10–44)
ANION GAP SERPL CALC-SCNC: 12 MMOL/L (ref 8–16)
AST SERPL-CCNC: 16 U/L (ref 10–40)
B-HCG UR QL: NEGATIVE
BASOPHILS # BLD AUTO: 0.02 K/UL (ref 0–0.2)
BASOPHILS NFR BLD: 0.3 % (ref 0–1.9)
BILIRUB SERPL-MCNC: 0.3 MG/DL (ref 0.1–1)
BUN SERPL-MCNC: 10 MG/DL (ref 6–20)
CALCIUM SERPL-MCNC: 9.6 MG/DL (ref 8.7–10.5)
CHLORIDE SERPL-SCNC: 105 MMOL/L (ref 95–110)
CO2 SERPL-SCNC: 22 MMOL/L (ref 23–29)
CREAT SERPL-MCNC: 0.7 MG/DL (ref 0.5–1.4)
CTP QC/QA: YES
DIFFERENTIAL METHOD: ABNORMAL
EOSINOPHIL # BLD AUTO: 0 K/UL (ref 0–0.5)
EOSINOPHIL NFR BLD: 0.6 % (ref 0–8)
ERYTHROCYTE [DISTWIDTH] IN BLOOD BY AUTOMATED COUNT: 13.7 % (ref 11.5–14.5)
EST. GFR  (NO RACE VARIABLE): >60 ML/MIN/1.73 M^2
GLUCOSE SERPL-MCNC: 106 MG/DL (ref 70–110)
HCT VFR BLD AUTO: 35.6 % (ref 37–48.5)
HGB BLD-MCNC: 12.5 G/DL (ref 12–16)
IMM GRANULOCYTES # BLD AUTO: 0.02 K/UL (ref 0–0.04)
IMM GRANULOCYTES NFR BLD AUTO: 0.3 % (ref 0–0.5)
LYMPHOCYTES # BLD AUTO: 1.6 K/UL (ref 1–4.8)
LYMPHOCYTES NFR BLD: 25.8 % (ref 18–48)
MCH RBC QN AUTO: 30.8 PG (ref 27–31)
MCHC RBC AUTO-ENTMCNC: 35.1 G/DL (ref 32–36)
MCV RBC AUTO: 88 FL (ref 82–98)
MONOCYTES # BLD AUTO: 0.4 K/UL (ref 0.3–1)
MONOCYTES NFR BLD: 6.9 % (ref 4–15)
NEUTROPHILS # BLD AUTO: 4.1 K/UL (ref 1.8–7.7)
NEUTROPHILS NFR BLD: 66.1 % (ref 38–73)
NRBC BLD-RTO: 0 /100 WBC
PLATELET # BLD AUTO: 290 K/UL (ref 150–450)
PMV BLD AUTO: 8.4 FL (ref 9.2–12.9)
POTASSIUM SERPL-SCNC: 3.5 MMOL/L (ref 3.5–5.1)
PROT SERPL-MCNC: 8.4 G/DL (ref 6–8.4)
RBC # BLD AUTO: 4.06 M/UL (ref 4–5.4)
SODIUM SERPL-SCNC: 139 MMOL/L (ref 136–145)
WBC # BLD AUTO: 6.25 K/UL (ref 3.9–12.7)

## 2022-12-31 PROCEDURE — 96374 THER/PROPH/DIAG INJ IV PUSH: CPT

## 2022-12-31 PROCEDURE — 80053 COMPREHEN METABOLIC PANEL: CPT | Performed by: EMERGENCY MEDICINE

## 2022-12-31 PROCEDURE — 80175 DRUG SCREEN QUAN LAMOTRIGINE: CPT | Performed by: EMERGENCY MEDICINE

## 2022-12-31 PROCEDURE — 85025 COMPLETE CBC W/AUTO DIFF WBC: CPT | Performed by: EMERGENCY MEDICINE

## 2022-12-31 PROCEDURE — 99284 EMERGENCY DEPT VISIT MOD MDM: CPT | Mod: 25

## 2022-12-31 PROCEDURE — 81025 URINE PREGNANCY TEST: CPT | Performed by: EMERGENCY MEDICINE

## 2023-01-01 VITALS
TEMPERATURE: 99 F | HEART RATE: 85 BPM | BODY MASS INDEX: 25.44 KG/M2 | SYSTOLIC BLOOD PRESSURE: 121 MMHG | DIASTOLIC BLOOD PRESSURE: 72 MMHG | RESPIRATION RATE: 15 BRPM | WEIGHT: 149 LBS | OXYGEN SATURATION: 100 % | HEIGHT: 64 IN

## 2023-01-01 PROCEDURE — 63600175 PHARM REV CODE 636 W HCPCS: Performed by: EMERGENCY MEDICINE

## 2023-01-01 RX ORDER — KETOROLAC TROMETHAMINE 30 MG/ML
15 INJECTION, SOLUTION INTRAMUSCULAR; INTRAVENOUS
Status: COMPLETED | OUTPATIENT
Start: 2023-01-01 | End: 2023-01-01

## 2023-01-01 RX ADMIN — KETOROLAC TROMETHAMINE 15 MG: 30 INJECTION, SOLUTION INTRAMUSCULAR; INTRAVENOUS at 01:01

## 2023-01-01 NOTE — FIRST PROVIDER EVALUATION
"Medical screening examination initiated.  I have conducted a focused provider triage encounter, findings are as follows:    Brief history of present illness:  seizure 1 hour PTA while laying down, hx of seizures.  States lasted 10 mins.  Last seizure was 1 year ago.  Takes her seizure medication as prescribed, lamictal     Vitals:    12/31/22 2046   BP: (!) 168/88   Pulse: 102   Resp: 16   Temp: 98.5 °F (36.9 °C)   TempSrc: Oral   SpO2: 98%   Weight: 67.6 kg (149 lb)   Height: 5' 4" (1.626 m)       Pertinent physical exam:  well appearing, answering questions appropriately.      Brief workup plan:  eval once roomed    Preliminary workup initiated; this workup will be continued and followed by the physician or advanced practice provider that is assigned to the patient when roomed.  "

## 2023-01-01 NOTE — ED TRIAGE NOTES
Pt reports having a seizure approximately 1 hr PTA, + LOC, fell from standing position, unknown if sustained a head injury. Pt reports taking lamotrigine as prescribed. Pt AAOx4, VSS, NADN at this time. Pt reports HA and general body aches post seizure, currently rates pain 3/10 at this time. Labs in process, will continue to monitor pt while in the ER.

## 2023-01-01 NOTE — ED NOTES
Pt sitting up in bed, NADN. Pt instructed to call for assistance, will continue to monitor pt while in the ER.

## 2023-01-01 NOTE — TELEPHONE ENCOUNTER
Reason for Disposition   [1] Seizure AND [2] lasts > 5 minutes    Additional Information   Negative: First seizure ever    Protocols used: [id number]  pt called re outside talking with a friend and friend told pt she was just started staring for 10 mins. hx sz. Rec EMS. Pt agrees.

## 2023-01-01 NOTE — ED PROVIDER NOTES
Encounter Date: 2022    SCRIBE #1 NOTE: I, Sultana White, am scribing for, and in the presence of,  Subhash Jack MD.     History     Chief Complaint   Patient presents with    Seizures     Reports witness seizure activity approx. 1 hr PTA, episode lasted 10 mins, patient was lying in bed and witness reports blank gaze. Last seizure was 1 year ago, compliant with Lamictal therapy . (+) bilateral leg, left arm, and head numbness     This is a 42 y.o. female with PMHx of HTN, seizures, anemia, and migraines who presents with complaint of seizure about one hour PTA. Patient states she was standing, taking to her friend when she had an episode of seizure activity. She describes standing with a blank stare for about 10 minutes. She notes that this is typical of her seizures. She denies biting her tongue or any other injuries, but currently reports a headache. She reports compliance with Lamictal but states she has been under stress recently and has not had enough rest. Patient denies any recent illness, such as cough, fever, or dysuria. Patient notes that before today, her last seizure was about a year ago.    The history is provided by the patient and medical records.   Review of patient's allergies indicates:  No Known Allergies  Past Medical History:   Diagnosis Date    Encounter for blood transfusion     Fibroids     Hypertension     Iron deficiency anemia     Menorrhagia     Migraine     Seizures     as baby     Past Surgical History:   Procedure Laterality Date     SECTION      x1    HYSTERECTOMY  2017    TUBAL LIGATION       Family History   Problem Relation Age of Onset    Hypertension Father     Hypertension Sister     Hypertension Maternal Grandmother     Breast cancer Maternal Grandmother     Cancer Maternal Grandfather     Hypertension Paternal Grandmother     Colon cancer Maternal Cousin     Ovarian cancer Neg Hx      Social History     Tobacco Use    Smoking status: Never     Smokeless tobacco: Never   Substance Use Topics    Alcohol use: No     Comment: Occasional; 3x /month    Drug use: No     Review of Systems   Constitutional:  Negative for fever.   HENT:  Negative for congestion.    Eyes:  Negative for redness.   Respiratory:  Negative for cough and shortness of breath.    Cardiovascular:  Negative for chest pain.   Gastrointestinal:  Negative for abdominal pain.   Genitourinary:  Negative for dysuria.   Skin:  Negative for rash.   Neurological:  Positive for seizures and headaches.   Psychiatric/Behavioral:  Negative for confusion.      Physical Exam     Initial Vitals [12/31/22 2046]   BP Pulse Resp Temp SpO2   (!) 168/88 102 16 98.5 °F (36.9 °C) 98 %      MAP       --         Physical Exam    Nursing note and vitals reviewed.  Constitutional: She appears well-developed and well-nourished. She is not diaphoretic. No distress.   HENT:   Head: Normocephalic and atraumatic.   Eyes: Conjunctivae are normal.   Neck: Neck supple.   Normal range of motion.  Cardiovascular:  Normal rate, regular rhythm, S1 normal, S2 normal, normal heart sounds and intact distal pulses.           No murmur heard.  Pulmonary/Chest: Breath sounds normal. No respiratory distress. She has no wheezes. She has no rhonchi. She has no rales.   Abdominal: Abdomen is soft. There is no abdominal tenderness. There is no rebound and no guarding.   Musculoskeletal:         General: No edema.      Cervical back: Normal range of motion and neck supple.     Neurological: She is alert and oriented to person, place, and time.   Skin: Skin is warm and dry.   Psychiatric: She has a normal mood and affect.       ED Course   Procedures  Labs Reviewed   CBC W/ AUTO DIFFERENTIAL - Abnormal; Notable for the following components:       Result Value    Hematocrit 35.6 (*)     MPV 8.4 (*)     All other components within normal limits   COMPREHENSIVE METABOLIC PANEL - Abnormal; Notable for the following components:    CO2 22 (*)      ALT 9 (*)     All other components within normal limits   LAMOTRIGINE LEVEL   POCT URINE PREGNANCY          Imaging Results    None          Medications   ketorolac injection 15 mg (15 mg Intravenous Given 1/1/23 0116)     Medical Decision Making:   History:   Old Medical Records: I decided to obtain old medical records.  Initial Assessment:       42-year-old female with history of seizures, anemia, migraines, HTN presents for evaluation after witnessed seizure.  Patient was standing and talking when she suddenly became unresponsive and had a blank stare reportedly for about 10 minutes.  She does not remember events but woke up feeling extremity tingling and mild headache.  She denies any falls or trauma, no incontinence or tongue biting.  She states this is typically how her seizures present, though she has not had 1 in about a year.  She has had increased stress recently, working 2 jobs.  She admits to not drinking enough water but denies any recent illness or other symptoms prior to episode tonight.  She is compliant with Lamictal and has been on the same dose for years.  On exam patient well-appearing and resting comfortably, back to normal mental status with no sign of head trauma or neurological deficits.  No indication for emergent head CT, will check basic labs to ensure no severe dehydration or other etiology for breakthrough seizure, and also check Lamictal level.      Basic labs with no acute findings, patient remained resting comfortably with resolved headache after Toradol, no further seizure activity.  Lamictal level pending, will not result until tomorrow.  No indication for admission or change in antiepileptics at this time, breakthrough seizure likely related to stress and possible dehydration.  Patient advised to call her neurologist for Lamictal level results and dose adjustment as needed, and understands further supportive care including increasing rest and p.o. hydration.  She will return to the  ED for any other breakthrough seizures or other concerns.      Clinical Tests:   Lab Tests: Reviewed and Ordered        Scribe Attestation:   Scribe #1: I performed the above scribed service and the documentation accurately describes the services I performed. I attest to the accuracy of the note.             I, Dr. Subhash Jack, personally performed the services described in this documentation. All medical record entries made by the scribe were at my direction and in my presence.  I have reviewed the chart and agree that the record reflects my personal performance and is accurate and complete. Subhash Jack MD.            Clinical Impression:   Final diagnoses:  [G40.919] Breakthrough seizure (Primary)        ED Disposition Condition    Discharge Stable          ED Prescriptions    None       Follow-up Information       Follow up With Specialties Details Why Contact Info    Johnathan Weir MD Neurology Call in 2 days  [address] [facility] [address]  [phone]               Subhash Jack MD  01/01/23 0703

## 2023-01-05 LAB — LAMOTRIGINE SERPL-MCNC: 5.1 UG/ML (ref 2–15)

## 2023-01-09 NOTE — PROGRESS NOTES
Subjective:       Patient ID: Pernell Ly is a 42 y.o. female.    Chief Complaint: Annual Exam    Pt here for annual, feels well      Review of Systems   Constitutional:  Positive for activity change. Negative for appetite change, chills, diaphoresis, fatigue, fever and unexpected weight change.   Eyes: Negative.    Respiratory:  Positive for choking. Negative for cough, chest tightness and shortness of breath.    Cardiovascular:  Positive for chest pain and leg swelling. Negative for palpitations and claudication.   Gastrointestinal: Negative.    Musculoskeletal: Negative.  Negative for joint swelling.   Integumentary:  Negative.   Neurological:  Negative for dizziness, weakness, light-headedness and headaches.   All other systems reviewed and are negative.      Objective:      Physical Exam  Vitals reviewed.   Constitutional:       General: She is not in acute distress.     Appearance: Normal appearance. She is well-developed and normal weight. She is not ill-appearing, toxic-appearing or diaphoretic.   HENT:      Head: Normocephalic and atraumatic.      Right Ear: Tympanic membrane, ear canal and external ear normal. There is no impacted cerumen.      Left Ear: Tympanic membrane, ear canal and external ear normal. There is no impacted cerumen.      Nose: Nose normal.      Mouth/Throat:      Pharynx: No oropharyngeal exudate or posterior oropharyngeal erythema.   Eyes:      Extraocular Movements: Extraocular movements intact.      Pupils: Pupils are equal, round, and reactive to light.   Neck:      Thyroid: No thyromegaly.   Cardiovascular:      Rate and Rhythm: Normal rate and regular rhythm.      Pulses: Normal pulses.      Heart sounds: Normal heart sounds. No murmur heard.    No friction rub. No gallop.   Pulmonary:      Effort: Pulmonary effort is normal. No respiratory distress.      Breath sounds: Normal breath sounds.   Abdominal:      General: Bowel sounds are normal. There is no distension.       Palpations: Abdomen is soft. There is no mass.      Tenderness: There is no abdominal tenderness. There is no guarding or rebound.   Musculoskeletal:         General: No tenderness. Normal range of motion.      Cervical back: Normal range of motion and neck supple.      Right lower leg: No edema (plus one pitting edema).   Lymphadenopathy:      Cervical: No cervical adenopathy.   Skin:     General: Skin is warm and dry.      Findings: No erythema.   Neurological:      General: No focal deficit present.      Mental Status: She is alert and oriented to person, place, and time. Mental status is at baseline.      Cranial Nerves: No cranial nerve deficit.      Sensory: No sensory deficit.      Motor: No weakness.      Coordination: Coordination normal.      Gait: Gait normal.      Deep Tendon Reflexes: Reflexes are normal and symmetric. Reflexes normal.   Psychiatric:         Behavior: Behavior normal.         Thought Content: Thought content normal.         Judgment: Judgment normal.       Assessment:       1. Other fatigue    2. Annual physical exam    3. Hypokalemia    4. Need for pneumococcal vaccination        Plan:       Annual PE wnl  Labs pending    Chest pain: I suspect that pt is having reflux based on location of pain and atypical presentation. Pt reassured but will still obtain EKG. Do trial of PPI to assess GERD. Lifestyle mods d/w pt   B/l LE swelling: suspect that this is dehydration amplified with procardia on board to lead to edema. Pt amenable since edema is since resolving and this has not been her norm. Will incr hydration.    ED prompts d/w pt    Other fatigue  -     Ambulatory referral/consult to Sleep Disorders; Future; Expected date: 11/15/2022    Annual physical exam  -     Lipid Panel; Future; Expected date: 11/08/2022  -     Hemoglobin A1C; Future; Expected date: 11/08/2022  -     TSH; Future; Expected date: 11/08/2022    Hypokalemia  -     Basic Metabolic Panel; Future; Expected date:  12/08/2022    Need for pneumococcal vaccination  -     (In Office Administered) Pneumococcal Conjugate Vaccine (20 Valent) (IM)    Other orders  -     Influenza - Quadrivalent (PF)    RTC prn symptoms

## 2023-01-30 ENCOUNTER — OFFICE VISIT (OUTPATIENT)
Dept: SLEEP MEDICINE | Facility: CLINIC | Age: 43
End: 2023-01-30
Attending: FAMILY MEDICINE
Payer: MEDICAID

## 2023-01-30 VITALS
SYSTOLIC BLOOD PRESSURE: 114 MMHG | BODY MASS INDEX: 26.39 KG/M2 | WEIGHT: 154.56 LBS | HEART RATE: 81 BPM | DIASTOLIC BLOOD PRESSURE: 77 MMHG | HEIGHT: 64 IN

## 2023-01-30 DIAGNOSIS — I10 HYPERTENSION, UNSPECIFIED TYPE: ICD-10-CM

## 2023-01-30 DIAGNOSIS — R53.83 OTHER FATIGUE: Primary | ICD-10-CM

## 2023-01-30 DIAGNOSIS — F51.09 OTHER INSOMNIA NOT DUE TO A SUBSTANCE OR KNOWN PHYSIOLOGICAL CONDITION: ICD-10-CM

## 2023-01-30 DIAGNOSIS — G47.10 HYPERSOMNOLENCE: ICD-10-CM

## 2023-01-30 DIAGNOSIS — R56.9 WITNESSED SEIZURE-LIKE ACTIVITY: ICD-10-CM

## 2023-01-30 PROCEDURE — 99999 PR PBB SHADOW E&M-EST. PATIENT-LVL III: ICD-10-PCS | Mod: PBBFAC,,, | Performed by: INTERNAL MEDICINE

## 2023-01-30 PROCEDURE — 3008F BODY MASS INDEX DOCD: CPT | Mod: CPTII,,, | Performed by: INTERNAL MEDICINE

## 2023-01-30 PROCEDURE — 1159F MED LIST DOCD IN RCRD: CPT | Mod: CPTII,,, | Performed by: INTERNAL MEDICINE

## 2023-01-30 PROCEDURE — 99204 OFFICE O/P NEW MOD 45 MIN: CPT | Mod: S$PBB,,, | Performed by: INTERNAL MEDICINE

## 2023-01-30 PROCEDURE — 99213 OFFICE O/P EST LOW 20 MIN: CPT | Mod: PBBFAC | Performed by: INTERNAL MEDICINE

## 2023-01-30 PROCEDURE — 1159F PR MEDICATION LIST DOCUMENTED IN MEDICAL RECORD: ICD-10-PCS | Mod: CPTII,,, | Performed by: INTERNAL MEDICINE

## 2023-01-30 PROCEDURE — 3074F PR MOST RECENT SYSTOLIC BLOOD PRESSURE < 130 MM HG: ICD-10-PCS | Mod: CPTII,,, | Performed by: INTERNAL MEDICINE

## 2023-01-30 PROCEDURE — 99999 PR PBB SHADOW E&M-EST. PATIENT-LVL III: CPT | Mod: PBBFAC,,, | Performed by: INTERNAL MEDICINE

## 2023-01-30 PROCEDURE — 3008F PR BODY MASS INDEX (BMI) DOCUMENTED: ICD-10-PCS | Mod: CPTII,,, | Performed by: INTERNAL MEDICINE

## 2023-01-30 PROCEDURE — 3078F DIAST BP <80 MM HG: CPT | Mod: CPTII,,, | Performed by: INTERNAL MEDICINE

## 2023-01-30 PROCEDURE — 3078F PR MOST RECENT DIASTOLIC BLOOD PRESSURE < 80 MM HG: ICD-10-PCS | Mod: CPTII,,, | Performed by: INTERNAL MEDICINE

## 2023-01-30 PROCEDURE — 3074F SYST BP LT 130 MM HG: CPT | Mod: CPTII,,, | Performed by: INTERNAL MEDICINE

## 2023-01-30 PROCEDURE — 99204 PR OFFICE/OUTPT VISIT, NEW, LEVL IV, 45-59 MIN: ICD-10-PCS | Mod: S$PBB,,, | Performed by: INTERNAL MEDICINE

## 2023-01-30 NOTE — PROGRESS NOTES
Referred by Zoey Nelson MD     NEW PATIENT VISIT    Pernell Ly  is a pleasant 42 y.o. female  who presents HTN, for feeling tired during the day.    Sleep Clinic New Patient 1/27/2023   What time do you go to bed on a week day? (Give a range) 12:00 am   What time do you go to bed on a day off? (Give a range) 12:00am   How long does it take you to fall asleep? (Give a range) Sometimes 2 to 3 hours   On average, how many times per night do you wake up? 2 to 3   How long does it take you to fall back into sleep? (Give a range) 1 hour   What time do you wake up to start your day on a week day? (Give a range) 7:00am   What time do you wake up to start your day on a day off? (Give a range) 7:00 am   What time do you get out of bed? (Give a range) 7:20am   On average, how many hours do you sleep? 3 to 4 hours   On average, how many naps do you take per day? One to none   Rate your sleep quality from 0 to 5 (0-poor, 5-great). 3   Have you experienced:  Weight gain?   How much weight have you lost or gained (in lbs.) in the last year? Gained maybe 5   On average, how many times per night do you go to the bathroom?  One   Have you ever had a sleep study/CPAP machine/surgery for sleep apnea? No   Have you ever had a CPAP machine for sleep apnea? No   Have you ever had surgery for sleep apnea? No     Sleep Clinic ROS  1/27/2023   Difficulty breathing through the nose?  Sometimes   Sore throat or dry mouth in the morning? Yes   Irregular or very fast heart beat?  No   Shortness of breath?  No   Acid reflux? Sometimes   Body aches and pains?  Yes   Morning headaches? Yes   Dizziness? Sometimes   Mood changes?  Yes   Do you exercise?  No   Do you feel like moving your legs a lot?  No       Past Medical History:   Diagnosis Date    Encounter for blood transfusion     Fibroids     Hypertension     Iron deficiency anemia     Menorrhagia     Migraine     Seizures     as baby     Patient Active Problem List   Diagnosis     "Anemia    Essential hypertension    S/P laparoscopic hysterectomy    Hypokalemia    Anxiety    Seizure    Atypical migraine    Headache disorder    HTN (hypertension)    Menorrhagia    Convulsion    Seizures    Reactive airway disease with acute exacerbation    Cough in adult    Seasonal allergies       Current Outpatient Medications:     azelastine (ASTELIN) 137 mcg (0.1 %) nasal spray, USE 1 SPRAY NASALLY TWICE DAILY, Disp: 30 mL, Rfl: 0    ferrous sulfate 324 mg (65 mg iron) TbEC, Take 324 mg by mouth 2 (two) times daily. , Disp: , Rfl:     fluticasone propionate (FLOVENT HFA) 110 mcg/actuation inhaler, Inhale 1 puff into the lungs 2 (two) times daily. Controller, Disp: 12 g, Rfl: 1    ibuprofen (ADVIL,MOTRIN) 800 MG tablet, TAKE 1 TABLET BY MOUTH THREE TIMES DAILY AS NEEDED FOR PAIN, Disp: 60 tablet, Rfl: 2    lamoTRIgine (LAMICTAL) 100 MG tablet, TAKE 1 TABLET(100 MG) BY MOUTH TWICE DAILY, Disp: 180 tablet, Rfl: 0    multivitamin with minerals tablet, Take by mouth., Disp: , Rfl:     NIFEdipine (PROCARDIA-XL) 90 MG (OSM) 24 hr tablet, TAKE 1 TABLET(90 MG) BY MOUTH EVERY DAY, Disp: 90 tablet, Rfl: 1    potassium chloride (KLOR-CON) 10 MEQ TbSR, TAKE 1 TABLET(10 MEQ) BY MOUTH EVERY DAY, Disp: 90 tablet, Rfl: 1    promethazine (PHENERGAN) 6.25 mg/5 mL syrup, Take 4 mLs (5 mg total) by mouth every 6 (six) hours as needed for Nausea (cough). May cause drowsiness, Disp: 118 mL, Rfl: 5    albuterol (PROVENTIL HFA) 90 mcg/actuation inhaler, Inhale 2 puffs into the lungs every 6 (six) hours as needed for Wheezing. Rescue, Disp: 18 g, Rfl: 0    cetirizine (ZYRTEC) 10 MG tablet, Take 1 tablet (10 mg total) by mouth once daily., Disp: 30 tablet, Rfl: 1     =  Vitals:    01/30/23 1523   BP: 114/77   BP Location: Right arm   Patient Position: Sitting   BP Method: Medium (Automatic)   Pulse: 81   Weight: 70.1 kg (154 lb 8.7 oz)   Height: 5' 4" (1.626 m)     Physical Exam:    GEN:   Well-appearing  Psych:  Appropriate affect, " "demonstrates insight  SKIN:  No rash on the face or bridge of the nose      LABS:   Lab Results   Component Value Date    HGB 12.5 12/31/2022    CO2 22 (L) 12/31/2022       RECORDS REVIEWED PREVIOUSLY:    No prior sleep testing.    ASSESSMENT    PROBLEM DESCRIPTION/ Sx on Presentation  STATUS   Screening for GARRISON   no snoring, no witnessed apneas    New   Daytime Sx   + sleepiness when inactive   ESS 8/24 on intake  denies H/H hallucinations, no sleep paralysis.  Denies sleep attacks  Naps helps for about an hour    New   Insomnia   Onset:                      Maintenance:         waking up every 2 hours, tossing and turning  Prior hypnotics:        Current hypnotics: occasional MLT    New   Nocturia   x once per sleep period over the past week   New   Hx of seizures Had sz in infancy, recurred a few years ago  A couple recent episodes of "blacking out", no recall  Bystander said she "was staring" does not recall events afterward  Has appt with neurology  New     Other issues:     PLAN     -recommend sleep testing   -discussed trial therapy if GARRISON present and the patient is  open to a trial of CPAP therapy  -CBTi will pursue if no etiology for insomnia on her sleep study    RTC          The patient was given open opportunity to ask questions and/or express concerns about treatment plan.   All questions/concerns were discussed.     Two patient identifiers used prior to evaluation.           "

## 2023-02-09 ENCOUNTER — TELEPHONE (OUTPATIENT)
Dept: SLEEP MEDICINE | Facility: OTHER | Age: 43
End: 2023-02-09
Payer: MEDICAID

## 2023-02-13 ENCOUNTER — TELEPHONE (OUTPATIENT)
Dept: SLEEP MEDICINE | Facility: OTHER | Age: 43
End: 2023-02-13
Payer: MEDICAID

## 2023-02-14 ENCOUNTER — HOSPITAL ENCOUNTER (OUTPATIENT)
Dept: SLEEP MEDICINE | Facility: OTHER | Age: 43
Discharge: HOME | End: 2023-02-14
Attending: INTERNAL MEDICINE
Payer: MEDICAID

## 2023-02-14 DIAGNOSIS — R53.83 OTHER FATIGUE: ICD-10-CM

## 2023-02-14 DIAGNOSIS — F51.09 OTHER INSOMNIA NOT DUE TO A SUBSTANCE OR KNOWN PHYSIOLOGICAL CONDITION: ICD-10-CM

## 2023-02-14 DIAGNOSIS — G47.10 HYPERSOMNOLENCE: ICD-10-CM

## 2023-02-14 DIAGNOSIS — I10 HYPERTENSION, UNSPECIFIED TYPE: ICD-10-CM

## 2023-02-14 PROCEDURE — 95800 SLP STDY UNATTENDED: CPT

## 2023-02-15 PROBLEM — R53.83 OTHER FATIGUE: Status: ACTIVE | Noted: 2023-02-15

## 2023-02-16 ENCOUNTER — PATIENT MESSAGE (OUTPATIENT)
Dept: SLEEP MEDICINE | Facility: CLINIC | Age: 43
End: 2023-02-16

## 2023-02-16 PROCEDURE — 95806 PR SLEEP STUDY, UNATTENDED, SIMUL RECORD HR/O2 SAT/RESP FLOW/RESP EFFT: ICD-10-PCS | Mod: 26,,, | Performed by: INTERNAL MEDICINE

## 2023-02-16 PROCEDURE — 95806 SLEEP STUDY UNATT&RESP EFFT: CPT | Mod: 26,,, | Performed by: INTERNAL MEDICINE

## 2023-02-24 ENCOUNTER — PATIENT MESSAGE (OUTPATIENT)
Dept: PSYCHIATRY | Facility: CLINIC | Age: 43
End: 2023-02-24
Payer: MEDICAID

## 2023-02-24 DIAGNOSIS — G47.10 HYPERSOMNOLENCE: ICD-10-CM

## 2023-02-24 DIAGNOSIS — F51.09 OTHER INSOMNIA NOT DUE TO A SUBSTANCE OR KNOWN PHYSIOLOGICAL CONDITION: ICD-10-CM

## 2023-02-24 DIAGNOSIS — R53.83 OTHER FATIGUE: Primary | ICD-10-CM

## 2023-02-24 DIAGNOSIS — I10 HYPERTENSION, UNSPECIFIED TYPE: ICD-10-CM

## 2023-02-27 ENCOUNTER — HOSPITAL ENCOUNTER (OUTPATIENT)
Dept: RADIOLOGY | Facility: OTHER | Age: 43
Discharge: HOME | End: 2023-02-27
Attending: FAMILY MEDICINE
Payer: MEDICAID

## 2023-02-27 ENCOUNTER — PATIENT MESSAGE (OUTPATIENT)
Dept: SLEEP MEDICINE | Facility: CLINIC | Age: 43
End: 2023-02-27
Payer: MEDICAID

## 2023-02-27 DIAGNOSIS — Z12.31 OTHER SCREENING MAMMOGRAM: ICD-10-CM

## 2023-02-27 PROCEDURE — 77063 MAMMO DIGITAL SCREENING BILAT WITH TOMO: ICD-10-PCS | Mod: 26,,, | Performed by: RADIOLOGY

## 2023-02-27 PROCEDURE — 77067 MAMMO DIGITAL SCREENING BILAT WITH TOMO: ICD-10-PCS | Mod: 26,,, | Performed by: RADIOLOGY

## 2023-02-27 PROCEDURE — 77063 BREAST TOMOSYNTHESIS BI: CPT | Mod: 26,,, | Performed by: RADIOLOGY

## 2023-02-27 PROCEDURE — 77067 SCR MAMMO BI INCL CAD: CPT | Mod: TC

## 2023-02-27 PROCEDURE — 77067 SCR MAMMO BI INCL CAD: CPT | Mod: 26,,, | Performed by: RADIOLOGY

## 2023-02-28 ENCOUNTER — PATIENT MESSAGE (OUTPATIENT)
Dept: PRIMARY CARE CLINIC | Facility: CLINIC | Age: 43
End: 2023-02-28
Payer: MEDICAID

## 2023-02-28 NOTE — TELEPHONE ENCOUNTER
Pt is requesting for a refill on potassium.   I called [facility] and they stated that her insurance will cover the medication on tomorrow and it will be ready for  then.  I called pt to inform her on what is going on.

## 2023-03-03 ENCOUNTER — TELEPHONE (OUTPATIENT)
Dept: SLEEP MEDICINE | Facility: OTHER | Age: 43
End: 2023-03-03
Payer: MEDICAID

## 2023-03-06 ENCOUNTER — TELEPHONE (OUTPATIENT)
Dept: SLEEP MEDICINE | Facility: OTHER | Age: 43
End: 2023-03-06
Payer: MEDICAID

## 2023-03-06 ENCOUNTER — HOSPITAL ENCOUNTER (OUTPATIENT)
Dept: SLEEP MEDICINE | Facility: OTHER | Age: 43
Discharge: HOME | End: 2023-03-06
Attending: INTERNAL MEDICINE
Payer: MEDICAID

## 2023-03-06 DIAGNOSIS — R53.83 OTHER FATIGUE: ICD-10-CM

## 2023-03-06 DIAGNOSIS — I10 HYPERTENSION, UNSPECIFIED TYPE: ICD-10-CM

## 2023-03-06 DIAGNOSIS — G47.10 HYPERSOMNOLENCE: ICD-10-CM

## 2023-03-06 DIAGNOSIS — F51.09 OTHER INSOMNIA NOT DUE TO A SUBSTANCE OR KNOWN PHYSIOLOGICAL CONDITION: ICD-10-CM

## 2023-03-06 PROCEDURE — 95810 POLYSOM 6/> YRS 4/> PARAM: CPT

## 2023-03-07 NOTE — PROGRESS NOTES
Pernell Ly to [facility] on 3/6/2023 for an overnight baseline study.   Pt did not meet criteria for split night study.     Post study information given to pt in AM

## 2023-03-23 ENCOUNTER — PATIENT MESSAGE (OUTPATIENT)
Dept: SLEEP MEDICINE | Facility: CLINIC | Age: 43
End: 2023-03-23

## 2023-03-23 PROCEDURE — 95810 POLYSOM 6/> YRS 4/> PARAM: CPT | Mod: 26,,, | Performed by: INTERNAL MEDICINE

## 2023-03-23 PROCEDURE — 95810 PR POLYSOMNOGRAPHY, 4 OR MORE: ICD-10-PCS | Mod: 26,,, | Performed by: INTERNAL MEDICINE

## 2023-03-23 NOTE — PROCEDURES
"[facility]    Polysomnography Interpretation Report    Patient Name:  ETHAN PEREZ  MRN#:  [id number]  :  1980  Study Date:  3/6/2023  Referring Provider:  SORAIDA Xie MD    Indications for Polysomnography:  The patient is a 42 year old Female who is 5' 4" and weighs 154.0 lbs.  Her BMI equals 26.6.  [place] was - and Neck Circumference was -.  A full night polysomnogram was performed to evaluate for -.    Polysomnogram Data  A full night polysomnogram recorded the standard physiologic parameters including EEG, EOG, EMG, EKG, nasal and oral airflow.  Respiratory parameters of chest and abdominal movements were recorded with [place] motion transducers.  Oxygen saturation was recorded by pulse oximetry.    Sleep Architecture  The total recording time of the polysomnogram was 449.9 minutes.  The total sleep time was 377.0 minutes.  The patient spent 4.5% of total sleep time in Stage N1, 66.7% in Stage N2, 4.1% in Stages N3, and 24.7% in REM.  Sleep latency was 5.8 minutes.  REM latency was 59.0 minutes.  Sleep Efficiency was 83.8%.  Wake after sleep onset was 67.0 minutes.    Respiratory Events  The polysomnogram revealed a presence of - obstructive, - central, and - mixed apneas resulting in a Total Apnea index of - events per hour.  There were - hypopneas resulting in a Total Hypopnea index of - events per hour.  The combined Apnea/Hypopnea index was - events per hour.  There were a total of 6 RERA events resulting in a Respiratory Disturbance Index (RDI) of 1.0 events per hour.     Mean oxygen saturation was 95.7%.  The lowest oxygen saturation during sleep was 92.0%.  Time spent ?88% oxygen saturation was - minutes (-).    Limb Activity  There were - limb movements recorded.      Cardiac:  single lead EKG revealed normal sinus rhythm    Oxygenation:  There was no significant hypoxemia observed.    Impression:  -No significant sleep-disordered breathing or other cause of significant " "sleep disruption was evident    Recommendations:    -no cause for the patient's fatigue was identified  -the patient has follow up with Sleep Medicine      Tj Xie MD    (This Sleep Study was interpreted by a Board Certified Sleep Specialist who conducted an epoch-by-epoch review of the entire raw data recording.)  (The indication for this sleep study was reviewed and deemed appropriate by AASM Practice Parameters or other reasons by a Board Certified Sleep Specialist.)      [facility]    Diagnostic PSG Report    Patient Name: ETHAN PEREZ Study Date: 3/6/2023   YOB: 1980 MRN #: [id number]   Age: 42 year JASON #: [id number]   Sex: Female Referring Provider: SORAIDA Xie MD   Height: 5' 4" Recording Tech: Rufus Mcmillan RPSGT   Weight: 154.0 lbs Scoring Tech: Anant Curtis JENNIFER RPSGT   BMI: 26.6 Interpreting Physician: -   ESS: - Neck Circumference: -     Study Overview    Lights Off: 10:11:02 PM  Count Index   Lights On: 05:40:55 AM Awakenings: 21 3.3   Time in Bed: 449.9 min. Arousals: 27 4.3   Total Sleep Time: 377.0 min. Apneas & Hypopneas: - -    Sleep Efficiency: 83.8% Limb Movements: - -   Sleep Latency: 5.8 min. Snores: - -   Wake After Sleep Onset: 67.0 min. Desaturations: - -    REM Latency from Sleep Onset: 59.0 min. Minimum SpO2 TST: 92.0%        Sleep Architecture   % of Time in Bed  Stages Time (mins) % Sleep Time   Wake 73.5    Stage N1 17.0 4.5%   Stage N2 251.5 66.7%   Stage N3 15.5 4.1%   REM 93.0 24.7%         Arousal Summary     NREM REM Sleep Index   Respiratory Arousals - 2 2 0.3   PLM Arousals - - - -   Isolated Limb Movement Arousals - - - -   Spontaneous Arousals 20 5 25 4.0   Total 20 7 27 4.3       Limb Movement Summary     Count Index   Isolated Limb Movements - -   Periodic Limb Movements (PLMs) - -   Total Limb Movements - -         Respiratory Summary     By Sleep Stage By Body Position Total    NREM REM Supine Non-Supine    Time (min) 284.0 93.0 " 141.5 235.5 377.0           Obstructive Apnea - - - - -   Mixed Apnea - - - - -   Central Apnea - - - - -   Total Apneas - - - - -   Total Apnea Index - - - - -           Hypopnea - - - - -   Hypopnea Index - - - - -           Apnea & Hypopnea - - - - -   Apnea & Hypopnea Index - - - - -           RERAs - 6 2 4 6   RERA Index - 3.9 0.8 1.0 1.0           RDI - 3.9 0.8 1.0 1.0     Scoring Criteria: Hypopneas scored at 4% desaturation criteria.    Respiratory Event Durations     Apnea Hypopnea    NREM REM NREM REM   Average (seconds) - - - -   Maximum (seconds) - - - -       Oxygen Saturation Summary     Wake NREM REM TST TIB   Average SpO2 96.3% 95.6% 95.9% 95.6% 95.7%   Minimum SpO2 92.0% 92.0% 92.0% 92.0% 92.0%   Maximum SpO2 99.0% 98.0% 98.0% 98.0% 99.0%       Oxygen Saturation Distribution    Range (%) Time in range (min) Time in range (%)   90.0 - 100.0 446.0 99.2%   80.0 - 90.0 - -   70.0 - 80.0 - -   60.0 - 70.0 - -   50.0 - 60.0 - -   0.0 - 50.0 - -   Time Spent ?88% SpO2    Range (%) Time in range (min) Time in range (%)   0.0 - 88.0 - -          Count Index   Desaturations - -      Cardiac Summary     Wake NREM REM Sleep Total   Average Pulse Rate (BPM) 66.9 60.8 64.1 61.6 62.5   Minimum Pulse Rate (BPM) 56.0 55.0 56.0 55.0 55.0   Maximum Pulse Rate (BPM) 88.0 73.0 76.0 76.0 88.0     Pulse Rate Distribution    Range (bpm) Time in range (min) Time in range (%)   0.0 - 40.0 - -   40.0 - 60.0 144.8 32.2%   60.0 - 80.0 304.0 67.6%   80.0 - 100.0 0.9 0.2%   100.0 - 120.0 - -   120.0 - 140.0 - -   140.0 - 200.0 - -     EtCO2 Summary    Stage Min (mmHg) Average (mmHg) Max (mmHg)   Wake - - -   NREM(1+2+3) - - -   REM - - -     Range (mmHg) Time in range (min) Time in range (%)   - - -   - - -   - - -   - - -   - - -     TcCO2 Summary    Stage Min (mmHg) Average (mmHg) Max (mmHg)   Wake - - -   NREM(1+2+3) - - -   REM - - -     Range (mmHg) Time in range (min) Time in range (%)   20.0 - 40.0 - -   40.0 - 50.0 - -    50.0 - 55.0 - -   55.0 - 100.0 - -   Excluded data <20.0 & >65.0 450.5 100.0%     Comments    -

## 2023-04-12 ENCOUNTER — OFFICE VISIT (OUTPATIENT)
Dept: NEUROLOGY | Facility: CLINIC | Age: 43
End: 2023-04-12
Payer: MEDICAID

## 2023-04-12 VITALS
SYSTOLIC BLOOD PRESSURE: 133 MMHG | WEIGHT: 157.06 LBS | BODY MASS INDEX: 26.81 KG/M2 | HEIGHT: 64 IN | DIASTOLIC BLOOD PRESSURE: 92 MMHG | HEART RATE: 70 BPM

## 2023-04-12 DIAGNOSIS — G40.909 SEIZURE DISORDER: Primary | ICD-10-CM

## 2023-04-12 DIAGNOSIS — F41.9 ANXIETY: ICD-10-CM

## 2023-04-12 DIAGNOSIS — R56.9 WITNESSED SEIZURE-LIKE ACTIVITY: ICD-10-CM

## 2023-04-12 PROCEDURE — 3075F SYST BP GE 130 - 139MM HG: CPT | Mod: CPTII,,,

## 2023-04-12 PROCEDURE — 99215 PR OFFICE/OUTPT VISIT, EST, LEVL V, 40-54 MIN: ICD-10-PCS | Mod: S$PBB,,,

## 2023-04-12 PROCEDURE — 1159F PR MEDICATION LIST DOCUMENTED IN MEDICAL RECORD: ICD-10-PCS | Mod: CPTII,,,

## 2023-04-12 PROCEDURE — 3080F DIAST BP >= 90 MM HG: CPT | Mod: CPTII,,,

## 2023-04-12 PROCEDURE — 99999 PR PBB SHADOW E&M-EST. PATIENT-LVL III: CPT | Mod: PBBFAC,,,

## 2023-04-12 PROCEDURE — 99215 OFFICE O/P EST HI 40 MIN: CPT | Mod: S$PBB,,,

## 2023-04-12 PROCEDURE — 3080F PR MOST RECENT DIASTOLIC BLOOD PRESSURE >= 90 MM HG: ICD-10-PCS | Mod: CPTII,,,

## 2023-04-12 PROCEDURE — 1159F MED LIST DOCD IN RCRD: CPT | Mod: CPTII,,,

## 2023-04-12 PROCEDURE — 3008F BODY MASS INDEX DOCD: CPT | Mod: CPTII,,,

## 2023-04-12 PROCEDURE — 99999 PR PBB SHADOW E&M-EST. PATIENT-LVL III: ICD-10-PCS | Mod: PBBFAC,,,

## 2023-04-12 PROCEDURE — 3075F PR MOST RECENT SYSTOLIC BLOOD PRESS GE 130-139MM HG: ICD-10-PCS | Mod: CPTII,,,

## 2023-04-12 PROCEDURE — 3008F PR BODY MASS INDEX (BMI) DOCUMENTED: ICD-10-PCS | Mod: CPTII,,,

## 2023-04-12 PROCEDURE — 99213 OFFICE O/P EST LOW 20 MIN: CPT | Mod: PBBFAC

## 2023-04-12 RX ORDER — LAMOTRIGINE 150 MG/1
150 TABLET ORAL 2 TIMES DAILY
Qty: 180 TABLET | Refills: 3 | Status: SHIPPED | OUTPATIENT
Start: 2023-04-12 | End: 2023-06-14 | Stop reason: SDUPTHER

## 2023-04-12 RX ORDER — SERTRALINE HYDROCHLORIDE 50 MG/1
50 TABLET, FILM COATED ORAL DAILY
Qty: 30 TABLET | Refills: 11 | Status: SHIPPED | OUTPATIENT
Start: 2023-04-12 | End: 2023-06-20

## 2023-04-12 NOTE — PROGRESS NOTES
[facility]  Epilepsy Clinic Progress Note    [facility] - NEUROLOGY 7TH [place] [facility], [place]    Date: 4/12/23  Patient Name: Pernell Ly   MRN: [id number]   PCP: Zoey Nelson  Referring Provider: Tj Xie MD    Assessment:   Pernell Ly is a 42 y.o. female presenting in follow up for longstanding well-controlled epilepsy. 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 Normal EEG. Recent breakthrough event described as sensation of lost time (described in detail below). Current event frequency is ~1 per year. Increase lamotrigine to 150mg BID. Trial of sertraline 50mg qd for mood. If events increase in frequency -> consider amb EEG. Follow up in 6 months or sooner with issues. Patient is comfortable with plan. All questions and concerns are addressed at this time.   Plan:     Problem List Items Addressed This Visit          Psychiatric    Anxiety    Relevant Medications    sertraline (ZOLOFT) 50 MG tablet     Other Visit Diagnoses       Seizure disorder    -  Primary    Witnessed seizure-like activity        Relevant Medications    lamoTRIgine (LAMICTAL) 150 MG Tab          I completed education on seizure first aid and safety. I recommended seizure precautions with regards to avoiding unsupervised water recreational activity or bathing in tubs, climbing or working at heights, operation of heavy or dangerous machinery, caution around fire and sources of high heat, as well as any other activity which could put a patient at danger in case of a seizure.  I also reviewed the [place] DMV law and recommended that the patient not drive for 6 months in the event of breakthrough seizures.    As the patient is a female of childbearing age, I have counseled the patient on issues pertaining to pregnancy and epilepsy and recommended folic acid supplementation. I have reviewed and recommended the AAN guideline of folic acid supplementation prior to and during pregnancy.      Amber  MARII Ortiz   Neurology-Epilepsy  [facility]    Collaborating physician, Dr. Johnathan Weir, was available during today's encounter.     I spent approximately 40 minutes on the day of this encounter preparing to see the patient, obtaining and reviewing history and results, performing a medically appropriate exam, counseling and educating the patient/family/caregiver, documenting clinical information, coordinating care, and ordering medications, tests, procedures, and referrals.    Patient note was created using MModal Dictation.  Any errors in syntax or even information may not have been identified and edited on initial review prior to signing this note.  Subjective:   Patient seen in consultation at the request of Tj Xie MD for the evaluation of epilepsy. A copy of this note will be sent to the referring physician.     Interval Events/ROS 4/12/2023:    Current ASM/SEs: lamotrigine 100mg BID; no SE  Breakthrough seizures/events:  event 4/10 -> states she remembers using the bathroom then all of a sudden she was in her bed being woken up by her dad, confused, felt off; does not recall walking back from the bathroom to her bed; son told her she seemed out of it; felt back to baseline ~30 min after event; no convulsions/abnormal movements; no identifiable trigger; current event frequency is around 1 per year   Driving: no  Folic acid: no   Sleep: chronic insomnia; follows w/ sleep medicine    Mood: anxious    Headaches. Otherwise, no fever, no cold symptoms, no changes in vision, no new weakness, no chest pain, no shortness of breath, no nausea, no vomiting, no diarrhea, no constipation, no tingling/numbness, no problems walking.    Recent Labs   Lab 04/19/22  1430 12/31/22  2229   Lamotrigine Lvl 5.0 5.1      HPI:   Ms. Pernell Ly is a 42 y.o. female who presents with a chief complaint of a longstanding seizure disorder.  Patient presents today to transfer her care from Dr. Dunham  "after being lost to follow-up for 2 years.  The patient reports that she had been doing well until the last few months when she has begun to experience episodes of loss of time.  The patient states that she will be alone and realized that several seconds have passed and she has lost awareness for a few minutes.  She endorses expressed compliance with her Lamictal.  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  She states that she believes is contributing to chronic daily tension headaches.  She states the headaches have become worse over the past few weeks and notes that she is taking ibuprofen twice a day every day for management of headaches.  She denies associated migrainous features or focal neurologic deficits.    Seizure Type: Supsected focal onset  Seizure Etiology:   L MTS  Current AEDs: Lamictal 100 mg BID    The patient is not accompanied by family who contribute to the history. This patient has 3 types of seizure as described below. The patient reports having seizures for years The patient reports to have stable seizure control. The seizure frequency is variable. The last seizure was last week . The patient does not report side effects from seizure medication.     Seizure Type 1:   Seizure Description: LOC, GTC (one out of sleep)  Aura:  De ja vu feeling  Associated Symptoms:  none  Seizure Frequency: 2 in lifetime  Last seizure: 2020-ish    Seizure Type 2:   Seizure Description: Incomprehensible speech lasting 14 minutes with loss of time and confusion  Aura: None  Associated Symptoms:  none  Seizure Frequency: One in lifetime  Last seizure: 2019    Seizure Type 3:   Seizure Description: "Goes out into space" for a minute, always happens when she's alone  Aura: None  Associated Symptoms: none  Seizure Frequency: Variable, often clusters every few months "seldom"   Last seizure: 1.5 weeks ago    Handedness: " right  Seizure Onset Age: infancy  Seizure/ Epilepsy Risk Factors: childhood seizure  Birth/Developmental History: normal birth history and Normal developmental history  Seizure Triggers/ Provoking Features: stress  Previous Seizure Medications: lamotrigine (Lamictal, LTG) and zonisamide (Zonegran, ZNA)  Recent Med Changes: None  Other Treatments: None  Prior Episodes of Status: None  Psychiatric/Behavioral Comorbitidies: Anxiety  Surgical Candidacy:  Pending    Prior Studies:  EEG : Not done  vEEG/ EMU evaluation: Left MTS 2018, personally reviewed  MRI of brain: Not done  AED levels:  Not done  CT/CTA Scan: Unremarkable - personally reviewed  PET Scan: Not done  Neuropsychological Evaluation: Not done  DEXA Scan: Not done  Other studies: Not done    PAST MEDICAL HISTORY:  Past Medical History:   Diagnosis Date    Encounter for blood transfusion     Fibroids     Hypertension     Iron deficiency anemia     Menorrhagia     Migraine     Seizures     as baby       PAST SURGICAL HISTORY:  Past Surgical History:   Procedure Laterality Date     SECTION      x1    HYSTERECTOMY  2017    TUBAL LIGATION         CURRENT MEDS:  Current Outpatient Medications   Medication Sig Dispense Refill    albuterol (PROVENTIL HFA) 90 mcg/actuation inhaler Inhale 2 puffs into the lungs every 6 (six) hours as needed for Wheezing. Rescue 18 g 0    azelastine (ASTELIN) 137 mcg (0.1 %) nasal spray USE 1 SPRAY NASALLY TWICE DAILY 30 mL 0    cetirizine (ZYRTEC) 10 MG tablet Take 1 tablet (10 mg total) by mouth once daily. 30 tablet 1    fluticasone propionate (FLOVENT HFA) 110 mcg/actuation inhaler Inhale 1 puff into the lungs 2 (two) times daily. Controller 12 g 1    ibuprofen (ADVIL,MOTRIN) 800 MG tablet TAKE 1 TABLET BY MOUTH THREE TIMES DAILY AS NEEDED FOR PAIN 60 tablet 2    lamoTRIgine (LAMICTAL) 100 MG tablet TAKE 1 TABLET(100 MG) BY MOUTH TWICE DAILY 180 tablet 0    multivitamin with minerals tablet Take by mouth.    "   NIFEdipine (PROCARDIA-XL) 90 MG (OSM) 24 hr tablet TAKE 1 TABLET(90 MG) BY MOUTH EVERY DAY 90 tablet 1    potassium chloride (KLOR-CON) 10 MEQ TbSR Take 1 tablet (10 mEq total) by mouth once daily. 90 tablet 1    ferrous sulfate 324 mg (65 mg iron) TbEC Take 324 mg by mouth 2 (two) times daily.       promethazine (PHENERGAN) 6.25 mg/5 mL syrup Take 4 mLs (5 mg total) by mouth every 6 (six) hours as needed for Nausea (cough). May cause drowsiness (Patient not taking: Reported on 4/12/2023) 118 mL 5     No current facility-administered medications for this visit.       ALLERGIES:  Review of patient's allergies indicates:  No Known Allergies    FAMILY HISTORY:  Family History   Problem Relation Age of Onset    Hypertension Father     Hypertension Sister     Hypertension Maternal Grandmother     Breast cancer Maternal Grandmother     Cancer Maternal Grandfather     Hypertension Paternal Grandmother     Colon cancer Maternal Cousin     Ovarian cancer Neg Hx        SOCIAL HISTORY:  Social History     Tobacco Use    Smoking status: Never    Smokeless tobacco: Never   Substance Use Topics    Alcohol use: No     Comment: Occasional; 3x /month    Drug use: No       Review of Systems:  12 system review of systems is negative except for the symptoms mentioned in HPI.      Objective:     Vitals:    04/12/23 0908   BP: (!) 133/92   Pulse: 70   Weight: 71.2 kg (157 lb 1.2 oz)   Height: 5' 4" (1.626 m)     General: NAD, well nourished   Eyes: no tearing, discharge, no erythema   ENT: moist mucous membranes of the oral cavity, nares patent    Neck: Supple  Cardiovascular: Warm and well perfused  Lungs: Normal work of breathing, normal chest wall excursions  Skin: No rash, lesions, or breakdown on exposed skin  Psychiatry: Mood and affect are appropriate   Abdomen: soft, non tender, non distended  Extremeties: No cyanosis, clubbing or edema.    Neurological   MENTAL STATUS: Alert and oriented to person, place, and time. Attention " and concentration within normal limits. Speech without dysarthria, able to name and repeat without difficulty. Recent and remote memory within normal limits   CRANIAL NERVES: EOMI. Facial sensation intact. Face symmetrical. Hearing grossly intact. Full shoulder shrug bilaterally. Tongue protrudes midline   SENSORY: Sensation is intact to light touch throughout.    MOTOR: Normal bulk and tone. 5/5 deltoid, biceps, triceps, interosseous, hand  bilaterally. 5/5 iliopsoas, knee extension/flexion, foot dorsi/plantarflexion bilaterally.    CEREBELLAR/COORDINATION/GAIT: Gait steady. Finger to nose intact. Normal rapid alternating movements.

## 2023-04-12 NOTE — PATIENT INSTRUCTIONS
You came to Epilepsy Clinic because of your seizure disorder. Your seizures are partially controlled on lamotrigine 100mg twice daily.      Because of your recent breakthrough event, we will increase the lamotrigine to 150mg twice daily.     Do not miss any doses of medication. If a dose of medication is missed, take it as soon as it is remembered even if that means doubling up on the dose. Get regular sleep. Go to sleep at the same time and wake up at the same time every day. People with epilepsy require more sleep than people without epilepsy.  Sleeping 10-12 hours a day can be normal for a person with epilepsy.  Every seizure makes it harder to prevent the next seizure. Epilepsy is associated with SUDEP, or sudden unexpected death in epilepsy.  The risk is significantly higher if convulsive seizures are not well controlled. For more information, check out these websites: https://www.epilepsy.com/, https://www.epilepsyallianceamerica.org/, www.miky-epilepsy.org, www.womenandepilepsy.org.      You are anxious. I would like you to try a new medication called sertraline. Please take 1/2 tablet for 14 days then increase to 1 tablet daily. You can take this medication in the morning or at night, with or without food. You will have some side effects as you start this medication like GI upset, changes in appetite, changes in sleep, possibly more anxiety. Please push through, your body will adjust.  Please do a self inventory in two months to see how you are doing with the medication and give me an update through the portal at that time.     Per [place] law, no episodes of loss of consciousness for 6 months before driving.  Avoid dangerous situations.  For example, no baths/pools alone, no heights, no power tools.  Wear a bike helmet.  If breakthrough seizures occur that are different in character, frequency, or duration from normal episodes, please patient portal me or call the office and we will decide the next steps.  If multiple seizures occur in a row without return back to baseline, 911 needs to be called.     Return to clinic in 6 months or sooner with issues.  Please patient portal with any questions or concerns.    Amber Ortiz PA-C   Neurology-Epilepsy  [facility]

## 2023-05-22 DIAGNOSIS — I10 ESSENTIAL HYPERTENSION: ICD-10-CM

## 2023-05-22 RX ORDER — NIFEDIPINE 90 MG/1
TABLET, EXTENDED RELEASE ORAL
Qty: 90 TABLET | Refills: 1 | Status: SHIPPED | OUTPATIENT
Start: 2023-05-22 | End: 2023-08-24 | Stop reason: SDUPTHER

## 2023-05-22 NOTE — TELEPHONE ENCOUNTER
No care due was identified.  Health [facility] Embedded Care Due Messages. Reference number: [id number].   5/22/2023 12:08:06 PM CDT

## 2023-06-14 DIAGNOSIS — R56.9 WITNESSED SEIZURE-LIKE ACTIVITY: ICD-10-CM

## 2023-06-14 RX ORDER — LAMOTRIGINE 150 MG/1
150 TABLET ORAL 2 TIMES DAILY
Qty: 180 TABLET | Refills: 3 | Status: SHIPPED | OUTPATIENT
Start: 2023-06-14 | End: 2023-11-15 | Stop reason: SDUPTHER

## 2023-06-14 RX ORDER — LAMOTRIGINE 150 MG/1
150 TABLET ORAL 2 TIMES DAILY
Qty: 180 TABLET | Refills: 3 | OUTPATIENT
Start: 2023-06-14 | End: 2024-06-13

## 2023-06-14 NOTE — TELEPHONE ENCOUNTER
Refill Encounter    PCP Visits: Recent Visits  Date Type Provider Dept   11/08/22 Office Visit Zoey Nelson MD [facility]   10/05/22 Office Visit Zoey Nelson MD [facility]   Showing recent visits within past 360 days and meeting all other requirements  Future Appointments  No visits were found meeting these conditions.  Showing future appointments within next 720 days and meeting all other requirements     Last 3 Blood Pressure:   BP Readings from Last 3 Encounters:   04/12/23 (!) 133/92   01/30/23 114/77   01/01/23 121/72     Preferred Pharmacy:   [id number] [place], [place] - [address]  [address] [facility] [address]  [address] [place] [address]  Phone: [phone] Fax: [phone]    [id number] [place], [place] - [address]  [address] [facility] [address]  [address] [place] [address]  Phone: [phone] Fax: [phone]    Requested RX:  Requested Prescriptions     Pending Prescriptions Disp Refills    lamoTRIgine (LAMICTAL) 150 MG Tab 180 tablet 3     Sig: Take 1 tablet (150 mg total) by mouth 2 (two) times daily.      RX Route: Normal

## 2023-06-20 ENCOUNTER — OFFICE VISIT (OUTPATIENT)
Dept: NEUROLOGY | Facility: CLINIC | Age: 43
End: 2023-06-20
Payer: MEDICAID

## 2023-06-20 VITALS
SYSTOLIC BLOOD PRESSURE: 128 MMHG | WEIGHT: 152.56 LBS | BODY MASS INDEX: 26.05 KG/M2 | DIASTOLIC BLOOD PRESSURE: 89 MMHG | HEIGHT: 64 IN | HEART RATE: 81 BPM

## 2023-06-20 DIAGNOSIS — F41.9 ANXIETY: ICD-10-CM

## 2023-06-20 DIAGNOSIS — G40.909 SEIZURE DISORDER: Primary | ICD-10-CM

## 2023-06-20 DIAGNOSIS — G47.00 INSOMNIA, UNSPECIFIED TYPE: ICD-10-CM

## 2023-06-20 PROCEDURE — 3079F DIAST BP 80-89 MM HG: CPT | Mod: CPTII,,,

## 2023-06-20 PROCEDURE — 3008F PR BODY MASS INDEX (BMI) DOCUMENTED: ICD-10-PCS | Mod: CPTII,,,

## 2023-06-20 PROCEDURE — 1159F PR MEDICATION LIST DOCUMENTED IN MEDICAL RECORD: ICD-10-PCS | Mod: CPTII,,,

## 2023-06-20 PROCEDURE — 1159F MED LIST DOCD IN RCRD: CPT | Mod: CPTII,,,

## 2023-06-20 PROCEDURE — 3074F PR MOST RECENT SYSTOLIC BLOOD PRESSURE < 130 MM HG: ICD-10-PCS | Mod: CPTII,,,

## 2023-06-20 PROCEDURE — 3079F PR MOST RECENT DIASTOLIC BLOOD PRESSURE 80-89 MM HG: ICD-10-PCS | Mod: CPTII,,,

## 2023-06-20 PROCEDURE — 99215 PR OFFICE/OUTPT VISIT, EST, LEVL V, 40-54 MIN: ICD-10-PCS | Mod: S$PBB,,,

## 2023-06-20 PROCEDURE — 99999 PR PBB SHADOW E&M-EST. PATIENT-LVL IV: ICD-10-PCS | Mod: PBBFAC,,,

## 2023-06-20 PROCEDURE — 3074F SYST BP LT 130 MM HG: CPT | Mod: CPTII,,,

## 2023-06-20 PROCEDURE — 99215 OFFICE O/P EST HI 40 MIN: CPT | Mod: S$PBB,,,

## 2023-06-20 PROCEDURE — 99999 PR PBB SHADOW E&M-EST. PATIENT-LVL IV: CPT | Mod: PBBFAC,,,

## 2023-06-20 PROCEDURE — 3008F BODY MASS INDEX DOCD: CPT | Mod: CPTII,,,

## 2023-06-20 PROCEDURE — 99214 OFFICE O/P EST MOD 30 MIN: CPT | Mod: PBBFAC

## 2023-06-20 RX ORDER — SERTRALINE HYDROCHLORIDE 50 MG/1
50 TABLET, FILM COATED ORAL DAILY
Qty: 90 TABLET | Refills: 3 | Status: SHIPPED | OUTPATIENT
Start: 2023-06-20 | End: 2023-11-03

## 2023-06-20 RX ORDER — TRAZODONE HYDROCHLORIDE 50 MG/1
50 TABLET ORAL NIGHTLY PRN
Qty: 30 TABLET | Refills: 11 | Status: SHIPPED | OUTPATIENT
Start: 2023-06-20 | End: 2024-03-21

## 2023-06-20 NOTE — PATIENT INSTRUCTIONS
You came to Epilepsy Clinic because of your seizure disorder. Your seizures are controlled on lamotrigine 150mg twice daily. Please continue the same medication at the same dose.     I am not 100% sure what these staring spells are at this time, but they could be seizure. I would like to get an EEG that you wear at home for 3 days to try and capture one of these events and figure out how to best treat them. Please call [phone] to schedule this.    Do not miss any doses of medication. If a dose of medication is missed, take it as soon as it is remembered even if that means doubling up on the dose.     Get regular sleep. Go to sleep at the same time and wake up at the same time every day. People with epilepsy require more sleep than people without epilepsy. I sent in a prescription for a medication called trazodone to help with sleep. Try taking 1 tablet (50mg) about 30 minutes before bedtime. If this dose makes you too drowsy, you can try taking half a tablet. If you think you need a stronger dose, you can take 2 tablets before bed. Take this medication as needed. If this medication does not work for you, you do not need to take it. Sleeping 10-12 hours a day can be normal for a person with epilepsy.    You are anxious. I would like you to try a new medication called sertraline. Please take 1/2 tablet for 14 days then increase to 1 tablet daily. You can take this medication in the morning or at night, with or without food. You will have some side effects as you start this medication like GI upset, changes in appetite, changes in sleep, possibly more anxiety. Please push through, your body will adjust. It takes around 4-6 weeks to notice an effect, be patient with this medication.     Per [place] law, no episodes of loss of consciousness for 6 months before driving.  Avoid dangerous situations.  For example, no baths/pools alone, no heights, no power tools.  Wear a bike helmet.  If breakthrough seizures occur  that are different in character, frequency, or duration from normal episodes, please patient portal me or call the office and we will decide the next steps. If multiple seizures occur in a row without return back to baseline, 911 needs to be called.     Return to clinic in 2 months or sooner with issues.  Please patient portal with any questions or concerns.    Amber Ortiz PA-C   Neurology-Epilepsy  [facility]

## 2023-06-20 NOTE — PROGRESS NOTES
[facility]  Epilepsy Clinic Progress Note    [facility] - NEUROLOGY 7TH [place] [facility], [place]    Date: 6/20/23  Patient Name: Pernell Ly   MRN: [id number]   PCP: Zoey Nelson  Referring Provider: No ref. provider found    Assessment:   Pernell Ly is a 43 y.o. female presenting in follow up for longstanding epilepsy. 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 Normal EEG. Increased frequency of staring events over the past 2 months despite increase in lamotrigine last visit (?epileptic vs NEE). No identifiable trigger and denies missed doses of medication. Will schedule for ambulatory EEG for event capture and characterization -> escalation of ASM regimen pending results. Continue lamotrigine 150mg BID for now. Sertraline 50mg qd for mood. Trazodone 25-50mg qhs PRN for insomnia. Follow up in 2 months or sooner with issues. Patient is comfortable with plan. All questions and concerns are addressed at this time.   Plan:     Problem List Items Addressed This Visit          Psychiatric    Anxiety    Relevant Medications    sertraline (ZOLOFT) 50 MG tablet     Other Visit Diagnoses       Seizure disorder    -  Primary    Relevant Orders    Ambulatory EEG monitoring    Insomnia, unspecified type        Relevant Medications    traZODone (DESYREL) 50 MG tablet          I completed education on seizure first aid and safety. I recommended seizure precautions with regards to avoiding unsupervised water recreational activity or bathing in tubs, climbing or working at heights, operation of heavy or dangerous machinery, caution around fire and sources of high heat, as well as any other activity which could put a patient at danger in case of a seizure.  I also reviewed the [place] DMV law and recommended that the patient not drive for 6 months in the event of breakthrough seizures.    As the patient is a female of childbearing age, I have counseled the patient on issues pertaining to  pregnancy and epilepsy and recommended folic acid supplementation. I have reviewed and recommended the AAN guideline of folic acid supplementation prior to and during pregnancy.      Amber Ortiz PA-C   Neurology-Epilepsy  [facility]    Collaborating physician, Dr. Johnathan Weir, was available during today's encounter.     I spent approximately 40 minutes on the day of this encounter preparing to see the patient, obtaining and reviewing history and results, performing a medically appropriate exam, counseling and educating the patient/family/caregiver, documenting clinical information, coordinating care, and ordering medications, tests, procedures, and referrals.    Patient note was created using MModal Dictation.  Any errors in syntax or even information may not have been identified and edited on initial review prior to signing this note.  Subjective:   Patient seen in consultation at the request of No ref. provider found for the evaluation of epilepsy. A copy of this note will be sent to the referring physician.     Interval Events/ROS 6/20/2023:    Current ASM/SEs: lamotrigine 150mg BID; no SE   Breakthrough seizures/events: last event was 6/15 while shopping in [employer], describes she was with her sister when she started staring off and wouldn't respond, 'came to' and her sister was very concerned, patient was confused, states her sister told her the event lasted >15 minutes (specifically clarified that the staring lasted >15 minutes and it took additional time for the patient to fully feel back to normal), no LOC or collapse, no abnormal movements, patient can't recall whether or not she was able to hear her sister during event; reports she has experienced staring episodes/sensation of lost time at least 3 times in the past 2 months, other episodes were not witnessed    Driving: no  Folic acid: no  Sleep: does not get much sleep, broken sleep, has tried melatonin in the past  Mood: chronic  anxiety, notes some feelings of depression, stopped sertraline after 3 days (didn't like the idea of taking a mood medication, but feels more open to it now)    Intermittent headaches. Otherwise, no fever, no cold symptoms, no changes in vision, no new weakness, no chest pain, no shortness of breath, no nausea, no vomiting, no diarrhea, no constipation, no tingling/numbness, no problems walking.    Recent Labs   Lab 04/19/22  1430 12/31/22  2229   Lamotrigine Lvl 5.0 5.1      Interval Events/ROS 4/12/2023:    Current ASM/SEs: lamotrigine 100mg BID; no SE  Breakthrough seizures/events:  event 4/10 -> states she remembers using the bathroom then all of a sudden she was in her bed being woken up by her dad, confused, felt off; does not recall walking back from the bathroom to her bed; son told her she seemed out of it; felt back to baseline ~30 min after event; no convulsions/abnormal movements; no identifiable trigger; current event frequency is around 1 per year   Driving: no  Folic acid: no   Sleep: chronic insomnia; follows w/ sleep medicine    Mood: anxious    Headaches. Otherwise, no fever, no cold symptoms, no changes in vision, no new weakness, no chest pain, no shortness of breath, no nausea, no vomiting, no diarrhea, no constipation, no tingling/numbness, no problems walking.     HPI:   Ms. Pernell Ly is a 43 y.o. female who presents with a chief complaint of a longstanding seizure disorder.  Patient presents today to transfer her care from Dr. Dunham after being lost to follow-up for 2 years.  The patient reports that she had been doing well until the last few months when she has begun to experience episodes of loss of time.  The patient states that she will be alone and realized that several seconds have passed and she has lost awareness for a few minutes.  She endorses expressed compliance with her Lamictal.  She does state that she is under quite a bit of stress and is working part-time while  "attending school full-time for medical coding.  She states that she believes is contributing to chronic daily tension headaches.  She states the headaches have become worse over the past few weeks and notes that she is taking ibuprofen twice a day every day for management of headaches.  She denies associated migrainous features or focal neurologic deficits.    Seizure Type: Supsected focal onset  Seizure Etiology:   L MTS  Current AEDs: Lamictal 100 mg BID    The patient is not accompanied by family who contribute to the history. This patient has 3 types of seizure as described below. The patient reports having seizures for years The patient reports to have stable seizure control. The seizure frequency is variable. The last seizure was last week . The patient does not report side effects from seizure medication.     Seizure Type 1:   Seizure Description: LOC, GTC (one out of sleep)  Aura:  De ja vu feeling  Associated Symptoms:  none  Seizure Frequency: 2 in lifetime  Last seizure: 2020-ish    Seizure Type 2:   Seizure Description: Incomprehensible speech lasting 14 minutes with loss of time and confusion  Aura: None  Associated Symptoms:  none  Seizure Frequency: One in lifetime  Last seizure: 2019    Seizure Type 3:   Seizure Description: "Goes out into space" for a minute, always happens when she's alone  Aura: None  Associated Symptoms: none  Seizure Frequency: Variable, often clusters every few months "seldom"   Last seizure: 1.5 weeks ago    Handedness: right  Seizure Onset Age: infancy  Seizure/ Epilepsy Risk Factors: childhood seizure  Birth/Developmental History: normal birth history and Normal developmental history  Seizure Triggers/ Provoking Features: stress  Previous Seizure Medications: lamotrigine (Lamictal, LTG) and zonisamide (Zonegran, ZNA)  Recent Med Changes: None  Other Treatments: None  Prior Episodes of Status: None  Psychiatric/Behavioral Comorbitidies: Anxiety  Surgical Candidacy:  " Pending    Prior Studies:  EEG : Not done  vEEG/ EMU evaluation: Left MTS 2018, personally reviewed  MRI of brain: Not done  AED levels:  Not done  CT/CTA Scan: Unremarkable - personally reviewed  PET Scan: Not done  Neuropsychological Evaluation: Not done  DEXA Scan: Not done  Other studies: Not done    PAST MEDICAL HISTORY:  Past Medical History:   Diagnosis Date    Encounter for blood transfusion     Fibroids     Hypertension     Iron deficiency anemia     Menorrhagia     Migraine     Seizures     as baby       PAST SURGICAL HISTORY:  Past Surgical History:   Procedure Laterality Date     SECTION      x1    HYSTERECTOMY  2017    TUBAL LIGATION         CURRENT MEDS:  Current Outpatient Medications   Medication Sig Dispense Refill    albuterol (PROVENTIL HFA) 90 mcg/actuation inhaler Inhale 2 puffs into the lungs every 6 (six) hours as needed for Wheezing. Rescue 18 g 0    azelastine (ASTELIN) 137 mcg (0.1 %) nasal spray USE 1 SPRAY NASALLY TWICE DAILY 30 mL 0    cetirizine (ZYRTEC) 10 MG tablet Take 1 tablet (10 mg total) by mouth once daily. 30 tablet 1    ferrous sulfate 324 mg (65 mg iron) TbEC Take 324 mg by mouth 2 (two) times daily.       fluticasone propionate (FLOVENT HFA) 110 mcg/actuation inhaler Inhale 1 puff into the lungs 2 (two) times daily. Controller 12 g 1    ibuprofen (ADVIL,MOTRIN) 800 MG tablet TAKE 1 TABLET BY MOUTH THREE TIMES DAILY AS NEEDED FOR PAIN 60 tablet 2    lamoTRIgine (LAMICTAL) 150 MG Tab Take 1 tablet (150 mg total) by mouth 2 (two) times daily. 180 tablet 3    multivitamin with minerals tablet Take by mouth.      NIFEdipine (PROCARDIA-XL) 90 MG (OSM) 24 hr tablet TAKE 1 TABLET(90 MG) BY MOUTH EVERY DAY 90 tablet 1    potassium chloride (KLOR-CON) 10 MEQ TbSR Take 1 tablet (10 mEq total) by mouth once daily. 90 tablet 1    sertraline (ZOLOFT) 50 MG tablet Take 1 tablet (50 mg total) by mouth once daily. (Take a half tablet for 2 weeks then increase to 1 full  "tablet daily) 30 tablet 11     No current facility-administered medications for this visit.       ALLERGIES:  Review of patient's allergies indicates:  No Known Allergies    FAMILY HISTORY:  Family History   Problem Relation Age of Onset    Hypertension Father     Hypertension Sister     Hypertension Maternal Grandmother     Breast cancer Maternal Grandmother     Cancer Maternal Grandfather     Hypertension Paternal Grandmother     Colon cancer Maternal Cousin     Ovarian cancer Neg Hx        SOCIAL HISTORY:  Social History     Tobacco Use    Smoking status: Never    Smokeless tobacco: Never   Substance Use Topics    Alcohol use: No     Comment: Occasional; 3x /month    Drug use: No       Review of Systems:  12 system review of systems is negative except for the symptoms mentioned in HPI.      Objective:     Vitals:    06/20/23 1327   BP: 128/89   Pulse: 81   Weight: 69.2 kg (152 lb 8.9 oz)   Height: 5' 4" (1.626 m)     General: NAD, well nourished   Eyes: no tearing, discharge, no erythema   ENT: moist mucous membranes of the oral cavity, nares patent    Neck: Supple  Cardiovascular: Warm and well perfused  Lungs: Normal work of breathing, normal chest wall excursions  Skin: No rash, lesions, or breakdown on exposed skin  Psychiatry: Mood and affect are appropriate, anxious   Abdomen: soft, non tender, non distended  Extremeties: No cyanosis, clubbing or edema.    Neurological   MENTAL STATUS: Alert and oriented to person, place, and time. Attention and concentration within normal limits. Speech without dysarthria, able to name and repeat without difficulty. Recent and remote memory within normal limits   CRANIAL NERVES: EOMI. Facial sensation intact. Face symmetrical. Hearing grossly intact. Full shoulder shrug bilaterally. Tongue protrudes midline   SENSORY: Sensation is intact to light touch throughout.    MOTOR: Normal bulk and tone. 5/5 deltoid, biceps, triceps, interosseous, hand  bilaterally. 5/5 " iliopsoas, knee extension/flexion, foot dorsi/plantarflexion bilaterally.    CEREBELLAR/COORDINATION/GAIT: Gait steady. Finger to nose intact. Normal rapid alternating movements.

## 2023-06-28 ENCOUNTER — OFFICE VISIT (OUTPATIENT)
Dept: PRIMARY CARE CLINIC | Facility: CLINIC | Age: 43
End: 2023-06-28
Attending: FAMILY MEDICINE
Payer: MEDICAID

## 2023-06-28 VITALS
HEART RATE: 73 BPM | OXYGEN SATURATION: 99 % | HEIGHT: 64 IN | BODY MASS INDEX: 26.72 KG/M2 | DIASTOLIC BLOOD PRESSURE: 80 MMHG | WEIGHT: 156.5 LBS | SYSTOLIC BLOOD PRESSURE: 120 MMHG

## 2023-06-28 DIAGNOSIS — I10 PRIMARY HYPERTENSION: ICD-10-CM

## 2023-06-28 DIAGNOSIS — K21.00 GASTROESOPHAGEAL REFLUX DISEASE WITH ESOPHAGITIS WITHOUT HEMORRHAGE: Primary | ICD-10-CM

## 2023-06-28 DIAGNOSIS — F41.9 ANXIETY: ICD-10-CM

## 2023-06-28 DIAGNOSIS — R56.9 SEIZURE: ICD-10-CM

## 2023-06-28 DIAGNOSIS — G43.009 ATYPICAL MIGRAINE: ICD-10-CM

## 2023-06-28 DIAGNOSIS — R51.9 HEADACHE DISORDER: ICD-10-CM

## 2023-06-28 PROCEDURE — 3079F DIAST BP 80-89 MM HG: CPT | Mod: CPTII,,, | Performed by: FAMILY MEDICINE

## 2023-06-28 PROCEDURE — 3008F BODY MASS INDEX DOCD: CPT | Mod: CPTII,,, | Performed by: FAMILY MEDICINE

## 2023-06-28 PROCEDURE — 99999 PR PBB SHADOW E&M-EST. PATIENT-LVL III: CPT | Mod: PBBFAC,,, | Performed by: FAMILY MEDICINE

## 2023-06-28 PROCEDURE — 99999 PR PBB SHADOW E&M-EST. PATIENT-LVL III: ICD-10-PCS | Mod: PBBFAC,,, | Performed by: FAMILY MEDICINE

## 2023-06-28 PROCEDURE — 99214 OFFICE O/P EST MOD 30 MIN: CPT | Mod: S$PBB,,, | Performed by: FAMILY MEDICINE

## 2023-06-28 PROCEDURE — 99213 OFFICE O/P EST LOW 20 MIN: CPT | Mod: PBBFAC,PN | Performed by: FAMILY MEDICINE

## 2023-06-28 PROCEDURE — 3008F PR BODY MASS INDEX (BMI) DOCUMENTED: ICD-10-PCS | Mod: CPTII,,, | Performed by: FAMILY MEDICINE

## 2023-06-28 PROCEDURE — 3074F SYST BP LT 130 MM HG: CPT | Mod: CPTII,,, | Performed by: FAMILY MEDICINE

## 2023-06-28 PROCEDURE — 99214 PR OFFICE/OUTPT VISIT, EST, LEVL IV, 30-39 MIN: ICD-10-PCS | Mod: S$PBB,,, | Performed by: FAMILY MEDICINE

## 2023-06-28 PROCEDURE — 3079F PR MOST RECENT DIASTOLIC BLOOD PRESSURE 80-89 MM HG: ICD-10-PCS | Mod: CPTII,,, | Performed by: FAMILY MEDICINE

## 2023-06-28 PROCEDURE — 3074F PR MOST RECENT SYSTOLIC BLOOD PRESSURE < 130 MM HG: ICD-10-PCS | Mod: CPTII,,, | Performed by: FAMILY MEDICINE

## 2023-06-28 RX ORDER — OMEPRAZOLE 40 MG/1
40 CAPSULE, DELAYED RELEASE ORAL DAILY
Qty: 30 CAPSULE | Refills: 0 | Status: SHIPPED | OUTPATIENT
Start: 2023-06-28 | End: 2023-08-01

## 2023-06-29 ENCOUNTER — OFFICE VISIT (OUTPATIENT)
Dept: OBSTETRICS AND GYNECOLOGY | Facility: CLINIC | Age: 43
End: 2023-06-29
Payer: MEDICAID

## 2023-06-29 ENCOUNTER — LAB VISIT (OUTPATIENT)
Dept: LAB | Facility: HOSPITAL | Age: 43
End: 2023-06-29
Attending: FAMILY MEDICINE
Payer: MEDICAID

## 2023-06-29 VITALS
HEIGHT: 64 IN | WEIGHT: 158.94 LBS | SYSTOLIC BLOOD PRESSURE: 121 MMHG | DIASTOLIC BLOOD PRESSURE: 75 MMHG | HEART RATE: 74 BPM | BODY MASS INDEX: 27.13 KG/M2

## 2023-06-29 DIAGNOSIS — Z01.411 ENCOUNTER FOR GYNECOLOGICAL EXAMINATION (GENERAL) (ROUTINE) WITH ABNORMAL FINDINGS: Primary | ICD-10-CM

## 2023-06-29 DIAGNOSIS — K21.00 GASTROESOPHAGEAL REFLUX DISEASE WITH ESOPHAGITIS WITHOUT HEMORRHAGE: ICD-10-CM

## 2023-06-29 DIAGNOSIS — Z20.2 POSSIBLE EXPOSURE TO STD: ICD-10-CM

## 2023-06-29 DIAGNOSIS — N89.8 VAGINAL DISCHARGE: ICD-10-CM

## 2023-06-29 DIAGNOSIS — R30.0 DYSURIA: ICD-10-CM

## 2023-06-29 LAB
ALBUMIN SERPL BCP-MCNC: 3.7 G/DL (ref 3.5–5.2)
ALP SERPL-CCNC: 53 U/L (ref 55–135)
ALT SERPL W/O P-5'-P-CCNC: 12 U/L (ref 10–44)
ANION GAP SERPL CALC-SCNC: 7 MMOL/L (ref 8–16)
AST SERPL-CCNC: 14 U/L (ref 10–40)
BILIRUB SERPL-MCNC: 0.2 MG/DL (ref 0.1–1)
BUN SERPL-MCNC: 13 MG/DL (ref 6–20)
CALCIUM SERPL-MCNC: 9.4 MG/DL (ref 8.7–10.5)
CHLORIDE SERPL-SCNC: 105 MMOL/L (ref 95–110)
CO2 SERPL-SCNC: 26 MMOL/L (ref 23–29)
CREAT SERPL-MCNC: 0.8 MG/DL (ref 0.5–1.4)
EST. GFR  (NO RACE VARIABLE): >60 ML/MIN/1.73 M^2
GLUCOSE SERPL-MCNC: 97 MG/DL (ref 70–110)
POTASSIUM SERPL-SCNC: 3.8 MMOL/L (ref 3.5–5.1)
PROT SERPL-MCNC: 7.7 G/DL (ref 6–8.4)
SODIUM SERPL-SCNC: 138 MMOL/L (ref 136–145)

## 2023-06-29 PROCEDURE — 99396 PREV VISIT EST AGE 40-64: CPT | Mod: S$PBB,,, | Performed by: OBSTETRICS & GYNECOLOGY

## 2023-06-29 PROCEDURE — 3078F PR MOST RECENT DIASTOLIC BLOOD PRESSURE < 80 MM HG: ICD-10-PCS | Mod: CPTII,,, | Performed by: OBSTETRICS & GYNECOLOGY

## 2023-06-29 PROCEDURE — 99213 OFFICE O/P EST LOW 20 MIN: CPT | Mod: PBBFAC,PN | Performed by: OBSTETRICS & GYNECOLOGY

## 2023-06-29 PROCEDURE — 1160F RVW MEDS BY RX/DR IN RCRD: CPT | Mod: CPTII,,, | Performed by: OBSTETRICS & GYNECOLOGY

## 2023-06-29 PROCEDURE — 1159F PR MEDICATION LIST DOCUMENTED IN MEDICAL RECORD: ICD-10-PCS | Mod: CPTII,,, | Performed by: OBSTETRICS & GYNECOLOGY

## 2023-06-29 PROCEDURE — 1159F MED LIST DOCD IN RCRD: CPT | Mod: CPTII,,, | Performed by: OBSTETRICS & GYNECOLOGY

## 2023-06-29 PROCEDURE — 87591 N.GONORRHOEAE DNA AMP PROB: CPT

## 2023-06-29 PROCEDURE — 36415 COLL VENOUS BLD VENIPUNCTURE: CPT | Mod: PO | Performed by: FAMILY MEDICINE

## 2023-06-29 PROCEDURE — 80053 COMPREHEN METABOLIC PANEL: CPT | Performed by: FAMILY MEDICINE

## 2023-06-29 PROCEDURE — 81514 NFCT DS BV&VAGINITIS DNA ALG: CPT

## 2023-06-29 PROCEDURE — 1160F PR REVIEW ALL MEDS BY PRESCRIBER/CLIN PHARMACIST DOCUMENTED: ICD-10-PCS | Mod: CPTII,,, | Performed by: OBSTETRICS & GYNECOLOGY

## 2023-06-29 PROCEDURE — 3074F SYST BP LT 130 MM HG: CPT | Mod: CPTII,,, | Performed by: OBSTETRICS & GYNECOLOGY

## 2023-06-29 PROCEDURE — 3074F PR MOST RECENT SYSTOLIC BLOOD PRESSURE < 130 MM HG: ICD-10-PCS | Mod: CPTII,,, | Performed by: OBSTETRICS & GYNECOLOGY

## 2023-06-29 PROCEDURE — 99999 PR PBB SHADOW E&M-EST. PATIENT-LVL III: CPT | Mod: PBBFAC,,, | Performed by: OBSTETRICS & GYNECOLOGY

## 2023-06-29 PROCEDURE — 3078F DIAST BP <80 MM HG: CPT | Mod: CPTII,,, | Performed by: OBSTETRICS & GYNECOLOGY

## 2023-06-29 PROCEDURE — 99999 PR PBB SHADOW E&M-EST. PATIENT-LVL III: ICD-10-PCS | Mod: PBBFAC,,, | Performed by: OBSTETRICS & GYNECOLOGY

## 2023-06-29 PROCEDURE — 3008F PR BODY MASS INDEX (BMI) DOCUMENTED: ICD-10-PCS | Mod: CPTII,,, | Performed by: OBSTETRICS & GYNECOLOGY

## 2023-06-29 PROCEDURE — 3008F BODY MASS INDEX DOCD: CPT | Mod: CPTII,,, | Performed by: OBSTETRICS & GYNECOLOGY

## 2023-06-29 PROCEDURE — 99396 PR PREVENTIVE VISIT,EST,40-64: ICD-10-PCS | Mod: S$PBB,,, | Performed by: OBSTETRICS & GYNECOLOGY

## 2023-06-29 PROCEDURE — 87086 URINE CULTURE/COLONY COUNT: CPT

## 2023-06-30 LAB
BACTERIA UR CULT: NO GROWTH
BACTERIAL VAGINOSIS DNA: POSITIVE
C TRACH DNA SPEC QL NAA+PROBE: NOT DETECTED
CANDIDA GLABRATA DNA: NEGATIVE
CANDIDA KRUSEI DNA: NEGATIVE
CANDIDA RRNA VAG QL PROBE: NEGATIVE
N GONORRHOEA DNA SPEC QL NAA+PROBE: NOT DETECTED
T VAGINALIS RRNA GENITAL QL PROBE: NEGATIVE

## 2023-07-03 ENCOUNTER — PATIENT MESSAGE (OUTPATIENT)
Dept: OBSTETRICS AND GYNECOLOGY | Facility: CLINIC | Age: 43
End: 2023-07-03
Payer: MEDICAID

## 2023-07-03 RX ORDER — METRONIDAZOLE 500 MG/1
500 TABLET ORAL EVERY 12 HOURS
Qty: 14 TABLET | Refills: 0 | Status: SHIPPED | OUTPATIENT
Start: 2023-07-03 | End: 2023-07-10

## 2023-07-03 NOTE — PROGRESS NOTES
"Past medical, surgical, social, family, and obstetric histories; medications; prior records and results; and available outside records were reviewed and updated in the EMR.  Pertinent findings were noted below.    Reason for Visit   Well Woman and STD CHECK    HPI   43 y.o. female  for well women exam. She reports having thin, white discharge for 1 week. She denies vaginal pruritis or irritation. The patient states she uses unscented dove soap, denies using new detergent.    History of abnormal paps: DENIES  Abnormal or postmenopausal bleeding: DENIES  History of abnormal mammograms: Denies    Family history of breast or ovarian cancer: DENIES  Any breast masses, pain, skin changes, or nipple discharge: DENIES  Possible recent STD exposure: None  Contraception: None    Pap: No result found, s/p hysterectomy and no history of high grade dysplasia  Mammogram: BIRADS1, T-C=7%, 2023  Allergies: Patient has no known allergies.    Review of Systems   Constitutional:  Negative for chills and fever.   Respiratory:  Negative for shortness of breath.    Cardiovascular:  Negative for chest pain.   Gastrointestinal:  Negative for abdominal pain, nausea and vomiting.   Endocrine: Negative for hot flashes.   Genitourinary:  Positive for dysuria and vaginal discharge. Negative for flank pain, frequency and menstrual problem.   Integumentary:  Negative for breast mass, nipple discharge and breast skin changes.   Neurological:  Negative for headaches.   Hematological:  Does not bruise/bleed easily.   Psychiatric/Behavioral:  Negative for depression.    Breast: Negative for mass, mastodynia, nipple discharge and skin changes    Exam   /75   Pulse 74   Ht 5' 4" (1.626 m)   Wt 72.1 kg (158 lb 15.2 oz)   LMP 2017   BMI 27.28 kg/m²     Physical Exam  Constitutional:       General: She is not in acute distress.     Appearance: She is well-developed.   Genitourinary:      Vulva, bladder and urethral meatus normal. "      Right Labia: No rash, lesions or Bartholin's cyst.     Left Labia: No lesions, Bartholin's cyst or rash.     Vaginal discharge (minimal thin, white) present.      No vaginal bleeding.        Right Adnexa: not tender and not full.     Left Adnexa: not tender and not full.     Cervix is absent.      Uterus is absent.      No urethral tenderness present.   Breasts:     Breasts are symmetrical.      Right: No mass, nipple discharge, skin change or tenderness.      Left: No mass, nipple discharge, skin change or tenderness.   HENT:      Head: Normocephalic and atraumatic.   Neck:      Thyroid: No thyroid mass.   Cardiovascular:      Rate and Rhythm: Normal rate.   Pulmonary:      Effort: Pulmonary effort is normal. No respiratory distress.   Abdominal:      General: There is no distension.      Palpations: There is no hepatomegaly, splenomegaly or mass.      Tenderness: There is no abdominal tenderness.      Hernia: No hernia is present.   Musculoskeletal:         General: Normal range of motion.      Cervical back: Normal range of motion.      Right lower leg: No edema.      Left lower leg: No edema.   Lymphadenopathy:      Cervical: No cervical adenopathy.      Upper Body:      Right upper body: No axillary adenopathy.      Left upper body: No axillary adenopathy.   Neurological:      Mental Status: She is alert and oriented to person, place, and time.   Skin:     Findings: No rash.   Psychiatric:         Mood and Affect: Mood and affect normal.   Exam conducted with a chaperone present.     Assessment and Plan   Encounter for gynecological examination (general) (routine) with abnormal findings    Vaginal discharge  -     Vaginosis Screen by DNA Probe  -     C. trachomatis/N. gonorrhoeae by AMP DNA [facility]; Cervicovaginal    Possible exposure to STD  -     C. trachomatis/N. gonorrhoeae by AMP DNA [facility]; Cervicovaginal    Dysuria  -     Urine culture      Annual exam  Breast and pelvic exam: Peformed  today  Patient was counseled on ASCCP guidelines for cervical cytology screening  Cervical screening: Not indicated  Patient was counseled on current recommendations for breast cancer screening  Mammogram screening: Up tp date  VitD/Ca supplementation recommendations based on age:  <50yoa - Ca 1000mg/day, VitD 600IU/day  51-70yoa - Ca 1200mg/day, VitD 600IU/day  >71yoa - Ca 1200mg/day, VitD 800IU/day  STD testing: Done today. Will f/u with results.      She was counseled to follow up with her PCP for other routine health maintenance     Sia Vásquez MD PGY-2  Obstetrics and Gynecology

## 2023-07-21 ENCOUNTER — PATIENT MESSAGE (OUTPATIENT)
Dept: OBSTETRICS AND GYNECOLOGY | Facility: CLINIC | Age: 43
End: 2023-07-21
Payer: MEDICAID

## 2023-08-01 DIAGNOSIS — K21.00 GASTROESOPHAGEAL REFLUX DISEASE WITH ESOPHAGITIS WITHOUT HEMORRHAGE: ICD-10-CM

## 2023-08-01 RX ORDER — OMEPRAZOLE 40 MG/1
40 CAPSULE, DELAYED RELEASE ORAL
Qty: 30 CAPSULE | Refills: 0 | Status: SHIPPED | OUTPATIENT
Start: 2023-08-01 | End: 2023-09-06

## 2023-08-01 NOTE — TELEPHONE ENCOUNTER
No care due was identified.  [facility] Embedded Care Due Messages. Reference number: [id number].   8/01/2023 11:52:02 AM CDT

## 2023-08-01 NOTE — TELEPHONE ENCOUNTER
Refill Routing Note   Medication(s) are not appropriate for processing by [facility] for the following reason(s):      New or recently adjusted medication    [facility] action(s):  Defer Care Due:  None identified            Appointments  past 12m or future 3m with PCP    Date Provider   Last Visit   6/28/2023 Zoey Nelson MD   Next Visit   Visit date not found Zoey Nelson MD   ED visits in past 90 days: 0        Note composed:2:20 PM 08/01/2023

## 2023-08-13 DIAGNOSIS — E87.6 HYPOKALEMIA: ICD-10-CM

## 2023-08-13 NOTE — TELEPHONE ENCOUNTER
No care due was identified.  [facility] Embedded Care Due Messages. Reference number: [id number].   8/13/2023 11:43:30 AM CDT

## 2023-08-14 RX ORDER — POTASSIUM CHLORIDE 750 MG/1
10 TABLET, EXTENDED RELEASE ORAL
Qty: 90 TABLET | Refills: 3 | Status: SHIPPED | OUTPATIENT
Start: 2023-08-14

## 2023-08-14 NOTE — TELEPHONE ENCOUNTER
Refill Decision Note   [facility]  is requesting a refill authorization.  Brief Assessment and Rationale for Refill:  Approve     Medication Therapy Plan:         Comments:     Note composed:10:29 AM 08/14/2023

## 2023-08-24 DIAGNOSIS — I10 ESSENTIAL HYPERTENSION: ICD-10-CM

## 2023-08-24 RX ORDER — NIFEDIPINE 90 MG/1
90 TABLET, EXTENDED RELEASE ORAL DAILY
Qty: 90 TABLET | Refills: 1 | Status: SHIPPED | OUTPATIENT
Start: 2023-08-24 | End: 2023-11-30

## 2023-08-24 NOTE — TELEPHONE ENCOUNTER
No care due was identified.  [facility] Embedded Care Due Messages. Reference number: [id number].   8/24/2023 1:17:31 PM CDT

## 2023-08-24 NOTE — TELEPHONE ENCOUNTER
Refill Encounter    PCP Visits: Recent Visits  Date Type Provider Dept   06/28/23 Office Visit Zoey Nelson MD [facility]   11/08/22 Office Visit Zoey Nelson MD [facility]   10/05/22 Office Visit Zoey Nelson MD [facility]   Showing recent visits within past 360 days and meeting all other requirements  Future Appointments  No visits were found meeting these conditions.  Showing future appointments within next 720 days and meeting all other requirements     Last 3 Blood Pressure:   BP Readings from Last 3 Encounters:   06/29/23 121/75   06/28/23 120/80   06/20/23 128/89     Preferred Pharmacy:   [id number] [address] [address]  Phone: [phone] Fax: [phone]    [facility] #[id number] [address] [facility] [address] [facility] [address]  [address] [facility] [address]  Phone: [phone] Fax: [phone]    Requested RX:  Requested Prescriptions     Pending Prescriptions Disp Refills    NIFEdipine (PROCARDIA-XL) 90 MG (OSM) 24 hr tablet 90 tablet 1     Sig: Take 1 tablet (90 mg total) by mouth once daily.      RX Route: Normal

## 2023-08-25 ENCOUNTER — OFFICE VISIT (OUTPATIENT)
Dept: NEUROLOGY | Facility: CLINIC | Age: 43
End: 2023-08-25
Payer: MEDICAID

## 2023-08-25 DIAGNOSIS — M54.2 CERVICALGIA: ICD-10-CM

## 2023-08-25 DIAGNOSIS — G40.909 SEIZURE DISORDER: Primary | ICD-10-CM

## 2023-08-25 DIAGNOSIS — G47.00 INSOMNIA, UNSPECIFIED TYPE: ICD-10-CM

## 2023-08-25 PROCEDURE — 99215 OFFICE O/P EST HI 40 MIN: CPT | Mod: 95,,,

## 2023-08-25 PROCEDURE — 99215 PR OFFICE/OUTPT VISIT, EST, LEVL V, 40-54 MIN: ICD-10-PCS | Mod: 95,,,

## 2023-08-25 NOTE — PROGRESS NOTES
[facility]  Epilepsy [facility] Progress Note    [facility] - NEUROLOGY 7TH FL  [facility], [place]    Date: 8/25/23  Patient Name: Pernell Ly   MRN: [id number]   PCP: Zoey Nelson  Referring Provider: No ref. provider found    The patient location is: Home  The chief complaint leading to consultation is: Epilepsy  Visit type: Virtual visit with synchronous audio and video  Total time spent with patient: 30 minutes  Each patient to whom he or she provides medical services by telemedicine is:  (1) informed of the relationship between the physician and patient and the respective role of any other health care provider with respect to management of the patient; and (2) notified that he or she may decline to receive medical services by telemedicine and may withdraw from such care at any time.    Assessment:   Pernell Ly is a 43 y.o. female presenting in follow up for longstanding epilepsy. 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 Normal EEG. Increased frequency of staring events over the past few months despite increase in lamotrigine -> scheduled for ambulatory EEG next month for event capture and characterization. Escalation of ASM regimen pending results. Continue lamotrigine 150mg BID for now. Trazodone 25-50mg qhs PRN for insomnia. Physical therapy for cervicalgia. Follow up in 3 months or sooner with issues. Patient is comfortable with plan. All questions and concerns are addressed at this time.   Plan:     Problem List Items Addressed This Visit    None  Visit Diagnoses       Seizure disorder    -  Primary    Cervicalgia        Relevant Orders    Ambulatory referral/consult to Physical/Occupational Therapy    Insomnia, unspecified type              I completed education on seizure first aid and safety. I recommended seizure precautions with regards to avoiding unsupervised water recreational activity or bathing in tubs, climbing or working at heights, operation of heavy  or dangerous machinery, caution around fire and sources of high heat, as well as any other activity which could put a patient at danger in case of a seizure.  I also reviewed the [place] DMV law and recommended that the patient not drive for 6 months in the event of breakthrough seizures.    Amber Ortiz PA-C   Neurology-Epilepsy  [facility]    Collaborating physician, Dr. Johnathan Weir, was available during today's encounter.     I spent approximately 40 minutes on the day of this encounter preparing to see the patient, obtaining and reviewing history and results, performing a medically appropriate exam, counseling and educating the patient/family/caregiver, documenting clinical information, coordinating care, and ordering medications, tests, procedures, and referrals.    Patient note was created using MModal Dictation.  Any errors in syntax or even information may not have been identified and edited on initial review prior to signing this note.  Subjective:   Patient seen in consultation at the request of No ref. provider found for the evaluation of epilepsy. A copy of this note will be sent to the referring physician.     Interval Events/ROS 8/25/2023:    Current ASM/SEs: lamotrigine 150mg twice daily; SE drowsy  Breakthrough seizures/events: staring event 8/5/2023, no identifiable trigger, described she was in the bathroom brushing her teeth then suddenly realized she was in a different room, son told her she had toothpaste around her mouth; prior to this last event was 6/15/2023; no identifiable trigger; no convulsions  Driving: no  Folic acid: s/p hysterectomy  Sleep: not great due to waking up w/ headaches   Mood: fair    Frequently wakes up in the middle of the night with headache located to the top of her head, also often wakes up with CROOK in the morning. States she has not been taking anything at home for the pain. Headache typically resolves once she is up and moving for the day.  Otherwise, no fever, no cold symptoms, no changes in vision, no new weakness, no chest pain, no shortness of breath, no nausea, no vomiting, no diarrhea, no constipation, no tingling/numbness, no problems walking.    Recent Labs   Lab 04/19/22  1430 12/31/22  2229   Lamotrigine Lvl 5.0 5.1      Interval Events/ROS 6/20/2023:    Current ASM/SEs: lamotrigine 150mg BID; no SE   Breakthrough seizures/events: last event was 6/15 while shopping in [employer], describes she was with her sister when she started staring off and wouldn't respond, 'came to' and her sister was very concerned, patient was confused, states her sister told her the event lasted >15 minutes (specifically clarified that the staring lasted >15 minutes and it took additional time for the patient to fully feel back to normal), no LOC or collapse, no abnormal movements, patient can't recall whether or not she was able to hear her sister during event; reports she has experienced staring episodes/sensation of lost time at least 3 times in the past 2 months, other episodes were not witnessed    Driving: no  Folic acid: no  Sleep: does not get much sleep, broken sleep, has tried melatonin in the past  Mood: chronic anxiety, notes some feelings of depression, stopped sertraline after 3 days (didn't like the idea of taking a mood medication, but feels more open to it now)    Intermittent headaches. Otherwise, no fever, no cold symptoms, no changes in vision, no new weakness, no chest pain, no shortness of breath, no nausea, no vomiting, no diarrhea, no constipation, no tingling/numbness, no problems walking.     Interval Events/ROS 4/12/2023:    Current ASM/SEs: lamotrigine 100mg BID; no SE  Breakthrough seizures/events:  event 4/10 -> states she remembers using the bathroom then all of a sudden she was in her bed being woken up by her dad, confused, felt off; does not recall walking back from the bathroom to her bed; son told her she seemed out of it; felt back  to baseline ~30 min after event; no convulsions/abnormal movements; no identifiable trigger; current event frequency is around 1 per year   Driving: no  Folic acid: no   Sleep: chronic insomnia; follows w/ sleep medicine    Mood: anxious    Headaches. Otherwise, no fever, no cold symptoms, no changes in vision, no new weakness, no chest pain, no shortness of breath, no nausea, no vomiting, no diarrhea, no constipation, no tingling/numbness, no problems walking.     HPI:   Ms. Pernell Ly is a 43 y.o. female who presents with a chief complaint of a longstanding seizure disorder.  Patient presents today to transfer her care from Dr. Dunham after being lost to follow-up for 2 years.  The patient reports that she had been doing well until the last few months when she has begun to experience episodes of loss of time.  The patient states that she will be alone and realized that several seconds have passed and she has lost awareness for a few minutes.  She endorses expressed compliance with her Lamictal.  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  She states that she believes is contributing to chronic daily tension headaches.  She states the headaches have become worse over the past few weeks and notes that she is taking ibuprofen twice a day every day for management of headaches.  She denies associated migrainous features or focal neurologic deficits.    Seizure Type: Supsected focal onset  Seizure Etiology:   L MTS  Current AEDs: Lamictal 100 mg BID    The patient is not accompanied by family who contribute to the history. This patient has 3 types of seizure as described below. The patient reports having seizures for years The patient reports to have stable seizure control. The seizure frequency is variable. The last seizure was last week . The patient does not report side effects from seizure medication.     Seizure Type 1:  "  Seizure Description: LOC, GTC (one out of sleep)  Aura:  De ja vu feeling  Associated Symptoms:  none  Seizure Frequency: 2 in lifetime  Last seizure: -    Seizure Type 2:   Seizure Description: Incomprehensible speech lasting 14 minutes with loss of time and confusion  Aura: None  Associated Symptoms:  none  Seizure Frequency: One in lifetime  Last seizure:     Seizure Type 3:   Seizure Description: "Goes out into space" for a minute, always happens when she's alone  Aura: None  Associated Symptoms: none  Seizure Frequency: Variable, often clusters every few months "seldom"   Last seizure: 1.5 weeks ago    Handedness: right  Seizure Onset Age: infancy  Seizure/ Epilepsy Risk Factors: childhood seizure  Birth/Developmental History: normal birth history and Normal developmental history  Seizure Triggers/ Provoking Features: stress  Previous Seizure Medications: lamotrigine (Lamictal, LTG) and zonisamide (Zonegran, ZNA)  Recent Med Changes: None  Other Treatments: None  Prior Episodes of Status: None  Psychiatric/Behavioral Comorbitidies: Anxiety  Surgical Candidacy:  Pending    Prior Studies:  EEG : Not done  vEEG/ EMU evaluation: Left MTS 2018, personally reviewed  MRI of brain: Not done  AED levels:  Not done  CT/CTA Scan: Unremarkable - personally reviewed  PET Scan: Not done  Neuropsychological Evaluation: Not done  DEXA Scan: Not done  Other studies: Not done    PAST MEDICAL HISTORY:  Past Medical History:   Diagnosis Date    Encounter for blood transfusion     Fibroids     Hypertension     Iron deficiency anemia     Menorrhagia     Migraine     Seizures     as baby       PAST SURGICAL HISTORY:  Past Surgical History:   Procedure Laterality Date     SECTION      x1    HYSTERECTOMY  2017    TUBAL LIGATION         CURRENT MEDS:  Current Outpatient Medications   Medication Sig Dispense Refill    potassium chloride (KLOR-CON) 10 MEQ TbSR TAKE 1 TABLET(10 MEQ) BY MOUTH EVERY DAY 90 " tablet 3    albuterol (PROVENTIL HFA) 90 mcg/actuation inhaler Inhale 2 puffs into the lungs every 6 (six) hours as needed for Wheezing. Rescue 18 g 0    azelastine (ASTELIN) 137 mcg (0.1 %) nasal spray USE 1 SPRAY NASALLY TWICE DAILY 30 mL 0    cetirizine (ZYRTEC) 10 MG tablet Take 1 tablet (10 mg total) by mouth once daily. 30 tablet 1    ferrous sulfate 324 mg (65 mg iron) TbEC Take 324 mg by mouth 2 (two) times daily.       fluticasone propionate (FLOVENT HFA) 110 mcg/actuation inhaler Inhale 1 puff into the lungs 2 (two) times daily. Controller 12 g 1    ibuprofen (ADVIL,MOTRIN) 800 MG tablet TAKE 1 TABLET BY MOUTH THREE TIMES DAILY AS NEEDED FOR PAIN 60 tablet 2    lamoTRIgine (LAMICTAL) 150 MG Tab Take 1 tablet (150 mg total) by mouth 2 (two) times daily. 180 tablet 3    multivitamin with minerals tablet Take by mouth.      NIFEdipine (PROCARDIA-XL) 90 MG (OSM) 24 hr tablet Take 1 tablet (90 mg total) by mouth once daily. 90 tablet 1    omeprazole (PRILOSEC) 40 MG capsule TAKE 1 CAPSULE(40 MG) BY MOUTH EVERY DAY 30 capsule 0    sertraline (ZOLOFT) 50 MG tablet Take 1 tablet (50 mg total) by mouth once daily. (Take a half tablet for 2 weeks then increase to 1 full tablet daily) 90 tablet 3    traZODone (DESYREL) 50 MG tablet Take 1 tablet (50 mg total) by mouth nightly as needed for Insomnia. 30 tablet 11     No current facility-administered medications for this visit.       ALLERGIES:  Review of patient's allergies indicates:  No Known Allergies    FAMILY HISTORY:  Family History   Problem Relation Age of Onset    Hypertension Father     Hypertension Sister     Hypertension Maternal Grandmother     Breast cancer Maternal Grandmother     Cancer Maternal Grandfather     Hypertension Paternal Grandmother     Colon cancer Maternal Cousin     Ovarian cancer Neg Hx        SOCIAL HISTORY:  Social History     Tobacco Use    Smoking status: Never     Passive exposure: Never    Smokeless tobacco: Never   Substance Use  Topics    Alcohol use: No     Comment: Occasional; 3x /month    Drug use: No       Review of Systems:  12 system review of systems is negative except for the symptoms mentioned in HPI.      Objective:     There were no vitals filed for this visit.    General: NAD, well nourished   Eyes: no tearing, discharge, no erythema   ENT: moist mucous membranes of the oral cavity, nares patent    Neck: free ROM  Cardiovascular: appears well perfused  Lungs: Normal work of breathing, normal chest wall excursions  Skin: No rash, lesions, or breakdown on exposed skin  Psychiatry: Mood and affect are appropriate, anxious   Abdomen: soft, non tender, non distended  Extremeties: No cyanosis, clubbing or edema.    Neurological   MENTAL STATUS: Alert and oriented to person, place, and time. Attention and concentration within normal limits. Speech without dysarthria, able to name and repeat without difficulty. Recent and remote memory within normal limits   CRANIAL NERVES: EOMI. Face symmetrical. Hearing grossly intact. Full shoulder shrug bilaterally. Tongue protrudes midline   SENSORY: no subjective deficits  MOTOR: moves all extremities freely against gravity   CEREBELLAR/COORDINATION/GAIT: remains seated

## 2023-08-26 PROBLEM — K21.00 GASTROESOPHAGEAL REFLUX DISEASE WITH ESOPHAGITIS WITHOUT HEMORRHAGE: Status: ACTIVE | Noted: 2023-08-26

## 2023-08-27 ENCOUNTER — ON-DEMAND VIRTUAL (OUTPATIENT)
Dept: URGENT CARE | Facility: CLINIC | Age: 43
End: 2023-08-27
Payer: MEDICAID

## 2023-08-27 ENCOUNTER — PATIENT MESSAGE (OUTPATIENT)
Dept: OBSTETRICS AND GYNECOLOGY | Facility: CLINIC | Age: 43
End: 2023-08-27
Payer: MEDICAID

## 2023-08-27 DIAGNOSIS — R56.9 SEIZURES: Primary | ICD-10-CM

## 2023-08-27 PROCEDURE — 99212 PR OFFICE/OUTPT VISIT, EST, LEVL II, 10-19 MIN: ICD-10-PCS | Mod: 95,,, | Performed by: FAMILY MEDICINE

## 2023-08-27 PROCEDURE — 99212 OFFICE O/P EST SF 10 MIN: CPT | Mod: 95,,, | Performed by: FAMILY MEDICINE

## 2023-08-27 NOTE — PROGRESS NOTES
Subjective:      Patient ID: Pernell Ly is a 43 y.o. female.    Vitals:  vitals were not taken for this visit.     Chief Complaint: Seizures      Visit Type: TELE AUDIOVISUAL    Present with the patient at the time of consultation: TELEMED PRESENT WITH PATIENT: None    Past Medical History:   Diagnosis Date    Encounter for blood transfusion     Fibroids     Hypertension     Iron deficiency anemia     Menorrhagia     Migraine     Seizures     as baby     Past Surgical History:   Procedure Laterality Date     SECTION      x1    HYSTERECTOMY  2017    TUBAL LIGATION       Review of patient's allergies indicates:  No Known Allergies  Current Outpatient Medications on File Prior to Visit   Medication Sig Dispense Refill    potassium chloride (KLOR-CON) 10 MEQ TbSR TAKE 1 TABLET(10 MEQ) BY MOUTH EVERY DAY 90 tablet 3    albuterol (PROVENTIL HFA) 90 mcg/actuation inhaler Inhale 2 puffs into the lungs every 6 (six) hours as needed for Wheezing. Rescue 18 g 0    azelastine (ASTELIN) 137 mcg (0.1 %) nasal spray USE 1 SPRAY NASALLY TWICE DAILY 30 mL 0    cetirizine (ZYRTEC) 10 MG tablet Take 1 tablet (10 mg total) by mouth once daily. 30 tablet 1    ferrous sulfate 324 mg (65 mg iron) TbEC Take 324 mg by mouth 2 (two) times daily.       fluticasone propionate (FLOVENT HFA) 110 mcg/actuation inhaler Inhale 1 puff into the lungs 2 (two) times daily. Controller 12 g 1    ibuprofen (ADVIL,MOTRIN) 800 MG tablet TAKE 1 TABLET BY MOUTH THREE TIMES DAILY AS NEEDED FOR PAIN 60 tablet 2    lamoTRIgine (LAMICTAL) 150 MG Tab Take 1 tablet (150 mg total) by mouth 2 (two) times daily. 180 tablet 3    multivitamin with minerals tablet Take by mouth.      NIFEdipine (PROCARDIA-XL) 90 MG (OSM) 24 hr tablet Take 1 tablet (90 mg total) by mouth once daily. 90 tablet 1    omeprazole (PRILOSEC) 40 MG capsule TAKE 1 CAPSULE(40 MG) BY MOUTH EVERY DAY 30 capsule 0    sertraline (ZOLOFT) 50 MG tablet Take 1 tablet (50 mg total) by  mouth once daily. (Take a half tablet for 2 weeks then increase to 1 full tablet daily) 90 tablet 3    traZODone (DESYREL) 50 MG tablet Take 1 tablet (50 mg total) by mouth nightly as needed for Insomnia. 30 tablet 11     No current facility-administered medications on file prior to visit.     Family History   Problem Relation Age of Onset    Hypertension Father     Hypertension Sister     Hypertension Maternal Grandmother     Breast cancer Maternal Grandmother     Cancer Maternal Grandfather     Hypertension Paternal Grandmother     Colon cancer Maternal Cousin     Ovarian cancer Neg Hx            [facility] P[facility] Odvv Intake    8/27/2023  6:37 PM CDT - Filed by Patient   Describe your reason for todays visit Seizure   What is your current physical address in the event of a medical emergency? [address]   Are you able to take your vital signs? No   Please attach any relevant images or files          Seizures   This is a chronic (Had a seizure about 1 hour ago.  Was on the phone with a friend and became confused.   She slept on the sofa after this .    She does not know how long the seizure lasted.) problem. The current episode started less than 1 hour ago. The problem has been resolved. There were more than 10 seizures. Associated symptoms include sleepiness and confusion. Pertinent negatives include no headaches, no speech difficulty, no visual disturbance, no neck stiffness, no sore throat, no chest pain, no cough, no nausea, no vomiting, no diarrhea and no muscle weakness. Characteristics do not include eye deviation, bowel incontinence, rhythmic jerking or loss of consciousness. typical symptoms are confusion The episode was Not witnessed. There was No sensation of an aura present. The seizure(s) had no focality (Recently saw her neurologist recently.  The doctor wanted to increase her dosage at that time, but the patient declined.). There were no medications administered prior to arrival.       HENT:  Negative for  sore throat.    Cardiovascular:  Negative for chest pain.   Respiratory:  Negative for cough.    Gastrointestinal:  Negative for nausea, vomiting, diarrhea and bowel incontinence.   Neurological:  Positive for seizures. Negative for speech difficulty, headaches and loss of consciousness.   Psychiatric/Behavioral:  Positive for confusion.         Objective:   The physical exam was conducted virtually.  Physical Exam   Constitutional: She is oriented to person, place, and time.   HENT:   Head: Normocephalic and atraumatic.   Eyes: Conjunctivae are normal.   Neck: Neck supple.   Abdominal: Normal appearance.   Musculoskeletal: Normal range of motion.         General: Normal range of motion.   Neurological: no focal deficit. She is alert and oriented to person, place, and time. She displays no weakness. No cranial nerve deficit or sensory deficit. Coordination normal.   Psychiatric: Mood normal.       Assessment:     1. Seizures        Plan:       Seizures     -   Patient appears to be recovering well at this point.  Advised to go to the ER if she has another seizure today or if characteristics of the seizures change   -   Advised to contact her neurologist on Monday regarding this seizure and for advice on medication changes

## 2023-08-27 NOTE — PROGRESS NOTES
Subjective     Patient ID: Pernell Ly is a 43 y.o. female.    Chief Complaint: Headache and GI Problem    Patient presents today for follow-up on headaches and GI issues.  Patient's is due to see a neurologist and should keep that appointment since she does have a history of seizures and headaches could be correlated to the.  Patient at this time denies any blurry vision nausea vomiting or gross neurological deficit.  Patient states that her GI issues are similar to those in the past.  Appeared to be more reflux related.  However patient has seen GI in the past and can follow up with them if symptoms do not resolve or if symptoms worsen    Headache   Pertinent negatives include no coughing, dizziness or weakness.   GI Problem      Review of Systems   Constitutional: Negative.    Eyes: Negative.    Respiratory:  Negative for cough, chest tightness and shortness of breath.    Cardiovascular:  Negative for chest pain, palpitations and leg swelling.   Gastrointestinal: Negative.    Musculoskeletal: Negative.  Negative for joint swelling.   Integumentary:  Negative.   Neurological:  Positive for headaches. Negative for dizziness, weakness and light-headedness.   All other systems reviewed and are negative.         Objective     Physical Exam  Vitals reviewed.   Constitutional:       General: She is not in acute distress.     Appearance: Normal appearance. She is well-developed and normal weight. She is not ill-appearing, toxic-appearing or diaphoretic.   HENT:      Head: Normocephalic and atraumatic.      Right Ear: Tympanic membrane, ear canal and external ear normal. There is no impacted cerumen.      Left Ear: Tympanic membrane, ear canal and external ear normal. There is no impacted cerumen.      Nose: Nose normal.      Mouth/Throat:      Pharynx: No oropharyngeal exudate or posterior oropharyngeal erythema.   Eyes:      Extraocular Movements: Extraocular movements intact.      Conjunctiva/sclera: Conjunctivae  normal.      Pupils: Pupils are equal, round, and reactive to light.   Neck:      Thyroid: No thyromegaly.   Cardiovascular:      Rate and Rhythm: Normal rate and regular rhythm.      Pulses: Normal pulses.      Heart sounds: Normal heart sounds. No murmur heard.  Pulmonary:      Effort: Pulmonary effort is normal. No respiratory distress.      Breath sounds: Normal breath sounds. No stridor. No rhonchi or rales.   Chest:      Chest wall: No tenderness.   Abdominal:      General: Bowel sounds are normal. There is no distension.      Palpations: Abdomen is soft. There is no mass.      Tenderness: There is no abdominal tenderness. There is no guarding or rebound.   Musculoskeletal:         General: No tenderness. Normal range of motion.      Cervical back: Normal range of motion and neck supple.      Right lower leg: No edema.      Left lower leg: No edema.   Lymphadenopathy:      Cervical: No cervical adenopathy.   Skin:     General: Skin is warm and dry.      Findings: No erythema.   Neurological:      General: No focal deficit present.      Mental Status: She is alert and oriented to person, place, and time. Mental status is at baseline.      Cranial Nerves: No cranial nerve deficit.      Sensory: No sensory deficit.      Motor: No weakness.      Coordination: Coordination normal.      Gait: Gait normal.      Deep Tendon Reflexes: Reflexes are normal and symmetric. Reflexes normal.   Psychiatric:         Mood and Affect: Mood normal.         Behavior: Behavior normal.         Thought Content: Thought content normal.         Judgment: Judgment normal.            Assessment and Plan     1. Gastroesophageal reflux disease with esophagitis without hemorrhage  -     Discontinue: omeprazole (PRILOSEC) 40 MG capsule; Take 1 capsule (40 mg total) by mouth once daily.  Dispense: 30 capsule; Refill: 0  -     Comprehensive Metabolic Panel; Future; Expected date: 06/28/2023        trial of Prilosec 1 tablet daily times 14  days.  We will also obtain a CMP.  Refer to GI if symptoms worsen.  Side effects of med discussed with patient inb depth  For headaches patient will need to follow up with a neurologist.  Patient needs to keep appointment that she was given.  Proceed to the emergency room if symptoms worsen.         No follow-ups on file.

## 2023-08-28 DIAGNOSIS — G40.909 SEIZURE DISORDER: Primary | ICD-10-CM

## 2023-08-28 DIAGNOSIS — R51.9 HEADACHE DISORDER: ICD-10-CM

## 2023-08-28 RX ORDER — TOPIRAMATE 50 MG/1
50 TABLET, FILM COATED ORAL 2 TIMES DAILY
Qty: 60 TABLET | Refills: 11 | Status: SHIPPED | OUTPATIENT
Start: 2023-08-28 | End: 2024-08-27

## 2023-08-28 NOTE — PATIENT INSTRUCTIONS
You came to [facility] because of your seizure disorder.  Your seizures are mostly controlled on lamotrigine 150mg twice daily.  Please continue the same medication at the same dose.     We have you scheduled for an ambulatory EEG to get a clearer idea of what these staring episodes are and how to best treat them.    Do not miss any doses of medication. If a dose of medication is missed, take it as soon as it is remembered even if that means doubling up on the dose. Get regular sleep. Go to sleep at the same time and wake up at the same time every day. People with epilepsy require more sleep than people without epilepsy.  Sleeping 10-12 hours a day can be normal for a person with epilepsy.  Every seizure makes it harder to prevent the next seizure. Epilepsy is associated with SUDEP, or sudden unexpected death in epilepsy.  The risk is significantly higher if convulsive seizures are not well controlled. For more information, check out these websites: https://www.epilepsy.com/, https://www.epilepsyallianceamerica.org/, www.miky-epilepsy.org, www.womenandepilepsy.org.  If you are interested in meeting other individuals in our epilepsy community, please reach out to the [facility] [place] ([phone], [phone], [email or web address]@[email or web address]).  They organize many informative and fun activities in the region.  They can provide you invaluable information on how to get access to resources available for patients living with epilepsy as well as a rich community of like-minded individuals who are all learning to cope with the same issues.  It is very important to remember, you are not alone.     You came to neurology clinic because of a multifactorial headache.  For migraines, please write a headache diary with special attention to headache symptoms, durations, triggers, medications used and bring this to the next clinic visit. For posterior head and neck pain, try a rolled towel underneath your neck while you  sleep for comfort. Ice/heat/massage. Physical Therapy for neck muscle spasms, they will call you to schedule. Please try yoga to help take some of the strain off your back and to work on strength and flexibility.  For pain, you can take over-the-counter ibuprofen (max dose 400-600 mg every 8 hr)/naproxen (max dose 500 mg every 12 hr) or an acetaminophen formulation like Excedrin, Tylenol, or Goody's powder (max dose 1 g every 8 hr). It is important that you do not take any of these medications more than one full day every 4 days (7 days a month).  If you take them more frequently than that, it can contribute to a medication overuse headache as well as other issues such as a GI upset and/or kidney/liver injury.           Per [place] law, no episodes of loss of consciousness for 6 months before driving.  Avoid dangerous situations.  For example, no baths/pools alone, no heights, no power tools.  Wear a bike helmet.  If breakthrough seizures occur that are different in character, frequency, or duration from normal episodes, please patient portal me or call the office and we will decide the next steps. If multiple seizures occur in a row without return back to baseline, 911 needs to be called.     Return to clinic in 3 months or sooner with issues.  Please patient portal with any questions or concerns.    Amber Ortiz PA-C   Neurology-Epilepsy  [facility]

## 2023-08-28 NOTE — PROGRESS NOTES
Called and spoke with pt regarding recent seizure. Trial of topiramate 50mg BID in addition to lamotrigine 150mg BID given recent breakthrough event as well as to help w/ frequent headaches. Of note, pt tried zonisamide in the past but does not recall why it was stopped, denies any allergic reaction, thinks maybe it just wasn't helping. Advised to reach out over the portal with any issues. Patient is comfortable with plan. All questions and concerns are addressed at this time. Will schedule for close follow up in 3 months.    Amber Ortiz PA-C   Neurology-Epilepsy  [facility]

## 2023-09-05 DIAGNOSIS — K21.00 GASTROESOPHAGEAL REFLUX DISEASE WITH ESOPHAGITIS WITHOUT HEMORRHAGE: ICD-10-CM

## 2023-09-05 NOTE — TELEPHONE ENCOUNTER
No care due was identified.  [facility] Embedded Care Due Messages. Reference number: [id number].   9/05/2023 12:55:34 PM CDT

## 2023-09-06 RX ORDER — OMEPRAZOLE 40 MG/1
40 CAPSULE, DELAYED RELEASE ORAL
Qty: 90 CAPSULE | Refills: 3 | Status: SHIPPED | OUTPATIENT
Start: 2023-09-06 | End: 2024-02-28

## 2023-09-06 NOTE — TELEPHONE ENCOUNTER
Refill Routing Note   Medication(s) are not appropriate for processing by [facility] for the following reason(s):      New or recently adjusted medication    [facility] action(s):  Defer Care Due:  None identified     Medication Therapy Plan: New start (6/26/23); Defer      Appointments  past 12m or future 3m with PCP    Date Provider   Last Visit   6/28/2023 Zoey Nelson MD   Next Visit   Visit date not found Zoey Nelson MD   ED visits in past 90 days: 0        Note composed:8:57 PM 09/05/2023

## 2023-09-07 ENCOUNTER — HOSPITAL ENCOUNTER (EMERGENCY)
Facility: OTHER | Age: 43
Discharge: HOME | End: 2023-09-07
Attending: EMERGENCY MEDICINE
Payer: MEDICAID

## 2023-09-07 VITALS
TEMPERATURE: 98 F | DIASTOLIC BLOOD PRESSURE: 79 MMHG | SYSTOLIC BLOOD PRESSURE: 127 MMHG | BODY MASS INDEX: 27.31 KG/M2 | HEART RATE: 91 BPM | OXYGEN SATURATION: 100 % | WEIGHT: 160 LBS | HEIGHT: 64 IN | RESPIRATION RATE: 18 BRPM

## 2023-09-07 DIAGNOSIS — E87.6 HYPOKALEMIA: ICD-10-CM

## 2023-09-07 DIAGNOSIS — R51.9 NONINTRACTABLE HEADACHE, UNSPECIFIED CHRONICITY PATTERN, UNSPECIFIED HEADACHE TYPE: ICD-10-CM

## 2023-09-07 DIAGNOSIS — R20.2 PARESTHESIA: Primary | ICD-10-CM

## 2023-09-07 DIAGNOSIS — E83.39 HYPOPHOSPHATEMIA: ICD-10-CM

## 2023-09-07 LAB
ALBUMIN SERPL BCP-MCNC: 3.8 G/DL (ref 3.5–5.2)
ALP SERPL-CCNC: 55 U/L (ref 55–135)
ALT SERPL W/O P-5'-P-CCNC: 10 U/L (ref 10–44)
ANION GAP SERPL CALC-SCNC: 5 MMOL/L (ref 8–16)
AST SERPL-CCNC: 12 U/L (ref 10–40)
BASOPHILS # BLD AUTO: 0.03 K/UL (ref 0–0.2)
BASOPHILS NFR BLD: 0.6 % (ref 0–1.9)
BILIRUB SERPL-MCNC: 0.3 MG/DL (ref 0.1–1)
BILIRUB UR QL STRIP: NEGATIVE
BUN SERPL-MCNC: 14 MG/DL (ref 6–20)
CALCIUM SERPL-MCNC: 9.2 MG/DL (ref 8.7–10.5)
CHLORIDE SERPL-SCNC: 112 MMOL/L (ref 95–110)
CLARITY UR: CLEAR
CO2 SERPL-SCNC: 21 MMOL/L (ref 23–29)
COLOR UR: YELLOW
CREAT SERPL-MCNC: 0.9 MG/DL (ref 0.5–1.4)
DIFFERENTIAL METHOD: ABNORMAL
EOSINOPHIL # BLD AUTO: 0.1 K/UL (ref 0–0.5)
EOSINOPHIL NFR BLD: 1.5 % (ref 0–8)
ERYTHROCYTE [DISTWIDTH] IN BLOOD BY AUTOMATED COUNT: 14.1 % (ref 11.5–14.5)
EST. GFR  (NO RACE VARIABLE): >60 ML/MIN/1.73 M^2
GLUCOSE SERPL-MCNC: 90 MG/DL (ref 70–110)
GLUCOSE UR QL STRIP: NEGATIVE
HCT VFR BLD AUTO: 37.3 % (ref 37–48.5)
HGB BLD-MCNC: 12.8 G/DL (ref 12–16)
HGB UR QL STRIP: NEGATIVE
IMM GRANULOCYTES # BLD AUTO: 0.02 K/UL (ref 0–0.04)
IMM GRANULOCYTES NFR BLD AUTO: 0.4 % (ref 0–0.5)
KETONES UR QL STRIP: NEGATIVE
LEUKOCYTE ESTERASE UR QL STRIP: NEGATIVE
LYMPHOCYTES # BLD AUTO: 2 K/UL (ref 1–4.8)
LYMPHOCYTES NFR BLD: 42.6 % (ref 18–48)
MAGNESIUM SERPL-MCNC: 2 MG/DL (ref 1.6–2.6)
MCH RBC QN AUTO: 29.7 PG (ref 27–31)
MCHC RBC AUTO-ENTMCNC: 34.3 G/DL (ref 32–36)
MCV RBC AUTO: 87 FL (ref 82–98)
MONOCYTES # BLD AUTO: 0.4 K/UL (ref 0.3–1)
MONOCYTES NFR BLD: 9.2 % (ref 4–15)
NEUTROPHILS # BLD AUTO: 2.1 K/UL (ref 1.8–7.7)
NEUTROPHILS NFR BLD: 45.7 % (ref 38–73)
NITRITE UR QL STRIP: NEGATIVE
NRBC BLD-RTO: 0 /100 WBC
PH UR STRIP: 6 [PH] (ref 5–8)
PHOSPHATE SERPL-MCNC: 2.6 MG/DL (ref 2.7–4.5)
PLATELET # BLD AUTO: 303 K/UL (ref 150–450)
PMV BLD AUTO: 8.2 FL (ref 9.2–12.9)
POTASSIUM SERPL-SCNC: 3.3 MMOL/L (ref 3.5–5.1)
PROT SERPL-MCNC: 7.9 G/DL (ref 6–8.4)
PROT UR QL STRIP: NEGATIVE
RBC # BLD AUTO: 4.31 M/UL (ref 4–5.4)
SODIUM SERPL-SCNC: 138 MMOL/L (ref 136–145)
SP GR UR STRIP: 1.02 (ref 1–1.03)
URN SPEC COLLECT METH UR: NORMAL
UROBILINOGEN UR STRIP-ACNC: NEGATIVE EU/DL
WBC # BLD AUTO: 4.69 K/UL (ref 3.9–12.7)

## 2023-09-07 PROCEDURE — 85025 COMPLETE CBC W/AUTO DIFF WBC: CPT | Performed by: EMERGENCY MEDICINE

## 2023-09-07 PROCEDURE — 96375 TX/PRO/DX INJ NEW DRUG ADDON: CPT

## 2023-09-07 PROCEDURE — 96361 HYDRATE IV INFUSION ADD-ON: CPT

## 2023-09-07 PROCEDURE — 99285 EMERGENCY DEPT VISIT HI MDM: CPT | Mod: 25

## 2023-09-07 PROCEDURE — 96374 THER/PROPH/DIAG INJ IV PUSH: CPT

## 2023-09-07 PROCEDURE — 84100 ASSAY OF PHOSPHORUS: CPT | Performed by: EMERGENCY MEDICINE

## 2023-09-07 PROCEDURE — 25000003 PHARM REV CODE 250: Performed by: EMERGENCY MEDICINE

## 2023-09-07 PROCEDURE — 83735 ASSAY OF MAGNESIUM: CPT | Performed by: EMERGENCY MEDICINE

## 2023-09-07 PROCEDURE — 63600175 PHARM REV CODE 636 W HCPCS: Performed by: EMERGENCY MEDICINE

## 2023-09-07 PROCEDURE — 81003 URINALYSIS AUTO W/O SCOPE: CPT | Performed by: EMERGENCY MEDICINE

## 2023-09-07 PROCEDURE — 80053 COMPREHEN METABOLIC PANEL: CPT | Performed by: EMERGENCY MEDICINE

## 2023-09-07 RX ORDER — KETOROLAC TROMETHAMINE 30 MG/ML
10 INJECTION, SOLUTION INTRAMUSCULAR; INTRAVENOUS
Status: COMPLETED | OUTPATIENT
Start: 2023-09-07 | End: 2023-09-07

## 2023-09-07 RX ORDER — PROCHLORPERAZINE EDISYLATE 5 MG/ML
5 INJECTION INTRAMUSCULAR; INTRAVENOUS ONCE
Status: COMPLETED | OUTPATIENT
Start: 2023-09-07 | End: 2023-09-07

## 2023-09-07 RX ADMIN — KETOROLAC TROMETHAMINE 10 MG: 30 INJECTION, SOLUTION INTRAMUSCULAR; INTRAVENOUS at 02:09

## 2023-09-07 RX ADMIN — PROCHLORPERAZINE EDISYLATE 5 MG: 5 INJECTION INTRAMUSCULAR; INTRAVENOUS at 02:09

## 2023-09-07 RX ADMIN — POTASSIUM BICARBONATE 25 MEQ: 978 TABLET, EFFERVESCENT ORAL at 04:09

## 2023-09-07 RX ADMIN — SODIUM CHLORIDE, POTASSIUM CHLORIDE, SODIUM LACTATE AND CALCIUM CHLORIDE 1000 ML: 600; 310; 30; 20 INJECTION, SOLUTION INTRAVENOUS at 02:09

## 2023-09-07 NOTE — DISCHARGE INSTRUCTIONS
Your potassium potassium was low.  Your head CT was unremarkable.  I think your paresthesias could have been an aura from a developing migraine.  It seemed to resolve as well as her headache after treatment.  Please follow-up with Neurology

## 2023-09-07 NOTE — ED PROVIDER NOTES
Encounter Date: 2023    SCRIBE #1 NOTE: I, King Stokes, am scribing for, and in the presence of,  Evie Ng MD. I have scribed the following portions of the note - Other sections scribed: HPI, ROS, PE.       History     Chief Complaint   Patient presents with    Dizziness     Pt states that she started to have some dizziness yesterday with R and L arm tingling. No neuro deficits noted in triage, no headache no blurred vision no slurred speech and equal  strength     Time seen by provider: 2:19 PM    This is a 43 y.o. female with a PMHx of seizures and migraines who presents with complaint of numbness that began about 11 hours ago. She states that she woke up with this numbness that began in the left side of her face, moved into her left arm and finally into her left leg. A few minutes later this shifted over to the right side of her body starting in the same spot and moving the same way as it did on the left side of her body. At this time, she reports that she still has numbness in her face on both sides as well as a headache located on the top and back of her head rated at a 4/10 pain. This headache is different from her normal headaches and is not typical of her usual migraines either. She denies any slurred speech, vision changes, leg swelling, or difficulty walking. She does reports lightheadedness and shortness of breath when getting up and ambulating in her house. Patient denies any other complaints at this time.    The history is provided by the patient.     Review of patient's allergies indicates:  No Known Allergies  Past Medical History:   Diagnosis Date    Encounter for blood transfusion     Fibroids     Hypertension     Iron deficiency anemia     Menorrhagia     Migraine     Seizures     as baby     Past Surgical History:   Procedure Laterality Date     SECTION      x1    HYSTERECTOMY  2017    TUBAL LIGATION       Family History   Problem Relation Age of Onset    Hypertension  Father     Hypertension Sister     Hypertension Maternal Grandmother     Breast cancer Maternal Grandmother     Cancer Maternal Grandfather     Hypertension Paternal Grandmother     Colon cancer Maternal Cousin     Ovarian cancer Neg Hx      Social History     Tobacco Use    Smoking status: Never     Passive exposure: Never    Smokeless tobacco: Never   Substance Use Topics    Alcohol use: No     Comment: Occasional; 3x /month    Drug use: No     Review of Systems   Constitutional:  Negative for unexpected weight change.   HENT:  Negative for nosebleeds.    Eyes:  Negative for pain and visual disturbance.   Respiratory:  Positive for shortness of breath. Negative for apnea.    Cardiovascular:  Negative for palpitations and leg swelling.   Gastrointestinal:  Negative for constipation.   Genitourinary:  Negative for enuresis.   Skin:  Negative for pallor.   Neurological:  Positive for light-headedness, numbness and headaches.   Hematological:  Does not bruise/bleed easily.   Psychiatric/Behavioral:  Negative for sleep disturbance.        Physical Exam     Initial Vitals [09/07/23 1402]   BP Pulse Resp Temp SpO2   (!) 161/90 98 18 98 °F (36.7 °C) 98 %      MAP       --         Physical Exam    Nursing note and vitals reviewed.  Constitutional: She appears well-developed and well-nourished.   HENT:   Head: Normocephalic and atraumatic.   Mouth/Throat: Oropharynx is clear and moist.   Eyes: Conjunctivae and EOM are normal. Pupils are equal, round, and reactive to light.   Neck: Neck supple.   Normal range of motion.  Cardiovascular:  Normal rate, regular rhythm, normal heart sounds and intact distal pulses.     Exam reveals no gallop and no friction rub.       No murmur heard.  Pulmonary/Chest: Breath sounds normal. No respiratory distress. She has no wheezes. She has no rhonchi. She has no rales.   Abdominal: Abdomen is soft. Bowel sounds are normal. She exhibits no distension. There is no abdominal tenderness. There is  no rebound and no guarding.   Musculoskeletal:         General: No tenderness or edema. Normal range of motion.      Cervical back: Normal range of motion and neck supple.     Lymphadenopathy:     She has no cervical adenopathy.   Neurological: She is alert and oriented to person, place, and time. She has normal strength and normal reflexes. No sensory deficit.   No drift. Upper and lower extremities 5/5 strength. Sensation to touch intact upper and lower extremities. Sensations intact in face.   Skin: Skin is warm and dry.   Psychiatric: She has a normal mood and affect. Her behavior is normal. Judgment and thought content normal.         ED Course   Procedures  Labs Reviewed   CBC W/ AUTO DIFFERENTIAL - Abnormal; Notable for the following components:       Result Value    MPV 8.2 (*)     All other components within normal limits   PHOSPHORUS - Abnormal; Notable for the following components:    Phosphorus 2.6 (*)     All other components within normal limits   COMPREHENSIVE METABOLIC PANEL - Abnormal; Notable for the following components:    Potassium 3.3 (*)     Chloride 112 (*)     CO2 21 (*)     Anion Gap 5 (*)     All other components within normal limits   URINALYSIS, REFLEX TO URINE CULTURE    Narrative:     Specimen Source->Urine   MAGNESIUM          Imaging Results              CT Head Without Contrast (Final result)  Result time 09/07/23 15:14:06      Final result by Manuel Chavis MD (09/07/23 15:14:06)                   Impression:      No acute intracranial abnormality.  Specifically, no intracranial hemorrhage.    Paranasal sinus disease.      Electronically signed by: Manuel Chavis MD  Date:    09/07/2023  Time:    15:14               Narrative:    EXAMINATION:  CT HEAD WITHOUT CONTRAST    CLINICAL HISTORY:  Headache, chronic, new features or increased frequency;    TECHNIQUE:  Low dose axial CT images obtained throughout the head without intravenous contrast. Sagittal and coronal reconstructions  were performed.    COMPARISON:  MRI brain 11/23/2018, head CT 11/13/2018    FINDINGS:  Intracranial compartment:    Ventricles and sulci are normal in size for age without evidence of hydrocephalus. No extra-axial blood or fluid collections.    The brain parenchyma appears normal. No parenchymal mass, hemorrhage, edema or major vascular distribution infarct.    Skull/extracranial contents (limited evaluation): No fracture. Patchy mucosal thickening of the bilateral paranasal sinuses.  No air-fluid level seen.  Mastoid air cells are clear.  Imaged portions of the orbits are within normal limits.                                       Medications   lactated ringers bolus 1,000 mL (0 mLs Intravenous Stopped 9/7/23 1639)   ketorolac injection 9.999 mg (9.999 mg Intravenous Given 9/7/23 1437)   prochlorperazine injection Soln 5 mg (5 mg Intravenous Given 9/7/23 1438)   potassium bicarbonate disintegrating tablet 25 mEq (25 mEq Oral Given 9/7/23 1636)     Medical Decision Making  Emergent evaluation a 43-year-old female presenting today with migratory paresthesias and headache.    Vitals reviewed, hypertensive otherwise stable    Neurologically intact, sensation intact.    Differential diagnosis includes not limited to atypical migraine, aura, electrolyte derangement, DIANA.  I have considered intracranial cause however felt less likely given bilateral nature of her symptoms.    Plan for CT head, labs, migraine cocktail.    Problems Addressed:  Hypokalemia: acute illness or injury  Hypophosphatemia: acute illness or injury  Nonintractable headache, unspecified chronicity pattern, unspecified headache type: acute illness or injury  Paresthesia: acute illness or injury    Amount and/or Complexity of Data Reviewed  Labs: ordered. Decision-making details documented in ED Course.  Radiology: ordered and independent interpretation performed.     Details: CT head reviewed and independently interpreted by me as no acute intracranial  hemorrhage    Risk  Prescription drug management.               ED Course as of 09/07/23 2032   Thu Sep 07, 2023   1521 WBC: 4.69 [GM]   1521 Hemoglobin: 12.8 [GM]   1521 Impression:     No acute intracranial abnormality.  Specifically, no intracranial hemorrhage.     Paranasal sinus disease.        Electronically signed by: Manuel Chavis MD  Date:                                            09/07/2023  Time:                                           15:14 [GM]   1614 Magnesium : 2.0 [GM]   1614 Phosphorus(!): 2.6 [GM]   1614 Potassium(!): 3.3 [GM]   1614 Chloride(!): 112 [GM]   1614 CO2(!): 21 [GM]   1624 Patient re-evaluated, states her symptoms have completely resolved, headache and paresthesias.    I think this was likely an aura with a migraine or electrolyte issue.  Her potassium was repleted.  She was given IV fluids.  Recommend follow-up with Neurology as planned. [GM]      ED Course User Index  [GM] Evie Ng MD                      Clinical Impression:   Final diagnoses:  [R20.2] Paresthesia (Primary)  [R51.9] Nonintractable headache, unspecified chronicity pattern, unspecified headache type  [E87.6] Hypokalemia  [E83.39] Hypophosphatemia        ED Disposition Condition    Discharge Stable          ED Prescriptions    None       Follow-up Information       Follow up With Specialties Details Why Contact Info    Zoey Nelson MD Family Medicine Schedule an appointment as soon as possible for a visit   [address] [place] [id number]  [phone]               Evie Ng MD  09/07/23 2032

## 2023-09-08 ENCOUNTER — PATIENT MESSAGE (OUTPATIENT)
Dept: NEUROLOGY | Facility: CLINIC | Age: 43
End: 2023-09-08
Payer: MEDICAID

## 2023-09-22 ENCOUNTER — HOSPITAL ENCOUNTER (OUTPATIENT)
Dept: NEUROLOGY | Facility: CLINIC | Age: 43
Discharge: HOME | End: 2023-09-22
Payer: MEDICAID

## 2023-09-22 ENCOUNTER — DOCUMENTATION ONLY (OUTPATIENT)
Dept: NEUROLOGY | Facility: CLINIC | Age: 43
End: 2023-09-22
Payer: MEDICAID

## 2023-09-22 DIAGNOSIS — G40.109 FOCAL EPILEPSY: Primary | ICD-10-CM

## 2023-09-25 ENCOUNTER — HOSPITAL ENCOUNTER (OUTPATIENT)
Dept: NEUROLOGY | Facility: CLINIC | Age: 43
Discharge: HOME | End: 2023-09-25
Payer: MEDICAID

## 2023-09-25 DIAGNOSIS — G40.909 SEIZURE DISORDER: ICD-10-CM

## 2023-10-03 ENCOUNTER — TELEPHONE (OUTPATIENT)
Dept: NEUROLOGY | Facility: CLINIC | Age: 43
End: 2023-10-03
Payer: MEDICAID

## 2023-10-03 NOTE — TELEPHONE ENCOUNTER
----- Message from Amber Ortiz PA-C sent at 10/2/2023  9:45 AM CDT -----  Can we schedule her for the next available follow up? Thank you!    Viki     (3) slightly limited

## 2023-11-03 ENCOUNTER — OFFICE VISIT (OUTPATIENT)
Dept: NEUROLOGY | Facility: CLINIC | Age: 43
End: 2023-11-03
Payer: MEDICAID

## 2023-11-03 DIAGNOSIS — M62.838 MUSCLE SPASMS OF NECK: ICD-10-CM

## 2023-11-03 DIAGNOSIS — R53.82 CHRONIC FATIGUE: ICD-10-CM

## 2023-11-03 DIAGNOSIS — G44.86 CERVICOGENIC HEADACHE: ICD-10-CM

## 2023-11-03 DIAGNOSIS — G40.909 SEIZURE DISORDER: Primary | ICD-10-CM

## 2023-11-03 PROCEDURE — 99215 PR OFFICE/OUTPT VISIT, EST, LEVL V, 40-54 MIN: ICD-10-PCS | Mod: 95,,,

## 2023-11-03 PROCEDURE — 99215 OFFICE O/P EST HI 40 MIN: CPT | Mod: 95,,,

## 2023-11-03 RX ORDER — TIZANIDINE 2 MG/1
2 TABLET ORAL NIGHTLY PRN
Qty: 30 TABLET | Refills: 1 | Status: SHIPPED | OUTPATIENT
Start: 2023-11-03

## 2023-11-03 NOTE — PATIENT INSTRUCTIONS
You came to [facility]psy [facility] because of your seizure disorder. Your seizures are well controlled on lamotrigine 150mg twice daily and topiramate 50mg twice daily. Please continue the same medications at the same dose.     I will send a message again to try and get the EEG report.     Do not miss any doses of medication. If a dose of medication is missed, take it as soon as it is remembered even if that means doubling up on the dose.    For the headaches, you can try taking 1-2 tablets of tizanidine before bed as needed to help with muscle spasms that may be contributing. If this medication does not work for you, you do not need to take it. I also placed a referral for physical therapy. They will call you to schedule.     I placed orders to check some other labs/vitamin levels to further evaluate your chronic fatigue. You can go to any [facility] lab at your convenience in the next 1-2 weeks to get this done.     Get regular sleep. Go to sleep at the same time and wake up at the same time every day. People with epilepsy require more sleep than people without epilepsy.  Sleeping 10-12 hours a day can be normal for a person with epilepsy.  Every seizure makes it harder to prevent the next seizure. Epilepsy is associated with SUDEP, or sudden unexpected death in epilepsy.  The risk is significantly higher if convulsive seizures are not well controlled. For more information, check out these websites: https://www.epilepsy.com/, https://www.epilepsyallianceamer[place].org/, www.miky-epilepsy.org, www.womenandepilepsy.org.  If you are interested in meeting other individuals in our epilepsy community, please reach out to the [facility] [place] ([phone], [phone], [email or web address]).  They organize many informative and fun activities in the region.  They can provide you invaluable information on how to get access to resources available for patients living with epilepsy as well as a rich community of  like-minded individuals who are all learning to cope with the same issues.  It is very important to remember, you are not alone.     Per [place] law, no episodes of loss of consciousness for 6 months before driving.  Avoid dangerous situations.  For example, no baths/pools alone, no heights, no power tools.  Wear a bike helmet.  If breakthrough seizures occur that are different in character, frequency, or duration from normal episodes, please patient portal me or call the office and we will decide the next steps. If multiple seizures occur in a row without return back to baseline, 911 needs to be called.     Return to clinic in 3 months or sooner with issues.  Please patient portal with any questions or concerns.    Amber Ortiz PA-C   Neurology-Epilepsy  [facility]

## 2023-11-03 NOTE — PROGRESS NOTES
[facility]  Epilepsy Clinic Progress Note    [facility] - NEUROLOGY 7TH FL  [facility], [place]    Date: 11/3/23  Patient Name: Pernell Ly   MRN: [id number]   PCP: Zoey Nelson  Referring Provider: No ref. provider found    The patient location is: Home  The chief complaint leading to consultation is: Epilepsy  Visit type: Virtual visit with synchronous audio and video  Total time spent with patient: 40 minutes  Each patient to whom he or she provides medical services by telemedicine is:  (1) informed of the relationship between the physician and patient and the respective role of any other health care provider with respect to management of the patient; and (2) notified that he or she may decline to receive medical services by telemedicine and may withdraw from such care at any time.    Assessment:   Pernell Ly is a 43 y.o. female presenting in follow up for longstanding epilepsy. 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 Normal EEG. Increased frequency of staring events over the past few months despite increase in lamotrigine -> obtained ambulatory EEG 9/2023 however result report has not yet been completed/uploaded to EMR, will follow up w/ physician regarding results. Patient has not had any events however since the addition of topiramate 8/28/2023. Current regimen: topiramate 50mg BID and lamotrigine 150mg BID. Continue medications at same doses. Levels. Labs to further evaluate chronic fatigue, pt had negative sleep study earlier this year. Physical therapy + trial of tizanidine 2mg qhs PRN for cervicogenic headache + muscle spasms. Follow up in 3 months or sooner with issues. Patient is comfortable with plan. All questions and concerns are addressed at this time.   Plan:     Problem List Items Addressed This Visit    None  Visit Diagnoses       Seizure disorder    -  Primary    Relevant Orders    Lamotrigine level    Topiramate level    Cervicogenic headache         Relevant Medications    tiZANidine (ZANAFLEX) 2 MG tablet    Other Relevant Orders    Ambulatory referral/consult to Physical/Occupational Therapy    Muscle spasms of neck        Relevant Medications    tiZANidine (ZANAFLEX) 2 MG tablet    Chronic fatigue        Relevant Orders    TSH    T4, FREE    VITAMIN B12    Calcitriol    FOLATE    KINGS    Sedimentation rate    C-REACTIVE PROTEIN          I completed education on seizure first aid and safety. I recommended seizure precautions with regards to avoiding unsupervised water recreational activity or bathing in tubs, climbing or working at heights, operation of heavy or dangerous machinery, caution around fire and sources of high heat, as well as any other activity which could put a patient at danger in case of a seizure.  I also reviewed the [facility] law and recommended that the patient not drive for 6 months in the event of breakthrough seizures.    Amber Ortiz PA-C   Neurology-Epilepsy  [facility]    Collaborating physician, Dr. Johnathan Weir, was available during today's encounter.     I spent approximately 63 minutes on the day of this encounter preparing to see the patient, obtaining and reviewing history and results, performing a medically appropriate exam, counseling and educating the patient/family/caregiver, documenting clinical information, coordinating care, and ordering medications, tests, procedures, and referrals.    Patient note was created using MModal Dictation.  Any errors in syntax or even information may not have been identified and edited on initial review prior to signing this note.  Subjective:   Patient seen in consultation at the request of No ref. provider found for the evaluation of epilepsy. A copy of this note will be sent to the referring physician.     Interval Events/ROS 11/3/2023:    Current ASM/SEs: lamotrigine 150mg twice daily and topiramate 50mg twice daily  Breakthrough seizures/events: none since the  addition of topiramate  Folic acid: s/p hysterectomy  Sleep: not great due to waking up w/ headaches; takes trazodone every once in a while  Mood: overall okay, denies anxiety/depression, some fluctuations in mood but is manageable    Persistent headaches described as numbness to L side of head, wakes her up during the night and is sometimes still there in the morning. Sleeps on her L side at night. Has taken tylenol 1-2 times for headache in the past week. Otherwise, no fever, no cold symptoms, no changes in vision, no new weakness, no chest pain, no shortness of breath, no nausea, no vomiting, no diarrhea, no constipation, no problems walking.    Recent Labs   Lab 04/19/22  1430 12/31/22  2229   Lamotrigine Lvl 5.0 5.1      Interval Events/ROS 8/25/2023:    Current ASM/SEs: lamotrigine 150mg twice daily; SE drowsy  Breakthrough seizures/events: staring event 8/5/2023, no identifiable trigger, described she was in the bathroom brushing her teeth then suddenly realized she was in a different room, son told her she had toothpaste around her mouth; prior to this last event was 6/15/2023; no identifiable trigger; no convulsions  Driving: no  Folic acid: s/p hysterectomy  Sleep: not great due to waking up w/ headaches   Mood: fair    Frequently wakes up in the middle of the night with headache located to the top of her head, also often wakes up with CROOK in the morning. States she has not been taking anything at home for the pain. Headache typically resolves once she is up and moving for the day. Otherwise, no fever, no cold symptoms, no changes in vision, no new weakness, no chest pain, no shortness of breath, no nausea, no vomiting, no diarrhea, no constipation, no tingling/numbness, no problems walking.     Interval Events/ROS 6/20/2023:    Current ASM/SEs: lamotrigine 150mg BID; no SE   Breakthrough seizures/events: last event was 6/15 while shopping in [employer], describes she was with her sister when she started  staring off and wouldn't respond, 'came to' and her sister was very concerned, patient was confused, states her sister told her the event lasted >15 minutes (specifically clarified that the staring lasted >15 minutes and it took additional time for the patient to fully feel back to normal), no LOC or collapse, no abnormal movements, patient can't recall whether or not she was able to hear her sister during event; reports she has experienced staring episodes/sensation of lost time at least 3 times in the past 2 months, other episodes were not witnessed    Driving: no  Folic acid: no  Sleep: does not get much sleep, broken sleep, has tried melatonin in the past  Mood: chronic anxiety, notes some feelings of depression, stopped sertraline after 3 days (didn't like the idea of taking a mood medication, but feels more open to it now)    Intermittent headaches. Otherwise, no fever, no cold symptoms, no changes in vision, no new weakness, no chest pain, no shortness of breath, no nausea, no vomiting, no diarrhea, no constipation, no tingling/numbness, no problems walking.     Interval Events/ROS 4/12/2023:    Current ASM/SEs: lamotrigine 100mg BID; no SE  Breakthrough seizures/events:  event 4/10 -> states she remembers using the bathroom then all of a sudden she was in her bed being woken up by her dad, confused, felt off; does not recall walking back from the bathroom to her bed; son told her she seemed out of it; felt back to baseline ~30 min after event; no convulsions/abnormal movements; no identifiable trigger; current event frequency is around 1 per year   Driving: no  Folic acid: no   Sleep: chronic insomnia; follows w/ sleep medicine    Mood: anxious    Headaches. Otherwise, no fever, no cold symptoms, no changes in vision, no new weakness, no chest pain, no shortness of breath, no nausea, no vomiting, no diarrhea, no constipation, no tingling/numbness, no problems walking.     HPI:   Ms. Pernell Ly is a 43  "y.o. female who presents with a chief complaint of a longstanding seizure disorder.  Patient presents today to transfer her care from Dr. Dunham after being lost to follow-up for 2 years.  The patient reports that she had been doing well until the last few months when she has begun to experience episodes of loss of time.  The patient states that she will be alone and realized that several seconds have passed and she has lost awareness for a few minutes.  She endorses expressed compliance with her Lamictal.  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  She states that she believes is contributing to chronic daily tension headaches.  She states the headaches have become worse over the past few weeks and notes that she is taking ibuprofen twice a day every day for management of headaches.  She denies associated migrainous features or focal neurologic deficits.    Seizure Type: Supsected focal onset  Seizure Etiology:   L MTS  Current AEDs: Lamictal 100 mg BID    The patient is not accompanied by family who contribute to the history. This patient has 3 types of seizure as described below. The patient reports having seizures for years The patient reports to have stable seizure control. The seizure frequency is variable. The last seizure was last week . The patient does not report side effects from seizure medication.     Seizure Type 1:   Seizure Description: LOC, GTC (one out of sleep)  Aura:  De ja vu feeling  Associated Symptoms:  none  Seizure Frequency: 2 in lifetime  Last seizure: 2020-ish    Seizure Type 2:   Seizure Description: Incomprehensible speech lasting 14 minutes with loss of time and confusion  Aura: None  Associated Symptoms:  none  Seizure Frequency: One in lifetime  Last seizure: 2019    Seizure Type 3:   Seizure Description: "Goes out into space" for a minute, always happens when she's alone  Aura: " "None  Associated Symptoms: none  Seizure Frequency: Variable, often clusters every few months "seldom"   Last seizure: 1.5 weeks ago    Handedness: right  Seizure Onset Age: infancy  Seizure/ Epilepsy Risk Factors: childhood seizure  Birth/Developmental History: normal birth history and Normal developmental history  Seizure Triggers/ Provoking Features: stress  Previous Seizure Medications: lamotrigine (Lamictal, LTG) and zonisamide (Zonegran, ZNA)  Recent Med Changes: None  Other Treatments: None  Prior Episodes of Status: None  Psychiatric/Behavioral Comorbitidies: Anxiety  Surgical Candidacy:  Pending    Prior Studies:  EEG : Not done  vEEG/ EMU evaluation: Left MTS 2018, personally reviewed  MRI of brain: Not done  AED levels:  Not done  CT/CTA Scan: Unremarkable - personally reviewed  PET Scan: Not done  Neuropsychological Evaluation: Not done  DEXA Scan: Not done  Other studies: Not done    PAST MEDICAL HISTORY:  Past Medical History:   Diagnosis Date    Encounter for blood transfusion     Fibroids     Hypertension     Iron deficiency anemia     Menorrhagia     Migraine     Seizures     as baby       PAST SURGICAL HISTORY:  Past Surgical History:   Procedure Laterality Date     SECTION      x1    HYSTERECTOMY  2017    TUBAL LIGATION         CURRENT MEDS:  Current Outpatient Medications   Medication Sig Dispense Refill    potassium chloride (KLOR-CON) 10 MEQ TbSR TAKE 1 TABLET(10 MEQ) BY MOUTH EVERY DAY 90 tablet 3    albuterol (PROVENTIL HFA) 90 mcg/actuation inhaler Inhale 2 puffs into the lungs every 6 (six) hours as needed for Wheezing. Rescue 18 g 0    azelastine (ASTELIN) 137 mcg (0.1 %) nasal spray USE 1 SPRAY NASALLY TWICE DAILY 30 mL 0    cetirizine (ZYRTEC) 10 MG tablet Take 1 tablet (10 mg total) by mouth once daily. 30 tablet 1    ferrous sulfate 324 mg (65 mg iron) TbEC Take 324 mg by mouth 2 (two) times daily.       fluticasone propionate (FLOVENT HFA) 110 mcg/actuation " inhaler Inhale 1 puff into the lungs 2 (two) times daily. Controller 12 g 1    ibuprofen (ADVIL,MOTRIN) 800 MG tablet TAKE 1 TABLET BY MOUTH THREE TIMES DAILY AS NEEDED FOR PAIN 60 tablet 2    lamoTRIgine (LAMICTAL) 150 MG Tab Take 1 tablet (150 mg total) by mouth 2 (two) times daily. 180 tablet 3    multivitamin with minerals tablet Take by mouth.      NIFEdipine (PROCARDIA-XL) 90 MG (OSM) 24 hr tablet Take 1 tablet (90 mg total) by mouth once daily. 90 tablet 1    omeprazole (PRILOSEC) 40 MG capsule TAKE 1 CAPSULE(40 MG) BY MOUTH EVERY DAY 90 capsule 3    sertraline (ZOLOFT) 50 MG tablet Take 1 tablet (50 mg total) by mouth once daily. (Take a half tablet for 2 weeks then increase to 1 full tablet daily) 90 tablet 3    topiramate (TOPAMAX) 50 MG tablet Take 1 tablet (50 mg total) by mouth 2 (two) times daily. 60 tablet 11    traZODone (DESYREL) 50 MG tablet Take 1 tablet (50 mg total) by mouth nightly as needed for Insomnia. 30 tablet 11     No current facility-administered medications for this visit.       ALLERGIES:  Review of patient's allergies indicates:  No Known Allergies    FAMILY HISTORY:  Family History   Problem Relation Age of Onset    Hypertension Father     Hypertension Sister     Hypertension Maternal Grandmother     Breast cancer Maternal Grandmother     Cancer Maternal Grandfather     Hypertension Paternal Grandmother     Colon cancer Maternal Cousin     Ovarian cancer Neg Hx        SOCIAL HISTORY:  Social History     Tobacco Use    Smoking status: Never     Passive exposure: Never    Smokeless tobacco: Never   Substance Use Topics    Alcohol use: No     Comment: Occasional; 3x /month    Drug use: No       Review of Systems:  12 system review of systems is negative except for the symptoms mentioned in HPI.      Objective:     There were no vitals filed for this visit.    General: NAD, well nourished   Eyes: no tearing, discharge, no erythema   ENT: moist mucous membranes of the oral cavity, nares  patent    Neck: free ROM  Cardiovascular: appears well perfused  Lungs: Normal work of breathing, normal chest wall excursions  Skin: No rash, lesions, or breakdown on exposed skin  Psychiatry: Mood and affect are appropriate, anxious   Abdomen: soft, non tender, non distended  Extremeties: No cyanosis, clubbing or edema.    Neurological   MENTAL STATUS: Alert and oriented to person, place, and time. Attention and concentration within normal limits. Speech without dysarthria, able to name and repeat without difficulty. Recent and remote memory within normal limits   CRANIAL NERVES: EOMI. Face symmetrical. Hearing grossly intact. Full shoulder shrug bilaterally. Tongue protrudes midline   SENSORY: no subjective deficits  MOTOR: moves all extremities freely against gravity   CEREBELLAR/COORDINATION/GAIT: remains seated

## 2023-11-06 NOTE — PROCEDURES
Pernell Ly is a 43 y.o. female patient.            Ambulatory EEG monitoring    Date/Time: 11/5/2023 11:24 PM    Performed by: Gurmeet Ugarte MD  Authorized by: Amber Ortiz PA-C          This EEG was performed to assess for evidence of underlying epilepsy.     ELECTROENCEPHALOGRAM REPORT     METHODOLOGY:  Electroencephalographic (EEG) recording is with electrodes placed according to the International 10-20 placement system.  Thirty two (32) channels of digital signal are simultaneously recorded from the scalp and may include EKG, EMG, and/or eye monitors.   Recording band pass was 0.1 to 512 hz.  Digital video recording of the patient is simultaneously recorded with the EEG.  The staff report clinical symptoms and may press an event button when the patient has symptoms of clinical interest to the treating physicians.  EEG and video recording is stored and archived in digital format.  The entire recording is visually reviewed, and the times identified by computer analysis as being spikes or seizures are reviewed again.  Activation procedures which include photic stimulation, hyperventilation and instructing patients to perform simple task are done in selected patients.   Compresses spectral analysis (CSA) is also performed on the activity recorded from each individual channel.  This is displayed as a power display of frequencies from 0 to 30 Hz over time.   The CSA analysis is done and displayed continuously.  This is reviewed for asymmetries in power between homologous areas of the scalp and for presence of changes in power which can be seen when seizures occur.  Sections of suspected abnormalities on the CSA is then compared with the original EEG recording.                Knoda software was also utilized in the review of this study.  This software suite analyzes the EEG recording in multiple domains.  Coherence and rhythmicity is computed to identify EEG sections which may contain organized  seizures.  Each channel undergoes analysis to detect presence of spike and sharp waves which have special and morphological characteristic of epileptic activity.  The routine EEG recording is converted from spacial into frequency domain.  This is then displayed comparing homologous areas to identify areas of significant asymmetry.  Algorithm to identify non-cortically generated artifact is used to separate eye movement, EMG and other artifact from the EEG.     Recording times   Start on November 22, 2023 at hour 10 minute 31 seconds 22   End on November 25, 2023 at hour 1 minute 22 seconds 35   Total time of ambulatory EEG recording for the study was 55 hours and 56 minutes    EEG FINDINGS:  The recording was obtained with a number of standard bipolar and referential montages during wakefulness, drowsiness and sleep.  In the alert state, the posterior background rhythm was a symmetric, well-modulated 9 to 10 Hz alpha rhythm, which reacted symmetrically to eye opening.  Intermittent photic stimulation evoked symmetric posterior driving responses. Hyperventilation produced physiological slowing.  No abnormalities were activated by photic stimulation or hyperventilation.  During drowsiness, the background rhythm waxed and waned and there were periods of slowing.  During stage II sleep, symmetric V waves and sleep spindles were noted.  Intermittent left frontal polar maximum epileptiform discharges were noted.  Rare right temporal maximum sharp waves were noted at F8 T4   Frequent left frontotemporal focal mixed delta range slowing was noted.  During this study 2 electrographic seizures were recorded.  Seizure 1. Was recorded from hours 13 minute 4 seconds 50 to hour 13 minutes 7 seconds 55.  The onset was characterized by buildup of rhythmical delta and theta range activity in the left frontotemporal region which evolved in morphology and rhythmicity.  1.5 minutes after the electrographic onset, the seizures spread to  the contralateral hemisphere was characterized by sharply contoured rhythmical alpha and theta activity.  The offset was characterized by return to interictal background during wakefulness.  Seizure 2. Was recorded from hour 14 minute 43 seconds 9 to hour 14 minute 45 seconds 3.  The onset and electrographic evolution this seizure was similar to the previous seizure.    The EKG channel revealed a sinus rhythm.     IMPRESSION:  This is an abnormal EEG during wakefulness, drowsiness and sleep.Intermittent left frontal polar maximum epileptiform discharges were noted. Rare right temporal maximum sharp waves were noted at F8 T4   Intermittent left frontotemporal focal slowing was noted.  Two electrographic seizures recorded during this study.  No patient diary was available for review     CLINICAL CORRELATION:  The patient is a 43-year-old female with a history of presumed epilepsy was currently maintained on topiramate and lamotrigine.  This is an abnormal EEG during wakefulness, drowsiness and sleep.  The presence of sharp waves in the left frontotemporal and right temporal region independently confers increased risk for focal seizures from these regions.  The presence of left frontotemporal focal slowing suggestive focal neural dysfunction in this region.  During this study to electrographic seizures emanating from the left frontotemporal region were noted.

## 2023-11-10 ENCOUNTER — TELEPHONE (OUTPATIENT)
Dept: NEUROLOGY | Facility: CLINIC | Age: 43
End: 2023-11-10
Payer: MEDICAID

## 2023-11-10 ENCOUNTER — PATIENT MESSAGE (OUTPATIENT)
Dept: NEUROLOGY | Facility: CLINIC | Age: 43
End: 2023-11-10
Payer: MEDICAID

## 2023-11-10 NOTE — TELEPHONE ENCOUNTER
----- Message from Amber Ortiz PA-C sent at 11/10/2023  5:10 PM CST -----  Can we get this pt scheduled for another follow up in about 4 weeks? Thank you!    Viki  ----- Message -----  From: Gurmeet Ugarte MD  Sent: 11/5/2023  11:41 PM CST  To: Amber Ortiz PA-C    Just placed a report, sorry for this delay. She may benefit from EMU to better characterize the seizures. Thanks for caring for her.     ----- Message -----  From: Amber Ortiz PA-C  Sent: 11/3/2023   1:45 PM CST  To: Scarlett Martin V; Gurmeet Ugarte MD; #    Hello,     Still missing the ambulatory EEG result report for this patient. Appreciate the assistance!    Thanks,   Viki

## 2023-11-15 ENCOUNTER — OFFICE VISIT (OUTPATIENT)
Dept: NEUROLOGY | Facility: CLINIC | Age: 43
End: 2023-11-15
Payer: MEDICAID

## 2023-11-15 DIAGNOSIS — M62.838 MUSCLE SPASMS OF NECK: ICD-10-CM

## 2023-11-15 DIAGNOSIS — R53.83 OTHER FATIGUE: ICD-10-CM

## 2023-11-15 DIAGNOSIS — G47.9 DIFFICULTY SLEEPING: ICD-10-CM

## 2023-11-15 DIAGNOSIS — G40.909 SEIZURE DISORDER: Primary | ICD-10-CM

## 2023-11-15 DIAGNOSIS — G44.86 CERVICOGENIC HEADACHE: ICD-10-CM

## 2023-11-15 PROCEDURE — 99215 PR OFFICE/OUTPT VISIT, EST, LEVL V, 40-54 MIN: ICD-10-PCS | Mod: 95,,,

## 2023-11-15 PROCEDURE — 99215 OFFICE O/P EST HI 40 MIN: CPT | Mod: 95,,,

## 2023-11-15 RX ORDER — LAMOTRIGINE 150 MG/1
300 TABLET ORAL 2 TIMES DAILY
Qty: 360 TABLET | Refills: 3 | Status: SHIPPED | OUTPATIENT
Start: 2023-11-15 | End: 2023-12-06 | Stop reason: SDUPTHER

## 2023-11-15 NOTE — PROGRESS NOTES
[facility]  Epilepsy Clinic Progress Note    [facility] - NEUROLOGY 7TH FL  [facility], [place]    Date: 11/15/23  Patient Name: Pernell Ly   MRN: [id number]   PCP: Zoey Nelson  Referring Provider: No ref. provider found    The patient location is: Home  The chief complaint leading to consultation is: Epilepsy  Visit type: Virtual visit with synchronous audio and video  Total time spent with patient: 19 minutes  Each patient to whom he or she provides medical services by telemedicine is:  (1) informed of the relationship between the physician and patient and the respective role of any other health care provider with respect to management of the patient; and (2) notified that he or she may decline to receive medical services by telemedicine and may withdraw from such care at any time.    Assessment:   Pernell Ly is a 43 y.o. female presenting in follow up for longstanding epilepsy. 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 Ambulatory EEG monitoring 9/2023 captured two electrographic seizures emanating from the left frontotemporal region as well as sharp waves in the left frontotemporal and right temporal regions despite current regimen of lamotrigine 150mg BID and topiramate 50mg BID. Escalate lamotrigine to 300mg BID over the course of 2 weeks. Continue topiramate at same dose. Levels next visit. If events persist -> EMU. Discuss possible surgical candidacy if events remain uncontrolled despite therapeutic doses of two medications. Labs to further evaluate chronic fatigue, pt had negative sleep study earlier this year. Physical therapy + trial of tizanidine 2mg qhs PRN for cervicogenic headache and associated muscle spasms. Follow up in 3 months or sooner with issues. Patient is comfortable with plan. All questions and concerns are addressed at this time.   Plan:     Problem List Items Addressed This Visit          Other    Other fatigue     Other Visit Diagnoses        Seizure disorder    -  Primary    Relevant Medications    lamoTRIgine (LAMICTAL) 150 MG Tab    Cervicogenic headache        Muscle spasms of neck        Difficulty sleeping              I completed education on seizure first aid and safety. I recommended seizure precautions with regards to avoiding unsupervised water recreational activity or bathing in tubs, climbing or working at heights, operation of heavy or dangerous machinery, caution around fire and sources of high heat, as well as any other activity which could put a patient at danger in case of a seizure. I also reviewed the [facility] law and recommended that the patient not drive for 6 months in the event of breakthrough seizures.    Amber Ortiz PA-C   Neurology-Epilepsy  [facility]    Collaborating physician, Dr. Johnathan Weir, was available during today's encounter.     I spent approximately 55 minutes on the day of this encounter preparing to see the patient, obtaining and reviewing history and results, performing a medically appropriate exam, counseling and educating the patient/family/caregiver, documenting clinical information, coordinating care, and ordering medications, tests, procedures, and referrals.    Patient note was created using MModal Dictation.  Any errors in syntax or even information may not have been identified and edited on initial review prior to signing this note.  Subjective:   Patient seen in consultation at the request of No ref. provider found for the evaluation of epilepsy. A copy of this note will be sent to the referring physician.     Interval Events/ROS 11/15/2023:    Current ASM/SEs: lamotrigine 150mg twice daily and topiramate 50mg twice daily; no SE  Breakthrough seizures/events: none that she knows of; discussed EEG results which captured 2 electrographic seizures that occurred unbeknownst to pt  Driving: no  Sleep: not great due to waking up w/ headaches; takes trazodone every once in a while  Mood:  overall okay, denies anxiety/depression, some fluctuations in mood but is manageable    Persistent headaches -> physical therapy scheduled for next week. Vivid dreams sometimes. Otherwise, no fever, no cold symptoms, no changes in vision, no new weakness, no chest pain, no shortness of breath, no nausea, no vomiting, no diarrhea, no constipation, no problems walking.    Recent Labs   Lab 04/19/22  1430 12/31/22  2229   Lamotrigine Lvl 5.0 5.1      Interval Events/ROS 11/3/2023:    Current ASM/SEs: lamotrigine 150mg twice daily and topiramate 50mg twice daily  Breakthrough seizures/events: none since the addition of topiramate  Folic acid: s/p hysterectomy  Sleep: not great due to waking up w/ headaches; takes trazodone every once in a while  Mood: overall okay, denies anxiety/depression, some fluctuations in mood but is manageable    Persistent headaches described as numbness to L side of head, wakes her up during the night and is sometimes still there in the morning. Sleeps on her L side at night. Has taken tylenol 1-2 times for headache in the past week. Otherwise, no fever, no cold symptoms, no changes in vision, no new weakness, no chest pain, no shortness of breath, no nausea, no vomiting, no diarrhea, no constipation, no problems walking.     Interval Events/ROS 8/25/2023:    Current ASM/SEs: lamotrigine 150mg twice daily; SE drowsy  Breakthrough seizures/events: staring event 8/5/2023, no identifiable trigger, described she was in the bathroom brushing her teeth then suddenly realized she was in a different room, son told her she had toothpaste around her mouth; prior to this last event was 6/15/2023; no identifiable trigger; no convulsions  Driving: no  Folic acid: s/p hysterectomy  Sleep: not great due to waking up w/ headaches   Mood: fair    Frequently wakes up in the middle of the night with headache located to the top of her head, also often wakes up with CROOK in the morning. States she has not been  taking anything at home for the pain. Headache typically resolves once she is up and moving for the day. Otherwise, no fever, no cold symptoms, no changes in vision, no new weakness, no chest pain, no shortness of breath, no nausea, no vomiting, no diarrhea, no constipation, no tingling/numbness, no problems walking.     Interval Events/ROS 6/20/2023:    Current ASM/SEs: lamotrigine 150mg BID; no SE   Breakthrough seizures/events: last event was 6/15 while shopping in [email or web address], describes she was with her sister when she started staring off and wouldn't respond, 'came to' and her sister was very concerned, patient was confused, states her sister told her the event lasted >15 minutes (specifically clarified that the staring lasted >15 minutes and it took additional time for the patient to fully feel back to normal), no LOC or collapse, no abnormal movements, patient can't recall whether or not she was able to hear her sister during event; reports she has experienced staring episodes/sensation of lost time at least 3 times in the past 2 months, other episodes were not witnessed    Driving: no  Folic acid: no  Sleep: does not get much sleep, broken sleep, has tried melatonin in the past  Mood: chronic anxiety, notes some feelings of depression, stopped sertraline after 3 days (didn't like the idea of taking a mood medication, but feels more open to it now)    Intermittent headaches. Otherwise, no fever, no cold symptoms, no changes in vision, no new weakness, no chest pain, no shortness of breath, no nausea, no vomiting, no diarrhea, no constipation, no tingling/numbness, no problems walking.     Interval Events/ROS 4/12/2023:    Current ASM/SEs: lamotrigine 100mg BID; no SE  Breakthrough seizures/events:  event 4/10 -> states she remembers using the bathroom then all of a sudden she was in her bed being woken up by her dad, confused, felt off; does not recall walking back from the bathroom to her bed; son told her she  seemed out of it; felt back to baseline ~30 min after event; no convulsions/abnormal movements; no identifiable trigger; current event frequency is around 1 per year   Driving: no  Folic acid: no   Sleep: chronic insomnia; follows w/ sleep medicine    Mood: anxious    Headaches. Otherwise, no fever, no cold symptoms, no changes in vision, no new weakness, no chest pain, no shortness of breath, no nausea, no vomiting, no diarrhea, no constipation, no tingling/numbness, no problems walking.     HPI:   Ms. Pernell Ly is a 43 y.o. female who presents with a chief complaint of a longstanding seizure disorder.  Patient presents today to transfer her care from Dr. Dunham after being lost to follow-up for 2 years.  The patient reports that she had been doing well until the last few months when she has begun to experience episodes of loss of time.  The patient states that she will be alone and realized that several seconds have passed and she has lost awareness for a few minutes.  She endorses expressed compliance with her Lamictal.  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  She states that she believes is contributing to chronic daily tension headaches.  She states the headaches have become worse over the past few weeks and notes that she is taking ibuprofen twice a day every day for management of headaches.  She denies associated migrainous features or focal neurologic deficits.    Seizure Type: Supsected focal onset  Seizure Etiology:   L MTS  Current AEDs: Lamictal 100 mg BID    The patient is not accompanied by family who contribute to the history. This patient has 3 types of seizure as described below. The patient reports having seizures for years The patient reports to have stable seizure control. The seizure frequency is variable. The last seizure was last week . The patient does not report side effects from seizure  "medication.     Seizure Type 1:   Seizure Description: LOC, GTC (one out of sleep)  Aura:  De ja vu feeling  Associated Symptoms:  none  Seizure Frequency: 2 in lifetime  Last seizure: -    Seizure Type 2:   Seizure Description: Incomprehensible speech lasting 14 minutes with loss of time and confusion  Aura: None  Associated Symptoms:  none  Seizure Frequency: One in lifetime  Last seizure:     Seizure Type 3:   Seizure Description: "Goes out into space" for a minute, always happens when she's alone  Aura: None  Associated Symptoms: none  Seizure Frequency: Variable, often clusters every few months "seldom"   Last seizure: 1.5 weeks ago    Handedness: right  Seizure Onset Age: infancy  Seizure/ Epilepsy Risk Factors: childhood seizure  Birth/Developmental History: normal birth history and Normal developmental history  Seizure Triggers/ Provoking Features: stress  Previous Seizure Medications: lamotrigine (Lamictal, LTG) and zonisamide (Zonegran, ZNA)  Recent Med Changes: None  Other Treatments: None  Prior Episodes of Status: None  Psychiatric/Behavioral Comorbitidies: Anxiety  Surgical Candidacy:  Pending    Prior Studies:  EEG : Not done  vEEG/ EMU evaluation: Left MTS 2018, personally reviewed  MRI of brain: Not done  AED levels:  Not done  CT/CTA Scan: Unremarkable - personally reviewed  PET Scan: Not done  Neuropsychological Evaluation: Not done  DEXA Scan: Not done  Other studies: Not done    PAST MEDICAL HISTORY:  Past Medical History:   Diagnosis Date    Encounter for blood transfusion     Fibroids     Hypertension     Iron deficiency anemia     Menorrhagia     Migraine     Seizures     as baby       PAST SURGICAL HISTORY:  Past Surgical History:   Procedure Laterality Date     SECTION      x1    HYSTERECTOMY  2017    TUBAL LIGATION         CURRENT MEDS:  Current Outpatient Medications   Medication Sig Dispense Refill    potassium chloride (KLOR-CON) 10 MEQ TbSR TAKE 1 " TABLET(10 MEQ) BY MOUTH EVERY DAY 90 tablet 3    albuterol (PROVENTIL HFA) 90 mcg/actuation inhaler Inhale 2 puffs into the lungs every 6 (six) hours as needed for Wheezing. Rescue 18 g 0    azelastine (ASTELIN) 137 mcg (0.1 %) nasal spray USE 1 SPRAY NASALLY TWICE DAILY 30 mL 0    cetirizine (ZYRTEC) 10 MG tablet Take 1 tablet (10 mg total) by mouth once daily. 30 tablet 1    fluticasone propionate (FLOVENT HFA) 110 mcg/actuation inhaler Inhale 1 puff into the lungs 2 (two) times daily. Controller 12 g 1    ibuprofen (ADVIL,MOTRIN) 800 MG tablet TAKE 1 TABLET BY MOUTH THREE TIMES DAILY AS NEEDED FOR PAIN 60 tablet 2    lamoTRIgine (LAMICTAL) 150 MG Tab Take 1 tablet (150 mg total) by mouth 2 (two) times daily. 180 tablet 3    multivitamin with minerals tablet Take by mouth.      NIFEdipine (PROCARDIA-XL) 90 MG (OSM) 24 hr tablet Take 1 tablet (90 mg total) by mouth once daily. 90 tablet 1    omeprazole (PRILOSEC) 40 MG capsule TAKE 1 CAPSULE(40 MG) BY MOUTH EVERY DAY 90 capsule 3    tiZANidine (ZANAFLEX) 2 MG tablet Take 1 tablet (2 mg total) by mouth nightly as needed (muscle spasms). 30 tablet 1    topiramate (TOPAMAX) 50 MG tablet Take 1 tablet (50 mg total) by mouth 2 (two) times daily. 60 tablet 11    traZODone (DESYREL) 50 MG tablet Take 1 tablet (50 mg total) by mouth nightly as needed for Insomnia. 30 tablet 11     No current facility-administered medications for this visit.       ALLERGIES:  Review of patient's allergies indicates:  No Known Allergies    FAMILY HISTORY:  Family History   Problem Relation Age of Onset    Hypertension Father     Hypertension Sister     Hypertension Maternal Grandmother     Breast cancer Maternal Grandmother     Cancer Maternal Grandfather     Hypertension Paternal Grandmother     Colon cancer Maternal Cousin     Ovarian cancer Neg Hx        SOCIAL HISTORY:  Social History     Tobacco Use    Smoking status: Never     Passive exposure: Never    Smokeless tobacco: Never    Substance Use Topics    Alcohol use: No     Comment: Occasional; 3x /month    Drug use: No     Review of Systems:  12 system review of systems is negative except for the symptoms mentioned in HPI.     Objective:     There were no vitals filed for this visit.    General: NAD, well nourished   Eyes: no tearing, discharge, no erythema   ENT: moist mucous membranes of the oral cavity, nares patent    Neck: free ROM  Cardiovascular: appears well perfused  Lungs: Normal work of breathing, normal chest wall excursions  Skin: No rash, lesions, or breakdown on exposed skin  Psychiatry: Mood and affect are appropriate, anxious   Abdomen: soft, non tender, non distended  Extremeties: No cyanosis, clubbing or edema.    Neurological   MENTAL STATUS: Alert and oriented to person, place, and time. Attention and concentration within normal limits. Speech without dysarthria, able to name and repeat without difficulty. Recent and remote memory within normal limits   CRANIAL NERVES: EOMI. Face symmetrical. Hearing grossly intact. Full shoulder shrug bilaterally. Tongue protrudes midline   SENSORY: no subjective deficits  MOTOR: moves all extremities freely against gravity   CEREBELLAR/COORDINATION/GAIT: remains seated

## 2023-11-16 NOTE — PATIENT INSTRUCTIONS
You came to Epilepsy Clinic because of your seizure disorder. Your seizures are mostly controlled on lamotrigine 150mg twice daily and topiramate 50mg twice daily.    We will increase the lamotrigine to 300mg twice daily. Please take 1 tablet in the morning and 2 tablets at night for two weeks, then increase to 2 tablets twice daily. Continue topiramate at same dose.     Do not miss any doses of medication. If a dose of medication is missed, take it as soon as it is remembered even if that means doubling up on the dose. Please get a lab test to check out the blood level of medication. You can go to any [facility] lab at your convenience to get this done.    Get regular sleep. Go to sleep at the same time and wake up at the same time every day. People with epilepsy require more sleep than people without epilepsy.  Sleeping 10-12 hours a day can be normal for a person with epilepsy.  Every seizure makes it harder to prevent the next seizure. Epilepsy is associated with SUDEP, or sudden unexpected death in epilepsy.  The risk is significantly higher if convulsive seizures are not well controlled. For more information, check out these websites: https://www.epilepsy.com/, https://www.epilepsyalliance[place].org/, www.miky-epilepsy.org, www.womenandepilepsy.org.  If you are interested in meeting other individuals in our epilepsy community, please reach out to the [facility]ile[facility] [place] ([phone], [phone], [email or web address]).  They organize many informative and fun activities in the region.  They can provide you invaluable information on how to get access to resources available for patients living with epilepsy as well as a rich community of like-minded individuals who are all learning to cope with the same issues.  It is very important to remember, you are not alone.     Per [place] law, no episodes of loss of awareness for 6 months before driving. Avoid dangerous situations.  For example, no  baths/pools alone, no heights, no power tools.  Wear a bike helmet.  If breakthrough seizures occur that are different in character, frequency, or duration from normal episodes, please patient portal me or call the office and we will decide the next steps. If multiple seizures occur in a row without return back to baseline, 911 needs to be called.     Return to clinic in 3 months or sooner with issues.  Please patient portal with any questions or concerns.    Amber Ortiz PA-C   Neurology-Epilepsy  [facility]

## 2023-11-22 PROCEDURE — 95721 EEG PHY/QHP>36<60 HR W/O VID: CPT | Mod: ,,, | Performed by: PSYCHIATRY & NEUROLOGY

## 2023-11-22 PROCEDURE — 95721 PR EEG, W/O VIDEO, CONT RECORD, CMPLT STDY, I&R, >36<60 HRS: ICD-10-PCS | Mod: ,,, | Performed by: PSYCHIATRY & NEUROLOGY

## 2023-11-28 ENCOUNTER — HOSPITAL ENCOUNTER (EMERGENCY)
Facility: OTHER | Age: 43
Discharge: HOME | End: 2023-11-28
Attending: EMERGENCY MEDICINE
Payer: MEDICAID

## 2023-11-28 ENCOUNTER — CLINICAL SUPPORT (OUTPATIENT)
Dept: REHABILITATION | Facility: OTHER | Age: 43
End: 2023-11-28
Payer: MEDICAID

## 2023-11-28 VITALS
OXYGEN SATURATION: 100 % | HEIGHT: 64 IN | HEART RATE: 80 BPM | RESPIRATION RATE: 19 BRPM | TEMPERATURE: 98 F | BODY MASS INDEX: 27.31 KG/M2 | WEIGHT: 160 LBS | DIASTOLIC BLOOD PRESSURE: 79 MMHG | SYSTOLIC BLOOD PRESSURE: 123 MMHG

## 2023-11-28 DIAGNOSIS — R29.898 DECREASED RANGE OF MOTION OF NECK: Primary | ICD-10-CM

## 2023-11-28 DIAGNOSIS — R29.818 ACUTE FOCAL NEUROLOGICAL DEFICIT: ICD-10-CM

## 2023-11-28 DIAGNOSIS — G44.86 CERVICOGENIC HEADACHE: ICD-10-CM

## 2023-11-28 DIAGNOSIS — R20.0 NUMBNESS: Primary | ICD-10-CM

## 2023-11-28 LAB
ALBUMIN SERPL BCP-MCNC: 4.3 G/DL (ref 3.5–5.2)
ALP SERPL-CCNC: 73 U/L (ref 55–135)
ALT SERPL W/O P-5'-P-CCNC: 14 U/L (ref 10–44)
ANION GAP SERPL CALC-SCNC: 13 MMOL/L (ref 8–16)
AST SERPL-CCNC: 13 U/L (ref 10–40)
BASOPHILS # BLD AUTO: 0.03 K/UL (ref 0–0.2)
BASOPHILS NFR BLD: 0.4 % (ref 0–1.9)
BILIRUB SERPL-MCNC: 0.2 MG/DL (ref 0.1–1)
BUN SERPL-MCNC: 13 MG/DL (ref 6–20)
CALCIUM SERPL-MCNC: 9.4 MG/DL (ref 8.7–10.5)
CHLORIDE SERPL-SCNC: 110 MMOL/L (ref 95–110)
CHOLEST SERPL-MCNC: 183 MG/DL (ref 120–199)
CHOLEST/HDLC SERPL: 2.9 {RATIO} (ref 2–5)
CO2 SERPL-SCNC: 17 MMOL/L (ref 23–29)
CREAT SERPL-MCNC: 0.8 MG/DL (ref 0.5–1.4)
CREAT SERPL-MCNC: 0.9 MG/DL (ref 0.5–1.4)
CREATININE POCT: 0.8
DIFFERENTIAL METHOD: ABNORMAL
EOSINOPHIL # BLD AUTO: 0.1 K/UL (ref 0–0.5)
EOSINOPHIL NFR BLD: 1.3 % (ref 0–8)
ERYTHROCYTE [DISTWIDTH] IN BLOOD BY AUTOMATED COUNT: 14.1 % (ref 11.5–14.5)
EST. GFR  (NO RACE VARIABLE): >60 ML/MIN/1.73 M^2
GLUCOSE SERPL-MCNC: 131 MG/DL (ref 70–110)
HCT VFR BLD AUTO: 38.5 % (ref 37–48.5)
HCV AB SERPL QL IA: NEGATIVE
HDLC SERPL-MCNC: 63 MG/DL (ref 40–75)
HDLC SERPL: 34.4 % (ref 20–50)
HGB BLD-MCNC: 13.1 G/DL (ref 12–16)
IMM GRANULOCYTES # BLD AUTO: 0.01 K/UL (ref 0–0.04)
IMM GRANULOCYTES NFR BLD AUTO: 0.1 % (ref 0–0.5)
INR PPP: 0.9 (ref 0.8–1.2)
LDLC SERPL CALC-MCNC: 109 MG/DL (ref 63–159)
LYMPHOCYTES # BLD AUTO: 3.9 K/UL (ref 1–4.8)
LYMPHOCYTES NFR BLD: 52 % (ref 18–48)
MAGNESIUM SERPL-MCNC: 2.2 MG/DL (ref 1.6–2.6)
MCH RBC QN AUTO: 29.8 PG (ref 27–31)
MCHC RBC AUTO-ENTMCNC: 34 G/DL (ref 32–36)
MCV RBC AUTO: 88 FL (ref 82–98)
MONOCYTES # BLD AUTO: 0.6 K/UL (ref 0.3–1)
MONOCYTES NFR BLD: 7.4 % (ref 4–15)
NEUTROPHILS # BLD AUTO: 2.9 K/UL (ref 1.8–7.7)
NEUTROPHILS NFR BLD: 38.8 % (ref 38–73)
NONHDLC SERPL-MCNC: 120 MG/DL
NRBC BLD-RTO: 0 /100 WBC
PLATELET # BLD AUTO: 416 K/UL (ref 150–450)
PMV BLD AUTO: 8.3 FL (ref 9.2–12.9)
POC PTINR: 1.3 (ref 0.9–1.2)
POCT GLUCOSE: 104 MG/DL (ref 70–110)
POTASSIUM SERPL-SCNC: 2.8 MMOL/L (ref 3.5–5.1)
PROT SERPL-MCNC: 9 G/DL (ref 6–8.4)
PROTHROMBIN TIME: 10.4 SEC (ref 9–12.5)
RBC # BLD AUTO: 4.39 M/UL (ref 4–5.4)
SAMPLE: ABNORMAL
SAMPLE: NORMAL
SODIUM SERPL-SCNC: 140 MMOL/L (ref 136–145)
TRIGL SERPL-MCNC: 55 MG/DL (ref 30–150)
TSH SERPL DL<=0.005 MIU/L-ACNC: 0.9 UIU/ML (ref 0.4–4)
WBC # BLD AUTO: 7.42 K/UL (ref 3.9–12.7)

## 2023-11-28 PROCEDURE — 86803 HEPATITIS C AB TEST: CPT | Performed by: EMERGENCY MEDICINE

## 2023-11-28 PROCEDURE — 83735 ASSAY OF MAGNESIUM: CPT | Performed by: EMERGENCY MEDICINE

## 2023-11-28 PROCEDURE — 93010 EKG 12-LEAD: ICD-10-PCS | Mod: ,,, | Performed by: INTERNAL MEDICINE

## 2023-11-28 PROCEDURE — 80053 COMPREHEN METABOLIC PANEL: CPT | Performed by: EMERGENCY MEDICINE

## 2023-11-28 PROCEDURE — 99285 EMERGENCY DEPT VISIT HI MDM: CPT | Mod: 25

## 2023-11-28 PROCEDURE — 25000003 PHARM REV CODE 250: Performed by: EMERGENCY MEDICINE

## 2023-11-28 PROCEDURE — 36415 COLL VENOUS BLD VENIPUNCTURE: CPT | Performed by: EMERGENCY MEDICINE

## 2023-11-28 PROCEDURE — 25500020 PHARM REV CODE 255: Performed by: EMERGENCY MEDICINE

## 2023-11-28 PROCEDURE — 85025 COMPLETE CBC W/AUTO DIFF WBC: CPT | Performed by: EMERGENCY MEDICINE

## 2023-11-28 PROCEDURE — 97161 PT EVAL LOW COMPLEX 20 MIN: CPT | Mod: PN

## 2023-11-28 PROCEDURE — 99214 PR OFFICE/OUTPT VISIT, EST, LEVL IV, 30-39 MIN: ICD-10-PCS | Mod: 95,,, | Performed by: STUDENT IN AN ORGANIZED HEALTH CARE EDUCATION/TRAINING PROGRAM

## 2023-11-28 PROCEDURE — 85610 PROTHROMBIN TIME: CPT | Performed by: EMERGENCY MEDICINE

## 2023-11-28 PROCEDURE — 80061 LIPID PANEL: CPT | Performed by: EMERGENCY MEDICINE

## 2023-11-28 PROCEDURE — 84443 ASSAY THYROID STIM HORMONE: CPT | Performed by: EMERGENCY MEDICINE

## 2023-11-28 PROCEDURE — 82962 GLUCOSE BLOOD TEST: CPT

## 2023-11-28 PROCEDURE — 93010 ELECTROCARDIOGRAM REPORT: CPT | Mod: ,,, | Performed by: INTERNAL MEDICINE

## 2023-11-28 PROCEDURE — 93005 ELECTROCARDIOGRAM TRACING: CPT

## 2023-11-28 PROCEDURE — 99214 OFFICE O/P EST MOD 30 MIN: CPT | Mod: 95,,, | Performed by: STUDENT IN AN ORGANIZED HEALTH CARE EDUCATION/TRAINING PROGRAM

## 2023-11-28 RX ORDER — POTASSIUM CHLORIDE 20 MEQ/1
TABLET, EXTENDED RELEASE ORAL
Status: DISCONTINUED
Start: 2023-11-28 | End: 2023-11-28 | Stop reason: WASHOUT

## 2023-11-28 RX ORDER — POTASSIUM CHLORIDE 20 MEQ/1
40 TABLET, EXTENDED RELEASE ORAL ONCE
Status: COMPLETED | OUTPATIENT
Start: 2023-11-28 | End: 2023-11-28

## 2023-11-28 RX ADMIN — IOHEXOL 100 ML: 350 INJECTION, SOLUTION INTRAVENOUS at 03:11

## 2023-11-28 RX ADMIN — POTASSIUM CHLORIDE 40 MEQ: 1500 TABLET, EXTENDED RELEASE ORAL at 06:11

## 2023-11-28 NOTE — TELEMEDICINE CONSULT
[facility]  Vascular Neurology  Comprehensive Stroke Center  TeleVascular Neurology Acute Consultation Note        Consult Information  Consult to Telemedicine - Acute Stroke  Consult performed by: Elle Mabry MD  Consult ordered by: Elaine Almanza MD        Consulting Provider: ELAINE ALMANZA   Current Providers  No providers found    Patient Location:  [facility] EMERGENCY DEPARTMENT Emergency Department    Spoke hospital nurse at bedside with patient assisting consultant.  Patient information was obtained from patient.       Stroke Documentation  Acute Stroke Times   Last Known Normal Date: 11/21/23  Last Known Normal Time: 1300  Unknown Normal Time: Unknown Time  Symptom Onset Date: 11/28/23  Symptom Onset Time: 1300  Stroke Team Called Date: 11/28/23  Stroke Team Called Time: 1545  Stroke Team Arrival Date: 11/28/23  Stroke Team Arrival Time: 1545  CT Interpretation Time: 1550  Thrombolytic Therapy Recommended: No  CTA Interpretation Time: 1550  Thrombectomy Recommended: No    NIH Scale:  1a. Level of Consciousness: 0-->Alert, keenly responsive  1b. LOC Questions: 0-->Answers both questions correctly  1c. LOC Commands: 0-->Performs both tasks correctly  2. Best Gaze: 0-->Normal  3. Visual: 0-->No visual loss  4. Facial Palsy: 0-->Normal symmetrical movements  5a. Motor Arm, Left: 0-->No drift, limb holds 90 (or 45) degrees for full 10 secs  5b. Motor Arm, Right: 0-->No drift, limb holds 90 (or 45) degrees for full 10 secs  6a. Motor Leg, Left: 0-->No drift, leg holds 30 degree position for full 5 secs  6b. Motor Leg, Right: 0-->No drift, leg holds 30 degree position for full 5 secs  7. Limb Ataxia: 0-->Absent  8. Sensory: 1-->Mild-to-moderate sensory loss, patient feels pinprick is less sharp or is dull on the affected side, or there is a loss of superficial pain with pinprick, but patient is aware of being touched  9. Best Language: 0-->No aphasia, normal  10. Dysarthria:  "0-->Normal  11. Extinction and Inattention (formerly Neglect): 0-->No abnormality  Total (NIH Stroke Scale): 1      Modified [place]: Score: 1  [place] Coma Scale: 15   ABCD2 Score:    [id number] Score:    HAS -BLED Score:    ICH Score:    Hunt & Zee Classification:      Blood pressure (!) 174/109, pulse 101, temperature 97.9 °F (36.6 °C), temperature source Oral, resp. rate 15, height 5' 4" (1.626 m), weight 72.6 kg (160 lb), last menstrual period 11/01/2017, SpO2 100 %.    Diagnoses  Problem Noted   Focal Neurological Deficit 11/28/2023       Medical Decision Making  HPI:  43 y.o. female with medical history of seizures, migraines, HTN - with admission concerns of focal neurological deficits. Stroke code called in for the same.     History from patient and medical records review. Symptoms of LUE, LLE, facial numbness - reported of symptomatology over the last 1 month / on-off / however today felt more prominent / mentioned that she dreamnt of having of a stroke and hence presented to ER. No compressive neuropathy pattern of presentation. No other focal motor or sensory or cranial nerve deficits. History of seizures or migraines - however no clinical semiology or headaches this time.     Images personally reviewed and interpreted:  Study: Head CT and CTA Head & Neck  Study Interpretation: Negative for acute findings / No LVO or AYESHA pathology     Assessment and plan:  Recs:  Unclear on the etiology - Low suspicion for focal cerebrovascular ischemic event or epileptic or neuro inflammatory etiology. However needs rule out. Possible functional neurological disorder.      No TNK as per telestroke álvaro - low nihss 1 / non disabling symptoms.      - MRI brain wo contrast  - CTA head and neck imaging unrevealing for any AYESHA pathology    If MRI brain positive for acute infarct : consider the following   - DAPT X 21 day, ASA 81/Plavix 75, with asa thereafter  - target LDL < 70 / Continue atorvastatin  - euglycemic goals "   - permissive HTN x 72 hrs post index event / long term < 130/80  - Echo f/u    - PT/OT recs - discharge planning   - F/u PCP for risk factor modification monitoring  - consider EEG if any clinical concerns for seizures     Provide stroke counseling provided during the visit - Identification of signs; emergency action and ER attention; Risk factor control, optimization for secondary stroke prevention; Compliance to medications     Lytics recommendation: Thrombolytic therapy not recommended due to Suspected stroke mimic  and Mild Non-Disabling Symptoms  Thrombectomy recommendation: No; No large vessel occlusion identified on imaging   Placement recommendation: pending further studies  do not transfer               Review of Systems   Constitutional:  Negative for chills and fever.   Cardiovascular:  Negative for chest pain.   Neurological:  Positive for sensory change. Negative for dizziness, speech change, focal weakness, seizures, loss of consciousness and headaches.   Physical Exam  HENT:      Head: Normocephalic and atraumatic.   Eyes:      Extraocular Movements: Extraocular movements intact.   Pulmonary:      Effort: No respiratory distress.   Neurological:      Mental Status: She is alert.      Cranial Nerves: Cranial nerves 2-12 are intact.      Sensory: Sensory deficit present.      Motor: No weakness.   Past Medical History:   Diagnosis Date    Encounter for blood transfusion     Fibroids     Hypertension     Iron deficiency anemia     Menorrhagia     Migraine     Seizures     as baby     Past Surgical History:   Procedure Laterality Date     SECTION      x1    HYSTERECTOMY  2017    TUBAL LIGATION       Family History   Problem Relation Age of Onset    Hypertension Father     Hypertension Sister     Hypertension Maternal Grandmother     Breast cancer Maternal Grandmother     Cancer Maternal Grandfather     Hypertension Paternal Grandmother     Colon cancer Maternal Cousin      Ovarian cancer Neg Hx            Elle Mabry MD      Emergent/Acute neurological consultation requested by spoke provider due to critical concerns for possible cerebrovascular event that could result in permanent loss of neurologic/bodily function, severe disability or death of this patient.  Immediate/timely evaluation by a highly prepared expert is paramount for optimal outcomes  High risk for neurological deterioration if not properly diagnosed  High risk for neurological deterioration if not treated promplty/as soon as possible  Complex diagnostic evaluation may be required (advanced imaging)  High risk treatment options (thrombolytics and/or thrombectomy)    Patient care was coordinated with spoke provider, including but not limted to    Discussing likely diagnosis/etiology of symptoms  Making recommendations for further diagnostic studies  Making recommendations for intravenous thrombolytics or other advanced therapies  Making recommendations for disposition (admission/transfer for higher level of care)

## 2023-11-28 NOTE — SUBJECTIVE & OBJECTIVE
HPI:  43 y.o. female with medical history of seizures, migraines, HTN - with admission concerns of focal neurological deficits. Stroke code called in for the same.     History from patient and medical records review. Symptoms of LUE, LLE, facial numbness - reported of symptomatology over the last 1 month / on-off / however today felt more prominent / mentioned that she dreamnt of having of a stroke and hence presented to ER. No compressive neuropathy pattern of presentation. No other focal motor or sensory or cranial nerve deficits. History of seizures or migraines - however no clinical semiology or headaches this time.     Images personally reviewed and interpreted:  Study: Head CT and CTA Head & Neck  Study Interpretation: Negative for acute findings / No LVO or AYESHA pathology     Assessment and plan:  Recs:  Unclear on the etiology - Low suspicion for focal cerebrovascular ischemic event or epileptic or neuro inflammatory etiology. However needs rule out. Possible functional neurological disorder.      No TNK as per telestroke álvaro - low nihss 1 / non disabling symptoms.      - MRI brain wo contrast  - CTA head and neck imaging unrevealing for any AYESHA pathology    If MRI brain positive for acute infarct : consider the following   - DAPT X 21 day, ASA 81/Plavix 75, with asa thereafter  - target LDL < 70 / Continue atorvastatin  - euglycemic goals   - permissive HTN x 72 hrs post index event / long term < 130/80  - Echo f/u    - PT/OT recs - discharge planning   - F/u PCP for risk factor modification monitoring  - consider EEG if any clinical concerns for seizures     Provide stroke counseling provided during the visit - Identification of signs; emergency action and ER attention; Risk factor control, optimization for secondary stroke prevention; Compliance to medications     Lytics recommendation: Thrombolytic therapy not recommended due to Suspected stroke mimic  and Mild Non-Disabling Symptoms  Thrombectomy  recommendation: No; No large vessel occlusion identified on imaging   Placement recommendation: pending further studies  do not transfer

## 2023-11-28 NOTE — PLAN OF CARE
[facility]   Physical Therapy Initial Evaluation      Name: Pernell Ly  Clinic Number: [id number]    Therapy Diagnosis:   Encounter Diagnoses   Name Primary?    Cervicogenic headache     Decreased range of motion of neck Yes        Physician: Amber Ortiz PA-C    Physician Orders: PT Eval and Treat   Medical Diagnosis from Referral: G44.86 (ICD-10-CM) - Cervicogenic headache   Evaluation Date: 11/28/2023  Authorization Period Expiration: 11/2/2024   Plan of Care Expiration: 1/23/2024  Progress Note Due: 12/28/2023  Visit # / Visits authorized: 1/ 1   FOTO: 0/ 3    Precautions: HTN     Time In: 8:20  Time Out: 9;00  Total Billable Time: 40 minutes    Subjective     Date of onset: 3 month    History of current condition - Pernell reports: She has been having pain on the left side of her neck/ upper trap and headaches for about 3 months. The pain comes and goes as she has not had the pain in the past 2 weeks. It is off and on and last about 2 weeks. She denies a SUSANA for symptoms with insidious onset. She feels her headaches and her neck pain are correlated. She has the symptoms when she wakes up in the morning and in the middle of the night. She sleeps on her sides and alternates from side to side with no apparent difference. She was prescribed medication for her pain/ headaches and it does not seem to be working. She has the headaches in a mamie horn pattern.     Falls: No    Imaging: CT scan films: Impression:     No acute intracranial abnormality.  Specifically, no intracranial hemorrhage.     Paranasal sinus disease.       Prior Therapy: No  Social History:  lives with their family  Occupation: [employer]  Prior Level of Function: Pt had no pain or headaches  Current Level of Function: Pt now has neck pain and headache when she wakes up and at night    Pain:  Current 0/10, worst 8/10, best 0/10   Location: left sided headaches   Description: Aching  Aggravating Factors:  Morning, Extension, and Flexing  Easing Factors: rest and movement    Patients goals: Pt would like to decrease her headaches and neck pain in the morning     Medical History:   Past Medical History:   Diagnosis Date    Encounter for blood transfusion     Fibroids     Hypertension     Iron deficiency anemia     Menorrhagia     Migraine     Seizures     as baby       Surgical History:   Pernell Ly  has a past surgical history that includes Tubal ligation;  section; and Hysterectomy (2017).    Medications:   Pernell has a current medication list which includes the following prescription(s): potassium chloride, albuterol, azelastine, cetirizine, fluticasone propionate, ibuprofen, lamotrigine, multivitamin with minerals, nifedipine, omeprazole, tizanidine, topiramate, and trazodone.    Allergies:   Review of patient's allergies indicates:  No Known Allergies     Objective        Posture: Sit with head tilted to the right    Cervical Range of Motion:    Degrees(%) Pain   Flexion 30 -     Extension 35 -     Right Rotation 65 -     Left Rotation 50 -     Right Side Bending 40 -   Left Side Bending 25 -   C0-C1 Flex 5 -   C0-C1 Ext 10 -   C0-C1 Sidebending R = 5  L = 0 -   C1-2 Rotation 45 -   C1-3 Rotation 60 -        Special Tests:  Vertebral Artery -   Lateral Flexion Alar Ligament -   Transverse Ligament -     Cervical joint mobility: Decreased OA side bend to the left, Hypomobile C3-C5 down glides      Thoracic mobility: Decreased      Deep Neck Flexor Strength Test:  Unable to assess due to hair      Limitation/Restriction for FOTO Neck Survey    Therapist reviewed FOTO scores for Pernell Ly on 2023.   FOTO documents entered into [employer] - see Media section.    Limitation Score: 54%            Treatment     Total Treatment time (time-based codes) separate from Evaluation: 16 minutes     Pernell received the treatments listed below:      therapeutic exercises to develop strength,  endurance, ROM, and flexibility for 16 minutes including:  Seated chin tucks 10x3s  Cervical rotation 10x  Cervical side bend 10x  Open books 10x each side      Patient Education and Home Exercises     Education provided:   - HEP  - Role of PT    Written Home Exercises Provided: yes. Exercises were reviewed and Pernell was able to demonstrate them prior to the end of the session.  Pernell demonstrated good  understanding of the education provided. See EMR under Patient Instructions for exercises provided during therapy sessions.    Assessment     Pernell is a 43 y.o. female referred to outpatient Physical Therapy with a medical diagnosis of G44.86 (ICD-10-CM) - Cervicogenic headache . Patient presents with decreased neck range of motion, decreased thoracic mobility, poor resting posture and decreased deep neck flexor activation. She tolerated session well and was able to increase her cervical range of motion after the session. Her symptoms can be debilitating when present which decreases her ability to perform her ADL's. She needs to address her impairments in order to return to prior level of function.     Patient prognosis is Good.   Patient will benefit from skilled outpatient Physical Therapy to address the deficits stated above and in the chart below, provide patient /family education, and to maximize patientt's level of independence.     Plan of care discussed with patient: Yes  Patient's spiritual, cultural and educational needs considered and patient is agreeable to the plan of care and goals as stated below:     Anticipated Barriers for therapy: Work    Medical Necessity is demonstrated by the following  History  Co-morbidities and personal factors that may impact the plan of care [x] LOW: no personal factors / co-morbidities  [] MODERATE: 1-2 personal factors / co-morbidities  [] HIGH: 3+ personal factors / co-morbidities    Moderate / High Support Documentation:   Co-morbidities affecting plan of care:      Personal Factors:   no deficits     Examination  Body Structures and Functions, activity limitations and participation restrictions that may impact the plan of care [x] LOW: addressing 1-2 elements  [] MODERATE: 3+ elements  [] HIGH: 4+ elements (please support below)    Moderate / High Support Documentation:      Clinical Presentation [x] LOW: stable  [] MODERATE: Evolving  [] HIGH: Unstable     Decision Making/ Complexity Score: low         GOALS:   Short Term Goals: 4 weeks  1.Report decreased neck pain  <   / =  2  /10  to increase tolerance for Sleeping, ADL's, driving  2. Increase cervical ROM by 5-10 degrees in order to perform ADLs with decreased difficulty.  3. Increase strength in B shoulders/scapular stabilizers by 1/3 MMT grade to increase tolerance for ADL and work activities.  4. Pt to tolerate HEP to improve ROM and independence with ADL's    Long Term Goals: 8 weeks  1.Report decreased neck pain  <   / =  0  /10  to increase tolerance for Sleeping, ADL's, driving  2. Increase UE/neck flexibility in order to improve posture.    3.Increased strength in B shoulders/scapular stabilizers to >/= 4/5 MMT grade to increase tolerance for ADL and work activities.  4.  Pt will report at 60 on FOTO neck score for neck pain disability to demonstrate decrease in disability and improvement in neck pain.   Plan     Plan of care Certification: 11/28/2023 to 1/23/2024.    Outpatient Physical Therapy 1 times weekly for 8 weeks to include the following interventions: Manual Therapy, Neuromuscular Re-ed, Patient Education, Therapeutic Activities, and Therapeutic Exercise.     Geovany López, PT, DPT        Physician's Signature: _________________________________________ Date: ________________

## 2023-11-28 NOTE — ED PROVIDER NOTES
Encounter Date: 11/28/2023    SCRIBE #1 NOTE: I, King Stokes, am scribing for, and in the presence of,  Elaine Almanza MD. I have scribed the following portions of the note - the EKG reading. Other sections scribed: HPI, ROS, PE.       History     Chief Complaint   Patient presents with    Numbness     Reports L sided numbness onset 1 week ago. Slight facial drooping noted to L side, L sided leg drift. Equal hand  BL. Aaox4. Denies blood thinners. Also reports taking a nap today and waking up feeling confused.      Time seen by physician: 3:32 PM    This is a 43 y.o. female with a PMHx of seizures who presents with complaint of intermittent left-sided numbness that began a few days ago. She locates the numbness to her face and left arm primarily with the episodes of numbness lasting about 15 minutes each time. Her last episode of numbness was this afternoon at around 1pm when she woke up from a nap. She states that she was in her normal state of health this morning before her nap. She notes that she had a dream that she was experiencing numbness and weakness, and then woke up to those symptoms occurring. She denies any weakness at this time, nor any difficulty walking/talking today. She reports that she has had episodes similar to this in the past, and that they mainly happen when she is waking up, though she does not remember the last time this occurred. She additionally reports that she has recently been put on a new dosage of her seizure medication.  Denies fevers/chills.  Denies any chest pain or shortness breath.  Denies any vomiting or diarrhea.    Patient denies any other complaints at this time.    The history is provided by the patient.     Review of patient's allergies indicates:  No Known Allergies  Past Medical History:   Diagnosis Date    Encounter for blood transfusion     Fibroids     Hypertension     Iron deficiency anemia     Menorrhagia     Migraine     Seizures     as baby     Past Surgical  History:   Procedure Laterality Date     SECTION      x1    HYSTERECTOMY  2017    TUBAL LIGATION       Family History   Problem Relation Age of Onset    Hypertension Father     Hypertension Sister     Hypertension Maternal Grandmother     Breast cancer Maternal Grandmother     Cancer Maternal Grandfather     Hypertension Paternal Grandmother     Colon cancer Maternal Cousin     Ovarian cancer Neg Hx      Social History     Tobacco Use    Smoking status: Never     Passive exposure: Never    Smokeless tobacco: Never   Substance Use Topics    Alcohol use: No     Comment: Occasional; 3x /month    Drug use: No     Review of Systems   Constitutional:  Negative for chills and fever.   HENT:  Negative for congestion and rhinorrhea.    Respiratory:  Negative for chest tightness and shortness of breath.    Cardiovascular:  Negative for chest pain and palpitations.   Gastrointestinal:  Negative for abdominal pain, diarrhea, nausea and vomiting.   Genitourinary:  Negative for dysuria and flank pain.   Musculoskeletal:  Negative for back pain and neck pain.   Skin:  Negative for color change and wound.   Neurological:  Positive for numbness. Negative for dizziness, seizures, weakness and headaches.       Physical Exam     Initial Vitals [23 1525]   BP Pulse Resp Temp SpO2   (!) 150/87 87 20 97.9 °F (36.6 °C) 98 %      MAP       --         Physical Exam    Nursing note and vitals reviewed.  Constitutional: She appears well-developed and well-nourished. She is not diaphoretic. No distress.   HENT:   Head: Normocephalic and atraumatic.   Eyes: Conjunctivae and EOM are normal.   Neck: Neck supple.   Normal range of motion.  Cardiovascular:  Normal rate, regular rhythm and normal heart sounds.           Pulmonary/Chest: Breath sounds normal. No respiratory distress. She has no wheezes. She has no rales.   Abdominal: Abdomen is soft. Bowel sounds are normal. She exhibits no distension. There is no abdominal  tenderness. There is no rebound.   Musculoskeletal:         General: No edema. Normal range of motion.      Cervical back: Normal range of motion and neck supple.     Neurological: She is alert and oriented to person, place, and time.   5/5 strength in all extremities.  Sensation intact.  No dysarthria.  No aphasia.  Normal finger-to-nose bilaterally.  Very subtle left-sided facial droop.   Skin: Skin is warm and dry. Capillary refill takes less than 2 seconds.   Psychiatric: She has a normal mood and affect.         ED Course   Procedures  Labs Reviewed   CBC W/ AUTO DIFFERENTIAL - Abnormal; Notable for the following components:       Result Value    MPV 8.3 (*)     Lymph % 52.0 (*)     All other components within normal limits    Narrative:     Release to patient->Immediate   COMPREHENSIVE METABOLIC PANEL - Abnormal; Notable for the following components:    Potassium 2.8 (*)     CO2 17 (*)     Glucose 131 (*)     Total Protein 9.0 (*)     All other components within normal limits    Narrative:     Release to patient->Immediate   ISTAT PROCEDURE - Abnormal; Notable for the following components:    POC PTINR 1.3 (*)     All other components within normal limits   HEPATITIS C ANTIBODY    Narrative:     Release to patient->Immediate   PROTIME-INR    Narrative:     Release to patient->Immediate   TSH    Narrative:     Release to patient->Immediate   LIPID PANEL    Narrative:     Release to patient->Immediate   MAGNESIUM   MAGNESIUM    Narrative:     Release to patient->Immediate   POCT GLUCOSE, HAND-HELD DEVICE   POCT GLUCOSE   ISTAT CREATININE     EKG Readings: (Independently Interpreted)   17:06.  Rate of 96. Normal sinus rhythm. Normal intervals. Normal axis. No ST or ischemic changes.        Imaging Results              MRI Brain Without Contrast (Final result)  Result time 11/28/23 20:17:59      Final result by Preston Mattson MD (11/28/23 20:17:59)                   Impression:      No acute intracranial  process.    Mild right maxillary sinus disease.      Electronically signed by: Preston Pandya  Date:    11/28/2023  Time:    20:17               Narrative:    EXAMINATION:  MRI BRAIN WITHOUT CONTRAST    CLINICAL HISTORY:  Transient ischemic attack (TIA);.    TECHNIQUE:  Multiplanar multisequence MR imaging of the brain was performed without contrast.    COMPARISON:  11/28/2023    FINDINGS:  Intracranial compartment:    Ventricles and sulci are normal in size for age without evidence of hydrocephalus. No extra-axial blood or fluid collections.    The brain parenchyma appears normal. No mass lesion, acute hemorrhage, edema or acute infarct.    Normal vascular flow voids are preserved.    Skull/extracranial contents (limited evaluation): Bone marrow signal intensity is normal.    Mild right maxillary sinus disease.                                       CTA Head and Neck (xpd) (Final result)  Result time 11/28/23 16:02:49      Final result by Russel Smith MD (11/28/23 16:02:49)                   Impression:      No evidence of acute hemorrhage or major vascular distribution infarct.    CT arteriogram demonstrates no evidence of high-grade stenosis or large vessel occlusion.    Right-sided persistent trigeminal artery, normal variant.      Electronically signed by: Russel Smith MD  Date:    11/28/2023  Time:    16:02               Narrative:    EXAMINATION:  CTA HEAD AND NECK (XPD)    CLINICAL HISTORY:  Neuro deficit, acute, stroke suspected;    TECHNIQUE:  Axial CT images obtained throughout the region of the head before and after the administration of intravenous contrast.    CT angiogram was performed through the cervical and intracranial vasculature during the IV bolus administration of 100mL of Omnipaque 350.  Multiplanar MPR and MIP reformats were performed.    CT source data was analyzed using artificial intelligence software for detection of a large vessel occlusions (LVO) in order to enable computer  assisted triage notification and aid clinical stroke decision making.    COMPARISON:  CT 09/07/2023, MRI 11/23/2018    FINDINGS:  The ventricles are normal in size without evidence of hydrocephalus.    The brain appears unchanged from prior exam.  No new parenchymal mass, hemorrhage, edema or major vascular distribution infarct.  No enhancing lesions    No extra-axial blood or fluid collections.    The cranium appears intact.    Right maxillary sinus mucous retention cyst.  Bilateral mastoid air cells are clear.    Endodontal disease with multiple dental caries and several missing teeth.    Mild cervical spondylosis.    CTA:    The aortic arch demonstrates no significant stenosis at the major branch vessel origins.    The right common carotid artery is normal in caliber. No significant stenosis at the carotid bifurcation.The right internal carotid artery is normal in caliber.  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    The left common carotid artery is normal in caliber. No significant stenosis at the carotid bifurcation.The left internal carotid artery is normal in caliber.    The right vertebral artery is normal in caliber.    The left vertebral artery is normal in caliber.    Basilar artery is within normal limits without focal abnormality.    The proximal anterior, middle, and posterior cerebral arteries are within normal limits.  No evidence of significant stenosis, focal occlusion, or intracranial aneurysm.                                       Medications   potassium chloride SA (K-DUR,KLOR-CON, K-TAB) 20 MEQ CR tablet (has no administration in time range)   iohexoL (OMNIPAQUE 350) injection 100 mL (100 mLs Intravenous Given 11/28/23 1542)   potassium chloride SA CR tablet 40 mEq (40 mEq Oral Given During Downtime 11/28/23 1815)     Medical Decision Making  3:32PM:  Patient is a 43-year-old female who presents to the emergency department with episodes of left-sided  numbness.  Patient appears well, nontoxic.  She does state she has numbness to her left face and left arm but on exam, she denies any decrease in sensation.  She does have a very subtle left-sided facial droop, though this could be her baseline.  Code stroke was called from triage.  Will plan for labs, imaging, will continue to follow and reassess.    Amount and/or Complexity of Data Reviewed  External Data Reviewed: notes.  Labs: ordered. Decision-making details documented in ED Course.  Radiology: ordered and independent interpretation performed. Decision-making details documented in ED Course.  ECG/medicine tests: ordered and independent interpretation performed. Decision-making details documented in ED Course.  Discussion of management or test interpretation with external provider(s): Vascular Neurology    Risk  Prescription drug management.    4:15 PM:  I dsicussed pt with vascular neurology.  Patient is not a tPA candidate at this time.  Recommends MRI for further evaluation but low likelihood to be an ischemic stroke.    9:08 PM:  Patient doing well, remains stable.  She is feeling much better and denies any symptoms at this time.  She remains neurologically intact.  Her imaging is negative for any acute findings and her MRI is negative for any evidence of an acute stroke.  Will have her follow up with her neurologist.  Her labs are otherwise unremarkable.  I do not feel that further work up in the ED is indicated at this time.  I updated pt regarding results and I counseled pt regarding supportive care measures.  I have discussed with the pt ED return warnings and need for close PCP f/u.  Pt agreeable to plan and all questions answered.  I feel that pt is stable for discharge and management as an outpatient and no further intervention is needed at this time.  Pt is comfortable returning to the ED if needed.  Will DC home in stable condition.          Scribe Attestation:   Scribe #1: I performed the above  scribed service and the documentation accurately describes the services I performed. I attest to the accuracy of the note.         Physician Attestation for Scribe: I, Elaine Almanza, reviewed documentation as scribed in my presence, which is both accurate and complete.                       Clinical Impression:  Final diagnoses:  [R29.818] Acute focal neurological deficit  [R20.0] Numbness (Primary)          ED Disposition Condition    Discharge Stable          ED Prescriptions    None       Follow-up Information       Follow up With Specialties Details Why Contact Info    Zoey Nelson MD Family Medicine   [address] [place] [id number]  [phone]               Elaine Almanza MD  11/28/23 5644

## 2023-11-28 NOTE — ED NOTES
MRI checklist complete, signed by pt and witnessed by RN. Original to remain with pt and copy placed in chart.

## 2023-11-30 ENCOUNTER — TELEPHONE (OUTPATIENT)
Dept: PRIMARY CARE CLINIC | Facility: CLINIC | Age: 43
End: 2023-11-30
Payer: MEDICAID

## 2023-11-30 ENCOUNTER — PATIENT OUTREACH (OUTPATIENT)
Dept: EMERGENCY MEDICINE | Facility: OTHER | Age: 43
End: 2023-11-30
Payer: MEDICAID

## 2023-11-30 DIAGNOSIS — I10 ESSENTIAL HYPERTENSION: ICD-10-CM

## 2023-11-30 RX ORDER — NIFEDIPINE 90 MG/1
90 TABLET, EXTENDED RELEASE ORAL
Qty: 90 TABLET | Refills: 1 | Status: SHIPPED | OUTPATIENT
Start: 2023-11-30

## 2023-11-30 NOTE — TELEPHONE ENCOUNTER
[facility] offer [facility] f/u with [facility].  Please inform patient that Dr. Nelson is out of the office until 1/2024. Thanks

## 2023-11-30 NOTE — TELEPHONE ENCOUNTER
No care due was identified.  [facility] Embedded Care Due Messages. Reference number: [id number].   11/30/2023 5:16:46 AM CST

## 2023-11-30 NOTE — TELEPHONE ENCOUNTER
Refill Encounter    PCP Visits: Recent Visits  Date Type Provider Dept   06/28/23 Office Visit Zoey Nelson MD [facility]   Showing recent visits within past 360 days and meeting all other requirements  Future Appointments  No visits were found meeting these conditions.  Showing future appointments within next 720 days and meeting all other requirements     Last 3 Blood Pressure:   BP Readings from Last 3 Encounters:   11/28/23 123/79   09/07/23 127/79   06/29/23 121/75     Preferred Pharmacy:   [id number] - [place], [place] - [address] [facility] [address]  [address] [address]  Phone: [phone] Fax: [phone]    [id number] - [place], [place] - [address] [facility] [address]  [address] [address]  Phone: [phone] Fax: [phone]    Requested RX:  Requested Prescriptions     Pending Prescriptions Disp Refills    NIFEdipine (PROCARDIA-XL) 90 MG (OSM) 24 hr tablet [Pharmacy Med Name: NIFEDIPINE 90MG ER (XL/OSM) TABLET] 90 tablet 1     Sig: TAKE 1 TABLET(90 MG) BY MOUTH EVERY DAY      RX Route: Normal

## 2023-11-30 NOTE — PROGRESS NOTES
Monika Weeks LPN  ED Navigator  Emergency Department    Project: [facility] ED Navigator  Role: Community Health Worker    Date: 11/30/2023  Patient Name: Pernell Ly  MRN: [id number]  PCP: Zoey Nelson MD    Assessment:     Pernell Ly is a 43 y.o. female who has presented to ED for Numbness. Patient has visited the ED 2 times in the past 3 months. Patient did not contact PCP.     ED Navigator Initial Assessment    ED Navigator Enrollment Documentation  Consent to Services  Does patient consent to completing the assessment?: Yes  Contact  Method of Initial Contact: Phone  Transportation  Does the patient have issues with Transportation?: No  Does the patient have transportation to and from healthcare appointments?: Yes  Insurance Coverage  Do you have coverage/adequate coverage?: Yes  Type/kind of coverage: [facility]  Is patient able to afford co-pays/deductibles?: Yes  Is patient able to afford HME or supplies?: Yes  Does patient have an established [facility] PCP?: Yes  Able to access?: Yes  Does the patient have a lack of adequate coverage?: No  Specialist Appointment  Did the patient come to the ED to see a specialist?: Yes  Does the patient have a pending specialist referral?: No  Does the patient have a specialist appointment made?: No  PCP Follow Up Appointment  Has the patient had an appointment with a primary care provider in the past year?: Yes  Approximate date: 6/28/23  Provider: Zoey Nelson MD  Does the patient have a follow up appontment with a PCP?: No  When was the last time you saw your PCP?: 6/28/23  Why does the patient not have a follow up scheduled?: Other (see comments) (Comment: Needs assistance scheduling)  Medications  Is patient able to afford medication?: Yes  Is patient unable to get medication due to lack of transportation?: No  Psychological  Does the patient have psycho-social concerns?: Yes  What concerns does the patient have?: Difficulty coping with life situations  Food  Does  the patient have concerns about food?: No  Communication/Education  Does the patient have limited English proficiency/English not primary language?: No  Does patient have low literacy and/or low health literacy?: Yes  Does patient have concerns with care?: No  Does patient have dissatisfaction with care?: No  Other Financial Concerns  Does the patient have immediate financial distress?: No  Does the patient have general financial concerns?: No  Other Social Barriers/Concerns  Does the patient have any additional barriers or concerns?: None  Primary Barrier  Barriers identified: Cognitive barrier (health literacy, language and communication, etc.)  Root Cause of ED Utilization: Patient Knowledge/Low Health Literacy  Plan to address Patient Knowledge/Low Health Literacy: Provided information for [facility] 24/7 Nurse triage line [phone] or 1-[facility] ([phone])  Next steps: Provided Education  Was education/educational materials provided surrounding PCP services/creating a medical home?: Yes Was education verbal or written?: Written, Verbal     Was education/educational materials provided surrounding low cost, healthy foods?: Yes Was education verbal or written?: Written     Was education/educational materials provided surrounding other items? If so, use comment to explain.: Yes (Comment: Counseling options) Was education verbal or written?: Written   Plan: Provided information for [facility] 24/7 Nurse triage line, [phone] or 1-[facility] ([phone])  Expected Date of Follow Up 1: 2/14/24  Additional Documentation: Spoke with patient today and she was agreeable to enrollment and subsequent F/U calls. Pt. Experiences stress/anxiety she reports because of the medications she is on and would like counseling options. Pt. Is established with Dr. Nelson for primary care needs and would like assistance with making a hospital F/U appointment. I was unable to schedule in epic so I have sent  secured message to the clinic staff. Patient would also like a F/U with Neurology after most recent ED visit. Was unable to schedule this appointment in [facility] as well so I have sent a secured message to the clinic staff to have someone reach out to get patient scheduled. Pt. Denies smoking. Pt. Drinks only on occasion socially. Pt. Denied the need for any in home resources at this time. [facility] form, [place] Virtual Visit Flyer, [facility] PCP scheduling assistance, [facility] on call RN#, and Heart Healthy Diet education all sent to e-mail.         Social History     Socioeconomic History    Marital status: Single   Tobacco Use    Smoking status: Never     Passive exposure: Never    Smokeless tobacco: Never   Substance and Sexual Activity    Alcohol use: No     Comment: Occasional; 3x /month    Drug use: No    Sexual activity: Yes     Partners: Male     Birth control/protection: See Surgical Hx     Social Determinants of Health     Financial Resource Strain: Low Risk  (11/30/2023)    Overall Financial Resource Strain (CARDIA)     Difficulty of Paying Living Expenses: Not hard at all   Food Insecurity: No Food Insecurity (11/30/2023)    Hunger Vital Sign     Worried About Running Out of Food in the Last Year: Never true     Ran Out of Food in the Last Year: Never true   Transportation Needs: No Transportation Needs (11/30/2023)    PRAPARE - Transportation     Lack of Transportation (Medical): No     Lack of Transportation (Non-Medical): No   Stress: No Stress Concern Present (11/30/2023)    [employer] [place] [place] [employer] - Occupational Stress Questionnaire     Feeling of Stress : Only a little   Social Connections: Unknown (11/30/2023)    Social Connection and Isolation Panel [NHANES]     Marital Status: Never    Housing Stability: Unknown (11/30/2023)    Housing Stability Vital Sign     Unable to Pay for Housing in the Last Year: No     Unstable Housing in the Last Year: No       Plan:   Spoke with  patient today and she was agreeable to enrollment and subsequent F/U calls. Pt. Experiences stress/anxiety she reports because of the medications she is on and would like counseling options. Pt. Is established with Dr. Nelson for primary care needs and would like assistance with making a hospital F/U appointment. I was unable to schedule in epic so I have sent secured message to the clinic staff. Patient would also like a F/U with Neurology after most recent ED visit. Was unable to schedule this appointment in [facility] as well so I have sent a secured message to the clinic staff to have someone reach out to get patient scheduled. Pt. Denies smoking. Pt. Drinks only on occasion socially. Pt. Denied the need for any in home resources at this time. [facility] Right [facility] form, [place] Virtual Visit Flyer, [facility] PCP scheduling assistance, [facility] on call RN#, and Heart Healthy Diet education all sent to e-mail.

## 2023-11-30 NOTE — TELEPHONE ENCOUNTER
----- Message from Monika Weeks LPN sent at 11/30/2023  9:54 AM CST -----  Regarding: Hospital F/U  Good Morning,    I was speaking with this patient for ED Navigation and patient is needing a hospital F/U appointment. I was unable to schedule in Epic for the patient as it was blocked. Can someone from clinic please reach out to patient to get her scheduled?     Thanks in advance!

## 2023-12-06 DIAGNOSIS — G40.909 SEIZURE DISORDER: Primary | ICD-10-CM

## 2023-12-06 RX ORDER — PREGABALIN 50 MG/1
50 CAPSULE ORAL 2 TIMES DAILY
Qty: 60 CAPSULE | Refills: 5 | Status: SHIPPED | OUTPATIENT
Start: 2023-12-06 | End: 2024-02-16

## 2023-12-06 RX ORDER — LAMOTRIGINE 150 MG/1
150 TABLET ORAL 2 TIMES DAILY
Qty: 180 TABLET | Refills: 3 | Status: SHIPPED | OUTPATIENT
Start: 2023-12-06 | End: 2024-02-16

## 2023-12-06 NOTE — PROGRESS NOTES
Did not tolerate increase in lamotrigine.  Trial of pregabalin 50 mg twice daily in addition to current regimen of topiramate 50 mg twice daily and lamotrigine 150 mg twice daily.  Patient is comfortable with plan.  We will also get another follow-up appointment scheduled.    Amber Ortiz PA-C   Neurology-Epilepsy  [facility]

## 2024-01-09 RX ORDER — IBUPROFEN 800 MG/1
TABLET ORAL
Qty: 60 TABLET | Refills: 2 | Status: SHIPPED | OUTPATIENT
Start: 2024-01-09

## 2024-01-09 NOTE — TELEPHONE ENCOUNTER
Refill Encounter    PCP Visits: Recent Visits  Date Type Provider Dept   06/28/23 Office Visit Zoey Nelson MD [facility]   Showing recent visits within past 360 days and meeting all other requirements  Future Appointments  No visits were found meeting these conditions.  Showing future appointments within next 720 days and meeting all other requirements     Last 3 Blood Pressure:   BP Readings from Last 3 Encounters:   11/28/23 123/79   09/07/23 127/79   06/29/23 121/75     Preferred Pharmacy:   [id number] [address] AT [address] OF [address] [address]  Phone: [phone] Fax: [phone]    Requested RX:  Requested Prescriptions     Pending Prescriptions Disp Refills    ibuprofen (ADVIL,MOTRIN) 800 MG tablet [Pharmacy Med Name: IBUPROFEN 800MG TABLETS] 60 tablet 2     Sig: TAKE 1 TABLET BY MOUTH THREE TIMES DAILY AS NEEDED FOR PAIN      RX Route: Normal

## 2024-01-09 NOTE — TELEPHONE ENCOUNTER
No care due was identified.  [facility] Embedded Care Due Messages. Reference number: [id number].   1/08/2024 7:24:08 PM CST

## 2024-01-27 ENCOUNTER — HOSPITAL ENCOUNTER (EMERGENCY)
Facility: OTHER | Age: 44
Discharge: HOME | End: 2024-01-27
Attending: EMERGENCY MEDICINE
Payer: MEDICAID

## 2024-01-27 VITALS
WEIGHT: 156.5 LBS | HEART RATE: 90 BPM | HEIGHT: 64 IN | SYSTOLIC BLOOD PRESSURE: 126 MMHG | RESPIRATION RATE: 18 BRPM | TEMPERATURE: 98 F | DIASTOLIC BLOOD PRESSURE: 78 MMHG | OXYGEN SATURATION: 100 % | BODY MASS INDEX: 26.72 KG/M2

## 2024-01-27 DIAGNOSIS — R51.9 ACUTE NONINTRACTABLE HEADACHE, UNSPECIFIED HEADACHE TYPE: Primary | ICD-10-CM

## 2024-01-27 DIAGNOSIS — R07.9 CHEST PAIN: ICD-10-CM

## 2024-01-27 DIAGNOSIS — R20.2 NUMBNESS AND TINGLING OF LEFT ARM AND LEG: ICD-10-CM

## 2024-01-27 DIAGNOSIS — R20.0 NUMBNESS AND TINGLING OF LEFT ARM AND LEG: ICD-10-CM

## 2024-01-27 LAB
ALBUMIN SERPL BCP-MCNC: 4.2 G/DL (ref 3.5–5.2)
ALP SERPL-CCNC: 71 U/L (ref 55–135)
ALT SERPL W/O P-5'-P-CCNC: 10 U/L (ref 10–44)
ANION GAP SERPL CALC-SCNC: 9 MMOL/L (ref 8–16)
AST SERPL-CCNC: 15 U/L (ref 10–40)
BASOPHILS # BLD AUTO: 0.01 K/UL (ref 0–0.2)
BASOPHILS NFR BLD: 0.2 % (ref 0–1.9)
BILIRUB SERPL-MCNC: 0.3 MG/DL (ref 0.1–1)
BUN SERPL-MCNC: 9 MG/DL (ref 6–20)
CALCIUM SERPL-MCNC: 9.4 MG/DL (ref 8.7–10.5)
CHLORIDE SERPL-SCNC: 111 MMOL/L (ref 95–110)
CO2 SERPL-SCNC: 19 MMOL/L (ref 23–29)
CREAT SERPL-MCNC: 0.8 MG/DL (ref 0.5–1.4)
DIFFERENTIAL METHOD BLD: ABNORMAL
EOSINOPHIL # BLD AUTO: 0.1 K/UL (ref 0–0.5)
EOSINOPHIL NFR BLD: 2.6 % (ref 0–8)
ERYTHROCYTE [DISTWIDTH] IN BLOOD BY AUTOMATED COUNT: 13.7 % (ref 11.5–14.5)
EST. GFR  (NO RACE VARIABLE): >60 ML/MIN/1.73 M^2
GLUCOSE SERPL-MCNC: 105 MG/DL (ref 70–110)
HCT VFR BLD AUTO: 36.6 % (ref 37–48.5)
HGB BLD-MCNC: 12.5 G/DL (ref 12–16)
IMM GRANULOCYTES # BLD AUTO: 0.01 K/UL (ref 0–0.04)
IMM GRANULOCYTES NFR BLD AUTO: 0.2 % (ref 0–0.5)
LYMPHOCYTES # BLD AUTO: 1.8 K/UL (ref 1–4.8)
LYMPHOCYTES NFR BLD: 38.2 % (ref 18–48)
MCH RBC QN AUTO: 30.4 PG (ref 27–31)
MCHC RBC AUTO-ENTMCNC: 34.2 G/DL (ref 32–36)
MCV RBC AUTO: 89 FL (ref 82–98)
MONOCYTES # BLD AUTO: 0.5 K/UL (ref 0.3–1)
MONOCYTES NFR BLD: 10.4 % (ref 4–15)
NEUTROPHILS # BLD AUTO: 2.2 K/UL (ref 1.8–7.7)
NEUTROPHILS NFR BLD: 48.4 % (ref 38–73)
NRBC BLD-RTO: 0 /100 WBC
PLATELET # BLD AUTO: 337 K/UL (ref 150–450)
PMV BLD AUTO: 8.6 FL (ref 9.2–12.9)
POTASSIUM SERPL-SCNC: 3.5 MMOL/L (ref 3.5–5.1)
PROT SERPL-MCNC: 8.5 G/DL (ref 6–8.4)
RBC # BLD AUTO: 4.11 M/UL (ref 4–5.4)
SODIUM SERPL-SCNC: 139 MMOL/L (ref 136–145)
TROPONIN I SERPL DL<=0.01 NG/ML-MCNC: <0.006 NG/ML (ref 0–0.03)
WBC # BLD AUTO: 4.63 K/UL (ref 3.9–12.7)

## 2024-01-27 PROCEDURE — 93010 ELECTROCARDIOGRAM REPORT: CPT | Mod: ,,, | Performed by: INTERNAL MEDICINE

## 2024-01-27 PROCEDURE — 96374 THER/PROPH/DIAG INJ IV PUSH: CPT

## 2024-01-27 PROCEDURE — 25000003 PHARM REV CODE 250: Performed by: EMERGENCY MEDICINE

## 2024-01-27 PROCEDURE — 99284 EMERGENCY DEPT VISIT MOD MDM: CPT | Mod: 25

## 2024-01-27 PROCEDURE — 96361 HYDRATE IV INFUSION ADD-ON: CPT

## 2024-01-27 PROCEDURE — 96375 TX/PRO/DX INJ NEW DRUG ADDON: CPT

## 2024-01-27 PROCEDURE — 84484 ASSAY OF TROPONIN QUANT: CPT | Performed by: EMERGENCY MEDICINE

## 2024-01-27 PROCEDURE — 93005 ELECTROCARDIOGRAM TRACING: CPT

## 2024-01-27 PROCEDURE — 80053 COMPREHEN METABOLIC PANEL: CPT | Performed by: EMERGENCY MEDICINE

## 2024-01-27 PROCEDURE — 85025 COMPLETE CBC W/AUTO DIFF WBC: CPT | Performed by: EMERGENCY MEDICINE

## 2024-01-27 PROCEDURE — 63600175 PHARM REV CODE 636 W HCPCS: Performed by: EMERGENCY MEDICINE

## 2024-01-27 RX ORDER — DIPHENHYDRAMINE HYDROCHLORIDE 50 MG/ML
25 INJECTION INTRAMUSCULAR; INTRAVENOUS
Status: COMPLETED | OUTPATIENT
Start: 2024-01-27 | End: 2024-01-27

## 2024-01-27 RX ORDER — PROCHLORPERAZINE EDISYLATE 5 MG/ML
5 INJECTION INTRAMUSCULAR; INTRAVENOUS
Status: COMPLETED | OUTPATIENT
Start: 2024-01-27 | End: 2024-01-27

## 2024-01-27 RX ORDER — KETOROLAC TROMETHAMINE 30 MG/ML
15 INJECTION, SOLUTION INTRAMUSCULAR; INTRAVENOUS
Status: COMPLETED | OUTPATIENT
Start: 2024-01-27 | End: 2024-01-27

## 2024-01-27 RX ADMIN — SODIUM CHLORIDE 1000 ML: 0.9 INJECTION, SOLUTION INTRAVENOUS at 03:01

## 2024-01-27 RX ADMIN — KETOROLAC TROMETHAMINE 15 MG: 30 INJECTION INTRAMUSCULAR; INTRAVENOUS at 04:01

## 2024-01-27 RX ADMIN — PROCHLORPERAZINE EDISYLATE 5 MG: 5 INJECTION INTRAMUSCULAR; INTRAVENOUS at 04:01

## 2024-01-27 RX ADMIN — DIPHENHYDRAMINE HYDROCHLORIDE 25 MG: 50 INJECTION, SOLUTION INTRAMUSCULAR; INTRAVENOUS at 04:01

## 2024-01-27 NOTE — ED PROVIDER NOTES
Encounter Date: 2024       History     Chief Complaint   Patient presents with    multiple complaints      C/o HA 7/10 and numbness to RLE/LUE/LLE that woke her up from her sleep tonight. Stated once arrived to ED c/o L CP 1/10 non radiating. -weakness/facial droop. -n/v/change in vision. CBG 95. VSS     43-year-old female with history of seizure disorder presents for evaluation of headache and numbness.  Patient states that she had trouble sleeping last night, perhaps due to stress, and woke up around 2:00 a.m. with left-sided throbbing headache and palpitations.  She tried to walk outside but did not feel better, then noticed numbness of her extremities, initially in her right and left arm, but then it spread to her left face and leg as well.  Headache has been persistent since then, but numbness has been intermittent, mostly on her left side now.  She became concerned for stroke so came into the ED, and on arrival here she also noticed some left-sided chest tightness that has been intermittent since then as well, none currently.  She had somewhat similar episode a few months ago for which she was seen here.  She has been compliant with seizure medications and denies any recent episodes.  No cough, fevers, recent illness, or other new complaints.      Review of patient's allergies indicates:  No Known Allergies  Past Medical History:   Diagnosis Date    Encounter for blood transfusion     Fibroids     Hypertension     Iron deficiency anemia     Menorrhagia     Migraine     Seizures     as baby     Past Surgical History:   Procedure Laterality Date     SECTION      x1    HYSTERECTOMY  2017    TUBAL LIGATION       Family History   Problem Relation Age of Onset    Hypertension Father     Hypertension Sister     Hypertension Maternal Grandmother     Breast cancer Maternal Grandmother     Cancer Maternal Grandfather     Hypertension Paternal Grandmother     Colon cancer Maternal Cousin     Ovarian  cancer Neg Hx      Social History     Tobacco Use    Smoking status: Never     Passive exposure: Never    Smokeless tobacco: Never   Substance Use Topics    Alcohol use: No     Comment: Occasional; 3x /month    Drug use: No     Review of Systems   Constitutional:  Negative for fever.   HENT:  Negative for congestion.    Eyes:  Negative for redness.   Respiratory:  Positive for chest tightness. Negative for shortness of breath.    Cardiovascular:  Positive for palpitations.   Gastrointestinal:  Negative for abdominal pain.   Genitourinary:  Negative for dysuria.   Skin:  Negative for rash.   Neurological:  Positive for numbness and headaches.   Psychiatric/Behavioral:  Negative for confusion.        Physical Exam     Initial Vitals [01/27/24 0211]   BP Pulse Resp Temp SpO2   (!) 158/92 81 18 98.3 °F (36.8 °C) 100 %      MAP       --         Physical Exam    Constitutional: She appears well-developed and well-nourished. She is not diaphoretic. No distress.   HENT:   Head: Normocephalic and atraumatic.   Eyes: Conjunctivae are normal.   Neck: Neck supple.   Cardiovascular:  Normal rate, regular rhythm, S1 normal, S2 normal, normal heart sounds and intact distal pulses.           No murmur heard.  Pulmonary/Chest: Breath sounds normal. No respiratory distress. She has no wheezes. She has no rhonchi. She has no rales.   Musculoskeletal:         General: No edema.      Cervical back: Neck supple.     Neurological: She is alert and oriented to person, place, and time. She has normal strength. No cranial nerve deficit or sensory deficit.   Equal sensation of face and all extremities to light touch.  Normal strength   Skin: Skin is warm and dry.   Psychiatric:   Anxious affect         ED Course   Procedures  Labs Reviewed   COMPREHENSIVE METABOLIC PANEL - Abnormal; Notable for the following components:       Result Value    Chloride 111 (*)     CO2 19 (*)     Total Protein 8.5 (*)     All other components within normal  limits   CBC W/ AUTO DIFFERENTIAL - Abnormal; Notable for the following components:    Hematocrit 36.6 (*)     MPV 8.6 (*)     All other components within normal limits   TROPONIN I     EKG Readings: (Independently Interpreted)   Normal sinus rhythm at rate 92, first-degree AV block, no significant change from previous       Imaging Results    None          Medications   sodium chloride 0.9% bolus 1,000 mL 1,000 mL (0 mLs Intravenous Stopped 1/27/24 0457)   ketorolac injection 15 mg (15 mg Intravenous Given 1/27/24 0404)   prochlorperazine injection Soln 5 mg (5 mg Intravenous Given 1/27/24 0404)   diphenhydrAMINE injection 25 mg (25 mg Intravenous Given 1/27/24 0404)     Medical Decision Making      43-year-old female with history of seizure disorder presents for evaluation of headache and numbness that she noticed when she woke up at 2:00 a.m.. She was otherwise at baseline in the past few days, though had increased stress and difficulty sleeping last night.  She describes headache as left-sided throbbing but no associated nausea or photophobia, and numbness was initially affecting both her arms, but is now intermittent and over her left face/arm/leg.  She also noted palpitations when headache started, and chest tightness upon arriving here to the ED.  No associated weakness, recent illness, or other associated symptoms.  On arrival patient with slightly elevated blood pressure but otherwise normal vitals; initial exam with no neuro deficits, equal sensation to light touch and normal strength of all extremities.  No indication for stroke code or tPA candidate since patient with normal neuro exam.  I extensively reviewed her chart and she had similar presentation here 2 months ago with intermittent left-sided numbness, and she had normal CTA head/neck and MRI brain at that time with no evidence for CVA.  She also had headache with intermittent numbness in September, was treated as complex migraine here with  improvement, with normal CT then as well.  Based on previous reassuring brain imaging and normal neuro exam now, no indication for stroke code or repeat imaging initially.  Most likely complex migraine, will treat with migraine cocktail and reassess.  Palpitations and chest tightness likely related to associated anxiety, but will do ACS workup since she denies significant history of this.      Basic labs with no acute findings including normal troponin.  On reassessment after migraine cocktail, patient resting comfortably with near resolved headache.  She states she had a few episodes of brief left-sided numbness that lasted only a few minutes but none currently.  She still has normal neuro exam on reassessment.  I discussed with her getting another CT head for further evaluation but that her symptoms are likely from complex migraine or other etiology for paresthesias, and I am concerned that she will be getting her 3rd head CT in the past 5 months, and she is agreeable with deferring at this time.  She will continue supportive care at home including rest and hydration, NSAIDs as needed for any further headaches, and return to the ED for imaging if she has persistent numbness or other new concerning symptoms such as weakness or speech difficulties.  She understands to follow up with her neurologist closely for further management.      Amount and/or Complexity of Data Reviewed  Labs: ordered.    Risk  Prescription drug management.                                      Clinical Impression:  Final diagnoses:  [R07.9] Chest pain  [R51.9] Acute nonintractable headache, unspecified headache type (Primary)  [R20.0, R20.2] Numbness and tingling of left arm and leg          ED Disposition Condition    Discharge Stable          ED Prescriptions    None       Follow-up Information       Follow up With Specialties Details Why Contact Info    [facility] - Emergency Dept Emergency Medicine Go to  If symptoms worsen [address]  [facility] [address]  [phone]             Subhash Jack MD  01/27/24 1114

## 2024-01-27 NOTE — ED TRIAGE NOTES
43 YOF presents to ED with c/o HA 7/10 and numbness to RLE/LUE/LLE that woke her up from her sleep tonight. Stated once arrived to ED c/o L CP 1/10 non radiating. -weakness/facial droop. -n/v/change in vision. CBG 95. A&Ox4. Denies any other complaints.     LOC: The patient is awake, alert and aware of environment with an appropriate affect, the patient is oriented x 3.  APPEARANCE: Patient resting comfortably and in no acute distress, patient is clean and well groomed, patient's clothing is properly fastened.  SKIN: The skin is warm and dry, patient has normal skin turgor and moist mucus membranes, skin intact, no breakdown or brusing noted.  MUSKULOSKELETAL: Patient moving all extremities well, no obvious swelling or deformities noted.  RESPIRATORY: Airway is open and patent, respirations are spontaneous, patient has a normal effort and rate.  CARDIAC: No peripheral edema.  ABDOMEN: Soft and no tenderness to palpation, no distention noted.     ED workup in progress. VSS. Safety measures in place; side rails up x2. Call light within pt reach. Will continue to monitor.

## 2024-01-28 ENCOUNTER — PATIENT MESSAGE (OUTPATIENT)
Dept: PRIMARY CARE CLINIC | Facility: CLINIC | Age: 44
End: 2024-01-28
Payer: MEDICAID

## 2024-01-28 ENCOUNTER — PATIENT MESSAGE (OUTPATIENT)
Dept: NEUROLOGY | Facility: CLINIC | Age: 44
End: 2024-01-28
Payer: MEDICAID

## 2024-01-28 DIAGNOSIS — R56.9 SEIZURE: Primary | ICD-10-CM

## 2024-01-28 DIAGNOSIS — R07.9 CHEST PAIN, UNSPECIFIED TYPE: ICD-10-CM

## 2024-01-29 ENCOUNTER — HOSPITAL ENCOUNTER (EMERGENCY)
Facility: OTHER | Age: 44
Discharge: HOME | End: 2024-01-29
Attending: EMERGENCY MEDICINE
Payer: MEDICAID

## 2024-01-29 VITALS
TEMPERATURE: 99 F | RESPIRATION RATE: 19 BRPM | OXYGEN SATURATION: 99 % | SYSTOLIC BLOOD PRESSURE: 107 MMHG | DIASTOLIC BLOOD PRESSURE: 68 MMHG | HEART RATE: 69 BPM

## 2024-01-29 DIAGNOSIS — E87.6 HYPOKALEMIA: ICD-10-CM

## 2024-01-29 DIAGNOSIS — R07.89 ATYPICAL CHEST PAIN: Primary | ICD-10-CM

## 2024-01-29 LAB
ANION GAP SERPL CALC-SCNC: 11 MMOL/L (ref 8–16)
BASOPHILS # BLD AUTO: 0.03 K/UL (ref 0–0.2)
BASOPHILS NFR BLD: 0.6 % (ref 0–1.9)
BUN SERPL-MCNC: 14 MG/DL (ref 6–20)
CALCIUM SERPL-MCNC: 9.6 MG/DL (ref 8.7–10.5)
CHLORIDE SERPL-SCNC: 111 MMOL/L (ref 95–110)
CO2 SERPL-SCNC: 18 MMOL/L (ref 23–29)
CREAT SERPL-MCNC: 0.8 MG/DL (ref 0.5–1.4)
D DIMER PPP IA.FEU-MCNC: 0.31 MG/L FEU
DIFFERENTIAL METHOD BLD: ABNORMAL
EOSINOPHIL # BLD AUTO: 0.1 K/UL (ref 0–0.5)
EOSINOPHIL NFR BLD: 2.1 % (ref 0–8)
ERYTHROCYTE [DISTWIDTH] IN BLOOD BY AUTOMATED COUNT: 13.6 % (ref 11.5–14.5)
EST. GFR  (NO RACE VARIABLE): >60 ML/MIN/1.73 M^2
GLUCOSE SERPL-MCNC: 117 MG/DL (ref 70–110)
HCT VFR BLD AUTO: 36.7 % (ref 37–48.5)
HGB BLD-MCNC: 12.5 G/DL (ref 12–16)
IMM GRANULOCYTES # BLD AUTO: 0.01 K/UL (ref 0–0.04)
IMM GRANULOCYTES NFR BLD AUTO: 0.2 % (ref 0–0.5)
LYMPHOCYTES # BLD AUTO: 1.8 K/UL (ref 1–4.8)
LYMPHOCYTES NFR BLD: 38.4 % (ref 18–48)
MCH RBC QN AUTO: 30 PG (ref 27–31)
MCHC RBC AUTO-ENTMCNC: 34.1 G/DL (ref 32–36)
MCV RBC AUTO: 88 FL (ref 82–98)
MONOCYTES # BLD AUTO: 0.4 K/UL (ref 0.3–1)
MONOCYTES NFR BLD: 8.2 % (ref 4–15)
NEUTROPHILS # BLD AUTO: 2.4 K/UL (ref 1.8–7.7)
NEUTROPHILS NFR BLD: 50.5 % (ref 38–73)
NRBC BLD-RTO: 0 /100 WBC
PLATELET # BLD AUTO: 334 K/UL (ref 150–450)
PMV BLD AUTO: 8.4 FL (ref 9.2–12.9)
POTASSIUM SERPL-SCNC: 3.1 MMOL/L (ref 3.5–5.1)
RBC # BLD AUTO: 4.16 M/UL (ref 4–5.4)
SODIUM SERPL-SCNC: 140 MMOL/L (ref 136–145)
TROPONIN I SERPL DL<=0.01 NG/ML-MCNC: <0.006 NG/ML (ref 0–0.03)
WBC # BLD AUTO: 4.74 K/UL (ref 3.9–12.7)

## 2024-01-29 PROCEDURE — 85025 COMPLETE CBC W/AUTO DIFF WBC: CPT | Performed by: EMERGENCY MEDICINE

## 2024-01-29 PROCEDURE — 85379 FIBRIN DEGRADATION QUANT: CPT | Performed by: EMERGENCY MEDICINE

## 2024-01-29 PROCEDURE — 63600175 PHARM REV CODE 636 W HCPCS: Performed by: EMERGENCY MEDICINE

## 2024-01-29 PROCEDURE — 80048 BASIC METABOLIC PNL TOTAL CA: CPT | Performed by: EMERGENCY MEDICINE

## 2024-01-29 PROCEDURE — 96361 HYDRATE IV INFUSION ADD-ON: CPT

## 2024-01-29 PROCEDURE — 96374 THER/PROPH/DIAG INJ IV PUSH: CPT

## 2024-01-29 PROCEDURE — 99284 EMERGENCY DEPT VISIT MOD MDM: CPT | Mod: 25

## 2024-01-29 PROCEDURE — 25000003 PHARM REV CODE 250: Performed by: EMERGENCY MEDICINE

## 2024-01-29 PROCEDURE — 84484 ASSAY OF TROPONIN QUANT: CPT | Performed by: EMERGENCY MEDICINE

## 2024-01-29 RX ORDER — KETOROLAC TROMETHAMINE 30 MG/ML
10 INJECTION, SOLUTION INTRAMUSCULAR; INTRAVENOUS
Status: COMPLETED | OUTPATIENT
Start: 2024-01-29 | End: 2024-01-29

## 2024-01-29 RX ORDER — POTASSIUM CHLORIDE 20 MEQ/1
40 TABLET, EXTENDED RELEASE ORAL
Status: COMPLETED | OUTPATIENT
Start: 2024-01-29 | End: 2024-01-29

## 2024-01-29 RX ADMIN — KETOROLAC TROMETHAMINE 10 MG: 30 INJECTION, SOLUTION INTRAMUSCULAR; INTRAVENOUS at 02:01

## 2024-01-29 RX ADMIN — POTASSIUM CHLORIDE 40 MEQ: 1500 TABLET, EXTENDED RELEASE ORAL at 03:01

## 2024-01-29 RX ADMIN — SODIUM CHLORIDE 1000 ML: 9 INJECTION, SOLUTION INTRAVENOUS at 02:01

## 2024-01-29 NOTE — ED PROVIDER NOTES
Encounter Date: 2024    SCRIBE #1 NOTE: I, Sheela Rg, am scribing for, and in the presence of,  Neeraj Jiménez MD. I have scribed the following portions of the note - Other sections scribed: HPI, ROS,PE.       History     Chief Complaint   Patient presents with    Chest Pain     Reports midsternal chest pain and headache, palpitations     Time seen by provider: 1:58 AM    This is a 43 y.o. female, with PMHx of HTN and seizures, who presents with complaint of chest pain and left sided numbness radiating from her arm to leg, onset 2 days ago. Patient was seen in the ED on 2024  for similar symptoms. Patient reports she woke up this morning with chest pain and rapid breathing. Shortly after, the left sided numbness began. Patient notes these episodes only occur at night, patient is at baseline during the day.She also notes recent psychological stressors. 1 month ago, patient had a change in her seizure medication. Patient states she has been feeling unwell since switching to new medication. Patient is compliant with medication. She denies any LOC, fever, nausea or vomiting.This is the extent of the patient's complaints at this time.        The history is provided by the patient.     Review of patient's allergies indicates:  No Known Allergies  Past Medical History:   Diagnosis Date    Encounter for blood transfusion     Fibroids     Hypertension     Iron deficiency anemia     Menorrhagia     Migraine     Seizures     as baby     Past Surgical History:   Procedure Laterality Date     SECTION      x1    HYSTERECTOMY  2017    TUBAL LIGATION       Family History   Problem Relation Age of Onset    Hypertension Father     Hypertension Sister     Hypertension Maternal Grandmother     Breast cancer Maternal Grandmother     Cancer Maternal Grandfather     Hypertension Paternal Grandmother     Colon cancer Maternal Cousin     Ovarian cancer Neg Hx      Social History     Tobacco Use    Smoking  status: Never     Passive exposure: Never    Smokeless tobacco: Never   Substance Use Topics    Alcohol use: No     Comment: Occasional; 3x /month    Drug use: No     Review of Systems  See HPI  Physical Exam     Initial Vitals   BP Pulse Resp Temp SpO2   01/29/24 0131 01/29/24 0128 01/29/24 0128 01/29/24 0131 01/29/24 0130   138/85 (!) 135 (!) 26 98.9 °F (37.2 °C) 99 %      MAP       --                Physical Exam    Nursing note and vitals reviewed.  Constitutional: She appears well-developed and well-nourished. No distress.   Anxious appearing.   HENT:   Head: Normocephalic and atraumatic.   Eyes: Conjunctivae are normal.   Neck: Neck supple.   Cardiovascular:  Normal rate, regular rhythm, S1 normal, S2 normal, normal heart sounds and intact distal pulses.           No murmur heard.  Pulmonary/Chest: Breath sounds normal. No respiratory distress. She has no wheezes. She has no rhonchi. She has no rales.   No reproducible chest wall pain.   Abdominal: Abdomen is soft. There is no abdominal tenderness. There is no rebound and no guarding.   Musculoskeletal:         General: No edema.      Cervical back: Neck supple.     Neurological: She is alert and oriented to person, place, and time.   Skin: Skin is warm. Capillary refill takes less than 2 seconds.   Psychiatric: She has a normal mood and affect.         ED Course   Procedures  Labs Reviewed   CBC W/ AUTO DIFFERENTIAL - Abnormal; Notable for the following components:       Result Value    Hematocrit 36.7 (*)     MPV 8.4 (*)     All other components within normal limits   BASIC METABOLIC PANEL - Abnormal; Notable for the following components:    Potassium 3.1 (*)     Chloride 111 (*)     CO2 18 (*)     Glucose 117 (*)     All other components within normal limits   TROPONIN I   D DIMER, QUANTITATIVE     EKG Readings: (Independently Interpreted)   Sinus tachycardia. Rate 130. Non specific ST changes. Similar in appearance to 1/27/2024. No ischemia or arrhythmia.        Imaging Results    None          Medications   sodium chloride 0.9% bolus 1,000 mL 1,000 mL (0 mLs Intravenous Stopped 1/29/24 0316)   ketorolac injection 9.999 mg (9.999 mg Intravenous Given 1/29/24 0216)   potassium chloride SA CR tablet 40 mEq (40 mEq Oral Given 1/29/24 0309)     Medical Decision Making  Amount and/or Complexity of Data Reviewed  Labs: ordered.    Risk  Prescription drug management.                     Patient presents with recurrence of chest pain which she noted upon waking suddenly in the middle of the night.  Made her feel like she was having chest tightness, also with some tingling numbness on left side of her body.  Similar episode a few days ago for which she was evaluated at our facility.  She has less of a headache today than she did at that prior presentation.  She does admit to being under more stress lately and she does describe a sensation of the anxiety that seems to worsen these symptoms.  She also expresses concern about the EKG reading that was sent to her MyChart, it does have a computer description of multiple abnormalities however reassured patient that the physician who looked at that EKG did not feel that these were real or significant, and that today's EKG also does not show any acute concerning findings and further does not show any interval change.  She was initially tachycardic at triage, although this has improved upon my exam.  Was given Toradol, IV fluids.  Her EKG does not show ischemic changes.  Troponin is again negative, suggesting against cardiac etiology of her symptoms.  Given the tachycardia in the recurrent chest pain, chest tightness I did additionally obtain a D-dimer which is negative suggesting against PE.  The patient had expressed some concern about stroke however chart review shows extensive recent neurologic workup including negative MRI, CTA and patient reassured as to this point.  At this time I do not think there is acute cardiac pulmonary or  neurologic condition and I think the patient is stable for discharge to home.  Encouraged follow-up with primary care, especially if symptoms persist.                 Clinical Impression:  Final diagnoses:  [R07.89] Atypical chest pain (Primary)  [E87.6] Hypokalemia          ED Disposition Condition    Discharge Stable          ED Prescriptions    None       Follow-up Information       Follow up With Specialties Details Why Contact Info    Zoey Nelson MD Family Medicine In 5 days  [address] [id number] [phone]            Physician Attestation for Scribe: I, [facility], reviewed documentation as scribed in my presence, which is both accurate and complete.       Neeraj Jiménez II, MD  01/29/24 0584

## 2024-01-29 NOTE — Clinical Note
"Pernell Almarazdino Ly was seen and treated in our emergency department on 1/29/2024.  She may return to work on 01/30/2024.       If you have any questions or concerns, please don't hesitate to call.      MAIRA Ward RN    "

## 2024-02-14 ENCOUNTER — PATIENT OUTREACH (OUTPATIENT)
Dept: EMERGENCY MEDICINE | Facility: OTHER | Age: 44
End: 2024-02-14
Payer: MEDICAID

## 2024-02-16 ENCOUNTER — TELEPHONE (OUTPATIENT)
Dept: NEUROLOGY | Facility: CLINIC | Age: 44
End: 2024-02-16
Payer: MEDICAID

## 2024-02-16 ENCOUNTER — OFFICE VISIT (OUTPATIENT)
Dept: NEUROLOGY | Facility: CLINIC | Age: 44
End: 2024-02-16
Payer: MEDICAID

## 2024-02-16 DIAGNOSIS — R53.82 CHRONIC FATIGUE: ICD-10-CM

## 2024-02-16 DIAGNOSIS — G47.9 DIFFICULTY SLEEPING: ICD-10-CM

## 2024-02-16 DIAGNOSIS — M62.838 MUSCLE SPASMS OF NECK: ICD-10-CM

## 2024-02-16 DIAGNOSIS — R51.9 HEADACHE DISORDER: ICD-10-CM

## 2024-02-16 DIAGNOSIS — R51.9 FREQUENT HEADACHES: ICD-10-CM

## 2024-02-16 DIAGNOSIS — G40.109 FOCAL EPILEPSY: Primary | ICD-10-CM

## 2024-02-16 DIAGNOSIS — R56.9 SEIZURE: ICD-10-CM

## 2024-02-16 PROCEDURE — 99417 PROLNG OP E/M EACH 15 MIN: CPT | Mod: 95,,,

## 2024-02-16 PROCEDURE — 99215 OFFICE O/P EST HI 40 MIN: CPT | Mod: 95,,,

## 2024-02-16 RX ORDER — LAMOTRIGINE 200 MG/1
200 TABLET ORAL 2 TIMES DAILY
Qty: 60 TABLET | Refills: 11 | Status: SHIPPED | OUTPATIENT
Start: 2024-02-16 | End: 2024-03-21

## 2024-02-16 NOTE — PROGRESS NOTES
[facility]  Epilepsy Clinic Progress Note    [facility] - NEUROLOGY 7TH [place] [facility], [place]    Date: 2/16/24  Patient Name: Pernell Ly   MRN: [id number]   PCP: Zoey Nelson  Referring Provider: Zoey Nelson MD    The patient location is: Home  The chief complaint leading to consultation is: Epilepsy  Visit type: Virtual visit with synchronous audio and video  Total time spent with patient: 42 minutes  Each patient to whom he or she provides medical services by telemedicine is:  (1) informed of the relationship between the physician and patient and the respective role of any other health care provider with respect to management of the patient; and (2) notified that he or she may decline to receive medical services by telemedicine and may withdraw from such care at any time.    Assessment:   Pernell Ly is a 43 y.o. female presenting in follow up for longstanding epilepsy. 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 Ambulatory EEG monitoring 9/2023 captured two electrographic seizures emanating from the left frontotemporal region as well as sharp waves in the left and right temporal regions despite regimen of lamotrigine 150mg BID and topiramate 50mg BID. Did not tolerate lamotrigine at 300mg BID. Escalated regimen w/ pregabalin 50mg BID which resulted in memory issues and increased drowsiness. Discussed plan for slight increase in lamotrigine to 200mg BID and, if tolerating, discontinue pregabalin to see if memory sx improve. Continue topiramate at same dose. Recommend EMU admission for event capture/characterization and medication optimization pending seizure activity. We will need to decrease or stop antiseizure drugs in order to provoke epileptic seizures for event semiology/characterization. Must be done inpatient. Appreciate obtaining medication levels upon admission. Discuss possible surgical candidacy if events remain uncontrolled despite therapeutic doses of  two medications. Continue physical therapy + tizanidine 2mg qhs PRN for cervicogenic headache and associated muscle spasms. Follow up in 3 months or sooner with issues. Patient is comfortable with plan. All questions and concerns are addressed at this time.   Plan:     Problem List Items Addressed This Visit          Neuro    Seizure    Relevant Medications    lamoTRIgine (LAMICTAL) 200 MG tablet    Other Relevant Orders    EMU Monitoring    Headache disorder     Other Visit Diagnoses       Focal epilepsy    -  Primary    Relevant Medications    lamoTRIgine (LAMICTAL) 200 MG tablet    Other Relevant Orders    EMU Monitoring    Frequent headaches        Difficulty sleeping        Chronic fatigue        Muscle spasms of neck              I completed education on seizure first aid and safety. I recommended seizure precautions with regards to avoiding unsupervised water recreational activity or bathing in tubs, climbing or working at heights, operation of heavy or dangerous machinery, caution around fire and sources of high heat, as well as any other activity which could put a patient at danger in case of a seizure. I also reviewed the [facility] law and recommended that the patient not drive for 6 months in the event of breakthrough seizures.    Amber Ortiz PA-C   Neurology-Epilepsy  [facility]    Collaborating physician, Dr. Johnathan Weir, was available during today's encounter.     I spent approximately 74 minutes on the day of this encounter preparing to see the patient, obtaining and reviewing history and results, performing a medically appropriate exam, counseling and educating the patient/family/caregiver, documenting clinical information, coordinating care, and ordering medications, tests, procedures, and referrals.    Patient note was created using MModal Dictation.  Any errors in syntax or even information may not have been identified and edited on initial review prior to signing this  note.  Subjective:   Patient seen in consultation at the request of Zoey Nelson MD for the evaluation of epilepsy. A copy of this note will be sent to the referring physician.     Interval Events/ROS 2/16/2024:    Current ASM/SEs:   Lamotrigine 150mg BID; no SE; did not tolerate 300mg BID  Topiramate 50mg BID; no SE  Pregabalin 50mg BID; SE memory issues, trouble following conversations w/ people, feels like she blanks out for a second, almost like a light switch (?seizure activity), drowsiness     Breakthrough seizures/events: 2 weeks ago -> seemed to wake up every other night w/ a headache, unsure if seizures have occurred    Driving: no  Sleep: overall good  Mood: some fluctuations in mood but is manageable  Activity: continues to attend work    Headaches. Chronic fatigue, pt had negative sleep study last year. Recent cough/congestion/sinus pressure. Otherwise, no fever, no changes in vision, no new weakness, no chest pain, no shortness of breath, no nausea, no vomiting, no diarrhea, no constipation, no tingling/numbness, no problems walking.    Recent Labs   Lab 04/19/22  1430 12/31/22  2229   Lamotrigine Lvl 5.0 5.1       Interval Events/ROS 11/15/2023:    Current ASM/SEs: lamotrigine 150mg twice daily and topiramate 50mg twice daily; no SE  Breakthrough seizures/events: none that she knows of; discussed EEG results which captured 2 electrographic seizures that occurred unbeknownst to pt  Driving: no  Sleep: not great due to waking up w/ headaches; takes trazodone every once in a while  Mood: overall okay, denies anxiety/depression, some fluctuations in mood but is manageable    Persistent headaches -> physical therapy scheduled for next week. Vivid dreams sometimes. Otherwise, no fever, no cold symptoms, no changes in vision, no new weakness, no chest pain, no shortness of breath, no nausea, no vomiting, no diarrhea, no constipation, no problems walking.     Interval Events/ROS 11/3/2023:    Current  ASM/SEs: lamotrigine 150mg twice daily and topiramate 50mg twice daily  Breakthrough seizures/events: none since the addition of topiramate  Folic acid: s/p hysterectomy  Sleep: not great due to waking up w/ headaches; takes trazodone every once in a while  Mood: overall okay, denies anxiety/depression, some fluctuations in mood but is manageable    Persistent headaches described as numbness to L side of head, wakes her up during the night and is sometimes still there in the morning. Sleeps on her L side at night. Has taken tylenol 1-2 times for headache in the past week. Otherwise, no fever, no cold symptoms, no changes in vision, no new weakness, no chest pain, no shortness of breath, no nausea, no vomiting, no diarrhea, no constipation, no problems walking.     Interval Events/ROS 8/25/2023:    Current ASM/SEs: lamotrigine 150mg twice daily; SE drowsy  Breakthrough seizures/events: staring event 8/5/2023, no identifiable trigger, described she was in the bathroom brushing her teeth then suddenly realized she was in a different room, son told her she had toothpaste around her mouth; prior to this last event was 6/15/2023; no identifiable trigger; no convulsions  Driving: no  Folic acid: s/p hysterectomy  Sleep: not great due to waking up w/ headaches   Mood: fair    Frequently wakes up in the middle of the night with headache located to the top of her head, also often wakes up with CROOK in the morning. States she has not been taking anything at home for the pain. Headache typically resolves once she is up and moving for the day. Otherwise, no fever, no cold symptoms, no changes in vision, no new weakness, no chest pain, no shortness of breath, no nausea, no vomiting, no diarrhea, no constipation, no tingling/numbness, no problems walking.     Interval Events/ROS 6/20/2023:    Current ASM/SEs: lamotrigine 150mg BID; no SE   Breakthrough seizures/events: last event was 6/15 while shopping in [employer], describes she was  with her sister when she started staring off and wouldn't respond, 'came to' and her sister was very concerned, patient was confused, states her sister told her the event lasted >15 minutes (specifically clarified that the staring lasted >15 minutes and it took additional time for the patient to fully feel back to normal), no LOC or collapse, no abnormal movements, patient can't recall whether or not she was able to hear her sister during event; reports she has experienced staring episodes/sensation of lost time at least 3 times in the past 2 months, other episodes were not witnessed    Driving: no  Folic acid: no  Sleep: does not get much sleep, broken sleep, has tried melatonin in the past  Mood: chronic anxiety, notes some feelings of depression, stopped sertraline after 3 days (didn't like the idea of taking a mood medication, but feels more open to it now)    Intermittent headaches. Otherwise, no fever, no cold symptoms, no changes in vision, no new weakness, no chest pain, no shortness of breath, no nausea, no vomiting, no diarrhea, no constipation, no tingling/numbness, no problems walking.     Interval Events/ROS 4/12/2023:    Current ASM/SEs: lamotrigine 100mg BID; no SE  Breakthrough seizures/events:  event 4/10 -> states she remembers using the bathroom then all of a sudden she was in her bed being woken up by her dad, confused, felt off; does not recall walking back from the bathroom to her bed; son told her she seemed out of it; felt back to baseline ~30 min after event; no convulsions/abnormal movements; no identifiable trigger; current event frequency is around 1 per year   Driving: no  Folic acid: no   Sleep: chronic insomnia; follows w/ sleep medicine    Mood: anxious    Headaches. Otherwise, no fever, no cold symptoms, no changes in vision, no new weakness, no chest pain, no shortness of breath, no nausea, no vomiting, no diarrhea, no constipation, no tingling/numbness, no problems walking.    "  HPI:   Ms. Pernell Ly is a 43 y.o. female who presents with a chief complaint of a longstanding seizure disorder.  Patient presents today to transfer her care from Dr. Dunham after being lost to follow-up for 2 years.  The patient reports that she had been doing well until the last few months when she has begun to experience episodes of loss of time.  The patient states that she will be alone and realized that several seconds have passed and she has lost awareness for a few minutes.  She endorses expressed compliance with her Lamictal.  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  She states that she believes is contributing to chronic daily tension headaches.  She states the headaches have become worse over the past few weeks and notes that she is taking ibuprofen twice a day every day for management of headaches.  She denies associated migrainous features or focal neurologic deficits.    Seizure Type: Supsected focal onset  Seizure Etiology:   L MTS  Current AEDs: Lamictal 100 mg BID    The patient is not accompanied by family who contribute to the history. This patient has 3 types of seizure as described below. The patient reports having seizures for years The patient reports to have stable seizure control. The seizure frequency is variable. The last seizure was last week . The patient does not report side effects from seizure medication.     Seizure Type 1:   Seizure Description: LOC, GTC (one out of sleep)  Aura:  De ja vu feeling  Associated Symptoms:  none  Seizure Frequency: 2 in lifetime  Last seizure: 2020-ish    Seizure Type 2:   Seizure Description: Incomprehensible speech lasting 14 minutes with loss of time and confusion  Aura: None  Associated Symptoms:  none  Seizure Frequency: One in lifetime  Last seizure: 2019    Seizure Type 3:   Seizure Description: "Goes out into space" for a minute, always happens " "when she's alone  Aura: None  Associated Symptoms: none  Seizure Frequency: Variable, often clusters every few months "seldom"   Last seizure: 1.5 weeks ago    Handedness: right  Seizure Onset Age: infancy  Seizure/ Epilepsy Risk Factors: childhood seizure  Birth/Developmental History: normal birth history and Normal developmental history  Seizure Triggers/ Provoking Features: stress  Previous Seizure Medications: lamotrigine (Lamictal, LTG) and zonisamide (Zonegran, ZNA)  Recent Med Changes: None  Other Treatments: None  Prior Episodes of Status: None  Psychiatric/Behavioral Comorbitidies: Anxiety  Surgical Candidacy:  Pending    Prior Studies:  EEG : Not done  vEEG/ EMU evaluation: Left MTS 2018, personally reviewed  MRI of brain: Not done  AED levels:  Not done  CT/CTA Scan: Unremarkable - personally reviewed  PET Scan: Not done  Neuropsychological Evaluation: Not done  DEXA Scan: Not done  Other studies: Not done    PAST MEDICAL HISTORY:  Past Medical History:   Diagnosis Date    Encounter for blood transfusion     Fibroids     Hypertension     Iron deficiency anemia     Menorrhagia     Migraine     Seizures     as baby       PAST SURGICAL HISTORY:  Past Surgical History:   Procedure Laterality Date     SECTION      x1    HYSTERECTOMY  2017    TUBAL LIGATION         CURRENT MEDS:  Current Outpatient Medications   Medication Sig Dispense Refill    potassium chloride (KLOR-CON) 10 MEQ TbSR TAKE 1 TABLET(10 MEQ) BY MOUTH EVERY DAY 90 tablet 3    albuterol (PROVENTIL HFA) 90 mcg/actuation inhaler Inhale 2 puffs into the lungs every 6 (six) hours as needed for Wheezing. Rescue 18 g 0    azelastine (ASTELIN) 137 mcg (0.1 %) nasal spray USE 1 SPRAY NASALLY TWICE DAILY 30 mL 0    cetirizine (ZYRTEC) 10 MG tablet Take 1 tablet (10 mg total) by mouth once daily. 30 tablet 1    fluticasone propionate (FLOVENT HFA) 110 mcg/actuation inhaler Inhale 1 puff into the lungs 2 (two) times daily. Controller " 12 g 1    ibuprofen (ADVIL,MOTRIN) 800 MG tablet TAKE 1 TABLET BY MOUTH THREE TIMES DAILY AS NEEDED FOR PAIN 60 tablet 2    lamoTRIgine (LAMICTAL) 150 MG Tab Take 1 tablet (150 mg total) by mouth 2 (two) times daily. 180 tablet 3    multivitamin with minerals tablet Take by mouth.      NIFEdipine (PROCARDIA-XL) 90 MG (OSM) 24 hr tablet TAKE 1 TABLET(90 MG) BY MOUTH EVERY DAY 90 tablet 1    omeprazole (PRILOSEC) 40 MG capsule TAKE 1 CAPSULE(40 MG) BY MOUTH EVERY DAY 90 capsule 3    pregabalin (LYRICA) 50 MG capsule Take 1 capsule (50 mg total) by mouth 2 (two) times daily. 60 capsule 5    tiZANidine (ZANAFLEX) 2 MG tablet Take 1 tablet (2 mg total) by mouth nightly as needed (muscle spasms). 30 tablet 1    topiramate (TOPAMAX) 50 MG tablet Take 1 tablet (50 mg total) by mouth 2 (two) times daily. 60 tablet 11    traZODone (DESYREL) 50 MG tablet Take 1 tablet (50 mg total) by mouth nightly as needed for Insomnia. 30 tablet 11     No current facility-administered medications for this visit.       ALLERGIES:  Review of patient's allergies indicates:  No Known Allergies    FAMILY HISTORY:  Family History   Problem Relation Age of Onset    Hypertension Father     Hypertension Sister     Hypertension Maternal Grandmother     Breast cancer Maternal Grandmother     Cancer Maternal Grandfather     Hypertension Paternal Grandmother     Colon cancer Maternal Cousin     Ovarian cancer Neg Hx        SOCIAL HISTORY:  Social History     Tobacco Use    Smoking status: Never     Passive exposure: Never    Smokeless tobacco: Never   Substance Use Topics    Alcohol use: No     Comment: Occasional; 3x /month    Drug use: No     Review of Systems:  12 system review of systems is negative except for the symptoms mentioned in HPI.     Objective:     There were no vitals filed for this visit.    General: NAD, well nourished   Eyes: no tearing, discharge, no erythema   ENT: moist mucous membranes of the oral cavity, nares patent    Neck:  free ROM  Cardiovascular: appears well perfused  Lungs: Normal work of breathing, normal chest wall excursions  Skin: No rash, lesions, or breakdown on exposed skin  Psychiatry: Mood and affect are appropriate, anxious   Abdomen: non distended  Extremeties: Not examined.    Neurological   MENTAL STATUS: Alert and oriented to person, place, and time. Attention and concentration within normal limits. Speech without dysarthria, able to name and repeat without difficulty. Recent and remote memory within normal limits   CRANIAL NERVES: EOMI. Face symmetrical. Hearing grossly intact. Full shoulder shrug bilaterally. Tongue protrudes midline   SENSORY: no subjective deficits  MOTOR: moves all extremities freely against gravity   CEREBELLAR/COORDINATION/GAIT: able to ambulate around the room without difficulty

## 2024-02-16 NOTE — PATIENT INSTRUCTIONS
You came to Epilepsy Clinic because of your seizure disorder.      Here is the plan we discussed today:  - increase lamotrigine to 200mg twice daily, I sent a new prescription to your pharmacy   - once you start the new dose of lamotrigine, decrease the lyrica to just 1 tablet at night for one week   - after one week, if you feel things are going ok, you can go ahead and stop the lyrica  - final regimen: lamotrigine 200mg twice daily + topiramate 50mg twice daily  - I would like to schedule an admission to the Epilepsy Monitoring Unit to try and capture your events to help us get a better understanding of what may be the cause of your symptoms and how to best treat them. Our epilepsy nurse Анна will call you to schedule this admission for a time that works best for you and answer any questions you may have.     Do not miss any doses of medication. If a dose of medication is missed, take it as soon as it is remembered even if that means doubling up on the dose. Get regular sleep. Go to sleep at the same time and wake up at the same time every day. People with epilepsy require more sleep than people without epilepsy.  Sleeping 10-12 hours a day can be normal for a person with epilepsy.  Every seizure makes it harder to prevent the next seizure. Epilepsy is associated with SUDEP, or sudden unexpected death in epilepsy.  The risk is significantly higher if convulsive seizures are not well controlled. For more information, check out these websites: https://www.epilepsy.com/, https://www.epilepsyalliance[place].org/, www.miky-epilepsy.org, www.womenandepilepsy.org.  If you are interested in meeting other individuals in our epilepsy community, please reach out to the [facility] [place] ([phone], [phone], info@[place].org).  They organize many informative and fun activities in the region.  They can provide you invaluable information on how to get access to resources available for patients  living with epilepsy as well as a rich community of like-minded individuals who are all learning to cope with the same issues.  It is very important to remember, you are not alone.     Per [place] law, no episodes of loss of consciousness for 6 months before driving.  Avoid dangerous situations.  For example, no baths/pools alone, no heights, no power tools.  Wear a bike helmet.  If breakthrough seizures occur that are different in character, frequency, or duration from normal episodes, please patient portal me or call the office and we will decide the next steps. If multiple seizures occur in a row without return back to baseline, 911 needs to be called.     Return to clinic in 3 months or sooner with issues.  Please patient portal with any questions or concerns.    Amber Ortiz PA-C   Neurology-Epilepsy  [facility]

## 2024-02-21 ENCOUNTER — HOSPITAL ENCOUNTER (EMERGENCY)
Facility: OTHER | Age: 44
Discharge: HOME | End: 2024-02-21
Attending: EMERGENCY MEDICINE
Payer: MEDICAID

## 2024-02-21 VITALS
WEIGHT: 160 LBS | HEIGHT: 64 IN | OXYGEN SATURATION: 97 % | BODY MASS INDEX: 27.31 KG/M2 | TEMPERATURE: 98 F | DIASTOLIC BLOOD PRESSURE: 81 MMHG | SYSTOLIC BLOOD PRESSURE: 131 MMHG | HEART RATE: 89 BPM | RESPIRATION RATE: 18 BRPM

## 2024-02-21 DIAGNOSIS — J06.9 VIRAL URI WITH COUGH: Primary | ICD-10-CM

## 2024-02-21 LAB
CTP QC/QA: YES
CTP QC/QA: YES
POC MOLECULAR INFLUENZA A AGN: NEGATIVE
POC MOLECULAR INFLUENZA B AGN: NEGATIVE
SARS-COV-2 RDRP RESP QL NAA+PROBE: NEGATIVE

## 2024-02-21 PROCEDURE — 87635 SARS-COV-2 COVID-19 AMP PRB: CPT | Performed by: EMERGENCY MEDICINE

## 2024-02-21 PROCEDURE — 25000003 PHARM REV CODE 250: Performed by: EMERGENCY MEDICINE

## 2024-02-21 PROCEDURE — 63600175 PHARM REV CODE 636 W HCPCS: Performed by: EMERGENCY MEDICINE

## 2024-02-21 PROCEDURE — 99284 EMERGENCY DEPT VISIT MOD MDM: CPT | Mod: 25

## 2024-02-21 PROCEDURE — 96372 THER/PROPH/DIAG INJ SC/IM: CPT | Performed by: EMERGENCY MEDICINE

## 2024-02-21 RX ORDER — ACETAMINOPHEN AND CODEINE PHOSPHATE 300; 30 MG/1; MG/1
1 TABLET ORAL NIGHTLY PRN
Qty: 7 TABLET | Refills: 0 | Status: SHIPPED | OUTPATIENT
Start: 2024-02-21 | End: 2024-02-28

## 2024-02-21 RX ORDER — BENZONATATE 100 MG/1
200 CAPSULE ORAL 3 TIMES DAILY PRN
Qty: 30 CAPSULE | Refills: 0 | Status: SHIPPED | OUTPATIENT
Start: 2024-02-21 | End: 2024-03-02

## 2024-02-21 RX ORDER — ACETAMINOPHEN AND CODEINE PHOSPHATE 300; 30 MG/1; MG/1
1 TABLET ORAL
Status: COMPLETED | OUTPATIENT
Start: 2024-02-21 | End: 2024-02-21

## 2024-02-21 RX ORDER — BETAMETHASONE SODIUM PHOSPHATE AND BETAMETHASONE ACETATE 3; 3 MG/ML; MG/ML
6 INJECTION, SUSPENSION INTRA-ARTICULAR; INTRALESIONAL; INTRAMUSCULAR; SOFT TISSUE ONCE
Status: DISCONTINUED | OUTPATIENT
Start: 2024-02-21 | End: 2024-02-21

## 2024-02-21 RX ORDER — BETAMETHASONE SODIUM PHOSPHATE AND BETAMETHASONE ACETATE 3; 3 MG/ML; MG/ML
6 INJECTION, SUSPENSION INTRA-ARTICULAR; INTRALESIONAL; INTRAMUSCULAR; SOFT TISSUE ONCE
Status: COMPLETED | OUTPATIENT
Start: 2024-02-21 | End: 2024-02-21

## 2024-02-21 RX ADMIN — BETAMETHASONE SODIUM PHOSPHATE AND BETAMETHASONE ACETATE 6 MG: 3; 3 INJECTION, SUSPENSION INTRA-ARTICULAR; INTRALESIONAL; INTRAMUSCULAR at 03:02

## 2024-02-21 RX ADMIN — ACETAMINOPHEN AND CODEINE PHOSPHATE 1 TABLET: 300; 30 TABLET ORAL at 02:02

## 2024-02-21 NOTE — ED PROVIDER NOTES
"Encounter Date: 2024    SCRIBE #1 NOTE: I, Maite Patrick, am scribing for, and in the presence of,  Marge Knox MD. I have scribed the following portions of the note - Other sections scribed: HPI, ROS, and physical exam.       History     Chief Complaint   Patient presents with    Cough     Pt c/o cough and congestion. Seen at  and given RX meds that "aren't working".       This is a 43 y.o. female with a past medical history of migraine and seizures who presents with complaint of cough and congestion with onset 3 weeks ago. She reports that the coughing worsened at around 8 PM last night. She states that she is "making myself cough up clear mucous." She notes that she is taking Robitussin, Mucinex, Amoxicillin, and nasal spray but has had no relief. She notes that she was prescribed Amoxicillin for 10 days and is finishing the course in the next 2 days. She also reports a headache and left ear pain. She denies fever and chills. She states that she has an ENT appointment in 5 days.      The history is provided by the patient.     Review of patient's allergies indicates:  No Known Allergies  Past Medical History:   Diagnosis Date    Encounter for blood transfusion     Fibroids     Hypertension     Iron deficiency anemia     Menorrhagia     Migraine     Seizures     as baby     Past Surgical History:   Procedure Laterality Date     SECTION      x1    HYSTERECTOMY  2017    TUBAL LIGATION       Family History   Problem Relation Age of Onset    Hypertension Father     Hypertension Sister     Hypertension Maternal Grandmother     Breast cancer Maternal Grandmother     Cancer Maternal Grandfather     Hypertension Paternal Grandmother     Colon cancer Maternal Cousin     Ovarian cancer Neg Hx      Social History     Tobacco Use    Smoking status: Never     Passive exposure: Never    Smokeless tobacco: Never   Substance Use Topics    Alcohol use: No     Comment: Occasional; 3x /month    Drug use: No "     Review of Systems   Constitutional:  Negative for chills, fever and unexpected weight change.   HENT:  Positive for congestion and ear pain (left). Negative for nosebleeds.    Eyes:  Negative for pain.   Respiratory:  Positive for cough (productive). Negative for apnea.    Cardiovascular:  Negative for palpitations.   Gastrointestinal:  Negative for constipation.   Genitourinary:  Negative for enuresis.   Skin:  Negative for pallor.   Neurological:  Positive for headaches.   Hematological:  Does not bruise/bleed easily.   Psychiatric/Behavioral:  Negative for sleep disturbance.        Physical Exam     Initial Vitals   BP Pulse Resp Temp SpO2   02/21/24 0103 02/21/24 0103 02/21/24 0104 02/21/24 0103 02/21/24 0103   (!) 134/99 (!) 115 20 97.9 °F (36.6 °C) 97 %      MAP       --                Physical Exam    Nursing note and vitals reviewed.  Constitutional: She appears well-developed and well-nourished. She does not have a sickly appearance. No distress.   HENT:   Head: Normocephalic and atraumatic.   Right Ear: Hearing, tympanic membrane, external ear and ear canal normal. No drainage, swelling or tenderness.   Left Ear: Hearing, tympanic membrane, external ear and ear canal normal. No drainage, swelling or tenderness.   Nasal congestion present.   Eyes: Conjunctivae, EOM and lids are normal. Right eye exhibits no discharge. Left eye exhibits no discharge. Right conjunctiva is not injected. Right conjunctiva has no hemorrhage. Left conjunctiva is not injected. Left conjunctiva has no hemorrhage. No scleral icterus.   Neck: Phonation normal. No stridor present. No tracheal deviation present.   Normal range of motion.  Cardiovascular:  Normal rate, regular rhythm and normal heart sounds.     Exam reveals no friction rub.       No murmur heard.  Pulses:       Radial pulses are 2+ on the right side and 2+ on the left side.        Dorsalis pedis pulses are 2+ on the right side and 2+ on the left side.    Pulmonary/Chest: Breath sounds normal. No respiratory distress. She has no wheezes. She has no rhonchi. She has no rales.   Musculoskeletal:      Cervical back: Normal range of motion.     Neurological: She is alert and oriented to person, place, and time. She has normal strength. GCS eye subscore is 4. GCS verbal subscore is 5. GCS motor subscore is 6.   Skin: Skin is warm.   Psychiatric: She has a normal mood and affect. Her speech is normal and behavior is normal. Judgment and thought content normal. Cognition and memory are normal.         ED Course   Procedures  Labs Reviewed   SARS-COV-2 RDRP GENE   POCT INFLUENZA A/B MOLECULAR          Imaging Results              X-Ray Chest AP (Final result)  Result time 02/21/24 02:21:08      Final result by Jamie Raza MD (02/21/24 02:21:08)                   Impression:      No acute findings in the chest.      Electronically signed by: Jamie Raza MD  Date:    02/21/2024  Time:    02:21               Narrative:    EXAMINATION:  XR CHEST AP PORTABLE    CLINICAL HISTORY:  persistent cough;    TECHNIQUE:  Single frontal view of the chest was performed.    COMPARISON:  09/09/2020.    FINDINGS:  No consolidation, pleural effusion or pneumothorax.    Cardiomediastinal silhouette is unremarkable.                                       Medications   acetaminophen-codeine 300-30mg per tablet 1 tablet (1 tablet Oral Given 2/21/24 0251)   betamethasone acetate-betamethasone sodium phosphate injection 6 mg (6 mg Intramuscular Given 2/21/24 0300)     Medical Decision Making  43-year-old female presents complaining of persistent cough for the past 3 weeks as predominantly nonproductive with occasional clear sputum.  She also reports nasal congestion.  She was seen at urgent care and given a prescription for a nasal spray as well as antibiotics to cover possible ear infection.  On exam she is well-appearing has clear lungs.  Head and neck exam was remarkable only for  nasal congestion.  No evidence of otitis media or externa.  Chest x-ray, COVID and flu were ordered from triage.  COVID and flu were both negative and chest x-ray was unremarkable.  Patient was given Celestone in the ED and prescribed Tessalon Perles as well as Tylenol with codeine in case of significant coughing at night.  PCP follow-up for re-evaluation as needed.    Amount and/or Complexity of Data Reviewed  Labs: ordered.  Radiology: ordered.    Risk  Prescription drug management.            Scribe Attestation:   Scribe #1: I performed the above scribed service and the documentation accurately describes the services I performed. I attest to the accuracy of the note.                         Physician Attestation for Scribe: I, Marge Knox  , reviewed documentation as scribed in my presence, which is both accurate and complete.        Clinical Impression:  Final diagnoses:  [J06.9] Viral URI with cough (Primary)          ED Disposition Condition    Discharge Stable          ED Prescriptions       Medication Sig Dispense Start Date End Date Auth. Provider    benzonatate (TESSALON) 100 MG capsule Take 2 capsules (200 mg total) by mouth 3 (three) times daily as needed for Cough. 30 capsule 2/21/2024 3/2/2024 Marge Knox MD    acetaminophen-codeine 300-30mg (TYLENOL #3) 300-30 mg Tab Take 1 tablet by mouth nightly as needed (severe coughing). 7 tablet 2/21/2024 2/28/2024 Marge Knox MD          Follow-up Information       Follow up With Specialties Details Why Contact Info    Zoey Nelson MD Family Medicine Schedule an appointment as soon as possible for a visit   [address] [place] [id number]  [phone]               Marge Knox MD  02/21/24 2130

## 2024-02-21 NOTE — ED TRIAGE NOTES
"  Chief Complaint   Patient presents with    Cough     Pt c/o cough and congestion. Seen at  and given RX meds that "aren't working".     Review of patient's allergies indicates:  No Known Allergies  Past Medical History:   Diagnosis Date    Encounter for blood transfusion     Fibroids     Hypertension     Iron deficiency anemia     Menorrhagia     Migraine     Seizures     as baby       "

## 2024-02-28 ENCOUNTER — OFFICE VISIT (OUTPATIENT)
Dept: PRIMARY CARE CLINIC | Facility: CLINIC | Age: 44
End: 2024-02-28
Attending: FAMILY MEDICINE
Payer: MEDICAID

## 2024-02-28 VITALS
BODY MASS INDEX: 24.77 KG/M2 | OXYGEN SATURATION: 99 % | DIASTOLIC BLOOD PRESSURE: 76 MMHG | HEART RATE: 94 BPM | SYSTOLIC BLOOD PRESSURE: 132 MMHG | HEIGHT: 64 IN | WEIGHT: 145.06 LBS

## 2024-02-28 DIAGNOSIS — J30.2 SEASONAL ALLERGIES: ICD-10-CM

## 2024-02-28 DIAGNOSIS — Z00.00 ANNUAL PHYSICAL EXAM: Primary | ICD-10-CM

## 2024-02-28 DIAGNOSIS — I10 ESSENTIAL HYPERTENSION: ICD-10-CM

## 2024-02-28 DIAGNOSIS — Z12.31 SCREENING MAMMOGRAM FOR BREAST CANCER: ICD-10-CM

## 2024-02-28 DIAGNOSIS — D50.8 OTHER IRON DEFICIENCY ANEMIA: ICD-10-CM

## 2024-02-28 DIAGNOSIS — R56.9 SEIZURES: ICD-10-CM

## 2024-02-28 PROCEDURE — 99214 OFFICE O/P EST MOD 30 MIN: CPT | Mod: PBBFAC,PN | Performed by: FAMILY MEDICINE

## 2024-02-28 PROCEDURE — 3075F SYST BP GE 130 - 139MM HG: CPT | Mod: CPTII,,, | Performed by: FAMILY MEDICINE

## 2024-02-28 PROCEDURE — 1160F RVW MEDS BY RX/DR IN RCRD: CPT | Mod: CPTII,,, | Performed by: FAMILY MEDICINE

## 2024-02-28 PROCEDURE — 3078F DIAST BP <80 MM HG: CPT | Mod: CPTII,,, | Performed by: FAMILY MEDICINE

## 2024-02-28 PROCEDURE — 3008F BODY MASS INDEX DOCD: CPT | Mod: CPTII,,, | Performed by: FAMILY MEDICINE

## 2024-02-28 PROCEDURE — 99396 PREV VISIT EST AGE 40-64: CPT | Mod: S$PBB,,, | Performed by: FAMILY MEDICINE

## 2024-02-28 PROCEDURE — 99999 PR PBB SHADOW E&M-EST. PATIENT-LVL IV: CPT | Mod: PBBFAC,,, | Performed by: FAMILY MEDICINE

## 2024-02-28 PROCEDURE — 1159F MED LIST DOCD IN RCRD: CPT | Mod: CPTII,,, | Performed by: FAMILY MEDICINE

## 2024-02-28 NOTE — PROGRESS NOTES
Subjective:       Patient ID: Pernell Ly is a 43 y.o. female.    Chief Complaint: annual  Pt here for annual, feels well        Review of Systems   Constitutional: Negative.  Negative for activity change, appetite change and fatigue.   HENT: Negative.     Eyes: Negative.    Respiratory: Negative.  Negative for chest tightness and shortness of breath.    Cardiovascular: Negative.    Gastrointestinal: Negative.  Negative for abdominal pain.   Endocrine: Negative.    Genitourinary: Negative.  Negative for difficulty urinating, dysuria, frequency and urgency.   Musculoskeletal: Negative.  Negative for arthralgias, gait problem and joint swelling.   Integumentary:  Negative for color change, pallor and rash. Negative.   Allergic/Immunologic: Negative.    Neurological:  Negative for dizziness, weakness, light-headedness and numbness.   Hematological: Negative.    Psychiatric/Behavioral: Negative.  Negative for decreased concentration, dysphoric mood, self-injury, sleep disturbance and suicidal ideas. The patient is not nervous/anxious.    All other systems reviewed and are negative.        Objective:      Physical Exam  Vitals reviewed.   Constitutional:       General: She is not in acute distress.     Appearance: Normal appearance. She is well-developed and normal weight. She is not ill-appearing, toxic-appearing or diaphoretic.   HENT:      Head: Normocephalic and atraumatic.      Right Ear: Tympanic membrane, ear canal and external ear normal. There is no impacted cerumen.      Left Ear: Tympanic membrane, ear canal and external ear normal. There is no impacted cerumen.      Nose: Nose normal.      Mouth/Throat:      Pharynx: No oropharyngeal exudate or posterior oropharyngeal erythema.   Eyes:      Extraocular Movements: Extraocular movements intact.      Pupils: Pupils are equal, round, and reactive to light.   Neck:      Thyroid: No thyromegaly.   Cardiovascular:      Rate and Rhythm: Normal rate and regular  rhythm.      Pulses: Normal pulses.      Heart sounds: Normal heart sounds. No murmur heard.     No friction rub. No gallop.   Pulmonary:      Effort: Pulmonary effort is normal. No respiratory distress.      Breath sounds: Normal breath sounds.   Abdominal:      General: Bowel sounds are normal. There is no distension.      Palpations: Abdomen is soft. There is no mass.      Tenderness: There is no abdominal tenderness. There is no guarding or rebound.   Musculoskeletal:         General: No tenderness. Normal range of motion.      Cervical back: Normal range of motion and neck supple.      Right lower leg: No edema (plus one pitting edema).   Lymphadenopathy:      Cervical: No cervical adenopathy.   Skin:     General: Skin is warm and dry.      Findings: No erythema.   Neurological:      General: No focal deficit present.      Mental Status: She is alert and oriented to person, place, and time. Mental status is at baseline.      Cranial Nerves: No cranial nerve deficit.      Sensory: No sensory deficit.      Motor: No weakness.      Coordination: Coordination normal.      Gait: Gait normal.      Deep Tendon Reflexes: Reflexes are normal and symmetric. Reflexes normal.   Psychiatric:         Behavior: Behavior normal.         Thought Content: Thought content normal.         Judgment: Judgment normal.         Assessment:       1. Annual physical exam        Plan:       Annual PE wnl  Labs pending    ED prompts d/w pt    Annual physical exam  -     Hemoglobin A1C; Future; Expected date: 02/28/2024  -     Lipid Panel; Future; Expected date: 02/28/2024  -     Comprehensive Metabolic Panel; Future; Expected date: 02/28/2024  -     CBC Auto Differential; Future; Expected date: 02/28/2024  -     TSH; Future; Expected date: 02/28/2024  -     Vitamin B12; Future; Expected date: 02/28/2024  -     Vitamin D; Future; Expected date: 02/28/2024    Screening mammogram for breast cancer  -     Mammo Digital Screening Bilat w/ Alexander;  Future; Expected date: 02/28/2024    Essential hypertension    Other iron deficiency anemia    Seizures    Seasonal allergies    HTN controlled on meds  Sdasonal allergies controlled on meds  Hypokalemia repeat labs and incr water consumption  Iron def anemia: repeat labs  Seizures stable on meds      RTC prn symptoms

## 2024-03-06 ENCOUNTER — PATIENT OUTREACH (OUTPATIENT)
Dept: EMERGENCY MEDICINE | Facility: OTHER | Age: 44
End: 2024-03-06

## 2024-03-08 ENCOUNTER — HOSPITAL ENCOUNTER (EMERGENCY)
Facility: OTHER | Age: 44
Discharge: HOME | End: 2024-03-08
Attending: INTERNAL MEDICINE

## 2024-03-08 VITALS
TEMPERATURE: 97 F | DIASTOLIC BLOOD PRESSURE: 86 MMHG | WEIGHT: 137 LBS | SYSTOLIC BLOOD PRESSURE: 134 MMHG | HEART RATE: 90 BPM | OXYGEN SATURATION: 100 % | BODY MASS INDEX: 23.39 KG/M2 | RESPIRATION RATE: 18 BRPM | HEIGHT: 64 IN

## 2024-03-08 DIAGNOSIS — E86.0 DEHYDRATION: ICD-10-CM

## 2024-03-08 DIAGNOSIS — R20.2 PARESTHESIAS: Primary | ICD-10-CM

## 2024-03-08 DIAGNOSIS — E87.6 HYPOKALEMIA: ICD-10-CM

## 2024-03-08 LAB
ALBUMIN SERPL BCP-MCNC: 4.3 G/DL (ref 3.5–5.2)
ALP SERPL-CCNC: 83 U/L (ref 55–135)
ALT SERPL W/O P-5'-P-CCNC: 9 U/L (ref 10–44)
ANION GAP SERPL CALC-SCNC: 11 MMOL/L (ref 8–16)
AST SERPL-CCNC: 12 U/L (ref 10–40)
BASOPHILS # BLD AUTO: 0.02 K/UL (ref 0–0.2)
BASOPHILS NFR BLD: 0.4 % (ref 0–1.9)
BILIRUB SERPL-MCNC: 0.3 MG/DL (ref 0.1–1)
BILIRUB UR QL STRIP: NEGATIVE
BUN SERPL-MCNC: 9 MG/DL (ref 6–20)
CALCIUM SERPL-MCNC: 9.8 MG/DL (ref 8.7–10.5)
CHLORIDE SERPL-SCNC: 111 MMOL/L (ref 95–110)
CLARITY UR: CLEAR
CO2 SERPL-SCNC: 18 MMOL/L (ref 23–29)
COLOR UR: COLORLESS
CREAT SERPL-MCNC: 0.8 MG/DL (ref 0.5–1.4)
DIFFERENTIAL METHOD BLD: ABNORMAL
EOSINOPHIL # BLD AUTO: 0.1 K/UL (ref 0–0.5)
EOSINOPHIL NFR BLD: 3 % (ref 0–8)
ERYTHROCYTE [DISTWIDTH] IN BLOOD BY AUTOMATED COUNT: 13.8 % (ref 11.5–14.5)
EST. GFR  (NO RACE VARIABLE): >60 ML/MIN/1.73 M^2
GLUCOSE SERPL-MCNC: 125 MG/DL (ref 70–110)
GLUCOSE UR QL STRIP: NEGATIVE
HCT VFR BLD AUTO: 37.5 % (ref 37–48.5)
HGB BLD-MCNC: 12.8 G/DL (ref 12–16)
HGB UR QL STRIP: NEGATIVE
IMM GRANULOCYTES # BLD AUTO: 0.01 K/UL (ref 0–0.04)
IMM GRANULOCYTES NFR BLD AUTO: 0.2 % (ref 0–0.5)
KETONES UR QL STRIP: NEGATIVE
LEUKOCYTE ESTERASE UR QL STRIP: NEGATIVE
LYMPHOCYTES # BLD AUTO: 2.7 K/UL (ref 1–4.8)
LYMPHOCYTES NFR BLD: 57.8 % (ref 18–48)
MAGNESIUM SERPL-MCNC: 2 MG/DL (ref 1.6–2.6)
MCH RBC QN AUTO: 30.5 PG (ref 27–31)
MCHC RBC AUTO-ENTMCNC: 34.1 G/DL (ref 32–36)
MCV RBC AUTO: 90 FL (ref 82–98)
MONOCYTES # BLD AUTO: 0.4 K/UL (ref 0.3–1)
MONOCYTES NFR BLD: 7.4 % (ref 4–15)
NEUTROPHILS # BLD AUTO: 1.5 K/UL (ref 1.8–7.7)
NEUTROPHILS NFR BLD: 31.2 % (ref 38–73)
NITRITE UR QL STRIP: NEGATIVE
NRBC BLD-RTO: 0 /100 WBC
PH UR STRIP: 7 [PH] (ref 5–8)
PLATELET # BLD AUTO: 342 K/UL (ref 150–450)
PMV BLD AUTO: 8.5 FL (ref 9.2–12.9)
POCT GLUCOSE: 125 MG/DL (ref 70–110)
POTASSIUM SERPL-SCNC: 3.1 MMOL/L (ref 3.5–5.1)
PROT SERPL-MCNC: 8.9 G/DL (ref 6–8.4)
PROT UR QL STRIP: ABNORMAL
RBC # BLD AUTO: 4.19 M/UL (ref 4–5.4)
SODIUM SERPL-SCNC: 140 MMOL/L (ref 136–145)
SP GR UR STRIP: 1.01 (ref 1–1.03)
URN SPEC COLLECT METH UR: ABNORMAL
UROBILINOGEN UR STRIP-ACNC: NEGATIVE EU/DL
WBC # BLD AUTO: 4.74 K/UL (ref 3.9–12.7)

## 2024-03-08 PROCEDURE — 80053 COMPREHEN METABOLIC PANEL: CPT | Performed by: INTERNAL MEDICINE

## 2024-03-08 PROCEDURE — 83735 ASSAY OF MAGNESIUM: CPT | Performed by: INTERNAL MEDICINE

## 2024-03-08 PROCEDURE — 85025 COMPLETE CBC W/AUTO DIFF WBC: CPT | Performed by: INTERNAL MEDICINE

## 2024-03-08 PROCEDURE — 82962 GLUCOSE BLOOD TEST: CPT

## 2024-03-08 PROCEDURE — 25000003 PHARM REV CODE 250: Performed by: INTERNAL MEDICINE

## 2024-03-08 PROCEDURE — 81003 URINALYSIS AUTO W/O SCOPE: CPT | Performed by: INTERNAL MEDICINE

## 2024-03-08 PROCEDURE — 99283 EMERGENCY DEPT VISIT LOW MDM: CPT

## 2024-03-08 RX ADMIN — POTASSIUM BICARBONATE 20 MEQ: 391 TABLET, EFFERVESCENT ORAL at 04:03

## 2024-03-08 NOTE — ED NOTES
Patient states that she was sleeping woke up, was confused, trying to find an audrey on her phone and couldn't, pt states her right arm felt numb, pt became scared called 911, EMS never showed, so she had her father drive her to ER. Pt states she was scared she was having a stroke. Pt states that she has had this happened before. Pt states she has hx of seizures and HTN, pt states that her seizure medications have been adjusted because she thought that her meds were causing these problems. Pt states that she feels like the numbness is going from right to left side of body. Denies any CP or SOB, pt A&OX4 in triage, stroke assessment preformed in triage, with no neuro deficits noted.

## 2024-03-08 NOTE — ED TRIAGE NOTES
"43 YOF  presents to ED with c/o wake from sleep 30 min 1 hr ago with RUE numbness. Stated same c/o a few weeks ago seen in ED. Left facial swelling noted. Per pt facial swelling chronic. Currently c/o left facial numbness and left hand and upper arm that she describes as "pins and needles". Denies any weakness or change in speech. Dr. Tenorio at bedside. No code stroke activation at this time. Pt stated recent change in dose for Sz medication. -CP. -SOB. A&Ox4. Denies any other complaints.     LOC: The patient is awake, alert and aware of environment with an appropriate affect, the patient is oriented x 3.  APPEARANCE: Patient resting comfortably and in no acute distress, patient is clean and well groomed, patient's clothing is properly fastened.  SKIN: The skin is warm and dry, patient has normal skin turgor and moist mucus membranes, skin intact, no breakdown or brusing noted.  MUSKULOSKELETAL: Patient moving all extremities well, no obvious swelling or deformities noted.  RESPIRATORY: Airway is open and patent, respirations are spontaneous, patient has a normal effort and rate.  CARDIAC: No peripheral edema.  ABDOMEN: Soft and no tenderness to palpation, no distention noted.     ED workup in progress. VSS. Safety measures in place; side rails up x2. Call light within pt reach. Will continue to monitor.   "

## 2024-03-08 NOTE — DISCHARGE INSTRUCTIONS
Diagnosis:   1. Paresthesias    2. Hypokalemia    3. Dehydration        Tests you had showed:   Labs Reviewed   CBC W/ AUTO DIFFERENTIAL - Abnormal; Notable for the following components:       Result Value    MPV 8.5 (*)     Gran # (ANC) 1.5 (*)     Gran % 31.2 (*)     Lymph % 57.8 (*)     All other components within normal limits   COMPREHENSIVE METABOLIC PANEL - Abnormal; Notable for the following components:    Potassium 3.1 (*)     Chloride 111 (*)     CO2 18 (*)     Glucose 125 (*)     Total Protein 8.9 (*)     ALT 9 (*)     All other components within normal limits   URINALYSIS, REFLEX TO URINE CULTURE - Abnormal; Notable for the following components:    Color, UA Colorless (*)     Protein, UA Trace (*)     All other components within normal limits    Narrative:     Specimen Source->Urine   POCT GLUCOSE - Abnormal; Notable for the following components:    POCT Glucose 125 (*)     All other components within normal limits   MAGNESIUM      No orders to display       Treatments you received were:   Medications   potassium bicarbonate disintegrating tablet 20 mEq (has no administration in time range)       Home Care Instructions:  - Medications: Continue taking your home medications as prescribed    Follow-Up Plan:  - Follow-up with: Primary care doctor within 1-2  days  - Additional testing and/or evaluation will be directed by your primary doctor    Return to the Emergency Department for symptoms including but not limited to: worsening symptoms, severe back pain, shortness of breath or chest pain, vomiting with inability to hold down fluids, blood in vomit or poop, fevers greater than 100.4°F, passing out/fainting/unconsciousness, or other concerning symptoms.  Please return if you note other symptoms of a stroke.    Future Appointments   Date Time Provider Department Center   3/8/2024  3:20 PM Israel Viera MD [facility] CARDIO [facility]   3/8/2024  4:45 PM [facility] MAMMO1 [facility] MAMMO [facility]   3/21/2024  3:00  PM Amber Ortiz PA-C [facility] MOLLY [facility]PI [facility]

## 2024-03-08 NOTE — ED PROVIDER NOTES
"Encounter date: 3:08 AM 03/08/2024    Source of History   Patient    Chief Complaint   Pt presents with:   Numbness (Pt states she was asleep, woke up was confused and had numbness to the right arm, pt states that this has happened before and she believes it is her seizure meds. Pt A&OX 4 in triage. )      History Of Present Illness   Pernell Ly is a 43 y.o. female with history of seizures, migraines, and HTN who presents to the ED with chief complaint of left-sided face and arm paresthesias onset 30 minutes PTA. She reports that she went to bed at around 10 PM last night with no symptoms. She states that she woke up out of her sleep confused, which she describes that she was looking for an audrey on her phone and did not recollect why, unable to provide any other examples of confusion. She notes that after waking up confused she began to feel a "pins and needles" sensation to the left side of her face, left shoulder, and left hand. She states that she called EMS and EMS did not arrive to her residence, so she had her family member drive her to the ED. She states that the tingling has been intermittent and she thought her voice was slurred which caused  her to be nervous; she adds that EMS not arriving increased her worry. She also reports that a couple months ago she had similar symptoms and had an MRI done, which resulted as normal. She notes that she discussed the possibility of the numbness and "brain fog" being a side effect of pregabalin with her PCP. She states that she was weaned off of the pregabalin, which she finished 4 days ago, and her doctor increased the dosage of her Lamictal. She denies any chest pain, shortness of breath, dysuria, visual changes, recent falls or trauma.    This is the extent to the patients complaints today here in the emergency department.  Past History   Review of patient's allergies indicates:  No Known Allergies    No current facility-administered medications on file prior " to encounter.     Current Outpatient Medications on File Prior to Encounter   Medication Sig Dispense Refill    albuterol (PROVENTIL HFA) 90 mcg/actuation inhaler Inhale 2 puffs into the lungs every 6 (six) hours as needed for Wheezing. Rescue 18 g 0    azelastine (ASTELIN) 137 mcg (0.1 %) nasal spray USE 1 SPRAY NASALLY TWICE DAILY 30 mL 0    cetirizine (ZYRTEC) 10 MG tablet Take 1 tablet (10 mg total) by mouth once daily. 30 tablet 1    fluticasone propionate (FLOVENT HFA) 110 mcg/actuation inhaler Inhale 1 puff into the lungs 2 (two) times daily. Controller 12 g 1    ibuprofen (ADVIL,MOTRIN) 800 MG tablet TAKE 1 TABLET BY MOUTH THREE TIMES DAILY AS NEEDED FOR PAIN 60 tablet 2    lamoTRIgine (LAMICTAL) 200 MG tablet Take 1 tablet (200 mg total) by mouth 2 (two) times daily. 60 tablet 11    multivitamin with minerals tablet Take by mouth.      NIFEdipine (PROCARDIA-XL) 90 MG (OSM) 24 hr tablet TAKE 1 TABLET(90 MG) BY MOUTH EVERY DAY 90 tablet 1    potassium chloride (KLOR-CON) 10 MEQ TbSR TAKE 1 TABLET(10 MEQ) BY MOUTH EVERY DAY 90 tablet 3    tiZANidine (ZANAFLEX) 2 MG tablet Take 1 tablet (2 mg total) by mouth nightly as needed (muscle spasms). 30 tablet 1    topiramate (TOPAMAX) 50 MG tablet Take 1 tablet (50 mg total) by mouth 2 (two) times daily. 60 tablet 11    traZODone (DESYREL) 50 MG tablet Take 1 tablet (50 mg total) by mouth nightly as needed for Insomnia. 30 tablet 11       As per HPI and below:  Past Medical History:   Diagnosis Date    Encounter for blood transfusion     Fibroids     Hypertension     Iron deficiency anemia     Menorrhagia     Migraine     Seizures     as baby     Past Surgical History:   Procedure Laterality Date     SECTION      x1    HYSTERECTOMY  2017    TUBAL LIGATION         Social History     Socioeconomic History    Marital status: Single   Tobacco Use    Smoking status: Never     Passive exposure: Never    Smokeless tobacco: Never   Substance and Sexual Activity  "   Alcohol use: No     Comment: Occasional; 3x /month    Drug use: No    Sexual activity: Yes     Partners: Male     Birth control/protection: See Surgical Hx     Social Determinants of Health     Financial Resource Strain: Low Risk  (11/30/2023)    Overall Financial Resource Strain (CARDIA)     Difficulty of Paying Living Expenses: Not hard at all   Food Insecurity: No Food Insecurity (11/30/2023)    Hunger Vital Sign     Worried About Running Out of Food in the Last Year: Never true     Ran Out of Food in the Last Year: Never true   Transportation Needs: No Transportation Needs (11/30/2023)    PRAPARE - Transportation     Lack of Transportation (Medical): No     Lack of Transportation (Non-Medical): No   Stress: No Stress Concern Present (11/30/2023)    [employer] - Occupational Stress Questionnaire     Feeling of Stress : Only a little   Social Connections: Unknown (11/30/2023)    Social Connection and Isolation Panel [NHANES]     Marital Status: Never    Housing Stability: Unknown (11/30/2023)    Housing Stability Vital Sign     Unable to Pay for Housing in the Last Year: No     Unstable Housing in the Last Year: No       Family History   Problem Relation Age of Onset    Hypertension Father     Hypertension Sister     Hypertension Maternal Grandmother     Breast cancer Maternal Grandmother     Cancer Maternal Grandfather     Hypertension Paternal Grandmother     Colon cancer Maternal Cousin     Ovarian cancer Neg Hx        Physical Exam     Vitals:    03/08/24 0304 03/08/24 0315 03/08/24 0345 03/08/24 0439   BP: 134/81 (!) 148/96 (!) 159/89 134/86   BP Location: Left arm Right arm  Right arm   Patient Position: Sitting Sitting  Sitting   Pulse: (!) 123 107 94 90   Resp: 16 15 17 18   Temp:  97.7 °F (36.5 °C)  97 °F (36.1 °C)   TempSrc:  Oral  Oral   SpO2: 99% 100% 100% 100%   Weight: 62.1 kg (137 lb)      Height: 5' 4" (1.626 m)        Physical Exam:   Nursing note and vitals " reviewed.  Appearance: Well-appearing, nontoxic female in no acute respiratory distress.  Making purposeful movements.  Speaking in full sentences.  Skin: No obvious rashes seen.  Good turgor.  No abrasions.  No ecchymoses.  Eyes: No conjunctival injection. EOMI, PERRL.  Head: NC/AT  Chest: CTAB. No increased work of breathing, bilateral chest rise.  Cardiovascular: Regular rate and rhythm.  Normal equal bilateral radial pulses.  Abdomen: Soft.  Not distended.  Nontender.  No guarding.  No rebound. No Masses  Musculoskeletal: No obvious deformities.   Neck supple, full range of motion, no obvious deformity.   No tenderness to palpation of cervical through lumbar spine.  No step-offs or deformities. Good range of motion all joints.  Neurologic: Moves all extremities.  Normal sensation.  No facial droop.  Normal speech. Moves all extremities and carries on conversation. CN- II: PERRL; III/IV/VI: EOMI w/out evidence of nystagmus; V: no deficits appreciated to light touch bilateral face; VII: no facial weakness, no facial asymmetry. Eyebrow raise symmetric. Smile symmetric; IX/X: palate midline, and raises symmetrically; XI: shoulder shrug 5/5 bilaterally; XII: tongue is midline w/out asymmetry. Strength 5/5 to bilateral upper and lower extremities, sensation intact to light touch.   Mental Status:  Alert and oriented x 3.  Appropriate, conversant.        Results and Medications    Procedures    Labs Reviewed   CBC W/ AUTO DIFFERENTIAL - Abnormal; Notable for the following components:       Result Value    MPV 8.5 (*)     Gran # (ANC) 1.5 (*)     Gran % 31.2 (*)     Lymph % 57.8 (*)     All other components within normal limits   COMPREHENSIVE METABOLIC PANEL - Abnormal; Notable for the following components:    Potassium 3.1 (*)     Chloride 111 (*)     CO2 18 (*)     Glucose 125 (*)     Total Protein 8.9 (*)     ALT 9 (*)     All other components within normal limits   URINALYSIS, REFLEX TO URINE CULTURE - Abnormal;  "Notable for the following components:    Color, UA Colorless (*)     Protein, UA Trace (*)     All other components within normal limits    Narrative:     Specimen Source->Urine   POCT GLUCOSE - Abnormal; Notable for the following components:    POCT Glucose 125 (*)     All other components within normal limits   MAGNESIUM       Imaging Results    None             Medications   potassium bicarbonate disintegrating tablet 20 mEq (20 mEq Oral Given 3/8/24 3163)       MDM, Impression and Plan   Previous Records:   I decided to obtain old medical records which is listed here or in ED course: MRI on 11/28/23 showed no acute infarct and at the same point of time CTA head & neck with no acute pathology appreciated.    Clinical Tests:   Lab Tests: Ordered and Reviewed  Radiological Study: Ordered and Reviewed  Medical Tests: Ordered and Reviewed    Differential diagnosis:  -medication side-effects   -pregabalin withdrawal   -electrolyte abnormalities  -unlikely CVA or ICH based off complaints and physical exam    Initial Assessment & ED Management:    Urgent evaluation of 43 y.o. female with history of seizures, migraines, and HTN who presents to the ED with chief complaint of left-sided face and arm paresthesias onset 30 minutes PTA. Patient was recently weaned off of pregabalin, her last dose was 4 days ago, secondary to possibly experiencing numbness and "brain fog" as a side effect of the pregabalin. Patient also notes that her Lamictal dose was increased. She presents afebrile and hemodynamically stable.  She has no focal neurologic deficits.  She has no decreased sensation thus no numbness.  She was initially tachycardic which spontaneously resolved.  I believe that this was due to anxiety.  Her vital signs normalized.  Urinalysis without signs of UTI.  Glucose within normal limits.  All labs reviewed she was noted to have a moderately low potassium which was replaced orally.  She was noted to have a metabolic " acidosis on her CO2 but this is noted to be chronic.  During the ED visit she noted that her paresthesias on the left resolved and 30 minutes later she had paresthesias on the right side of her face and in her bilateral hands.  These complaints do not seem to be consistent with CVA or ICH.  After having a long discussion with the patient.  We discussed risk and benefits of admission.  She feels comfortable following up with her neurologist in the outpatient setting.  Care plan addressed with patient and all those present. All questions answered.  Strict return precautions discussed.  Patient was instructed on the correct follow-up time and route.  They voiced verbal understanding and agreement  with the plan and were deemed stable for discharge.     Medical Decision Making  Amount and/or Complexity of Data Reviewed  Labs: ordered. Decision-making details documented in ED Course.    Risk  Prescription drug management.           Please see ED course for discussion of workup and dispo if not listed above.          Final diagnoses:  [R20.2] Paresthesias (Primary)  [E87.6] Hypokalemia  [E86.0] Dehydration        ED Disposition Condition    Discharge Stable          ED Prescriptions    None       Follow-up Information       Follow up With Specialties Details Why Contact Info    Zoey Nelson MD Family Medicine In 2 days  [address] [address]  [phone]      [facility] - Emergency Dept Emergency Medicine  If symptoms worsen [address] [facility] [id number] [phone]    Amber Ortiz PA-C Neurology In 2 days  [address] [facility] [address]  [phone]              ED Course as of 03/08/24 0720   Fri Mar 08, 2024   0326 NIH Stroke Scale/Score (NIHSS) from [email or web address]  on 3/8/2024  ** All calculations should be rechecked by clinician prior to use **    RESULT SUMMARY:  1 points  NIH Stroke Scale      INPUTS:  1A: Level of consciousness -> 0 = Alert;  keenly responsive  1B: Ask month and age -> 0 = Both questions right  1C: 'Blink eyes' & 'squeeze hands' -> 0 = Performs both tasks  2: Horizontal extraocular movements -> 0 = Normal  3: Visual fields -> 0 = No visual loss  4: Facial palsy -> 0 = Normal symmetry  5A: Left arm motor drift -> 0 = No drift for 10 seconds  5B: Right arm motor drift -> 0 = No drift for 10 seconds  6A: Left leg motor drift -> 0 = No drift for 5 seconds  6B: Right leg motor drift -> 0 = No drift for 5 seconds  7: Limb Ataxia -> 0 = No ataxia  8: Sensation -> 1 = Mild-moderate loss: less sharp/more dull   9: Language/aphasia -> 0 = Normal; no aphasia  10: Dysarthria -> 0 = Normal  11: Extinction/inattention -> 0 = No abnormality   [HM]   0326 NIH scale 1 if given a point for sensation []   0433 CO2(!): 18  Chronic x 2 months  [HM]      ED Course User Index  [HM] Luanne Tenorio MD            I, Maite Patrick, scribed for, and in the presence of, Luanne Tenorio MD. I performed the scribed service and the documentation accurately describes the services I performed. I attest to the accuracy of the note.     Physician Attestation for Scribe: I, Luanen Teonrio MD, reviewed documentation as scribed in my presence, which is both accurate and complete.      MD Jona Ramsey Heyward B, MD  03/08/24 3579

## 2024-03-21 ENCOUNTER — OFFICE VISIT (OUTPATIENT)
Dept: NEUROLOGY | Facility: CLINIC | Age: 44
End: 2024-03-21

## 2024-03-21 ENCOUNTER — PATIENT MESSAGE (OUTPATIENT)
Dept: NEUROLOGY | Facility: CLINIC | Age: 44
End: 2024-03-21

## 2024-03-21 DIAGNOSIS — F41.9 ANXIETY: ICD-10-CM

## 2024-03-21 DIAGNOSIS — R51.9 FREQUENT HEADACHES: ICD-10-CM

## 2024-03-21 DIAGNOSIS — G40.109 FOCAL EPILEPSY: Primary | ICD-10-CM

## 2024-03-21 DIAGNOSIS — R29.90 EPISODE OF TRANSIENT NEUROLOGIC SYMPTOMS: ICD-10-CM

## 2024-03-21 DIAGNOSIS — R53.82 CHRONIC FATIGUE: ICD-10-CM

## 2024-03-21 DIAGNOSIS — G47.9 DIFFICULTY SLEEPING: ICD-10-CM

## 2024-03-21 DIAGNOSIS — R20.2 PARESTHESIAS: ICD-10-CM

## 2024-03-21 DIAGNOSIS — Z65.8 PSYCHOSOCIAL STRESSORS: ICD-10-CM

## 2024-03-21 PROCEDURE — 99215 OFFICE O/P EST HI 40 MIN: CPT | Mod: 95,,,

## 2024-03-21 RX ORDER — CLOBAZAM 10 MG/1
10 TABLET ORAL NIGHTLY
Qty: 30 EACH | Refills: 5 | Status: SHIPPED | OUTPATIENT
Start: 2024-03-21 | End: 2024-09-17

## 2024-03-21 RX ORDER — LAMOTRIGINE 150 MG/1
150 TABLET ORAL 2 TIMES DAILY
Qty: 180 TABLET | Refills: 3 | Status: SHIPPED | OUTPATIENT
Start: 2024-03-21 | End: 2025-03-21

## 2024-03-21 NOTE — PROGRESS NOTES
[facility]  Epilepsy Clinic Progress Note    [facility] - NEUROLOGY 7TH [place] [facility], [place]    Date: 3/21/24  Patient Name: Pernell Ly   MRN: [id number]   PCP: Zoey Nelson  Referring Provider: No ref. provider found    The patient location is: Home  The chief complaint leading to consultation is: Epilepsy  Visit type: Virtual visit with synchronous audio and video  Total time spent with patient: 40 minutes  Each patient to whom he or she provides medical services by telemedicine is:  (1) informed of the relationship between the physician and patient and the respective role of any other health care provider with respect to management of the patient; and (2) notified that he or she may decline to receive medical services by telemedicine and may withdraw from such care at any time.    Assessment:   Pernell Ly is a 43 y.o. female presenting in follow up for longstanding epilepsy. 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 Ambulatory EEG monitoring 9/2023 captured two electrographic seizures emanating from the left frontotemporal region as well as sharp waves in the left and right temporal regions despite regimen of lamotrigine 150mg BID and topiramate 50mg BID. She is very sensitive to medication side effects and did not tolerate increase in lamotrigine or topiramate. Pregabalin resulted in memory issues and increased drowsiness. Current regimen: lamotrigine 150mg BID and topiramate 50mg BID. Persistent nightly events of L sided tingling/numbness +/- headache that will awake her from sleep and spontaneously resolve in around 20min for which she has been evaluated in the ED w/ negative workup. Also had a negative sleep study last year. Recent episode of staring and decreased responsiveness as well to which she is amnestic, witnessed by her son. Plan is for EMU admission for event capture/characterization and medication optimization pending results, however, this is  currently complicated by insurance issues as she was recently dropped from medicaid. Will work to schedule admission once insurance is reinstated. SW referral for additional support/resources. For now, we will try augmenting current regimen w/ clobazam 10mg qhs for seizure, sleep, and possible anxiety benefit. Event log. Close follow up in 6-8 weeks or sooner with issues. Advised to reach out over the portal with any concerns. Patient is comfortable with plan. All questions and concerns are addressed at this time.   Plan:     Problem List Items Addressed This Visit          Psychiatric    Anxiety     Other Visit Diagnoses       Focal epilepsy    -  Primary    Relevant Medications    lamoTRIgine (LAMICTAL) 150 MG Tab    cloBAZam (ONFI) 10 mg Tab    Episode of transient neurologic symptoms        Frequent headaches        Difficulty sleeping        Chronic fatigue              I completed education on seizure first aid and safety. I recommended seizure precautions with regards to avoiding unsupervised water recreational activity or bathing in tubs, climbing or working at heights, operation of heavy or dangerous machinery, caution around fire and sources of high heat, as well as any other activity which could put a patient at danger in case of a seizure. I also reviewed the [place] DMV law and recommended that the patient not drive for 6 months in the event of breakthrough seizures.    Amber Ortiz PA-C   Neurology-Epilepsy  [facility]    Collaborating physician, Dr. Johnathan Weir, was available during today's encounter.     I spent approximately 68 minutes on the day of this encounter preparing to see the patient, obtaining and reviewing history and results, performing a medically appropriate exam, counseling and educating the patient/family/caregiver, documenting clinical information, coordinating care, and ordering medications, tests, procedures, and referrals.    Patient note was created using  MModal Dictation.  Any errors in syntax or even information may not have been identified and edited on initial review prior to signing this note.  Subjective:   Patient seen in consultation at the request of No ref. provider found for the evaluation of epilepsy. A copy of this note will be sent to the referring physician.     Interval Events/ROS 3/21/2024:    Current ASM/SEs:   - about 2 weeks ago self decreased lamotrigine back to 150mg BID as she thought it may have been worsening episodes of numbness/tingling  - topiramate 50mg BID; no known SE  - discontinued pregabalin (SE memory issues, drowsiness)     Breakthrough seizures/events:   - nightly events (4-5x per week) described as L sided numbness/tingling from head to toe that will awake her from sleep, accompanied by sensation of fearfulness, feels like her heart is racing, maintained awareness, numbness/tingling will last for around 20 minutes before it spontaneously resolves   - recent episode of staring and decreased responsiveness (3/8/24) while in the shower, occurred the night before her grandmother's , reports son thought things were abnormally quiet so came to check on her and found her staring off, helped her out of the shower onto the bed and called 911, pt states she remembers starting to shave her legs and then the next thing she remembers is waking up w/ EMS in front of her face, subsequently evaluated in the ED, workup overall unremarkable       Driving: no  Folic acid: no, s/p hysterectomy  Sleep: significant trouble sleeping/insomnia since her grandmother very sadly passed away 2 weeks ago   Mood: stressed, tired, concerned and anxious about her health     Chronic headaches. Otherwise, no fever, no cold symptoms, no changes in vision, no new weakness, no chest pain, no shortness of breath, no nausea, no vomiting, no diarrhea, no constipation, no problems walking.    Recent Labs   Lab 22  1430 22  2229   Lamotrigine Lvl 5.0  5.1       Interval Events/ROS 2/16/2024:    Current ASM/SEs:   Lamotrigine 150mg BID; no SE; did not tolerate 300mg BID  Topiramate 50mg BID; no SE  Pregabalin 50mg BID; SE memory issues, trouble following conversations w/ people, feels like she blanks out for a second, almost like a light switch (?seizure activity), drowsiness     Breakthrough seizures/events: 2 weeks ago -> seemed to wake up every other night w/ a headache, unsure if seizures have occurred    Driving: no  Sleep: overall good  Mood: some fluctuations in mood but is manageable  Activity: continues to attend work    Headaches. Chronic fatigue, pt had negative sleep study last year. Recent cough/congestion/sinus pressure. Otherwise, no fever, no changes in vision, no new weakness, no chest pain, no shortness of breath, no nausea, no vomiting, no diarrhea, no constipation, no tingling/numbness, no problems walking.      Interval Events/ROS 11/15/2023:    Current ASM/SEs: lamotrigine 150mg twice daily and topiramate 50mg twice daily; no SE  Breakthrough seizures/events: none that she knows of; discussed EEG results which captured 2 electrographic seizures that occurred unbeknownst to pt  Driving: no  Sleep: not great due to waking up w/ headaches; takes trazodone every once in a while  Mood: overall okay, denies anxiety/depression, some fluctuations in mood but is manageable    Persistent headaches -> physical therapy scheduled for next week. Vivid dreams sometimes. Otherwise, no fever, no cold symptoms, no changes in vision, no new weakness, no chest pain, no shortness of breath, no nausea, no vomiting, no diarrhea, no constipation, no problems walking.     Interval Events/ROS 11/3/2023:    Current ASM/SEs: lamotrigine 150mg twice daily and topiramate 50mg twice daily  Breakthrough seizures/events: none since the addition of topiramate  Folic acid: s/p hysterectomy  Sleep: not great due to waking up w/ headaches; takes trazodone every once in a  while  Mood: overall okay, denies anxiety/depression, some fluctuations in mood but is manageable    Persistent headaches described as numbness to L side of head, wakes her up during the night and is sometimes still there in the morning. Sleeps on her L side at night. Has taken tylenol 1-2 times for headache in the past week. Otherwise, no fever, no cold symptoms, no changes in vision, no new weakness, no chest pain, no shortness of breath, no nausea, no vomiting, no diarrhea, no constipation, no problems walking.     Interval Events/ROS 8/25/2023:    Current ASM/SEs: lamotrigine 150mg twice daily; SE drowsy  Breakthrough seizures/events: staring event 8/5/2023, no identifiable trigger, described she was in the bathroom brushing her teeth then suddenly realized she was in a different room, son told her she had toothpaste around her mouth; prior to this last event was 6/15/2023; no identifiable trigger; no convulsions  Driving: no  Folic acid: s/p hysterectomy  Sleep: not great due to waking up w/ headaches   Mood: fair    Frequently wakes up in the middle of the night with headache located to the top of her head, also often wakes up with CROOK in the morning. States she has not been taking anything at home for the pain. Headache typically resolves once she is up and moving for the day. Otherwise, no fever, no cold symptoms, no changes in vision, no new weakness, no chest pain, no shortness of breath, no nausea, no vomiting, no diarrhea, no constipation, no tingling/numbness, no problems walking.     Interval Events/ROS 6/20/2023:    Current ASM/SEs: lamotrigine 150mg BID; no SE   Breakthrough seizures/events: last event was 6/15 while shopping in [employer], describes she was with her sister when she started staring off and wouldn't respond, 'came to' and her sister was very concerned, patient was confused, states her sister told her the event lasted >15 minutes (specifically clarified that the staring lasted >15 minutes  and it took additional time for the patient to fully feel back to normal), no LOC or collapse, no abnormal movements, patient can't recall whether or not she was able to hear her sister during event; reports she has experienced staring episodes/sensation of lost time at least 3 times in the past 2 months, other episodes were not witnessed    Driving: no  Folic acid: no  Sleep: does not get much sleep, broken sleep, has tried melatonin in the past  Mood: chronic anxiety, notes some feelings of depression, stopped sertraline after 3 days (didn't like the idea of taking a mood medication, but feels more open to it now)    Intermittent headaches. Otherwise, no fever, no cold symptoms, no changes in vision, no new weakness, no chest pain, no shortness of breath, no nausea, no vomiting, no diarrhea, no constipation, no tingling/numbness, no problems walking.     Interval Events/ROS 4/12/2023:    Current ASM/SEs: lamotrigine 100mg BID; no SE  Breakthrough seizures/events:  event 4/10 -> states she remembers using the bathroom then all of a sudden she was in her bed being woken up by her dad, confused, felt off; does not recall walking back from the bathroom to her bed; son told her she seemed out of it; felt back to baseline ~30 min after event; no convulsions/abnormal movements; no identifiable trigger; current event frequency is around 1 per year   Driving: no  Folic acid: no   Sleep: chronic insomnia; follows w/ sleep medicine    Mood: anxious    Headaches. Otherwise, no fever, no cold symptoms, no changes in vision, no new weakness, no chest pain, no shortness of breath, no nausea, no vomiting, no diarrhea, no constipation, no tingling/numbness, no problems walking.     HPI:   Ms. Pernell Ly is a 43 y.o. female who presents with a chief complaint of a longstanding seizure disorder.  Patient presents today to transfer her care from Dr. Dunham after being lost to follow-up for 2 years.  The patient reports that she  "had been doing well until the last few months when she has begun to experience episodes of loss of time.  The patient states that she will be alone and realized that several seconds have passed and she has lost awareness for a few minutes.  She endorses expressed compliance with her Lamictal.  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  She states that she believes is contributing to chronic daily tension headaches.  She states the headaches have become worse over the past few weeks and notes that she is taking ibuprofen twice a day every day for management of headaches.  She denies associated migrainous features or focal neurologic deficits.    Seizure Type: Supsected focal onset  Seizure Etiology:   L MTS  Current AEDs: Lamictal 100 mg BID    The patient is not accompanied by family who contribute to the history. This patient has 3 types of seizure as described below. The patient reports having seizures for years The patient reports to have stable seizure control. The seizure frequency is variable. The last seizure was last week . The patient does not report side effects from seizure medication.     Seizure Type 1:   Seizure Description: LOC, GTC (one out of sleep)  Aura:  De ja vu feeling  Associated Symptoms:  none  Seizure Frequency: 2 in lifetime  Last seizure: 2020-ish    Seizure Type 2:   Seizure Description: Incomprehensible speech lasting 14 minutes with loss of time and confusion  Aura: None  Associated Symptoms:  none  Seizure Frequency: One in lifetime  Last seizure: 2019    Seizure Type 3:   Seizure Description: "Goes out into space" for a minute, always happens when she's alone  Aura: None  Associated Symptoms: none  Seizure Frequency: Variable, often clusters every few months "seldom"   Last seizure: 1.5 weeks ago    Handedness: right  Seizure Onset Age: infancy  Seizure/ Epilepsy Risk Factors: childhood " seizure  Birth/Developmental History: normal birth history and Normal developmental history  Seizure Triggers/ Provoking Features: stress  Previous Seizure Medications: lamotrigine (Lamictal, LTG) and zonisamide (Zonegran, ZNA)  Recent Med Changes: None  Other Treatments: None  Prior Episodes of Status: None  Psychiatric/Behavioral Comorbitidies: Anxiety  Surgical Candidacy:  Pending    Prior Studies:  EEG : Not done  vEEG/ EMU evaluation: Left MTS 2018, personally reviewed  MRI of brain: Not done  AED levels:  Not done  CT/CTA Scan: Unremarkable - personally reviewed  PET Scan: Not done  Neuropsychological Evaluation: Not done  DEXA Scan: Not done  Other studies: Not done    PAST MEDICAL HISTORY:  Past Medical History:   Diagnosis Date    Encounter for blood transfusion     Fibroids     Hypertension     Iron deficiency anemia     Menorrhagia     Migraine     Seizures     as baby       PAST SURGICAL HISTORY:  Past Surgical History:   Procedure Laterality Date     SECTION      x1    HYSTERECTOMY  2017    TUBAL LIGATION         CURRENT MEDS:  Current Outpatient Medications   Medication Sig Dispense Refill    albuterol (PROVENTIL HFA) 90 mcg/actuation inhaler Inhale 2 puffs into the lungs every 6 (six) hours as needed for Wheezing. Rescue 18 g 0    azelastine (ASTELIN) 137 mcg (0.1 %) nasal spray USE 1 SPRAY NASALLY TWICE DAILY 30 mL 0    cetirizine (ZYRTEC) 10 MG tablet Take 1 tablet (10 mg total) by mouth once daily. 30 tablet 1    fluticasone propionate (FLOVENT HFA) 110 mcg/actuation inhaler Inhale 1 puff into the lungs 2 (two) times daily. Controller 12 g 1    ibuprofen (ADVIL,MOTRIN) 800 MG tablet TAKE 1 TABLET BY MOUTH THREE TIMES DAILY AS NEEDED FOR PAIN 60 tablet 2    lamoTRIgine (LAMICTAL) 200 MG tablet Take 1 tablet (200 mg total) by mouth 2 (two) times daily. 60 tablet 11    multivitamin with minerals tablet Take by mouth.      NIFEdipine (PROCARDIA-XL) 90 MG (OSM) 24 hr tablet TAKE 1  TABLET(90 MG) BY MOUTH EVERY DAY 90 tablet 1    potassium chloride (KLOR-CON) 10 MEQ TbSR TAKE 1 TABLET(10 MEQ) BY MOUTH EVERY DAY 90 tablet 3    tiZANidine (ZANAFLEX) 2 MG tablet Take 1 tablet (2 mg total) by mouth nightly as needed (muscle spasms). 30 tablet 1    topiramate (TOPAMAX) 50 MG tablet Take 1 tablet (50 mg total) by mouth 2 (two) times daily. 60 tablet 11    traZODone (DESYREL) 50 MG tablet Take 1 tablet (50 mg total) by mouth nightly as needed for Insomnia. 30 tablet 11     No current facility-administered medications for this visit.       ALLERGIES:  Review of patient's allergies indicates:  No Known Allergies    FAMILY HISTORY:  Family History   Problem Relation Age of Onset    Hypertension Father     Hypertension Sister     Hypertension Maternal Grandmother     Breast cancer Maternal Grandmother     Cancer Maternal Grandfather     Hypertension Paternal Grandmother     Colon cancer Maternal Cousin     Ovarian cancer Neg Hx        SOCIAL HISTORY:  Social History     Tobacco Use    Smoking status: Never     Passive exposure: Never    Smokeless tobacco: Never   Substance Use Topics    Alcohol use: No     Comment: Occasional; 3x /month    Drug use: No     Review of Systems:  12 system review of systems is negative except for the symptoms mentioned in HPI.     Objective:     There were no vitals filed for this visit.    General: NAD, well nourished   Eyes: no tearing, discharge, no erythema   ENT: moist mucous membranes of the oral cavity, nares patent    Neck: free ROM  Cardiovascular: appears well perfused  Lungs: Normal work of breathing, normal chest wall excursions  Skin: No rash, lesions, or breakdown on exposed skin  Psychiatry: Mood and affect are appropriate, anxious   Abdomen: non distended  Extremeties: Not examined.    Neurological   MENTAL STATUS: Alert and oriented to person, place, and time. Attention and concentration within normal limits. Speech without dysarthria, able to name and repeat  without difficulty. Recent and remote memory within normal limits   CRANIAL NERVES: EOMI. Face symmetrical. Hearing grossly intact. Full shoulder shrug bilaterally. Tongue protrudes midline   SENSORY: no subjective deficits  MOTOR: moves all extremities freely against gravity   CEREBELLAR/COORDINATION/GAIT: able to ambulate around the room without difficulty

## 2024-03-21 NOTE — PATIENT INSTRUCTIONS
Here is the plan we discussed today:  - continue lamotrigine 150mg twice daily   - continue topiramate 50mg twice daily   - start clobazam 10mg nightly before bed to try to help with seizures, sleep, and mood  - please keep an event log   - as soon as insurance is reinstated, we will plan to schedule an admission to the epilepsy monitoring unit to try and capture these events and determine how to best treat them   - I asked our epilepsy  to reach out to you over the portal to potentially help provide some additional support and resources as you work on obtaining medicaid again     Do not miss any doses of medication. If a dose of medication is missed, take it as soon as it is remembered even if that means doubling up on the dose.    Get regular sleep. Go to sleep at the same time and wake up at the same time every day. People with epilepsy require more sleep than people without epilepsy.  Sleeping 10-12 hours a day can be normal for a person with epilepsy.      Websites that are good resources: https://www.epilepsy.com/, https://www.epilepsyalliance[place].org/, www.miky-epilepsy.org, www.womenandepilepsy.org.  If you are interested in meeting other individuals in our epilepsy community, please reach out to the [facility] [place] ([phone], [phone], info@[place].org).  They organize many informative and fun activities in the region.  They can provide you invaluable information on how to get access to resources available for patients living with epilepsy as well as a rich community of like-minded individuals who are all learning to cope with the same issues.  It is very important to remember, you are not alone.     Per [place] law, no episodes of loss of consciousness for 6 months before driving.  Avoid dangerous situations.  For example, no baths/pools alone, no heights, no power tools.  Wear a bike helmet.  If breakthrough seizures occur that are different in character,  frequency, or duration from normal episodes, please patient portal me or call the office and we will decide the next steps. If multiple seizures occur in a row without return back to baseline, 911 needs to be called.     Return to clinic in 2 months or sooner with issues.  Please patient portal with any questions or concerns.    Amber Ortiz PA-C   Neurology-Epilepsy  [facility]

## 2024-03-22 ENCOUNTER — PATIENT MESSAGE (OUTPATIENT)
Dept: NEUROLOGY | Facility: CLINIC | Age: 44
End: 2024-03-22

## 2024-04-01 ENCOUNTER — TELEPHONE (OUTPATIENT)
Dept: NEUROLOGY | Facility: CLINIC | Age: 44
End: 2024-04-01
Payer: COMMERCIAL

## 2024-04-01 NOTE — TELEPHONE ENCOUNTER
Spoke with patient about EMU and she says she has new insurance, [facility]. I explained she needs to update this in her chart either by going into MyChart or by calling registration. She is looking in [facility] and will call me back

## 2024-04-05 ENCOUNTER — HOSPITAL ENCOUNTER (EMERGENCY)
Facility: OTHER | Age: 44
Discharge: HOME | End: 2024-04-05
Attending: EMERGENCY MEDICINE
Payer: COMMERCIAL

## 2024-04-05 VITALS
RESPIRATION RATE: 17 BRPM | SYSTOLIC BLOOD PRESSURE: 147 MMHG | BODY MASS INDEX: 24.24 KG/M2 | TEMPERATURE: 97 F | DIASTOLIC BLOOD PRESSURE: 97 MMHG | OXYGEN SATURATION: 98 % | HEIGHT: 64 IN | WEIGHT: 142 LBS | HEART RATE: 89 BPM

## 2024-04-05 DIAGNOSIS — R53.1 WEAKNESS GENERALIZED: ICD-10-CM

## 2024-04-05 DIAGNOSIS — E87.6 HYPOKALEMIA: ICD-10-CM

## 2024-04-05 DIAGNOSIS — R55 NEAR SYNCOPE: Primary | ICD-10-CM

## 2024-04-05 LAB
ALBUMIN SERPL BCP-MCNC: 4.1 G/DL (ref 3.5–5.2)
ALP SERPL-CCNC: 67 U/L (ref 55–135)
ALT SERPL W/O P-5'-P-CCNC: 15 U/L (ref 10–44)
ANION GAP SERPL CALC-SCNC: 11 MMOL/L (ref 8–16)
AST SERPL-CCNC: 21 U/L (ref 10–40)
BASOPHILS # BLD AUTO: 0.04 K/UL (ref 0–0.2)
BASOPHILS NFR BLD: 0.7 % (ref 0–1.9)
BILIRUB SERPL-MCNC: 0.2 MG/DL (ref 0.1–1)
BUN SERPL-MCNC: 13 MG/DL (ref 6–20)
CALCIUM SERPL-MCNC: 9.3 MG/DL (ref 8.7–10.5)
CHLORIDE SERPL-SCNC: 105 MMOL/L (ref 95–110)
CO2 SERPL-SCNC: 21 MMOL/L (ref 23–29)
CREAT SERPL-MCNC: 0.7 MG/DL (ref 0.5–1.4)
CTP QC/QA: YES
DIFFERENTIAL METHOD BLD: ABNORMAL
EOSINOPHIL # BLD AUTO: 0.5 K/UL (ref 0–0.5)
EOSINOPHIL NFR BLD: 8.8 % (ref 0–8)
ERYTHROCYTE [DISTWIDTH] IN BLOOD BY AUTOMATED COUNT: 13.6 % (ref 11.5–14.5)
EST. GFR  (NO RACE VARIABLE): >60 ML/MIN/1.73 M^2
GLUCOSE SERPL-MCNC: 119 MG/DL (ref 70–110)
HCT VFR BLD AUTO: 35.3 % (ref 37–48.5)
HGB BLD-MCNC: 12.1 G/DL (ref 12–16)
IMM GRANULOCYTES # BLD AUTO: 0.01 K/UL (ref 0–0.04)
IMM GRANULOCYTES NFR BLD AUTO: 0.2 % (ref 0–0.5)
LYMPHOCYTES # BLD AUTO: 1.8 K/UL (ref 1–4.8)
LYMPHOCYTES NFR BLD: 32.7 % (ref 18–48)
MCH RBC QN AUTO: 30 PG (ref 27–31)
MCHC RBC AUTO-ENTMCNC: 34.3 G/DL (ref 32–36)
MCV RBC AUTO: 87 FL (ref 82–98)
MONOCYTES # BLD AUTO: 0.5 K/UL (ref 0.3–1)
MONOCYTES NFR BLD: 9.5 % (ref 4–15)
NEUTROPHILS # BLD AUTO: 2.7 K/UL (ref 1.8–7.7)
NEUTROPHILS NFR BLD: 48.1 % (ref 38–73)
NRBC BLD-RTO: 0 /100 WBC
OHS QRS DURATION: 76 MS
OHS QTC CALCULATION: 452 MS
PLATELET # BLD AUTO: 321 K/UL (ref 150–450)
PMV BLD AUTO: 8.7 FL (ref 9.2–12.9)
POTASSIUM SERPL-SCNC: 3.3 MMOL/L (ref 3.5–5.1)
PROT SERPL-MCNC: 8.4 G/DL (ref 6–8.4)
RBC # BLD AUTO: 4.04 M/UL (ref 4–5.4)
SARS-COV-2 RDRP RESP QL NAA+PROBE: NEGATIVE
SODIUM SERPL-SCNC: 137 MMOL/L (ref 136–145)
WBC # BLD AUTO: 5.56 K/UL (ref 3.9–12.7)

## 2024-04-05 PROCEDURE — 96360 HYDRATION IV INFUSION INIT: CPT

## 2024-04-05 PROCEDURE — 80053 COMPREHEN METABOLIC PANEL: CPT | Performed by: EMERGENCY MEDICINE

## 2024-04-05 PROCEDURE — 93005 ELECTROCARDIOGRAM TRACING: CPT

## 2024-04-05 PROCEDURE — 87635 SARS-COV-2 COVID-19 AMP PRB: CPT | Performed by: EMERGENCY MEDICINE

## 2024-04-05 PROCEDURE — 85025 COMPLETE CBC W/AUTO DIFF WBC: CPT | Performed by: EMERGENCY MEDICINE

## 2024-04-05 PROCEDURE — 25000003 PHARM REV CODE 250: Performed by: EMERGENCY MEDICINE

## 2024-04-05 PROCEDURE — 93010 ELECTROCARDIOGRAM REPORT: CPT | Mod: ,,, | Performed by: INTERNAL MEDICINE

## 2024-04-05 PROCEDURE — 99284 EMERGENCY DEPT VISIT MOD MDM: CPT | Mod: 25

## 2024-04-05 RX ORDER — POTASSIUM CHLORIDE 20 MEQ/1
40 TABLET, EXTENDED RELEASE ORAL
Status: COMPLETED | OUTPATIENT
Start: 2024-04-05 | End: 2024-04-05

## 2024-04-05 RX ADMIN — SODIUM CHLORIDE 1000 ML: 9 INJECTION, SOLUTION INTRAVENOUS at 12:04

## 2024-04-05 RX ADMIN — POTASSIUM CHLORIDE 40 MEQ: 1500 TABLET, EXTENDED RELEASE ORAL at 01:04

## 2024-04-05 NOTE — ED NOTES
Pt states she had a near syncope episode , pt states her head is hurting and has hx of headache, pt states she feels weak, and has not had a smell or taste in about 1 week

## 2024-04-05 NOTE — ED PROVIDER NOTES
"Encounter Date: 2024    SCRIBE #1 NOTE: I, Maite Patrick, am scribing for, and in the presence of,  Neeraj Jiménez II, MD. I have scribed the following portions of the note - the EKG reading. Other sections scribed: HPI, ROS, and physical exam.       History     Chief Complaint   Patient presents with    Fatigue     Pt states she feels weak, with headache, pt states about 30 min ago she felt like she was going to pass out, pt states she has not had a taste or been able to smell in about 1 week      Time seen by provider: 12:12 AM    This is a 43 y.o. female with PMH of  seizures, migraines, and HTN who presents with complaint of lightheadedness onset 45 minutes PTA. She reports that she was getting up to go take a shower when she felt lightheaded. She states that she sat down for a couple minutes after and felt relief. She adds that she has been having intermittent headaches and left arm/leg numbness after waking up in the middle of the night. She notes that the left arm and leg numbness is an ongoing issue and she is following with neurology. She also reports experiencing "night sweats", cough, congestion, rhinorrhea, palpitations, lost in taste and smell, and decreased appetite in the past 2 weeks. She states that she took a home COVID test 1 week ago, which was negative, and she is following with an ENT in 2 days for the lost in taste and smell. She denies fever, chest pain, nausea, vomiting, diarrhea,difficulty urinating, tobacco use, and alcohol use.    This is the extent of the patient's complaints at this time.      The history is provided by the patient.     Review of patient's allergies indicates:  No Known Allergies  Past Medical History:   Diagnosis Date    Encounter for blood transfusion     Fibroids     Hypertension     Iron deficiency anemia     Menorrhagia     Migraine     Seizures     as baby     Past Surgical History:   Procedure Laterality Date     SECTION      x1    HYSTERECTOMY  " 09/29/2017    TUBAL LIGATION       Family History   Problem Relation Age of Onset    Hypertension Father     Hypertension Sister     Hypertension Maternal Grandmother     Breast cancer Maternal Grandmother     Cancer Maternal Grandfather     Hypertension Paternal Grandmother     Colon cancer Maternal Cousin     Ovarian cancer Neg Hx      Social History     Tobacco Use    Smoking status: Never     Passive exposure: Never    Smokeless tobacco: Never   Substance Use Topics    Alcohol use: No     Comment: Occasional; 3x /month    Drug use: No     Review of Systems  See HPI.    Physical Exam     Initial Vitals [04/05/24 0001]   BP Pulse Resp Temp SpO2   (!) 164/94 110 19 97.3 °F (36.3 °C) 99 %      MAP       --         Physical Exam    Nursing note and vitals reviewed.  Constitutional: She appears well-developed and well-nourished. She is not diaphoretic. No distress.   Tired appearing.   HENT:   Head: Normocephalic and atraumatic.   Mildly dry mucous membranes.   Eyes: EOM are normal. Pupils are equal, round, and reactive to light.   No pallor or icterus.   Neck: Neck supple.   Normal range of motion.  Cardiovascular:  Normal rate, regular rhythm and normal heart sounds.     Exam reveals no gallop and no friction rub.       No murmur heard.  Pulmonary/Chest: Breath sounds normal. No respiratory distress. She has no wheezes. She has no rhonchi. She has no rales.   Abdominal: Abdomen is soft. There is no abdominal tenderness.   Musculoskeletal:         General: No tenderness or edema. Normal range of motion.      Cervical back: Normal range of motion and neck supple.     Lymphadenopathy:     She has no cervical adenopathy.   Neurological: She is alert and oriented to person, place, and time.   Skin: Skin is warm and dry.   Psychiatric: She has a normal mood and affect. Her behavior is normal. Judgment and thought content normal.         ED Course   Procedures  Labs Reviewed   CBC W/ AUTO DIFFERENTIAL - Abnormal; Notable  for the following components:       Result Value    Hematocrit 35.3 (*)     MPV 8.7 (*)     Eosinophil % 8.8 (*)     All other components within normal limits   COMPREHENSIVE METABOLIC PANEL - Abnormal; Notable for the following components:    Potassium 3.3 (*)     CO2 21 (*)     Glucose 119 (*)     All other components within normal limits   SARS-COV-2 RDRP GENE     EKG Readings: (Independently Interpreted)   Initial Reading: No STEMI.   Sinus rhythm. Rate of 95. No ST or T wave changes. No ischemia or arrhythmia.        Imaging Results    None          Medications   sodium chloride 0.9% bolus 1,000 mL 1,000 mL (0 mLs Intravenous Stopped 4/5/24 0141)   potassium chloride SA CR tablet 40 mEq (40 mEq Oral Given 4/5/24 0141)     Medical Decision Making  Amount and/or Complexity of Data Reviewed  Labs: ordered.    Risk  Prescription drug management.            Scribe Attestation:   Scribe #1: I performed the above scribed service and the documentation accurately describes the services I performed. I attest to the accuracy of the note.              Patient presents complaining of episode of lightheadedness, felt like she might pass out.  Occurred when she stood to go take a shower.   Feeling somewhat lightheaded upon my evaluation, but vital signs are stable, clinically well-appearing.  Orthostatics were negative.  EKG unremarkable and patient had no arrhythmia while on the monitor.  Laboratory studies show mild hypokalemia which was orally repleted.  Discussed diet for increased potassium intake.  The patient is feeling better after the IV fluids.  No longer feels lightheaded, or dizzy upon standing.  Encouraged continued hydration at home.  Encouraged follow-up with primary care, especially if symptoms persist. Stable for discharge           Physician Attestation for Scribe: I, TL, reviewed documentation as scribed in my presence, which is both accurate and complete.      Clinical Impression:  Final  diagnoses:  [R53.1] Weakness generalized  [R55] Near syncope (Primary)  [E87.6] Hypokalemia          ED Disposition Condition    Discharge           ED Prescriptions    None       Follow-up Information       Follow up With Specialties Details Why Contact Info    Zoey Nelson MD Family Medicine In 1 week  [address] [address]  [phone]               Neeraj Jiménez II, MD  04/05/24 2765

## 2024-04-08 ENCOUNTER — HOSPITAL ENCOUNTER (EMERGENCY)
Facility: OTHER | Age: 44
Discharge: HOME | End: 2024-04-08
Attending: EMERGENCY MEDICINE
Payer: COMMERCIAL

## 2024-04-08 ENCOUNTER — PATIENT MESSAGE (OUTPATIENT)
Dept: NEUROLOGY | Facility: CLINIC | Age: 44
End: 2024-04-08
Payer: COMMERCIAL

## 2024-04-08 VITALS
HEART RATE: 86 BPM | TEMPERATURE: 98 F | OXYGEN SATURATION: 100 % | RESPIRATION RATE: 13 BRPM | DIASTOLIC BLOOD PRESSURE: 86 MMHG | HEIGHT: 64 IN | SYSTOLIC BLOOD PRESSURE: 145 MMHG | BODY MASS INDEX: 22.2 KG/M2 | WEIGHT: 130 LBS

## 2024-04-08 DIAGNOSIS — E87.6 HYPOKALEMIA: ICD-10-CM

## 2024-04-08 DIAGNOSIS — R20.0 NUMBNESS: Primary | ICD-10-CM

## 2024-04-08 LAB
ALBUMIN SERPL BCP-MCNC: 4.3 G/DL (ref 3.5–5.2)
ALP SERPL-CCNC: 77 U/L (ref 55–135)
ALT SERPL W/O P-5'-P-CCNC: 18 U/L (ref 10–44)
ANION GAP SERPL CALC-SCNC: 12 MMOL/L (ref 8–16)
AST SERPL-CCNC: 16 U/L (ref 10–40)
BASOPHILS # BLD AUTO: 0.02 K/UL (ref 0–0.2)
BASOPHILS NFR BLD: 0.4 % (ref 0–1.9)
BILIRUB SERPL-MCNC: 0.3 MG/DL (ref 0.1–1)
BUN SERPL-MCNC: 11 MG/DL (ref 6–20)
CALCIUM SERPL-MCNC: 10 MG/DL (ref 8.7–10.5)
CHLORIDE SERPL-SCNC: 102 MMOL/L (ref 95–110)
CO2 SERPL-SCNC: 23 MMOL/L (ref 23–29)
CREAT SERPL-MCNC: 0.8 MG/DL (ref 0.5–1.4)
DIFFERENTIAL METHOD BLD: ABNORMAL
EOSINOPHIL # BLD AUTO: 0.2 K/UL (ref 0–0.5)
EOSINOPHIL NFR BLD: 2.7 % (ref 0–8)
ERYTHROCYTE [DISTWIDTH] IN BLOOD BY AUTOMATED COUNT: 13.5 % (ref 11.5–14.5)
EST. GFR  (NO RACE VARIABLE): >60 ML/MIN/1.73 M^2
GLUCOSE SERPL-MCNC: 138 MG/DL (ref 70–110)
HCT VFR BLD AUTO: 33.7 % (ref 37–48.5)
HGB BLD-MCNC: 11.5 G/DL (ref 12–16)
HIV 1+2 AB+HIV1 P24 AG SERPL QL IA: NEGATIVE
IMM GRANULOCYTES # BLD AUTO: 0.01 K/UL (ref 0–0.04)
IMM GRANULOCYTES NFR BLD AUTO: 0.2 % (ref 0–0.5)
LYMPHOCYTES # BLD AUTO: 2.1 K/UL (ref 1–4.8)
LYMPHOCYTES NFR BLD: 37.7 % (ref 18–48)
MAGNESIUM SERPL-MCNC: 1.9 MG/DL (ref 1.6–2.6)
MCH RBC QN AUTO: 29.9 PG (ref 27–31)
MCHC RBC AUTO-ENTMCNC: 34.1 G/DL (ref 32–36)
MCV RBC AUTO: 88 FL (ref 82–98)
MONOCYTES # BLD AUTO: 0.5 K/UL (ref 0.3–1)
MONOCYTES NFR BLD: 9.5 % (ref 4–15)
NEUTROPHILS # BLD AUTO: 2.7 K/UL (ref 1.8–7.7)
NEUTROPHILS NFR BLD: 49.5 % (ref 38–73)
NRBC BLD-RTO: 0 /100 WBC
PLATELET # BLD AUTO: 281 K/UL (ref 150–450)
PMV BLD AUTO: 8.5 FL (ref 9.2–12.9)
POTASSIUM SERPL-SCNC: 2.7 MMOL/L (ref 3.5–5.1)
PROT SERPL-MCNC: 8.9 G/DL (ref 6–8.4)
RBC # BLD AUTO: 3.84 M/UL (ref 4–5.4)
SODIUM SERPL-SCNC: 137 MMOL/L (ref 136–145)
WBC # BLD AUTO: 5.49 K/UL (ref 3.9–12.7)

## 2024-04-08 PROCEDURE — 80053 COMPREHEN METABOLIC PANEL: CPT | Performed by: PHYSICIAN ASSISTANT

## 2024-04-08 PROCEDURE — 99284 EMERGENCY DEPT VISIT MOD MDM: CPT | Mod: 25

## 2024-04-08 PROCEDURE — 85025 COMPLETE CBC W/AUTO DIFF WBC: CPT

## 2024-04-08 PROCEDURE — 83735 ASSAY OF MAGNESIUM: CPT | Performed by: EMERGENCY MEDICINE

## 2024-04-08 PROCEDURE — 93005 ELECTROCARDIOGRAM TRACING: CPT

## 2024-04-08 PROCEDURE — 87389 HIV-1 AG W/HIV-1&-2 AB AG IA: CPT | Performed by: EMERGENCY MEDICINE

## 2024-04-08 PROCEDURE — 93010 ELECTROCARDIOGRAM REPORT: CPT | Mod: ,,, | Performed by: INTERNAL MEDICINE

## 2024-04-08 PROCEDURE — 25000003 PHARM REV CODE 250

## 2024-04-08 RX ORDER — POTASSIUM CHLORIDE 20 MEQ/1
40 TABLET, EXTENDED RELEASE ORAL
Status: COMPLETED | OUTPATIENT
Start: 2024-04-08 | End: 2024-04-08

## 2024-04-08 RX ORDER — POTASSIUM CHLORIDE 750 MG/1
20 TABLET, EXTENDED RELEASE ORAL DAILY
Qty: 28 TABLET | Refills: 0 | Status: SHIPPED | OUTPATIENT
Start: 2024-04-08 | End: 2024-04-16

## 2024-04-08 RX ADMIN — POTASSIUM CHLORIDE 40 MEQ: 1500 TABLET, EXTENDED RELEASE ORAL at 08:04

## 2024-04-09 LAB
OHS QRS DURATION: 78 MS
OHS QTC CALCULATION: 443 MS

## 2024-04-09 NOTE — ED PROVIDER NOTES
Encounter Date: 2024       History     Chief Complaint   Patient presents with    Numbness     Pt presents to the ER with complaints of numbness in her left hand. Pt states she started to experience numbness in the entire left side around 10pm last night; states she is now only having numbness of the hand. Pt states she thinks the symptoms may be related to a medication reaction.       Pernell Ly is a 43 y.o. female with history of HTN, iron deficiency anemia and seizures presenting to the emergency department for evaluation of numbness in her left hand that began around 10 pm last night. She states that she did have numbness to the entire left side of her body last night but that resolved when she awoke from sleep this morning. She states that the numbness has improved and her left hand just feels weak now. She thinks that she may be having a reaction to her stopping the use of Topamax recently. States that her neurologist advised her to wean off of Topamax, which she did and then completely discontinued it at least one week ago. She states that she is currently only on lamotrigine BID for seizures. Notes that her neurologist recently changed her dosage from 200 mg to 150 mg. She states that she was started on Clobazam 10 mg in place of Topamax but she just picked the medication up from the pharmacy today. She states that she was having issues with her medical insurance and could not afford the medication out of pocket. She denies any recent seizure activity while only taking lamotrigine.       The history is provided by the patient.     Review of patient's allergies indicates:  No Known Allergies  Past Medical History:   Diagnosis Date    Encounter for blood transfusion     Fibroids     Hypertension     Iron deficiency anemia     Menorrhagia     Migraine     Seizures     as baby     Past Surgical History:   Procedure Laterality Date     SECTION      x1    HYSTERECTOMY  2017    TUBAL  LIGATION       Family History   Problem Relation Age of Onset    Hypertension Father     Hypertension Sister     Hypertension Maternal Grandmother     Breast cancer Maternal Grandmother     Cancer Maternal Grandfather     Hypertension Paternal Grandmother     Colon cancer Maternal Cousin     Ovarian cancer Neg Hx      Social History     Tobacco Use    Smoking status: Never     Passive exposure: Never    Smokeless tobacco: Never   Substance Use Topics    Alcohol use: No     Comment: Occasional; 3x /month    Drug use: No     Review of Systems   Constitutional:  Negative for chills and fever.   HENT:  Negative for congestion, rhinorrhea and sore throat.    Respiratory:  Negative for cough and shortness of breath.    Cardiovascular:  Negative for chest pain.   Gastrointestinal:  Negative for abdominal pain, diarrhea, nausea and vomiting.   Genitourinary:  Negative for dysuria, frequency and urgency.   Musculoskeletal:  Negative for back pain.   Skin:  Negative for rash.   Neurological:  Positive for weakness (left hand) and numbness (left hand). Negative for dizziness and headaches.   Psychiatric/Behavioral:  Negative for confusion.        Physical Exam     Initial Vitals [04/08/24 1920]   BP Pulse Resp Temp SpO2   (!) 141/86 (!) 111 16 98.1 °F (36.7 °C) 98 %      MAP       --         Physical Exam    Nursing note and vitals reviewed.  Constitutional: She appears well-developed and well-nourished. No distress.   HENT:   Head: Normocephalic and atraumatic.   Nose: Nose normal.   Mouth/Throat: Oropharynx is clear and moist.   Eyes: Conjunctivae and EOM are normal. Pupils are equal, round, and reactive to light.   Neck: Neck supple.   Normal range of motion.  Cardiovascular:  Normal rate, regular rhythm, normal heart sounds and intact distal pulses.           Pulmonary/Chest: Breath sounds normal. No respiratory distress. She has no wheezes. She has no rhonchi. She has no rales. She exhibits no tenderness.    Musculoskeletal:         General: Normal range of motion.      Cervical back: Normal range of motion and neck supple.      Comments: Able to move all extremities without difficulty.      Neurological: She is alert and oriented to person, place, and time. She has normal strength. No cranial nerve deficit or sensory deficit. GCS score is 15. GCS eye subscore is 4. GCS verbal subscore is 5. GCS motor subscore is 6.   No focal neurological deficits.  Neurovascularly intact. Strong, equal  strength bilaterally.    Skin: Skin is warm and dry.   Psychiatric: She has a normal mood and affect. Her behavior is normal. Judgment and thought content normal.         ED Course   Procedures  Labs Reviewed   COMPREHENSIVE METABOLIC PANEL - Abnormal; Notable for the following components:       Result Value    Potassium 2.7 (*)     Glucose 138 (*)     Total Protein 8.9 (*)     All other components within normal limits    Narrative:     Release to patient->Immediate  K   critical result(s) called and verbal readback obtained from Stacey Ramey RN by SLEONARD 04/08/2024 19:57   CBC W/ AUTO DIFFERENTIAL - Abnormal; Notable for the following components:    RBC 3.84 (*)     Hemoglobin 11.5 (*)     Hematocrit 33.7 (*)     MPV 8.5 (*)     All other components within normal limits   HIV 1 / 2 ANTIBODY    Narrative:     Release to patient->Immediate   MAGNESIUM   MAGNESIUM    Narrative:     Release to patient->Immediate     EKG Readings: (Independently Interpreted)   Initial Reading: No STEMI.   Sinus rhythm with a rate of 81.  First-degree AV block.  Right axis deviation.  Similar to previous EKG from April 5, 2024.         Imaging Results    None          Medications   potassium chloride SA CR tablet 40 mEq (40 mEq Oral Given 4/8/24 2028)     Medical Decision Making  Amount and/or Complexity of Data Reviewed  Labs: ordered.    Risk  Prescription drug management.                          Medical Decision Making:   Initial Assessment:   Urgent  evaluation of 43 year old female with history of HTN, iron deficiency anemia and seizures presenting with left hand numbness and weakness that began last night around 10 pm. States that the entire left side of her body was numb last night but that resolved when she awoke from sleep this morning. She denies any other neurological symptoms or fever. On exam, she is well appearing and non toxic. Hemodynamically stable. Afebrile in the ED. no focal neurological deficits.  Neurovascularly intact. Strong, Equal  strength bilaterally. Labs ordered by the triage provider pending.   Clinical Tests:   Lab Tests: Ordered and Reviewed  Medical Tests: Ordered and Reviewed  ED Management:  EKG today shows sinus rhythm with first degree AV block. Similar to previous EKG from 4/5/2024. On review of labs, no leukocytosis. Mild anemia of 11.5 amd 33.7. CMP reveals hypokalemia of 2.7. Pt given oral potassium 40 mEQ in the ED. She did tolerate it with no complications. Glucose mildly elevated at 138. Total protein 8.9. Normal anion gap. Magnesium WNL. I updated the patient on all results. Case was discussed with supervising physician, Dr. Lobo Sen who does not feel the patient needs to be admitted today for hypokalemia. Recommends increasing oral potassium to 20 mEQ daily and having patient increase consumption of bananas and leafy greens. Also recommended starting new medication tonight for seizure prescribed by neurology and having her follow up in clinic. I updated the patient on plan of care. She is agreeable with this plan of care. Discharged home with script for oral potassium.  Ambulatory referral to Endocrinology was provided.  Patient verbalized understanding and agreement with this plan of care. She was given specific return precautions. Advised to follow up with PCP as needed. All questions and concerns addressed. She is stable for discharge.     This note was created with [employer] Direct Dictation. Please  excuse any spelling or grammatical errors.             Clinical Impression:  Final diagnoses:  [E87.6] Hypokalemia  [R20.0] Numbness (Primary)          ED Disposition Condition    Discharge Stable          ED Prescriptions       Medication Sig Dispense Start Date End Date Auth. Provider    potassium chloride (KLOR-CON) 10 MEQ TbSR Take 2 tablets (20 mEq total) by mouth once daily. for 14 days 28 tablet 4/8/2024 4/22/2024 Fili Lara PA-C          Follow-up Information    None          Fili Lara PA-C  04/08/24 [id number]

## 2024-04-09 NOTE — FIRST PROVIDER EVALUATION
Emergency Department TeleTriage Encounter Note      CHIEF COMPLAINT    Chief Complaint   Patient presents with    Numbness     Pt presents to the ER with complaints of numbness in her left hand. Pt states she started to experience numbness in the entire left side around 10pm last night; states she is now only having numbness of the hand. Pt states she thinks the symptoms may be related to a medication reaction.         VITAL SIGNS   Initial Vitals [04/08/24 1920]   BP Pulse Resp Temp SpO2   (!) 141/86 (!) 111 16 98.1 °F (36.7 °C) 98 %      MAP       --            ALLERGIES    Review of patient's allergies indicates:  No Known Allergies    PROVIDER TRIAGE NOTE  Patient presents with complaint of pressure to the left hand.  Reports started last night.  Concerned possible side effect of weaning her medication.  Denies any other complaints.  History of similar symptoms.      Phy:   Constitutional: well nourished, well developed, appearing stated age, NAD        Initial orders will be placed and care will be transferred to an alternate provider when patient is roomed for a full evaluation. Any additional orders and the final disposition will be determined by that provider.        ORDERS  Labs Reviewed   HIV 1 / 2 ANTIBODY       ED Orders (720h ago, onward)      Start Ordered     Status Ordering Provider    04/08/24 1921 04/08/24 1921  HIV 1/2 Ag/Ab (4th Gen)  STAT         Ordered EMILIANO BRISENO              Virtual Visit Note: The provider triage portion of this emergency department evaluation and documentation was performed via [employer], a HIPAA-compliant telemedicine application, in concert with a tele-presenter in the room. A face to face patient evaluation with one of my colleagues will occur once the patient is placed in an emergency department room.      DISCLAIMER: This note was prepared with [employer] voice recognition transcription software. Garbled syntax, mangled pronouns, and other bizarre  constructions may be attributed to that software system.

## 2024-04-09 NOTE — DISCHARGE INSTRUCTIONS
Please take potassium chloride 20 mEQ once daily. Make sure you eat leafy greens and a banana once a day. Follow up with endocrinology for persistent hypokalemia.    Start taking clobazam as directed by your neurology team. Continue lamotrigine. Follow up with neurology in clinic regarding your symptoms.

## 2024-04-09 NOTE — ED TRIAGE NOTES
Pernell Ly, a 43 y.o. female presents to the ED c/o left hand numbness. Patient also states that she's not feeling well.    Patient is A&Ox4. Denies any other complaints. ED workup in progress. Safety measures in place; side rails up x2. Call light within pt reach. Plan of care ongoing.     Chief Complaint   Patient presents with    Numbness     Pt presents to the ER with complaints of numbness in her left hand. Pt states she started to experience numbness in the entire left side around 10pm last night; states she is now only having numbness of the hand. Pt states she thinks the symptoms may be related to a medication reaction.       Review of patient's allergies indicates:  No Known Allergies  Past Medical History:   Diagnosis Date    Encounter for blood transfusion     Fibroids     Hypertension     Iron deficiency anemia     Menorrhagia     Migraine     Seizures     as baby

## 2024-04-16 ENCOUNTER — OFFICE VISIT (OUTPATIENT)
Dept: ENDOCRINOLOGY | Facility: CLINIC | Age: 44
End: 2024-04-16
Payer: COMMERCIAL

## 2024-04-16 ENCOUNTER — HOSPITAL ENCOUNTER (EMERGENCY)
Facility: OTHER | Age: 44
Discharge: HOME | End: 2024-04-16
Attending: EMERGENCY MEDICINE
Payer: COMMERCIAL

## 2024-04-16 ENCOUNTER — OFFICE VISIT (OUTPATIENT)
Dept: PRIMARY CARE CLINIC | Facility: CLINIC | Age: 44
End: 2024-04-16
Attending: FAMILY MEDICINE
Payer: COMMERCIAL

## 2024-04-16 VITALS
HEART RATE: 81 BPM | HEIGHT: 64 IN | TEMPERATURE: 98 F | DIASTOLIC BLOOD PRESSURE: 78 MMHG | RESPIRATION RATE: 16 BRPM | BODY MASS INDEX: 22.2 KG/M2 | SYSTOLIC BLOOD PRESSURE: 118 MMHG | WEIGHT: 130 LBS | OXYGEN SATURATION: 100 %

## 2024-04-16 VITALS
HEIGHT: 64 IN | WEIGHT: 138.56 LBS | OXYGEN SATURATION: 99 % | BODY MASS INDEX: 23.66 KG/M2 | DIASTOLIC BLOOD PRESSURE: 74 MMHG | SYSTOLIC BLOOD PRESSURE: 114 MMHG | HEART RATE: 79 BPM

## 2024-04-16 DIAGNOSIS — J01.00 ACUTE MAXILLARY SINUSITIS, RECURRENCE NOT SPECIFIED: ICD-10-CM

## 2024-04-16 DIAGNOSIS — I10 PRIMARY HYPERTENSION: ICD-10-CM

## 2024-04-16 DIAGNOSIS — E87.6 HYPOKALEMIA: Primary | ICD-10-CM

## 2024-04-16 DIAGNOSIS — R20.0 NUMBNESS: Primary | ICD-10-CM

## 2024-04-16 DIAGNOSIS — E87.6 HYPOKALEMIA: ICD-10-CM

## 2024-04-16 DIAGNOSIS — R05.9 COUGH IN ADULT: ICD-10-CM

## 2024-04-16 DIAGNOSIS — R00.2 PALPITATIONS: ICD-10-CM

## 2024-04-16 DIAGNOSIS — R56.9 SEIZURE: ICD-10-CM

## 2024-04-16 LAB
OHS QRS DURATION: 80 MS
OHS QTC CALCULATION: 441 MS

## 2024-04-16 PROCEDURE — 3074F SYST BP LT 130 MM HG: CPT | Mod: CPTII,S$GLB,, | Performed by: FAMILY MEDICINE

## 2024-04-16 PROCEDURE — 99283 EMERGENCY DEPT VISIT LOW MDM: CPT | Mod: 25

## 2024-04-16 PROCEDURE — 3008F BODY MASS INDEX DOCD: CPT | Mod: CPTII,S$GLB,, | Performed by: FAMILY MEDICINE

## 2024-04-16 PROCEDURE — 1159F MED LIST DOCD IN RCRD: CPT | Mod: CPTII,S$GLB,, | Performed by: FAMILY MEDICINE

## 2024-04-16 PROCEDURE — 1160F RVW MEDS BY RX/DR IN RCRD: CPT | Mod: CPTII,S$GLB,, | Performed by: FAMILY MEDICINE

## 2024-04-16 PROCEDURE — 99499 UNLISTED E&M SERVICE: CPT | Mod: 95,,, | Performed by: INTERNAL MEDICINE

## 2024-04-16 PROCEDURE — 99999 PR PBB SHADOW E&M-EST. PATIENT-LVL III: CPT | Mod: PBBFAC,,, | Performed by: FAMILY MEDICINE

## 2024-04-16 PROCEDURE — 99215 OFFICE O/P EST HI 40 MIN: CPT | Mod: S$GLB,,, | Performed by: FAMILY MEDICINE

## 2024-04-16 PROCEDURE — 93010 ELECTROCARDIOGRAM REPORT: CPT | Mod: ,,, | Performed by: INTERNAL MEDICINE

## 2024-04-16 PROCEDURE — 3078F DIAST BP <80 MM HG: CPT | Mod: CPTII,S$GLB,, | Performed by: FAMILY MEDICINE

## 2024-04-16 PROCEDURE — 93005 ELECTROCARDIOGRAM TRACING: CPT

## 2024-04-16 RX ORDER — POTASSIUM CHLORIDE 20 MEQ/1
20 TABLET, EXTENDED RELEASE ORAL 2 TIMES DAILY
Qty: 90 TABLET | Refills: 1 | Status: SHIPPED | OUTPATIENT
Start: 2024-04-16

## 2024-04-16 RX ORDER — PROMETHAZINE HYDROCHLORIDE AND DEXTROMETHORPHAN HYDROBROMIDE 6.25; 15 MG/5ML; MG/5ML
SYRUP ORAL
COMMUNITY
Start: 2024-02-04

## 2024-04-16 RX ORDER — PREDNISONE 20 MG/1
40 TABLET ORAL DAILY
Qty: 6 TABLET | Refills: 0 | Status: SHIPPED | OUTPATIENT
Start: 2024-04-16

## 2024-04-16 RX ORDER — AMOXICILLIN 875 MG/1
875 TABLET, FILM COATED ORAL EVERY 12 HOURS
COMMUNITY
Start: 2024-02-12

## 2024-04-16 NOTE — PROGRESS NOTES
Answers submitted by the patient for this visit:  Review of Systems Questionnaire (Submitted on 4/15/2024)  activity change: Yes  unexpected weight change: Yes  neck pain: No  hearing loss: No  rhinorrhea: Yes  trouble swallowing: No  eye discharge: No  visual disturbance: No  chest tightness: Yes  wheezing: Yes  chest pain: Yes  palpitations: Yes  blood in stool: No  constipation: Yes  vomiting: No  diarrhea: Yes  polydipsia: No  polyuria: No  difficulty urinating: No  hematuria: No  menstrual problem: No  dysuria: No  joint swelling: No  arthralgias: Yes  headaches: Yes  dysphoric mood: Yes

## 2024-04-16 NOTE — PROGRESS NOTES
Subjective     Patient ID: Pernell Ly is a 43 y.o. female.    Chief Complaint: URI (Low k levels)    Patient presents today for concerns of hyperkalemia.  States that her potassium has dropped low and was told by the ER doctor see come for follow up with me.  Patient has gone to the emergency room multiple times in the last 8 weeks.  Many of her issues seem to stem from her seizure, or seizure medications.  Patient states that she does need to follow up with Neurology since she has not been feeling well.    Her potassium in the ER was 2.7 urgency room doubled her potassium intake to 20 mEq per day.    URI   Associated symptoms include chest pain, diarrhea, headaches, rhinorrhea and wheezing. Pertinent negatives include no dysuria, neck pain or vomiting.     Review of Systems   Constitutional:  Positive for activity change and unexpected weight change.   HENT:  Positive for rhinorrhea. Negative for hearing loss and trouble swallowing.    Eyes:  Negative for discharge and visual disturbance.   Respiratory:  Positive for chest tightness and wheezing.    Cardiovascular:  Positive for chest pain and palpitations.   Gastrointestinal:  Positive for constipation and diarrhea. Negative for blood in stool and vomiting.   Endocrine: Negative for polydipsia and polyuria.   Genitourinary:  Negative for difficulty urinating, dysuria, hematuria and menstrual problem.   Musculoskeletal:  Positive for arthralgias. Negative for joint swelling and neck pain.   Neurological:  Positive for headaches.   Psychiatric/Behavioral:  Positive for dysphoric mood.    All other systems reviewed and are negative.         Objective     Physical Exam  Vitals reviewed.   Constitutional:       General: She is not in acute distress.     Appearance: Normal appearance. She is well-developed and normal weight. She is not ill-appearing, toxic-appearing or diaphoretic.   HENT:      Head: Normocephalic and atraumatic.   Eyes:      Extraocular  Movements: Extraocular movements intact.      Conjunctiva/sclera: Conjunctivae normal.      Pupils: Pupils are equal, round, and reactive to light.   Neck:      Thyroid: No thyromegaly.   Cardiovascular:      Rate and Rhythm: Normal rate and regular rhythm.      Heart sounds: Normal heart sounds. No murmur heard.  Pulmonary:      Effort: Pulmonary effort is normal. No respiratory distress.      Breath sounds: Normal breath sounds. No wheezing or rales.   Chest:      Chest wall: No tenderness.   Musculoskeletal:         General: Normal range of motion.      Cervical back: Normal range of motion and neck supple.      Right lower leg: No edema.      Left lower leg: No edema.   Lymphadenopathy:      Cervical: No cervical adenopathy.   Skin:     General: Skin is warm.   Neurological:      Mental Status: She is alert and oriented to person, place, and time.   Psychiatric:         Behavior: Behavior normal.         Thought Content: Thought content normal.         Judgment: Judgment normal.            Assessment and Plan     1. Hypokalemia  -     potassium chloride SA (K-DUR,KLOR-CON) 20 MEQ tablet; Take 1 tablet (20 mEq total) by mouth 2 (two) times daily.  Dispense: 90 tablet; Refill: 1  -     POTASSIUM; Future; Expected date: 04/16/2024    2. Acute maxillary sinusitis, recurrence not specified  -     predniSONE (DELTASONE) 20 MG tablet; Take 2 tablets (40 mg total) by mouth once daily.  Dispense: 6 tablet; Refill: 0    3. Seizure    4. Cough in adult    5. Primary hypertension      Hypertension controlled  Patient does appear to have an ongoing chronic sinus issue as as result of allergy.  We will treat her with prednisone x3 days.  Side-effects of med discussed with patient in depth  Seizures managed by Neurology.  Unclear is Lamictal can cause a potassium deficit.  Have asked patient to speak with her neurologist regarding this.  Nifedipine does not appear to have this issue and have reassured patient that she can  continue taking his medication    Agree with the ER doc and had asked patient to increase her potassium 20 mEq per day.  One week not fasting.  Patient amenable with plan have also encouraged her to take a daily banana.  Patient will follow-up with her neurologist.  We will also follow up with endocrinology to see if there is another cause of her hypokalemia referral has been placed by [facility].  Adria prompts discussed with patient in    Of note patient appears extremely fatigued.  Hands patient for a very long time and she is very very tired throughout the entire encounter.  When I ask her she states that she has on a new medication for seizures and thinks that this is adding to her fatigue in addition to Lamictal.  I do think it is imperative that patient speaks with her neurologist             No follow-ups on file.

## 2024-04-16 NOTE — ED PROVIDER NOTES
"  Source of History:  The patient     Chief complaint:  Back Pain (C/O DISCOMFORT TO BACK, DESCRIBES AS BURNING, )        HPI:  Pernell Ly is a 43 y.o. female  complains of numbness and tingling that she awoke with.  Was both lower extremities as well as the left upper extremity but more so in the left side.  Patient states she has had this couple of times a week for the last few months.  She has been seen in the emergency department a few times for this with no diagnosis.  She has a history of seizures and recently was changed with 1 medication 1 week ago but again the symptoms had been preceding this.  She also complains of intermittent headaches but has a history of migraines for years and nothing new.  Denies any visual changes.  Denies any fevers or vomiting.     Denies being very anxious or high stress patient however states that her grandmother just  about a month ago.     This is the extent to the patients complaints today here in the emergency department.     ROS:   See HPI.     Review of patient's allergies indicates:  No Known Allergies     PMH:  As per HPI and below:  History - Past Medical History        Past Medical History:   Diagnosis Date    Encounter for blood transfusion      Fibroids      Hypertension      Iron deficiency anemia      Menorrhagia      Migraine      Seizures       as baby         History - Past Surgical History         Past Surgical History:   Procedure Laterality Date     SECTION         x1    HYSTERECTOMY   2017    TUBAL LIGATION                Social History   Social History            Tobacco Use    Smoking status: Never       Passive exposure: Never    Smokeless tobacco: Never   Substance Use Topics    Alcohol use: No       Comment: Occasional; 3x /month    Drug use: No            Physical Exam:    /75 (BP Location: Left arm, Patient Position: Sitting)   Pulse 100   Temp 98 °F (36.7 °C) (Oral)   Resp 16   Ht 5' 4" (1.626 m)   Wt 59 kg (130 " lb)   LMP 11/01/2017   SpO2 100%   Breastfeeding No   BMI 22.31 kg/m²   Nursing note and vital signs reviewed.  Constitutional: No acute distress.  Nontoxic  Cardiovascular: Regular rate and rhythm.  No murmurs. No gallops. No rubs  Respiratory: Clear to auscultation bilaterally.  Good air movement.  No wheezes.  No rhonchi. No rales. No accessory muscle use..  Abdomen: Soft.  Not distended.  Nontender.  No guarding.  No rebound. Non-peritoneal.  Musculoskeletal: Good range of motion all joints.  No deformities.  Neck supple.  No meningismus.  Extremities: No pitting edema  Neuro: alert. At baseline.  No focal neurological deficits.  Psych anxious appearing.     Summary of Previous Medical Records:  Reviewing medical records patient has had multiple emergency department visits with a complaint diagnosis of numbness, paresthesias.  The most recent was 8 days ago on April 8, 2024.  Also dating back to September 2023 presenting for similar complaints.  Workups have revealed no acute abnormality     Differential Dx considered but not limited to:    Anxiety, paresthesias.  Considered multiple sclerosis but as this has been going on for months and no bulbar findings less likely.  Do not suspect meningitis.  No evidence of stroke.     MDM/ Workup:  This is the 4th visit to the emergency department in the last 6 weeks for similar presentation.  Workups have included blood work and EKG showing no significant abnormality.  Most recently just 8 days ago.  Based on this I do not feel repeat blood work is indicated.  I will get an EKG for her palpitations but otherwise do not feel any further workup is indicated.  She has an appointment with primary care as well as a specialist today which I strongly recommend she make these appointments.  I have discussed with her the role of the emergency department as well as the need for further outpatient workup with likely primary care as well as Neurology.       No further workup is  indicated in the emergency department today.  I updated pt regarding results and I counseled pt regarding supportive care measures.  Diagnosis and treatment plan explained to patient. I have answered all questions and the patient is satisfied with the plan of care. Patient discharged home in stable condition.             ED Course as of 04/16/24 0704   Tue Apr 16, 2024   0700 EKG 12-lead  EKG independently interpreted by myself shows normal sinus rhythm at a rate of 78, first-degree AV block, no acute ST T wave abnormalities.Compared to an EKG on April 8, 2024 shows no change. [SM]       ED Course User Index  [SM] Lobo Sen DO                  Diagnostic Impression:    1. Numbness    2. Palpitations            ED Disposition Condition     Discharge Stable                Lobo Sen DO  04/16/24 0807

## 2024-04-16 NOTE — ED TRIAGE NOTES
"42 yo female reports to ed  with co "burning" sensation to the L side/Lback. Denies abd pain. Reports productive cough with white sputum. Denies other complaints at this time.   "

## 2024-04-16 NOTE — MEDICAL/APP STUDENT
History     Chief Complaint   Patient presents with    Back Pain     C/O DISCOMFORT TO BACK, DESCRIBES AS BURNING,      Patient presents this morning c/o numbness/tingling and pain primarily in LLE. Upon waking this morning, she initially felt similar sensations in RLE, LLE, and LUE as well. Symptoms have improved since waking, now localized to mid-shin and below on LLE. Also endorsed palpitations and transient pressure-like chest pain on waking, but these have since resolved.    Pt has longstanding history of seizures managed with lamotrigine. Recently increased dose of lamotrigine to 150mg from 125mg, also started clobazam last week.    The history is provided by the patient. No  was used.       Past Medical History:   Diagnosis Date    Encounter for blood transfusion     Fibroids     Hypertension     Iron deficiency anemia     Menorrhagia     Migraine     Seizures     as baby       Past Surgical History:   Procedure Laterality Date     SECTION      x1    HYSTERECTOMY  2017    TUBAL LIGATION         Family History   Problem Relation Name Age of Onset    Hypertension Father      Hypertension Sister      Hypertension Maternal Grandmother      Breast cancer Maternal Grandmother      Cancer Maternal Grandfather      Hypertension Paternal Grandmother      Colon cancer Maternal Cousin      Ovarian cancer Neg Hx         Social History     Tobacco Use    Smoking status: Never     Passive exposure: Never    Smokeless tobacco: Never   Substance Use Topics    Alcohol use: No     Comment: Occasional; 3x /month    Drug use: No       Review of Systems   Constitutional:  Negative for fever.   HENT:  Negative for sore throat.    Eyes:  Negative for visual disturbance.   Respiratory:  Negative for shortness of breath.    Cardiovascular:  Positive for chest pain.   Gastrointestinal:  Negative for nausea.   Genitourinary:  Positive for flank pain. Negative for dysuria.   Musculoskeletal:  Negative  "for back pain.   Skin:  Negative for rash.   Neurological:  Positive for seizures and numbness. Negative for weakness.   Hematological:  Does not bruise/bleed easily.   Psychiatric/Behavioral:  The patient is nervous/anxious.        Physical Exam   /75 (BP Location: Left arm, Patient Position: Sitting)   Pulse 100   Temp 98 °F (36.7 °C) (Oral)   Resp 16   Ht 5' 4" (1.626 m)   Wt 59 kg (130 lb)   LMP 11/01/2017   SpO2 100%   Breastfeeding No   BMI 22.31 kg/m²     Physical Exam    Vitals reviewed.  Constitutional: She appears well-developed and well-nourished. She is not diaphoretic. She is cooperative. No distress.   HENT:   Head: Normocephalic and atraumatic.   Eyes: Conjunctivae and EOM are normal. Pupils are equal, round, and reactive to light.   Neck: Neck supple.   Normal range of motion.  Cardiovascular:  Regular rhythm, S1 normal and intact distal pulses.   Tachycardia present.         Murmur (systolic murmur) heard.  Systolic murmur is present with a grade of 2/6.  Pulses:       Radial pulses are 2+ on the right side and 2+ on the left side.   Loud S2   Pulmonary/Chest: Effort normal and breath sounds normal. No respiratory distress.   Shallow breaths. No increased WoB     Abdominal: Abdomen is soft. Bowel sounds are normal. She exhibits no distension and no mass. There is no abdominal tenderness. There is no rebound and no guarding.   Musculoskeletal:         General: Normal range of motion.      Cervical back: Normal range of motion and neck supple.     Neurological: She is alert and oriented to person, place, and time. She has normal strength. No cranial nerve deficit or sensory deficit.   Sensation to light touch globally symmetric, motor strength 5/5 in all extremities   Skin: Skin is warm and dry.   Psychiatric: Her speech is normal and behavior is normal. Judgment and thought content normal. Her mood appears anxious. Cognition and memory are normal.         ED Course   Sensory " abnormality  Sensation normal and symmetric globally on exam  Pt reports preferring to sleep on L side, symptoms improve with time after waking  Likely no acute process, ?side effect of recent medication changes  Ddx: compressive neuropathy 2/2 sleeping position  Palpitations  Resolved over time  Has history of palpitations, previous workup has revealed no specific cause  ?related to anxiety

## 2024-04-17 ENCOUNTER — LAB VISIT (OUTPATIENT)
Dept: LAB | Facility: HOSPITAL | Age: 44
End: 2024-04-17
Attending: FAMILY MEDICINE
Payer: COMMERCIAL

## 2024-04-17 DIAGNOSIS — E87.6 HYPOKALEMIA: ICD-10-CM

## 2024-04-17 LAB — POTASSIUM SERPL-SCNC: 3.9 MMOL/L (ref 3.5–5.1)

## 2024-04-17 PROCEDURE — 36415 COLL VENOUS BLD VENIPUNCTURE: CPT | Mod: PO | Performed by: FAMILY MEDICINE

## 2024-04-17 PROCEDURE — 84132 ASSAY OF SERUM POTASSIUM: CPT | Performed by: FAMILY MEDICINE

## 2024-04-24 ENCOUNTER — OFFICE VISIT (OUTPATIENT)
Dept: NEUROLOGY | Facility: CLINIC | Age: 44
End: 2024-04-24
Payer: COMMERCIAL

## 2024-04-24 ENCOUNTER — OFFICE VISIT (OUTPATIENT)
Dept: ENDOCRINOLOGY | Facility: CLINIC | Age: 44
End: 2024-04-24
Payer: COMMERCIAL

## 2024-04-24 ENCOUNTER — LAB VISIT (OUTPATIENT)
Dept: LAB | Facility: HOSPITAL | Age: 44
End: 2024-04-24
Attending: INTERNAL MEDICINE
Payer: COMMERCIAL

## 2024-04-24 VITALS
BODY MASS INDEX: 24.5 KG/M2 | HEART RATE: 84 BPM | HEIGHT: 64 IN | DIASTOLIC BLOOD PRESSURE: 86 MMHG | OXYGEN SATURATION: 99 % | SYSTOLIC BLOOD PRESSURE: 126 MMHG | WEIGHT: 143.5 LBS

## 2024-04-24 VITALS — BODY MASS INDEX: 24.77 KG/M2 | WEIGHT: 145.06 LBS | HEIGHT: 64 IN

## 2024-04-24 DIAGNOSIS — G47.9 DIFFICULTY SLEEPING: ICD-10-CM

## 2024-04-24 DIAGNOSIS — E55.9 VITAMIN D DEFICIENCY: ICD-10-CM

## 2024-04-24 DIAGNOSIS — R20.0 NUMBNESS: ICD-10-CM

## 2024-04-24 DIAGNOSIS — R20.2 PARESTHESIAS: ICD-10-CM

## 2024-04-24 DIAGNOSIS — R53.82 CHRONIC FATIGUE: ICD-10-CM

## 2024-04-24 DIAGNOSIS — I10 PRIMARY HYPERTENSION: ICD-10-CM

## 2024-04-24 DIAGNOSIS — R51.9 HEADACHE DISORDER: ICD-10-CM

## 2024-04-24 DIAGNOSIS — I10 PRIMARY HYPERTENSION: Primary | ICD-10-CM

## 2024-04-24 DIAGNOSIS — R53.83 FATIGUE, UNSPECIFIED TYPE: ICD-10-CM

## 2024-04-24 DIAGNOSIS — G40.109 FOCAL EPILEPSY: Primary | ICD-10-CM

## 2024-04-24 DIAGNOSIS — R51.9 FREQUENT HEADACHES: ICD-10-CM

## 2024-04-24 DIAGNOSIS — Z65.8 PSYCHOSOCIAL STRESSORS: ICD-10-CM

## 2024-04-24 DIAGNOSIS — E87.6 HYPOKALEMIA: ICD-10-CM

## 2024-04-24 DIAGNOSIS — M62.838 MUSCLE SPASMS OF NECK: ICD-10-CM

## 2024-04-24 LAB
25(OH)D3+25(OH)D2 SERPL-MCNC: 18 NG/ML (ref 30–96)
ANION GAP SERPL CALC-SCNC: 8 MMOL/L (ref 8–16)
BUN SERPL-MCNC: 18 MG/DL (ref 6–20)
CALCIUM SERPL-MCNC: 9.7 MG/DL (ref 8.7–10.5)
CHLORIDE SERPL-SCNC: 105 MMOL/L (ref 95–110)
CO2 SERPL-SCNC: 25 MMOL/L (ref 23–29)
CREAT SERPL-MCNC: 0.7 MG/DL (ref 0.5–1.4)
EST. GFR  (NO RACE VARIABLE): >60 ML/MIN/1.73 M^2
GLUCOSE SERPL-MCNC: 94 MG/DL (ref 70–110)
POTASSIUM SERPL-SCNC: 4 MMOL/L (ref 3.5–5.1)
SODIUM SERPL-SCNC: 138 MMOL/L (ref 136–145)

## 2024-04-24 PROCEDURE — 3008F BODY MASS INDEX DOCD: CPT | Mod: CPTII,S$GLB,, | Performed by: INTERNAL MEDICINE

## 2024-04-24 PROCEDURE — 84244 ASSAY OF RENIN: CPT | Performed by: INTERNAL MEDICINE

## 2024-04-24 PROCEDURE — 3074F SYST BP LT 130 MM HG: CPT | Mod: CPTII,S$GLB,, | Performed by: INTERNAL MEDICINE

## 2024-04-24 PROCEDURE — 82306 VITAMIN D 25 HYDROXY: CPT | Performed by: INTERNAL MEDICINE

## 2024-04-24 PROCEDURE — 36415 COLL VENOUS BLD VENIPUNCTURE: CPT | Performed by: INTERNAL MEDICINE

## 2024-04-24 PROCEDURE — 1159F MED LIST DOCD IN RCRD: CPT | Mod: CPTII,S$GLB,, | Performed by: INTERNAL MEDICINE

## 2024-04-24 PROCEDURE — 82088 ASSAY OF ALDOSTERONE: CPT | Performed by: INTERNAL MEDICINE

## 2024-04-24 PROCEDURE — 80048 BASIC METABOLIC PNL TOTAL CA: CPT | Performed by: INTERNAL MEDICINE

## 2024-04-24 PROCEDURE — 3079F DIAST BP 80-89 MM HG: CPT | Mod: CPTII,S$GLB,, | Performed by: INTERNAL MEDICINE

## 2024-04-24 PROCEDURE — 99999 PR PBB SHADOW E&M-EST. PATIENT-LVL IV: CPT | Mod: PBBFAC,,,

## 2024-04-24 PROCEDURE — 3008F BODY MASS INDEX DOCD: CPT | Mod: CPTII,S$GLB,,

## 2024-04-24 PROCEDURE — 99204 OFFICE O/P NEW MOD 45 MIN: CPT | Mod: S$GLB,,, | Performed by: INTERNAL MEDICINE

## 2024-04-24 PROCEDURE — 99999 PR PBB SHADOW E&M-EST. PATIENT-LVL IV: CPT | Mod: PBBFAC,,, | Performed by: INTERNAL MEDICINE

## 2024-04-24 PROCEDURE — 99215 OFFICE O/P EST HI 40 MIN: CPT | Mod: S$GLB,,,

## 2024-04-24 NOTE — ASSESSMENT & PLAN NOTE
No GI losses to explain hypokalemia  No known medications  Reports adequate dietary intake  TSH done 8/2023 normal     RTAs or other renal defect  Screen for debra excess

## 2024-04-24 NOTE — PROGRESS NOTES
[facility]  Epilepsy Clinic Progress Note    [facility] - NEUROLOGY 7TH FL  [facility], [place]    Date: 4/24/24  Patient Name: Pernell Ly   MRN: [id number]   PCP: Zoey Nelson  Referring Provider: Lobo Sen DO    Assessment:   Pernell Ly is a 44 y.o. female presenting in follow up for longstanding epilepsy. 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 Ambulatory EEG monitoring 9/2023 captured two electrographic seizures emanating from the left frontotemporal region as well as sharp waves in the left and right temporal regions despite regimen of lamotrigine 150mg BID and topiramate 50mg BID. She is very sensitive to medication side effects and did not tolerate increase in lamotrigine or topiramate (topiramate ultimately discontinued due to numbness/tingling side effect). Pregabalin resulted in memory issues and increased drowsiness. Current regimen: lamotrigine 150mg BID and clobazam 10mg qhs. Persistent weekly nocturnal events of L sided tingling/numbness +/- headache that will awake her from sleep and spontaneously resolve in around 20min for which she has been evaluated in the ED w/ negative workup. Also had a negative sleep study last year. Recent episode of staring and decreased responsiveness as well to which she is amnestic, witnessed by her son. Labs/levels today. Plan is for EMU admission as son as possible for event capture/characterization and medication optimization pending results, however, this is currently complicated by insurance issues as she was recently dropped from medicaid. Will work to schedule admission once insurance is reinstated. SW referral for additional support/resources. Event log. Vit D 2000 IU qd. Close follow up in 3 months or sooner with issues. Advised to reach out over the portal with any concerns. Patient is comfortable with plan. All questions and concerns are addressed at this time.   Plan:     Problem List Items Addressed  This Visit          Neuro    Headache disorder       Endocrine    Vitamin D deficiency     Other Visit Diagnoses       Focal epilepsy    -  Primary    Relevant Orders    Lamotrigine level (Completed)    Clobazam (Completed)    TSH (Completed)    T4, FREE (Completed)    FOLATE (Completed)    VITAMIN B12 (Completed)    IRON AND TIBC (Completed)    EMU Monitoring    Paresthesias        Frequent headaches        Muscle spasms of neck        Numbness        Difficulty sleeping        Chronic fatigue        Psychosocial stressors        Fatigue, unspecified type        Relevant Orders    TSH (Completed)    T4, FREE (Completed)    FOLATE (Completed)    VITAMIN B12 (Completed)    IRON AND TIBC (Completed)          I completed education on seizure first aid and safety. I recommended seizure precautions with regards to avoiding unsupervised water recreational activity or bathing in tubs, climbing or working at heights, operation of heavy or dangerous machinery, caution around fire and sources of high heat, as well as any other activity which could put a patient at danger in case of a seizure. I also reviewed the [place] DMV law and recommended that the patient not drive for 6 months in the event of breakthrough seizures.    Amber Ortiz PA-C   Neurology-Epilepsy  [facility]    Collaborating physician, Dr. Johnathan Weir, was available during today's encounter.     I spent approximately 45 minutes on the day of this encounter preparing to see the patient, obtaining and reviewing history and results, performing a medically appropriate exam, counseling and educating the patient/family/caregiver, documenting clinical information, coordinating care, and ordering medications, tests, procedures, and referrals.    Patient note was created using MModal Dictation.  Any errors in syntax or even information may not have been identified and edited on initial review prior to signing this note.  Subjective:   Patient seen  in consultation at the request of Lobo Sen DO for the evaluation of epilepsy. A copy of this note will be sent to the referring physician.     Interval Events/ROS 2024:    Current ASM/SEs: lamotrigine 150mg BID and clobazam 10mg qhs (started 2-3 weeks ago); no known SE; takes lamotrigine at 8:30am/pm & clobazam an hour later around 9:30pm    Breakthrough seizures/events: not that she knows of, 2x in the past week woke up w/ numbness/tingling sensation, mainly on the left side, slight improvement in nocturnal events since starting clobazam/discontinuing topiramate  Driving: no  Folic acid: no, s/p hysterectomy  Sleep: not great  Mood: stressed, tired, concerned and anxious about her health     Chronic headaches. Otherwise, no fever, no cold symptoms, no changes in vision, no new weakness, no chest pain, no shortness of breath, no nausea, no vomiting, no diarrhea, no constipation, no problems walking.    Recent Labs   Lab 22  1430 22  2229   Lamotrigine Lvl 5.0 5.1       Interval Events/ROS 3/21/2024:    Current ASM/SEs:   - about 2 weeks ago self decreased lamotrigine back to 150mg BID as she thought it may have been worsening episodes of numbness/tingling  - topiramate 50mg BID; no known SE  - discontinued pregabalin (SE memory issues, drowsiness)     Breakthrough seizures/events:   - nightly events (4-5x per week) described as L sided numbness/tingling from head to toe that will awake her from sleep, accompanied by sensation of fearfulness, feels like her heart is racing, maintained awareness, numbness/tingling will last for around 20 minutes before it spontaneously resolves   - recent episode of staring and decreased responsiveness (3/8/24) while in the shower, occurred the night before her grandmother's , reports son thought things were abnormally quiet so came to check on her and found her staring off, helped her out of the shower onto the bed and called 911, pt states she  remembers starting to shave her legs and then the next thing she remembers is waking up w/ EMS in front of her face, subsequently evaluated in the ED, workup overall unremarkable       Driving: no  Folic acid: no, s/p hysterectomy  Sleep: significant trouble sleeping/insomnia since her grandmother very sadly passed away 2 weeks ago   Mood: stressed, tired, concerned and anxious about her health     Chronic headaches. Otherwise, no fever, no cold symptoms, no changes in vision, no new weakness, no chest pain, no shortness of breath, no nausea, no vomiting, no diarrhea, no constipation, no problems walking.      Interval Events/ROS 2/16/2024:    Current ASM/SEs:   Lamotrigine 150mg BID; no SE; did not tolerate 300mg BID  Topiramate 50mg BID; no SE  Pregabalin 50mg BID; SE memory issues, trouble following conversations w/ people, feels like she blanks out for a second, almost like a light switch (?seizure activity), drowsiness     Breakthrough seizures/events: 2 weeks ago -> seemed to wake up every other night w/ a headache, unsure if seizures have occurred    Driving: no  Sleep: overall good  Mood: some fluctuations in mood but is manageable  Activity: continues to attend work    Headaches. Chronic fatigue, pt had negative sleep study last year. Recent cough/congestion/sinus pressure. Otherwise, no fever, no changes in vision, no new weakness, no chest pain, no shortness of breath, no nausea, no vomiting, no diarrhea, no constipation, no tingling/numbness, no problems walking.      Interval Events/ROS 11/15/2023:    Current ASM/SEs: lamotrigine 150mg twice daily and topiramate 50mg twice daily; no SE  Breakthrough seizures/events: none that she knows of; discussed EEG results which captured 2 electrographic seizures that occurred unbeknownst to pt  Driving: no  Sleep: not great due to waking up w/ headaches; takes trazodone every once in a while  Mood: overall okay, denies anxiety/depression, some fluctuations in mood  but is manageable    Persistent headaches -> physical therapy scheduled for next week. Vivid dreams sometimes. Otherwise, no fever, no cold symptoms, no changes in vision, no new weakness, no chest pain, no shortness of breath, no nausea, no vomiting, no diarrhea, no constipation, no problems walking.     Interval Events/ROS 11/3/2023:    Current ASM/SEs: lamotrigine 150mg twice daily and topiramate 50mg twice daily  Breakthrough seizures/events: none since the addition of topiramate  Folic acid: s/p hysterectomy  Sleep: not great due to waking up w/ headaches; takes trazodone every once in a while  Mood: overall okay, denies anxiety/depression, some fluctuations in mood but is manageable    Persistent headaches described as numbness to L side of head, wakes her up during the night and is sometimes still there in the morning. Sleeps on her L side at night. Has taken tylenol 1-2 times for headache in the past week. Otherwise, no fever, no cold symptoms, no changes in vision, no new weakness, no chest pain, no shortness of breath, no nausea, no vomiting, no diarrhea, no constipation, no problems walking.     Interval Events/ROS 8/25/2023:    Current ASM/SEs: lamotrigine 150mg twice daily; SE drowsy  Breakthrough seizures/events: staring event 8/5/2023, no identifiable trigger, described she was in the bathroom brushing her teeth then suddenly realized she was in a different room, son told her she had toothpaste around her mouth; prior to this last event was 6/15/2023; no identifiable trigger; no convulsions  Driving: no  Folic acid: s/p hysterectomy  Sleep: not great due to waking up w/ headaches   Mood: fair    Frequently wakes up in the middle of the night with headache located to the top of her head, also often wakes up with CROOK in the morning. States she has not been taking anything at home for the pain. Headache typically resolves once she is up and moving for the day. Otherwise, no fever, no cold symptoms, no  changes in vision, no new weakness, no chest pain, no shortness of breath, no nausea, no vomiting, no diarrhea, no constipation, no tingling/numbness, no problems walking.     Interval Events/ROS 6/20/2023:    Current ASM/SEs: lamotrigine 150mg BID; no SE   Breakthrough seizures/events: last event was 6/15 while shopping in [employer], describes she was with her sister when she started staring off and wouldn't respond, 'came to' and her sister was very concerned, patient was confused, states her sister told her the event lasted >15 minutes (specifically clarified that the staring lasted >15 minutes and it took additional time for the patient to fully feel back to normal), no LOC or collapse, no abnormal movements, patient can't recall whether or not she was able to hear her sister during event; reports she has experienced staring episodes/sensation of lost time at least 3 times in the past 2 months, other episodes were not witnessed    Driving: no  Folic acid: no  Sleep: does not get much sleep, broken sleep, has tried melatonin in the past  Mood: chronic anxiety, notes some feelings of depression, stopped sertraline after 3 days (didn't like the idea of taking a mood medication, but feels more open to it now)    Intermittent headaches. Otherwise, no fever, no cold symptoms, no changes in vision, no new weakness, no chest pain, no shortness of breath, no nausea, no vomiting, no diarrhea, no constipation, no tingling/numbness, no problems walking.     Interval Events/ROS 4/12/2023:    Current ASM/SEs: lamotrigine 100mg BID; no SE  Breakthrough seizures/events:  event 4/10 -> states she remembers using the bathroom then all of a sudden she was in her bed being woken up by her dad, confused, felt off; does not recall walking back from the bathroom to her bed; son told her she seemed out of it; felt back to baseline ~30 min after event; no convulsions/abnormal movements; no identifiable trigger; current event frequency is  around 1 per year   Driving: no  Folic acid: no   Sleep: chronic insomnia; follows w/ sleep medicine    Mood: anxious    Headaches. Otherwise, no fever, no cold symptoms, no changes in vision, no new weakness, no chest pain, no shortness of breath, no nausea, no vomiting, no diarrhea, no constipation, no tingling/numbness, no problems walking.     HPI:   Ms. Pernell Ly is a 44 y.o. female who presents with a chief complaint of a longstanding seizure disorder.  Patient presents today to transfer her care from Dr. Dunham after being lost to follow-up for 2 years.  The patient reports that she had been doing well until the last few months when she has begun to experience episodes of loss of time.  The patient states that she will be alone and realized that several seconds have passed and she has lost awareness for a few minutes.  She endorses expressed compliance with her Lamictal.  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  She states that she believes is contributing to chronic daily tension headaches.  She states the headaches have become worse over the past few weeks and notes that she is taking ibuprofen twice a day every day for management of headaches.  She denies associated migrainous features or focal neurologic deficits.    Seizure Type: Supsected focal onset  Seizure Etiology:   L MTS  Current AEDs: Lamictal 100 mg BID    The patient is not accompanied by family who contribute to the history. This patient has 3 types of seizure as described below. The patient reports having seizures for years The patient reports to have stable seizure control. The seizure frequency is variable. The last seizure was last week . The patient does not report side effects from seizure medication.     Seizure Type 1:   Seizure Description: LOC, GTC (one out of sleep)  Aura:  De ja vu feeling  Associated Symptoms:  none  Seizure  "Frequency: 2 in lifetime  Last seizure: -    Seizure Type 2:   Seizure Description: Incomprehensible speech lasting 14 minutes with loss of time and confusion  Aura: None  Associated Symptoms:  none  Seizure Frequency: One in lifetime  Last seizure: 2019    Seizure Type 3:   Seizure Description: "Goes out into space" for a minute, always happens when she's alone  Aura: None  Associated Symptoms: none  Seizure Frequency: Variable, often clusters every few months "seldom"   Last seizure: 1.5 weeks ago    Handedness: right  Seizure Onset Age: infancy  Seizure/ Epilepsy Risk Factors: childhood seizure  Birth/Developmental History: normal birth history and Normal developmental history  Seizure Triggers/ Provoking Features: stress  Previous Seizure Medications: lamotrigine (Lamictal, LTG) and zonisamide (Zonegran, ZNA)  Recent Med Changes: None  Other Treatments: None  Prior Episodes of Status: None  Psychiatric/Behavioral Comorbitidies: Anxiety  Surgical Candidacy:  Pending    Prior Studies:  EEG : Not done  vEEG/ EMU evaluation: Left MTS 2018, personally reviewed  MRI of brain: Not done  AED levels:  Not done  CT/CTA Scan: Unremarkable - personally reviewed  PET Scan: Not done  Neuropsychological Evaluation: Not done  DEXA Scan: Not done  Other studies: Not done    PAST MEDICAL HISTORY:  Past Medical History:   Diagnosis Date    Encounter for blood transfusion     Fibroids     Hypertension     Iron deficiency anemia     Menorrhagia     Migraine     Seizures     as baby       PAST SURGICAL HISTORY:  Past Surgical History:   Procedure Laterality Date     SECTION      x1    HYSTERECTOMY  2017    TUBAL LIGATION         CURRENT MEDS:  Current Outpatient Medications   Medication Sig Dispense Refill    amoxicillin (AMOXIL) 875 MG tablet Take 875 mg by mouth every 12 (twelve) hours.      azelastine (ASTELIN) 137 mcg (0.1 %) nasal spray USE 1 SPRAY NASALLY TWICE DAILY 30 mL 0    cloBAZam (ONFI) 10 mg " Tab Take 1 each (10 mg total) by mouth nightly. 30 each 5    ibuprofen (ADVIL,MOTRIN) 800 MG tablet TAKE 1 TABLET BY MOUTH THREE TIMES DAILY AS NEEDED FOR PAIN 60 tablet 2    lamoTRIgine (LAMICTAL) 150 MG Tab Take 1 tablet (150 mg total) by mouth 2 (two) times daily. 180 tablet 3    multivitamin with minerals tablet Take by mouth.      NIFEdipine (PROCARDIA-XL) 90 MG (OSM) 24 hr tablet TAKE 1 TABLET(90 MG) BY MOUTH EVERY DAY 90 tablet 1    potassium chloride SA (K-DUR,KLOR-CON) 20 MEQ tablet Take 1 tablet (20 mEq total) by mouth 2 (two) times daily. 90 tablet 1    predniSONE (DELTASONE) 20 MG tablet Take 2 tablets (40 mg total) by mouth once daily. 6 tablet 0    tiZANidine (ZANAFLEX) 2 MG tablet Take 1 tablet (2 mg total) by mouth nightly as needed (muscle spasms). 30 tablet 1    albuterol (PROVENTIL HFA) 90 mcg/actuation inhaler Inhale 2 puffs into the lungs every 6 (six) hours as needed for Wheezing. Rescue 18 g 0    cetirizine (ZYRTEC) 10 MG tablet Take 1 tablet (10 mg total) by mouth once daily. 30 tablet 1    fluticasone propionate (FLOVENT HFA) 110 mcg/actuation inhaler Inhale 1 puff into the lungs 2 (two) times daily. Controller 12 g 1    promethazine-dextromethorphan (PROMETHAZINE-DM) 6.25-15 mg/5 mL Syrp TAKE 5 ML BY MOUTH EVERY 6 HOURS FOR COUGH (Patient not taking: Reported on 4/24/2024)       No current facility-administered medications for this visit.       ALLERGIES:  Review of patient's allergies indicates:  No Known Allergies    FAMILY HISTORY:  Family History   Problem Relation Name Age of Onset    Hypertension Father      Hypertension Sister      Hypertension Maternal Grandmother      Breast cancer Maternal Grandmother      Cancer Maternal Grandfather      Hypertension Paternal Grandmother      Colon cancer Maternal Cousin      Ovarian cancer Neg Hx         SOCIAL HISTORY:  Social History     Tobacco Use    Smoking status: Never     Passive exposure: Never    Smokeless tobacco: Never   Substance  "Use Topics    Alcohol use: No     Comment: Occasional; 3x /month    Drug use: No     Review of Systems:  12 system review of systems is negative except for the symptoms mentioned in HPI.     Objective:     Vitals:    04/24/24 1448   Weight: 65.8 kg (145 lb 1 oz)   Height: 5' 4" (1.626 m)     General: awake, alert, NAD, well nourished   Eyes: no tearing, discharge, no erythema   ENT: moist mucous membranes of the oral cavity, nares patent    Neck: normal ROM  Cardiovascular: well perfused  Lungs: Normal work of breathing, normal chest wall excursions  Skin: No rash, lesions, or breakdown on exposed skin  Psychiatry: Mood and affect are appropriate, anxious   Abdomen: non distended  Extremeties: no edema    Neurological   MENTAL STATUS: Alert and oriented to person, place, and time. Attention and concentration within normal limits. Speech without dysarthria, able to name and repeat without difficulty. Recent and remote memory within normal limits   CRANIAL NERVES: EOMI. Face symmetrical. Hearing grossly intact. Full shoulder shrug bilaterally. Tongue protrudes midline   SENSORY: no deficits to light touch throughout  MOTOR: 5/5 strength to upper and lower extremities bilaterally   CEREBELLAR/COORDINATION/GAIT: gait steady, able to ambulate around the room without difficulty   "

## 2024-04-24 NOTE — PATIENT INSTRUCTIONS
You came to Epilepsy Clinic because of your seizure disorder. Please continue the same medications at the same dose.     Our epilepsy nurse Анна will contact you to schedule an admission to the Epilepsy Monitoring Unit to try and capture these nightly events and figure out how to best treat them.     Do not miss any doses of medication. If a dose of medication is missed, take it as soon as it is remembered even if that means doubling up on the dose. Please get a lab test to check out the blood level of medication. I also ordered labs to investigate some other potential causes of your fatigue. Your vitamin d level was low. You can start taking a vit d3 supplement 2000mg IU daily, you can get this over the counter at [employer]/etc. Your endocrinologist may have additional/alternative recommendations.     Get regular sleep. Go to sleep at the same time and wake up at the same time every day. People with epilepsy require more sleep than people without epilepsy.  Sleeping 10-12 hours a day can be normal for a person with epilepsy.  Every seizure makes it harder to prevent the next seizure. Epilepsy is associated with SUDEP, or sudden unexpected death in epilepsy.  The risk is significantly higher if convulsive seizures are not well controlled. For more information, check out these websites: https://www.epilepsy.com/, https://www.epilepsyalliance[place].org/, www.miky-epilepsy.org, www.womenandepilepsy.org.  If you are interested in meeting other individuals in our epilepsy community, please reach out to the [facility] [place] ([phone], [phone], info@[place].org).  They organize many informative and fun activities in the region.  They can provide you invaluable information on how to get access to resources available for patients living with epilepsy as well as a rich community of like-minded individuals who are all learning to cope with the same issues.  It is very important to remember,  you are not alone.     Per [place] law, no episodes of loss of consciousness for 6 months before driving.  Avoid dangerous situations.  For example, no baths/pools alone, no heights, no power tools.  Wear a bike helmet.  If breakthrough seizures occur that are different in character, frequency, or duration from normal episodes, please patient portal me or call the office and we will decide the next steps. If multiple seizures occur in a row without return back to baseline, 911 needs to be called.     Return to clinic in 3 months or sooner with issues.  Please patient portal with any questions or concerns.    Amber Ortiz PA-C   Neurology-Epilepsy  [facility]

## 2024-04-24 NOTE — ASSESSMENT & PLAN NOTE
Since 2017   Family history   Sporadic hypokalemia     Repeat labs today including debra and renin

## 2024-04-25 ENCOUNTER — TELEPHONE (OUTPATIENT)
Dept: NEUROLOGY | Facility: CLINIC | Age: 44
End: 2024-04-25
Payer: COMMERCIAL

## 2024-04-25 NOTE — TELEPHONE ENCOUNTER
Attempted EMU scheduling, aware an adult is required to accompany her for the duration. She confirms she has someone but needs to look at their schedule first before scheduling.

## 2024-04-28 LAB — RENIN PLAS-CCNC: <0.6 NG/ML/H

## 2024-04-29 LAB — ALDOST SERPL-MCNC: 18.9 NG/DL

## 2024-05-07 DIAGNOSIS — I10 PRIMARY HYPERTENSION: Primary | ICD-10-CM

## 2024-05-07 NOTE — PROGRESS NOTES
Called and discussed labs   Suppressed renin in the setting of hypertension on ARB   Used prednisone for three day a few days before collection (4/16 - 4/20)  Plan to repeat labs, may be able to skip confirmatory testing and proceed to CT of adrenals

## 2024-05-08 ENCOUNTER — LAB VISIT (OUTPATIENT)
Dept: LAB | Facility: HOSPITAL | Age: 44
End: 2024-05-08
Attending: INTERNAL MEDICINE
Payer: COMMERCIAL

## 2024-05-08 DIAGNOSIS — I10 PRIMARY HYPERTENSION: ICD-10-CM

## 2024-05-08 LAB
ANION GAP SERPL CALC-SCNC: 7 MMOL/L (ref 8–16)
BUN SERPL-MCNC: 17 MG/DL (ref 6–20)
CALCIUM SERPL-MCNC: 9.9 MG/DL (ref 8.7–10.5)
CHLORIDE SERPL-SCNC: 106 MMOL/L (ref 95–110)
CO2 SERPL-SCNC: 26 MMOL/L (ref 23–29)
CREAT SERPL-MCNC: 0.8 MG/DL (ref 0.5–1.4)
EST. GFR  (NO RACE VARIABLE): >60 ML/MIN/1.73 M^2
GLUCOSE SERPL-MCNC: 108 MG/DL (ref 70–110)
POTASSIUM SERPL-SCNC: 4.1 MMOL/L (ref 3.5–5.1)
SODIUM SERPL-SCNC: 139 MMOL/L (ref 136–145)

## 2024-05-08 PROCEDURE — 36415 COLL VENOUS BLD VENIPUNCTURE: CPT | Mod: PO | Performed by: INTERNAL MEDICINE

## 2024-05-08 PROCEDURE — 82088 ASSAY OF ALDOSTERONE: CPT | Performed by: INTERNAL MEDICINE

## 2024-05-08 PROCEDURE — 80048 BASIC METABOLIC PNL TOTAL CA: CPT | Performed by: INTERNAL MEDICINE

## 2024-05-08 PROCEDURE — 84244 ASSAY OF RENIN: CPT | Performed by: INTERNAL MEDICINE

## 2024-05-09 ENCOUNTER — PATIENT MESSAGE (OUTPATIENT)
Dept: ENDOCRINOLOGY | Facility: CLINIC | Age: 44
End: 2024-05-09
Payer: COMMERCIAL

## 2024-05-13 LAB
ALDOST SERPL-MCNC: 14.2 NG/DL
RENIN PLAS-CCNC: <0.6 NG/ML/H

## 2024-05-14 ENCOUNTER — HOSPITAL ENCOUNTER (EMERGENCY)
Facility: OTHER | Age: 44
Discharge: HOME | End: 2024-05-14
Attending: EMERGENCY MEDICINE
Payer: COMMERCIAL

## 2024-05-14 VITALS
DIASTOLIC BLOOD PRESSURE: 72 MMHG | OXYGEN SATURATION: 98 % | WEIGHT: 145 LBS | RESPIRATION RATE: 18 BRPM | TEMPERATURE: 98 F | HEIGHT: 64 IN | BODY MASS INDEX: 24.75 KG/M2 | SYSTOLIC BLOOD PRESSURE: 126 MMHG | HEART RATE: 80 BPM

## 2024-05-14 DIAGNOSIS — R00.0 TACHYCARDIA: ICD-10-CM

## 2024-05-14 DIAGNOSIS — R10.9 FLANK PAIN: Primary | ICD-10-CM

## 2024-05-14 LAB
ALBUMIN SERPL BCP-MCNC: 4 G/DL (ref 3.5–5.2)
ALP SERPL-CCNC: 70 U/L (ref 55–135)
ALT SERPL W/O P-5'-P-CCNC: 17 U/L (ref 10–44)
ANION GAP SERPL CALC-SCNC: 11 MMOL/L (ref 8–16)
AST SERPL-CCNC: 19 U/L (ref 10–40)
BASOPHILS # BLD AUTO: 0.02 K/UL (ref 0–0.2)
BASOPHILS NFR BLD: 0.4 % (ref 0–1.9)
BILIRUB SERPL-MCNC: 0.4 MG/DL (ref 0.1–1)
BILIRUB UR QL STRIP: NEGATIVE
BUN SERPL-MCNC: 10 MG/DL (ref 6–20)
CALCIUM SERPL-MCNC: 9.7 MG/DL (ref 8.7–10.5)
CHLORIDE SERPL-SCNC: 106 MMOL/L (ref 95–110)
CLARITY UR: CLEAR
CO2 SERPL-SCNC: 22 MMOL/L (ref 23–29)
COLOR UR: COLORLESS
CREAT SERPL-MCNC: 0.7 MG/DL (ref 0.5–1.4)
D DIMER PPP IA.FEU-MCNC: 0.76 MG/L FEU
DIFFERENTIAL METHOD BLD: ABNORMAL
EOSINOPHIL # BLD AUTO: 0.2 K/UL (ref 0–0.5)
EOSINOPHIL NFR BLD: 4.2 % (ref 0–8)
ERYTHROCYTE [DISTWIDTH] IN BLOOD BY AUTOMATED COUNT: 13.3 % (ref 11.5–14.5)
EST. GFR  (NO RACE VARIABLE): >60 ML/MIN/1.73 M^2
GLUCOSE SERPL-MCNC: 105 MG/DL (ref 70–110)
GLUCOSE UR QL STRIP: NEGATIVE
HCT VFR BLD AUTO: 38 % (ref 37–48.5)
HGB BLD-MCNC: 12.4 G/DL (ref 12–16)
HGB UR QL STRIP: NEGATIVE
IMM GRANULOCYTES # BLD AUTO: 0.02 K/UL (ref 0–0.04)
IMM GRANULOCYTES NFR BLD AUTO: 0.4 % (ref 0–0.5)
KETONES UR QL STRIP: NEGATIVE
LEUKOCYTE ESTERASE UR QL STRIP: NEGATIVE
LIPASE SERPL-CCNC: 23 U/L (ref 4–60)
LYMPHOCYTES # BLD AUTO: 2.3 K/UL (ref 1–4.8)
LYMPHOCYTES NFR BLD: 41.9 % (ref 18–48)
MAGNESIUM SERPL-MCNC: 1.8 MG/DL (ref 1.6–2.6)
MCH RBC QN AUTO: 29.7 PG (ref 27–31)
MCHC RBC AUTO-ENTMCNC: 32.6 G/DL (ref 32–36)
MCV RBC AUTO: 91 FL (ref 82–98)
MONOCYTES # BLD AUTO: 0.5 K/UL (ref 0.3–1)
MONOCYTES NFR BLD: 9.5 % (ref 4–15)
NEUTROPHILS # BLD AUTO: 2.4 K/UL (ref 1.8–7.7)
NEUTROPHILS NFR BLD: 43.6 % (ref 38–73)
NITRITE UR QL STRIP: NEGATIVE
NRBC BLD-RTO: 0 /100 WBC
OHS QRS DURATION: 80 MS
OHS QTC CALCULATION: 457 MS
PH UR STRIP: 7 [PH] (ref 5–8)
PLATELET # BLD AUTO: 324 K/UL (ref 150–450)
PMV BLD AUTO: 8.9 FL (ref 9.2–12.9)
POTASSIUM SERPL-SCNC: 3.6 MMOL/L (ref 3.5–5.1)
PROT SERPL-MCNC: 8.5 G/DL (ref 6–8.4)
PROT UR QL STRIP: NEGATIVE
RBC # BLD AUTO: 4.17 M/UL (ref 4–5.4)
SODIUM SERPL-SCNC: 139 MMOL/L (ref 136–145)
SP GR UR STRIP: <1.005 (ref 1–1.03)
URN SPEC COLLECT METH UR: ABNORMAL
UROBILINOGEN UR STRIP-ACNC: NEGATIVE EU/DL
WBC # BLD AUTO: 5.47 K/UL (ref 3.9–12.7)

## 2024-05-14 PROCEDURE — 96374 THER/PROPH/DIAG INJ IV PUSH: CPT | Mod: 59

## 2024-05-14 PROCEDURE — 83735 ASSAY OF MAGNESIUM: CPT | Performed by: EMERGENCY MEDICINE

## 2024-05-14 PROCEDURE — 85379 FIBRIN DEGRADATION QUANT: CPT | Performed by: EMERGENCY MEDICINE

## 2024-05-14 PROCEDURE — 63600175 PHARM REV CODE 636 W HCPCS: Performed by: EMERGENCY MEDICINE

## 2024-05-14 PROCEDURE — 80053 COMPREHEN METABOLIC PANEL: CPT | Performed by: EMERGENCY MEDICINE

## 2024-05-14 PROCEDURE — 96361 HYDRATE IV INFUSION ADD-ON: CPT

## 2024-05-14 PROCEDURE — 81003 URINALYSIS AUTO W/O SCOPE: CPT | Performed by: EMERGENCY MEDICINE

## 2024-05-14 PROCEDURE — 85025 COMPLETE CBC W/AUTO DIFF WBC: CPT | Performed by: EMERGENCY MEDICINE

## 2024-05-14 PROCEDURE — 83690 ASSAY OF LIPASE: CPT | Performed by: EMERGENCY MEDICINE

## 2024-05-14 PROCEDURE — 99285 EMERGENCY DEPT VISIT HI MDM: CPT | Mod: 25

## 2024-05-14 PROCEDURE — 25500020 PHARM REV CODE 255: Performed by: EMERGENCY MEDICINE

## 2024-05-14 PROCEDURE — 25000003 PHARM REV CODE 250: Performed by: EMERGENCY MEDICINE

## 2024-05-14 RX ORDER — KETOROLAC TROMETHAMINE 30 MG/ML
10 INJECTION, SOLUTION INTRAMUSCULAR; INTRAVENOUS
Status: COMPLETED | OUTPATIENT
Start: 2024-05-14 | End: 2024-05-14

## 2024-05-14 RX ORDER — SODIUM CHLORIDE 9 MG/ML
1000 INJECTION, SOLUTION INTRAVENOUS
Status: COMPLETED | OUTPATIENT
Start: 2024-05-14 | End: 2024-05-14

## 2024-05-14 RX ORDER — IBUPROFEN 600 MG/1
600 TABLET ORAL EVERY 6 HOURS PRN
Qty: 20 TABLET | Refills: 0 | Status: SHIPPED | OUTPATIENT
Start: 2024-05-14

## 2024-05-14 RX ADMIN — SODIUM CHLORIDE 1000 ML: 9 INJECTION, SOLUTION INTRAVENOUS at 06:05

## 2024-05-14 RX ADMIN — KETOROLAC TROMETHAMINE 10 MG: 30 INJECTION, SOLUTION INTRAMUSCULAR; INTRAVENOUS at 06:05

## 2024-05-14 RX ADMIN — IOHEXOL 100 ML: 350 INJECTION, SOLUTION INTRAVENOUS at 07:05

## 2024-05-14 NOTE — ED PROVIDER NOTES
Encounter Date: 2024       History     Chief Complaint   Patient presents with    Numbness     L sided numbness after falling asleep with pain on L side. Reports CP and palpitations woke her from her sleep. Seen multiple times for same in past w/ no clear diagnosis.     44-year-old female with history of hypertension, anemia, migraines and seizures presents with multiple complaints.  She has chronic left upper abdominal pain as well as chronic intermittent left-sided numbness.  Both of these symptoms are consistent with her previous episodes, however, she woke up this morning with the feeling of her heart racing and this is what prompted her to present to the emergency department.  The abdominal pain has been present for months and occurs almost daily.  She states it usually lasts a couple of hours and often times resolves with the use of a heating pad.  She was experiencing this pain last night when she went to bed and it was still present.  She also woke up this morning with the left-sided arm numbness for which she has been evaluated in the emergency department multiple times.  She also states that she has been evaluated by her neurologist for this and she would like for her to undergo a sleep study for further evaluation.  Currently she denies any chest pain, shortness of breath, headache, nausea, vomiting, urinary symptoms, fever, chills, myalgias.  She is experiencing constipation but this is a chronic issue was well.      Review of patient's allergies indicates:  No Known Allergies  Past Medical History:   Diagnosis Date    Encounter for blood transfusion     Fibroids     Hypertension     Iron deficiency anemia     Menorrhagia     Migraine     Seizures     as baby     Past Surgical History:   Procedure Laterality Date     SECTION      x1    HYSTERECTOMY  2017    TUBAL LIGATION       Family History   Problem Relation Name Age of Onset    Hypertension Father      Hypertension Sister       Hypertension Maternal Grandmother      Breast cancer Maternal Grandmother      Cancer Maternal Grandfather      Hypertension Paternal Grandmother      Colon cancer Maternal Cousin      Ovarian cancer Neg Hx       Social History     Tobacco Use    Smoking status: Never     Passive exposure: Never    Smokeless tobacco: Never   Substance Use Topics    Alcohol use: No     Comment: Occasional; 3x /month    Drug use: No     Review of Systems    Physical Exam     Initial Vitals [05/14/24 0456]   BP Pulse Resp Temp SpO2   134/78 106 17 97.8 °F (36.6 °C) 100 %      MAP       --         Physical Exam    Nursing note and vitals reviewed.  Constitutional: She appears well-developed and well-nourished. She is not diaphoretic. No distress.   Appears anxious   HENT:   Head: Normocephalic and atraumatic.   Right Ear: External ear normal.   Left Ear: External ear normal.   Nose: Nose normal.   Eyes: Conjunctivae and EOM are normal. Pupils are equal, round, and reactive to light. Right eye exhibits no discharge. Left eye exhibits no discharge. No scleral icterus.   Neck: Neck supple. No JVD present.   Normal range of motion.  Cardiovascular:  Normal rate, regular rhythm, normal heart sounds and intact distal pulses.     Exam reveals no gallop and no friction rub.       No murmur heard.  Pulmonary/Chest: Breath sounds normal. No respiratory distress. She has no wheezes. She has no rhonchi. She has no rales. She exhibits no tenderness.   Abdominal: Abdomen is soft. She exhibits no distension. There is no abdominal tenderness. There is no rebound and no guarding.   Musculoskeletal:         General: No tenderness or edema. Normal range of motion.      Cervical back: Normal range of motion and neck supple.      Comments: No calf tenderness.  No leg edema.  No CVA tenderness to palpation.     Neurological: She is alert and oriented to person, place, and time. She has normal strength. No cranial nerve deficit or sensory deficit.    Finger-to-nose and heel-to-shin normal.  No pronator drift.  Speech normal.   Skin: Skin is warm.         ED Course   Procedures  Labs Reviewed   COMPREHENSIVE METABOLIC PANEL - Abnormal; Notable for the following components:       Result Value    CO2 22 (*)     Total Protein 8.5 (*)     All other components within normal limits   CBC W/ AUTO DIFFERENTIAL - Abnormal; Notable for the following components:    MPV 8.9 (*)     All other components within normal limits   URINALYSIS, REFLEX TO URINE CULTURE - Abnormal; Notable for the following components:    Color, UA Colorless (*)     Specific Gravity, UA <1.005 (*)     All other components within normal limits    Narrative:     Specimen Source->Urine   D DIMER, QUANTITATIVE - Abnormal; Notable for the following components:    D-Dimer 0.76 (*)     All other components within normal limits   LIPASE   MAGNESIUM     EKG Readings: (Independently Interpreted)   Sinus tachycardia, heart rate 104, first-degree AV block, no significant ST changes compared to 4/16/24       Imaging Results              CT Abdomen Pelvis With IV Contrast NO Oral Contrast (Final result)  Result time 05/14/24 07:28:51      Final result by Job Louie MD (05/14/24 07:28:51)                   Impression:      1. No convincing evidence of acute pulmonary embolism to the proximal segmental pulmonary arterial level.  2. No definite acute findings identified in the chest, abdomen, or pelvis.  No definite etiology of the patient's symptoms identified.  3. Additional details of chronic and incidental findings, as provided in the body of the report.  Note the previously identified hepatic lesions on the 08/11/2020 CT are not well characterized by current technique.      Electronically signed by: Job Louie  Date:    05/14/2024  Time:    07:28               Narrative:    EXAMINATION:  CTA CHEST NON CORONARY (PE STUDIES); CT ABDOMEN PELVIS WITH IV CONTRAST    CLINICAL HISTORY:  Pulmonary embolism  (PE) suspected, positive D-dimer;; Abdominal pain, acute, nonlocalized;    TECHNIQUE:  Low dose axial images, sagittal and coronal reformations were obtained from the thoracic inlet to the lung bases following the IV administration of 100 mL of Omnipaque 350.  Contrast timing was optimized to evaluate the pulmonary arteries.  MIP images were performed.    By separate acquisition, contrast-enhanced CT of the abdomen and pelvis was performed with multiplanar reformats.    COMPARISON:  CT of the abdomen and pelvis performed 08/11/2020.    FINDINGS:  CTA chest:    Exam quality: Study considered diagnostic to the proximal segmental pulmonary arterial level.    Pulmonary embolism: No acute or chronic pulmonary embolism.    Central pulmonary arteries: Normal caliber.    Aorta: Normal caliber.    Heart: No right heart strain. Normal size.    Coronary arteries: No calcifications.    Pericardium: Normal. No effusion, thickening, or calcification.    Support tubes and lines: None.    Base of neck/thyroid: Normal.    Lymph nodes: No supraclavicular, axillary, internal mammary, mediastinal, or hilar adenopathy.    Esophagus: Normal.    Pleura: No effusion, thickening, or calcification.    Upper abdomen: See below.    Body wall: Unremarkable    Airways: Central airways grossly patent.    Lungs: Assessment compromised by motion.  Small volume right middle lobe atelectasis.  Linear atelectasis and/or scar noted in the left lower lobe.    Bones: Well-circumscribed sclerotic lesion within the T6 vertebral body, possibly intra osseous hemangioma.    CT of the abdomen and pelvis:    Lower chest: As above.    Liver: Unremarkable.  Note that previously identified hepatic lesions seen on the 08/11/2020 CT are not well characterized by current technique.    Gallbladder and bile ducts: Unremarkable. No biliary ductal dilatation.    Pancreas: Unremarkable.    Spleen: Unremarkable.    Adrenals: Unremarkable.    Kidneys: Subcentimeter  hypodense lesion left kidney, too small to definitely characterize.  Kidneys otherwise grossly unremarkable.    Lymph nodes: No abdominal or pelvic lymphadenopathy.    Bowel and mesentery: Moderate to large volume colonic stool.  No definite evidence of bowel obstruction.  Unremarkable appearance of the appendix.    Abdominal aorta: Unremarkable.    Inferior vena cava: Unremarkable.    Free fluid or free air: None.    Pelvis: Lobulated hypodense right adnexal lesion measuring up to 41 mm, possibly simple ovarian cyst.    Body wall: Unremarkable.    Bones: Unremarkable.                                       CTA Chest Non-Coronary (PE Studies) (Final result)  Result time 05/14/24 07:28:51      Final result by Job Louie MD (05/14/24 07:28:51)                   Impression:      1. No convincing evidence of acute pulmonary embolism to the proximal segmental pulmonary arterial level.  2. No definite acute findings identified in the chest, abdomen, or pelvis.  No definite etiology of the patient's symptoms identified.  3. Additional details of chronic and incidental findings, as provided in the body of the report.  Note the previously identified hepatic lesions on the 08/11/2020 CT are not well characterized by current technique.      Electronically signed by: Job Louie  Date:    05/14/2024  Time:    07:28               Narrative:    EXAMINATION:  CTA CHEST NON CORONARY (PE STUDIES); CT ABDOMEN PELVIS WITH IV CONTRAST    CLINICAL HISTORY:  Pulmonary embolism (PE) suspected, positive D-dimer;; Abdominal pain, acute, nonlocalized;    TECHNIQUE:  Low dose axial images, sagittal and coronal reformations were obtained from the thoracic inlet to the lung bases following the IV administration of 100 mL of Omnipaque 350.  Contrast timing was optimized to evaluate the pulmonary arteries.  MIP images were performed.    By separate acquisition, contrast-enhanced CT of the abdomen and pelvis was performed with  multiplanar reformats.    COMPARISON:  CT of the abdomen and pelvis performed 08/11/2020.    FINDINGS:  CTA chest:    Exam quality: Study considered diagnostic to the proximal segmental pulmonary arterial level.    Pulmonary embolism: No acute or chronic pulmonary embolism.    Central pulmonary arteries: Normal caliber.    Aorta: Normal caliber.    Heart: No right heart strain. Normal size.    Coronary arteries: No calcifications.    Pericardium: Normal. No effusion, thickening, or calcification.    Support tubes and lines: None.    Base of neck/thyroid: Normal.    Lymph nodes: No supraclavicular, axillary, internal mammary, mediastinal, or hilar adenopathy.    Esophagus: Normal.    Pleura: No effusion, thickening, or calcification.    Upper abdomen: See below.    Body wall: Unremarkable    Airways: Central airways grossly patent.    Lungs: Assessment compromised by motion.  Small volume right middle lobe atelectasis.  Linear atelectasis and/or scar noted in the left lower lobe.    Bones: Well-circumscribed sclerotic lesion within the T6 vertebral body, possibly intra osseous hemangioma.    CT of the abdomen and pelvis:    Lower chest: As above.    Liver: Unremarkable.  Note that previously identified hepatic lesions seen on the 08/11/2020 CT are not well characterized by current technique.    Gallbladder and bile ducts: Unremarkable. No biliary ductal dilatation.    Pancreas: Unremarkable.    Spleen: Unremarkable.    Adrenals: Unremarkable.    Kidneys: Subcentimeter hypodense lesion left kidney, too small to definitely characterize.  Kidneys otherwise grossly unremarkable.    Lymph nodes: No abdominal or pelvic lymphadenopathy.    Bowel and mesentery: Moderate to large volume colonic stool.  No definite evidence of bowel obstruction.  Unremarkable appearance of the appendix.    Abdominal aorta: Unremarkable.    Inferior vena cava: Unremarkable.    Free fluid or free air: None.    Pelvis: Lobulated hypodense right  "adnexal lesion measuring up to 41 mm, possibly simple ovarian cyst.    Body wall: Unremarkable.    Bones: Unremarkable.                                       Medications   0.9%  NaCl infusion (0 mLs Intravenous Stopped 5/14/24 0843)   ketorolac injection 9.999 mg (9.999 mg Intravenous Given 5/14/24 0603)   iohexoL (OMNIPAQUE 350) injection 100 mL (100 mLs Intravenous Given 5/14/24 0700)     Medical Decision Making  Palpitations - patient tachycardic in triage and persistently tachycardic throughout my exam.  EKG sinus tachycardia.  Differential diagnosis includes anxiety, dehydration, electrolyte abnormalities, symptomatic anemia, pain, low suspicion for PE given presentation.  Will obtain lab work and give IV fluids and Toradol.    Left upper extremity paresthesia - chronic and unchanged compared to previous episodes.  Neuro exam nonfocal.  Currently being evaluated by Neurology.  Do not feel any emergent imaging is warranted at this time.    Left upper abdominal pain - also a chronic issue and unchanged compared to previous episodes.  She denies any previous evaluation for this.  No abdominal/flank/back tenderness to palpation.  Will obtain lab work and urinalysis and reassess.    5:51 AM  The patient's care will be transferred to the 6:00 a.m. physician pending results of workup and reassessment.    Amount and/or Complexity of Data Reviewed  External Data Reviewed: notes.     Details: Epilepsy clinic note April 2024:  "Persistent nightly events of L sided tingling/numbness +/- headache that will awake her from sleep and spontaneously resolve in around 20min for which she has been evaluated in the ED w/ negative workup. Also had a negative sleep study last year. Recent episode of staring and decreased responsiveness as well to which she is amnestic, witnessed by her son. Plan is for EMU admission for event capture/characterization and medication optimization pending results, however, this is currently complicated " "by insurance issues as she was recently dropped from medicaid. Will work to schedule admission once insurance is reinstated. SW referral for additional support/resources. For now, we will try augmenting current regimen w/ clobazam 10mg qhs for seizure, sleep, and possible anxiety benefit."  Labs: ordered.    Risk  Prescription drug management.               ED Course as of 05/14/24 2157   Tue May 14, 2024   0633 WBC: 5.47 [SM]   0633 Hemoglobin: 12.4 [SM]   0633 Platelet Count: 324 [SM]   0633 NITRITE UA: Negative [SM]   0633 Leukocyte Esterase, UA: Negative [SM]   0642 On re-evaluation patient is resting comfortably.  Heart rate is 94.  Still pending labs. [SM]   0713 D-Dimer(!): 0.76  With the tachycardia as well as the left-sided upper abdominal lower chest wall pain will get a CT angiogram PE study to evaluate for pulmonary embolus.  She has also had this nonspecific abdominal pain dating back for the last couple of years but not any recent testing will also get an abdomen pelvis. [SM]   0714 Lipase: 23 [SM]   0714 Magnesium : 1.8 [SM]   0714 Sodium: 139 [SM]   0714 Potassium: 3.6 [SM]   0714 Chloride: 106 [SM]   0714 Creatinine: 0.7 [SM]   0825 CTA Chest Non-Coronary (PE Studies)  CTA PE study as well as CT abdomen pelvis shows no convincing evidence of acute pulmonary embolus or any other acute findings.  There is noted hepatic lesions but they were also seen on CT scan on 08/11/2020. [SM]   0828 I re-evaluated the patient who is resting comfortably.  Updated her on the results of the studies.  Recommended follow back up with primary care for further evaluation management.    No further workup is indicated in the emergency department today.  I updated pt regarding results and I counseled pt regarding supportive care measures.  Diagnosis and treatment plan explained to patient. I have answered all questions and the patient is satisfied with the plan of care. Patient discharged home in stable condition.  [SM]    "   ED Course User Index  [SM] Lobo Sen, DO                           Clinical Impression:  Final diagnoses:  [R00.0] Tachycardia  [R10.9] Flank pain (Primary)          ED Disposition Condition    Discharge Stable          ED Prescriptions       Medication Sig Dispense Start Date End Date Auth. Provider    ibuprofen (ADVIL,MOTRIN) 600 MG tablet Take 1 tablet (600 mg total) by mouth every 6 (six) hours as needed for Pain. 20 tablet 5/14/2024 -- Lobo Sen DO          Follow-up Information       Follow up With Specialties Details Why Contact Info    Zoey Nelson MD Family Medicine In 1 week  [address] [address]  [phone]               Marge Knox MD  05/14/24 5521

## 2024-05-14 NOTE — DISCHARGE INSTRUCTIONS
We do not know the reason for your symptoms today, however all of your studies showed nothing serious.  Follow up with primary care for further evaluation management.

## 2024-05-14 NOTE — ED TRIAGE NOTES
Pernell Ly, a 44 y.o. female presents to the ED w/ complaint of left sided numbness and pain.     Triage note:  Chief Complaint   Patient presents with    Numbness     L sided numbness after falling asleep with pain on L side. Reports CP and palpitations woke her from her sleep. Seen multiple times for same in past w/ no clear diagnosis.     Review of patient's allergies indicates:  No Known Allergies  Past Medical History:   Diagnosis Date    Encounter for blood transfusion     Fibroids     Hypertension     Iron deficiency anemia     Menorrhagia     Migraine     Seizures     as baby

## 2024-05-15 ENCOUNTER — OFFICE VISIT (OUTPATIENT)
Dept: PRIMARY CARE CLINIC | Facility: CLINIC | Age: 44
End: 2024-05-15
Attending: FAMILY MEDICINE
Payer: COMMERCIAL

## 2024-05-15 ENCOUNTER — PATIENT MESSAGE (OUTPATIENT)
Dept: PRIMARY CARE CLINIC | Facility: CLINIC | Age: 44
End: 2024-05-15
Payer: COMMERCIAL

## 2024-05-15 DIAGNOSIS — R09.81 SINUS CONGESTION: Primary | ICD-10-CM

## 2024-05-15 DIAGNOSIS — R53.83 OTHER FATIGUE: ICD-10-CM

## 2024-05-15 DIAGNOSIS — J45.31 MILD PERSISTENT REACTIVE AIRWAY DISEASE WITH ACUTE EXACERBATION: ICD-10-CM

## 2024-05-15 PROCEDURE — 99214 OFFICE O/P EST MOD 30 MIN: CPT | Mod: 95,,, | Performed by: FAMILY MEDICINE

## 2024-05-15 RX ORDER — CICLESONIDE 50 UG/1
2 SPRAY NASAL DAILY
Qty: 12.5 G | Refills: 0 | Status: SHIPPED | OUTPATIENT
Start: 2024-05-15

## 2024-05-15 RX ORDER — AZITHROMYCIN 250 MG/1
TABLET, FILM COATED ORAL
Qty: 6 TABLET | Refills: 0 | Status: SHIPPED | OUTPATIENT
Start: 2024-05-15 | End: 2024-05-20

## 2024-05-22 ENCOUNTER — TELEPHONE (OUTPATIENT)
Dept: ENDOCRINOLOGY | Facility: CLINIC | Age: 44
End: 2024-05-22
Payer: COMMERCIAL

## 2024-05-22 NOTE — TELEPHONE ENCOUNTER
Called and reviewed labs and plan  Prefers to do the saline infusion test. \    Does not want to do oral salt loading  Wants to think about it.   sd

## 2024-05-23 ENCOUNTER — OFFICE VISIT (OUTPATIENT)
Dept: CARDIOLOGY | Facility: CLINIC | Age: 44
End: 2024-05-23
Payer: COMMERCIAL

## 2024-05-23 VITALS
WEIGHT: 143.13 LBS | SYSTOLIC BLOOD PRESSURE: 128 MMHG | OXYGEN SATURATION: 99 % | RESPIRATION RATE: 18 BRPM | BODY MASS INDEX: 24.43 KG/M2 | HEIGHT: 64 IN | HEART RATE: 82 BPM | DIASTOLIC BLOOD PRESSURE: 78 MMHG

## 2024-05-23 DIAGNOSIS — R94.31 ABNORMAL ECG: Primary | ICD-10-CM

## 2024-05-23 PROCEDURE — 3074F SYST BP LT 130 MM HG: CPT | Mod: CPTII,S$GLB,, | Performed by: INTERNAL MEDICINE

## 2024-05-23 PROCEDURE — 1160F RVW MEDS BY RX/DR IN RCRD: CPT | Mod: CPTII,S$GLB,, | Performed by: INTERNAL MEDICINE

## 2024-05-23 PROCEDURE — 99214 OFFICE O/P EST MOD 30 MIN: CPT | Mod: S$GLB,,, | Performed by: INTERNAL MEDICINE

## 2024-05-23 PROCEDURE — 3078F DIAST BP <80 MM HG: CPT | Mod: CPTII,S$GLB,, | Performed by: INTERNAL MEDICINE

## 2024-05-23 PROCEDURE — 1159F MED LIST DOCD IN RCRD: CPT | Mod: CPTII,S$GLB,, | Performed by: INTERNAL MEDICINE

## 2024-05-23 PROCEDURE — 99999 PR PBB SHADOW E&M-EST. PATIENT-LVL IV: CPT | Mod: PBBFAC,,, | Performed by: INTERNAL MEDICINE

## 2024-05-23 PROCEDURE — 3008F BODY MASS INDEX DOCD: CPT | Mod: CPTII,S$GLB,, | Performed by: INTERNAL MEDICINE

## 2024-05-23 NOTE — PROGRESS NOTES
[facility]    Chief Complaint  Chief Complaint   Patient presents with    Abnormal ECG       HPI:    The patient went to the emergency department at which time electrocardiogram was performed.  She was told to make this follow-up appointment to review the electrocardiogram.  She had presented with left-sided numbness.  This is a frequent, chronic problem that she has.  Has had several ER visits.  As part of her evaluation, electrocardiogram was performed.  She also underwent a CTA which showed no pulmonary embolism.  Left-sided numbness persists.    Medications  Current Outpatient Medications   Medication Sig Dispense Refill    azelastine (ASTELIN) 137 mcg (0.1 %) nasal spray USE 1 SPRAY NASALLY TWICE DAILY 30 mL 0    ibuprofen (ADVIL,MOTRIN) 600 MG tablet Take 1 tablet (600 mg total) by mouth every 6 (six) hours as needed for Pain. 20 tablet 0    lamoTRIgine (LAMICTAL) 150 MG Tab Take 1 tablet (150 mg total) by mouth 2 (two) times daily. 180 tablet 3    NIFEdipine (PROCARDIA-XL) 90 MG (OSM) 24 hr tablet TAKE 1 TABLET(90 MG) BY MOUTH EVERY DAY 90 tablet 1    potassium chloride SA (K-DUR,KLOR-CON) 20 MEQ tablet Take 1 tablet (20 mEq total) by mouth 2 (two) times daily. 90 tablet 1    albuterol (PROVENTIL HFA) 90 mcg/actuation inhaler Inhale 2 puffs into the lungs every 6 (six) hours as needed for Wheezing. Rescue 18 g 0    amoxicillin (AMOXIL) 875 MG tablet Take 875 mg by mouth every 12 (twelve) hours. (Patient not taking: Reported on 5/23/2024)      cetirizine (ZYRTEC) 10 MG tablet Take 1 tablet (10 mg total) by mouth once daily. 30 tablet 1    ciclesonide (OMNARIS) 50 mcg Spry 2 sprays by Each Nostril route once daily. (Patient not taking: Reported on 5/23/2024) 12.5 g 0    cloBAZam (ONFI) 10 mg Tab Take 1 each (10 mg total) by mouth nightly. (Patient not taking: Reported on 5/23/2024) 30 each 5    fluticasone propionate (FLOVENT HFA) 110 mcg/actuation inhaler Inhale 1 puff into the lungs 2 (two)  times daily. Controller 12 g 1    ibuprofen (ADVIL,MOTRIN) 800 MG tablet TAKE 1 TABLET BY MOUTH THREE TIMES DAILY AS NEEDED FOR PAIN (Patient not taking: Reported on 2024) 60 tablet 2    multivitamin with minerals tablet Take by mouth.      predniSONE (DELTASONE) 20 MG tablet Take 2 tablets (40 mg total) by mouth once daily. (Patient not taking: Reported on 2024) 6 tablet 0    promethazine-dextromethorphan (PROMETHAZINE-DM) 6.25-15 mg/5 mL Syrp TAKE 5 ML BY MOUTH EVERY 6 HOURS FOR COUGH (Patient not taking: Reported on 2024)      tiZANidine (ZANAFLEX) 2 MG tablet Take 1 tablet (2 mg total) by mouth nightly as needed (muscle spasms). (Patient not taking: Reported on 2024) 30 tablet 1     No current facility-administered medications for this visit.        History  Past Medical History:   Diagnosis Date    Encounter for blood transfusion     Fibroids     Hypertension     Iron deficiency anemia     Menorrhagia     Migraine     Seizures     as baby     Past Surgical History:   Procedure Laterality Date     SECTION      x1    HYSTERECTOMY  2017    TUBAL LIGATION       Social History     Socioeconomic History    Marital status: Single   Tobacco Use    Smoking status: Never     Passive exposure: Never    Smokeless tobacco: Never   Substance and Sexual Activity    Alcohol use: No     Comment: Occasional; 3x /month    Drug use: No    Sexual activity: Yes     Partners: Male     Birth control/protection: See Surgical Hx     Social Determinants of Health     Financial Resource Strain: Medium Risk (4/15/2024)    Overall Financial Resource Strain (CARDIA)     Difficulty of Paying Living Expenses: Somewhat hard   Food Insecurity: Food Insecurity Present (4/15/2024)    Hunger Vital Sign     Worried About Running Out of Food in the Last Year: Sometimes true     Ran Out of Food in the Last Year: Never true   Transportation Needs: No Transportation Needs (4/15/2024)    PRAPARE - Transportation     Lack  of Transportation (Medical): No     Lack of Transportation (Non-Medical): No   Physical Activity: Inactive (4/15/2024)    Exercise Vital Sign     Days of Exercise per Week: 5 days     Minutes of Exercise per Session: 0 min   Stress: Stress Concern Present (4/15/2024)    Citizen of [place] [employer] - Occupational Stress Questionnaire     Feeling of Stress : Rather much   Housing Stability: Unknown (11/30/2023)    Housing Stability Vital Sign     Unable to Pay for Housing in the Last Year: No     Unstable Housing in the Last Year: No     Family History   Problem Relation Name Age of Onset    Hypertension Father      Hypertension Sister      Hypertension Maternal Grandmother      Breast cancer Maternal Grandmother      Cancer Maternal Grandfather      Hypertension Paternal Grandmother      Colon cancer Maternal Cousin      Ovarian cancer Neg Hx          Allergies  Review of patient's allergies indicates:  No Known Allergies    Review of Systems   Review of Systems   Constitutional: Negative for malaise/fatigue, weight gain and weight loss.   Eyes:  Negative for visual disturbance.   Cardiovascular:  Negative for chest pain, claudication, cyanosis, dyspnea on exertion, irregular heartbeat, leg swelling, near-syncope, orthopnea, palpitations, paroxysmal nocturnal dyspnea and syncope.   Respiratory:  Negative for cough, hemoptysis, shortness of breath, sleep disturbances due to breathing and wheezing.    Hematologic/Lymphatic: Negative for bleeding problem. Does not bruise/bleed easily.   Skin:  Negative for poor wound healing.   Musculoskeletal:  Negative for muscle cramps and myalgias.   Gastrointestinal:  Negative for abdominal pain, anorexia, diarrhea, heartburn, hematemesis, hematochezia, melena, nausea and vomiting.   Genitourinary:  Negative for hematuria and nocturia.   Neurological:  Negative for excessive daytime sleepiness, dizziness, focal weakness, light-headedness and weakness.       Physical  Exam  Vitals:    05/23/24 1040   BP: 128/78   Pulse: 82   Resp: 18     Wt Readings from Last 1 Encounters:   05/23/24 64.9 kg (143 lb 1.6 oz)     Physical Exam  Vitals and nursing note reviewed.   Constitutional:       General: She is not in acute distress.     Appearance: She is not toxic-appearing or diaphoretic.   HENT:      Head: Normocephalic and atraumatic.      Mouth/Throat:      Lips: Pink.      Mouth: Mucous membranes are moist.   Eyes:      General: No scleral icterus.     Conjunctiva/sclera: Conjunctivae normal.   Neck:      Thyroid: No thyromegaly.      Vascular: No carotid bruit, hepatojugular reflux or JVD.      Trachea: Trachea normal.   Cardiovascular:      Rate and Rhythm: Normal rate and regular rhythm. No extrasystoles are present.     Chest Wall: PMI is not displaced.      Pulses:           Carotid pulses are 2+ on the right side and 2+ on the left side.       Radial pulses are 2+ on the right side and 2+ on the left side.        Dorsalis pedis pulses are 2+ on the right side and 2+ on the left side.        Posterior tibial pulses are 2+ on the right side and 2+ on the left side.      Heart sounds: S1 normal and S2 normal. No murmur heard.     No friction rub. No S3 or S4 sounds.   Pulmonary:      Effort: Pulmonary effort is normal. No accessory muscle usage or respiratory distress.      Breath sounds: Normal breath sounds and air entry. No decreased breath sounds, wheezing, rhonchi or rales.   Abdominal:      General: Bowel sounds are normal. There is no distension or abdominal bruit.      Palpations: Abdomen is soft. There is no hepatomegaly, splenomegaly or pulsatile mass.      Tenderness: There is no abdominal tenderness.   Musculoskeletal:         General: No tenderness or deformity.      Right lower leg: No edema.      Left lower leg: No edema.   Skin:     General: Skin is warm and dry.      Capillary Refill: Capillary refill takes less than 2 seconds.      Coloration: Skin is not cyanotic  or pale.      Nails: There is no clubbing.   Neurological:      General: No focal deficit present.      Mental Status: She is alert and oriented to person, place, and time.   Psychiatric:         Attention and Perception: Attention normal.         Mood and Affect: Mood normal.         Speech: Speech normal.         Behavior: Behavior normal. Behavior is cooperative.         Labs  Admission on 05/14/2024, Discharged on 05/14/2024   Component Date Value Ref Range Status    Sodium 05/14/2024 139  136 - 145 mmol/L Final    Potassium 05/14/2024 3.6  3.5 - 5.1 mmol/L Final    Chloride 05/14/2024 106  95 - 110 mmol/L Final    CO2 05/14/2024 22 (L)  23 - 29 mmol/L Final    Glucose 05/14/2024 105  70 - 110 mg/dL Final    BUN 05/14/2024 10  6 - 20 mg/dL Final    Creatinine 05/14/2024 0.7  0.5 - 1.4 mg/dL Final    Calcium 05/14/2024 9.7  8.7 - 10.5 mg/dL Final    Total Protein 05/14/2024 8.5 (H)  6.0 - 8.4 g/dL Final    Albumin 05/14/2024 4.0  3.5 - 5.2 g/dL Final    Total Bilirubin 05/14/2024 0.4  0.1 - 1.0 mg/dL Final    Comment: For infants and newborns, interpretation of results should be based  on gestational age, weight and in agreement with clinical  observations.    Premature Infant recommended reference ranges:  Up to 24 hours.............<8.0 mg/dL  Up to 48 hours............<12.0 mg/dL  3-5 days..................<15.0 mg/dL  6-29 days.................<15.0 mg/dL      Alkaline Phosphatase 05/14/2024 70  55 - 135 U/L Final    AST 05/14/2024 19  10 - 40 U/L Final    ALT 05/14/2024 17  10 - 44 U/L Final    eGFR 05/14/2024 >60  >60 mL/min/1.73 m^2 Final    Anion Gap 05/14/2024 11  8 - 16 mmol/L Final    Lipase 05/14/2024 23  4 - 60 U/L Final    WBC 05/14/2024 5.47  3.90 - 12.70 K/uL Final    RBC 05/14/2024 4.17  4.00 - 5.40 M/uL Final    Hemoglobin 05/14/2024 12.4  12.0 - 16.0 g/dL Final    Hematocrit 05/14/2024 38.0  37.0 - 48.5 % Final    MCV 05/14/2024 91  82 - 98 fL Final    MCH 05/14/2024 29.7  27.0 - 31.0 pg Final     MCHC 05/14/2024 32.6  32.0 - 36.0 g/dL Final    RDW 05/14/2024 13.3  11.5 - 14.5 % Final    Platelets 05/14/2024 324  150 - 450 K/uL Final    MPV 05/14/2024 8.9 (L)  9.2 - 12.9 fL Final    Immature Granulocytes 05/14/2024 0.4  0.0 - 0.5 % Final    Gran # (ANC) 05/14/2024 2.4  1.8 - 7.7 K/uL Final    Immature Grans (Abs) 05/14/2024 0.02  0.00 - 0.04 K/uL Final    Comment: Mild elevation in immature granulocytes is non specific and   can be seen in a variety of conditions including stress response,   acute inflammation, trauma and pregnancy. Correlation with other   laboratory and clinical findings is essential.      Lymph # 05/14/2024 2.3  1.0 - 4.8 K/uL Final    Mono # 05/14/2024 0.5  0.3 - 1.0 K/uL Final    Eos # 05/14/2024 0.2  0.0 - 0.5 K/uL Final    Baso # 05/14/2024 0.02  0.00 - 0.20 K/uL Final    nRBC 05/14/2024 0  0 /100 WBC Final    Gran % 05/14/2024 43.6  38.0 - 73.0 % Final    Lymph % 05/14/2024 41.9  18.0 - 48.0 % Final    Mono % 05/14/2024 9.5  4.0 - 15.0 % Final    Eosinophil % 05/14/2024 4.2  0.0 - 8.0 % Final    Basophil % 05/14/2024 0.4  0.0 - 1.9 % Final    Differential Method 05/14/2024 Automated   Final    Specimen UA 05/14/2024 Urine, Clean Catch   Final    Color, UA 05/14/2024 Colorless (A)  Yellow, Straw, Shu Final    Appearance, UA 05/14/2024 Clear  Clear Final    pH, UA 05/14/2024 7.0  5.0 - 8.0 Final    Specific Gravity, UA 05/14/2024 <1.005 (A)  1.005 - 1.030 Final    Protein, UA 05/14/2024 Negative  Negative Final    Comment: Recommend a 24 hour urine protein or a urine   protein/creatinine ratio if globulin induced proteinuria is  clinically suspected.      Glucose, UA 05/14/2024 Negative  Negative Final    Ketones, UA 05/14/2024 Negative  Negative Final    Bilirubin (UA) 05/14/2024 Negative  Negative Final    Occult Blood UA 05/14/2024 Negative  Negative Final    Nitrite, UA 05/14/2024 Negative  Negative Final    Urobilinogen, UA 05/14/2024 Negative  <2.0 EU/dL Final    Leukocytes,  UA 05/14/2024 Negative  Negative Final    Magnesium 05/14/2024 1.8  1.6 - 2.6 mg/dL Final    D-Dimer 05/14/2024 0.76 (H)  <0.50 mg/L FEU Final    Comment: The quantitative D-dimer assay should be used as an aid in   the diagnosis of deep vein thrombosis and pulmonary embolism  in patients with the appropriate presentation and clinical  history. The upper limit of the reference interval and the clinical   cut off   point are identical. Causes of a positive (>0.50 mg/L FEU) D-Dimer   test  include, but are not limited to: DVT, PE, DIC, thrombolytic   therapy, anticoagulant therapy, recent surgery, trauma, or   pregnancy, disseminated malignancy, aortic aneurysm, cirrhosis,  and severe infection. False negative results may occur in   patients with distal DVT.  JACOB^[id number]^^[id number]^  LOT^[id number]^[id number]\DDiBuf^[id number]     Orders Only on 05/14/2024   Component Date Value Ref Range Status    QRS Duration 05/14/2024 80  ms Final    [facility] QTC Calculation 05/14/2024 457  ms Final   Lab Visit on 05/08/2024   Component Date Value Ref Range Status    Aldosterone 05/08/2024 14.2  ng/dL Final    Comment: REFERENCE RANGE:    Upright              <= 39.2 ng/dL    Supine               <= 23.2 ng/dL    Test performed at [facility],  [address] [place]  [address]     [phone]  Yadi Story MD, PhD - Medical Director      Renin Activity 05/08/2024 <0.6  ng/mL/h Final    Comment: -------------------REFERENCE VALUE--------------------------  (Peripheral vein specimen)  Na-deplete, upright:  Mean: 5.9  Range: 2.9-10.8  Na-replete, upright:  Mean: 1.0  Range: < or =0.6-3.0    -------------------ADDITIONAL INFORMATION-------------------  Testing performed by Liquid Chromatography-Tandem Mass   Spectrometry (LC-MS/MS).  This test was developed and its performance characteristics   determined by [facility] in a manner consistent with CLIA   requirements. This test has not been cleared or approved by    the U.S. Food and Drug Administration.    Test Performed by:  [facility]  [address] [place] [address]  : Agustin Loza M.D. Ph.D.; IA# [id number]      Sodium 05/08/2024 139  136 - 145 mmol/L Final    Potassium 05/08/2024 4.1  3.5 - 5.1 mmol/L Final    Chloride 05/08/2024 106  95 - 110 mmol/L Final    CO2 05/08/2024 26  23 - 29 mmol/L Final    Glucose 05/08/2024 108  70 - 110 mg/dL Final    BUN 05/08/2024 17  6 - 20 mg/dL Final    Creatinine 05/08/2024 0.8  0.5 - 1.4 mg/dL Final    Calcium 05/08/2024 9.9  8.7 - 10.5 mg/dL Final    Anion Gap 05/08/2024 7 (L)  8 - 16 mmol/L Final    eGFR 05/08/2024 >60.0  >60 mL/min/1.73 m^2 Final   Lab Visit on 04/24/2024   Component Date Value Ref Range Status    Lamotrigine Lvl 04/24/2024 6.6  2.0 - 15.0 ug/mL Final    Comment: Lamotrigine toxic level:  >20 ug/mL    The reference range is not well established.  It may be as wide as 1 - 20 ug/mL.      If applicable, any drug confirmation testing reported  here was developed and the performance characteristics  determined by [facility]. This   confirmation testing has not been cleared or approved  by the FDA. The laboratory is regulated under CLIA as  qualified to perform high-complexity testing. This test  is used for patient testing purposes. It should not be  regarded as investigational or for research.    Test performed at [facility],  [address] , [place]  [address] [phone]  Yadi Story MD, PhD - Medical Director      Clobazam 04/24/2024 189.0  30 - 300 ng/mL Final    Desmethylclobazam 04/24/2024 415.0  300 - 3000 ng/mL Final    Comment: -------------------ADDITIONAL INFORMATION-------------------  This test was developed and its performance characteristics   determined by [facility] in a manner consistent with CLIA   requirements. This test has not been cleared or approved by   the U.S. Food and Drug  Administration.    Test Performed by:  [facility]  [address] [place] [address]  : Agustin Loza M.D. Ph.D.; CLIA# [id number]      TSH 04/24/2024 0.559  0.400 - 4.000 uIU/mL Final    Free T4 04/24/2024 0.92  0.71 - 1.51 ng/dL Final    Folate 04/24/2024 9.2  4.0 - 24.0 ng/mL Final    Vitamin B-12 04/24/2024 1252 (H)  210 - 950 pg/mL Final    Iron 04/24/2024 63  30 - 160 ug/dL Final    Transferrin 04/24/2024 219  200 - 375 mg/dL Final    TIBC 04/24/2024 324  250 - 450 ug/dL Final    Saturated Iron 04/24/2024 19 (L)  20 - 50 % Final   Lab Visit on 04/24/2024   Component Date Value Ref Range Status    Aldosterone 04/24/2024 18.9  ng/dL Final    Comment: REFERENCE RANGE:    Upright              <= 39.2 ng/dL    Supine               <= 23.2 ng/dL    Test performed at [place],  [address] [place]  [address] [phone]  Yadi Story MD, PhD - Medical Director      Renin Activity 04/24/2024 <0.6  ng/mL/h Final    Comment: -------------------REFERENCE VALUE--------------------------  (Peripheral vein specimen)  Na-deplete, upright:  Mean: 5.9  Range: 2.9-10.8  Na-replete, upright:  Mean: 1.0  Range: < or =0.6-3.0    -------------------ADDITIONAL INFORMATION-------------------  Testing performed by Liquid Chromatography-Tandem Mass   Spectrometry (LC-MS/MS).  This test was developed and its performance characteristics   determined by [facility] in a manner consistent with CLIA   requirements. This test has not been cleared or approved by   the U.S. Food and Drug Administration.    Test Performed by:  [facility]  [address] [place] [address]  : Agustin Loza M.D. Ph.D.; CLIA# [id number]      Sodium 04/24/2024 138  136 - 145 mmol/L Final    Potassium 04/24/2024 4.0  3.5 - 5.1 mmol/L Final    Chloride 04/24/2024 105  95 - 110 mmol/L Final    CO2  04/24/2024 25  23 - 29 mmol/L Final    Glucose 04/24/2024 94  70 - 110 mg/dL Final    BUN 04/24/2024 18  6 - 20 mg/dL Final    Creatinine 04/24/2024 0.7  0.5 - 1.4 mg/dL Final    Calcium 04/24/2024 9.7  8.7 - 10.5 mg/dL Final    Anion Gap 04/24/2024 8  8 - 16 mmol/L Final    eGFR 04/24/2024 >60.0  >60 mL/min/1.73 m^2 Final    Vit D, 25-Hydroxy 04/24/2024 18 (L)  30 - 96 ng/mL Final    Comment: Vitamin D deficiency.........<10 ng/mL                              Vitamin D insufficiency......10-29 ng/mL       Vitamin D sufficiency........> or equal to 30 ng/mL  Vitamin D toxicity............>100 ng/mL     Lab Visit on 04/17/2024   Component Date Value Ref Range Status    Potassium 04/17/2024 3.9  3.5 - 5.1 mmol/L Final   Admission on 04/16/2024, Discharged on 04/16/2024   Component Date Value Ref Range Status    QRS Duration 04/16/2024 80  ms Final    OHS QTC Calculation 04/16/2024 441  ms Final   Admission on 04/08/2024, Discharged on 04/08/2024   Component Date Value Ref Range Status    HIV 1/2 Ag/Ab 04/08/2024 Negative  Negative Final    Sodium 04/08/2024 137  136 - 145 mmol/L Final    Potassium 04/08/2024 2.7 (LL)  3.5 - 5.1 mmol/L Final    Comment: K   critical result(s) called and verbal readback obtained from Stacey Ramey RN by SLEONARD 04/08/2024 19:57      Chloride 04/08/2024 102  95 - 110 mmol/L Final    CO2 04/08/2024 23  23 - 29 mmol/L Final    Glucose 04/08/2024 138 (H)  70 - 110 mg/dL Final    BUN 04/08/2024 11  6 - 20 mg/dL Final    Creatinine 04/08/2024 0.8  0.5 - 1.4 mg/dL Final    Calcium 04/08/2024 10.0  8.7 - 10.5 mg/dL Final    Total Protein 04/08/2024 8.9 (H)  6.0 - 8.4 g/dL Final    Albumin 04/08/2024 4.3  3.5 - 5.2 g/dL Final    Total Bilirubin 04/08/2024 0.3  0.1 - 1.0 mg/dL Final    Comment: For infants and newborns, interpretation of results should be based  on gestational age, weight and in agreement with clinical  observations.    Premature Infant recommended reference ranges:  Up to 24  hours.............<8.0 mg/dL  Up to 48 hours............<12.0 mg/dL  3-5 days..................<15.0 mg/dL  6-29 days.................<15.0 mg/dL      Alkaline Phosphatase 04/08/2024 77  55 - 135 U/L Final    AST 04/08/2024 16  10 - 40 U/L Final    ALT 04/08/2024 18  10 - 44 U/L Final    eGFR 04/08/2024 >60  >60 mL/min/1.73 m^2 Final    Anion Gap 04/08/2024 12  8 - 16 mmol/L Final    QRS Duration 04/08/2024 78  ms Final    OHS QTC Calculation 04/08/2024 443  ms Final    Magnesium 04/08/2024 1.9  1.6 - 2.6 mg/dL Final    WBC 04/08/2024 5.49  3.90 - 12.70 K/uL Final    RBC 04/08/2024 3.84 (L)  4.00 - 5.40 M/uL Final    Hemoglobin 04/08/2024 11.5 (L)  12.0 - 16.0 g/dL Final    Hematocrit 04/08/2024 33.7 (L)  37.0 - 48.5 % Final    MCV 04/08/2024 88  82 - 98 fL Final    MCH 04/08/2024 29.9  27.0 - 31.0 pg Final    MCHC 04/08/2024 34.1  32.0 - 36.0 g/dL Final    RDW 04/08/2024 13.5  11.5 - 14.5 % Final    Platelets 04/08/2024 281  150 - 450 K/uL Final    MPV 04/08/2024 8.5 (L)  9.2 - 12.9 fL Final    Immature Granulocytes 04/08/2024 0.2  0.0 - 0.5 % Final    Gran # (ANC) 04/08/2024 2.7  1.8 - 7.7 K/uL Final    Immature Grans (Abs) 04/08/2024 0.01  0.00 - 0.04 K/uL Final    Comment: Mild elevation in immature granulocytes is non specific and   can be seen in a variety of conditions including stress response,   acute inflammation, trauma and pregnancy. Correlation with other   laboratory and clinical findings is essential.      Lymph # 04/08/2024 2.1  1.0 - 4.8 K/uL Final    Mono # 04/08/2024 0.5  0.3 - 1.0 K/uL Final    Eos # 04/08/2024 0.2  0.0 - 0.5 K/uL Final    Baso # 04/08/2024 0.02  0.00 - 0.20 K/uL Final    nRBC 04/08/2024 0  0 /100 WBC Final    Gran % 04/08/2024 49.5  38.0 - 73.0 % Final    Lymph % 04/08/2024 37.7  18.0 - 48.0 % Final    Mono % 04/08/2024 9.5  4.0 - 15.0 % Final    Eosinophil % 04/08/2024 2.7  0.0 - 8.0 % Final    Basophil % 04/08/2024 0.4  0.0 - 1.9 % Final    Differential Method 04/08/2024  Automated   Final   Admission on 04/05/2024, Discharged on 04/05/2024   Component Date Value Ref Range Status    POC Rapid COVID 04/05/2024 Negative  Negative Final     Acceptable 04/05/2024 Yes   Final    QRS Duration 04/05/2024 76  ms Final    OHS QTC Calculation 04/05/2024 452  ms Final    WBC 04/05/2024 5.56  3.90 - 12.70 K/uL Final    RBC 04/05/2024 4.04  4.00 - 5.40 M/uL Final    Hemoglobin 04/05/2024 12.1  12.0 - 16.0 g/dL Final    Hematocrit 04/05/2024 35.3 (L)  37.0 - 48.5 % Final    MCV 04/05/2024 87  82 - 98 fL Final    MCH 04/05/2024 30.0  27.0 - 31.0 pg Final    MCHC 04/05/2024 34.3  32.0 - 36.0 g/dL Final    RDW 04/05/2024 13.6  11.5 - 14.5 % Final    Platelets 04/05/2024 321  150 - 450 K/uL Final    MPV 04/05/2024 8.7 (L)  9.2 - 12.9 fL Final    Immature Granulocytes 04/05/2024 0.2  0.0 - 0.5 % Final    Gran # (ANC) 04/05/2024 2.7  1.8 - 7.7 K/uL Final    Immature Grans (Abs) 04/05/2024 0.01  0.00 - 0.04 K/uL Final    Comment: Mild elevation in immature granulocytes is non specific and   can be seen in a variety of conditions including stress response,   acute inflammation, trauma and pregnancy. Correlation with other   laboratory and clinical findings is essential.      Lymph # 04/05/2024 1.8  1.0 - 4.8 K/uL Final    Mono # 04/05/2024 0.5  0.3 - 1.0 K/uL Final    Eos # 04/05/2024 0.5  0.0 - 0.5 K/uL Final    Baso # 04/05/2024 0.04  0.00 - 0.20 K/uL Final    nRBC 04/05/2024 0  0 /100 WBC Final    Gran % 04/05/2024 48.1  38.0 - 73.0 % Final    Lymph % 04/05/2024 32.7  18.0 - 48.0 % Final    Mono % 04/05/2024 9.5  4.0 - 15.0 % Final    Eosinophil % 04/05/2024 8.8 (H)  0.0 - 8.0 % Final    Basophil % 04/05/2024 0.7  0.0 - 1.9 % Final    Differential Method 04/05/2024 Automated   Final    Sodium 04/05/2024 137  136 - 145 mmol/L Final    Potassium 04/05/2024 3.3 (L)  3.5 - 5.1 mmol/L Final    Specimen moderately hemolyzed    Chloride 04/05/2024 105  95 - 110 mmol/L Final    CO2 04/05/2024 21  (L)  23 - 29 mmol/L Final    Glucose 04/05/2024 119 (H)  70 - 110 mg/dL Final    BUN 04/05/2024 13  6 - 20 mg/dL Final    Creatinine 04/05/2024 0.7  0.5 - 1.4 mg/dL Final    Calcium 04/05/2024 9.3  8.7 - 10.5 mg/dL Final    Total Protein 04/05/2024 8.4  6.0 - 8.4 g/dL Final    Albumin 04/05/2024 4.1  3.5 - 5.2 g/dL Final    Total Bilirubin 04/05/2024 0.2  0.1 - 1.0 mg/dL Final    Comment: For infants and newborns, interpretation of results should be based  on gestational age, weight and in agreement with clinical  observations.    Premature Infant recommended reference ranges:  Up to 24 hours.............<8.0 mg/dL  Up to 48 hours............<12.0 mg/dL  3-5 days..................<15.0 mg/dL  6-29 days.................<15.0 mg/dL      Alkaline Phosphatase 04/05/2024 67  55 - 135 U/L Final    AST 04/05/2024 21  10 - 40 U/L Final    ALT 04/05/2024 15  10 - 44 U/L Final    eGFR 04/05/2024 >60  >60 mL/min/1.73 m^2 Final    Anion Gap 04/05/2024 11  8 - 16 mmol/L Final   Admission on 03/08/2024, Discharged on 03/08/2024   Component Date Value Ref Range Status    WBC 03/08/2024 4.74  3.90 - 12.70 K/uL Final    RBC 03/08/2024 4.19  4.00 - 5.40 M/uL Final    Hemoglobin 03/08/2024 12.8  12.0 - 16.0 g/dL Final    Hematocrit 03/08/2024 37.5  37.0 - 48.5 % Final    MCV 03/08/2024 90  82 - 98 fL Final    MCH 03/08/2024 30.5  27.0 - 31.0 pg Final    MCHC 03/08/2024 34.1  32.0 - 36.0 g/dL Final    RDW 03/08/2024 13.8  11.5 - 14.5 % Final    Platelets 03/08/2024 342  150 - 450 K/uL Final    MPV 03/08/2024 8.5 (L)  9.2 - 12.9 fL Final    Immature Granulocytes 03/08/2024 0.2  0.0 - 0.5 % Final    Gran # (ANC) 03/08/2024 1.5 (L)  1.8 - 7.7 K/uL Final    Immature Grans (Abs) 03/08/2024 0.01  0.00 - 0.04 K/uL Final    Comment: Mild elevation in immature granulocytes is non specific and   can be seen in a variety of conditions including stress response,   acute inflammation, trauma and pregnancy. Correlation with other   laboratory and  clinical findings is essential.      Lymph # 03/08/2024 2.7  1.0 - 4.8 K/uL Final    Mono # 03/08/2024 0.4  0.3 - 1.0 K/uL Final    Eos # 03/08/2024 0.1  0.0 - 0.5 K/uL Final    Baso # 03/08/2024 0.02  0.00 - 0.20 K/uL Final    nRBC 03/08/2024 0  0 /100 WBC Final    Gran % 03/08/2024 31.2 (L)  38.0 - 73.0 % Final    Lymph % 03/08/2024 57.8 (H)  18.0 - 48.0 % Final    Mono % 03/08/2024 7.4  4.0 - 15.0 % Final    Eosinophil % 03/08/2024 3.0  0.0 - 8.0 % Final    Basophil % 03/08/2024 0.4  0.0 - 1.9 % Final    Differential Method 03/08/2024 Automated   Final    Sodium 03/08/2024 140  136 - 145 mmol/L Final    Potassium 03/08/2024 3.1 (L)  3.5 - 5.1 mmol/L Final    Chloride 03/08/2024 111 (H)  95 - 110 mmol/L Final    CO2 03/08/2024 18 (L)  23 - 29 mmol/L Final    Glucose 03/08/2024 125 (H)  70 - 110 mg/dL Final    BUN 03/08/2024 9  6 - 20 mg/dL Final    Creatinine 03/08/2024 0.8  0.5 - 1.4 mg/dL Final    Calcium 03/08/2024 9.8  8.7 - 10.5 mg/dL Final    Total Protein 03/08/2024 8.9 (H)  6.0 - 8.4 g/dL Final    Albumin 03/08/2024 4.3  3.5 - 5.2 g/dL Final    Total Bilirubin 03/08/2024 0.3  0.1 - 1.0 mg/dL Final    Comment: For infants and newborns, interpretation of results should be based  on gestational age, weight and in agreement with clinical  observations.    Premature Infant recommended reference ranges:  Up to 24 hours.............<8.0 mg/dL  Up to 48 hours............<12.0 mg/dL  3-5 days..................<15.0 mg/dL  6-29 days.................<15.0 mg/dL      Alkaline Phosphatase 03/08/2024 83  55 - 135 U/L Final    AST 03/08/2024 12  10 - 40 U/L Final    ALT 03/08/2024 9 (L)  10 - 44 U/L Final    eGFR 03/08/2024 >60  >60 mL/min/1.73 m^2 Final    Anion Gap 03/08/2024 11  8 - 16 mmol/L Final    Magnesium 03/08/2024 2.0  1.6 - 2.6 mg/dL Final    Specimen UA 03/08/2024 Urine, Clean Catch   Final    Color, UA 03/08/2024 Colorless (A)  Yellow, Straw, Shu Final    Appearance, UA 03/08/2024 Clear  Clear Final     pH, UA 03/08/2024 7.0  5.0 - 8.0 Final    Specific Gravity, UA 03/08/2024 1.010  1.005 - 1.030 Final    Protein, UA 03/08/2024 Trace (A)  Negative Final    Comment: Recommend a 24 hour urine protein or a urine   protein/creatinine ratio if globulin induced proteinuria is  clinically suspected.      Glucose, UA 03/08/2024 Negative  Negative Final    Ketones, UA 03/08/2024 Negative  Negative Final    Bilirubin (UA) 03/08/2024 Negative  Negative Final    Occult Blood UA 03/08/2024 Negative  Negative Final    Nitrite, UA 03/08/2024 Negative  Negative Final    Urobilinogen, UA 03/08/2024 Negative  <2.0 EU/dL Final    Leukocytes, UA 03/08/2024 Negative  Negative Final    POCT Glucose 03/08/2024 125 (H)  70 - 110 mg/dL Final   There may be more visits with results that are not included.       Imaging  CTA Chest Non-Coronary (PE Studies)    Result Date: 5/14/2024  EXAMINATION: CTA CHEST NON CORONARY (PE STUDIES); CT ABDOMEN PELVIS WITH IV CONTRAST CLINICAL HISTORY: Pulmonary embolism (PE) suspected, positive D-dimer;; Abdominal pain, acute, nonlocalized; TECHNIQUE: Low dose axial images, sagittal and coronal reformations were obtained from the thoracic inlet to the lung bases following the IV administration of 100 mL of Omnipaque 350.  Contrast timing was optimized to evaluate the pulmonary arteries.  MIP images were performed. By separate acquisition, contrast-enhanced CT of the abdomen and pelvis was performed with multiplanar reformats. COMPARISON: CT of the abdomen and pelvis performed 08/11/2020. FINDINGS: CTA chest: Exam quality: Study considered diagnostic to the proximal segmental pulmonary arterial level. Pulmonary embolism: No acute or chronic pulmonary embolism. Central pulmonary arteries: Normal caliber. Aorta: Normal caliber. Heart: No right heart strain. Normal size. Coronary arteries: No calcifications. Pericardium: Normal. No effusion, thickening, or calcification. Support tubes and lines: None. Base of  neck/thyroid: Normal. Lymph nodes: No supraclavicular, axillary, internal mammary, mediastinal, or hilar adenopathy. Esophagus: Normal. Pleura: No effusion, thickening, or calcification. Upper abdomen: See below. Body wall: Unremarkable Airways: Central airways grossly patent. Lungs: Assessment compromised by motion.  Small volume right middle lobe atelectasis.  Linear atelectasis and/or scar noted in the left lower lobe. Bones: Well-circumscribed sclerotic lesion within the T6 vertebral body, possibly intra osseous hemangioma. CT of the abdomen and pelvis: Lower chest: As above. Liver: Unremarkable.  Note that previously identified hepatic lesions seen on the 08/11/2020 CT are not well characterized by current technique. Gallbladder and bile ducts: Unremarkable. No biliary ductal dilatation. Pancreas: Unremarkable. Spleen: Unremarkable. Adrenals: Unremarkable. Kidneys: Subcentimeter hypodense lesion left kidney, too small to definitely characterize.  Kidneys otherwise grossly unremarkable. Lymph nodes: No abdominal or pelvic lymphadenopathy. Bowel and mesentery: Moderate to large volume colonic stool.  No definite evidence of bowel obstruction.  Unremarkable appearance of the appendix. Abdominal aorta: Unremarkable. Inferior vena cava: Unremarkable. Free fluid or free air: None. Pelvis: Lobulated hypodense right adnexal lesion measuring up to 41 mm, possibly simple ovarian cyst. Body wall: Unremarkable. Bones: Unremarkable.     1. No convincing evidence of acute pulmonary embolism to the proximal segmental pulmonary arterial level. 2. No definite acute findings identified in the chest, abdomen, or pelvis.  No definite etiology of the patient's symptoms identified. 3. Additional details of chronic and incidental findings, as provided in the body of the report.  Note the previously identified hepatic lesions on the 08/11/2020 CT are not well characterized by current technique. Electronically signed by: Job Louie  Date:    05/14/2024 Time:    07:28    CT Abdomen Pelvis With IV Contrast NO Oral Contrast    Result Date: 5/14/2024  EXAMINATION: CTA CHEST NON CORONARY (PE STUDIES); CT ABDOMEN PELVIS WITH IV CONTRAST CLINICAL HISTORY: Pulmonary embolism (PE) suspected, positive D-dimer;; Abdominal pain, acute, nonlocalized; TECHNIQUE: Low dose axial images, sagittal and coronal reformations were obtained from the thoracic inlet to the lung bases following the IV administration of 100 mL of Omnipaque 350.  Contrast timing was optimized to evaluate the pulmonary arteries.  MIP images were performed. By separate acquisition, contrast-enhanced CT of the abdomen and pelvis was performed with multiplanar reformats. COMPARISON: CT of the abdomen and pelvis performed 08/11/2020. FINDINGS: CTA chest: Exam quality: Study considered diagnostic to the proximal segmental pulmonary arterial level. Pulmonary embolism: No acute or chronic pulmonary embolism. Central pulmonary arteries: Normal caliber. Aorta: Normal caliber. Heart: No right heart strain. Normal size. Coronary arteries: No calcifications. Pericardium: Normal. No effusion, thickening, or calcification. Support tubes and lines: None. Base of neck/thyroid: Normal. Lymph nodes: No supraclavicular, axillary, internal mammary, mediastinal, or hilar adenopathy. Esophagus: Normal. Pleura: No effusion, thickening, or calcification. Upper abdomen: See below. Body wall: Unremarkable Airways: Central airways grossly patent. Lungs: Assessment compromised by motion.  Small volume right middle lobe atelectasis.  Linear atelectasis and/or scar noted in the left lower lobe. Bones: Well-circumscribed sclerotic lesion within the T6 vertebral body, possibly intra osseous hemangioma. CT of the abdomen and pelvis: Lower chest: As above. Liver: Unremarkable.  Note that previously identified hepatic lesions seen on the 08/11/2020 CT are not well characterized by current technique. Gallbladder and bile  ducts: Unremarkable. No biliary ductal dilatation. Pancreas: Unremarkable. Spleen: Unremarkable. Adrenals: Unremarkable. Kidneys: Subcentimeter hypodense lesion left kidney, too small to definitely characterize.  Kidneys otherwise grossly unremarkable. Lymph nodes: No abdominal or pelvic lymphadenopathy. Bowel and mesentery: Moderate to large volume colonic stool.  No definite evidence of bowel obstruction.  Unremarkable appearance of the appendix. Abdominal aorta: Unremarkable. Inferior vena cava: Unremarkable. Free fluid or free air: None. Pelvis: Lobulated hypodense right adnexal lesion measuring up to 41 mm, possibly simple ovarian cyst. Body wall: Unremarkable. Bones: Unremarkable.     1. No convincing evidence of acute pulmonary embolism to the proximal segmental pulmonary arterial level. 2. No definite acute findings identified in the chest, abdomen, or pelvis.  No definite etiology of the patient's symptoms identified. 3. Additional details of chronic and incidental findings, as provided in the body of the report.  Note the previously identified hepatic lesions on the 08/11/2020 CT are not well characterized by current technique. Electronically signed by: Job Louie Date:    05/14/2024 Time:    07:28      Assessment  1. Abnormal ECG  Electrocardiogram shows sinus tachycardia.  Borderline criteria for left atrial enlargement is likely related to her heart rate.  Axis is vertical.  She has delayed precordial R-wave transition which is likely related to lead placement.  - Echo; Future      Plan and Discussion    Workup as above with further planning based on those results.    The 10-year ASCVD risk score (Des DIAZ, et al., 2019) is: 1.4%    Values used to calculate the score:      Age: 44 years      Sex: Female      Is Non- : Yes      Diabetic: No      Tobacco smoker: No      Systolic Blood Pressure: 128 mmHg      Is BP treated: Yes      HDL Cholesterol: 63 mg/dL      Total  Cholesterol: 183 mg/dL     Follow Up  Follow up for test results.      Jeyson Espitia MD

## 2024-05-27 ENCOUNTER — TELEPHONE (OUTPATIENT)
Dept: NEUROLOGY | Facility: CLINIC | Age: 44
End: 2024-05-27
Payer: COMMERCIAL

## 2024-05-28 ENCOUNTER — HOSPITAL ENCOUNTER (OUTPATIENT)
Dept: CARDIOLOGY | Facility: OTHER | Age: 44
Discharge: HOME | End: 2024-05-28
Attending: INTERNAL MEDICINE
Payer: COMMERCIAL

## 2024-05-28 VITALS
HEART RATE: 82 BPM | WEIGHT: 143 LBS | HEIGHT: 64 IN | BODY MASS INDEX: 24.41 KG/M2 | SYSTOLIC BLOOD PRESSURE: 128 MMHG | DIASTOLIC BLOOD PRESSURE: 78 MMHG

## 2024-05-28 DIAGNOSIS — R94.31 ABNORMAL ECG: ICD-10-CM

## 2024-05-28 PROCEDURE — 93306 TTE W/DOPPLER COMPLETE: CPT | Mod: 26,,, | Performed by: INTERNAL MEDICINE

## 2024-05-28 PROCEDURE — 93356 MYOCRD STRAIN IMG SPCKL TRCK: CPT

## 2024-05-28 PROCEDURE — 93356 MYOCRD STRAIN IMG SPCKL TRCK: CPT | Mod: ,,, | Performed by: INTERNAL MEDICINE

## 2024-05-29 DIAGNOSIS — E87.6 HYPOKALEMIA: ICD-10-CM

## 2024-05-29 DIAGNOSIS — I10 PRIMARY HYPERTENSION: Primary | ICD-10-CM

## 2024-05-29 LAB
ASCENDING AORTA: 2.44 CM
AV INDEX (PROSTH): 0.7
AV MEAN GRADIENT: 7 MMHG
AV PEAK GRADIENT: 12 MMHG
AV VALVE AREA BY VELOCITY RATIO: 1.81 CM²
AV VALVE AREA: 1.69 CM²
AV VELOCITY RATIO: 0.74
BSA FOR ECHO PROCEDURE: 1.71 M2
CV ECHO LV RWT: 0.38 CM
DOP CALC AO PEAK VEL: 1.71 M/S
DOP CALC AO VTI: 35.3 CM
DOP CALC LVOT AREA: 2.4 CM2
DOP CALC LVOT DIAMETER: 1.76 CM
DOP CALC LVOT PEAK VEL: 1.27 M/S
DOP CALC LVOT STROKE VOLUME: 59.82 CM3
DOP CALC RVOT PEAK VEL: 0.96 M/S
DOP CALCLVOT PEAK VEL VTI: 24.6 CM
E WAVE DECELERATION TIME: 206.62 MSEC
E/A RATIO: 1.28
E/E' RATIO: 5.71 M/S
ECHO LV POSTERIOR WALL: 0.92 CM (ref 0.6–1.1)
FRACTIONAL SHORTENING: 34 % (ref 28–44)
GLOBAL LONGITUIDAL STRAIN: 20.1 %
INTERVENTRICULAR SEPTUM: 0.82 CM (ref 0.6–1.1)
IVC DIAMETER: 1.55 CM
LA MAJOR: 5.92 CM
LA MINOR: 5.56 CM
LA WIDTH: 3.9 CM
LEFT ATRIUM SIZE: 3.9 CM
LEFT ATRIUM VOLUME INDEX MOD: 34.9 ML/M2
LEFT ATRIUM VOLUME INDEX: 43.9 ML/M2
LEFT ATRIUM VOLUME MOD: 59 CM3
LEFT ATRIUM VOLUME: 74.14 CM3
LEFT INTERNAL DIMENSION IN SYSTOLE: 3.24 CM (ref 2.1–4)
LEFT VENTRICLE DIASTOLIC VOLUME INDEX: 66.89 ML/M2
LEFT VENTRICLE DIASTOLIC VOLUME: 113.04 ML
LEFT VENTRICLE MASS INDEX: 87 G/M2
LEFT VENTRICLE SYSTOLIC VOLUME INDEX: 24.9 ML/M2
LEFT VENTRICLE SYSTOLIC VOLUME: 42.08 ML
LEFT VENTRICULAR INTERNAL DIMENSION IN DIASTOLE: 4.9 CM (ref 3.5–6)
LEFT VENTRICULAR MASS: 146.29 G
LV LATERAL E/E' RATIO: 6.06 M/S
LV SEPTAL E/E' RATIO: 5.39 M/S
LVOT MG: 3.14 MMHG
LVOT MV: 0.83 CM/S
MV PEAK A VEL: 0.76 M/S
MV PEAK E VEL: 0.97 M/S
MV STENOSIS PRESSURE HALF TIME: 59.92 MS
MV VALVE AREA P 1/2 METHOD: 3.67 CM2
PISA MRMAX VEL: 4.93 M/S
PISA TR MAX VEL: 2.4 M/S
PULM VEIN S/D RATIO: 1.32
PV PEAK D VEL: 0.57 M/S
PV PEAK GRADIENT: 5
PV PEAK S VEL: 0.75 M/S
PV PEAK VELOCITY: 1.12 M/S
RA MAJOR: 5.34 CM
RA PRESSURE ESTIMATED: 3 MMHG
RA WIDTH: 3.2 CM
RV TB RVSP: 5 MMHG
RV TISSUE DOPPLER FREE WALL SYSTOLIC VELOCITY 1 (APICAL 4 CHAMBER VIEW): 14.76 CM/S
SINUS: 2.5 CM
STJ: 2.27 CM
TDI LATERAL: 0.16 M/S
TDI SEPTAL: 0.18 M/S
TDI: 0.17 M/S
TR MAX PG: 23 MMHG
TRICUSPID ANNULAR PLANE SYSTOLIC EXCURSION: 2.9 CM
TV REST PULMONARY ARTERY PRESSURE: 26 MMHG
Z-SCORE OF LEFT VENTRICULAR DIMENSION IN END DIASTOLE: 0.4
Z-SCORE OF LEFT VENTRICULAR DIMENSION IN END SYSTOLE: 0.83

## 2024-05-29 NOTE — PATIENT INSTRUCTIONS
Saline Suppression Test (revise orders already in EPIC):    Name of Practitioner:[unfilled]  Name of Assistant:  Name of Procedure:  Description of Procedure:  Specimen Removed:  Post Op Diagnosis:    * Fasting after midnight except water and medications.    * Up to restroom only.    * VS upon admission; BP & HR every hour.    * Peripheral IV access, 20 or 22ga, w/ saline lock.    * Labs: Stat BMP, plus aldosterone and renin @ baseline.    * If potassium > 3.5, start Normal Saline infusion of 2 liters @ 500mL/hour.    * Labs:  Stat BMP after 1 liter NS infusion.  Continue infusion pending results.    * Labs: BMP, serum aldosterone and renin post-infusion of 2 liters NS.    * D/C pt to home with instructions to have a meal w/ protein within one hour of test, and before driving.

## 2024-06-06 ENCOUNTER — PATIENT MESSAGE (OUTPATIENT)
Dept: NEUROLOGY | Facility: CLINIC | Age: 44
End: 2024-06-06
Payer: COMMERCIAL

## 2024-06-07 ENCOUNTER — PATIENT MESSAGE (OUTPATIENT)
Dept: ENDOCRINOLOGY | Facility: CLINIC | Age: 44
End: 2024-06-07
Payer: COMMERCIAL

## 2024-06-11 NOTE — PROGRESS NOTES
Subjective:       Patient ID: Pernell Ly is a 44 y.o. female.    Chief Complaint:  Upper respiratory infection    The patient location is:  Home  The chief complaint leading to consultation is:  Above    Visit type: audiovisual    Face to Face time with patient:  20 minutes (20 minutes of total time spent on the encounter, which includes face to face time and non-face to face time preparing to see the patient (eg, review of tests), Obtaining and/or reviewing separately obtained history, Documenting clinical information in the electronic or other health record, Independently interpreting results (not separately reported) and communicating results to the patient/family/caregiver, or Care coordination (not separately reported).     Each patient to whom he or she provides medical services by telemedicine is:  (1) informed of the relationship between the physician and patient and the respective role of any other health care provider with respect to management of the patient; and (2) notified that he or she may decline to receive medical services by telemedicine and may withdraw from such care at any time.    Notes:     Patient presents today with complaints of upper respiratory symptoms.  States that she feels fatigued has sinus congestion cough postnasal drainage.  Denies any fevers at present.  States that over-the-counter medication is not helping her.  Does have history of asthma and seasonal allergies.  States that breathing is stable no signs and symptoms of asthma at present    Cough  This is a recurrent problem. The current episode started in the past 7 days. The problem has been waxing and waning. The problem occurs hourly. The cough is Productive of brown sputum. Associated symptoms include ear congestion, headaches, myalgias, nasal congestion, rhinorrhea and wheezing. Pertinent negatives include no chest pain, chills, ear pain, fever, heartburn, hemoptysis, rash, sore throat, shortness of breath, sweats  or weight loss. The symptoms are aggravated by pollens. The treatment provided no relief. There is no history of asthma, bronchiectasis, bronchitis, COPD, emphysema, environmental allergies or pneumonia.     Review of Systems   Constitutional:  Negative for chills, fever and weight loss.   HENT:  Positive for rhinorrhea. Negative for ear pain and sore throat.    Respiratory:  Positive for cough and wheezing. Negative for hemoptysis and shortness of breath.    Cardiovascular:  Negative for chest pain.   Gastrointestinal:  Negative for heartburn.   Musculoskeletal:  Positive for myalgias.   Skin:  Negative for rash.   Allergic/Immunologic: Negative for environmental allergies.   Neurological:  Positive for headaches.   All other systems reviewed and are negative.        Objective:      Physical Exam  Vitals reviewed.   Constitutional:       Appearance: Normal appearance. She is well-developed.      Comments: Patient is coughing throughout encounter   HENT:      Head: Normocephalic and atraumatic.   Eyes:      Extraocular Movements: Extraocular movements intact.   Pulmonary:      Effort: Pulmonary effort is normal.   Musculoskeletal:         General: Normal range of motion.      Cervical back: Neck supple.      Right lower leg: No edema.      Left lower leg: No edema.   Neurological:      Mental Status: She is alert and oriented to person, place, and time.   Psychiatric:         Mood and Affect: Mood normal.         Behavior: Behavior normal.         Thought Content: Thought content normal.         Judgment: Judgment normal.         Assessment:       1. Sinus congestion    2. Mild persistent reactive airway disease with acute exacerbation    3. Other fatigue        Plan:   1. Sinus congestion  -     azithromycin (Z-LORRI) 250 MG tablet; Take 2 tablets by mouth on day 1; Take 1 tablet by mouth on days 2-5  Dispense: 6 tablet; Refill: 0  -     ciclesonide (OMNARIS) 50 mcg Spry; 2 sprays by Each Nostril route once daily.  (Patient not taking: Reported on 5/23/2024)  Dispense: 12.5 g; Refill: 0    2. Mild persistent reactive airway disease with acute exacerbation    3. Other fatigue    We will give patient Z-Jayro since symptoms have not subsided with over-the-counter treatments x2 weeks.  We will also try different nasal spray since Flonase is not helping patient.  Return to clinic p.r.n. symptoms persisting

## 2024-06-13 NOTE — PROGRESS NOTES
"Last 5 Patient Entered Readings                                      Current 30 Day Average: 134/94     Recent Readings 4/30/2019 4/29/2019    SBP (mmHg) 122 145    DBP (mmHg) 95 92    Pulse 95 71        LVM to do initial enrollment call. Will follow up in one week.  5/7 @ 9:30A    Digital Medicine Enrollment Call    Introduced Ms. Pernell Ly to Digital Medicine.     Discussed program expectations and requirements. Will discuss the reasons for text messages during the follow up call in one week. Patient currently taking multiple readings per week. Will discuss the minimum program requirement of one reading per week during follow up call. Discussed program goal to manage BP to be below 130/80    Introduced digital medicine care team.     Reviewed the importance of self-monitoring for digital medicine participation.     Reviewed proper cuff placement and best practices for accurate and consistent readings such as 60 minutes after taking medication, 30 minutes after exercise, eating, caffeine intake, etc.   Patient discussed that her physician recommended that she enroll in the DM hypertension program and patient is willing to "give it a try". Patient discussed that as we work together more that she will be willing to look more closely at lifestyle and how it can affect blood pressure.     Patient requested that we follow-up in one week to discuss lifestyle and talk more about health coaching.    Reviewed that the Digital Medicine team is not available for emergencies and instructed the patient to call 911 or [facility] ([phone] or [phone]) if one arises.          " 69M w/PMHx of HTN, HLD, Thyroid Ca s/p thyroidectomy, Asthma, JASE on CPAP presents to ED with complaints RLE erythema and edema x 1.    ID is consulted for RLE cellulitis  Febrile 101.4, WBC 8.94 > 5.15  Band 4%  BCx x 2 pending  Duplex LE no DVT    Antibiotics:  Vancomycin 6/12 ->  Cefepime 6/12 ->      IMPRESSION:      RECOMMENDATIONS:        * THIS IS AN INCOMPLETE NOTE. FINAL RECOMMENDATION IS PENDING *   69M w/PMHx of HTN, HLD, Thyroid Ca s/p thyroidectomy, Asthma, JASE on CPAP presents to ED with complaints RLE erythema and edema x 1.    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  Attending Physician  Division of Infectious Diseases  [facility]  Please contact me via [employer] Teams   69M w/PMHx of HTN, HLD, Thyroid Ca s/p thyroidectomy, Asthma, JASE on CPAP presents to ED with complaints RLE erythema and edema x 1.    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  Attending Physician  Division of Infectious Diseases  [facility]  Please contact me via [employer] Teams

## 2024-06-17 ENCOUNTER — PATIENT MESSAGE (OUTPATIENT)
Dept: PRIMARY CARE CLINIC | Facility: CLINIC | Age: 44
End: 2024-06-17
Payer: COMMERCIAL

## 2024-06-17 DIAGNOSIS — J00 ACUTE NASOPHARYNGITIS: Primary | ICD-10-CM

## 2024-06-18 RX ORDER — AMOXICILLIN AND CLAVULANATE POTASSIUM 875; 125 MG/1; MG/1
1 TABLET, FILM COATED ORAL EVERY 12 HOURS
Qty: 20 TABLET | Refills: 0 | Status: SHIPPED | OUTPATIENT
Start: 2024-06-18 | End: 2024-06-28

## 2024-06-19 ENCOUNTER — PATIENT MESSAGE (OUTPATIENT)
Dept: NEUROLOGY | Facility: CLINIC | Age: 44
End: 2024-06-19
Payer: COMMERCIAL

## 2024-06-19 NOTE — ED TRIAGE NOTES
Patient presents to ED with c/o anxiety that began after waking this morning. She reports feeling overwhelmed and numbness to arms, and legs. Patient is tearful on arrival.   
99
no

## 2024-06-20 ENCOUNTER — TELEPHONE (OUTPATIENT)
Dept: NEUROLOGY | Facility: CLINIC | Age: 44
End: 2024-06-20
Payer: COMMERCIAL

## 2024-06-20 DIAGNOSIS — G40.109 FOCAL EPILEPSY: Primary | ICD-10-CM

## 2024-06-20 NOTE — TELEPHONE ENCOUNTER
EMU Scheduled 7/16/24, 11am. Aware an adult is required to accompany her for the duration including overnight. Aware she will be connected to lines to her head, chest, arm, have an IV placed; will not be able to leave the room until all equipment is removed at discharge. Instructions thoroughly discussed; mailed via [employer]. Denies any special needs.

## 2024-07-02 ENCOUNTER — TELEPHONE (OUTPATIENT)
Dept: NEUROLOGY | Facility: CLINIC | Age: 44
End: 2024-07-02
Payer: COMMERCIAL

## 2024-07-02 NOTE — TELEPHONE ENCOUNTER
Patient's insurance has denied the EMU admission 2/2 her insurance has [facility] as her network of providers. Both the ordering provider and [facility] are out of network. No option to appeal or peer to peer. Must got to an in network provider and EMU. I have canceled this admission w/ Jesica [id number]

## 2024-07-22 ENCOUNTER — TELEPHONE (OUTPATIENT)
Dept: NEUROLOGY | Facility: CLINIC | Age: 44
End: 2024-07-22
Payer: COMMERCIAL

## 2024-07-22 NOTE — TELEPHONE ENCOUNTER
----- Message from Dalila Faust sent at 7/19/2024  2:13 PM CDT -----  Regarding: call  back  Contact: [phone]  Caller  Nila calling in  requesting call back regarding apt on 7/16/24 please call to discuss  Further

## 2024-07-24 ENCOUNTER — OFFICE VISIT (OUTPATIENT)
Dept: NEUROLOGY | Facility: CLINIC | Age: 44
End: 2024-07-24
Payer: COMMERCIAL

## 2024-07-24 ENCOUNTER — PATIENT MESSAGE (OUTPATIENT)
Dept: NEUROLOGY | Facility: CLINIC | Age: 44
End: 2024-07-24

## 2024-07-24 DIAGNOSIS — G40.909 SEIZURE DISORDER: Primary | ICD-10-CM

## 2024-07-24 DIAGNOSIS — R53.83 FATIGUE, UNSPECIFIED TYPE: ICD-10-CM

## 2024-07-24 DIAGNOSIS — R51.9 HEADACHE DISORDER: ICD-10-CM

## 2024-07-24 DIAGNOSIS — Z65.8 PSYCHOSOCIAL STRESSORS: ICD-10-CM

## 2024-07-24 PROCEDURE — 99214 OFFICE O/P EST MOD 30 MIN: CPT | Mod: 95,,,

## 2024-07-24 NOTE — PROGRESS NOTES
[facility]  Epilepsy [facility] Progress Note    [facility] - NEUROLOGY 7TH FL  [facility], [place]    Date: 7/24/24  Patient Name: Pernell Ly   MRN: [id number]   PCP: Zoey Nelson  Referring Provider: No ref. provider found    The patient location is: Home  The chief complaint leading to consultation is: Epilepsy  Visit type: Virtual visit with synchronous audio and video  Total time spent with patient: 13 minutes  Each patient to whom he or she provides medical services by telemedicine is:  (1) informed of the relationship between the physician and patient and the respective role of any other health care provider with respect to management of the patient; and (2) notified that he or she may decline to receive medical services by telemedicine and may withdraw from such care at any time.    Assessment:   Pernell Ly is a 44 y.o. female presenting in follow up for longstanding epilepsy. 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 Ambulatory EEG monitoring 9/2023 captured two electrographic seizures emanating from the left frontotemporal region as well as sharp waves in the left and right temporal regions despite previous regimen of lamotrigine 150mg BID and topiramate 50mg BID. She is very sensitive to medication side effects and did not tolerate increase in lamotrigine or topiramate (topiramate ultimately discontinued due to numbness/tingling side effect). Pregabalin resulted in memory issues and increased drowsiness. Current regimen: lamotrigine 150mg BID and clobazam 10mg qhs. She has been tolerating this regimen well -> escalate clobazam to 20mg qhs given recent events. Levels next visit. Follow up in 3 months or sooner with issues. Advised to reach out over the portal with any concerns. Patient is comfortable with plan. All questions and concerns are addressed at this time.   Plan:     Problem List Items Addressed This Visit          Neuro    Headache disorder     Other  Visit Diagnoses       Seizure disorder    -  Primary    Fatigue, unspecified type        Psychosocial stressors              I completed education on seizure first aid and safety. I recommended seizure precautions with regards to avoiding unsupervised water recreational activity or bathing in tubs, climbing or working at heights, operation of heavy or dangerous machinery, caution around fire and sources of high heat, as well as any other activity which could put a patient at danger in case of a seizure. I also reviewed the [facility] law and recommended that the patient not drive for 6 months in the event of breakthrough seizures.    Amber Ortiz PA-C   Neurology-Epilepsy  [facility]    Collaborating physician, Dr. Johnathan Weir, was available during today's encounter.     I spent approximately 32 minutes on the day of this encounter preparing to see the patient, obtaining and reviewing history and results, performing a medically appropriate exam, counseling and educating the patient/family/caregiver, documenting clinical information, coordinating care, and ordering medications, tests, procedures, and referrals.    Patient note was created using MModal Dictation.  Any errors in syntax or even information may not have been identified and edited on initial review prior to signing this note.  Subjective:   Patient seen in consultation at the request of No ref. provider found for the evaluation of epilepsy. A copy of this note will be sent to the referring physician.     Interval Events/ROS 7/24/2024:    Current ASM/SEs: lamotrigine 150mg BID, clobazam 10mg qhs; SE drowsy when first started clobazam, gradually improving   Breakthrough seizures/events: June 28 - staring episode at work, also experienced sz at [facility] one weekend   Sleep: fair  Mood: stressed/anxious, overall stable    Otherwise, no fever, no cold symptoms, no changes in vision, no new weakness, no chest pain, no shortness of  breath, no nausea, no vomiting, no diarrhea, no constipation, no problems walking.    Recent Labs   Lab 22  1430 22  2229 24  1555   Lamotrigine Lvl 5.0 5.1 6.6   Clobazam  --   --  189.0   Desmethylclobazam  --   --  415.0       Interval Events/ROS 2024:    Current ASM/SEs: lamotrigine 150mg BID and clobazam 10mg qhs (started 2-3 weeks ago); no known SE; takes lamotrigine at 8:30am/pm & clobazam an hour later around 9:30pm    Breakthrough seizures/events: not that she knows of, 2x in the past week woke up w/ numbness/tingling sensation, mainly on the left side, slight improvement in nocturnal events since starting clobazam/discontinuing topiramate  Driving: no  Folic acid: no, s/p hysterectomy  Sleep: not great  Mood: stressed, tired, concerned and anxious about her health     Chronic headaches. Otherwise, no fever, no cold symptoms, no changes in vision, no new weakness, no chest pain, no shortness of breath, no nausea, no vomiting, no diarrhea, no constipation, no problems walking.      Interval Events/ROS 3/21/2024:    Current ASM/SEs:   - about 2 weeks ago self decreased lamotrigine back to 150mg BID as she thought it may have been worsening episodes of numbness/tingling  - topiramate 50mg BID; no known SE  - discontinued pregabalin (SE memory issues, drowsiness)     Breakthrough seizures/events:   - nightly events (4-5x per week) described as L sided numbness/tingling from head to toe that will awake her from sleep, accompanied by sensation of fearfulness, feels like her heart is racing, maintained awareness, numbness/tingling will last for around 20 minutes before it spontaneously resolves   - recent episode of staring and decreased responsiveness (3/8/24) while in the shower, occurred the night before her grandmother's , reports son thought things were abnormally quiet so came to check on her and found her staring off, helped her out of the shower onto the bed and called 911, pt  states she remembers starting to shave her legs and then the next thing she remembers is waking up w/ EMS in front of her face, subsequently evaluated in the ED, workup overall unremarkable       Driving: no  Folic acid: no, s/p hysterectomy  Sleep: significant trouble sleeping/insomnia since her grandmother very sadly passed away 2 weeks ago   Mood: stressed, tired, concerned and anxious about her health     Chronic headaches. Otherwise, no fever, no cold symptoms, no changes in vision, no new weakness, no chest pain, no shortness of breath, no nausea, no vomiting, no diarrhea, no constipation, no problems walking.      Interval Events/ROS 2/16/2024:    Current ASM/SEs:   Lamotrigine 150mg BID; no SE; did not tolerate 300mg BID  Topiramate 50mg BID; no SE  Pregabalin 50mg BID; SE memory issues, trouble following conversations w/ people, feels like she blanks out for a second, almost like a light switch (?seizure activity), drowsiness     Breakthrough seizures/events: 2 weeks ago -> seemed to wake up every other night w/ a headache, unsure if seizures have occurred    Driving: no  Sleep: overall good  Mood: some fluctuations in mood but is manageable  Activity: continues to attend work    Headaches. Chronic fatigue, pt had negative sleep study last year. Recent cough/congestion/sinus pressure. Otherwise, no fever, no changes in vision, no new weakness, no chest pain, no shortness of breath, no nausea, no vomiting, no diarrhea, no constipation, no tingling/numbness, no problems walking.      Interval Events/ROS 11/15/2023:    Current ASM/SEs: lamotrigine 150mg twice daily and topiramate 50mg twice daily; no SE  Breakthrough seizures/events: none that she knows of; discussed EEG results which captured 2 electrographic seizures that occurred unbeknownst to pt  Driving: no  Sleep: not great due to waking up w/ headaches; takes trazodone every once in a while  Mood: overall okay, denies anxiety/depression, some  fluctuations in mood but is manageable    Persistent headaches -> physical therapy scheduled for next week. Vivid dreams sometimes. Otherwise, no fever, no cold symptoms, no changes in vision, no new weakness, no chest pain, no shortness of breath, no nausea, no vomiting, no diarrhea, no constipation, no problems walking.     Interval Events/ROS 11/3/2023:    Current ASM/SEs: lamotrigine 150mg twice daily and topiramate 50mg twice daily  Breakthrough seizures/events: none since the addition of topiramate  Folic acid: s/p hysterectomy  Sleep: not great due to waking up w/ headaches; takes trazodone every once in a while  Mood: overall okay, denies anxiety/depression, some fluctuations in mood but is manageable    Persistent headaches described as numbness to L side of head, wakes her up during the night and is sometimes still there in the morning. Sleeps on her L side at night. Has taken tylenol 1-2 times for headache in the past week. Otherwise, no fever, no cold symptoms, no changes in vision, no new weakness, no chest pain, no shortness of breath, no nausea, no vomiting, no diarrhea, no constipation, no problems walking.     Interval Events/ROS 8/25/2023:    Current ASM/SEs: lamotrigine 150mg twice daily; SE drowsy  Breakthrough seizures/events: staring event 8/5/2023, no identifiable trigger, described she was in the bathroom brushing her teeth then suddenly realized she was in a different room, son told her she had toothpaste around her mouth; prior to this last event was 6/15/2023; no identifiable trigger; no convulsions  Driving: no  Folic acid: s/p hysterectomy  Sleep: not great due to waking up w/ headaches   Mood: fair    Frequently wakes up in the middle of the night with headache located to the top of her head, also often wakes up with CROOK in the morning. States she has not been taking anything at home for the pain. Headache typically resolves once she is up and moving for the day. Otherwise, no fever, no  cold symptoms, no changes in vision, no new weakness, no chest pain, no shortness of breath, no nausea, no vomiting, no diarrhea, no constipation, no tingling/numbness, no problems walking.     Interval Events/ROS 6/20/2023:    Current ASM/SEs: lamotrigine 150mg BID; no SE   Breakthrough seizures/events: last event was 6/15 while shopping in [employer], describes she was with her sister when she started staring off and wouldn't respond, 'came to' and her sister was very concerned, patient was confused, states her sister told her the event lasted >15 minutes (specifically clarified that the staring lasted >15 minutes and it took additional time for the patient to fully feel back to normal), no LOC or collapse, no abnormal movements, patient can't recall whether or not she was able to hear her sister during event; reports she has experienced staring episodes/sensation of lost time at least 3 times in the past 2 months, other episodes were not witnessed    Driving: no  Folic acid: no  Sleep: does not get much sleep, broken sleep, has tried melatonin in the past  Mood: chronic anxiety, notes some feelings of depression, stopped sertraline after 3 days (didn't like the idea of taking a mood medication, but feels more open to it now)    Intermittent headaches. Otherwise, no fever, no cold symptoms, no changes in vision, no new weakness, no chest pain, no shortness of breath, no nausea, no vomiting, no diarrhea, no constipation, no tingling/numbness, no problems walking.     Interval Events/ROS 4/12/2023:    Current ASM/SEs: lamotrigine 100mg BID; no SE  Breakthrough seizures/events:  event 4/10 -> states she remembers using the bathroom then all of a sudden she was in her bed being woken up by her dad, confused, felt off; does not recall walking back from the bathroom to her bed; son told her she seemed out of it; felt back to baseline ~30 min after event; no convulsions/abnormal movements; no identifiable trigger; current  event frequency is around 1 per year   Driving: no  Folic acid: no   Sleep: chronic insomnia; follows w/ sleep medicine    Mood: anxious    Headaches. Otherwise, no fever, no cold symptoms, no changes in vision, no new weakness, no chest pain, no shortness of breath, no nausea, no vomiting, no diarrhea, no constipation, no tingling/numbness, no problems walking.     HPI:   Ms. Pernell Ly is a 44 y.o. female who presents with a chief complaint of a longstanding seizure disorder.  Patient presents today to transfer her care from Dr. Dunham after being lost to follow-up for 2 years.  The patient reports that she had been doing well until the last few months when she has begun to experience episodes of loss of time.  The patient states that she will be alone and realized that several seconds have passed and she has lost awareness for a few minutes.  She endorses expressed compliance with her Lamictal.  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  She states that she believes is contributing to chronic daily tension headaches.  She states the headaches have become worse over the past few weeks and notes that she is taking ibuprofen twice a day every day for management of headaches.  She denies associated migrainous features or focal neurologic deficits.    Seizure Type: Supsected focal onset  Seizure Etiology:   L MTS  Current AEDs: Lamictal 100 mg BID    The patient is not accompanied by family who contribute to the history. This patient has 3 types of seizure as described below. The patient reports having seizures for years The patient reports to have stable seizure control. The seizure frequency is variable. The last seizure was last week . The patient does not report side effects from seizure medication.     Seizure Type 1:   Seizure Description: LOC, GTC (one out of sleep)  Aura:  De ja vu feeling  Associated Symptoms:   "none  Seizure Frequency: 2 in lifetime  Last seizure:     Seizure Type 2:   Seizure Description: Incomprehensible speech lasting 14 minutes with loss of time and confusion  Aura: None  Associated Symptoms:  none  Seizure Frequency: One in lifetime  Last seizure: 2019    Seizure Type 3:   Seizure Description: "Goes out into space" for a minute, always happens when she's alone  Aura: None  Associated Symptoms: none  Seizure Frequency: Variable, often clusters every few months "seldom"   Last seizure: 1.5 weeks ago    Handedness: right  Seizure Onset Age: infancy  Seizure/ Epilepsy Risk Factors: childhood seizure  Birth/Developmental History: normal birth history and Normal developmental history  Seizure Triggers/ Provoking Features: stress  Previous Seizure Medications: lamotrigine (Lamictal, LTG) and zonisamide (Zonegran, ZNA)  Recent Med Changes: None  Other Treatments: None  Prior Episodes of Status: None  Psychiatric/Behavioral Comorbitidies: Anxiety  Surgical Candidacy:  Pending    Prior Studies:  EEG : Not done  vEEG/ EMU evaluation: Left MTS 2018, personally reviewed  MRI of brain: Not done  AED levels:  Not done  CT/CTA Scan: Unremarkable - personally reviewed  PET Scan: Not done  Neuropsychological Evaluation: Not done  DEXA Scan: Not done  Other studies: Not done    PAST MEDICAL HISTORY:  Past Medical History:   Diagnosis Date    Encounter for blood transfusion     Fibroids     Hypertension     Iron deficiency anemia     Menorrhagia     Migraine     Seizures     as baby       PAST SURGICAL HISTORY:  Past Surgical History:   Procedure Laterality Date     SECTION      x1    HYSTERECTOMY  2017    TUBAL LIGATION         CURRENT MEDS:  Current Outpatient Medications   Medication Sig Dispense Refill    albuterol (PROVENTIL HFA) 90 mcg/actuation inhaler Inhale 2 puffs into the lungs every 6 (six) hours as needed for Wheezing. Rescue 18 g 0    amoxicillin (AMOXIL) 875 MG tablet Take 875 " mg by mouth every 12 (twelve) hours. (Patient not taking: Reported on 5/23/2024)      azelastine (ASTELIN) 137 mcg (0.1 %) nasal spray USE 1 SPRAY NASALLY TWICE DAILY 30 mL 0    cetirizine (ZYRTEC) 10 MG tablet Take 1 tablet (10 mg total) by mouth once daily. 30 tablet 1    ciclesonide (OMNARIS) 50 mcg Spry 2 sprays by Each Nostril route once daily. (Patient not taking: Reported on 5/23/2024) 12.5 g 0    cloBAZam (ONFI) 10 mg Tab Take 1 each (10 mg total) by mouth nightly. (Patient not taking: Reported on 5/23/2024) 30 each 5    fluticasone propionate (FLOVENT HFA) 110 mcg/actuation inhaler Inhale 1 puff into the lungs 2 (two) times daily. Controller 12 g 1    ibuprofen (ADVIL,MOTRIN) 600 MG tablet Take 1 tablet (600 mg total) by mouth every 6 (six) hours as needed for Pain. 20 tablet 0    ibuprofen (ADVIL,MOTRIN) 800 MG tablet TAKE 1 TABLET BY MOUTH THREE TIMES DAILY AS NEEDED FOR PAIN (Patient not taking: Reported on 5/23/2024) 60 tablet 2    lamoTRIgine (LAMICTAL) 150 MG Tab Take 1 tablet (150 mg total) by mouth 2 (two) times daily. 180 tablet 3    multivitamin with minerals tablet Take by mouth.      NIFEdipine (PROCARDIA-XL) 90 MG (OSM) 24 hr tablet TAKE 1 TABLET(90 MG) BY MOUTH EVERY DAY 90 tablet 1    potassium chloride SA (K-DUR,KLOR-CON) 20 MEQ tablet Take 1 tablet (20 mEq total) by mouth 2 (two) times daily. 90 tablet 1    predniSONE (DELTASONE) 20 MG tablet Take 2 tablets (40 mg total) by mouth once daily. (Patient not taking: Reported on 5/23/2024) 6 tablet 0    promethazine-dextromethorphan (PROMETHAZINE-DM) 6.25-15 mg/5 mL Syrp TAKE 5 ML BY MOUTH EVERY 6 HOURS FOR COUGH (Patient not taking: Reported on 4/24/2024)      tiZANidine (ZANAFLEX) 2 MG tablet Take 1 tablet (2 mg total) by mouth nightly as needed (muscle spasms). (Patient not taking: Reported on 5/23/2024) 30 tablet 1     No current facility-administered medications for this visit.       ALLERGIES:  Review of patient's allergies indicates:  No  Known Allergies    FAMILY HISTORY:  Family History   Problem Relation Name Age of Onset    Hypertension Father      Hypertension Sister      Hypertension Maternal Grandmother      Breast cancer Maternal Grandmother      Cancer Maternal Grandfather      Hypertension Paternal Grandmother      Colon cancer Maternal Cousin      Ovarian cancer Neg Hx         SOCIAL HISTORY:  Social History     Tobacco Use    Smoking status: Never     Passive exposure: Never    Smokeless tobacco: Never   Substance Use Topics    Alcohol use: No     Comment: Occasional; 3x /month    Drug use: No     Review of Systems:  12 system review of systems is negative except for the symptoms mentioned in HPI.     Objective:     There were no vitals filed for this visit.    General: awake, alert, NAD, well nourished   Eyes: no tearing, discharge, no erythema   ENT: moist mucous membranes of the oral cavity, nares patent    Neck: normal ROM  Cardiovascular: well perfused  Lungs: Normal work of breathing, normal chest wall excursions  Skin: No rash, lesions, or breakdown on exposed skin  Psychiatry: Mood and affect are appropriate, anxious   Abdomen: non distended  Extremeties: not examined     Neurological   MENTAL STATUS: Alert and oriented to person, place, and time. Attention and concentration within normal limits. Speech without dysarthria, able to name and repeat without difficulty. Recent and remote memory within normal limits   CRANIAL NERVES: EOMI. Face symmetrical. Hearing grossly intact. Full shoulder shrug bilaterally. Tongue protrudes midline   SENSORY: no subjective deficits   MOTOR: able to move extremities antigravity x4  CEREBELLAR/COORDINATION/GAIT: gait steady, able to ambulate around the room without difficulty

## 2024-07-29 ENCOUNTER — TELEPHONE (OUTPATIENT)
Dept: ENDOCRINOLOGY | Facility: CLINIC | Age: 44
End: 2024-07-29
Payer: COMMERCIAL

## 2024-07-29 NOTE — TELEPHONE ENCOUNTER
Patient called to schedule Saline suppression test.  Requested 8/12/24 am.  Instructions reviewed as per protocol.    Will be dropped off and understands to be in procedure room for 6 hours.  Questions answered.

## 2024-08-05 ENCOUNTER — TELEPHONE (OUTPATIENT)
Dept: ENDOCRINOLOGY | Facility: CLINIC | Age: 44
End: 2024-08-05
Payer: COMMERCIAL

## 2024-08-06 ENCOUNTER — PATIENT MESSAGE (OUTPATIENT)
Dept: NEUROLOGY | Facility: CLINIC | Age: 44
End: 2024-08-06
Payer: COMMERCIAL

## 2024-08-06 DIAGNOSIS — G40.109 FOCAL EPILEPSY: Primary | ICD-10-CM

## 2024-08-06 DIAGNOSIS — G40.109 FOCAL EPILEPSY: ICD-10-CM

## 2024-08-06 RX ORDER — CLOBAZAM 10 MG/1
10 TABLET ORAL NIGHTLY
Qty: 30 EACH | Refills: 5 | OUTPATIENT
Start: 2024-08-06 | End: 2025-02-02

## 2024-08-06 RX ORDER — CLOBAZAM 20 MG/1
20 TABLET ORAL NIGHTLY
Qty: 30 TABLET | Refills: 5 | Status: ON HOLD | OUTPATIENT
Start: 2024-08-06 | End: 2025-02-02

## 2024-08-08 ENCOUNTER — HOSPITAL ENCOUNTER (INPATIENT)
Facility: HOSPITAL | Age: 44
LOS: 4 days | Discharge: HOME | DRG: 101 | End: 2024-08-14
Attending: EMERGENCY MEDICINE | Admitting: HOSPITALIST
Payer: COMMERCIAL

## 2024-08-08 DIAGNOSIS — R56.9 SEIZURE: ICD-10-CM

## 2024-08-08 DIAGNOSIS — G40.909 NONINTRACTABLE EPILEPSY WITHOUT STATUS EPILEPTICUS, UNSPECIFIED EPILEPSY TYPE: ICD-10-CM

## 2024-08-08 DIAGNOSIS — R07.9 CHEST PAIN: ICD-10-CM

## 2024-08-08 DIAGNOSIS — G40.909 RECURRENT SEIZURES: Primary | ICD-10-CM

## 2024-08-08 PROBLEM — R53.83 OTHER FATIGUE: Status: RESOLVED | Noted: 2023-02-15 | Resolved: 2024-08-08

## 2024-08-08 PROBLEM — R29.818 FOCAL NEUROLOGICAL DEFICIT: Status: RESOLVED | Noted: 2023-11-28 | Resolved: 2024-08-08

## 2024-08-08 PROBLEM — R05.9 COUGH IN ADULT: Status: RESOLVED | Noted: 2022-06-01 | Resolved: 2024-08-08

## 2024-08-08 LAB
ALBUMIN SERPL BCP-MCNC: 4 G/DL (ref 3.5–5.2)
ALP SERPL-CCNC: 58 U/L (ref 55–135)
ALT SERPL W/O P-5'-P-CCNC: 12 U/L (ref 10–44)
ANION GAP SERPL CALC-SCNC: 11 MMOL/L (ref 8–16)
AST SERPL-CCNC: 14 U/L (ref 10–40)
BASOPHILS # BLD AUTO: 0.02 K/UL (ref 0–0.2)
BASOPHILS NFR BLD: 0.4 % (ref 0–1.9)
BILIRUB SERPL-MCNC: 0.2 MG/DL (ref 0.1–1)
BILIRUB UR QL STRIP: NEGATIVE
BUN SERPL-MCNC: 13 MG/DL (ref 6–20)
CALCIUM SERPL-MCNC: 9.9 MG/DL (ref 8.7–10.5)
CHLORIDE SERPL-SCNC: 105 MMOL/L (ref 95–110)
CLARITY UR: CLEAR
CO2 SERPL-SCNC: 21 MMOL/L (ref 23–29)
COLOR UR: YELLOW
CREAT SERPL-MCNC: 0.8 MG/DL (ref 0.5–1.4)
CTP QC/QA: YES
DIFFERENTIAL METHOD BLD: ABNORMAL
EOSINOPHIL # BLD AUTO: 0.1 K/UL (ref 0–0.5)
EOSINOPHIL NFR BLD: 1.6 % (ref 0–8)
ERYTHROCYTE [DISTWIDTH] IN BLOOD BY AUTOMATED COUNT: 13.8 % (ref 11.5–14.5)
EST. GFR  (NO RACE VARIABLE): >60 ML/MIN/1.73 M^2
GLUCOSE SERPL-MCNC: 92 MG/DL (ref 70–110)
GLUCOSE UR QL STRIP: NEGATIVE
HCT VFR BLD AUTO: 38.2 % (ref 37–48.5)
HGB BLD-MCNC: 12.3 G/DL (ref 12–16)
HGB UR QL STRIP: NEGATIVE
IMM GRANULOCYTES # BLD AUTO: 0.01 K/UL (ref 0–0.04)
IMM GRANULOCYTES NFR BLD AUTO: 0.2 % (ref 0–0.5)
KETONES UR QL STRIP: NEGATIVE
LEUKOCYTE ESTERASE UR QL STRIP: NEGATIVE
LYMPHOCYTES # BLD AUTO: 1.5 K/UL (ref 1–4.8)
LYMPHOCYTES NFR BLD: 30.6 % (ref 18–48)
MCH RBC QN AUTO: 29.4 PG (ref 27–31)
MCHC RBC AUTO-ENTMCNC: 32.2 G/DL (ref 32–36)
MCV RBC AUTO: 91 FL (ref 82–98)
MONOCYTES # BLD AUTO: 0.4 K/UL (ref 0.3–1)
MONOCYTES NFR BLD: 8.7 % (ref 4–15)
NEUTROPHILS # BLD AUTO: 2.9 K/UL (ref 1.8–7.7)
NEUTROPHILS NFR BLD: 58.5 % (ref 38–73)
NITRITE UR QL STRIP: NEGATIVE
NRBC BLD-RTO: 0 /100 WBC
PH UR STRIP: 6 [PH] (ref 5–8)
PLATELET # BLD AUTO: 304 K/UL (ref 150–450)
PMV BLD AUTO: 8.5 FL (ref 9.2–12.9)
POTASSIUM SERPL-SCNC: 3.5 MMOL/L (ref 3.5–5.1)
PROT SERPL-MCNC: 8.4 G/DL (ref 6–8.4)
PROT UR QL STRIP: NEGATIVE
RBC # BLD AUTO: 4.19 M/UL (ref 4–5.4)
SARS-COV-2 RDRP RESP QL NAA+PROBE: NEGATIVE
SODIUM SERPL-SCNC: 137 MMOL/L (ref 136–145)
SP GR UR STRIP: 1.02 (ref 1–1.03)
URN SPEC COLLECT METH UR: NORMAL
UROBILINOGEN UR STRIP-ACNC: NEGATIVE EU/DL
WBC # BLD AUTO: 4.97 K/UL (ref 3.9–12.7)

## 2024-08-08 PROCEDURE — 80053 COMPREHEN METABOLIC PANEL: CPT | Performed by: EMERGENCY MEDICINE

## 2024-08-08 PROCEDURE — 81003 URINALYSIS AUTO W/O SCOPE: CPT | Performed by: EMERGENCY MEDICINE

## 2024-08-08 PROCEDURE — 25000003 PHARM REV CODE 250: Performed by: EMERGENCY MEDICINE

## 2024-08-08 PROCEDURE — 99291 CRITICAL CARE FIRST HOUR: CPT

## 2024-08-08 PROCEDURE — 85025 COMPLETE CBC W/AUTO DIFF WBC: CPT | Performed by: EMERGENCY MEDICINE

## 2024-08-08 PROCEDURE — 80175 DRUG SCREEN QUAN LAMOTRIGINE: CPT | Performed by: EMERGENCY MEDICINE

## 2024-08-08 PROCEDURE — 36415 COLL VENOUS BLD VENIPUNCTURE: CPT | Performed by: EMERGENCY MEDICINE

## 2024-08-08 PROCEDURE — 87635 SARS-COV-2 COVID-19 AMP PRB: CPT | Performed by: EMERGENCY MEDICINE

## 2024-08-08 PROCEDURE — 80339 ANTIEPILEPTICS NOS 1-3: CPT | Performed by: EMERGENCY MEDICINE

## 2024-08-08 RX ORDER — CLOBAZAM 10 MG/1
30 TABLET ORAL NIGHTLY
Status: DISCONTINUED | OUTPATIENT
Start: 2024-08-08 | End: 2024-08-08

## 2024-08-08 RX ORDER — DIAZEPAM 10 MG/2ML
5 INJECTION INTRAMUSCULAR ONCE AS NEEDED
Status: DISCONTINUED | OUTPATIENT
Start: 2024-08-08 | End: 2024-08-09

## 2024-08-08 RX ORDER — CLOBAZAM 10 MG/1
30 TABLET ORAL NIGHTLY
Status: DISCONTINUED | OUTPATIENT
Start: 2024-08-08 | End: 2024-08-14 | Stop reason: HOSPADM

## 2024-08-08 RX ORDER — IBUPROFEN 600 MG/1
600 TABLET ORAL
Status: COMPLETED | OUTPATIENT
Start: 2024-08-09 | End: 2024-08-08

## 2024-08-08 RX ORDER — LEVETIRACETAM 500 MG/5ML
1500 INJECTION, SOLUTION, CONCENTRATE INTRAVENOUS
Status: DISCONTINUED | OUTPATIENT
Start: 2024-08-08 | End: 2024-08-08

## 2024-08-08 RX ORDER — LAMOTRIGINE 150 MG/1
150 TABLET ORAL NIGHTLY
Status: DISCONTINUED | OUTPATIENT
Start: 2024-08-08 | End: 2024-08-10

## 2024-08-08 RX ADMIN — LAMOTRIGINE 150 MG: 100 TABLET ORAL at 07:08

## 2024-08-08 RX ADMIN — CLOBAZAM 30 MG: 10 TABLET ORAL at 07:08

## 2024-08-08 RX ADMIN — IBUPROFEN 600 MG: 600 TABLET, FILM COATED ORAL at 11:08

## 2024-08-08 NOTE — ED PROVIDER NOTES
"  Source of History:  Medical record, patient      Chief complaint:  Per triage note: "Seizures (Reports having a focal seizure 30-45 min ago. Hx of seizures, compliant with medicaitons. )  "    HPI:    This is a 44 y.o. female who presents with complaint of seizures. Patient had seizure like activity at work 2 hours ago. Her previous episode was 2 weeks ago. She reports having seizures about once a month, typically for 3-5 minutes. Patient states she does take her seizure medication, and noted her neurologist increased her dosage of lamotrigine yesterday. She additionally complains of fatigue and a mild headache, which she describes as "fluttering" at her left temple.  Patient denies any fever, cough, or runny nose. This is the extent of the patient's complaints at this time.        ROS:   See HPI for pertinent Review of Systems      Review of patient's allergies indicates:  No Known Allergies    PMH:  As per HPI and below:  Past Medical History:   Diagnosis Date    Encounter for blood transfusion     Epilepsy     Fibroids     Focal epilepsy     Hypertension     Migraine        Past Surgical History:   Procedure Laterality Date     SECTION      x1    HYSTERECTOMY  2017    TUBAL LIGATION         Social History     Tobacco Use    Smoking status: Never     Passive exposure: Never    Smokeless tobacco: Never   Substance Use Topics    Alcohol use: No     Comment: Occasional; 3x /month    Drug use: No       Physical Exam:      Nursing note and vitals reviewed.  /72   Pulse 105   Temp 98 °F (36.7 °C)   Resp 18   Ht 5' 4" (1.626 m)   Wt 77.1 kg (170 lb)   LMP 2017   SpO2 96%   Breastfeeding No   BMI 29.18 kg/m²     Constitutional: No distress.  Eyes: EOMI. No discharge. Anicteric.  HENT:   Neck: Normal range of motion. Neck supple.  Cardiovascular: Normal rate. No murmur, no gallop and no friction rub heard.   Pulmonary/Chest: No respiratory distress. Effort normal. No wheezes, no rales, " no rhonchi.   Abdominal: Bowel sounds normal. Soft. No distension and no mass. There is no tenderness. There is no rebound, no guarding, no tenderness at McBurney's point.  Neurological: GCS 15. Alert and oriented to person, place, and time. No gross cranial nerve, light touch or strength deficit. Coordination normal.   Skin: Skin is warm and dry.   EXT: 2+ radial pulses.     Medical Decision Making / Independent Interpretations / External Records Reviewed:      ED Course as of 08/08/24 1828   u Aug 08, 2024   1301 Patient is a 44-year-old female with epilepsy (currently on  lamotrigine 150mg BID and clobazam 20 mg qhs - just increased from 10mg, has persistent weekly nocturnal events of L sided tingling/numbness +/- headache that will awake her from sleep and spontaneously resolve in around 20min, planned for EMU admission delayed by insurance issues) who presents for evaluation after suspected see her.  Patient states that she was at work, then all her coworkers were standing over her.  She was unsure of the character or duration of the episode, but states that when she has generalized seizures, they typically last 3-5 minutes.  Her last seizure was about 2 weeks ago.  At baseline, she believes her seizures occur about every 2 weeks.  She states that her lamotrigine dose was increased just yesterday.  She reports compliance with her medications.  She reports feeling tired today before the episode.  She states that she was in her usual state of health otherwise, denying any fevers, chills, cough, nausea, vomiting.  Currently she only complains of feeling tired. [RC]   1448 I independently interpreted labs which are unremarkable / unrevealing.   I walked past the patient was acting normally, playing a game on her phone, and talking to the nurse.  As they were conversing, patient suddenly stopped responding verbally and I was called emergently to the bedside. Pt was nonverbal, not responding to verbal stimuli, was  looking around, had transient barely perceptible flickering of left facial muscles, fidgeting with wires and BP cuff with her RUE.She was laying with her phone in her left hand when episode started. She kept the phone resting in her hand and did also flex her left elbow. Episode lasted approx 2 min. Pt now awake, alert, oriented, is amnestic to the episode.   Will consult neurology.  [RC]   1521 Patient history, findings, results, patient management discussed with neurologist Dr. Story.   He recommends increasing clozebam to 30 mg, no keppra, admission for continuous EEG given her acutely increased seizure frequency.   We do not have continuous EEG capability here, and pt would benefit from video EEG, therefore will transfer patient [RC]   1545 Still awaiting accepting facility. Transfer center was going to connect with Dr. Ayala at [place], however their continuous EEG equipment is having some sort of failure, so they are unable to accept. [RC]   1647 Patient history, findings, results, patient management discussed with neuro ICU neurologist Dr. Flores.  She agrees that patient merits transfer and continuous EEG monitoring, notes that patient does not require ICU level of care as there is no clinical suspicion for status epilepticus.   [RC]   1816 Patient history, findings, results, patient management discussed with  (Physician In Lead Of Transfers) Dr. Neil who accepts the patient.       =====================================  Critical Care:  40 minutes total critical care time was personally spent by me, exclusive of procedures and separately billable time.   Critical care was necessary to treat or prevent imminent or life-threatening deterioration of the following conditions:  Seizure-like activity, status epilepticus   =====================================       [RC]      ED Course User Index  [RC] Hunter Matos MD       I decided to obtain the patient's medical records. I reviewed patient's  prior external notes / results: specialist documentation   .     Additional Medical Decision Making: Hospitalization or escalation of care considered    Medications   diazePAM injection 5 mg (has no administration in time range)   cloBAZam Tab 30 mg (has no administration in time range)   lamoTRIgine tablet 150 mg (has no administration in time range)              Diagnostic Impression:    1. Recurrent seizures    2. Nonintractable epilepsy without status epilepticus, unspecified epilepsy type         ED Disposition Condition    Transfer to Another Facility Stable          No future appointments.        ---  I, Fartun Baer, scribed for, and in the presence of, Dr. Matos. I performed the scribed service and the documentation accurately describes the services I performed. I attest to the accuracy of the note.     Physician Attestation for Scribe:   I, Hunter Matos MD, reviewed documentation as scribed in my presence, which is both accurate and complete.       Hunter Matos MD  08/08/24 2335       Hunter Matos MD  08/08/24 7402       Hunter Matos MD  08/08/24 3121

## 2024-08-08 NOTE — PLAN OF CARE
"   Outside Transfer Acceptance Note / Regional Referral Center    Referring facility: [facility] LOCATION ([facility])   Referring provider: CARLA CESPEDES  Accepting facility: [facility]  Accepting provider: CHANTEL STALLWORTH  Admitting provider: JORGE A  Reason for transfer:  Seizure d/o  Transfer diagnosis: Seizure d/o  Transfer specialty requested: Neurology  Transfer specialty notified: Yes  Transfer level: NUMBER 1-5: 2  Bed type requested: Med-tele  Isolation status: No active isolations   Admission class or status: IP- Inpatient      Narrative     44F epilepsy (currently on lamotrigine 150mg BID and clobazam 20 mg qhs - just increased from 10mg, has persistent weekly nocturnal events of L sided tingling/numbness +/- headache that will awake her from sleep and spontaneously resolve in around 20min, planned for EMU admission delayed by insurance issues) now at [facility] ED with concern of seizures. Patient had seizure like activity at work 2 hours ago. Her previous episode was 2 weeks ago. She reports having seizures about once a month, typically for 3-5 minutes. Patient states she does take her seizure medication, and noted her neurologist increased her dosage of lamotrigine yesterday. She additionally complains of fatigue and a mild headache, which she describes as "fluttering" at her left temple.  Patient denies any fever, cough, or runny nose. This is the extent of the patient's complaints at this time. VS, labs non-contributory. Patient had additional seizure activity in ED. Neuro was consulted and recommends increasing clozebam to 30 mg, no keppra, admission for continuous EEG given her acutely increased seizure frequency. Referring providers report they do not have continuous EEG capability here, and pt would benefit from video EEG, therefore will transfer patient to [facility]. NCC was consulted and feel neuro ICU not indicated at this time due to low suspicion of status.    Objective "     Vitals: Temp: 98 °F (36.7 °C) (08/08/24 1707)  Pulse: 105 (08/08/24 1707)  Resp: 18 (08/08/24 1707)  BP: 125/72 (08/08/24 1707)  SpO2: 96 % (08/08/24 1707)  Recent Labs: All pertinent labs within the past 24 hours have been reviewed.  Recent imaging: See above   Airway:     Vent settings:         IV access:    Infusions: See above  Allergies: Review of patient's allergies indicates:  No Known Allergies   NPO: No    Anticoagulation:   Anticoagulants       None             Instructions      Vance y-  Admit to Hospital Medicine  Upon patient arrival to floor, please send SecureChat to [facility] or call extension [phone] (if no answer, do NOT leave a callback number after the beep, rather please send a SecureChat to [facility]), for Hospital Medicine admit team assignment and for additional admit orders for the patient.  Do not page the attending physician associated with the patient on arrival (this physician may not be on duty at the time of arrival).  Rather, always send a SecureChat to [facility] or call [phone] to reach the triage physician for orders and team assignment.

## 2024-08-08 NOTE — Clinical Note
Diagnosis: Recurrent seizures [138048]   Future Attending Provider: SEPIDEH MERCEDES [[id number]]   Is the patient being sent to ED Observation?: No  
07-Jul-2022

## 2024-08-08 NOTE — ED TRIAGE NOTES
Pt presents to ED after LOC at work about an hour ago. Pt has hx of seizures. Pt was told by coworkers that she had a seizure that lasted about 10 minutes. She was seated when seizure occurred. Pt denies pain at this time. Pt denies fall or injury with seizure. States just very tired. Hx of HTN. AAO x3, respirations even and unlabored.

## 2024-08-09 LAB
POCT GLUCOSE: 107 MG/DL (ref 70–110)
POCT GLUCOSE: 112 MG/DL (ref 70–110)

## 2024-08-09 PROCEDURE — G0378 HOSPITAL OBSERVATION PER HR: HCPCS

## 2024-08-09 PROCEDURE — 25000003 PHARM REV CODE 250: Performed by: EMERGENCY MEDICINE

## 2024-08-09 PROCEDURE — 25000003 PHARM REV CODE 250: Performed by: HOSPITALIST

## 2024-08-09 PROCEDURE — A4216 STERILE WATER/SALINE, 10 ML: HCPCS | Performed by: HOSPITALIST

## 2024-08-09 RX ORDER — ONDANSETRON HYDROCHLORIDE 2 MG/ML
4 INJECTION, SOLUTION INTRAVENOUS EVERY 8 HOURS PRN
Status: DISCONTINUED | OUTPATIENT
Start: 2024-08-09 | End: 2024-08-14 | Stop reason: HOSPADM

## 2024-08-09 RX ORDER — CETIRIZINE HYDROCHLORIDE 5 MG/1
10 TABLET ORAL DAILY
Status: DISCONTINUED | OUTPATIENT
Start: 2024-08-10 | End: 2024-08-14 | Stop reason: HOSPADM

## 2024-08-09 RX ORDER — NIFEDIPINE 30 MG/1
90 TABLET, EXTENDED RELEASE ORAL DAILY
Status: DISCONTINUED | OUTPATIENT
Start: 2024-08-10 | End: 2024-08-14 | Stop reason: HOSPADM

## 2024-08-09 RX ORDER — IBUPROFEN 200 MG
16 TABLET ORAL
Status: DISCONTINUED | OUTPATIENT
Start: 2024-08-09 | End: 2024-08-14 | Stop reason: HOSPADM

## 2024-08-09 RX ORDER — DIAZEPAM 10 MG/2ML
5 INJECTION INTRAMUSCULAR EVERY 4 HOURS PRN
Status: DISCONTINUED | OUTPATIENT
Start: 2024-08-09 | End: 2024-08-14 | Stop reason: HOSPADM

## 2024-08-09 RX ORDER — TALC
6 POWDER (GRAM) TOPICAL NIGHTLY PRN
Status: DISCONTINUED | OUTPATIENT
Start: 2024-08-09 | End: 2024-08-14 | Stop reason: HOSPADM

## 2024-08-09 RX ORDER — GLUCAGON 1 MG
1 KIT INJECTION
Status: DISCONTINUED | OUTPATIENT
Start: 2024-08-09 | End: 2024-08-14 | Stop reason: HOSPADM

## 2024-08-09 RX ORDER — IBUPROFEN 600 MG/1
600 TABLET ORAL EVERY 6 HOURS PRN
Status: DISCONTINUED | OUTPATIENT
Start: 2024-08-09 | End: 2024-08-14 | Stop reason: HOSPADM

## 2024-08-09 RX ORDER — PROCHLORPERAZINE EDISYLATE 5 MG/ML
5 INJECTION INTRAMUSCULAR; INTRAVENOUS EVERY 6 HOURS PRN
Status: DISCONTINUED | OUTPATIENT
Start: 2024-08-09 | End: 2024-08-14 | Stop reason: HOSPADM

## 2024-08-09 RX ORDER — POLYETHYLENE GLYCOL 3350 17 G/17G
17 POWDER, FOR SOLUTION ORAL 2 TIMES DAILY PRN
Status: DISCONTINUED | OUTPATIENT
Start: 2024-08-09 | End: 2024-08-14 | Stop reason: HOSPADM

## 2024-08-09 RX ORDER — CETIRIZINE HYDROCHLORIDE 5 MG/1
10 TABLET ORAL
Status: COMPLETED | OUTPATIENT
Start: 2024-08-09 | End: 2024-08-09

## 2024-08-09 RX ORDER — IBUPROFEN 200 MG
24 TABLET ORAL
Status: DISCONTINUED | OUTPATIENT
Start: 2024-08-09 | End: 2024-08-14 | Stop reason: HOSPADM

## 2024-08-09 RX ORDER — NALOXONE HCL 0.4 MG/ML
0.02 VIAL (ML) INJECTION
Status: DISCONTINUED | OUTPATIENT
Start: 2024-08-09 | End: 2024-08-14 | Stop reason: HOSPADM

## 2024-08-09 RX ORDER — ACETAMINOPHEN 325 MG/1
650 TABLET ORAL EVERY 4 HOURS PRN
Status: DISCONTINUED | OUTPATIENT
Start: 2024-08-09 | End: 2024-08-14 | Stop reason: HOSPADM

## 2024-08-09 RX ORDER — OMEPRAZOLE 20 MG/1
20 CAPSULE, DELAYED RELEASE ORAL DAILY PRN
COMMUNITY

## 2024-08-09 RX ORDER — SODIUM CHLORIDE 0.9 % (FLUSH) 0.9 %
10 SYRINGE (ML) INJECTION EVERY 8 HOURS
Status: DISCONTINUED | OUTPATIENT
Start: 2024-08-09 | End: 2024-08-14 | Stop reason: HOSPADM

## 2024-08-09 RX ADMIN — Medication 10 ML: at 09:08

## 2024-08-09 RX ADMIN — CLOBAZAM 30 MG: 10 TABLET ORAL at 09:08

## 2024-08-09 RX ADMIN — LAMOTRIGINE 150 MG: 100 TABLET ORAL at 09:08

## 2024-08-09 RX ADMIN — IBUPROFEN 600 MG: 600 TABLET, FILM COATED ORAL at 07:08

## 2024-08-09 RX ADMIN — CETIRIZINE HYDROCHLORIDE 10 MG: 5 TABLET, FILM COATED ORAL at 10:08

## 2024-08-09 NOTE — ED NOTES
Pt de-satting to low 90s while sleeping. When waking pt, pt comes up to >95%. Placed pt on 1L O2 while sleeping, pt updated on need. Pt states she has had a cough for x1 month, more prominent while sleeping.

## 2024-08-09 NOTE — ED NOTES
Per MD, dc'd O2. Pt states she was put on 2L NC over night but has not had it on all morning and O2 sat is .

## 2024-08-09 NOTE — ED NOTES
Spoke with [facility] and they are still awaiting a bed at main campus for pt. Let [facility] know that patient is moving up to 3 center.

## 2024-08-09 NOTE — ED NOTES
Pt awake and alert; resting quietly on stretcher.  Pt placed on continuous cardiac and pulse ox monitoring with non-invasive blood pressure to cycle every 30 minutes. VS stable; NSR noted. Pt denies pain at this time; no acute distress or discomfort reported or observed.  Pt denies restroom needs at this time; is able to reposition self on stretcher. Bed locked in lowest position; side rails up and locked x 2; call light, bedside table, and personal belongings within reach. Room assessed for safety measures and cleanliness; no action needed at this time. Plan of care discussed; awaiting transfer to [facility]. Pt instructed to alert nurse for assistance and before attempting to get out of bed; verbalizes understanding. Pt denies needs or complaints at this time; monitoring ongoing.

## 2024-08-09 NOTE — ED NOTES
Per MD, pt being transferred to ICU until a room is assigned at [facility]

## 2024-08-09 NOTE — SUBJECTIVE & OBJECTIVE
Past Medical History:   Diagnosis Date    Encounter for blood transfusion     Epilepsy     Fibroids     Focal epilepsy     Hypertension     Migraine        Past Surgical History:   Procedure Laterality Date     SECTION      x1    HYSTERECTOMY  2017    TUBAL LIGATION         Review of patient's allergies indicates:  No Known Allergies    No current facility-administered medications on file prior to encounter.     Current Outpatient Medications on File Prior to Encounter   Medication Sig    azelastine (ASTELIN) 137 mcg (0.1 %) nasal spray USE 1 SPRAY NASALLY TWICE DAILY    cloBAZam (ONFI) 20 mg Tab Take 1 tablet (20 mg total) by mouth nightly.    fluticasone propionate (FLOVENT HFA) 110 mcg/actuation inhaler Inhale 1 puff into the lungs 2 (two) times daily. Controller    ibuprofen (ADVIL,MOTRIN) 600 MG tablet Take 1 tablet (600 mg total) by mouth every 6 (six) hours as needed for Pain.    lamoTRIgine (LAMICTAL) 150 MG Tab Take 1 tablet (150 mg total) by mouth 2 (two) times daily.    multivitamin with minerals tablet Take by mouth.    NIFEdipine (PROCARDIA-XL) 90 MG (OSM) 24 hr tablet TAKE 1 TABLET(90 MG) BY MOUTH EVERY DAY    omeprazole (PRILOSEC) 20 MG capsule Take 20 mg by mouth daily as needed.    potassium chloride SA (K-DUR,KLOR-CON) 20 MEQ tablet Take 1 tablet (20 mEq total) by mouth 2 (two) times daily.    albuterol (PROVENTIL HFA) 90 mcg/actuation inhaler Inhale 2 puffs into the lungs every 6 (six) hours as needed for Wheezing. Rescue    amoxicillin (AMOXIL) 875 MG tablet Take 875 mg by mouth every 12 (twelve) hours. (Patient not taking: Reported on 2024)    cetirizine (ZYRTEC) 10 MG tablet Take 1 tablet (10 mg total) by mouth once daily.    ciclesonide (OMNARIS) 50 mcg Spry 2 sprays by Each Nostril route once daily.    predniSONE (DELTASONE) 20 MG tablet Take 2 tablets (40 mg total) by mouth once daily. (Patient not taking: Reported on 2024)    promethazine-dextromethorphan  (PROMETHAZINE-DM) 6.25-15 mg/5 mL Syrp TAKE 5 ML BY MOUTH EVERY 6 HOURS FOR COUGH (Patient not taking: Reported on 4/24/2024)    tiZANidine (ZANAFLEX) 2 MG tablet Take 1 tablet (2 mg total) by mouth nightly as needed (muscle spasms). (Patient not taking: Reported on 5/23/2024)    [DISCONTINUED] ibuprofen (ADVIL,MOTRIN) 800 MG tablet TAKE 1 TABLET BY MOUTH THREE TIMES DAILY AS NEEDED FOR PAIN (Patient not taking: Reported on 5/23/2024)     Family History       Problem Relation (Age of Onset)    Breast cancer Maternal Grandmother    Cancer Maternal Grandfather    Colon cancer Maternal Cousin    Hypertension Father, Sister, Maternal Grandmother, Paternal Grandmother          Tobacco Use    Smoking status: Never     Passive exposure: Never    Smokeless tobacco: Never   Substance and Sexual Activity    Alcohol use: No     Comment: Occasional; 3x /month    Drug use: No    Sexual activity: Yes     Partners: Male     Birth control/protection: See Surgical Hx     Review of Systems   Constitutional:  Negative for chills and fever.   HENT:  Positive for congestion.    Eyes:  Negative for visual disturbance.   Respiratory:  Negative for shortness of breath.    Cardiovascular:  Negative for chest pain.   Gastrointestinal:  Negative for abdominal pain, nausea and vomiting.   Endocrine: Negative for polyuria.   Genitourinary:  Negative for dysuria.   Musculoskeletal:  Negative for back pain.   Skin:  Negative for rash.   Neurological:  Positive for seizures. Negative for tremors, weakness and headaches.   Hematological:  Does not bruise/bleed easily.   Psychiatric/Behavioral:  Negative for confusion.      Objective:     Vital Signs (Most Recent):  Temp: 97.9 °F (36.6 °C) (08/09/24 1756)  Pulse: 74 (08/09/24 1756)  Resp: 18 (08/09/24 1756)  BP: (!) 151/87 (08/09/24 1756)  SpO2: 99 % (08/09/24 1756) Vital Signs (24h Range):  Temp:  [97.9 °F (36.6 °C)-98.3 °F (36.8 °C)] 97.9 °F (36.6 °C)  Pulse:  [70-90] 74  Resp:  [14-20] 18  SpO2:   [95 %-100 %] 99 %  BP: (106-151)/(53-95) 151/87     Weight: 77.1 kg (170 lb)  Body mass index is 29.18 kg/m².     Physical Exam  Vitals and nursing note reviewed.   Constitutional:       Appearance: She is not ill-appearing.   HENT:      Head: Normocephalic and atraumatic.      Nose: Nose normal.   Eyes:      Extraocular Movements: Extraocular movements intact.      Conjunctiva/sclera: Conjunctivae normal.   Cardiovascular:      Rate and Rhythm: Normal rate and regular rhythm.   Pulmonary:      Effort: Pulmonary effort is normal.      Breath sounds: Normal breath sounds.   Abdominal:      General: Bowel sounds are normal. There is no distension.      Palpations: Abdomen is soft.      Tenderness: There is no abdominal tenderness. There is no guarding or rebound.   Musculoskeletal:         General: Normal range of motion.      Cervical back: Normal range of motion.      Right lower leg: No edema.      Left lower leg: No edema.   Skin:     General: Skin is warm and dry.      Capillary Refill: Capillary refill takes less than 2 seconds.   Neurological:      Mental Status: She is alert and oriented to person, place, and time.      Motor: No weakness.   Psychiatric:         Mood and Affect: Mood normal.         Behavior: Behavior normal.         Thought Content: Thought content normal.                Significant Labs: All pertinent labs within the past 24 hours have been reviewed.    Significant Imaging: I have reviewed all pertinent imaging results/findings within the past 24 hours.

## 2024-08-09 NOTE — ASSESSMENT & PLAN NOTE
Atypical migraines  - No infection as possible nidus for worsening seizure activity and no identifiable triggers as per patient  - Continue lamotrigine 150 mg p.o. b.i.d. and increased clobazam 30 mg p.o. q.h.s. as previously discussed with Neurology by ER physician  - Seizure precautions and will have diazepam 5 mg IV q.4 hours as needed for any return of seizure activity  - Also have medication for headaches as needed  - Await transfer to [facility]  - Trend with labs and correct electrolytes as needed

## 2024-08-09 NOTE — ED NOTES
Spoke with [facility] and they are still awaiting a bed at main campus for pt. Let [facility] know that pt status has not changed.

## 2024-08-09 NOTE — ED NOTES
"Pt resting comfortably in bed. Pt denies pain at this time. Says she feels like she "has something she needs to cough up from her throat". Also complains of a runny nose. Pt given tissues and an emesis bag to use. Pt given ice water as requested. Pt waiting to be transferred to main [place] for EEG.   "

## 2024-08-09 NOTE — HPI
44-year-old female with HTN, migraines, epilepsy who presented with a seizure 2 hours prior to presenting in the ER.  She also reported difficulty controlling her seizures with increase in seizure activity with an episode 2 weeks prior to this last episode.  Seizures last typically for 3-5 minutes.  Another episode of seizure activity witnessed in the ER.  Her medications has been titrated up without improvement as outpatient.  Patient reported intermittent left-sided tingling and numbness sensation, mostly at night that can be associated with a headache and there was concern that this may be seizures as well, and she was working to get EEG monitoring (EMU) with her epileptologist but was unable to have that covered by insurance.  Case was discussed with Neurology by ER physician who recommended increasing clobazam 20 mg p.o. q.h.s. to 30 mg p.o. q.h.s., and transferred to [facility] for continuous EEG monitoring which is only available at that facility.  Patient was accepted for transfer, however no beds have been available, and it was past 24 hours while awaiting in the ER therefore Hospital Medicine was asked to admit patient while awaiting bed for transfer.  No further seizure activity after medication adjustment since last night.  Patient reports nasal congestion, but otherwise is without complaints.  No reported headache at this time.

## 2024-08-09 NOTE — ED NOTES
Pt complains of congestion and runny nose. Denies pain at this time. Also requests meal tray. MD aware

## 2024-08-09 NOTE — ED NOTES
Spoke with transfer center. [facility] stated there are no updates with possible bed assignments, unable to provide time frame. Stated it is a matter of discharges. Will call with further updates. Reported to MD.

## 2024-08-09 NOTE — H&P
[facility] Medicine  History & Physical    Patient Name: Pernell Ly  MRN: [id number]  Patient Class: OP- Observation  Admission Date: 8/8/2024  Attending Physician: Arlyn Rodríguez MD   Primary Care Provider: Zoey Nelson MD         Patient information was obtained from patient, past medical records, and ER records.     Subjective:     Principal Problem:Seizure    Chief Complaint:   Chief Complaint   Patient presents with    Seizures     Reports having a focal seizure 30-45 min ago. Hx of seizures, compliant with medicaitons.         HPI: 44-year-old female with HTN, migraines, epilepsy who presented with a seizure 2 hours prior to presenting in the ER.  She also reported difficulty controlling her seizures with increase in seizure activity with an episode 2 weeks prior to this last episode.  Seizures last typically for 3-5 minutes.  Another episode of seizure activity witnessed in the ER.  Her medications has been titrated up without improvement as outpatient.  Patient reported intermittent left-sided tingling and numbness sensation, mostly at night that can be associated with a headache and there was concern that this may be seizures as well, and she was working to get EEG monitoring (EMU) with her epileptologist but was unable to have that covered by insurance.  Case was discussed with Neurology by ER physician who recommended increasing clobazam 20 mg p.o. q.h.s. to 30 mg p.o. q.h.s., and transferred to [facility] for continuous EEG monitoring which is only available at that facility.  Patient was accepted for transfer, however no beds have been available, and it was past 24 hours while awaiting in the ER therefore Hospital Medicine was asked to admit patient while awaiting bed for transfer.  No further seizure activity after medication adjustment since last night.  Patient reports nasal congestion, but otherwise is without complaints.  No reported headache at this time.         Past  Medical History:   Diagnosis Date    Encounter for blood transfusion     Epilepsy     Fibroids     Focal epilepsy     Hypertension     Migraine        Past Surgical History:   Procedure Laterality Date     SECTION      x1    HYSTERECTOMY  2017    TUBAL LIGATION         Review of patient's allergies indicates:  No Known Allergies    No current facility-administered medications on file prior to encounter.     Current Outpatient Medications on File Prior to Encounter   Medication Sig    azelastine (ASTELIN) 137 mcg (0.1 %) nasal spray USE 1 SPRAY NASALLY TWICE DAILY    cloBAZam (ONFI) 20 mg Tab Take 1 tablet (20 mg total) by mouth nightly.    fluticasone propionate (FLOVENT HFA) 110 mcg/actuation inhaler Inhale 1 puff into the lungs 2 (two) times daily. Controller    ibuprofen (ADVIL,MOTRIN) 600 MG tablet Take 1 tablet (600 mg total) by mouth every 6 (six) hours as needed for Pain.    lamoTRIgine (LAMICTAL) 150 MG Tab Take 1 tablet (150 mg total) by mouth 2 (two) times daily.    multivitamin with minerals tablet Take by mouth.    NIFEdipine (PROCARDIA-XL) 90 MG (OSM) 24 hr tablet TAKE 1 TABLET(90 MG) BY MOUTH EVERY DAY    omeprazole (PRILOSEC) 20 MG capsule Take 20 mg by mouth daily as needed.    potassium chloride SA (K-DUR,KLOR-CON) 20 MEQ tablet Take 1 tablet (20 mEq total) by mouth 2 (two) times daily.    albuterol (PROVENTIL HFA) 90 mcg/actuation inhaler Inhale 2 puffs into the lungs every 6 (six) hours as needed for Wheezing. Rescue    amoxicillin (AMOXIL) 875 MG tablet Take 875 mg by mouth every 12 (twelve) hours. (Patient not taking: Reported on 2024)    cetirizine (ZYRTEC) 10 MG tablet Take 1 tablet (10 mg total) by mouth once daily.    ciclesonide (OMNARIS) 50 mcg Spry 2 sprays by Each Nostril route once daily.    predniSONE (DELTASONE) 20 MG tablet Take 2 tablets (40 mg total) by mouth once daily. (Patient not taking: Reported on 2024)    promethazine-dextromethorphan  (PROMETHAZINE-DM) 6.25-15 mg/5 mL Syrp TAKE 5 ML BY MOUTH EVERY 6 HOURS FOR COUGH (Patient not taking: Reported on 4/24/2024)    tiZANidine (ZANAFLEX) 2 MG tablet Take 1 tablet (2 mg total) by mouth nightly as needed (muscle spasms). (Patient not taking: Reported on 5/23/2024)    [DISCONTINUED] ibuprofen (ADVIL,MOTRIN) 800 MG tablet TAKE 1 TABLET BY MOUTH THREE TIMES DAILY AS NEEDED FOR PAIN (Patient not taking: Reported on 5/23/2024)     Family History       Problem Relation (Age of Onset)    Breast cancer Maternal Grandmother    Cancer Maternal Grandfather    Colon cancer Maternal Cousin    Hypertension Father, Sister, Maternal Grandmother, Paternal Grandmother          Tobacco Use    Smoking status: Never     Passive exposure: Never    Smokeless tobacco: Never   Substance and Sexual Activity    Alcohol use: No     Comment: Occasional; 3x /month    Drug use: No    Sexual activity: Yes     Partners: Male     Birth control/protection: See Surgical Hx     Review of Systems   Constitutional:  Negative for chills and fever.   HENT:  Positive for congestion.    Eyes:  Negative for visual disturbance.   Respiratory:  Negative for shortness of breath.    Cardiovascular:  Negative for chest pain.   Gastrointestinal:  Negative for abdominal pain, nausea and vomiting.   Endocrine: Negative for polyuria.   Genitourinary:  Negative for dysuria.   Musculoskeletal:  Negative for back pain.   Skin:  Negative for rash.   Neurological:  Positive for seizures. Negative for tremors, weakness and headaches.   Hematological:  Does not bruise/bleed easily.   Psychiatric/Behavioral:  Negative for confusion.      Objective:     Vital Signs (Most Recent):  Temp: 97.9 °F (36.6 °C) (08/09/24 1756)  Pulse: 74 (08/09/24 1756)  Resp: 18 (08/09/24 1756)  BP: (!) 151/87 (08/09/24 1756)  SpO2: 99 % (08/09/24 1756) Vital Signs (24h Range):  Temp:  [97.9 °F (36.6 °C)-98.3 °F (36.8 °C)] 97.9 °F (36.6 °C)  Pulse:  [70-90] 74  Resp:  [14-20] 18  SpO2:   [95 %-100 %] 99 %  BP: (106-151)/(53-95) 151/87     Weight: 77.1 kg (170 lb)  Body mass index is 29.18 kg/m².     Physical Exam  Vitals and nursing note reviewed.   Constitutional:       Appearance: She is not ill-appearing.   HENT:      Head: Normocephalic and atraumatic.      Nose: Nose normal.   Eyes:      Extraocular Movements: Extraocular movements intact.      Conjunctiva/sclera: Conjunctivae normal.   Cardiovascular:      Rate and Rhythm: Normal rate and regular rhythm.   Pulmonary:      Effort: Pulmonary effort is normal.      Breath sounds: Normal breath sounds.   Abdominal:      General: Bowel sounds are normal. There is no distension.      Palpations: Abdomen is soft.      Tenderness: There is no abdominal tenderness. There is no guarding or rebound.   Musculoskeletal:         General: Normal range of motion.      Cervical back: Normal range of motion.      Right lower leg: No edema.      Left lower leg: No edema.   Skin:     General: Skin is warm and dry.      Capillary Refill: Capillary refill takes less than 2 seconds.   Neurological:      Mental Status: She is alert and oriented to person, place, and time.      Motor: No weakness.   Psychiatric:         Mood and Affect: Mood normal.         Behavior: Behavior normal.         Thought Content: Thought content normal.                Significant Labs: All pertinent labs within the past 24 hours have been reviewed.    Significant Imaging: I have reviewed all pertinent imaging results/findings within the past 24 hours.  Assessment/Plan:     * Seizure  Atypical migraines  - No infection as possible nidus for worsening seizure activity and no identifiable triggers as per patient  - Continue lamotrigine 150 mg p.o. b.i.d. and increased clobazam 30 mg p.o. q.h.s. as previously discussed with Neurology by ER physician  - Seizure precautions and will have diazepam 5 mg IV q.4 hours as needed for any return of seizure activity  - Also have medication for headaches  as needed  - [place] [facility] [place] Trend with labs and correct electrolytes as needed    Gastroesophageal reflux disease with esophagitis without hemorrhage  - Reports she takes PPI as needed but not on a daily basis    Essential hypertension  - Resume home regimen of nifedipine XL 90 mg p.o. daily      VTE Risk Mitigation (From admission, onward)           Ordered     IP VTE LOW RISK PATIENT  Once         08/09/24 1754     Place sequential compression device  Until discontinued         08/09/24 1754                       On 08/09/2024, patient should be placed in hospital observation services under my care.             Arlyn Rodríguez MD  Department of Hospital Medicine  [facility] Surg ([place])

## 2024-08-09 NOTE — PROVIDER PROGRESS NOTES - EMERGENCY DEPT.
Encounter Date: 8/8/2024    ED Physician Progress Notes        Physician Note:   44-year-old female with history of seizures initially seen by Dr. Matos yesterday for increased frequency in breakthrough focal seizures, with another episode after ED arrival.  Neurology recommended continuous EEG monitoring, patient accepted by [facility] for transfer there but no beds available.  Patient has been waiting in the ED for 24 hours, will upgrade to hospital medicine until bed available at [facility], accepted by hospitalist Dr. Rodríguez, will admit to ICU for close monitoring.  She did take her morning dose of lamotrigine, no further seizure activity since yesterday or new complaints

## 2024-08-09 NOTE — ED NOTES
Pt resting comfortably in bed. Advised pt that we do not have any updates at this moment about transfer. Will keep her updated.

## 2024-08-09 NOTE — ED NOTES
Pt complaining of L sided pain when lying down. States this is an off and on issue for months, only occurs when she lies down to sleep. Requesting ibuprofen. Provider aware.

## 2024-08-10 LAB — POCT GLUCOSE: 84 MG/DL (ref 70–110)

## 2024-08-10 PROCEDURE — 11000001 HC ACUTE MED/SURG PRIVATE ROOM

## 2024-08-10 PROCEDURE — 25000003 PHARM REV CODE 250: Performed by: HOSPITALIST

## 2024-08-10 PROCEDURE — 99223 1ST HOSP IP/OBS HIGH 75: CPT | Mod: ,,, | Performed by: PSYCHIATRY & NEUROLOGY

## 2024-08-10 PROCEDURE — A4216 STERILE WATER/SALINE, 10 ML: HCPCS | Performed by: HOSPITALIST

## 2024-08-10 RX ORDER — LAMOTRIGINE 150 MG/1
150 TABLET ORAL 2 TIMES DAILY
Status: DISCONTINUED | OUTPATIENT
Start: 2024-08-10 | End: 2024-08-14 | Stop reason: HOSPADM

## 2024-08-10 RX ADMIN — Medication 10 ML: at 02:08

## 2024-08-10 RX ADMIN — IBUPROFEN 600 MG: 600 TABLET, FILM COATED ORAL at 05:08

## 2024-08-10 RX ADMIN — LAMOTRIGINE 150 MG: 150 TABLET ORAL at 09:08

## 2024-08-10 RX ADMIN — CETIRIZINE HYDROCHLORIDE 10 MG: 5 TABLET, FILM COATED ORAL at 09:08

## 2024-08-10 RX ADMIN — NIFEDIPINE 90 MG: 30 TABLET, FILM COATED, EXTENDED RELEASE ORAL at 09:08

## 2024-08-10 RX ADMIN — LAMOTRIGINE 150 MG: 150 TABLET ORAL at 11:08

## 2024-08-10 RX ADMIN — Medication 10 ML: at 09:08

## 2024-08-10 RX ADMIN — CLOBAZAM 30 MG: 10 TABLET ORAL at 09:08

## 2024-08-10 RX ADMIN — Medication 10 ML: at 05:08

## 2024-08-10 NOTE — NURSING
Nurses Note -- 4 Eyes      8/9/2024   11:18 PM      Skin assessed during: Admit      [x] No Altered Skin Integrity Present    []Prevention Measures Documented      [] Yes- Altered Skin Integrity Present or Discovered   [] LDA Added if Not in Epic (Describe Wound)   [] New Altered Skin Integrity was Present on Admit and Documented in LDA   [] Wound Image Taken    Wound Care Consulted? No    Attending Nurse:  Ulisses Caraballo RN/Staff Member:   Ana

## 2024-08-10 NOTE — PLAN OF CARE
[facility] Neurosurgery (Hospital)  Initial Discharge Assessment       Primary Care Provider: Zoey Nelson MD    Admission Diagnosis: Recurrent seizures [G40.909]  Chest pain [R07.9]  Nonintractable epilepsy without status epilepticus, unspecified epilepsy type [G40.909]    Admission Date: 8/8/2024  Expected Discharge Date:     Transition of Care Barriers: None    Payor: UNITED [employer] / Plan: [facility] EXCHANGE PLAN / Product Type: Commercial /     Extended Emergency Contact Information  Primary Emergency Contact: MalachiMatt   [facility]  Home Phone: [phone]  Relation: Father    Discharge Plan A: Home  Discharge Plan B: Home with family      [id number] [place] - [address] AT [facility] [place] [address]  [address]  [facility] [address]  Phone: [phone] Fax: [phone]    [id number], [place] - [address] AT [facility] [address]  [address]  [place] [address]  Phone: [phone] Fax: [phone]    [id number] - [place], [place] - [address]  [address]  [place] [address]  Phone: [phone] Fax: [phone]      Initial Assessment (most recent)       Adult Discharge Assessment - 08/10/24 1124          Discharge Assessment    Assessment Type Discharge Planning Assessment     Confirmed/corrected address, phone number and insurance Yes     Confirmed Demographics Correct on Facesheet     Source of Information patient     Does patient/caregiver understand observation status Yes     Communicated SY with patient/caregiver Date not available/Unable to determine     Reason For Admission Seizure     People in Home child(lalo), adult     Facility Arrived From: [facility]     Do you expect to return to your current living situation? Yes     Do you have help at home or someone to help you manage your care at home? Yes     Who are your caregiver(s) and their phone number(s)?  Matt Ly Father [phone]     Prior to hospitilization cognitive status: Alert/Oriented     Current cognitive status: Alert/Oriented     Walking or Climbing Stairs Difficulty no     Dressing/Bathing Difficulty no     Equipment Currently Used at Home none     Readmission within 30 days? No     Patient currently being followed by outpatient case management? No     Do you currently have service(s) that help you manage your care at home? No     Do you take prescription medications? Yes     Do you have prescription coverage? Yes     Coverage Payor: [employer] - UHC EXCHANGE PLAN -     Do you have any problems affording any of your prescribed medications? No     Is the patient taking medications as prescribed? yes     Who is going to help you get home at discharge? Matt Handy [phone]     How do you get to doctors appointments? family or friend will provide     Are you on dialysis? No     Do you take coumadin? No     Discharge Plan A Home     Discharge Plan B Home with family     DME Needed Upon Discharge  other (see comments)   TBD    Discharge Plan discussed with: Patient     Transition of Care Barriers None        Physical Activity    On average, how many days per week do you engage in moderate to strenuous exercise (like a brisk walk)? 0 days     On average, how many minutes do you engage in exercise at this level? 0 min        Financial Resource Strain    How hard is it for you to pay for the very basics like food, housing, medical care, and heating? Hard        Housing Stability    In the last 12 months, was there a time when you were not able to pay the mortgage or rent on time? Yes     At any time in the past 12 months, were you homeless or living in a shelter (including now)? No        Transportation Needs    Has the lack of transportation kept you from medical appointments, meetings, work or from getting things needed for daily living? No        Food Insecurity    Within the past  12 months, you worried that your food would run out before you got the money to buy more. Sometimes true     Within the past 12 months, the food you bought just didn't last and you didn't have money to get more. Never true        Stress    Do you feel stress - tense, restless, nervous, or anxious, or unable to sleep at night because your mind is troubled all the time - these days? Rather much        Social Isolation    How often do you feel lonely or isolated from those around you?  Never        Alcohol Use    Q1: How often do you have a drink containing alcohol? Monthly or less     Q2: How many drinks containing alcohol do you have on a typical day when you are drinking? 1 or 2     Q3: How often do you have six or more drinks on one occasion? Never        [employer]    In the past 12 months has the electric, gas, oil, or water company threatened to shut off services in your home? No        Health Literacy    How often do you need to have someone help you when you read instructions, pamphlets, or other written material from your doctor or pharmacy? Never                      Spoke to pt. Pt lives at home with her adult son. Post hospital  stay patients father will be pt support person and pt. has transportation at d/c with family. There have been no hospitalizations within the last 30 days per pt. Verified pt PCP and preferred pharmacy. Pt stated not on Coumadin and is not receiving dialysis. All questions answered regarding case management/ discharge planning , pt verbalized understanding. Discharge booklet with SW contact information given to pt.     Discharge Plan A and Plan B have been determined by review of patient's clinical status, future medical and therapeutic needs, and coverage/benefits for post-acute care in coordination with multidisciplinary team members.     HUSAM Camarillo  [facility]  [phone]

## 2024-08-10 NOTE — PROGRESS NOTES
[facility] ([facility])  [facility] Medicine  Progress Note    Patient Name: Pernell Ly  MRN: [id number]  Patient Class: OP- Observation   Admission Date: 8/8/2024  Length of Stay: 0 days  Attending Physician: Maite Hargrove MD  Primary Care Provider: Zoey Nelson MD        Subjective:     Principal Problem:Seizure        HPI:  44-year-old female with HTN, migraines, epilepsy who presented with a seizure 2 hours prior to presenting in the ER.  She also reported difficulty controlling her seizures with increase in seizure activity with an episode 2 weeks prior to this last episode.  Seizures last typically for 3-5 minutes.  Another episode of seizure activity witnessed in the ER.  Her medications has been titrated up without improvement as outpatient.  Patient reported intermittent left-sided tingling and numbness sensation, mostly at night that can be associated with a headache and there was concern that this may be seizures as well, and she was working to get EEG monitoring (EMU) with her epileptologist but was unable to have that covered by insurance.  Case was discussed with Neurology by ER physician who recommended increasing clobazam 20 mg p.o. q.h.s. to 30 mg p.o. q.h.s., and transferred to [facility] for continuous EEG monitoring which is only available at that facility.  Patient was accepted for transfer, however no beds have been available, and it was past 24 hours while awaiting in the ER therefore Hospital Medicine was asked to admit patient while awaiting bed for transfer.  No further seizure activity after medication adjustment since last night.  Patient reports nasal congestion, but otherwise is without complaints.  No reported headache at this time.         Overview/Hospital Course:  No notes on file    Interval History:   8/10: admission for breakthrough seizure      Review of Systems   Constitutional:  Negative for chills and fever.   HENT:  Positive for congestion.    Eyes:  Negative  for visual disturbance.   Respiratory:  Negative for shortness of breath.    Cardiovascular:  Negative for chest pain.   Gastrointestinal:  Negative for abdominal pain, nausea and vomiting.   Endocrine: Negative for polyuria.   Genitourinary:  Negative for dysuria.   Musculoskeletal:  Negative for back pain.   Skin:  Negative for rash.   Neurological:  Positive for seizures. Negative for tremors, weakness and headaches.   Hematological:  Does not bruise/bleed easily.   Psychiatric/Behavioral:  Negative for confusion.      Objective:     Vital Signs (Most Recent):  Temp: 99 °F (37.2 °C) (08/10/24 1204)  Pulse: 70 (08/10/24 1211)  Resp: 18 (08/10/24 1204)  BP: 116/81 (08/10/24 1204)  SpO2: 98 % (08/10/24 1204) Vital Signs (24h Range):  Temp:  [97.8 °F (36.6 °C)-99 °F (37.2 °C)] 99 °F (37.2 °C)  Pulse:  [66-79] 70  Resp:  [16-20] 18  SpO2:  [96 %-100 %] 98 %  BP: (102-151)/(53-95) 116/81     Weight: 77.2 kg (170 lb 3.5 oz)  Body mass index is 29.22 kg/m².    Intake/Output Summary (Last 24 hours) at 8/10/2024 1305  Last data filed at 8/10/2024 0800  Gross per 24 hour   Intake 75 ml   Output --   Net 75 ml         Physical Exam  Vitals and nursing note reviewed.   Constitutional:       Appearance: She is not ill-appearing.   HENT:      Head: Normocephalic and atraumatic.      Nose: Nose normal.   Eyes:      Extraocular Movements: Extraocular movements intact.      Conjunctiva/sclera: Conjunctivae normal.   Cardiovascular:      Rate and Rhythm: Normal rate and regular rhythm.   Pulmonary:      Effort: Pulmonary effort is normal.      Breath sounds: Normal breath sounds.   Abdominal:      General: Bowel sounds are normal. There is no distension.      Palpations: Abdomen is soft.      Tenderness: There is no abdominal tenderness. There is no guarding or rebound.   Musculoskeletal:         General: Normal range of motion.      Cervical back: Normal range of motion.      Right lower leg: No edema.      Left lower leg: No  edema.   Skin:     General: Skin is warm and dry.      Capillary Refill: Capillary refill takes less than 2 seconds.   Neurological:      Mental Status: She is alert and oriented to person, place, and time.      Motor: No weakness.   Psychiatric:         Mood and Affect: Mood normal.         Behavior: Behavior normal.         Thought Content: Thought content normal.             Significant Labs: All pertinent labs within the past 24 hours have been reviewed.    Significant Imaging: I have reviewed all pertinent imaging results/findings within the past 24 hours.    Assessment/Plan:      * Seizure  Atypical migraines  - No infection as possible nidus for worsening seizure activity and no identifiable triggers as per patient  - Continue lamotrigine 150 mg p.o. b.i.d. and increased clobazam 30 mg p.o. q.h.s. as previously discussed with Neurology by ER physician  - Seizure precautions and will have diazepam 5 mg IV q.4 hours as needed for any return of seizure activity  - Also have medication for headaches as needed  - Await transfer to [facility]  - Trend with labs and correct electrolytes as needed    Gastroesophageal reflux disease with esophagitis without hemorrhage  - Reports she takes PPI as needed but not on a daily basis    Essential hypertension  - Resume home regimen of nifedipine XL 90 mg p.o. daily      VTE Risk Mitigation (From admission, onward)           Ordered     IP VTE LOW RISK PATIENT  Once         08/09/24 1754     Place sequential compression device  Until discontinued         08/09/24 1754                    Discharge Planning   SY:      Code Status: Full Code   Is the patient medically ready for discharge?:     Reason for patient still in hospital (select all that apply): Patient trending condition  Discharge Plan A: Home                  Maite Hargrove MD  Department of Hospital Medicine   [facility] - Neurosurgery ([facility])

## 2024-08-10 NOTE — NURSING
Report called to receiving nurse (Rafaela ) at [facility]. [place] transported off unit by [facility] [place] [facility] Neuro surgical step down Rm [id number], per transport (Rosie), with personal belongings in tow. AAOX4. VS obtained and stable. CBG monitored and no SSI dosess required. Physical assessment per flowsheets. 2100 scheduled meds administered prior to patient leaving the unit. Cardiac monitor removed from patient. No apparent distress noted at this time.

## 2024-08-10 NOTE — SUBJECTIVE & OBJECTIVE
Past Medical History:   Diagnosis Date    Encounter for blood transfusion     Epilepsy     Fibroids     Focal epilepsy     Hypertension     Migraine        Past Surgical History:   Procedure Laterality Date     SECTION      x1    HYSTERECTOMY  2017    TUBAL LIGATION         Review of patient's allergies indicates:  No Known Allergies      No current facility-administered medications on file prior to encounter.     Current Outpatient Medications on File Prior to Encounter   Medication Sig    azelastine (ASTELIN) 137 mcg (0.1 %) nasal spray USE 1 SPRAY NASALLY TWICE DAILY    cloBAZam (ONFI) 20 mg Tab Take 1 tablet (20 mg total) by mouth nightly.    fluticasone propionate (FLOVENT HFA) 110 mcg/actuation inhaler Inhale 1 puff into the lungs 2 (two) times daily. Controller    ibuprofen (ADVIL,MOTRIN) 600 MG tablet Take 1 tablet (600 mg total) by mouth every 6 (six) hours as needed for Pain.    lamoTRIgine (LAMICTAL) 150 MG Tab Take 1 tablet (150 mg total) by mouth 2 (two) times daily.    multivitamin with minerals tablet Take by mouth.    NIFEdipine (PROCARDIA-XL) 90 MG (OSM) 24 hr tablet TAKE 1 TABLET(90 MG) BY MOUTH EVERY DAY    omeprazole (PRILOSEC) 20 MG capsule Take 20 mg by mouth daily as needed.    potassium chloride SA (K-DUR,KLOR-CON) 20 MEQ tablet Take 1 tablet (20 mEq total) by mouth 2 (two) times daily.    albuterol (PROVENTIL HFA) 90 mcg/actuation inhaler Inhale 2 puffs into the lungs every 6 (six) hours as needed for Wheezing. Rescue    amoxicillin (AMOXIL) 875 MG tablet Take 875 mg by mouth every 12 (twelve) hours. (Patient not taking: Reported on 2024)    cetirizine (ZYRTEC) 10 MG tablet Take 1 tablet (10 mg total) by mouth once daily.    ciclesonide (OMNARIS) 50 mcg Spry 2 sprays by Each Nostril route once daily.    predniSONE (DELTASONE) 20 MG tablet Take 2 tablets (40 mg total) by mouth once daily. (Patient not taking: Reported on 2024)    promethazine-dextromethorphan  (PROMETHAZINE-DM) 6.25-15 mg/5 mL Syrp TAKE 5 ML BY MOUTH EVERY 6 HOURS FOR COUGH (Patient not taking: Reported on 4/24/2024)    tiZANidine (ZANAFLEX) 2 MG tablet Take 1 tablet (2 mg total) by mouth nightly as needed (muscle spasms). (Patient not taking: Reported on 5/23/2024)     Family History       Problem Relation (Age of Onset)    Breast cancer Maternal Grandmother    Cancer Maternal Grandfather    Colon cancer Maternal Cousin    Hypertension Father, Sister, Maternal Grandmother, Paternal Grandmother          Tobacco Use    Smoking status: Never     Passive exposure: Never    Smokeless tobacco: Never   Substance and Sexual Activity    Alcohol use: No     Comment: Occasional; 3x /month    Drug use: No    Sexual activity: Yes     Partners: Male     Birth control/protection: See Surgical Hx     Review of Systems   Constitutional:  Negative for chills and fever.   HENT:  Positive for congestion.    Eyes:  Negative for visual disturbance.   Respiratory:  Negative for shortness of breath.    Cardiovascular:  Negative for chest pain.   Gastrointestinal:  Negative for abdominal pain, nausea and vomiting.   Endocrine: Negative for polyuria.   Genitourinary:  Negative for dysuria.   Musculoskeletal:  Negative for back pain.   Skin:  Negative for rash.   Neurological:  Positive for seizures. Negative for tremors, weakness and headaches.   Hematological:  Does not bruise/bleed easily.   Psychiatric/Behavioral:  Negative for confusion.      Objective:     Vital Signs (Most Recent):  Temp: 99 °F (37.2 °C) (08/10/24 1204)  Pulse: 70 (08/10/24 1211)  Resp: 18 (08/10/24 1204)  BP: 116/81 (08/10/24 1204)  SpO2: 98 % (08/10/24 1204) Vital Signs (24h Range):  Temp:  [97.8 °F (36.6 °C)-99 °F (37.2 °C)] 99 °F (37.2 °C)  Pulse:  [66-79] 70  Resp:  [16-20] 18  SpO2:  [96 %-100 %] 98 %  BP: (102-151)/(53-95) 116/81     Weight: 77.2 kg (170 lb 3.5 oz)  Body mass index is 29.22 kg/m².     Physical Exam  Vitals and nursing note reviewed.    Constitutional:       Appearance: She is not ill-appearing.   HENT:      Head: Normocephalic and atraumatic.      Nose: Nose normal.   Eyes:      Extraocular Movements: Extraocular movements intact.      Conjunctiva/sclera: Conjunctivae normal.   Cardiovascular:      Rate and Rhythm: Normal rate and regular rhythm.   Pulmonary:      Effort: Pulmonary effort is normal.      Breath sounds: Normal breath sounds.   Abdominal:      General: Bowel sounds are normal. There is no distension.      Palpations: Abdomen is soft.      Tenderness: There is no abdominal tenderness. There is no guarding or rebound.   Musculoskeletal:         General: Normal range of motion.      Cervical back: Normal range of motion.      Right lower leg: No edema.      Left lower leg: No edema.   Skin:     General: Skin is warm and dry.      Capillary Refill: Capillary refill takes less than 2 seconds.   Neurological:      Mental Status: She is alert and oriented to person, place, and time.      Cranial Nerves: Cranial nerves 2-12 are intact.      Motor: No weakness.      Deep Tendon Reflexes:      Reflex Scores:       Tricep reflexes are 2+ on the right side and 2+ on the left side.       Bicep reflexes are 2+ on the right side and 2+ on the left side.       Patellar reflexes are 2+ on the right side and 2+ on the left side.  Psychiatric:         Mood and Affect: Mood normal.         Behavior: Behavior normal.         Thought Content: Thought content normal.          NEUROLOGICAL EXAMINATION:     MENTAL STATUS   Oriented to person, place, and time.   Level of consciousness: alert    CRANIAL NERVES   Cranial nerves II through XII intact.     MOTOR EXAM   Muscle bulk: normal    Strength   Right deltoid: 5/5  Left deltoid: 5/5  Right biceps: 5/5  Left biceps: 5/5  Right triceps: 5/5  Left triceps: 5/5  Right quadriceps: 5/5  Left quadriceps: 5/5  Right hamstrin/5  Left hamstrin/5    REFLEXES     Reflexes   Right biceps: 2+  Left biceps:  2+  Right triceps: 2+  Left triceps: 2+  Right patellar: 2+  Left patellar: 2+    SENSORY EXAM   Light touch normal.   Vibration normal.       Significant Labs: All pertinent lab results from the past 24 hours have been reviewed.    Significant Imaging: I have reviewed and interpreted all pertinent imaging results/findings within the past 24 hours.

## 2024-08-10 NOTE — HPI
Ms. Pernell Ly is a 44 y.o. patient with history of HTN, migraines and epilepsy. Presented ot OSH presented with a seizure 2 hours prior to being admitted to the ED. Episode described as blank starring with left upward gaze, followed by left sided tingling, numbness sensation and associated left temporal headaches. Episodes are usually described as GTC. This is the first time patient has this type of presentation. Patient has been compliant with home AED (previously on Onfi 20 nightly and  BID). Followed as an outpatient with Epilepsy team with plans on sorting out a possible EMU admission. On admission upon transfer without evidence of seizure like activity.     Patient trasferred to [facility] – [place], [place] for Neurology evaluation and EEG monitoring

## 2024-08-10 NOTE — ASSESSMENT & PLAN NOTE
Ms. Pernell Ly is a 44 y.o. patient transferred from OSH for EEG monitorin. Presented with seizure like activity. On admission to OSH, seizure episodes different than prior GTCs. This time with 1-2 minutes blank starring. No gaze preference during episodes. Prior to transfer optimized on Onfi and continued on LTG. Patient has needed EMU for a long time, however, insurance authorization pending to start admission process. Will continue to monitor seizure like activity, if any, while inpatient. Current episode more consistent with complicated migraines. However, will monitor neurological electrical function.     Plan   -Continue Onfi 30mg nightly   -Continue LTG 150mg BID   -Consider Ativan 2mg PRN with parameters (Only if GTC > 5 minutes)   -Place EEG   -Thank you for this consult. Neurology will continue to follow.

## 2024-08-10 NOTE — SUBJECTIVE & OBJECTIVE
Interval History:   8/10: admission for breakthrough seizure      Review of Systems   Constitutional:  Negative for chills and fever.   HENT:  Positive for congestion.    Eyes:  Negative for visual disturbance.   Respiratory:  Negative for shortness of breath.    Cardiovascular:  Negative for chest pain.   Gastrointestinal:  Negative for abdominal pain, nausea and vomiting.   Endocrine: Negative for polyuria.   Genitourinary:  Negative for dysuria.   Musculoskeletal:  Negative for back pain.   Skin:  Negative for rash.   Neurological:  Positive for seizures. Negative for tremors, weakness and headaches.   Hematological:  Does not bruise/bleed easily.   Psychiatric/Behavioral:  Negative for confusion.      Objective:     Vital Signs (Most Recent):  Temp: 99 °F (37.2 °C) (08/10/24 1204)  Pulse: 70 (08/10/24 1211)  Resp: 18 (08/10/24 1204)  BP: 116/81 (08/10/24 1204)  SpO2: 98 % (08/10/24 1204) Vital Signs (24h Range):  Temp:  [97.8 °F (36.6 °C)-99 °F (37.2 °C)] 99 °F (37.2 °C)  Pulse:  [66-79] 70  Resp:  [16-20] 18  SpO2:  [96 %-100 %] 98 %  BP: (102-151)/(53-95) 116/81     Weight: 77.2 kg (170 lb 3.5 oz)  Body mass index is 29.22 kg/m².    Intake/Output Summary (Last 24 hours) at 8/10/2024 1305  Last data filed at 8/10/2024 0800  Gross per 24 hour   Intake 75 ml   Output --   Net 75 ml         Physical Exam  Vitals and nursing note reviewed.   Constitutional:       Appearance: She is not ill-appearing.   HENT:      Head: Normocephalic and atraumatic.      Nose: Nose normal.   Eyes:      Extraocular Movements: Extraocular movements intact.      Conjunctiva/sclera: Conjunctivae normal.   Cardiovascular:      Rate and Rhythm: Normal rate and regular rhythm.   Pulmonary:      Effort: Pulmonary effort is normal.      Breath sounds: Normal breath sounds.   Abdominal:      General: Bowel sounds are normal. There is no distension.      Palpations: Abdomen is soft.      Tenderness: There is no abdominal tenderness. There is  no guarding or rebound.   Musculoskeletal:         General: Normal range of motion.      Cervical back: Normal range of motion.      Right lower leg: No edema.      Left lower leg: No edema.   Skin:     General: Skin is warm and dry.      Capillary Refill: Capillary refill takes less than 2 seconds.   Neurological:      Mental Status: She is alert and oriented to person, place, and time.      Motor: No weakness.   Psychiatric:         Mood and Affect: Mood normal.         Behavior: Behavior normal.         Thought Content: Thought content normal.             Significant Labs: All pertinent labs within the past 24 hours have been reviewed.    Significant Imaging: I have reviewed all pertinent imaging results/findings within the past 24 hours.

## 2024-08-11 PROBLEM — G40.909 RECURRENT SEIZURES: Status: ACTIVE | Noted: 2024-08-11

## 2024-08-11 PROCEDURE — 25000003 PHARM REV CODE 250: Performed by: HOSPITALIST

## 2024-08-11 PROCEDURE — 95714 VEEG EA 12-26 HR UNMNTR: CPT

## 2024-08-11 PROCEDURE — 95700 EEG CONT REC W/VID EEG TECH: CPT

## 2024-08-11 PROCEDURE — 11000001 HC ACUTE MED/SURG PRIVATE ROOM

## 2024-08-11 PROCEDURE — 95720 EEG PHY/QHP EA INCR W/VEEG: CPT | Mod: ,,, | Performed by: STUDENT IN AN ORGANIZED HEALTH CARE EDUCATION/TRAINING PROGRAM

## 2024-08-11 PROCEDURE — A4216 STERILE WATER/SALINE, 10 ML: HCPCS | Performed by: HOSPITALIST

## 2024-08-11 PROCEDURE — 99233 SBSQ HOSP IP/OBS HIGH 50: CPT | Mod: ,,, | Performed by: PSYCHIATRY & NEUROLOGY

## 2024-08-11 RX ADMIN — LAMOTRIGINE 150 MG: 150 TABLET ORAL at 09:08

## 2024-08-11 RX ADMIN — IBUPROFEN 600 MG: 600 TABLET, FILM COATED ORAL at 09:08

## 2024-08-11 RX ADMIN — CLOBAZAM 30 MG: 10 TABLET ORAL at 09:08

## 2024-08-11 RX ADMIN — NIFEDIPINE 90 MG: 30 TABLET, FILM COATED, EXTENDED RELEASE ORAL at 09:08

## 2024-08-11 RX ADMIN — Medication 10 ML: at 09:08

## 2024-08-11 RX ADMIN — IBUPROFEN 600 MG: 600 TABLET, FILM COATED ORAL at 06:08

## 2024-08-11 RX ADMIN — CETIRIZINE HYDROCHLORIDE 10 MG: 5 TABLET, FILM COATED ORAL at 09:08

## 2024-08-11 RX ADMIN — Medication 10 ML: at 02:08

## 2024-08-11 NOTE — SUBJECTIVE & OBJECTIVE
Interval History:   8/11:  admission for EMU     Review of Systems   Constitutional:  Negative for chills and fever.   HENT:  Positive for congestion.    Eyes:  Negative for visual disturbance.   Respiratory:  Negative for shortness of breath.    Cardiovascular:  Negative for chest pain.   Gastrointestinal:  Negative for abdominal pain, nausea and vomiting.   Endocrine: Negative for polyuria.   Genitourinary:  Negative for dysuria.   Musculoskeletal:  Negative for back pain.   Skin:  Negative for rash.   Neurological:  Positive for seizures. Negative for tremors, weakness and headaches.   Hematological:  Does not bruise/bleed easily.   Psychiatric/Behavioral:  Negative for confusion.      Objective:     Vital Signs (Most Recent):  Temp: 99.1 °F (37.3 °C) (08/11/24 1201)  Pulse: 68 (08/11/24 1201)  Resp: 18 (08/11/24 1201)  BP: 126/75 (08/11/24 1201)  SpO2: 99 % (08/11/24 1201) Vital Signs (24h Range):  Temp:  [97.6 °F (36.4 °C)-99.1 °F (37.3 °C)] 99.1 °F (37.3 °C)  Pulse:  [66-86] 68  Resp:  [16-18] 18  SpO2:  [97 %-100 %] 99 %  BP: (100-126)/(59-80) 126/75     Weight: 77.2 kg (170 lb 3.5 oz)  Body mass index is 29.22 kg/m².  No intake or output data in the 24 hours ending 08/11/24 1356      Physical Exam  Vitals and nursing note reviewed.   Constitutional:       Appearance: She is not ill-appearing.   HENT:      Head: Normocephalic and atraumatic.      Nose: Nose normal.   Eyes:      Extraocular Movements: Extraocular movements intact.      Conjunctiva/sclera: Conjunctivae normal.   Cardiovascular:      Rate and Rhythm: Normal rate and regular rhythm.   Pulmonary:      Effort: Pulmonary effort is normal.      Breath sounds: Normal breath sounds.   Abdominal:      General: Bowel sounds are normal. There is no distension.      Palpations: Abdomen is soft.      Tenderness: There is no abdominal tenderness. There is no guarding or rebound.   Musculoskeletal:         General: Normal range of motion.      Cervical back:  Normal range of motion.      Right lower leg: No edema.      Left lower leg: No edema.   Skin:     General: Skin is warm and dry.      Capillary Refill: Capillary refill takes less than 2 seconds.   Neurological:      Mental Status: She is alert and oriented to person, place, and time.      Motor: No weakness.   Psychiatric:         Mood and Affect: Mood normal.         Behavior: Behavior normal.         Thought Content: Thought content normal.             Significant Labs: All pertinent labs within the past 24 hours have been reviewed.    Significant Imaging: I have reviewed all pertinent imaging results/findings within the past 24 hours.

## 2024-08-11 NOTE — CARE UPDATE
I have reviewed the chart of Pernell Ly who is hospitalized for the following:    Active Hospital Problems    Diagnosis    *Seizure    Gastroesophageal reflux disease with esophagitis without hemorrhage    Anxiety    Essential hypertension    Atypical migraine    HTN (hypertension)        Purvi Quinteros NP  [facility]

## 2024-08-11 NOTE — SUBJECTIVE & OBJECTIVE
Subjective:     Interval History: No new episodes of seizures. Tolerating Onfi optimization. Pending EEG placement.     Current Facility-Administered Medications   Medication Dose Route Frequency Provider Last Rate Last Admin    acetaminophen tablet 650 mg  650 mg Oral Q4H PRN Arlyn Rodríguez MD        cetirizine tablet 10 mg  10 mg Oral Daily Arlyn Rodríguez MD   10 mg at 08/11/24 0931    cloBAZam Tab 30 mg  30 mg Oral QHS Arlyn Rodríguez MD   30 mg at 08/10/24 2148    dextrose 10% bolus 125 mL 125 mL  12.5 g Intravenous PRN Arlyn Rodríguez MD        dextrose 10% bolus 250 mL 250 mL  25 g Intravenous PRN Arlyn Rodríguez MD        diazePAM injection 5 mg  5 mg Intravenous Q4H PRN Arlyn Rodríguez MD        glucagon (human recombinant) injection 1 mg  1 mg Intramuscular PRN Arlyn Rodríguez MD        glucose chewable tablet 16 g  16 g Oral PRN Arlyn Rodríguez MD        glucose chewable tablet 24 g  24 g Oral PRN Arlyn Rodríguez MD        ibuprofen tablet 600 mg  600 mg Oral Q6H JEANNETTEN Arlyn Rodríguez MD   600 mg at 08/10/24 0550    lamoTRIgine tablet 150 mg  150 mg Oral BID Maite Hargrove MD   150 mg at 08/11/24 0930    melatonin tablet 6 mg  6 mg Oral Nightly PRN Arlyn Rodríguez MD        naloxone 0.4 mg/mL injection 0.02 mg  0.02 mg Intravenous PRN Arlyn Rodríguez MD        NIFEdipine 24 hr tablet 90 mg  90 mg Oral Daily Arlyn Rodríguez MD   90 mg at 08/11/24 0930    ondansetron injection 4 mg  4 mg Intravenous Q8H JEANNETTEN Arlyn Rodríguez MD        polyethylene glycol packet 17 g  17 g Oral BID PRN Arlyn Rodríguez MD        prochlorperazine injection Soln 5 mg  5 mg Intravenous Q6H PRN Arlyn Rodríguez MD        sodium chloride 0.9% flush 10 mL  10 mL Intravenous Q8H Arlyn Rodríguez MD   10 mL at 08/10/24 2149       Review of Systems   Constitutional:  Negative for chills and fever.   HENT:  Negative for congestion.    Eyes:  Negative for visual disturbance.   Respiratory:  Negative for shortness of breath.    Cardiovascular:  Negative  for chest pain.   Gastrointestinal:  Negative for abdominal pain, nausea and vomiting.   Endocrine: Negative for polyuria.   Genitourinary:  Negative for dysuria.   Musculoskeletal:  Negative for back pain.   Skin:  Negative for rash.   Neurological:  Negative for tremors, seizures, weakness and headaches.   Hematological:  Does not bruise/bleed easily.   Psychiatric/Behavioral:  Negative for confusion.      Objective:     Vital Signs (Most Recent):  Temp: 99.1 °F (37.3 °C) (08/11/24 1201)  Pulse: 68 (08/11/24 1201)  Resp: 18 (08/11/24 1201)  BP: 126/75 (08/11/24 1201)  SpO2: 99 % (08/11/24 1201) Vital Signs (24h Range):  Temp:  [97.6 °F (36.4 °C)-99.1 °F (37.3 °C)] 99.1 °F (37.3 °C)  Pulse:  [66-86] 68  Resp:  [16-18] 18  SpO2:  [97 %-100 %] 99 %  BP: (100-126)/(59-80) 126/75     Weight: 77.2 kg (170 lb 3.5 oz)  Body mass index is 29.22 kg/m².     Physical Exam  Vitals and nursing note reviewed.   Constitutional:       Appearance: She is not ill-appearing.   HENT:      Head: Normocephalic and atraumatic.      Nose: Nose normal.   Eyes:      Extraocular Movements: Extraocular movements intact.      Conjunctiva/sclera: Conjunctivae normal.   Cardiovascular:      Rate and Rhythm: Normal rate and regular rhythm.   Pulmonary:      Effort: Pulmonary effort is normal.      Breath sounds: Normal breath sounds.   Abdominal:      General: Bowel sounds are normal. There is no distension.      Palpations: Abdomen is soft.      Tenderness: There is no abdominal tenderness. There is no guarding or rebound.   Musculoskeletal:         General: Normal range of motion.      Cervical back: Normal range of motion.      Right lower leg: No edema.      Left lower leg: No edema.   Skin:     General: Skin is warm and dry.      Capillary Refill: Capillary refill takes less than 2 seconds.   Neurological:      Mental Status: She is alert and oriented to person, place, and time.      Cranial Nerves: Cranial nerves 2-12 are intact.       Motor: No weakness.      Deep Tendon Reflexes:      Reflex Scores:       Tricep reflexes are 2+ on the right side and 2+ on the left side.       Bicep reflexes are 2+ on the right side and 2+ on the left side.       Patellar reflexes are 2+ on the right side and 2+ on the left side.  Psychiatric:         Mood and Affect: Mood normal.         Behavior: Behavior normal.         Thought Content: Thought content normal.          NEUROLOGICAL EXAMINATION:     MENTAL STATUS   Oriented to person, place, and time.   Level of consciousness: alert    CRANIAL NERVES   Cranial nerves II through XII intact.     MOTOR EXAM   Muscle bulk: normal    Strength   Right deltoid: 5/5  Left deltoid: 5/5  Right biceps: 5/5  Left biceps: 5/5  Right triceps: 5/5  Left triceps: 5/5  Right quadriceps: 5/5  Left quadriceps: 5/5  Right hamstrin/5  Left hamstrin/5    REFLEXES     Reflexes   Right biceps: 2+  Left biceps: 2+  Right triceps: 2+  Left triceps: 2+  Right patellar: 2+  Left patellar: 2+    SENSORY EXAM   Light touch normal.   Vibration normal.       Significant Labs: All pertinent lab results from the past 24 hours have been reviewed.    Significant Imaging: I have reviewed and interpreted all pertinent imaging results/findings within the past 24 hours.

## 2024-08-11 NOTE — ASSESSMENT & PLAN NOTE
Ms. Pernell Ly is a 44 y.o. patient transferred from OSH for EEG monitorin. Presented with seizure like activity. On admission to OSH, seizure episodes different than prior GTCs. This time with 1-2 minutes blank starring. No gaze preference during episodes. Prior to transfer optimized on Onfi and continued on LTG. Patient has needed EMU for a long time, however, insurance authorization pending to start admission process. Will continue to monitor seizure like activity, if any, while inpatient. Current episode more consistent with complicated migraines. However, will monitor neurological electrical function.     Plan   -Continue Onfi 30mg nightly   -Continue LTG 150mg BID   -Consider Ativan 2mg PRN with parameters (Only if GTC > 5 minutes)   -Pending EEG placement   -Thank you for this consult. Neurology will continue to follow.

## 2024-08-11 NOTE — PLAN OF CARE
Problem: Adult Inpatient Plan of Care  Goal: Plan of Care Review  Outcome: Progressing  Goal: Patient-Specific Goal (Individualized)  Outcome: Progressing  Goal: Absence of Hospital-Acquired Illness or Injury  Outcome: Progressing  Goal: Optimal Comfort and Wellbeing  Outcome: Progressing  Goal: Readiness for Transition of Care  Outcome: Progressing   POC and meds reviewed with patient.No events noted.EEG monitoring in progress.

## 2024-08-11 NOTE — PROGRESS NOTES
Vance Rosen - Neurosurgery ([facility])  [facility] Medicine  Progress Note    Patient Name: Pernell Ly  MRN: [id number]  Patient Class: IP- Inpatient   Admission Date: 8/8/2024  Length of Stay: 1 days  Attending Physician: Maite Hargrove MD  Primary Care Provider: Zoey Nelson MD        Subjective:     Principal Problem:Seizure        HPI:  44-year-old female with HTN, migraines, epilepsy who presented with a seizure 2 hours prior to presenting in the ER.  She also reported difficulty controlling her seizures with increase in seizure activity with an episode 2 weeks prior to this last episode.  Seizures last typically for 3-5 minutes.  Another episode of seizure activity witnessed in the ER.  Her medications has been titrated up without improvement as outpatient.  Patient reported intermittent left-sided tingling and numbness sensation, mostly at night that can be associated with a headache and there was concern that this may be seizures as well, and she was working to get EEG monitoring (EMU) with her epileptologist but was unable to have that covered by insurance.  Case was discussed with Neurology by ER physician who recommended increasing clobazam 20 mg p.o. q.h.s. to 30 mg p.o. q.h.s., and [place] [facility] [place] EEG monitoring which is only available at that facility.  Patient was accepted for transfer, however no beds have been available, and it was past 24 hours while awaiting in the ER therefore Hospital Medicine was asked to admit patient while awaiting bed for transfer.  No further seizure activity after medication adjustment since last night.  Patient reports nasal congestion, but otherwise is without complaints.  No reported headache at this time.         Overview/Hospital Course:  No notes on file    Interval History:   8/11:  admission for EMU     Review of Systems   Constitutional:  Negative for chills and fever.   HENT:  Positive for congestion.    Eyes:  Negative for visual  disturbance.   Respiratory:  Negative for shortness of breath.    Cardiovascular:  Negative for chest pain.   Gastrointestinal:  Negative for abdominal pain, nausea and vomiting.   Endocrine: Negative for polyuria.   Genitourinary:  Negative for dysuria.   Musculoskeletal:  Negative for back pain.   Skin:  Negative for rash.   Neurological:  Positive for seizures. Negative for tremors, weakness and headaches.   Hematological:  Does not bruise/bleed easily.   Psychiatric/Behavioral:  Negative for confusion.      Objective:     Vital Signs (Most Recent):  Temp: 99.1 °F (37.3 °C) (08/11/24 1201)  Pulse: 68 (08/11/24 1201)  Resp: 18 (08/11/24 1201)  BP: 126/75 (08/11/24 1201)  SpO2: 99 % (08/11/24 1201) Vital Signs (24h Range):  Temp:  [97.6 °F (36.4 °C)-99.1 °F (37.3 °C)] 99.1 °F (37.3 °C)  Pulse:  [66-86] 68  Resp:  [16-18] 18  SpO2:  [97 %-100 %] 99 %  BP: (100-126)/(59-80) 126/75     Weight: 77.2 kg (170 lb 3.5 oz)  Body mass index is 29.22 kg/m².  No intake or output data in the 24 hours ending 08/11/24 1356      Physical Exam  Vitals and nursing note reviewed.   Constitutional:       Appearance: She is not ill-appearing.   HENT:      Head: Normocephalic and atraumatic.      Nose: Nose normal.   Eyes:      Extraocular Movements: Extraocular movements intact.      Conjunctiva/sclera: Conjunctivae normal.   Cardiovascular:      Rate and Rhythm: Normal rate and regular rhythm.   Pulmonary:      Effort: Pulmonary effort is normal.      Breath sounds: Normal breath sounds.   Abdominal:      General: Bowel sounds are normal. There is no distension.      Palpations: Abdomen is soft.      Tenderness: There is no abdominal tenderness. There is no guarding or rebound.   Musculoskeletal:         General: Normal range of motion.      Cervical back: Normal range of motion.      Right lower leg: No edema.      Left lower leg: No edema.   Skin:     General: Skin is warm and dry.      Capillary Refill: Capillary refill takes less  than 2 seconds.   Neurological:      Mental Status: She is alert and oriented to person, place, and time.      Motor: No weakness.   Psychiatric:         Mood and Affect: Mood normal.         Behavior: Behavior normal.         Thought Content: Thought content normal.             Significant Labs: All pertinent labs within the past 24 hours have been reviewed.    Significant Imaging: I have reviewed all pertinent imaging results/findings within the past 24 hours.    Assessment/Plan:      * Seizure  Atypical migraines  - No infection as possible nidus for worsening seizure activity and no identifiable triggers as per patient  - Continue lamotrigine 150 mg p.o. b.i.d. and increased clobazam 30 mg p.o. q.h.s. as previously discussed with Neurology by ER physician  - Seizure precautions and will have diazepam 5 mg IV q.4 hours as needed for any return of seizure activity  - Also have medication for headaches as needed  - [place] [facility] [place]  - Trend with labs and correct electrolytes as needed    Gastroesophageal reflux disease with esophagitis without hemorrhage  - Reports she takes PPI as needed but not on a daily basis    Essential hypertension  - Resume home regimen of nifedipine XL 90 mg p.o. daily      VTE Risk Mitigation (From admission, onward)           Ordered     IP VTE LOW RISK PATIENT  Once         08/09/24 1754     Place sequential compression device  Until discontinued         08/09/24 1754                    Discharge Planning   SY:      Code Status: Full Code   Is the patient medically ready for discharge?:     Reason for patient still in hospital (select all that apply): Patient trending condition  Discharge Plan A: Home                  Maite Hargrove MD  Department of Hospital Medicine   [facility] - Neurosurgery ([facility])

## 2024-08-11 NOTE — PROGRESS NOTES
V[facility] Rosen - Neurosurgery ([facility])  Neurology  Progress Note    Patient Name: Pernell Ly  MRN: [id number]  Admission Date: 8/8/2024  Hospital Length of Stay: 1 days  Code Status: Full Code   Attending Provider: Maite Hargrove MD  Primary Care Physician: Zoey Nelson MD   Principal Problem:Seizure    HPI:   Ms. Pernell Ly is a 44 y.o. patient with history of HTN, migraines and epilepsy. Presented ot OSH presented with a seizure 2 hours prior to being admitted to the ED. Episode described as blank starring with left upward gaze, followed by left sided tingling, numbness sensation and associated left temporal headaches. Episodes are usually described as GTC. This is the first time patient has this type of presentation. Patient has been compliant with home AED (previously on Onfi 20 nightly and  BID). Followed as an outpatient with Epilepsy team with plans on sorting out a possible EMU admission. On admission upon transfer without evidence of seizure like activity.     Patient trasferred to [facility] [place] for Neurology evaluation and EEG monitoring      Subjective:     Interval History: No new episodes of seizures. Tolerating Onfi optimization. Pending EEG placement.     Current Facility-Administered Medications   Medication Dose Route Frequency Provider Last Rate Last Admin    acetaminophen tablet 650 mg  650 mg Oral Q4H PRN Arlyn Rodríguez MD        cetirizine tablet 10 mg  10 mg Oral Daily Arlyn Rodríguez MD   10 mg at 08/11/24 0931    cloBAZam Tab 30 mg  30 mg Oral QHS Arlyn Rodríguez MD   30 mg at 08/10/24 2148    dextrose 10% bolus 125 mL 125 mL  12.5 g Intravenous PRN Arlyn Rodríguez MD        dextrose 10% bolus 250 mL 250 mL  25 g Intravenous PRN Arlyn Rodríguez MD        diazePAM injection 5 mg  5 mg Intravenous Q4H PRN Arlyn Rodríguez MD        glucagon (human recombinant) injection 1 mg  1 mg Intramuscular PRN Arlyn Rodríguez MD        glucose chewable tablet 16 g  16 g Oral PRN Arlyn Rodríguez MD         glucose chewable tablet 24 g  24 g Oral PRN Arlyn Rodríguez MD        ibuprofen tablet 600 mg  600 mg Oral Q6H JEANNETTEN Arlyn Rodríguez MD   600 mg at 08/10/24 0550    lamoTRIgine tablet 150 mg  150 mg Oral BID Maite Hargrove MD   150 mg at 08/11/24 0930    melatonin tablet 6 mg  6 mg Oral Nightly PRN Arlyn Rodríguez MD        naloxone 0.4 mg/mL injection 0.02 mg  0.02 mg Intravenous PRN Arlyn Rodríguez MD        NIFEdipine 24 hr tablet 90 mg  90 mg Oral Daily Arlyn Rodríguez MD   90 mg at 08/11/24 0930    ondansetron injection 4 mg  4 mg Intravenous Q8H PRN Arlyn Rodríguez MD        polyethylene glycol packet 17 g  17 g Oral BID PRN Arlyn Rodríguez MD        prochlorperazine injection Soln 5 mg  5 mg Intravenous Q6H PRN Arlyn Rodríguez MD        sodium chloride 0.9% flush 10 mL  10 mL Intravenous Q8H Arlyn Rodríguez MD   10 mL at 08/10/24 2149       Review of Systems   Constitutional:  Negative for chills and fever.   HENT:  Negative for congestion.    Eyes:  Negative for visual disturbance.   Respiratory:  Negative for shortness of breath.    Cardiovascular:  Negative for chest pain.   Gastrointestinal:  Negative for abdominal pain, nausea and vomiting.   Endocrine: Negative for polyuria.   Genitourinary:  Negative for dysuria.   Musculoskeletal:  Negative for back pain.   Skin:  Negative for rash.   Neurological:  Negative for tremors, seizures, weakness and headaches.   Hematological:  Does not bruise/bleed easily.   Psychiatric/Behavioral:  Negative for confusion.      Objective:     Vital Signs (Most Recent):  Temp: 99.1 °F (37.3 °C) (08/11/24 1201)  Pulse: 68 (08/11/24 1201)  Resp: 18 (08/11/24 1201)  BP: 126/75 (08/11/24 1201)  SpO2: 99 % (08/11/24 1201) Vital Signs (24h Range):  Temp:  [97.6 °F (36.4 °C)-99.1 °F (37.3 °C)] 99.1 °F (37.3 °C)  Pulse:  [66-86] 68  Resp:  [16-18] 18  SpO2:  [97 %-100 %] 99 %  BP: (100-126)/(59-80) 126/75     Weight: 77.2 kg (170 lb 3.5 oz)  Body mass index is 29.22 kg/m².     Physical  Exam  Vitals and nursing note reviewed.   Constitutional:       Appearance: She is not ill-appearing.   HENT:      Head: Normocephalic and atraumatic.      Nose: Nose normal.   Eyes:      Extraocular Movements: Extraocular movements intact.      Conjunctiva/sclera: Conjunctivae normal.   Cardiovascular:      Rate and Rhythm: Normal rate and regular rhythm.   Pulmonary:      Effort: Pulmonary effort is normal.      Breath sounds: Normal breath sounds.   Abdominal:      General: Bowel sounds are normal. There is no distension.      Palpations: Abdomen is soft.      Tenderness: There is no abdominal tenderness. There is no guarding or rebound.   Musculoskeletal:         General: Normal range of motion.      Cervical back: Normal range of motion.      Right lower leg: No edema.      Left lower leg: No edema.   Skin:     General: Skin is warm and dry.      Capillary Refill: Capillary refill takes less than 2 seconds.   Neurological:      Mental Status: She is alert and oriented to person, place, and time.      Cranial Nerves: Cranial nerves 2-12 are intact.      Motor: No weakness.      Deep Tendon Reflexes:      Reflex Scores:       Tricep reflexes are 2+ on the right side and 2+ on the left side.       Bicep reflexes are 2+ on the right side and 2+ on the left side.       Patellar reflexes are 2+ on the right side and 2+ on the left side.  Psychiatric:         Mood and Affect: Mood normal.         Behavior: Behavior normal.         Thought Content: Thought content normal.          NEUROLOGICAL EXAMINATION:     MENTAL STATUS   Oriented to person, place, and time.   Level of consciousness: alert    CRANIAL NERVES   Cranial nerves II through XII intact.     MOTOR EXAM   Muscle bulk: normal    Strength   Right deltoid: 5/5  Left deltoid: 5/5  Right biceps: 5/5  Left biceps: 5/5  Right triceps: 5/5  Left triceps: 5/5  Right quadriceps: 5/5  Left quadriceps: 5/5  Right hamstrin/5  Left hamstrin/5    REFLEXES      Reflexes   Right biceps: 2+  Left biceps: 2+  Right triceps: 2+  Left triceps: 2+  Right patellar: 2+  Left patellar: 2+    SENSORY EXAM   Light touch normal.   Vibration normal.       Significant Labs: All pertinent lab results from the past 24 hours have been reviewed.    Significant Imaging: I have reviewed and interpreted all pertinent imaging results/findings within the past 24 hours.  Assessment and Plan:     * Seizure  Ms. Pernell Ly is a 44 y.o. patient transferred from OSH for EEG monitorin. Presented with seizure like activity. On admission to OSH, seizure episodes different than prior GTCs. This time with 1-2 minutes blank starring. No gaze preference during episodes. Prior to transfer optimized on Onfi and continued on LTG. Patient has needed EMU for a long time, however, insurance authorization pending to start admission process. Will continue to monitor seizure like activity, if any, while inpatient. Current episode more consistent with complicated migraines. However, will monitor neurological electrical function.     Plan   -Continue Onfi 30mg nightly   -Continue LTG 150mg BID   -Consider Ativan 2mg PRN with parameters (Only if GTC > 5 minutes)   -Pending EEG placement   -Thank you for this consult. Neurology will continue to follow.         VTE Risk Mitigation (From admission, onward)           Ordered     IP VTE LOW RISK PATIENT  Once         08/09/24 1754     Place sequential compression device  Until discontinued         08/09/24 1754                    Enzo Nunes MD  Neurology  Vance sweetie - Neurosurgery ([facility])

## 2024-08-12 LAB — LAMOTRIGINE SERPL-MCNC: 3.9 UG/ML (ref 2–15)

## 2024-08-12 PROCEDURE — 25000003 PHARM REV CODE 250: Performed by: HOSPITALIST

## 2024-08-12 PROCEDURE — 95720 EEG PHY/QHP EA INCR W/VEEG: CPT | Mod: ,,, | Performed by: STUDENT IN AN ORGANIZED HEALTH CARE EDUCATION/TRAINING PROGRAM

## 2024-08-12 PROCEDURE — 99233 SBSQ HOSP IP/OBS HIGH 50: CPT | Mod: ,,, | Performed by: PSYCHIATRY & NEUROLOGY

## 2024-08-12 PROCEDURE — A4216 STERILE WATER/SALINE, 10 ML: HCPCS | Performed by: HOSPITALIST

## 2024-08-12 PROCEDURE — 95714 VEEG EA 12-26 HR UNMNTR: CPT

## 2024-08-12 PROCEDURE — 11000001 HC ACUTE MED/SURG PRIVATE ROOM

## 2024-08-12 RX ADMIN — NIFEDIPINE 90 MG: 30 TABLET, FILM COATED, EXTENDED RELEASE ORAL at 08:08

## 2024-08-12 RX ADMIN — LAMOTRIGINE 150 MG: 150 TABLET ORAL at 08:08

## 2024-08-12 RX ADMIN — Medication 10 ML: at 02:08

## 2024-08-12 RX ADMIN — Medication 10 ML: at 10:08

## 2024-08-12 RX ADMIN — CLOBAZAM 30 MG: 10 TABLET ORAL at 08:08

## 2024-08-12 RX ADMIN — CETIRIZINE HYDROCHLORIDE 10 MG: 5 TABLET, FILM COATED ORAL at 08:08

## 2024-08-12 RX ADMIN — IBUPROFEN 600 MG: 600 TABLET, FILM COATED ORAL at 08:08

## 2024-08-12 NOTE — PROGRESS NOTES
Vance Rosne - Neurosurgery ([facility])  [facility] Medicine  Progress Note    Patient Name: Pernell Ly  MRN: [id number]  Patient Class: IP- Inpatient   Admission Date: 8/8/2024  Length of Stay: 2 days  Attending Physician: Maite Hargrove MD  Primary Care Provider: Zoey Nelson MD        Subjective:     Principal Problem:Seizure        HPI:  44-year-old female with HTN, migraines, epilepsy who presented with a seizure 2 hours prior to presenting in the ER.  She also reported difficulty controlling her seizures with increase in seizure activity with an episode 2 weeks prior to this last episode.  Seizures last typically for 3-5 minutes.  Another episode of seizure activity witnessed in the ER.  Her medications has been titrated up without improvement as outpatient.  Patient reported intermittent left-sided tingling and numbness sensation, mostly at night that can be associated with a headache and there was concern that this may be seizures as well, and she was working to get EEG monitoring (EMU) with her epileptologist but was unable to have that covered by insurance.  Case was discussed with Neurology by ER physician who recommended increasing clobazam 20 mg p.o. q.h.s. to 30 mg p.o. q.h.s., and [place] [facility] [place] which [place] available at that [place].  Patient was accepted for transfer, however no beds have been available, and it was past 24 hours while awaiting in the ER therefore Hospital Medicine was asked to admit patient while awaiting bed for transfer.  No further seizure activity after medication adjustment since last night.  Patient reports nasal congestion, but otherwise is without complaints.  No reported headache at this time.         Overview/Hospital Course:  No notes on file    Interval History:   8/11: EEG in process    Review of Systems   Constitutional:  Negative for chills and fever.   HENT:  Positive for congestion.    Eyes:  Negative for visual disturbance.    Respiratory:  Negative for shortness of breath.    Cardiovascular:  Negative for chest pain.   Gastrointestinal:  Negative for abdominal pain, nausea and vomiting.   Endocrine: Negative for polyuria.   Genitourinary:  Negative for dysuria.   Musculoskeletal:  Negative for back pain.   Skin:  Negative for rash.   Neurological:  Positive for seizures. Negative for tremors, weakness and headaches.   Hematological:  Does not bruise/bleed easily.   Psychiatric/Behavioral:  Negative for confusion.      Objective:     Vital Signs (Most Recent):  Temp: 98.2 °F (36.8 °C) (08/12/24 0800)  Pulse: 90 (08/12/24 1053)  Resp: 18 (08/12/24 0800)  BP: (!) 149/81 (08/12/24 0800)  SpO2: 99 % (08/12/24 0800) Vital Signs (24h Range):  Temp:  [97.5 °F (36.4 °C)-99.1 °F (37.3 °C)] 98.2 °F (36.8 °C)  Pulse:  [62-90] 90  Resp:  [16-18] 18  SpO2:  [95 %-99 %] 99 %  BP: (106-149)/(64-84) 149/81     Weight: 77.2 kg (170 lb 3.5 oz)  Body mass index is 29.22 kg/m².    Intake/Output Summary (Last 24 hours) at 8/12/2024 1159  Last data filed at 8/11/2024 1400  Gross per 24 hour   Intake 10 ml   Output --   Net 10 ml         Physical Exam  Vitals and nursing note reviewed.   Constitutional:       Appearance: She is not ill-appearing.   HENT:      Head: Normocephalic and atraumatic.      Nose: Nose normal.   Eyes:      Extraocular Movements: Extraocular movements intact.      Conjunctiva/sclera: Conjunctivae normal.   Cardiovascular:      Rate and Rhythm: Normal rate and regular rhythm.   Pulmonary:      Effort: Pulmonary effort is normal.      Breath sounds: Normal breath sounds.   Abdominal:      General: Bowel sounds are normal. There is no distension.      Palpations: Abdomen is soft.      Tenderness: There is no abdominal tenderness. There is no guarding or rebound.   Musculoskeletal:         General: Normal range of motion.      Cervical back: Normal range of motion.      Right lower leg: No edema.      Left lower leg: No edema.   Skin:      General: Skin is warm and dry.      Capillary Refill: Capillary refill takes less than 2 seconds.   Neurological:      Mental Status: She is alert and oriented to person, place, and time.      Motor: No weakness.   Psychiatric:         Mood and Affect: Mood normal.         Behavior: Behavior normal.         Thought Content: Thought content normal.             Significant Labs: All pertinent labs within the past 24 hours have been reviewed.    Significant Imaging: I have reviewed all pertinent imaging results/findings within the past 24 hours.    Assessment/Plan:      * Seizure  Atypical migraines  - No infection as possible nidus for worsening seizure activity and no identifiable triggers as per patient  - Continue lamotrigine 150 mg p.o. b.i.d. and increased clobazam 30 mg p.o. q.h.s. as previously discussed with Neurology by ER physician  - Seizure precautions and will have diazepam 5 mg IV q.4 hours as needed for any return of seizure activity  - Also have medication for headaches as needed  - Await transfer [place] [facility] [place]  - Trend with labs and correct electrolytes as needed    Gastroesophageal reflux disease with esophagitis without hemorrhage  - Reports she takes PPI as needed but not on a daily basis    Essential hypertension  - Resume home regimen of nifedipine XL 90 mg p.o. daily      VTE Risk Mitigation (From admission, onward)           Ordered     IP VTE LOW RISK PATIENT  Once         08/09/24 1754     Place sequential compression device  Until discontinued         08/09/24 1754                    Discharge Planning   SY:      Code Status: Full Code   Is the patient medically ready for discharge?:     Reason for patient still in hospital (select all that apply): Patient trending condition  Discharge Plan A: Home                  Maite Hargrove MD  Department of Hospital Medicine   [facility] - Neurosurgery ([facility])

## 2024-08-12 NOTE — PROCEDURES
VIDEO ELECTROENCEPHALOGRAM  REPORT    DATE OF SERVICE:8/12/24  EEG NUMBER: [id number]  REQUESTED BY:  Dr Hargrove  LOCATION OF SERVICE:  [facility] [place]    METHODOLOGY   Electroencephalographic (EEG) recording is with electrodes placed according to the International 10-20 placement system.  Thirty two (32) channels of digital signal (sampling rate of 512/sec) including T1 and T2 was simultaneously recorded from the scalp and may include  EKG, EMG, and/or eye monitors.  Recording band pass was 0.1 to 512 hz.  Digital video recording of the patient is simultaneously recorded with the EEG.  The patient is instructed report clinical symptoms which may occur during the recording session.  EEG and video recording is stored and archived in digital format.  Activation procedures which include photic stimulation, hyperventilation and instructing patients to perform simple task are done in selected patients.   The EEG is displayed on a monitor screen and can be reviewed using different montages.  Computer assisted analysis is employed to detect spike and electrographic seizure activity.   The entire record is submitted for computer analysis.  The entire recording is visually reviewed and the times identified by computer analysis as being spikes or seizures are reviewed again.  Compresses spectral analysis (CSA) is also performed on the activity recorded from each individual channel.  This is displayed as a power display of frequencies from 0 to 30 Hz over time.   The CSA is reviewed looking for asymmetries in power between homologous areas of the scalp and then compared with the original EEG recording.     [employer] software was also utilized in the review of this study.  This software suite analyzes the EEG recording in multiple domains.  Coherence and rhythmicity is computed to identify EEG sections which may contain organized seizures.  Each channel undergoes analysis to detect presence of spike and sharp waves which have special and  morphological characteristic of epileptic activity.  The routine EEG recording is converted from spacial into frequency domain.  This is then displayed comparing homologous areas to identify areas of significant asymmetry.  Algorithm to identify non-cortically generated artifact is used to separate eye movement, EMG and other artifact from the EEG.      ELECTROENCEPHALOGRAM:    RECORDING TIMES:  Start on 8/11/24 at 15:13  Stop on 8/12/24 at 07:00    A total of 15 hrs and 47 min of EEG recording was obtained.    Indication: 44 y.o. patient with history of HTN, migraines and epilepsy. Presented ot OSH presented with a seizure 2 hours prior to being admitted to the ED. Episode described as blank starring with left upward gaze, followed by left sided tingling, numbness sensation and associated left temporal headaches.     State of Consciousness:   Awake and asleep    Background:   The background is well organized, symmetric and continuous.  There is an excessive amount of beta activity present.  There is a well defined 10-11 hz posterior dominant rhythm seen at maximum alertness that is symmetric and reactive to eye opening and closure.    Sleep:   Transition into stage 1 sleep is characterized by attenuation of the posterior dominant rhythm, bilateral theta activity, and vertex waves.  There is also transition into stage II sleep with symmetric vertex waves, sleep spindles, and k complexes noted.     Epileptiform Abnormalities  Spikes, right temporal (F8>T4)  Spikes, left temporal (F7>T3)    Seizures/Events:   None    EKG:   Regular rate and rhythm on single lead EKG    Activating procedures:   Hyperventilation and photic stimulation are not performed     Impression:   Abnormal awake and sleep EEG due to the presence of bialteral temporal interictal epileptiform discharges consistent with cortical irritability and seizure potential in these regions.  No seizures are recorded.  This record also shows excessive fast  activity which is typically seen secondary to sedative medications such as benzodiazepines or barbiturates.       Sarah Bridges MD  [facility]   Department of Neurology/Epilepsy

## 2024-08-12 NOTE — PROGRESS NOTES
[facility] - Neurosurgery ([facility])  Neurology  Progress Note    Patient Name: Pernell Ly  MRN: [id number]  Admission Date: 8/8/2024  Hospital Length of Stay: 2 days  Code Status: Full Code   Attending Provider: Maite Hargrove MD  Primary Care Physician: Zoey Nelson MD   Principal Problem:Seizure    HPI:   Ms. Pernell Ly is a 44 y.o. patient with history of HTN, migraines and epilepsy. Presented ot OSH presented with a seizure 2 hours prior to being admitted to the ED. Episode described as blank starring with left upward gaze, followed by left sided tingling, numbness sensation and associated left temporal headaches. Episodes are usually described as GTC. This is the first time patient has this type of presentation. Patient has been compliant with home AED (previously on Onfi 20 nightly and  BID). Followed as an outpatient with Epilepsy team with plans on sorting out a possible EMU admission. On admission upon transfer without evidence of seizure like activity.     [place] [facility] [place]    Subjective:     Interval History: Stable clinical course. No evidence of seizures in the EEG. Tolerating AEDs.     Current Facility-Administered Medications   Medication Dose Route Frequency Provider Last Rate Last Admin    acetaminophen tablet 650 mg  650 mg Oral Q4H PRN Arlyn Rodríguez MD        cetirizine tablet 10 mg  10 mg Oral Daily Arlyn Rodríguez MD   10 mg at 08/12/24 0846    cloBAZam Tab 30 mg  30 mg Oral QHS Arlyn Rodríguez MD   30 mg at 08/11/24 2132    dextrose 10% bolus 125 mL 125 mL  12.5 g Intravenous PRN Arlyn Rodríguez MD        dextrose 10% bolus 250 mL 250 mL  25 g Intravenous PRN Arlyn Rodríguez MD        diazePAM injection 5 mg  5 mg Intravenous Q4H PRN Arlyn Rodríguez MD        glucagon (human recombinant) injection 1 mg  1 mg Intramuscular PRN Arlyn Rodríguez MD        glucose chewable tablet 16 g  16 g Oral PRN Arlyn Rodríguez MD         glucose chewable tablet 24 g  24 g Oral PRN Arlyn Rodríguez MD        ibuprofen tablet 600 mg  600 mg Oral Q6H JEANNETTEN Arlyn Rodríguez MD   600 mg at 08/11/24 2152    lamoTRIgine tablet 150 mg  150 mg Oral BID Maite Hargrove MD   150 mg at 08/12/24 0846    melatonin tablet 6 mg  6 mg Oral Nightly PRN Arlyn Rodríguez MD        naloxone 0.4 mg/mL injection 0.02 mg  0.02 mg Intravenous PRN Arlyn Rodríguez MD        NIFEdipine 24 hr tablet 90 mg  90 mg Oral Daily Arlyn Rodríguez MD   90 mg at 08/12/24 0846    ondansetron injection 4 mg  4 mg Intravenous Q8H PRN Arlyn Rodríguez MD        polyethylene glycol packet 17 g  17 g Oral BID PRN Arlyn Rodríguez MD        prochlorperazine injection Soln 5 mg  5 mg Intravenous Q6H PRN Arlyn Rodríguez MD        sodium chloride 0.9% flush 10 mL  10 mL Intravenous Q8H Arlyn Rodríguez MD   10 mL at 08/12/24 1417       Review of Systems   Constitutional:  Negative for chills and fever.   HENT:  Negative for congestion.    Eyes:  Negative for visual disturbance.   Respiratory:  Negative for shortness of breath.    Cardiovascular:  Negative for chest pain.   Gastrointestinal:  Negative for abdominal pain, nausea and vomiting.   Endocrine: Negative for polyuria.   Genitourinary:  Negative for dysuria.   Musculoskeletal:  Negative for back pain.   Skin:  Negative for rash.   Neurological:  Negative for tremors, seizures, weakness and headaches.   Hematological:  Does not bruise/bleed easily.   Psychiatric/Behavioral:  Negative for confusion.      Objective:     Vital Signs (Most Recent):  Temp: 97.6 °F (36.4 °C) (08/12/24 1555)  Pulse: 70 (08/12/24 1555)  Resp: 18 (08/12/24 1555)  BP: 114/74 (08/12/24 1555)  SpO2: 98 % (08/12/24 1555) Vital Signs (24h Range):  Temp:  [97.5 °F (36.4 °C)-98.7 °F (37.1 °C)] 97.6 °F (36.4 °C)  Pulse:  [62-90] 70  Resp:  [16-18] 18  SpO2:  [95 %-99 %] 98 %  BP: (106-149)/(64-81) 114/74     Weight: 77.2 kg (170 lb 3.5 oz)  Body mass index is 29.22 kg/m².     Physical  Exam  Vitals and nursing note reviewed.   Constitutional:       Appearance: She is not ill-appearing.   HENT:      Head: Normocephalic and atraumatic.      Nose: Nose normal.   Eyes:      Extraocular Movements: Extraocular movements intact.      Conjunctiva/sclera: Conjunctivae normal.   Cardiovascular:      Rate and Rhythm: Normal rate and regular rhythm.   Pulmonary:      Effort: Pulmonary effort is normal.      Breath sounds: Normal breath sounds.   Abdominal:      General: Bowel sounds are normal. There is no distension.      Palpations: Abdomen is soft.      Tenderness: There is no abdominal tenderness. There is no guarding or rebound.   Musculoskeletal:         General: Normal range of motion.      Cervical back: Normal range of motion.      Right lower leg: No edema.      Left lower leg: No edema.   Skin:     General: Skin is warm and dry.      Capillary Refill: Capillary refill takes less than 2 seconds.   Neurological:      Mental Status: She is alert and oriented to person, place, and time.      Cranial Nerves: Cranial nerves 2-12 are intact.      Motor: No weakness.      Deep Tendon Reflexes:      Reflex Scores:       Tricep reflexes are 2+ on the right side and 2+ on the left side.       Bicep reflexes are 2+ on the right side and 2+ on the left side.       Patellar reflexes are 2+ on the right side and 2+ on the left side.  Psychiatric:         Mood and Affect: Mood normal.         Behavior: Behavior normal.         Thought Content: Thought content normal.          NEUROLOGICAL EXAMINATION:     MENTAL STATUS   Oriented to person, place, and time.   Level of consciousness: alert    CRANIAL NERVES   Cranial nerves II through XII intact.     MOTOR EXAM   Muscle bulk: normal    Strength   Right deltoid: 5/5  Left deltoid: 5/5  Right biceps: 5/5  Left biceps: 5/5  Right triceps: 5/5  Left triceps: 5/5  Right quadriceps: 5/5  Left quadriceps: 5/5  Right hamstrin/5  Left hamstrin/5    REFLEXES      Reflexes   Right biceps: 2+  Left biceps: 2+  Right triceps: 2+  Left triceps: 2+  Right patellar: 2+  Left patellar: 2+    SENSORY EXAM   Light touch normal.   Vibration normal.       Significant Labs: All pertinent lab results from the past 24 hours have been reviewed.    Significant Imaging: I have reviewed and interpreted all pertinent imaging results/findings within the past 24 hours.  Assessment and Plan:     * Seizure  Ms. Pernell Ly is a 44 y.o. patient transferred from OSH for EEG monitorin. Presented with seizure like activity. On admission to OSH, seizure episodes different than prior GTCs. This time with 1-2 minutes blank starring. No gaze preference during episodes. Prior to transfer optimized on Onfi and continued on LTG. Patient has needed EMU for a long time, however, insurance authorization pending to start admission process. Will continue to monitor seizure like activity, if any, while inpatient. Current episode more consistent with complicated migraines. However, will monitor neurological electrical function.     EEG with bilateral temporal interictal epileptiform discharges consistent with cortical irritability and seizure potential in these regions. No seizures recorded. Excessive fast activity, typically seen in benzodiazepines.     Plan   -Continue Onfi 30mg nightly   -Continue LTG 150mg BID   -Consider Ativan 2mg PRN with parameters (Only if GTC > 5 minutes)   -Follow up with outpatient Epilepsy; Patient previously in-line for possible EMU admission   -Thank you for this consult. Neurology will signoff. Please reach out for any questions or concerns        VTE Risk Mitigation (From admission, onward)           Ordered     IP VTE LOW RISK PATIENT  Once         08/09/24 1754     Place sequential compression device  Until discontinued         08/09/24 1754                    Enzo Nunes MD  Neurology  [facility] - Neurosurgery ([facility])

## 2024-08-12 NOTE — SUBJECTIVE & OBJECTIVE
Interval History:   8/11: EEG in process    Review of Systems   Constitutional:  Negative for chills and fever.   HENT:  Positive for congestion.    Eyes:  Negative for visual disturbance.   Respiratory:  Negative for shortness of breath.    Cardiovascular:  Negative for chest pain.   Gastrointestinal:  Negative for abdominal pain, nausea and vomiting.   Endocrine: Negative for polyuria.   Genitourinary:  Negative for dysuria.   Musculoskeletal:  Negative for back pain.   Skin:  Negative for rash.   Neurological:  Positive for seizures. Negative for tremors, weakness and headaches.   Hematological:  Does not bruise/bleed easily.   Psychiatric/Behavioral:  Negative for confusion.      Objective:     Vital Signs (Most Recent):  Temp: 98.2 °F (36.8 °C) (08/12/24 0800)  Pulse: 90 (08/12/24 1053)  Resp: 18 (08/12/24 0800)  BP: (!) 149/81 (08/12/24 0800)  SpO2: 99 % (08/12/24 0800) Vital Signs (24h Range):  Temp:  [97.5 °F (36.4 °C)-99.1 °F (37.3 °C)] 98.2 °F (36.8 °C)  Pulse:  [62-90] 90  Resp:  [16-18] 18  SpO2:  [95 %-99 %] 99 %  BP: (106-149)/(64-84) 149/81     Weight: 77.2 kg (170 lb 3.5 oz)  Body mass index is 29.22 kg/m².    Intake/Output Summary (Last 24 hours) at 8/12/2024 1159  Last data filed at 8/11/2024 1400  Gross per 24 hour   Intake 10 ml   Output --   Net 10 ml         Physical Exam  Vitals and nursing note reviewed.   Constitutional:       Appearance: She is not ill-appearing.   HENT:      Head: Normocephalic and atraumatic.      Nose: Nose normal.   Eyes:      Extraocular Movements: Extraocular movements intact.      Conjunctiva/sclera: Conjunctivae normal.   Cardiovascular:      Rate and Rhythm: Normal rate and regular rhythm.   Pulmonary:      Effort: Pulmonary effort is normal.      Breath sounds: Normal breath sounds.   Abdominal:      General: Bowel sounds are normal. There is no distension.      Palpations: Abdomen is soft.      Tenderness: There is no abdominal tenderness. There is no guarding or  rebound.   Musculoskeletal:         General: Normal range of motion.      Cervical back: Normal range of motion.      Right lower leg: No edema.      Left lower leg: No edema.   Skin:     General: Skin is warm and dry.      Capillary Refill: Capillary refill takes less than 2 seconds.   Neurological:      Mental Status: She is alert and oriented to person, place, and time.      Motor: No weakness.   Psychiatric:         Mood and Affect: Mood normal.         Behavior: Behavior normal.         Thought Content: Thought content normal.             Significant Labs: All pertinent labs within the past 24 hours have been reviewed.    Significant Imaging: I have reviewed all pertinent imaging results/findings within the past 24 hours.

## 2024-08-12 NOTE — PLAN OF CARE
Problem: Adult Inpatient Plan of Care  Goal: Plan of Care Review  Outcome: Progressing  Goal: Patient-Specific Goal (Individualized)  Outcome: Progressing  Goal: Absence of Hospital-Acquired Illness or Injury  Outcome: Progressing  Goal: Optimal Comfort and Wellbeing  Outcome: Progressing  Goal: Readiness for Transition of Care  Outcome: Progressing   POC and meds reviewed with patient.EEG in place , progressing, no events noted.Will continue to monitor.

## 2024-08-12 NOTE — SUBJECTIVE & OBJECTIVE
Subjective:     Interval History: Stable clinical course. No evidence of seizures in the EEG. Tolerating AEDs.     Current Facility-Administered Medications   Medication Dose Route Frequency Provider Last Rate Last Admin    acetaminophen tablet 650 mg  650 mg Oral Q4H PRN Arlyn Rodríguez MD        cetirizine tablet 10 mg  10 mg Oral Daily Arlyn Rodríguez MD   10 mg at 08/12/24 0846    cloBAZam Tab 30 mg  30 mg Oral QHS Arlyn Rodríguez MD   30 mg at 08/11/24 2132    dextrose 10% bolus 125 mL 125 mL  12.5 g Intravenous PRN Arlyn Rodríguez MD        dextrose 10% bolus 250 mL 250 mL  25 g Intravenous PRN Arlyn Rodríguez MD        diazePAM injection 5 mg  5 mg Intravenous Q4H PRN Arlyn Rodríguez MD        glucagon (human recombinant) injection 1 mg  1 mg Intramuscular PRN Arlyn Rodríguez MD        glucose chewable tablet 16 g  16 g Oral PRN Arlyn Rodríguez MD        glucose chewable tablet 24 g  24 g Oral PRN Arlyn Rodríguez MD        ibuprofen tablet 600 mg  600 mg Oral Q6H JEANNETTEN Arlyn Rodríguez MD   600 mg at 08/11/24 2152    lamoTRIgine tablet 150 mg  150 mg Oral BID Maite Hargrove MD   150 mg at 08/12/24 0846    melatonin tablet 6 mg  6 mg Oral Nightly PRN Arlyn Rodríguez MD        naloxone 0.4 mg/mL injection 0.02 mg  0.02 mg Intravenous PRN Arlyn Rodríguez MD        NIFEdipine 24 hr tablet 90 mg  90 mg Oral Daily Arlyn Rodríguez MD   90 mg at 08/12/24 0846    ondansetron injection 4 mg  4 mg Intravenous Q8H PRN Arlyn Rodríguez MD        polyethylene glycol packet 17 g  17 g Oral BID PRN Arlyn Rodríguez MD        prochlorperazine injection Soln 5 mg  5 mg Intravenous Q6H PRN Arlyn Rodríguez MD        sodium chloride 0.9% flush 10 mL  10 mL Intravenous Q8H Arlyn Rodríguez MD   10 mL at 08/12/24 1417       Review of Systems   Constitutional:  Negative for chills and fever.   HENT:  Negative for congestion.    Eyes:  Negative for visual disturbance.   Respiratory:  Negative for shortness of breath.    Cardiovascular:  Negative for  chest pain.   Gastrointestinal:  Negative for abdominal pain, nausea and vomiting.   Endocrine: Negative for polyuria.   Genitourinary:  Negative for dysuria.   Musculoskeletal:  Negative for back pain.   Skin:  Negative for rash.   Neurological:  Negative for tremors, seizures, weakness and headaches.   Hematological:  Does not bruise/bleed easily.   Psychiatric/Behavioral:  Negative for confusion.      Objective:     Vital Signs (Most Recent):  Temp: 97.6 °F (36.4 °C) (08/12/24 1555)  Pulse: 70 (08/12/24 1555)  Resp: 18 (08/12/24 1555)  BP: 114/74 (08/12/24 1555)  SpO2: 98 % (08/12/24 1555) Vital Signs (24h Range):  Temp:  [97.5 °F (36.4 °C)-98.7 °F (37.1 °C)] 97.6 °F (36.4 °C)  Pulse:  [62-90] 70  Resp:  [16-18] 18  SpO2:  [95 %-99 %] 98 %  BP: (106-149)/(64-81) 114/74     Weight: 77.2 kg (170 lb 3.5 oz)  Body mass index is 29.22 kg/m².     Physical Exam  Vitals and nursing note reviewed.   Constitutional:       Appearance: She is not ill-appearing.   HENT:      Head: Normocephalic and atraumatic.      Nose: Nose normal.   Eyes:      Extraocular Movements: Extraocular movements intact.      Conjunctiva/sclera: Conjunctivae normal.   Cardiovascular:      Rate and Rhythm: Normal rate and regular rhythm.   Pulmonary:      Effort: Pulmonary effort is normal.      Breath sounds: Normal breath sounds.   Abdominal:      General: Bowel sounds are normal. There is no distension.      Palpations: Abdomen is soft.      Tenderness: There is no abdominal tenderness. There is no guarding or rebound.   Musculoskeletal:         General: Normal range of motion.      Cervical back: Normal range of motion.      Right lower leg: No edema.      Left lower leg: No edema.   Skin:     General: Skin is warm and dry.      Capillary Refill: Capillary refill takes less than 2 seconds.   Neurological:      Mental Status: She is alert and oriented to person, place, and time.      Cranial Nerves: Cranial nerves 2-12 are intact.      Motor: No  weakness.      Deep Tendon Reflexes:      Reflex Scores:       Tricep reflexes are 2+ on the right side and 2+ on the left side.       Bicep reflexes are 2+ on the right side and 2+ on the left side.       Patellar reflexes are 2+ on the right side and 2+ on the left side.  Psychiatric:         Mood and Affect: Mood normal.         Behavior: Behavior normal.         Thought Content: Thought content normal.          NEUROLOGICAL EXAMINATION:     MENTAL STATUS   Oriented to person, place, and time.   Level of consciousness: alert    CRANIAL NERVES   Cranial nerves II through XII intact.     MOTOR EXAM   Muscle bulk: normal    Strength   Right deltoid: 5/5  Left deltoid: 5/5  Right biceps: 5/5  Left biceps: 5/5  Right triceps: 5/5  Left triceps: 5/5  Right quadriceps: 5/5  Left quadriceps: 5/5  Right hamstrin/5  Left hamstrin/5    REFLEXES     Reflexes   Right biceps: 2+  Left biceps: 2+  Right triceps: 2+  Left triceps: 2+  Right patellar: 2+  Left patellar: 2+    SENSORY EXAM   Light touch normal.   Vibration normal.       Significant Labs: All pertinent lab results from the past 24 hours have been reviewed.    Significant Imaging: I have reviewed and interpreted all pertinent imaging results/findings within the past 24 hours.

## 2024-08-12 NOTE — ASSESSMENT & PLAN NOTE
Ms. Pernell Ly is a 44 y.o. patient transferred from OSH for EEG monitorin. Presented with seizure like activity. On admission to OSH, seizure episodes different than prior GTCs. This time with 1-2 minutes blank starring. No gaze preference during episodes. Prior to transfer optimized on Onfi and continued on LTG. Patient has needed EMU for a long time, however, insurance authorization pending to start admission process. Will continue to monitor seizure like activity, if any, while inpatient. Current episode more consistent with complicated migraines. However, will monitor neurological electrical function.     EEG with bilateral temporal interictal epileptiform discharges consistent with cortical irritability and seizure potential in these regions. No seizures recorded. Excessive fast activity, typically seen in benzodiazepines.     Plan   -Continue Onfi 30mg nightly   -Continue LTG 150mg BID   -Consider Ativan 2mg PRN with parameters (Only if GTC > 5 minutes)   -Follow up with outpatient Epilepsy; Patient previously in-line for possible EMU admission   -Thank you for this consult. Neurology will signoff. Please reach out for any questions or concerns

## 2024-08-13 LAB
CLOBAZAM SERPL-MCNC: 280 NG/ML (ref 30–300)
NORCLOBAZAM SERPL-MCNC: 600 NG/ML (ref 300–3000)

## 2024-08-13 PROCEDURE — 11000001 HC ACUTE MED/SURG PRIVATE ROOM

## 2024-08-13 PROCEDURE — 25000003 PHARM REV CODE 250: Performed by: HOSPITALIST

## 2024-08-13 PROCEDURE — A4216 STERILE WATER/SALINE, 10 ML: HCPCS | Performed by: HOSPITALIST

## 2024-08-13 PROCEDURE — 25000003 PHARM REV CODE 250: Performed by: INTERNAL MEDICINE

## 2024-08-13 RX ORDER — BENZONATATE 100 MG/1
100 CAPSULE ORAL 3 TIMES DAILY PRN
Status: DISCONTINUED | OUTPATIENT
Start: 2024-08-13 | End: 2024-08-14 | Stop reason: HOSPADM

## 2024-08-13 RX ADMIN — BENZONATATE 100 MG: 100 CAPSULE ORAL at 09:08

## 2024-08-13 RX ADMIN — Medication 10 ML: at 01:08

## 2024-08-13 RX ADMIN — LAMOTRIGINE 150 MG: 150 TABLET ORAL at 09:08

## 2024-08-13 RX ADMIN — Medication 10 ML: at 09:08

## 2024-08-13 RX ADMIN — LAMOTRIGINE 150 MG: 150 TABLET ORAL at 08:08

## 2024-08-13 RX ADMIN — NIFEDIPINE 90 MG: 30 TABLET, FILM COATED, EXTENDED RELEASE ORAL at 08:08

## 2024-08-13 RX ADMIN — BENZONATATE 100 MG: 100 CAPSULE ORAL at 06:08

## 2024-08-13 RX ADMIN — CLOBAZAM 30 MG: 10 TABLET ORAL at 09:08

## 2024-08-13 RX ADMIN — ACETAMINOPHEN 650 MG: 325 TABLET ORAL at 01:08

## 2024-08-13 RX ADMIN — Medication 10 ML: at 06:08

## 2024-08-13 RX ADMIN — IBUPROFEN 600 MG: 600 TABLET, FILM COATED ORAL at 09:08

## 2024-08-13 RX ADMIN — CETIRIZINE HYDROCHLORIDE 10 MG: 5 TABLET, FILM COATED ORAL at 08:08

## 2024-08-13 NOTE — CARE UPDATE
I have reviewed the chart of Pernell Ly who is hospitalized for the following:    Active Hospital Problems    Diagnosis    *Seizure    Recurrent seizures    Gastroesophageal reflux disease with esophagitis without hemorrhage    Anxiety    Essential hypertension    Atypical migraine    HTN (hypertension)        Purvi Quinteros NP  [facility]

## 2024-08-13 NOTE — PROCEDURES
VIDEO ELECTROENCEPHALOGRAM  REPORT    DATE OF SERVICE:8/12/24-8/13/24  EEG NUMBER: [id number]  REQUESTED BY:  Dr Hargrove  LOCATION OF SERVICE:  [facility]    METHODOLOGY   Electroencephalographic (EEG) recording is with electrodes placed according to the International 10-20 placement system.  Thirty two (32) channels of digital signal (sampling rate of 512/sec) including T1 and T2 was simultaneously recorded from the scalp and may include  EKG, EMG, and/or eye monitors.  Recording band pass was 0.1 to 512 hz.  Digital video recording of the patient is simultaneously recorded with the EEG.  The patient is instructed report clinical symptoms which may occur during the recording session.  EEG and video recording is stored and archived in digital format.  Activation procedures which include photic stimulation, hyperventilation and instructing patients to perform simple task are done in selected patients.   The EEG is displayed on a monitor screen and can be reviewed using different montages.  Computer assisted analysis is employed to detect spike and electrographic seizure activity.   The entire record is submitted for computer analysis.  The entire recording is visually reviewed and the times identified by computer analysis as being spikes or seizures are reviewed again.  Compresses spectral analysis (CSA) is also performed on the activity recorded from each individual channel.  This is displayed as a power display of frequencies from 0 to 30 Hz over time.   The CSA is reviewed looking for asymmetries in power between homologous areas of the scalp and then compared with the original EEG recording.     Plain Vanilla software was also utilized in the review of this study.  This software suite analyzes the EEG recording in multiple domains.  Coherence and rhythmicity is computed to identify EEG sections which may contain organized seizures.  Each channel undergoes analysis to detect presence of spike and sharp waves which have  special and morphological characteristic of epileptic activity.  The routine EEG recording is converted from spacial into frequency domain.  This is then displayed comparing homologous areas to identify areas of significant asymmetry.  Algorithm to identify non-cortically generated artifact is used to separate eye movement, EMG and other artifact from the EEG.      ELECTROENCEPHALOGRAM:    RECORDING TIMES:  Start on 8/12/24 at 07:00  Stop on 8/13/24 at 07:00  24 hours recorded    Indication: 44 y.o. patient with history of HTN, migraines and epilepsy. Presented ot OSH presented with a seizure 2 hours prior to being admitted to the ED. Episode described as blank starring with left upward gaze, followed by left sided tingling, numbness sensation and associated left temporal headaches.     State of Consciousness:   Awake and asleep    Background:   The background is well organized, symmetric and continuous.  There is an excessive amount of beta activity present.  There is a well defined 10-11 hz posterior dominant rhythm seen at maximum alertness that is symmetric and reactive to eye opening and closure.    Sleep:   Transition into stage 1 sleep is characterized by attenuation of the posterior dominant rhythm, bilateral theta activity, and vertex waves.  There is also transition into stage II sleep with symmetric vertex waves, sleep spindles, and k complexes noted.     Epileptiform Abnormalities  Spikes, right temporal (F8>T4)  Spikes, left temporal (F7>T3)    Seizures/Events:   Multiple accidental event button activations    EKG:   Regular rate and rhythm on single lead EKG    Activating procedures:   Hyperventilation and photic stimulation are not performed     Impression:   Abnormal awake and sleep EEG due to the presence of bialteral temporal interictal epileptiform discharges consistent with cortical irritability and seizure potential in these regions.  No seizures are recorded.  This record also shows excessive  fast activity which is typically seen secondary to sedative medications such as benzodiazepines or barbiturates.        Sarah Bridges MD  [facility]   Department of Neurology/Epilepsy

## 2024-08-13 NOTE — SUBJECTIVE & OBJECTIVE
Interval History:   8/13: Continuing EEG monitoring.     Review of Systems   Constitutional:  Negative for chills and fever.   HENT:  Positive for congestion.    Eyes:  Negative for visual disturbance.   Respiratory:  Negative for shortness of breath.    Cardiovascular:  Negative for chest pain.   Gastrointestinal:  Negative for abdominal pain, nausea and vomiting.   Endocrine: Negative for polyuria.   Genitourinary:  Negative for dysuria.   Musculoskeletal:  Negative for back pain.   Skin:  Negative for rash.   Neurological:  Positive for seizures. Negative for tremors, weakness and headaches.   Hematological:  Does not bruise/bleed easily.   Psychiatric/Behavioral:  Negative for confusion.      Objective:     Vital Signs (Most Recent):  Temp: 97.9 °F (36.6 °C) (08/13/24 1213)  Pulse: 69 (08/13/24 1500)  Resp: 20 (08/13/24 1213)  BP: 127/85 (08/13/24 1213)  SpO2: 99 % (08/13/24 1213) Vital Signs (24h Range):  Temp:  [97.9 °F (36.6 °C)-98.1 °F (36.7 °C)] 97.9 °F (36.6 °C)  Pulse:  [66-90] 69  Resp:  [16-20] 20  SpO2:  [96 %-99 %] 99 %  BP: (105-127)/(57-85) 127/85     Weight: 77.2 kg (170 lb 3.5 oz)  Body mass index is 29.22 kg/m².    Intake/Output Summary (Last 24 hours) at 8/13/2024 1710  Last data filed at 8/13/2024 0600  Gross per 24 hour   Intake 20 ml   Output --   Net 20 ml         Physical Exam  Vitals and nursing note reviewed.   Constitutional:       Appearance: She is not ill-appearing.   HENT:      Head: Normocephalic and atraumatic.      Nose: Nose normal.   Eyes:      Extraocular Movements: Extraocular movements intact.      Conjunctiva/sclera: Conjunctivae normal.   Cardiovascular:      Rate and Rhythm: Normal rate and regular rhythm.   Pulmonary:      Effort: Pulmonary effort is normal.      Breath sounds: Normal breath sounds.   Abdominal:      General: Bowel sounds are normal. There is no distension.      Palpations: Abdomen is soft.      Tenderness: There is no abdominal tenderness. There is no  guarding or rebound.   Musculoskeletal:         General: Normal range of motion.      Cervical back: Normal range of motion.      Right lower leg: No edema.      Left lower leg: No edema.   Skin:     General: Skin is warm and dry.      Capillary Refill: Capillary refill takes less than 2 seconds.   Neurological:      Mental Status: She is alert and oriented to person, place, and time.      Motor: No weakness.   Psychiatric:         Mood and Affect: Mood normal.         Behavior: Behavior normal.         Thought Content: Thought content normal.             Significant Labs: All pertinent labs within the past 24 hours have been reviewed.    Significant Imaging: I have reviewed all pertinent imaging results/findings within the past 24 hours.

## 2024-08-13 NOTE — PLAN OF CARE
Problem: Adult Inpatient Plan of Care  Goal: Plan of Care Review  Outcome: Progressing  Flowsheets (Taken 8/13/2024 0744)  Plan of Care Reviewed With: patient  Goal: Patient-Specific Goal (Individualized)  Outcome: Progressing  Flowsheets (Taken 8/13/2024 0744)  Individualized Care Needs: keep patient updated  Anxieties, Fears or Concerns: states none  Patient/Family-Specific Goals (Include Timeframe): keep family updated  Goal: Absence of Hospital-Acquired Illness or Injury  Outcome: Progressing  Intervention: Identify and Manage Fall Risk  Flowsheets (Taken 8/13/2024 0744)  Safety Promotion/Fall Prevention:   assistive device/personal item within reach   family to remain at bedside   family expresses understanding of fall risk and prevention   instructed to call staff for mobility   lighting adjusted   medications reviewed   nonskid shoes/socks when out of bed   patient expresses understanding of fall risk and prevention   side rails raised x 2  Intervention: Prevent Skin Injury  Flowsheets (Taken 8/13/2024 0744)  Body Position: position changed independently  Skin Protection: incontinence pads utilized  Device Skin Pressure Protection: tubing/devices free from skin contact  Intervention: Prevent and Manage VTE (Venous Thromboembolism) Risk  Flowsheets (Taken 8/13/2024 0744)  VTE Prevention/Management: remove, assess skin, and reapply sequential compression device  Intervention: Prevent Infection  Flowsheets (Taken 8/13/2024 0744)  Infection Prevention:   environmental surveillance performed   hand hygiene promoted   personal protective equipment utilized   rest/sleep promoted   single patient room provided  Goal: Optimal Comfort and Wellbeing  Outcome: Progressing

## 2024-08-14 ENCOUNTER — TELEPHONE (OUTPATIENT)
Dept: FAMILY MEDICINE | Facility: CLINIC | Age: 44
End: 2024-08-14
Payer: COMMERCIAL

## 2024-08-14 VITALS
SYSTOLIC BLOOD PRESSURE: 111 MMHG | HEART RATE: 71 BPM | DIASTOLIC BLOOD PRESSURE: 73 MMHG | RESPIRATION RATE: 18 BRPM | HEIGHT: 64 IN | TEMPERATURE: 98 F | OXYGEN SATURATION: 95 % | BODY MASS INDEX: 29.06 KG/M2 | WEIGHT: 170.19 LBS

## 2024-08-14 PROCEDURE — 25000003 PHARM REV CODE 250: Performed by: HOSPITALIST

## 2024-08-14 PROCEDURE — 25000003 PHARM REV CODE 250: Performed by: INTERNAL MEDICINE

## 2024-08-14 PROCEDURE — A4216 STERILE WATER/SALINE, 10 ML: HCPCS | Performed by: HOSPITALIST

## 2024-08-14 PROCEDURE — 25000003 PHARM REV CODE 250

## 2024-08-14 RX ORDER — CLOBAZAM 10 MG/1
30 TABLET ORAL NIGHTLY
Qty: 90 EACH | Refills: 5 | Status: SHIPPED | OUTPATIENT
Start: 2024-08-14 | End: 2024-08-14 | Stop reason: HOSPADM

## 2024-08-14 RX ORDER — CLOBAZAM 20 MG/1
30 TABLET ORAL DAILY
Qty: 45 TABLET | Refills: 5 | Status: SHIPPED | OUTPATIENT
Start: 2024-08-14 | End: 2025-02-10

## 2024-08-14 RX ORDER — CLOBAZAM 20 MG/1
30 TABLET ORAL 2 TIMES DAILY
Qty: 90 TABLET | Refills: 5 | Status: SHIPPED | OUTPATIENT
Start: 2024-08-14 | End: 2024-08-14

## 2024-08-14 RX ADMIN — LAMOTRIGINE 150 MG: 150 TABLET ORAL at 09:08

## 2024-08-14 RX ADMIN — BENZONATATE 100 MG: 100 CAPSULE ORAL at 03:08

## 2024-08-14 RX ADMIN — GUAIFENESIN AND DEXTROMETHORPHAN HYDROBROMIDE 1 TABLET: 600; 30 TABLET, EXTENDED RELEASE ORAL at 03:08

## 2024-08-14 RX ADMIN — CETIRIZINE HYDROCHLORIDE 10 MG: 5 TABLET, FILM COATED ORAL at 09:08

## 2024-08-14 RX ADMIN — Medication 10 ML: at 06:08

## 2024-08-14 RX ADMIN — NIFEDIPINE 90 MG: 30 TABLET, FILM COATED, EXTENDED RELEASE ORAL at 09:08

## 2024-08-14 NOTE — PLAN OF CARE
Problem: Adult Inpatient Plan of Care  Goal: Plan of Care Review  Outcome: Progressing  Flowsheets (Taken 8/14/2024 0723)  Plan of Care Reviewed With: patient  Goal: Patient-Specific Goal (Individualized)  Outcome: Progressing     Problem: Adult Inpatient Plan of Care  Goal: Absence of Hospital-Acquired Illness or Injury  Intervention: Identify and Manage Fall Risk  Flowsheets (Taken 8/14/2024 0723)  Safety Promotion/Fall Prevention:   lighting adjusted   nonskid shoes/socks when out of bed   medications reviewed   side rails raised x 2  Intervention: Prevent and Manage VTE (Venous Thromboembolism) Risk  Flowsheets (Taken 8/14/2024 0723)  VTE Prevention/Management: ambulation promoted  Intervention: Prevent Infection  Flowsheets (Taken 8/14/2024 0723)  Infection Prevention:   environmental surveillance performed   equipment surfaces disinfected   rest/sleep promoted   personal protective equipment utilized   hand hygiene promoted   single patient room provided  Goal: Optimal Comfort and Wellbeing  Intervention: Monitor Pain and Promote Comfort  Flowsheets (Taken 8/14/2024 0723)  Pain Management Interventions:   quiet environment facilitated   relaxation techniques promoted   pain management plan reviewed with patient/caregiver   care clustered

## 2024-08-14 NOTE — PLAN OF CARE
Date: 08/14/2024      Patient Name: Pernell Ly  YOB: [birth year] [facility] [address]          Hospital Medicine Dept.  [facility] [address] [place] [address] [phone] after hours  [phone] fax Pernell Ly has been hospitalized at the [facility] since 8/8/2024.  Please excuse the patient from duties.  Patient may return on 8/15/2024.  No restrictions.     Please contact me if you have any questions.        Maite Hargrove MD  08/14/2024

## 2024-08-14 NOTE — PLAN OF CARE
[facility])  Discharge Final Note    Primary Care Provider: Zoey Nelson MD    Expected Discharge Date: 8/14/2024    Final Discharge Note (most recent)       Final Note - 08/14/24 1117          Final Note    Assessment Type Final Discharge Note (P)      Anticipated Discharge Disposition Home or Self Care (P)         Post-Acute Status    Post-Acute Authorization Other (P)      Other Status No Post-Acute Service Needs (P)                  Discharge home with family, no post acute needs.      Viki Aleman LMSW [facility]  [phone]    No future appointments.                   Contact Info       Zoey Nelson MD   Specialty: Family Medicine   Relationship: PCP - General    [address] [place] [address]   Phone: [phone]       Next Steps: Follow up on 8/14/2024    Instructions: f/u appointment  Viviana will message to primary care to get pt. scheduled. They will call the pt.

## 2024-08-14 NOTE — PLAN OF CARE
Problem: Adult Inpatient Plan of Care  Goal: Plan of Care Review  Outcome: Progressing  Goal: Patient-Specific Goal (Individualized)  Outcome: Progressing  Goal: Absence of Hospital-Acquired Illness or Injury  Outcome: Progressing  Goal: Optimal Comfort and Wellbeing  Outcome: Progressing  Goal: Readiness for Transition of Care  Outcome: Progressing   POC and meds reviewed with patient.EEG had been d/c during this shift.Patient had tylenol 650 po r/t to HA rated to 5.Med was effective.Will continue to monitor.

## 2024-08-14 NOTE — TELEPHONE ENCOUNTER
----- Message from Cheri Albarado sent at 8/14/2024 12:34 PM CDT -----  Regarding: call back  Type: Patient Call Back    Who called: pt     What is the request in detail: requesting call to schedule hospital f/u within 7 days, no availability populating in Epic     Can the clinic reply by [facility]?no    Would the patient rather a call back or a response via [facility]? call    Best call back number: [phone]     Additional Information:

## 2024-08-14 NOTE — PLAN OF CARE
Viviana will message to primary care to get pt. scheduled. They will call the pt.         Concetta Aguilar [facility]  Case Management   [phone]

## 2024-08-15 ENCOUNTER — TELEPHONE (OUTPATIENT)
Dept: PRIMARY CARE CLINIC | Facility: CLINIC | Age: 44
End: 2024-08-15
Payer: COMMERCIAL

## 2024-08-15 NOTE — TELEPHONE ENCOUNTER
----- Message from Viviana Trujillo sent at 8/14/2024 10:31 AM CDT -----  Regarding: hospital follow up  Name of caller:  ( rich)       What is the requesting detail: requesting to schedule a 7 day hospital follow up. No appt populated foe me. Please advise       Can the clinic reply by [facility]:       What number to call back: [phone]

## 2024-08-15 NOTE — TELEPHONE ENCOUNTER
Do you think I can schedule patient virtually with  for hfu or can her patients be seen by PETER ?

## 2024-08-26 ENCOUNTER — HOSPITAL ENCOUNTER (EMERGENCY)
Facility: OTHER | Age: 44
Discharge: HOME | End: 2024-08-26
Attending: EMERGENCY MEDICINE
Payer: COMMERCIAL

## 2024-08-26 VITALS
SYSTOLIC BLOOD PRESSURE: 153 MMHG | RESPIRATION RATE: 20 BRPM | BODY MASS INDEX: 29.02 KG/M2 | WEIGHT: 170 LBS | OXYGEN SATURATION: 96 % | TEMPERATURE: 98 F | DIASTOLIC BLOOD PRESSURE: 95 MMHG | HEIGHT: 64 IN | HEART RATE: 97 BPM

## 2024-08-26 DIAGNOSIS — R05.9 COUGH: ICD-10-CM

## 2024-08-26 DIAGNOSIS — R06.02 SHORTNESS OF BREATH: ICD-10-CM

## 2024-08-26 DIAGNOSIS — R06.2 WHEEZING: Primary | ICD-10-CM

## 2024-08-26 LAB
ALBUMIN SERPL BCP-MCNC: 4.1 G/DL (ref 3.5–5.2)
ALP SERPL-CCNC: 64 U/L (ref 55–135)
ALT SERPL W/O P-5'-P-CCNC: 18 U/L (ref 10–44)
ANION GAP SERPL CALC-SCNC: 9 MMOL/L (ref 8–16)
AST SERPL-CCNC: 18 U/L (ref 10–40)
BASOPHILS # BLD AUTO: 0.03 K/UL (ref 0–0.2)
BASOPHILS NFR BLD: 0.6 % (ref 0–1.9)
BILIRUB SERPL-MCNC: 0.2 MG/DL (ref 0.1–1)
BNP SERPL-MCNC: <10 PG/ML (ref 0–99)
BUN SERPL-MCNC: 16 MG/DL (ref 6–20)
CALCIUM SERPL-MCNC: 9.3 MG/DL (ref 8.7–10.5)
CHLORIDE SERPL-SCNC: 106 MMOL/L (ref 95–110)
CO2 SERPL-SCNC: 24 MMOL/L (ref 23–29)
CREAT SERPL-MCNC: 0.8 MG/DL (ref 0.5–1.4)
CTP QC/QA: YES
CTP QC/QA: YES
DIFFERENTIAL METHOD BLD: ABNORMAL
EOSINOPHIL # BLD AUTO: 0.2 K/UL (ref 0–0.5)
EOSINOPHIL NFR BLD: 3.3 % (ref 0–8)
ERYTHROCYTE [DISTWIDTH] IN BLOOD BY AUTOMATED COUNT: 13.6 % (ref 11.5–14.5)
EST. GFR  (NO RACE VARIABLE): >60 ML/MIN/1.73 M^2
GLUCOSE SERPL-MCNC: 117 MG/DL (ref 70–110)
HCT VFR BLD AUTO: 36.7 % (ref 37–48.5)
HGB BLD-MCNC: 12.6 G/DL (ref 12–16)
IMM GRANULOCYTES # BLD AUTO: 0 K/UL (ref 0–0.04)
IMM GRANULOCYTES NFR BLD AUTO: 0 % (ref 0–0.5)
LIPASE SERPL-CCNC: 12 U/L (ref 4–60)
LYMPHOCYTES # BLD AUTO: 2.1 K/UL (ref 1–4.8)
LYMPHOCYTES NFR BLD: 42.8 % (ref 18–48)
MAGNESIUM SERPL-MCNC: 2.1 MG/DL (ref 1.6–2.6)
MCH RBC QN AUTO: 30.1 PG (ref 27–31)
MCHC RBC AUTO-ENTMCNC: 34.3 G/DL (ref 32–36)
MCV RBC AUTO: 88 FL (ref 82–98)
MONOCYTES # BLD AUTO: 0.3 K/UL (ref 0.3–1)
MONOCYTES NFR BLD: 5.6 % (ref 4–15)
NEUTROPHILS # BLD AUTO: 2.3 K/UL (ref 1.8–7.7)
NEUTROPHILS NFR BLD: 47.7 % (ref 38–73)
NRBC BLD-RTO: 0 /100 WBC
PLATELET # BLD AUTO: 341 K/UL (ref 150–450)
PMV BLD AUTO: 8.8 FL (ref 9.2–12.9)
POC MOLECULAR INFLUENZA A AGN: NEGATIVE
POC MOLECULAR INFLUENZA B AGN: NEGATIVE
POTASSIUM SERPL-SCNC: 3.6 MMOL/L (ref 3.5–5.1)
PROT SERPL-MCNC: 8.4 G/DL (ref 6–8.4)
RBC # BLD AUTO: 4.19 M/UL (ref 4–5.4)
SARS-COV-2 RDRP RESP QL NAA+PROBE: NEGATIVE
SODIUM SERPL-SCNC: 139 MMOL/L (ref 136–145)
TROPONIN I SERPL DL<=0.01 NG/ML-MCNC: <0.006 NG/ML (ref 0–0.03)
WBC # BLD AUTO: 4.84 K/UL (ref 3.9–12.7)

## 2024-08-26 PROCEDURE — 63600175 PHARM REV CODE 636 W HCPCS: Performed by: EMERGENCY MEDICINE

## 2024-08-26 PROCEDURE — 84484 ASSAY OF TROPONIN QUANT: CPT | Performed by: EMERGENCY MEDICINE

## 2024-08-26 PROCEDURE — 96374 THER/PROPH/DIAG INJ IV PUSH: CPT

## 2024-08-26 PROCEDURE — 25000242 PHARM REV CODE 250 ALT 637 W/ HCPCS: Performed by: EMERGENCY MEDICINE

## 2024-08-26 PROCEDURE — 93010 ELECTROCARDIOGRAM REPORT: CPT | Mod: ,,, | Performed by: INTERNAL MEDICINE

## 2024-08-26 PROCEDURE — 85025 COMPLETE CBC W/AUTO DIFF WBC: CPT | Performed by: EMERGENCY MEDICINE

## 2024-08-26 PROCEDURE — 83735 ASSAY OF MAGNESIUM: CPT | Performed by: EMERGENCY MEDICINE

## 2024-08-26 PROCEDURE — 87502 INFLUENZA DNA AMP PROBE: CPT

## 2024-08-26 PROCEDURE — 80053 COMPREHEN METABOLIC PANEL: CPT | Performed by: EMERGENCY MEDICINE

## 2024-08-26 PROCEDURE — 99285 EMERGENCY DEPT VISIT HI MDM: CPT | Mod: 25

## 2024-08-26 PROCEDURE — 87635 SARS-COV-2 COVID-19 AMP PRB: CPT | Performed by: EMERGENCY MEDICINE

## 2024-08-26 PROCEDURE — 83880 ASSAY OF NATRIURETIC PEPTIDE: CPT | Performed by: EMERGENCY MEDICINE

## 2024-08-26 PROCEDURE — 83690 ASSAY OF LIPASE: CPT | Performed by: EMERGENCY MEDICINE

## 2024-08-26 PROCEDURE — 25000003 PHARM REV CODE 250: Performed by: EMERGENCY MEDICINE

## 2024-08-26 PROCEDURE — 93005 ELECTROCARDIOGRAM TRACING: CPT

## 2024-08-26 RX ORDER — DEXAMETHASONE SODIUM PHOSPHATE 4 MG/ML
8 INJECTION, SOLUTION INTRA-ARTICULAR; INTRALESIONAL; INTRAMUSCULAR; INTRAVENOUS; SOFT TISSUE
Status: COMPLETED | OUTPATIENT
Start: 2024-08-26 | End: 2024-08-26

## 2024-08-26 RX ORDER — METHYLPREDNISOLONE SOD SUCC 125 MG
125 VIAL (EA) INJECTION
Status: COMPLETED | OUTPATIENT
Start: 2024-08-26 | End: 2024-08-26

## 2024-08-26 RX ORDER — PREDNISONE 20 MG/1
40 TABLET ORAL DAILY
Qty: 10 TABLET | Refills: 0 | Status: SHIPPED | OUTPATIENT
Start: 2024-08-26 | End: 2024-08-31

## 2024-08-26 RX ORDER — ALBUTEROL SULFATE 90 UG/1
1-2 INHALANT RESPIRATORY (INHALATION) EVERY 6 HOURS PRN
Qty: 8 G | Refills: 0 | Status: SHIPPED | OUTPATIENT
Start: 2024-08-26 | End: 2025-08-26

## 2024-08-26 RX ORDER — AMOXICILLIN AND CLAVULANATE POTASSIUM 875; 125 MG/1; MG/1
1 TABLET, FILM COATED ORAL 2 TIMES DAILY
Qty: 14 TABLET | Refills: 0 | Status: SHIPPED | OUTPATIENT
Start: 2024-08-26

## 2024-08-26 RX ORDER — IPRATROPIUM BROMIDE AND ALBUTEROL SULFATE 2.5; .5 MG/3ML; MG/3ML
3 SOLUTION RESPIRATORY (INHALATION)
Status: COMPLETED | OUTPATIENT
Start: 2024-08-26 | End: 2024-08-26

## 2024-08-26 RX ORDER — AMOXICILLIN AND CLAVULANATE POTASSIUM 875; 125 MG/1; MG/1
1 TABLET, FILM COATED ORAL
Status: COMPLETED | OUTPATIENT
Start: 2024-08-26 | End: 2024-08-26

## 2024-08-26 RX ORDER — BENZONATATE 100 MG/1
100 CAPSULE ORAL 3 TIMES DAILY PRN
Status: DISCONTINUED | OUTPATIENT
Start: 2024-08-26 | End: 2024-08-26 | Stop reason: HOSPADM

## 2024-08-26 RX ADMIN — BENZONATATE 100 MG: 100 CAPSULE ORAL at 06:08

## 2024-08-26 RX ADMIN — METHYLPREDNISOLONE SODIUM SUCCINATE 125 MG: 125 INJECTION, POWDER, FOR SOLUTION INTRAMUSCULAR; INTRAVENOUS at 04:08

## 2024-08-26 RX ADMIN — IPRATROPIUM BROMIDE AND ALBUTEROL SULFATE 3 ML: 2.5; .5 SOLUTION RESPIRATORY (INHALATION) at 04:08

## 2024-08-26 RX ADMIN — DEXAMETHASONE SODIUM PHOSPHATE 8 MG: 4 INJECTION INTRA-ARTICULAR; INTRALESIONAL; INTRAMUSCULAR; INTRAVENOUS; SOFT TISSUE at 04:08

## 2024-08-26 RX ADMIN — AMOXICILLIN AND CLAVULANATE POTASSIUM 1 TABLET: 875; 125 TABLET, FILM COATED ORAL at 06:08

## 2024-08-26 NOTE — DISCHARGE INSTRUCTIONS
Mrs. Ly,    Thank you for letting me care for you today! It was nice meeting you, and I hope you feel better soon.   If you would like access to your chart and what was done today please utilize the [facility].   Please come back to [facility] for all of your future medical needs.    Our goal in the emergency department is to always give you outstanding care and exceptional service. You may receive a survey by mail or e-mail in the next week regarding your experience in our ED. We would greatly appreciate you completing and returning the survey. Your feedback provides us with a way to recognize our staff who give very good care and it helps us learn how to improve when your experience was below our aspiration of excellence.     Sincerely,    Murphy Ruiz MD  Board Certified Emergency Physician

## 2024-08-26 NOTE — PHARMACY MED REC
"Admission Medication History     The home medication history was taken by Estela Mack CPHT     You may go to "Admission" then "Reconcile Home Medications" tabs to review and/or act upon these items.     The home medication list has been updated by the Pharmacy department.   Please read ALL comments highlighted in yellow.   Please address this information as you see fit.    Feel free to contact us if you have any questions or require assistance.      The patient was able to verbally verify medications ay bedside.  "

## 2024-08-26 NOTE — ED PROVIDER NOTES
Encounter Date: 2024       History     Chief Complaint   Patient presents with    Shortness of Breath     Pt reports SOB that started about an hour ago, she has had a cough x2 week that has been getting worse. Pt hasn't had inhaler. She has audible wheezing noted in triage and is 88% on RA. Pt placed on O2 and SPO2 now 93% on 3 LPM     This is a 44-year-old female with a past medical history significant for epilepsy as well as hypertension presenting for evaluation of shortness of breath and wheezing after she had started laughing today. She denies any known sick contacts, has had some episodes of cough as a result of mold exposure. She denies recent injuries or illnesses, she does not have any diagnosed lung disease and is not on medications for her lungs chronically.     The history is provided by the patient and medical records.     Review of patient's allergies indicates:  No Known Allergies  Past Medical History:   Diagnosis Date    Encounter for blood transfusion     Epilepsy     Fibroids     Focal epilepsy     Hypertension     Migraine      Past Surgical History:   Procedure Laterality Date     SECTION      x1    HYSTERECTOMY  2017    TUBAL LIGATION       Family History   Problem Relation Name Age of Onset    Hypertension Father      Hypertension Sister      Hypertension Maternal Grandmother      Breast cancer Maternal Grandmother      Cancer Maternal Grandfather      Hypertension Paternal Grandmother      Colon cancer Maternal Cousin      Ovarian cancer Neg Hx       Social History     Tobacco Use    Smoking status: Never     Passive exposure: Never    Smokeless tobacco: Never   Substance Use Topics    Alcohol use: No     Comment: Occasional; 3x /month    Drug use: No     Review of Systems  Constitutional-no fever  HEENT-no congestion  Eyes-no redness  Respiratory-+ shortness of breath  Cardio-no chest pain  GI-no abdominal pain  Endocrine-no cold intolerance  -no difficulty  urinating  MSK-no myalgias  Skin-no rashes  Allergy-no environmental allergy  Neurologic-, no headache  Hematology-no swollen nodes  Behavioral-no confusion   Physical Exam     Initial Vitals   BP Pulse Resp Temp SpO2   08/26/24 1631 08/26/24 1539 08/26/24 1539 08/26/24 1539 08/26/24 1539   (!) 156/90 (!) 112 (!) 28 98.1 °F (36.7 °C) (!) 88 %      MAP       --                Physical Exam  Constitutional: unwell appearing 45 yo woman in moderate respiratory distress  Eyes: Conjunctivae normal.  ENT       Head: Normocephalic, atraumatic.       Nose: Normal external appearance        Mouth/Throat: no strigulous respirations   Hematological/Lymphatic/Immunilogical: no visible lymphadenopathy   Cardiovascular: Normal rate,   Respiratory: increased WOB with wheezes in all lung fields, intermittent stridor   Gastrointestinal: non distended   Musculoskeletal: Normal range of motion in all extremities. No obvious deformities or swelling.  Neurologic: Alert, oriented. Normal speech and language. No gross focal neurologic deficits are appreciated.  Skin: Skin is warm, dry. No rash noted.  Psychiatric: Mood and affect are normal.    ED Course   Critical Care    Date/Time: 8/26/2024 4:27 PM    Performed by: Murphy Ruiz MD  Authorized by: Murphy Ruiz MD  Direct patient critical care time: 20 minutes  Additional history critical care time: 5 minutes  Ordering / reviewing critical care time: 5 minutes  Documentation critical care time: 3 minutes  Total critical care time (exclusive of procedural time) : 33 minutes  Critical care time was exclusive of separately billable procedures and treating other patients and teaching time.  Critical care was necessary to treat or prevent imminent or life-threatening deterioration of the following conditions: respiratory failure.  Critical care was time spent personally by me on the following activities: blood draw for specimens, development of treatment plan with patient or  surrogate, evaluation of patient's response to treatment, interpretation of cardiac output measurements, examination of patient, obtaining history from patient or surrogate, ordering and performing treatments and interventions, ordering and review of laboratory studies, ordering and review of radiographic studies, pulse oximetry, review of old charts and re-evaluation of patient's condition.        Labs Reviewed   CBC W/ AUTO DIFFERENTIAL - Abnormal       Result Value    WBC 4.84      RBC 4.19      Hemoglobin 12.6      Hematocrit 36.7 (*)     MCV 88      MCH 30.1      MCHC 34.3      RDW 13.6      Platelets 341      MPV 8.8 (*)     Immature Granulocytes 0.0      Gran # (ANC) 2.3      Immature Grans (Abs) 0.00      Lymph # 2.1      Mono # 0.3      Eos # 0.2      Baso # 0.03      nRBC 0      Gran % 47.7      Lymph % 42.8      Mono % 5.6      Eosinophil % 3.3      Basophil % 0.6      Differential Method Automated     COMPREHENSIVE METABOLIC PANEL - Abnormal    Sodium 139      Potassium 3.6      Chloride 106      CO2 24      Glucose 117 (*)     BUN 16      Creatinine 0.8      Calcium 9.3      Total Protein 8.4      Albumin 4.1      Total Bilirubin 0.2      Alkaline Phosphatase 64      AST 18      ALT 18      eGFR >60      Anion Gap 9     MAGNESIUM    Magnesium 2.1     LIPASE    Lipase 12     TROPONIN I    Troponin I <0.006     B-TYPE NATRIURETIC PEPTIDE    BNP <10     SARS-COV-2 RDRP GENE    POC Rapid COVID Negative       Acceptable Yes     POCT INFLUENZA A/B MOLECULAR    POC Molecular Influenza A Ag Negative      POC Molecular Influenza B Ag Negative       Acceptable Yes          ECG Results              EKG 12-lead (Final result)        Collection Time Result Time QRS Duration OHS QTC Calculation    08/26/24 16:25:19 08/27/24 09:46:56 68 444                     Final result by Interface, Lab In [facility] (08/27/24 09:46:59)                   Narrative:    Test Reason : R06.02,    Vent. Rate  : 095 BPM     Atrial Rate : 095 BPM     P-R Int : 218 ms          QRS Dur : 068 ms      QT Int : 354 ms       P-R-T Axes : 060 077 055 degrees     QTc Int : 444 ms    Sinus rhythm with 1st degree A-V block  Low voltage QRS  Septal infarct ,age undetermined  Abnormal ECG    Confirmed by Sadaf Germain MD ([id number]) on 8/27/2024 9:46:54 AM    Referred By: [email or web address]   SELF           Confirmed By:Sadaf Germain MD                      Wet Read by Murphy Ruiz MD (08/26/24 16:28:22, [facility], Emergency Medicine)    My EKG interpretation, sinus rhythm, 95 beats per minute, normal axis, no ST segment changes, when compared to previous EKG 05/14/2024 sinus rhythm has replaced sinus tachycardia                                  Imaging Results              X-Ray Chest AP Portable (Final result)  Result time 08/26/24 16:56:54      Final result by Sanjuana Lawrence MD (08/26/24 16:56:54)                   Impression:      No acute cardiopulmonary process seen.      Electronically signed by: Sanjuana Lawrence  Date:    08/26/2024  Time:    16:56               Narrative:    EXAMINATION:  XR CHEST AP PORTABLE    CLINICAL HISTORY:  Cough, unspecified    TECHNIQUE:  Single frontal view of the chest was performed.    COMPARISON:  02/21/2024    FINDINGS:  Lungs are well expanded.  No acute consolidation, pleural effusion, or pneumothorax.    Cardiac silhouette is normal in size.                                    X-Rays:   Independently Interpreted Readings:   Other Readings:  Cxr- no pneumothorax, no focal opacity     Medications   albuterol-ipratropium 2.5 mg-0.5 mg/3 mL nebulizer solution 3 mL (3 mLs Nebulization Given 8/26/24 1620)   dexAMETHasone injection 8 mg (8 mg Other Given 8/26/24 1614)   methylPREDNISolone sodium succinate injection 125 mg (125 mg Intravenous Given 8/26/24 1619)   amoxicillin-clavulanate 875-125mg per tablet 1 tablet (1 tablet Oral Given 8/26/24 1809)     Medical Decision Making  Ddx-  reactive airway disease, pneumonia, covid, influenza, asthma, tracheitis, uvulitis     Arrived with hypoxia, wheeze and elevated WOB.  Emergently placed on o2, cardiac monitor and undertook frequent reassessments.   WOB improved with nebs and steroids.  Able to come off oxygen.  Ambulated without hypoxia.       Problems Addressed:  Cough: acute illness or injury  Shortness of breath: acute illness or injury  Wheezing: acute illness or injury    Amount and/or Complexity of Data Reviewed  External Data Reviewed: labs, radiology, ECG and notes.     Details: Multiple previous presentations primarily for neurologic complaints  Labs: ordered. Decision-making details documented in ED Course.  Radiology: ordered and independent interpretation performed. Decision-making details documented in ED Course.  ECG/medicine tests: ordered and independent interpretation performed. Decision-making details documented in ED Course.    Risk  OTC drugs.  Prescription drug management.  Drug therapy requiring intensive monitoring for toxicity.  Decision regarding hospitalization.                                      Clinical Impression:  Final diagnoses:  [R06.02] Shortness of breath  [R05.9] Cough  [R06.2] Wheezing (Primary)          ED Disposition Condition    Discharge Stable          ED Prescriptions       Medication Sig Dispense Start Date End Date Auth. Provider    amoxicillin-clavulanate 875-125mg (AUGMENTIN) 875-125 mg per tablet Take 1 tablet by mouth 2 (two) times daily. 14 tablet 8/26/2024 -- Murphy Ruiz MD    albuterol (PROVENTIL/VENTOLIN HFA) 90 mcg/actuation inhaler Inhale 1-2 puffs into the lungs every 6 (six) hours as needed. Rescue 8 g 8/26/2024 8/26/2025 Murphy Ruiz MD    predniSONE (DELTASONE) 20 MG tablet Take 2 tablets (40 mg total) by mouth once daily. for 5 days 10 tablet 8/26/2024 8/31/2024 Murphy Ruiz MD          Follow-up Information       Follow up With Specialties Details Why Contact  Info    Zoey Nelson MD Family Medicine Call in 1 day If symptoms worsen, For a follow up visit about today [address] [address]  [phone]               Murphy Ruiz MD  08/27/24 0291

## 2024-08-26 NOTE — ED NOTES
Respiratory notified of new orders.   POCT Rapid Covid-19 swab currently in process. Awaiting results.   POCT Influenza A/B Molecular swab currently in process. Awaiting results.

## 2024-08-26 NOTE — Clinical Note
"Pernell "Amanda Ly was seen and treated in our emergency department on 8/26/2024.  She may return to work on 08/28/2024.       If you have any questions or concerns, please don't hesitate to call.      Murphy Ruiz MD"

## 2024-08-27 LAB
OHS QRS DURATION: 68 MS
OHS QTC CALCULATION: 444 MS

## 2024-09-25 ENCOUNTER — PATIENT MESSAGE (OUTPATIENT)
Dept: NEUROLOGY | Facility: CLINIC | Age: 44
End: 2024-09-25
Payer: COMMERCIAL

## 2024-09-30 DIAGNOSIS — G40.109 FOCAL EPILEPSY: Primary | ICD-10-CM

## 2024-09-30 RX ORDER — CLOBAZAM 20 MG/1
30 TABLET ORAL DAILY
Qty: 45 TABLET | Refills: 5 | Status: SHIPPED | OUTPATIENT
Start: 2024-09-30 | End: 2025-03-29

## 2024-10-04 ENCOUNTER — TELEPHONE (OUTPATIENT)
Dept: NEUROLOGY | Facility: CLINIC | Age: 44
End: 2024-10-04
Payer: COMMERCIAL

## 2024-10-04 NOTE — TELEPHONE ENCOUNTER
[facility] scheduled patient an appointment on 10/7/24 with Amber.

## 2024-10-07 ENCOUNTER — PATIENT MESSAGE (OUTPATIENT)
Dept: NEUROLOGY | Facility: CLINIC | Age: 44
End: 2024-10-07

## 2024-10-07 ENCOUNTER — OFFICE VISIT (OUTPATIENT)
Dept: NEUROLOGY | Facility: CLINIC | Age: 44
End: 2024-10-07
Payer: COMMERCIAL

## 2024-10-07 DIAGNOSIS — R53.82 CHRONIC FATIGUE: ICD-10-CM

## 2024-10-07 DIAGNOSIS — R51.9 HEADACHE DISORDER: ICD-10-CM

## 2024-10-07 DIAGNOSIS — G40.109 FOCAL EPILEPSY: Primary | ICD-10-CM

## 2024-10-07 DIAGNOSIS — G40.909 SEIZURE DISORDER: ICD-10-CM

## 2024-10-07 DIAGNOSIS — E55.9 VITAMIN D DEFICIENCY: ICD-10-CM

## 2024-10-07 PROCEDURE — 99213 OFFICE O/P EST LOW 20 MIN: CPT | Mod: 95,,,

## 2024-10-07 RX ORDER — CHOLECALCIFEROL (VITAMIN D3) 1250 MCG
1250 TABLET ORAL
Qty: 8 TABLET | Refills: 0 | Status: SHIPPED | OUTPATIENT
Start: 2024-10-07 | End: 2024-11-26

## 2024-10-07 NOTE — LETTER
October 7, 2024      [facility] 7th Fl  [address]  [facility] [address]  Phone: [phone]  Fax: [phone]       Patient: Pernell Ly   YOB: 1980  Date of Visit: 10/07/2024    To Whom It May Concern:    Pernell Ly is under my care at [facility] for seizures/epilepsy. Currently, this condition is stable with the use of 2 antiseizure medications, however, seizures may unfortunately occur at random and without warning. I appreciate your assistance in caring for this patient. If you have any questions or concerns, or if I can be of further assistance, please do not hesitate to contact me.    Sincerely,    Amber Ortiz PA-C   Neurology-Epilepsy  [facility]

## 2024-10-07 NOTE — PATIENT INSTRUCTIONS
You came to Epilepsy Clinic because of your seizure disorder. Please continue the same medications at the same dose.     Do not miss any doses of medication. If a dose of medication is missed, take it as soon as it is remembered even if that means doubling up on the dose. Get regular sleep. Go to sleep at the same time and wake up at the same time every day. People with epilepsy require more sleep than people without epilepsy.  Sleeping 10-12 hours a day can be normal for a person with epilepsy.  Every seizure makes it harder to prevent the next seizure. Epilepsy is associated with SUDEP, or sudden unexpected death in epilepsy.  The risk is significantly higher if convulsive seizures are not well controlled. For more information, check out these websites: https://www.epilepsy.com/, https://www.epilepsyalliance[place].org/, www.miky-epilepsy.org, www.womenandepilepsy.org.  If you are interested in meeting other individuals in our epilepsy community, please reach out to the [facility] [place] ([phone], [phone], info@[place].org).  They organize many informative and fun activities in the region.  They can provide you invaluable information on how to get access to resources available for patients living with epilepsy as well as a rich community of like-minded individuals who are all learning to cope with the same issues.  It is very important to remember, you are not alone.     Per [place] law, no episodes of loss of consciousness for 6 months before driving.  Avoid dangerous situations.  For example, no baths/pools alone, no heights, no power tools.  Wear a bike helmet.  If breakthrough seizures occur that are different in character, frequency, or duration from normal episodes, please patient portal me or call the office and we will decide the next steps. If multiple seizures occur in a row without return back to baseline, 911 needs to be called.     Return to clinic in 3 months or sooner  with issues.  Please patient portal with any questions or concerns.    Amber Ortiz PA-C   Neurology-Epilepsy  [facility]

## 2024-10-07 NOTE — PROGRESS NOTES
[facility]psy [facility] Progress Note    [facility] - NEUROLOGY 7TH FL  [facility], [place]    Date: 10/7/24  Patient Name: Pernell Ly   MRN: [id number]   PCP: Zoey Nelson  Referring Provider: No ref. provider found    The patient location is: Home  The chief complaint leading to consultation is: Epilepsy  Visit type: Virtual visit with synchronous audio and video  Total time spent with patient: 5 minutes  Each patient to whom he or she provides medical services by telemedicine is:  (1) informed of the relationship between the physician and patient and the respective role of any other health care provider with respect to management of the patient; and (2) notified that he or she may decline to receive medical services by telemedicine and may withdraw from such care at any time.    Assessment:   Pernell Ly is a 44 y.o. female presenting in follow up for longstanding epilepsy. 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 Ambulatory EEG monitoring 9/2023 captured two electrographic seizures emanating from the left frontotemporal region as well as sharp waves in the left and right temporal regions despite previous regimen of lamotrigine 150mg BID and topiramate 50mg BID. She is very sensitive to medication side effects and did not tolerate increase in lamotrigine or topiramate (topiramate ultimately discontinued due to numbness/tingling side effect). Pregabalin resulted in memory issues and increased drowsiness. Current regimen: lamotrigine 150mg BID and clobazam 30mg qhs. She has been tolerating this regimen well with no recent events, will continue these medications at same doses. Levels next visit. Vit D 50,000 IU x8 weeks.  Letter provided for jose luis. Follow up in 3 months or sooner with issues. Advised to reach out over the portal with any concerns. Patient is comfortable with plan. All questions and concerns are addressed at this time.   Plan:     Problem List  Items Addressed This Visit          Neuro    Headache disorder       Endocrine    Vitamin D deficiency    Relevant Medications    cholecalciferol, vitamin D3, 1,250 mcg (50,000 unit) Tab     Other Visit Diagnoses       Focal epilepsy    -  Primary    Seizure disorder        Chronic fatigue              I completed education on seizure first aid and safety. I recommended seizure precautions with regards to avoiding unsupervised water recreational activity or bathing in tubs, climbing or working at heights, operation of heavy or dangerous machinery, caution around fire and sources of high heat, as well as any other activity which could put a patient at danger in case of a seizure. I also reviewed the [facility] law and recommended that the patient not drive for 6 months in the event of breakthrough seizures.    Amber Ortiz PA-C   Neurology-Epilepsy  [facility]    Collaborating physician, Dr. Johnathan Weir, was available during today's encounter.     I spent approximately 26 minutes on the day of this encounter preparing to see the patient, obtaining and reviewing history and results, performing a medically appropriate exam, counseling and educating the patient/family/caregiver, documenting clinical information, coordinating care, and ordering medications, tests, procedures, and referrals.    Patient note was created using MModal Dictation.  Any errors in syntax or even information may not have been identified and edited on initial review prior to signing this note.  Subjective:   Patient seen in consultation at the request of No ref. provider found for the evaluation of epilepsy. A copy of this note will be sent to the referring physician.     Interval Events/ROS 10/7/2024:    Current ASM/SEs: lamotrigine 150mg BID and clobazam 30mg qhs; SE drowsiness otherwise tolerating well  Breakthrough seizures/events: none since increasing clobazam to 30mg qhs 8/30/2024  Driving: no  Folic acid: no, s/p  hysterectomy  Sleep: ok, daytime fatigue  Mood: no new issues    Taking vit d 1000 IU daily. Needs letter to give landlord. Otherwise, no fever, no cold symptoms, no headache, no changes in vision, no new weakness, no chest pain, no shortness of breath, no nausea, no vomiting, no diarrhea, no constipation, no tingling/numbness, no problems walking.    Recent Labs   Lab 04/19/22  1430 12/31/22  2229 04/24/24  1555 08/08/24  1328 08/08/24  1634   Lamotrigine Lvl 5.0 5.1 6.6 3.9  --    Clobazam  --   --  189.0  --  280.0   Desmethylclobazam  --   --  415.0  --  600.0       Interval Events/ROS 4/24/2024:    Current ASM/SEs: lamotrigine 150mg BID and clobazam 10mg qhs (started 2-3 weeks ago); no known SE; takes lamotrigine at 8:30am/pm & clobazam an hour later around 9:30pm    Breakthrough seizures/events: not that she knows of, 2x in the past week woke up w/ numbness/tingling sensation, mainly on the left side, slight improvement in nocturnal events since starting clobazam/discontinuing topiramate  Driving: no  Folic acid: no, s/p hysterectomy  Sleep: not great  Mood: stressed, tired, concerned and anxious about her health     Chronic headaches. Otherwise, no fever, no cold symptoms, no changes in vision, no new weakness, no chest pain, no shortness of breath, no nausea, no vomiting, no diarrhea, no constipation, no problems walking.      Interval Events/ROS 3/21/2024:    Current ASM/SEs:   - about 2 weeks ago self decreased lamotrigine back to 150mg BID as she thought it may have been worsening episodes of numbness/tingling  - topiramate 50mg BID; no known SE  - discontinued pregabalin (SE memory issues, drowsiness)     Breakthrough seizures/events:   - nightly events (4-5x per week) described as L sided numbness/tingling from head to toe that will awake her from sleep, accompanied by sensation of fearfulness, feels like her heart is racing, maintained awareness, numbness/tingling will last for around 20 minutes before  it spontaneously resolves   - recent episode of staring and decreased responsiveness (3/8/24) while in the shower, occurred the night before her grandmother's , reports son thought things were abnormally quiet so came to check on her and found her staring off, helped her out of the shower onto the bed and called 911, pt states she remembers starting to shave her legs and then the next thing she remembers is waking up w/ EMS in front of her face, subsequently evaluated in the ED, workup overall unremarkable       Driving: no  Folic acid: no, s/p hysterectomy  Sleep: significant trouble sleeping/insomnia since her grandmother very sadly passed away 2 weeks ago   Mood: stressed, tired, concerned and anxious about her health     Chronic headaches. Otherwise, no fever, no cold symptoms, no changes in vision, no new weakness, no chest pain, no shortness of breath, no nausea, no vomiting, no diarrhea, no constipation, no problems walking.      Interval Events/ROS 2024:    Current ASM/SEs:   Lamotrigine 150mg BID; no SE; did not tolerate 300mg BID  Topiramate 50mg BID; no SE  Pregabalin 50mg BID; SE memory issues, trouble following conversations w/ people, feels like she blanks out for a second, almost like a light switch (?seizure activity), drowsiness     Breakthrough seizures/events: 2 weeks ago -> seemed to wake up every other night w/ a headache, unsure if seizures have occurred    Driving: no  Sleep: overall good  Mood: some fluctuations in mood but is manageable  Activity: continues to attend work    Headaches. Chronic fatigue, pt had negative sleep study last year. Recent cough/congestion/sinus pressure. Otherwise, no fever, no changes in vision, no new weakness, no chest pain, no shortness of breath, no nausea, no vomiting, no diarrhea, no constipation, no tingling/numbness, no problems walking.      Interval Events/ROS 11/15/2023:    Current ASM/SEs: lamotrigine 150mg twice daily and topiramate 50mg  twice daily; no SE  Breakthrough seizures/events: none that she knows of; discussed EEG results which captured 2 electrographic seizures that occurred unbeknownst to pt  Driving: no  Sleep: not great due to waking up w/ headaches; takes trazodone every once in a while  Mood: overall okay, denies anxiety/depression, some fluctuations in mood but is manageable    Persistent headaches -> physical therapy scheduled for next week. Vivid dreams sometimes. Otherwise, no fever, no cold symptoms, no changes in vision, no new weakness, no chest pain, no shortness of breath, no nausea, no vomiting, no diarrhea, no constipation, no problems walking.     Interval Events/ROS 11/3/2023:    Current ASM/SEs: lamotrigine 150mg twice daily and topiramate 50mg twice daily  Breakthrough seizures/events: none since the addition of topiramate  Folic acid: s/p hysterectomy  Sleep: not great due to waking up w/ headaches; takes trazodone every once in a while  Mood: overall okay, denies anxiety/depression, some fluctuations in mood but is manageable    Persistent headaches described as numbness to L side of head, wakes her up during the night and is sometimes still there in the morning. Sleeps on her L side at night. Has taken tylenol 1-2 times for headache in the past week. Otherwise, no fever, no cold symptoms, no changes in vision, no new weakness, no chest pain, no shortness of breath, no nausea, no vomiting, no diarrhea, no constipation, no problems walking.     Interval Events/ROS 8/25/2023:    Current ASM/SEs: lamotrigine 150mg twice daily; SE drowsy  Breakthrough seizures/events: staring event 8/5/2023, no identifiable trigger, described she was in the bathroom brushing her teeth then suddenly realized she was in a different room, son told her she had toothpaste around her mouth; prior to this last event was 6/15/2023; no identifiable trigger; no convulsions  Driving: no  Folic acid: s/p hysterectomy  Sleep: not great due to waking  up w/ headaches   Mood: fair    Frequently wakes up in the middle of the night with headache located to the top of her head, also often wakes up with CROOK in the morning. States she has not been taking anything at home for the pain. Headache typically resolves once she is up and moving for the day. Otherwise, no fever, no cold symptoms, no changes in vision, no new weakness, no chest pain, no shortness of breath, no nausea, no vomiting, no diarrhea, no constipation, no tingling/numbness, no problems walking.     Interval Events/ROS 6/20/2023:    Current ASM/SEs: lamotrigine 150mg BID; no SE   Breakthrough seizures/events: last event was 6/15 while shopping in [employer], describes she was with her sister when she started staring off and wouldn't respond, 'came to' and her sister was very concerned, patient was confused, states her sister told her the event lasted >15 minutes (specifically clarified that the staring lasted >15 minutes and it took additional time for the patient to fully feel back to normal), no LOC or collapse, no abnormal movements, patient can't recall whether or not she was able to hear her sister during event; reports she has experienced staring episodes/sensation of lost time at least 3 times in the past 2 months, other episodes were not witnessed    Driving: no  Folic acid: no  Sleep: does not get much sleep, broken sleep, has tried melatonin in the past  Mood: chronic anxiety, notes some feelings of depression, stopped sertraline after 3 days (didn't like the idea of taking a mood medication, but feels more open to it now)    Intermittent headaches. Otherwise, no fever, no cold symptoms, no changes in vision, no new weakness, no chest pain, no shortness of breath, no nausea, no vomiting, no diarrhea, no constipation, no tingling/numbness, no problems walking.     Interval Events/ROS 4/12/2023:    Current ASM/SEs: lamotrigine 100mg BID; no SE  Breakthrough seizures/events:  event 4/10 -> states she  remembers using the bathroom then all of a sudden she was in her bed being woken up by her dad, confused, felt off; does not recall walking back from the bathroom to her bed; son told her she seemed out of it; felt back to baseline ~30 min after event; no convulsions/abnormal movements; no identifiable trigger; current event frequency is around 1 per year   Driving: no  Folic acid: no   Sleep: chronic insomnia; follows w/ sleep medicine    Mood: anxious    Headaches. Otherwise, no fever, no cold symptoms, no changes in vision, no new weakness, no chest pain, no shortness of breath, no nausea, no vomiting, no diarrhea, no constipation, no tingling/numbness, no problems walking.     HPI:   Ms. Pernell Ly is a 44 y.o. female who presents with a chief complaint of a longstanding seizure disorder.  Patient presents today to transfer her care from Dr. Dunham after being lost to follow-up for 2 years.  The patient reports that she had been doing well until the last few months when she has begun to experience episodes of loss of time.  The patient states that she will be alone and realized that several seconds have passed and she has lost awareness for a few minutes.  She endorses expressed compliance with her Lamictal.  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  She states that she believes is contributing to chronic daily tension headaches.  She states the headaches have become worse over the past few weeks and notes that she is taking ibuprofen twice a day every day for management of headaches.  She denies associated migrainous features or focal neurologic deficits.    Seizure Type: Supsected focal onset  Seizure Etiology:   L MTS  Current AEDs: Lamictal 100 mg BID    The patient is not accompanied by family who contribute to the history. This patient has 3 types of seizure as described below. The patient reports having  "seizures for years The patient reports to have stable seizure control. The seizure frequency is variable. The last seizure was last week . The patient does not report side effects from seizure medication.     Seizure Type 1:   Seizure Description: LOC, GTC (one out of sleep)  Aura:  De ja vu feeling  Associated Symptoms:  none  Seizure Frequency: 2 in lifetime  Last seizure: -    Seizure Type 2:   Seizure Description: Incomprehensible speech lasting 14 minutes with loss of time and confusion  Aura: None  Associated Symptoms:  none  Seizure Frequency: One in lifetime  Last seizure:     Seizure Type 3:   Seizure Description: "Goes out into space" for a minute, always happens when she's alone  Aura: None  Associated Symptoms: none  Seizure Frequency: Variable, often clusters every few months "seldom"   Last seizure: 1.5 weeks ago    Handedness: right  Seizure Onset Age: infancy  Seizure/ Epilepsy Risk Factors: childhood seizure  Birth/Developmental History: normal birth history and Normal developmental history  Seizure Triggers/ Provoking Features: stress  Previous Seizure Medications: lamotrigine (Lamictal, LTG) and zonisamide (Zonegran, ZNA)  Recent Med Changes: None  Other Treatments: None  Prior Episodes of Status: None  Psychiatric/Behavioral Comorbitidies: Anxiety  Surgical Candidacy:  Pending    Prior Studies:  EEG : Not done  vEEG/ EMU evaluation: Left MTS 2018, personally reviewed  MRI of brain: Not done  AED levels:  Not done  CT/CTA Scan: Unremarkable - personally reviewed  PET Scan: Not done  Neuropsychological Evaluation: Not done  DEXA Scan: Not done  Other studies: Not done    PAST MEDICAL HISTORY:  Past Medical History:   Diagnosis Date    Encounter for blood transfusion     Epilepsy     Fibroids     Focal epilepsy     Hypertension     Migraine        PAST SURGICAL HISTORY:  Past Surgical History:   Procedure Laterality Date     SECTION      x1    HYSTERECTOMY  2017    " TUBAL LIGATION         CURRENT MEDS:  Current Outpatient Medications   Medication Sig Dispense Refill    albuterol (PROVENTIL/VENTOLIN HFA) 90 mcg/actuation inhaler Inhale 1-2 puffs into the lungs every 6 (six) hours as needed. Rescue 8 g 0    amoxicillin-clavulanate 875-125mg (AUGMENTIN) 875-125 mg per tablet Take 1 tablet by mouth 2 (two) times daily. 14 tablet 0    azelastine (ASTELIN) 137 mcg (0.1 %) nasal spray USE 1 SPRAY NASALLY TWICE DAILY 30 mL 0    cetirizine (ZYRTEC) 10 MG tablet Take 1 tablet (10 mg total) by mouth once daily. 30 tablet 1    ciclesonide (OMNARIS) 50 mcg Spry 2 sprays by Each Nostril route once daily. 12.5 g 0    cloBAZam (ONFI) 20 mg Tab Take 1.5 tablets (30 mg total) by mouth once daily. 45 tablet 5    fluticasone propionate (FLOVENT HFA) 110 mcg/actuation inhaler Inhale 1 puff into the lungs 2 (two) times daily. Controller 12 g 1    ibuprofen (ADVIL,MOTRIN) 600 MG tablet Take 1 tablet (600 mg total) by mouth every 6 (six) hours as needed for Pain. 20 tablet 0    lamoTRIgine (LAMICTAL) 150 MG Tab Take 1 tablet (150 mg total) by mouth 2 (two) times daily. 180 tablet 3    multivitamin with minerals tablet Take by mouth.      NIFEdipine (PROCARDIA-XL) 90 MG (OSM) 24 hr tablet TAKE 1 TABLET(90 MG) BY MOUTH EVERY DAY 90 tablet 1    omeprazole (PRILOSEC) 20 MG capsule Take 20 mg by mouth daily as needed.      potassium chloride SA (K-DUR,KLOR-CON) 20 MEQ tablet Take 1 tablet (20 mEq total) by mouth 2 (two) times daily. 90 tablet 1    promethazine-dextromethorphan (PROMETHAZINE-DM) 6.25-15 mg/5 mL Syrp       tiZANidine (ZANAFLEX) 2 MG tablet Take 1 tablet (2 mg total) by mouth nightly as needed (muscle spasms). (Patient not taking: Reported on 5/23/2024) 30 tablet 1     No current facility-administered medications for this visit.       ALLERGIES:  Review of patient's allergies indicates:  No Known Allergies    FAMILY HISTORY:  Family History   Problem Relation Name Age of Onset    Hypertension  Father      Hypertension Sister      Hypertension Maternal Grandmother      Breast cancer Maternal Grandmother      Cancer Maternal Grandfather      Hypertension Paternal Grandmother      Colon cancer Maternal Cousin      Ovarian cancer Neg Hx         SOCIAL HISTORY:  Social History     Tobacco Use    Smoking status: Never     Passive exposure: Never    Smokeless tobacco: Never   Substance Use Topics    Alcohol use: No     Comment: Occasional; 3x /month    Drug use: No     Review of Systems:  12 system review of systems is negative except for the symptoms mentioned in HPI.     Objective:     There were no vitals filed for this visit.    General: awake, alert, NAD, well nourished   Eyes: no tearing, discharge, no erythema   ENT: moist mucous membranes of the oral cavity, nares patent    Neck: normal ROM  Cardiovascular: well perfused  Lungs: Normal work of breathing, normal chest wall excursions  Skin: No rash, lesions, or breakdown on exposed skin  Psychiatry: Mood and affect are appropriate, anxious   Abdomen: non distended  Extremeties: not examined     Neurological   MENTAL STATUS: Alert and oriented to person, place, and time. Attention and concentration within normal limits. Speech without dysarthria, able to name and repeat without difficulty. Recent and remote memory within normal limits   CRANIAL NERVES: EOMI. Face symmetrical. Hearing grossly intact. Full shoulder shrug bilaterally. Tongue protrudes midline   SENSORY: no subjective deficits   MOTOR: gross motor movements smooth upper and lower extremities bilaterally   CEREBELLAR/COORDINATION/GAIT: gait steady, able to ambulate around the room without difficulty

## 2024-10-16 DIAGNOSIS — K21.00 GASTROESOPHAGEAL REFLUX DISEASE WITH ESOPHAGITIS WITHOUT HEMORRHAGE: ICD-10-CM

## 2024-10-16 RX ORDER — OMEPRAZOLE 40 MG/1
40 CAPSULE, DELAYED RELEASE ORAL
Qty: 90 CAPSULE | Refills: 3 | Status: SHIPPED | OUTPATIENT
Start: 2024-10-16

## 2024-10-16 NOTE — TELEPHONE ENCOUNTER
No care due was identified.  [facility] Embedded Care Due Messages. Reference number: [id number].   10/16/2024 5:14:00 AM CDT

## 2024-10-16 NOTE — TELEPHONE ENCOUNTER
Refill Encounter    PCP Visits: Recent Visits  Date Type Provider Dept   05/15/24 Office Visit Zoey Nelson MD [facility]   04/16/24 Office Visit Zoey Nelson MD [facility]   02/28/24 Office Visit Zoey Nelson MD [facility]   Showing recent visits within past 360 days and meeting all other requirements  Future Appointments  No visits were found meeting these conditions.  Showing future appointments within next 720 days and meeting all other requirements     Last 3 Blood Pressure:   BP Readings from Last 3 Encounters:   08/26/24 (!) 153/95   08/14/24 111/73   05/28/24 128/78     Preferred Pharmacy:   [id number] [address] AT [facility] & [address] [address]  Phone: [phone] Fax: [phone]      Requested RX:  Requested Prescriptions     Pending Prescriptions Disp Refills    omeprazole (PRILOSEC) 40 MG capsule [Pharmacy Med Name: OMEPRAZOLE 40MG CAPSULES] 90 capsule 3     Sig: TAKE 1 CAPSULE(40 MG) BY MOUTH EVERY DAY      RX Route: Normal     Instructions: This plan will send the code FBSD to the PM system.  DO NOT or CHANGE the price. Detail Level: Simple Price (Do Not Change): 0.00

## 2024-10-22 ENCOUNTER — OFFICE VISIT (OUTPATIENT)
Dept: PRIMARY CARE CLINIC | Facility: CLINIC | Age: 44
End: 2024-10-22
Attending: FAMILY MEDICINE
Payer: COMMERCIAL

## 2024-10-22 DIAGNOSIS — J45.31 MILD PERSISTENT REACTIVE AIRWAY DISEASE WITH ACUTE EXACERBATION: ICD-10-CM

## 2024-10-22 DIAGNOSIS — R09.81 SINUS CONGESTION: Primary | ICD-10-CM

## 2024-10-22 PROCEDURE — 1160F RVW MEDS BY RX/DR IN RCRD: CPT | Mod: CPTII,95,, | Performed by: FAMILY MEDICINE

## 2024-10-22 PROCEDURE — 1159F MED LIST DOCD IN RCRD: CPT | Mod: CPTII,95,, | Performed by: FAMILY MEDICINE

## 2024-10-22 PROCEDURE — 99214 OFFICE O/P EST MOD 30 MIN: CPT | Mod: 95,,, | Performed by: FAMILY MEDICINE

## 2024-10-22 RX ORDER — BENZONATATE 100 MG/1
100 CAPSULE ORAL 3 TIMES DAILY PRN
Qty: 30 CAPSULE | Refills: 1 | Status: SHIPPED | OUTPATIENT
Start: 2024-10-22 | End: 2024-10-22 | Stop reason: SDUPTHER

## 2024-10-22 RX ORDER — BECLOMETHASONE DIPROPIONATE HFA 40 UG/1
AEROSOL, METERED RESPIRATORY (INHALATION)
Qty: 10.6 G | Refills: 11 | Status: SHIPPED | OUTPATIENT
Start: 2024-10-22 | End: 2024-10-22 | Stop reason: SDUPTHER

## 2024-10-22 RX ORDER — BECLOMETHASONE DIPROPIONATE HFA 40 UG/1
AEROSOL, METERED RESPIRATORY (INHALATION)
Qty: 10.6 G | Refills: 11 | Status: SHIPPED | OUTPATIENT
Start: 2024-10-22

## 2024-10-22 RX ORDER — BENZONATATE 100 MG/1
100 CAPSULE ORAL 3 TIMES DAILY PRN
Qty: 30 CAPSULE | Refills: 1 | Status: SHIPPED | OUTPATIENT
Start: 2024-10-22 | End: 2024-11-11

## 2024-11-23 ENCOUNTER — PATIENT MESSAGE (OUTPATIENT)
Dept: NEUROLOGY | Facility: CLINIC | Age: 44
End: 2024-11-23
Payer: COMMERCIAL

## 2024-12-04 DIAGNOSIS — E87.6 HYPOKALEMIA: ICD-10-CM

## 2024-12-04 NOTE — TELEPHONE ENCOUNTER
No care due was identified.  [facility] Embedded Care Due Messages. Reference number: [id number].   12/04/2024 5:25:57 PM CST

## 2024-12-05 RX ORDER — POTASSIUM CHLORIDE 20 MEQ/1
20 TABLET, EXTENDED RELEASE ORAL 2 TIMES DAILY
Qty: 90 TABLET | Refills: 1 | Status: SHIPPED | OUTPATIENT
Start: 2024-12-05

## 2024-12-07 DIAGNOSIS — E55.9 VITAMIN D DEFICIENCY: ICD-10-CM

## 2024-12-08 DIAGNOSIS — I10 ESSENTIAL HYPERTENSION: ICD-10-CM

## 2024-12-08 NOTE — TELEPHONE ENCOUNTER
No care due was identified.  [facility] Embedded Care Due Messages. Reference number: [id number].   12/08/2024 11:02:33 AM CST

## 2024-12-09 RX ORDER — NIFEDIPINE 90 MG/1
90 TABLET, EXTENDED RELEASE ORAL DAILY
Qty: 90 TABLET | Refills: 0 | Status: SHIPPED | OUTPATIENT
Start: 2024-12-09 | End: 2025-03-09

## 2024-12-09 RX ORDER — ASPIRIN 325 MG
50000 TABLET, DELAYED RELEASE (ENTERIC COATED) ORAL
Qty: 8 CAPSULE | Refills: 0 | Status: SHIPPED | OUTPATIENT
Start: 2024-12-09 | End: 2025-01-28

## 2024-12-17 ENCOUNTER — PATIENT MESSAGE (OUTPATIENT)
Dept: GYNECOLOGIC ONCOLOGY | Facility: CLINIC | Age: 44
End: 2024-12-17
Payer: COMMERCIAL

## 2024-12-29 DIAGNOSIS — G40.109 FOCAL EPILEPSY: ICD-10-CM

## 2024-12-30 RX ORDER — CLOBAZAM 20 MG/1
30 TABLET ORAL DAILY
Qty: 45 TABLET | Refills: 5 | Status: SHIPPED | OUTPATIENT
Start: 2024-12-30 | End: 2025-06-28

## 2025-01-21 ENCOUNTER — OFFICE VISIT (OUTPATIENT)
Dept: NEUROLOGY | Facility: CLINIC | Age: 45
End: 2025-01-21
Payer: COMMERCIAL

## 2025-01-21 DIAGNOSIS — G40.109 FOCAL EPILEPSY: Primary | ICD-10-CM

## 2025-01-21 DIAGNOSIS — R51.9 HEADACHE DISORDER: ICD-10-CM

## 2025-01-21 DIAGNOSIS — R20.0 NUMBNESS AND TINGLING: ICD-10-CM

## 2025-01-21 DIAGNOSIS — R20.2 NUMBNESS AND TINGLING: ICD-10-CM

## 2025-01-21 DIAGNOSIS — R53.83 FATIGUE, UNSPECIFIED TYPE: ICD-10-CM

## 2025-01-21 NOTE — PROGRESS NOTES
[facility] Progress Note    [facility] - NEUROLOGY 7TH [place] [facility], [place]    Date: 1/21/25  Patient Name: Pernell Ly   MRN: [id number]   PCP: Zoey Nelson  Referring Provider: No ref. provider found    The patient location is: Home  The chief complaint leading to consultation is: Epilepsy  Visit type: Virtual visit with synchronous audio and video  Total time spent with patient: 20 minutes  Each patient to whom he or she provides medical services by telemedicine is:  (1) informed of the relationship between the physician and patient and the respective role of any other health care provider with respect to management of the patient; and (2) notified that he or she may decline to receive medical services by telemedicine and may withdraw from such care at any time.    Assessment:   Pernell Ly is a 44 y.o. female presenting in follow up for longstanding epilepsy. 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 Ambulatory EEG monitoring 9/2023 captured two electrographic seizures emanating from the left frontotemporal region as well as sharp waves in the left and right temporal regions despite previous regimen of lamotrigine 150mg BID and topiramate 50mg BID. She is very sensitive to medication side effects and did not tolerate increase in lamotrigine or topiramate (topiramate ultimately discontinued due to numbness/tingling side effect). Pregabalin resulted in memory issues and increased drowsiness. Current regimen: lamotrigine 150mg BID and clobazam 30mg qhs. She has been tolerating this regimen well, will continue these medications at same doses for now. Levels next visit. Vit D 50,000 IU x8 weeks. Follow up in 3 months or sooner with issues. Advised to reach out with any concerns. Patient is comfortable with plan. All questions and concerns are addressed at this time.   Plan:     Problem List Items Addressed This Visit          Neuro    Headache disorder      Other Visit Diagnoses         Focal epilepsy    -  Primary      Fatigue, unspecified type          Numbness and tingling              I completed education on seizure first aid and safety. I recommended seizure precautions with regards to avoiding unsupervised water recreational activity or bathing in tubs, climbing or working at heights, operation of heavy or dangerous machinery, caution around fire and sources of high heat, as well as any other activity which could put a patient at danger in case of a seizure. I also reviewed the [facility] law and recommended that the patient not drive for 6 months in the event of breakthrough seizures.    Amber Ortiz PA-C   Neurology-Epilepsy  [facility]    Collaborating physician, Dr. Johnathan Weir, was available during today's encounter.     I spent approximately 30 minutes on the day of this encounter preparing to see the patient, obtaining and reviewing history and results, performing a medically appropriate exam, counseling and educating the patient/family/caregiver, documenting clinical information, coordinating care, and ordering medications, tests, procedures, and referrals.    Patient note was created using MModal Dictation.  Any errors in syntax or even information may not have been identified and edited on initial review prior to signing this note.  Subjective:   Patient seen in consultation at the request of No ref. provider found for the evaluation of epilepsy. A copy of this note will be sent to the referring physician.     Interval Events/ROS 1/21/2025:    Current AED/SEs:  lamotrigine 150mg BID and clobazam 30mg qhs; SE drowsiness otherwise tolerating well  Breakthrough seizures/events: 2 weeks ago - at work - introducing her to somebody then naomi TAY when came to was told she had an episode   Intermittent Numbness L hand and L foot    Otherwise, no fever, no cold symptoms, no headache, no changes in vision, no new weakness, no chest  pain, no shortness of breath, no nausea, no vomiting, no diarrhea, no constipation, no problems walking.    Recent Labs   Lab 04/19/22  1430 12/31/22  2229 04/24/24  1555 08/08/24  1328 08/08/24  1634   Lamotrigine Lvl 5.0 5.1 6.6 3.9  --    Clobazam  --   --  189.0  --  280.0   Desmethylclobazam  --   --  415.0  --  600.0       Interval Events/ROS 10/7/2024:    Current ASM/SEs: lamotrigine 150mg BID and clobazam 30mg qhs; SE drowsiness otherwise tolerating well  Breakthrough seizures/events: none since increasing clobazam to 30mg qhs 8/30/2024  Driving: no  Folic acid: no, s/p hysterectomy  Sleep: ok, daytime fatigue  Mood: no new issues    Taking vit d 1000 IU daily. Needs letter to give landlord. Otherwise, no fever, no cold symptoms, no headache, no changes in vision, no new weakness, no chest pain, no shortness of breath, no nausea, no vomiting, no diarrhea, no constipation, no tingling/numbness, no problems walking.      Interval Events/ROS 4/24/2024:    Current ASM/SEs: lamotrigine 150mg BID and clobazam 10mg qhs (started 2-3 weeks ago); no known SE; takes lamotrigine at 8:30am/pm & clobazam an hour later around 9:30pm    Breakthrough seizures/events: not that she knows of, 2x in the past week woke up w/ numbness/tingling sensation, mainly on the left side, slight improvement in nocturnal events since starting clobazam/discontinuing topiramate  Driving: no  Folic acid: no, s/p hysterectomy  Sleep: not great  Mood: stressed, tired, concerned and anxious about her health     Chronic headaches. Otherwise, no fever, no cold symptoms, no changes in vision, no new weakness, no chest pain, no shortness of breath, no nausea, no vomiting, no diarrhea, no constipation, no problems walking.      Interval Events/ROS 3/21/2024:    Current ASM/SEs:   - about 2 weeks ago self decreased lamotrigine back to 150mg BID as she thought it may have been worsening episodes of numbness/tingling  - topiramate 50mg BID; no known SE  -  discontinued pregabalin (SE memory issues, drowsiness)     Breakthrough seizures/events:   - nightly events (4-5x per week) described as L sided numbness/tingling from head to toe that will awake her from sleep, accompanied by sensation of fearfulness, feels like her heart is racing, maintained awareness, numbness/tingling will last for around 20 minutes before it spontaneously resolves   - recent episode of staring and decreased responsiveness (3/8/24) while in the shower, occurred the night before her grandmother's , reports son thought things were abnormally quiet so came to check on her and found her staring off, helped her out of the shower onto the bed and called 911, pt states she remembers starting to shave her legs and then the next thing she remembers is waking up w/ EMS in front of her face, subsequently evaluated in the ED, workup overall unremarkable       Driving: no  Folic acid: no, s/p hysterectomy  Sleep: significant trouble sleeping/insomnia since her grandmother very sadly passed away 2 weeks ago   Mood: stressed, tired, concerned and anxious about her health     Chronic headaches. Otherwise, no fever, no cold symptoms, no changes in vision, no new weakness, no chest pain, no shortness of breath, no nausea, no vomiting, no diarrhea, no constipation, no problems walking.      Interval Events/ROS 2024:    Current ASM/SEs:   Lamotrigine 150mg BID; no SE; did not tolerate 300mg BID  Topiramate 50mg BID; no SE  Pregabalin 50mg BID; SE memory issues, trouble following conversations w/ people, feels like she blanks out for a second, almost like a light switch (?seizure activity), drowsiness     Breakthrough seizures/events: 2 weeks ago -> seemed to wake up every other night w/ a headache, unsure if seizures have occurred    Driving: no  Sleep: overall good  Mood: some fluctuations in mood but is manageable  Activity: continues to attend work    Headaches. Chronic fatigue, pt had negative  sleep study last year. Recent cough/congestion/sinus pressure. Otherwise, no fever, no changes in vision, no new weakness, no chest pain, no shortness of breath, no nausea, no vomiting, no diarrhea, no constipation, no tingling/numbness, no problems walking.      Interval Events/ROS 11/15/2023:    Current ASM/SEs: lamotrigine 150mg twice daily and topiramate 50mg twice daily; no SE  Breakthrough seizures/events: none that she knows of; discussed EEG results which captured 2 electrographic seizures that occurred unbeknownst to pt  Driving: no  Sleep: not great due to waking up w/ headaches; takes trazodone every once in a while  Mood: overall okay, denies anxiety/depression, some fluctuations in mood but is manageable    Persistent headaches -> physical therapy scheduled for next week. Vivid dreams sometimes. Otherwise, no fever, no cold symptoms, no changes in vision, no new weakness, no chest pain, no shortness of breath, no nausea, no vomiting, no diarrhea, no constipation, no problems walking.     Interval Events/ROS 11/3/2023:    Current ASM/SEs: lamotrigine 150mg twice daily and topiramate 50mg twice daily  Breakthrough seizures/events: none since the addition of topiramate  Folic acid: s/p hysterectomy  Sleep: not great due to waking up w/ headaches; takes trazodone every once in a while  Mood: overall okay, denies anxiety/depression, some fluctuations in mood but is manageable    Persistent headaches described as numbness to L side of head, wakes her up during the night and is sometimes still there in the morning. Sleeps on her L side at night. Has taken tylenol 1-2 times for headache in the past week. Otherwise, no fever, no cold symptoms, no changes in vision, no new weakness, no chest pain, no shortness of breath, no nausea, no vomiting, no diarrhea, no constipation, no problems walking.     Interval Events/ROS 8/25/2023:    Current ASM/SEs: lamotrigine 150mg twice daily; SE drowsy  Breakthrough  seizures/events: staring event 8/5/2023, no identifiable trigger, described she was in the bathroom brushing her teeth then suddenly realized she was in a different room, son told her she had toothpaste around her mouth; prior to this last event was 6/15/2023; no identifiable trigger; no convulsions  Driving: no  Folic acid: s/p hysterectomy  Sleep: not great due to waking up w/ headaches   Mood: fair    Frequently wakes up in the middle of the night with headache located to the top of her head, also often wakes up with CROOK in the morning. States she has not been taking anything at home for the pain. Headache typically resolves once she is up and moving for the day. Otherwise, no fever, no cold symptoms, no changes in vision, no new weakness, no chest pain, no shortness of breath, no nausea, no vomiting, no diarrhea, no constipation, no tingling/numbness, no problems walking.     Interval Events/ROS 6/20/2023:    Current ASM/SEs: lamotrigine 150mg BID; no SE   Breakthrough seizures/events: last event was 6/15 while shopping in [employer], describes she was with her sister when she started staring off and wouldn't respond, 'came to' and her sister was very concerned, patient was confused, states her sister told her the event lasted >15 minutes (specifically clarified that the staring lasted >15 minutes and it took additional time for the patient to fully feel back to normal), no LOC or collapse, no abnormal movements, patient can't recall whether or not she was able to hear her sister during event; reports she has experienced staring episodes/sensation of lost time at least 3 times in the past 2 months, other episodes were not witnessed    Driving: no  Folic acid: no  Sleep: does not get much sleep, broken sleep, has tried melatonin in the past  Mood: chronic anxiety, notes some feelings of depression, stopped sertraline after 3 days (didn't like the idea of taking a mood medication, but feels more open to it  now)    Intermittent headaches. Otherwise, no fever, no cold symptoms, no changes in vision, no new weakness, no chest pain, no shortness of breath, no nausea, no vomiting, no diarrhea, no constipation, no tingling/numbness, no problems walking.     Interval Events/ROS 4/12/2023:    Current ASM/SEs: lamotrigine 100mg BID; no SE  Breakthrough seizures/events:  event 4/10 -> states she remembers using the bathroom then all of a sudden she was in her bed being woken up by her dad, confused, felt off; does not recall walking back from the bathroom to her bed; son told her she seemed out of it; felt back to baseline ~30 min after event; no convulsions/abnormal movements; no identifiable trigger; current event frequency is around 1 per year   Driving: no  Folic acid: no   Sleep: chronic insomnia; follows w/ sleep medicine    Mood: anxious    Headaches. Otherwise, no fever, no cold symptoms, no changes in vision, no new weakness, no chest pain, no shortness of breath, no nausea, no vomiting, no diarrhea, no constipation, no tingling/numbness, no problems walking.     HPI:   Ms. Pernell Ly is a 44 y.o. female who presents with a chief complaint of a longstanding seizure disorder.  Patient presents today to transfer her care from Dr. Dunham after being lost to follow-up for 2 years.  The patient reports that she had been doing well until the last few months when she has begun to experience episodes of loss of time.  The patient states that she will be alone and realized that several seconds have passed and she has lost awareness for a few minutes.  She endorses expressed compliance with her Lamictal.  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  She states that she believes is contributing to chronic daily tension headaches.  She states the headaches have become worse over the past few weeks and notes that she is taking ibuprofen  "twice a day every day for management of headaches.  She denies associated migrainous features or focal neurologic deficits.    Seizure Type: Supsected focal onset  Seizure Etiology:   L MTS  Current AEDs: Lamictal 100 mg BID    The patient is not accompanied by family who contribute to the history. This patient has 3 types of seizure as described below. The patient reports having seizures for years The patient reports to have stable seizure control. The seizure frequency is variable. The last seizure was last week . The patient does not report side effects from seizure medication.     Seizure Type 1:   Seizure Description: LOC, GTC (one out of sleep)  Aura:  De ja vu feeling  Associated Symptoms:  none  Seizure Frequency: 2 in lifetime  Last seizure: 2020-ish    Seizure Type 2:   Seizure Description: Incomprehensible speech lasting 14 minutes with loss of time and confusion  Aura: None  Associated Symptoms:  none  Seizure Frequency: One in lifetime  Last seizure: 2019    Seizure Type 3:   Seizure Description: "Goes out into space" for a minute, always happens when she's alone  Aura: None  Associated Symptoms: none  Seizure Frequency: Variable, often clusters every few months "seldom"   Last seizure: 1.5 weeks ago    Handedness: right  Seizure Onset Age: infancy  Seizure/ Epilepsy Risk Factors: childhood seizure  Birth/Developmental History: normal birth history and Normal developmental history  Seizure Triggers/ Provoking Features: stress  Previous Seizure Medications: lamotrigine (Lamictal, LTG) and zonisamide (Zonegran, ZNA)  Recent Med Changes: None  Other Treatments: None  Prior Episodes of Status: None  Psychiatric/Behavioral Comorbitidies: Anxiety  Surgical Candidacy:  Pending    Prior Studies:  EEG : Not done  vEEG/ EMU evaluation: Left MTS 11/2018, personally reviewed  MRI of brain: Not done  AED levels:  Not done  CT/CTA Scan: Unremarkable 11/18- personally reviewed  PET Scan: Not done  Neuropsychological " Evaluation: Not done  DEXA Scan: Not done  Other studies: Not done    PAST MEDICAL HISTORY:  Past Medical History:   Diagnosis Date    Encounter for blood transfusion     Epilepsy     Fibroids     Focal epilepsy     Hypertension     Migraine        PAST SURGICAL HISTORY:  Past Surgical History:   Procedure Laterality Date     SECTION      x1    HYSTERECTOMY  2017    TUBAL LIGATION         CURRENT MEDS:  Current Outpatient Medications   Medication Sig Dispense Refill    albuterol (PROVENTIL/VENTOLIN HFA) 90 mcg/actuation inhaler Inhale 1-2 puffs into the lungs every 6 (six) hours as needed. Rescue 8 g 0    amoxicillin-clavulanate 875-125mg (AUGMENTIN) 875-125 mg per tablet Take 1 tablet by mouth 2 (two) times daily. 14 tablet 0    azelastine (ASTELIN) 137 mcg (0.1 %) nasal spray USE 1 SPRAY NASALLY TWICE DAILY 30 mL 0    beclomethasone dipropionate (QVAR REDIHALER) 40 mcg/actuation HFAB Take 2 puffs in AM and 2 puffs in PM 10.6 g 11    cholecalciferol, vitamin D3, 1,250 mcg (50,000 unit) capsule Take 1 capsule (50,000 Units total) by mouth every 7 days. for 8 doses 8 capsule 0    ciclesonide (OMNARIS) 50 mcg Spry 2 sprays by Each Nostril route once daily. 12.5 g 0    cloBAZam (ONFI) 20 mg Tab Take 1.5 tablets (30 mg total) by mouth once daily. 45 tablet 5    ibuprofen (ADVIL,MOTRIN) 600 MG tablet Take 1 tablet (600 mg total) by mouth every 6 (six) hours as needed for Pain. 20 tablet 0    lamoTRIgine (LAMICTAL) 150 MG Tab Take 1 tablet (150 mg total) by mouth 2 (two) times daily. 180 tablet 3    multivitamin with minerals tablet Take by mouth.      NIFEdipine (PROCARDIA-XL) 90 MG (OSM) 24 hr tablet Take 1 tablet (90 mg total) by mouth once daily. 90 tablet 0    omeprazole (PRILOSEC) 20 MG capsule Take 20 mg by mouth daily as needed.      omeprazole (PRILOSEC) 40 MG capsule TAKE 1 CAPSULE(40 MG) BY MOUTH EVERY DAY 90 capsule 3    potassium chloride SA (K-DUR,KLOR-CON) 20 MEQ tablet Take 1 tablet (20 mEq  total) by mouth 2 (two) times daily. 90 tablet 1    promethazine-dextromethorphan (PROMETHAZINE-DM) 6.25-15 mg/5 mL Syrp       tiZANidine (ZANAFLEX) 2 MG tablet Take 1 tablet (2 mg total) by mouth nightly as needed (muscle spasms). 30 tablet 1    cetirizine (ZYRTEC) 10 MG tablet Take 1 tablet (10 mg total) by mouth once daily. 30 tablet 1     No current facility-administered medications for this visit.       ALLERGIES:  Review of patient's allergies indicates:  No Known Allergies    FAMILY HISTORY:  Family History   Problem Relation Name Age of Onset    Hypertension Father      Hypertension Sister      Hypertension Maternal Grandmother      Breast cancer Maternal Grandmother      Cancer Maternal Grandfather      Hypertension Paternal Grandmother      Colon cancer Maternal Cousin      Ovarian cancer Neg Hx         SOCIAL HISTORY:  Social History     Tobacco Use    Smoking status: Never     Passive exposure: Never    Smokeless tobacco: Never   Substance Use Topics    Alcohol use: No     Comment: Occasional; 3x /month    Drug use: No     Review of Systems:  12 system review of systems is negative except for the symptoms mentioned in HPI.     Objective:     There were no vitals filed for this visit.    General: awake, alert, NAD, well nourished   Eyes: no tearing, discharge, no erythema   ENT: moist mucous membranes of the oral cavity, nares patent    Neck: normal ROM  Cardiovascular: well perfused  Lungs: Normal work of breathing, normal chest wall excursions  Skin: No rash, lesions, or breakdown on exposed skin  Psychiatry: Mood and affect are appropriate, anxious   Abdomen: non distended  Extremeties: not examined     Neurological   MENTAL STATUS: Alert and oriented to person, place, and time. Attention and concentration within normal limits. Speech without dysarthria, able to name and repeat without difficulty. Recent and remote memory within normal limits   CRANIAL NERVES: EOMI. Face symmetrical. Hearing grossly  intact. Full shoulder shrug bilaterally. Tongue protrudes midline   SENSORY: no subjective deficits   MOTOR: gross motor movements smooth upper and lower extremities bilaterally   CEREBELLAR/COORDINATION/GAIT: gait steady, able to ambulate around the room without difficulty

## 2025-02-10 ENCOUNTER — TELEPHONE (OUTPATIENT)
Dept: PRIMARY CARE CLINIC | Facility: CLINIC | Age: 45
End: 2025-02-10
Payer: COMMERCIAL

## 2025-02-19 DIAGNOSIS — G40.109 FOCAL EPILEPSY: ICD-10-CM

## 2025-02-20 RX ORDER — LAMOTRIGINE 150 MG/1
150 TABLET ORAL 2 TIMES DAILY
Qty: 180 TABLET | Refills: 3 | Status: SHIPPED | OUTPATIENT
Start: 2025-02-20

## 2025-03-08 DIAGNOSIS — I10 ESSENTIAL HYPERTENSION: ICD-10-CM

## 2025-03-08 NOTE — TELEPHONE ENCOUNTER
No care due was identified.  [facility] Embedded Care Due Messages. Reference number: [id number].   3/08/2025 11:33:43 AM CST

## 2025-03-10 RX ORDER — NIFEDIPINE 90 MG/1
TABLET, EXTENDED RELEASE ORAL
Qty: 90 TABLET | Refills: 0 | Status: SHIPPED | OUTPATIENT
Start: 2025-03-10

## 2025-03-10 NOTE — TELEPHONE ENCOUNTER
Refill Encounter    PCP Visits: Recent Visits  Date Type Provider Dept   10/22/24 Office Visit Zoey Nelson MD [facility]   05/15/24 Office Visit Zoey Nelson MD [facility]   04/16/24 Office Visit Zoey Nelson MD [facility]   Showing recent visits within past 360 days and meeting all other requirements  Future Appointments  No visits were found meeting these conditions.  Showing future appointments within next 720 days and meeting all other requirements      Last 3 Blood Pressure:   BP Readings from Last 3 Encounters:   08/26/24 (!) 153/95   08/14/24 111/73   05/28/24 128/78     Preferred Pharmacy:   [id number] - [place], [place] - [address]  [address]  [address] [address]  Phone: [phone] Fax: [phone]    [id number] - [place], [place] - [address]  [address]  [place] [address]  Phone: [phone] Fax: [phone]    [facility] - [facility] [address]  [address]  [address] [address]  Phone: [phone] Fax: [phone]    [id number] - [place], [place] - [address]  [address] [address]  [address] [facility] [address]  Phone: [phone] Fax: [phone]    [id number] - [place], [place] - [address] [facility]  [address] [address]  Phone: [phone] Fax: [phone]    Requested RX:  Requested Prescriptions     Pending Prescriptions Disp Refills    NIFEdipine (PROCARDIA-XL) 90 MG (OSM) 24 hr tablet [Pharmacy Med Name: NIFEDIPINE 90MG ER (XL/OSM) TABLET] 90 tablet 0     Sig: TAKE 1 TABLET(90 MG) BY MOUTH DAILY      RX Route: Normal

## 2025-03-12 RX ORDER — AMOXICILLIN AND CLAVULANATE POTASSIUM 875; 125 MG/1; MG/1
TABLET, FILM COATED ORAL
Qty: 20 TABLET | OUTPATIENT
Start: 2025-03-12

## 2025-03-12 NOTE — TELEPHONE ENCOUNTER
Spoke with patient regarding medication request for sinus infection, patient declined in person appointment, scheduled virtual appointment as requested for evaluation

## 2025-03-13 ENCOUNTER — OFFICE VISIT (OUTPATIENT)
Dept: PRIMARY CARE CLINIC | Facility: CLINIC | Age: 45
End: 2025-03-13
Payer: COMMERCIAL

## 2025-03-13 DIAGNOSIS — Z12.31 ENCOUNTER FOR SCREENING MAMMOGRAM FOR BREAST CANCER: ICD-10-CM

## 2025-03-13 DIAGNOSIS — J01.01 ACUTE RECURRENT MAXILLARY SINUSITIS: Primary | ICD-10-CM

## 2025-03-13 RX ORDER — AMOXICILLIN AND CLAVULANATE POTASSIUM 875; 125 MG/1; MG/1
1 TABLET, FILM COATED ORAL EVERY 12 HOURS
Qty: 14 TABLET | Refills: 0 | Status: SHIPPED | OUTPATIENT
Start: 2025-03-13 | End: 2025-03-20

## 2025-03-13 NOTE — PROGRESS NOTES
VIRTUAL VISIT/TELEMEDICINE VISIT  FAMILY MEDICINE  [facility]    The patient location is: [place]  The chief complaint leading to consultation is:   Chief Complaint   Patient presents with    New Med Request    Sinus Problem     Visit type: Virtual visit with synchronous audio and video   Total time spent: 25 minute  Time spent medical decision makin minute  Each patient to whom he or she provides medical services by telemedicine is:  (1) informed of the relationship between the physician and patient and the respective role of any other health care provider with respect to management of the patient; and (2) notified that he or she may decline to receive medical services by telemedicine and may withdraw from such care at any time.    HPI: Pernell Ly is a 44 y.o. female presenting today for acute sinus congestion and cough.    Patient is an established patient of PCP, Dr. Zoey Nelson MD. This patient is new to me.    Respiratory Symptoms-  She reports yellow thick nasal discharge for 2 weeks with nasal congestion, sinus pain (maxillary and facial bilaterally), and headaches. She experiences pain behind her left eye. Symptoms worsen at night when lying down, requiring tissue in her nose to sleep due to constant rhinorrhea and post-nasal drip. She has had breathing difficulties for the past couple months with mild improvement using a Q-Kaylyn redihaler.    She reports frequent sinusitis requiring antibiotic treatment. She was last treated in 2024. Her PCP previously placed a referral for ENT evaluation. I encouraged her to schedule.    Pertinent Medications-  She takes Lamotrigine and Clobazam for epilepsy management. For respiratory symptoms, she uses Delsym for cough, Mucinex for congestion, and a neti pot for sinus rinses.        Answers submitted by the patient for this visit:  Cough Questionnaire (Submitted on 3/13/2025)  Chief Complaint: Cough  Chronicity:  chronic  Onset: in the past 7 days  Progression since onset: waxing and waning  Frequency: hourly  Cough characteristics: non-productive, productive of sputum, productive of purulent sputum  nasal congestion: Yes  postnasal drip: Yes  rhinorrhea: Yes  Aggravated by: dust, lying down, pollens, stress  Improvement on treatment: no relief    Review of Systems   Respiratory:  Positive for cough, shortness of breath and wheezing.    All other systems reviewed and are negative.      Social History     Social History Narrative    Not on file       ALLERGIES:   Review of patient's allergies indicates:  No Known Allergies    MEDS:   Current Outpatient Medications on File Prior to Visit   Medication Sig Dispense Refill Last Dose/Taking    albuterol (PROVENTIL/VENTOLIN HFA) 90 mcg/actuation inhaler Inhale 1-2 puffs into the lungs every 6 (six) hours as needed. Rescue 8 g 0 Taking As Needed    azelastine (ASTELIN) 137 mcg (0.1 %) nasal spray USE 1 SPRAY NASALLY TWICE DAILY 30 mL 0 Taking Differently    cloBAZam (ONFI) 20 mg Tab Take 1.5 tablets (30 mg total) by mouth once daily. 45 tablet 5 Taking    ibuprofen (ADVIL,MOTRIN) 600 MG tablet Take 1 tablet (600 mg total) by mouth every 6 (six) hours as needed for Pain. 20 tablet 0 Taking As Needed    lamoTRIgine (LAMICTAL) 150 MG Tab TAKE 1 TABLET(150 MG) BY MOUTH TWICE DAILY 180 tablet 3 Taking    multivitamin with minerals tablet Take by mouth.   Taking    NIFEdipine (PROCARDIA-XL) 90 MG (OSM) 24 hr tablet TAKE 1 TABLET(90 MG) BY MOUTH DAILY 90 tablet 0 Taking    omeprazole (PRILOSEC) 20 MG capsule Take 20 mg by mouth daily as needed.   Taking As Needed    omeprazole (PRILOSEC) 40 MG capsule TAKE 1 CAPSULE(40 MG) BY MOUTH EVERY DAY 90 capsule 3 Taking    potassium chloride SA (K-DUR,KLOR-CON) 20 MEQ tablet Take 1 tablet (20 mEq total) by mouth 2 (two) times daily. 90 tablet 1 Taking    tiZANidine (ZANAFLEX) 2 MG tablet Take 1 tablet (2 mg total) by mouth nightly as needed (muscle  spasms). 30 tablet 1 Taking Differently    [DISCONTINUED] amoxicillin-clavulanate 875-125mg (AUGMENTIN) 875-125 mg per tablet Take 1 tablet by mouth 2 (two) times daily. 14 tablet 0 Taking    beclomethasone dipropionate (QVAR REDIHALER) 40 mcg/actuation HFAB Take 2 puffs in AM and 2 puffs in PM 10.6 g 11     cetirizine (ZYRTEC) 10 MG tablet Take 1 tablet (10 mg total) by mouth once daily. 30 tablet 1     ciclesonide (OMNARIS) 50 mcg Spry 2 sprays by Each Nostril route once daily. (Patient not taking: Reported on 3/13/2025) 12.5 g 0 Not Taking    promethazine-dextromethorphan (PROMETHAZINE-DM) 6.25-15 mg/5 mL Syrp    Taking Unknown       Vital signs:   There were no vitals filed for this visit.  There is no height or weight on file to calculate BMI.    PHYSICAL EXAM:     Physical Exam  Constitutional:       Appearance: Normal appearance. She is ill-appearing. She is not diaphoretic.   HENT:      Head: Normocephalic and atraumatic.      Nose: Congestion present.   Pulmonary:      Effort: Pulmonary effort is normal. No respiratory distress.   Skin:     Coloration: Skin is not pale.   Neurological:      Mental Status: She is alert and oriented to person, place, and time.   Psychiatric:         Mood and Affect: Mood normal.         Behavior: Behavior normal.         Thought Content: Thought content normal.         Judgment: Judgment normal.           PERTINENT RESULTS:   No visits with results within 1 Week(s) from this visit.   Latest known visit with results is:   Admission on 08/26/2024, Discharged on 08/26/2024   Component Date Value Ref Range Status    WBC 08/26/2024 4.84  3.90 - 12.70 K/uL Final    RBC 08/26/2024 4.19  4.00 - 5.40 M/uL Final    Hemoglobin 08/26/2024 12.6  12.0 - 16.0 g/dL Final    Hematocrit 08/26/2024 36.7 (L)  37.0 - 48.5 % Final    MCV 08/26/2024 88  82 - 98 fL Final    MCH 08/26/2024 30.1  27.0 - 31.0 pg Final    MCHC 08/26/2024 34.3  32.0 - 36.0 g/dL Final    RDW 08/26/2024 13.6  11.5 - 14.5 %  Final    Platelets 08/26/2024 341  150 - 450 K/uL Final    MPV 08/26/2024 8.8 (L)  9.2 - 12.9 fL Final    Immature Granulocytes 08/26/2024 0.0  0.0 - 0.5 % Final    Gran # (ANC) 08/26/2024 2.3  1.8 - 7.7 K/uL Final    Immature Grans (Abs) 08/26/2024 0.00  0.00 - 0.04 K/uL Final    Comment: Mild elevation in immature granulocytes is non specific and   can be seen in a variety of conditions including stress response,   acute inflammation, trauma and pregnancy. Correlation with other   laboratory and clinical findings is essential.      Lymph # 08/26/2024 2.1  1.0 - 4.8 K/uL Final    Mono # 08/26/2024 0.3  0.3 - 1.0 K/uL Final    Eos # 08/26/2024 0.2  0.0 - 0.5 K/uL Final    Baso # 08/26/2024 0.03  0.00 - 0.20 K/uL Final    nRBC 08/26/2024 0  0 /100 WBC Final    Gran % 08/26/2024 47.7  38.0 - 73.0 % Final    Lymph % 08/26/2024 42.8  18.0 - 48.0 % Final    Mono % 08/26/2024 5.6  4.0 - 15.0 % Final    Eosinophil % 08/26/2024 3.3  0.0 - 8.0 % Final    Basophil % 08/26/2024 0.6  0.0 - 1.9 % Final    Differential Method 08/26/2024 Automated   Final    Sodium 08/26/2024 139  136 - 145 mmol/L Final    Potassium 08/26/2024 3.6  3.5 - 5.1 mmol/L Final    Chloride 08/26/2024 106  95 - 110 mmol/L Final    CO2 08/26/2024 24  23 - 29 mmol/L Final    Glucose 08/26/2024 117 (H)  70 - 110 mg/dL Final    BUN 08/26/2024 16  6 - 20 mg/dL Final    Creatinine 08/26/2024 0.8  0.5 - 1.4 mg/dL Final    Calcium 08/26/2024 9.3  8.7 - 10.5 mg/dL Final    Total Protein 08/26/2024 8.4  6.0 - 8.4 g/dL Final    Albumin 08/26/2024 4.1  3.5 - 5.2 g/dL Final    Total Bilirubin 08/26/2024 0.2  0.1 - 1.0 mg/dL Final    Comment: For infants and newborns, interpretation of results should be based  on gestational age, weight and in agreement with clinical  observations.    Premature Infant recommended reference ranges:  Up to 24 hours.............<8.0 mg/dL  Up to 48 hours............<12.0 mg/dL  3-5 days..................<15.0 mg/dL  6-29  days.................<15.0 mg/dL      Alkaline Phosphatase 08/26/2024 64  55 - 135 U/L Final    AST 08/26/2024 18  10 - 40 U/L Final    ALT 08/26/2024 18  10 - 44 U/L Final    eGFR 08/26/2024 >60  >60 mL/min/1.73 m^2 Final    Anion Gap 08/26/2024 9  8 - 16 mmol/L Final    Magnesium 08/26/2024 2.1  1.6 - 2.6 mg/dL Final    Lipase 08/26/2024 12  4 - 60 U/L Final    Troponin I 08/26/2024 <0.006  0.000 - 0.026 ng/mL Final    Comment: The reference interval for Troponin I represents the 99th percentile   cutoff   for our facility and is consistent with 3rd generation assay   performance.      POC Rapid COVID 08/26/2024 Negative  Negative Final     Acceptable 08/26/2024 Yes   Final    POC Molecular Influenza A Ag 08/26/2024 Negative  Negative Final    POC Molecular Influenza B Ag 08/26/2024 Negative  Negative Final     Acceptable 08/26/2024 Yes   Final    QRS Duration 08/26/2024 68  ms Final    [facility] QTC Calculation 08/26/2024 444  ms Final    BNP 08/26/2024 <10  0 - 99 pg/mL Final    Values of less than 100 pg/ml are consistent with non-CHF populations.       ASSESSMENT/PLAN:    Assessment & Plan    IMPRESSION:  - Assessed symptoms as indicative of a sinus infection  - Considered patient's history of epilepsy and current medications (Lamotrigine, Clobazam) when selecting antibiotic treatment  - Determined amoxicillin (Augmentin) to be safe and appropriate for treating the sinus infection  - Recommend continuation of current treatments (inhaler, Delsym, Mucinex) for managing symptoms  - Suggested addition of Flonase nasal spray to address sinus inflammation and congestion        1. Acute recurrent maxillary sinusitis  -     amoxicillin-clavulanate 875-125mg (AUGMENTIN) 875-125 mg per tablet; Take 1 tablet by mouth every 12 (twelve) hours. for 7 days  Dispense: 14 tablet; Refill: 0    2. Encounter for screening mammogram for breast cancer  -     Mammo Digital Screening Bilat w/ Alexander (XPD);  Future; Expected date: 03/13/2025    Health maintenance addressed: Mammogram ordered  Vaccines recommended: Flu and Covid-19    Follow up if symptoms worsen or fail to improve.     ANTHONY Mendoza-C   Internal Medicine

## 2025-03-24 ENCOUNTER — OFFICE VISIT (OUTPATIENT)
Dept: PRIMARY CARE CLINIC | Facility: CLINIC | Age: 45
End: 2025-03-24

## 2025-03-24 DIAGNOSIS — R56.9 SEIZURE: ICD-10-CM

## 2025-03-24 DIAGNOSIS — I10 ESSENTIAL HYPERTENSION: ICD-10-CM

## 2025-03-24 DIAGNOSIS — Z00.01 ENCOUNTER FOR GENERAL ADULT MEDICAL EXAMINATION WITH ABNORMAL FINDINGS: Primary | ICD-10-CM

## 2025-03-24 DIAGNOSIS — J01.01 ACUTE RECURRENT MAXILLARY SINUSITIS: ICD-10-CM

## 2025-03-24 DIAGNOSIS — K21.00 GASTROESOPHAGEAL REFLUX DISEASE WITH ESOPHAGITIS WITHOUT HEMORRHAGE: ICD-10-CM

## 2025-03-24 PROCEDURE — 99999 PR PBB SHADOW E&M-EST. PATIENT-LVL III: CPT | Mod: PBBFAC,,,

## 2025-03-24 RX ORDER — PREDNISONE 20 MG/1
20 TABLET ORAL 2 TIMES DAILY
Qty: 6 TABLET | Refills: 0 | Status: SHIPPED | OUTPATIENT
Start: 2025-03-24 | End: 2025-03-27

## 2025-03-24 RX ORDER — LEVOCETIRIZINE DIHYDROCHLORIDE 5 MG/1
5 TABLET, FILM COATED ORAL NIGHTLY
COMMUNITY

## 2025-03-25 VITALS
WEIGHT: 163.25 LBS | DIASTOLIC BLOOD PRESSURE: 88 MMHG | OXYGEN SATURATION: 99 % | SYSTOLIC BLOOD PRESSURE: 138 MMHG | BODY MASS INDEX: 28.02 KG/M2 | HEART RATE: 70 BPM

## 2025-03-25 PROBLEM — Z00.01 ENCOUNTER FOR GENERAL ADULT MEDICAL EXAMINATION WITH ABNORMAL FINDINGS: Status: ACTIVE | Noted: 2025-03-25

## 2025-03-26 NOTE — PROGRESS NOTES
INTERNAL MEDICINE  [facility]    Reason for visit:   Chief Complaint   Patient presents with    Annual Exam    Chest Pain     HPI: Pernell Ly is a 44 y.o. female presenting today for routine annual physical with concern for persistent sinus congestion and chest wall pain.    Patient is an established patient of PCP, Dr. Zoey Nelson MD. This patient is known to me.    Breast Cancer Screening: Last mammogram 2022 negative, repeat overdue and order placed previously. Will schedule.  Cervical Cancer Screening: Last PAP 2017 with Dr. Hutson and due for repeat. Will refer.  Colorectal Cancer Screening: Average risk. Begin screening age 45.    Sinus and Respiratory Symptoms-  She reports congestion and sinus fullness with fluctuating loss of smell and taste occurring every other day. On the day of visit, she could taste, but the previous day she experienced loss of taste during daytime that resolved in the evening. She has a dry cough at night that disrupts sleep. Previous treatments including antibiotics, Delsym, Mucinex, and sinus rinses have not provided improvement. She denies ear pain.    Pertinent Medical History-  She has a history of epilepsy managed with Lamotrigine and Clobazam. She experienced wheezing requiring an ER visit over summer with steroid treatment. She was also previously evaluated in primary care for wheezing for which she was prescribed albuterol and Qvar inhalers.      Answers submitted by the patient for this visit:  Review of Systems Questionnaire (Submitted on 3/23/2025)  activity change: Yes  unexpected weight change: Yes  rhinorrhea: Yes  trouble swallowing: No  visual disturbance: No  chest tightness: No  polyuria: No  difficulty urinating: No  menstrual problem: No  joint swelling: Yes  arthralgias: Yes  confusion: Yes  dysphoric mood: Yes    Review of Systems   HENT:  Negative for hearing loss.    Eyes:  Negative for discharge.   Respiratory:  Positive for  wheezing.    Cardiovascular:  Positive for chest pain and palpitations.   Gastrointestinal:  Negative for blood in stool, constipation, diarrhea and vomiting.   Genitourinary:  Negative for dysuria and hematuria.   Neurological:  Positive for weakness and headaches.   Endo/Heme/Allergies:  Negative for polydipsia.       Social History     Social History Narrative    Not on file       ALLERGIES:   Review of patient's allergies indicates:  No Known Allergies    MEDS:   Current Outpatient Medications on File Prior to Visit   Medication Sig Dispense Refill Last Dose/Taking    levocetirizine (XYZAL) 5 MG tablet Take 5 mg by mouth every evening.   Taking    albuterol (PROVENTIL/VENTOLIN HFA) 90 mcg/actuation inhaler Inhale 1-2 puffs into the lungs every 6 (six) hours as needed. Rescue 8 g 0     azelastine (ASTELIN) 137 mcg (0.1 %) nasal spray USE 1 SPRAY NASALLY TWICE DAILY 30 mL 0     beclomethasone dipropionate (QVAR REDIHALER) 40 mcg/actuation HFAB Take 2 puffs in AM and 2 puffs in PM 10.6 g 11     cloBAZam (ONFI) 20 mg Tab Take 1.5 tablets (30 mg total) by mouth once daily. 45 tablet 5     ibuprofen (ADVIL,MOTRIN) 600 MG tablet Take 1 tablet (600 mg total) by mouth every 6 (six) hours as needed for Pain. 20 tablet 0     lamoTRIgine (LAMICTAL) 150 MG Tab TAKE 1 TABLET(150 MG) BY MOUTH TWICE DAILY 180 tablet 3     multivitamin with minerals tablet Take by mouth.       NIFEdipine (PROCARDIA-XL) 90 MG (OSM) 24 hr tablet TAKE 1 TABLET(90 MG) BY MOUTH DAILY 90 tablet 0     omeprazole (PRILOSEC) 20 MG capsule Take 20 mg by mouth daily as needed.       potassium chloride SA (K-DUR,KLOR-CON) 20 MEQ tablet Take 1 tablet (20 mEq total) by mouth 2 (two) times daily. 90 tablet 1     tiZANidine (ZANAFLEX) 2 MG tablet Take 1 tablet (2 mg total) by mouth nightly as needed (muscle spasms). 30 tablet 1        Vital signs:   Vitals:    03/24/25 1052   BP: 138/88   Patient Position: Sitting   Pulse: 70   SpO2: 99%   Weight: 74 kg (163 lb  4 oz)     Body mass index is 28.02 kg/m².    PHYSICAL EXAM:     Physical Exam  Vitals reviewed.   Constitutional:       General: She is not in acute distress.     Appearance: Normal appearance. She is not ill-appearing or diaphoretic.   HENT:      Head: Normocephalic and atraumatic.      Right Ear: Tympanic membrane normal.      Left Ear: Tympanic membrane normal.      Mouth/Throat:      Pharynx: Oropharynx is clear. No oropharyngeal exudate or posterior oropharyngeal erythema.   Cardiovascular:      Rate and Rhythm: Normal rate and regular rhythm.      Heart sounds: Normal heart sounds. No murmur heard.  Pulmonary:      Effort: Pulmonary effort is normal. No respiratory distress.      Breath sounds: Normal breath sounds. No stridor. No wheezing, rhonchi or rales.   Chest:      Chest wall: Tenderness (left sternocostal joints ribs 3-4) present.   Skin:     General: Skin is warm and dry.      Coloration: Skin is not pale.      Findings: No erythema or rash.   Neurological:      Mental Status: She is alert and oriented to person, place, and time.   Psychiatric:         Mood and Affect: Mood normal.         Behavior: Behavior normal.         Thought Content: Thought content normal.         Judgment: Judgment normal.           PERTINENT RESULTS:   No visits with results within 1 Week(s) from this visit.   Latest known visit with results is:   Admission on 08/26/2024, Discharged on 08/26/2024   Component Date Value Ref Range Status    WBC 08/26/2024 4.84  3.90 - 12.70 K/uL Final    RBC 08/26/2024 4.19  4.00 - 5.40 M/uL Final    Hemoglobin 08/26/2024 12.6  12.0 - 16.0 g/dL Final    Hematocrit 08/26/2024 36.7 (L)  37.0 - 48.5 % Final    MCV 08/26/2024 88  82 - 98 fL Final    MCH 08/26/2024 30.1  27.0 - 31.0 pg Final    MCHC 08/26/2024 34.3  32.0 - 36.0 g/dL Final    RDW 08/26/2024 13.6  11.5 - 14.5 % Final    Platelets 08/26/2024 341  150 - 450 K/uL Final    MPV 08/26/2024 8.8 (L)  9.2 - 12.9 fL Final    Immature  Granulocytes 08/26/2024 0.0  0.0 - 0.5 % Final    Gran # (ANC) 08/26/2024 2.3  1.8 - 7.7 K/uL Final    Immature Grans (Abs) 08/26/2024 0.00  0.00 - 0.04 K/uL Final    Comment: Mild elevation in immature granulocytes is non specific and   can be seen in a variety of conditions including stress response,   acute inflammation, trauma and pregnancy. Correlation with other   laboratory and clinical findings is essential.      Lymph # 08/26/2024 2.1  1.0 - 4.8 K/uL Final    Mono # 08/26/2024 0.3  0.3 - 1.0 K/uL Final    Eos # 08/26/2024 0.2  0.0 - 0.5 K/uL Final    Baso # 08/26/2024 0.03  0.00 - 0.20 K/uL Final    nRBC 08/26/2024 0  0 /100 WBC Final    Gran % 08/26/2024 47.7  38.0 - 73.0 % Final    Lymph % 08/26/2024 42.8  18.0 - 48.0 % Final    Mono % 08/26/2024 5.6  4.0 - 15.0 % Final    Eosinophil % 08/26/2024 3.3  0.0 - 8.0 % Final    Basophil % 08/26/2024 0.6  0.0 - 1.9 % Final    Differential Method 08/26/2024 Automated   Final    Sodium 08/26/2024 139  136 - 145 mmol/L Final    Potassium 08/26/2024 3.6  3.5 - 5.1 mmol/L Final    Chloride 08/26/2024 106  95 - 110 mmol/L Final    CO2 08/26/2024 24  23 - 29 mmol/L Final    Glucose 08/26/2024 117 (H)  70 - 110 mg/dL Final    BUN 08/26/2024 16  6 - 20 mg/dL Final    Creatinine 08/26/2024 0.8  0.5 - 1.4 mg/dL Final    Calcium 08/26/2024 9.3  8.7 - 10.5 mg/dL Final    Total Protein 08/26/2024 8.4  6.0 - 8.4 g/dL Final    Albumin 08/26/2024 4.1  3.5 - 5.2 g/dL Final    Total Bilirubin 08/26/2024 0.2  0.1 - 1.0 mg/dL Final    Comment: For infants and newborns, interpretation of results should be based  on gestational age, weight and in agreement with clinical  observations.    Premature Infant recommended reference ranges:  Up to 24 hours.............<8.0 mg/dL  Up to 48 hours............<12.0 mg/dL  3-5 days..................<15.0 mg/dL  6-29 days.................<15.0 mg/dL      Alkaline Phosphatase 08/26/2024 64  55 - 135 U/L Final    AST 08/26/2024 18  10 - 40 U/L Final     ALT 08/26/2024 18  10 - 44 U/L Final    eGFR 08/26/2024 >60  >60 mL/min/1.73 m^2 Final    Anion Gap 08/26/2024 9  8 - 16 mmol/L Final    Magnesium 08/26/2024 2.1  1.6 - 2.6 mg/dL Final    Lipase 08/26/2024 12  4 - 60 U/L Final    Troponin I 08/26/2024 <0.006  0.000 - 0.026 ng/mL Final    Comment: The reference interval for Troponin I represents the 99th percentile   cutoff   for our facility and is consistent with 3rd generation assay   performance.      POC Rapid COVID 08/26/2024 Negative  Negative Final     Acceptable 08/26/2024 Yes   Final    POC Molecular Influenza A Ag 08/26/2024 Negative  Negative Final    POC Molecular Influenza B Ag 08/26/2024 Negative  Negative Final     Acceptable 08/26/2024 Yes   Final    QRS Duration 08/26/2024 68  ms Final    [facility] QTC Calculation 08/26/2024 444  ms Final    BNP 08/26/2024 <10  0 - 99 pg/mL Final    Values of less than 100 pg/ml are consistent with non-CHF populations.       ASSESSMENT/PLAN:    - Assessed persistent sinus symptoms and cough, noting previous treatments including antibiotics and OTC medications have been ineffective.  - Evaluated chest pain, likely due to inflammation of costochondral cartilage from prolonged coughing.  - Considered GERD or bronchitis as possible cause of persistent cough.  - Performed physical exam, including lung auscultation, which revealed no wheezing or crackles.  - Reviewed medication list and recent labs, noting all were WNL.  - Checked blood pressure, finding it borderline elevated.  - Discussed that current symptoms are unlikely to be from a virus like COVID or flu due to prolonged duration.    - Recommend use of fluticasone, astelin, xyzal.    1. Encounter for general adult medical examination with abnormal findings  Overview:  Breast Cancer Screening: Last mammogram 2022 negative, repeat overdue and order placed previously. Will schedule.  Cervical Cancer Screening: Last PAP 2017 with Dr. Hutson  and due for repeat. Will refer.  Colorectal Cancer Screening: Average risk. Begin screening age 45.    Orders:  -     Ambulatory referral/consult to Gynecology; Future; Expected date: 04/01/2025  -     Hemoglobin A1C; Future; Expected date: 03/25/2025  -     Lipid Panel; Future; Expected date: 03/25/2025  -     TSH; Future; Expected date: 03/25/2025  -     CBC Auto Differential; Future; Expected date: 03/25/2025  -     Comprehensive Metabolic Panel; Future; Expected date: 03/25/2025    2. Acute recurrent maxillary sinusitis  -     predniSONE (DELTASONE) 20 MG tablet; Take 1 tablet (20 mg total) by mouth 2 (two) times daily. for 3 days  Dispense: 6 tablet; Refill: 0    3. Essential hypertension    4. Gastroesophageal reflux disease with esophagitis without hemorrhage    5. Seizure      Follow up if symptoms worsen or fail to improve.     ALLYSSA MendozaC   Internal Medicine

## 2025-04-01 ENCOUNTER — PATIENT MESSAGE (OUTPATIENT)
Dept: NEUROLOGY | Facility: CLINIC | Age: 45
End: 2025-04-01
Payer: COMMERCIAL

## 2025-04-15 ENCOUNTER — OFFICE VISIT (OUTPATIENT)
Dept: OBSTETRICS AND GYNECOLOGY | Facility: CLINIC | Age: 45
End: 2025-04-15
Payer: COMMERCIAL

## 2025-04-15 VITALS
DIASTOLIC BLOOD PRESSURE: 88 MMHG | WEIGHT: 169.31 LBS | BODY MASS INDEX: 28.91 KG/M2 | SYSTOLIC BLOOD PRESSURE: 136 MMHG | HEIGHT: 64 IN

## 2025-04-15 DIAGNOSIS — N90.89 VULVAR LESION: ICD-10-CM

## 2025-04-15 DIAGNOSIS — Z11.3 SCREEN FOR STD (SEXUALLY TRANSMITTED DISEASE): Primary | ICD-10-CM

## 2025-04-15 PROCEDURE — 87255 GENET VIRUS ISOLATE HSV: CPT | Performed by: ADVANCED PRACTICE MIDWIFE

## 2025-04-15 PROCEDURE — 3079F DIAST BP 80-89 MM HG: CPT | Mod: CPTII,S$GLB,, | Performed by: ADVANCED PRACTICE MIDWIFE

## 2025-04-15 PROCEDURE — 99214 OFFICE O/P EST MOD 30 MIN: CPT | Mod: S$GLB,,, | Performed by: ADVANCED PRACTICE MIDWIFE

## 2025-04-15 PROCEDURE — 99999 PR PBB SHADOW E&M-EST. PATIENT-LVL III: CPT | Mod: PBBFAC,,, | Performed by: ADVANCED PRACTICE MIDWIFE

## 2025-04-15 PROCEDURE — 1159F MED LIST DOCD IN RCRD: CPT | Mod: CPTII,S$GLB,, | Performed by: ADVANCED PRACTICE MIDWIFE

## 2025-04-15 PROCEDURE — 3075F SYST BP GE 130 - 139MM HG: CPT | Mod: CPTII,S$GLB,, | Performed by: ADVANCED PRACTICE MIDWIFE

## 2025-04-15 PROCEDURE — 81515 NFCT DS BV&VAGINITIS DNA ALG: CPT | Performed by: ADVANCED PRACTICE MIDWIFE

## 2025-04-15 PROCEDURE — 3008F BODY MASS INDEX DOCD: CPT | Mod: CPTII,S$GLB,, | Performed by: ADVANCED PRACTICE MIDWIFE

## 2025-04-15 PROCEDURE — 87491 CHLMYD TRACH DNA AMP PROBE: CPT | Performed by: ADVANCED PRACTICE MIDWIFE

## 2025-04-15 RX ORDER — VALACYCLOVIR HYDROCHLORIDE 1 G/1
1000 TABLET, FILM COATED ORAL 2 TIMES DAILY
Qty: 20 TABLET | Refills: 0 | Status: SHIPPED | OUTPATIENT
Start: 2025-04-15 | End: 2025-04-25

## 2025-04-15 NOTE — PROGRESS NOTES
"Pernell Ly is a 44 y.o. female  presents [facility] with complaint of  painful rash at her buttocks. Pt reports the rash started about 2 days ago. Pt is requesting STD screening. Pt has had a hysterectomy.    ROS:  GENERAL: No fever, chills, fatigability or weight loss.  VULVAR: Reports painful rash in between buttocks near rectum  VAGINAL: No relaxation, no abnormal bleeding and no lesions.Denies discharge.  ABDOMEN: No abdominal pain. Denies nausea. Denies vomiting. No diarrhea. No constipation  URINARY: No incontinence, no nocturia, no frequency and no dysuria.    Review of patient's allergies indicates:  No Known Allergies  Current Medications[1]    Past Medical History:   Diagnosis Date    Encounter for blood transfusion     Epilepsy     Fibroids     Focal epilepsy     Hypertension     Migraine      Past Surgical History:   Procedure Laterality Date     SECTION      x1    HYSTERECTOMY  2017    TUBAL LIGATION       Social History[2]  OB History    Para Term  AB Living   2 2 1   1   SAB IAB Ectopic Multiple Live Births       1      # Outcome Date GA Lbr Joe/2nd Weight Sex Type Anes PTL Lv   2 Para 01   2.665 kg (5 lb 14 oz) M CS-Unspec   LEIDY   1 Term                /88 (BP Location: Left arm, Patient Position: Sitting)   Ht 5' 4" (1.626 m)   Wt 76.8 kg (169 lb 5 oz)   LMP 2017   BMI 29.06 kg/m²     PHYSICAL EXAM:  GENERAL: Calm and appropriate affect, alert, oriented x4  SKIN: Color appropriate for race, warm and dry, clean and intact with no rashes.  RESP: Even, unlabored breathing  ABDOMEN: Soft, nontender, no masses.  :   Normal external female genitalia without lesions. HSV TYPE LESIONS NOTED ON L BUTTOCK NEAR ANUS>-CULTURE OBTAINED Normal hair distribution. Adequate perineal body, normal urethral meatus.  Vagina pink and well rugated, no lesions, vaginal discharge -   No significant cystocele or rectocele.  Cervix -no cervical " motion tenderness.      ASSESSMENT / PLAN:    ICD-10-CM ICD-9-CM    1. Screen for STD (sexually transmitted disease)  Z11.3 V74.5 HIV 1/2 Ag/Ab (4th Gen)      Treponema Pallidium Antibodies IgG, IgM      Hepatitis B Surface Antigen      2. Vulvar lesion  N90.89 624.8 valACYclovir (VALTREX) 1000 MG tablet        Screen for STD (sexually transmitted disease)  -     HIV 1/2 Ag/Ab (4th Gen); Future; Expected date: 04/15/2025  -     Treponema Pallidium Antibodies IgG, IgM; Future; Expected date: 04/15/2025  -     Hepatitis B Surface Antigen; Future; Expected date: 04/15/2025  -     C. trachomatis/N. gonorrhoeae by AMP DNA  -     Vaginosis Screen by DNA Probe    Vulvar lesion  -     valACYclovir (VALTREX) 1000 MG tablet; Take 1 tablet (1,000 mg total) by mouth 2 (two) times daily. for 10 days  Dispense: 20 tablet; Refill: 0  -     Herpes simplex virus culture        Patient was counseled today on  etiology, mgmt and treatment of HSV. Instructions given for prescription. Discussed option for meds only with outbreak vs daily prophylaxis. Pt to notify me when decided.       FOLLOW UP: PRN results  Pending lab results, PRN lack of improvement.         [1]   Current Outpatient Medications:     albuterol (PROVENTIL/VENTOLIN HFA) 90 mcg/actuation inhaler, Inhale 1-2 puffs into the lungs every 6 (six) hours as needed. Rescue, Disp: 8 g, Rfl: 0    azelastine (ASTELIN) 137 mcg (0.1 %) nasal spray, USE 1 SPRAY NASALLY TWICE DAILY, Disp: 30 mL, Rfl: 0    beclomethasone dipropionate (QVAR REDIHALER) 40 mcg/actuation HFAB, Take 2 puffs in AM and 2 puffs in PM, Disp: 10.6 g, Rfl: 11    cloBAZam (ONFI) 20 mg Tab, Take 1.5 tablets (30 mg total) by mouth once daily., Disp: 45 tablet, Rfl: 5    ibuprofen (ADVIL,MOTRIN) 600 MG tablet, Take 1 tablet (600 mg total) by mouth every 6 (six) hours as needed for Pain., Disp: 20 tablet, Rfl: 0    lamoTRIgine (LAMICTAL) 150 MG Tab, TAKE 1 TABLET(150 MG) BY MOUTH TWICE DAILY, Disp: 180 tablet, Rfl:  3    levocetirizine (XYZAL) 5 MG tablet, Take 5 mg by mouth every evening., Disp: , Rfl:     multivitamin with minerals tablet, Take by mouth., Disp: , Rfl:     NIFEdipine (PROCARDIA-XL) 90 MG (OSM) 24 hr tablet, TAKE 1 TABLET(90 MG) BY MOUTH DAILY, Disp: 90 tablet, Rfl: 0    omeprazole (PRILOSEC) 20 MG capsule, Take 20 mg by mouth daily as needed., Disp: , Rfl:     potassium chloride SA (K-DUR,KLOR-CON) 20 MEQ tablet, Take 1 tablet (20 mEq total) by mouth 2 (two) times daily., Disp: 90 tablet, Rfl: 1    tiZANidine (ZANAFLEX) 2 MG tablet, Take 1 tablet (2 mg total) by mouth nightly as needed (muscle spasms)., Disp: 30 tablet, Rfl: 1    valACYclovir (VALTREX) 1000 MG tablet, Take 1 tablet (1,000 mg total) by mouth 2 (two) times daily. for 10 days, Disp: 20 tablet, Rfl: 0  [2]   Social History  Tobacco Use    Smoking status: Never     Passive exposure: Never    Smokeless tobacco: Never   Substance Use Topics    Alcohol use: No     Comment: Occasional; 3x /month    Drug use: No

## 2025-04-16 ENCOUNTER — TELEPHONE (OUTPATIENT)
Dept: OBSTETRICS AND GYNECOLOGY | Facility: CLINIC | Age: 45
End: 2025-04-16
Payer: COMMERCIAL

## 2025-04-16 PROCEDURE — 87529 HSV DNA AMP PROBE: CPT

## 2025-04-16 PROCEDURE — 87529 HSV DNA AMP PROBE: CPT | Performed by: ADVANCED PRACTICE MIDWIFE

## 2025-04-16 NOTE — TELEPHONE ENCOUNTER
Spoke with send-out lab (transferred by [facility] - ext [phone])    The patient's order test code was wrong therefore it was being sent to a different lab that [facility] does not have a contract with. The send-out lab corrected the code and it is being sent to [facility] for testing. Provided the correct testing code for provider's by lab we should be ordering [id number] (HSV - non blood PCR). Voiced understanding, provider updated on this delay in results.

## 2025-04-17 ENCOUNTER — LAB VISIT (OUTPATIENT)
Dept: LAB | Facility: OTHER | Age: 45
End: 2025-04-17
Attending: ADVANCED PRACTICE MIDWIFE
Payer: COMMERCIAL

## 2025-04-17 DIAGNOSIS — Z11.3 SCREEN FOR STD (SEXUALLY TRANSMITTED DISEASE): ICD-10-CM

## 2025-04-17 DIAGNOSIS — Z00.01 ENCOUNTER FOR GENERAL ADULT MEDICAL EXAMINATION WITH ABNORMAL FINDINGS: ICD-10-CM

## 2025-04-17 LAB
ABSOLUTE EOSINOPHIL (OHS): 0.07 K/UL
ABSOLUTE MONOCYTE (OHS): 0.46 K/UL (ref 0.3–1)
ABSOLUTE NEUTROPHIL COUNT (OHS): 2.48 K/UL (ref 1.8–7.7)
ALBUMIN SERPL BCP-MCNC: 3.9 G/DL (ref 3.5–5.2)
ALP SERPL-CCNC: 61 UNIT/L (ref 40–150)
ALT SERPL W/O P-5'-P-CCNC: 12 UNIT/L (ref 10–44)
ANION GAP (OHS): 9 MMOL/L (ref 8–16)
AST SERPL-CCNC: 16 UNIT/L (ref 11–45)
BACTERIAL VAGINOSIS DNA (OHS): NOT DETECTED
BASOPHILS # BLD AUTO: 0.03 K/UL
BASOPHILS NFR BLD AUTO: 0.6 %
BILIRUB SERPL-MCNC: 0.2 MG/DL (ref 0.1–1)
BUN SERPL-MCNC: 16 MG/DL (ref 6–20)
C TRACH DNA SPEC QL NAA+PROBE: NOT DETECTED
CALCIUM SERPL-MCNC: 9.3 MG/DL (ref 8.7–10.5)
CANDIDA GLABRATA/KRUSEI DNA (OHS): NOT DETECTED
CANDIDA SPECIES DNA (OHS): DETECTED
CHLORIDE SERPL-SCNC: 106 MMOL/L (ref 95–110)
CHOLEST SERPL-MCNC: 165 MG/DL (ref 120–199)
CHOLEST/HDLC SERPL: 2.6 {RATIO} (ref 2–5)
CO2 SERPL-SCNC: 24 MMOL/L (ref 23–29)
CREAT SERPL-MCNC: 0.8 MG/DL (ref 0.5–1.4)
CTGC SOURCE (OHS) ORD-325: NORMAL
EAG (OHS): 105 MG/DL (ref 68–131)
ERYTHROCYTE [DISTWIDTH] IN BLOOD BY AUTOMATED COUNT: 13.2 % (ref 11.5–14.5)
GFR SERPLBLD CREATININE-BSD FMLA CKD-EPI: >60 ML/MIN/1.73/M2
GLUCOSE SERPL-MCNC: 100 MG/DL (ref 70–110)
HBA1C MFR BLD: 5.3 % (ref 4–5.6)
HBV SURFACE AG SERPL QL IA: NORMAL
HCT VFR BLD AUTO: 34.9 % (ref 37–48.5)
HDLC SERPL-MCNC: 64 MG/DL (ref 40–75)
HDLC SERPL: 38.8 % (ref 20–50)
HGB BLD-MCNC: 11.8 GM/DL (ref 12–16)
HIV 1+2 AB+HIV1 P24 AG SERPL QL IA: NEGATIVE
IMM GRANULOCYTES # BLD AUTO: 0.01 K/UL (ref 0–0.04)
IMM GRANULOCYTES NFR BLD AUTO: 0.2 % (ref 0–0.5)
LDLC SERPL CALC-MCNC: 88 MG/DL (ref 63–159)
LYMPHOCYTES # BLD AUTO: 2.16 K/UL (ref 1–4.8)
MCH RBC QN AUTO: 30.6 PG (ref 27–31)
MCHC RBC AUTO-ENTMCNC: 33.8 G/DL (ref 32–36)
MCV RBC AUTO: 91 FL (ref 82–98)
N GONORRHOEA DNA UR QL NAA+PROBE: NOT DETECTED
NONHDLC SERPL-MCNC: 101 MG/DL
NUCLEATED RBC (/100WBC) (OHS): 0 /100 WBC
PLATELET # BLD AUTO: 277 K/UL (ref 150–450)
PMV BLD AUTO: 8.5 FL (ref 9.2–12.9)
POTASSIUM SERPL-SCNC: 3.8 MMOL/L (ref 3.5–5.1)
PROT SERPL-MCNC: 8.3 GM/DL (ref 6–8.4)
RBC # BLD AUTO: 3.85 M/UL (ref 4–5.4)
RELATIVE EOSINOPHIL (OHS): 1.3 %
RELATIVE LYMPHOCYTE (OHS): 41.5 % (ref 18–48)
RELATIVE MONOCYTE (OHS): 8.8 % (ref 4–15)
RELATIVE NEUTROPHIL (OHS): 47.6 % (ref 38–73)
SODIUM SERPL-SCNC: 139 MMOL/L (ref 136–145)
T PALLIDUM IGG+IGM SER QL: NEGATIVE
TRICHOMONAS VAGINALIS DNA (OHS): NOT DETECTED
TRIGL SERPL-MCNC: 65 MG/DL (ref 30–150)
TSH SERPL-ACNC: 1.16 UIU/ML (ref 0.4–4)
WBC # BLD AUTO: 5.21 K/UL (ref 3.9–12.7)

## 2025-04-17 PROCEDURE — 87340 HEPATITIS B SURFACE AG IA: CPT

## 2025-04-17 PROCEDURE — 87389 HIV-1 AG W/HIV-1&-2 AB AG IA: CPT

## 2025-04-17 PROCEDURE — 83036 HEMOGLOBIN GLYCOSYLATED A1C: CPT

## 2025-04-17 PROCEDURE — 80061 LIPID PANEL: CPT

## 2025-04-17 PROCEDURE — 86593 SYPHILIS TEST NON-TREP QUANT: CPT

## 2025-04-17 PROCEDURE — 85025 COMPLETE CBC W/AUTO DIFF WBC: CPT

## 2025-04-17 PROCEDURE — 82947 ASSAY GLUCOSE BLOOD QUANT: CPT

## 2025-04-17 PROCEDURE — 36415 COLL VENOUS BLD VENIPUNCTURE: CPT

## 2025-04-17 PROCEDURE — 84443 ASSAY THYROID STIM HORMONE: CPT

## 2025-04-18 ENCOUNTER — PATIENT MESSAGE (OUTPATIENT)
Dept: OBSTETRICS AND GYNECOLOGY | Facility: CLINIC | Age: 45
End: 2025-04-18
Payer: COMMERCIAL

## 2025-04-18 DIAGNOSIS — A60.9 HSV (HERPES SIMPLEX VIRUS) ANOGENITAL INFECTION: ICD-10-CM

## 2025-04-18 DIAGNOSIS — B37.9 CANDIDA ALBICANS INFECTION: Primary | ICD-10-CM

## 2025-04-19 LAB — MAYO GENERIC ORDERABLE RESULT: ABNORMAL

## 2025-04-21 ENCOUNTER — RESULTS FOLLOW-UP (OUTPATIENT)
Dept: PRIMARY CARE CLINIC | Facility: CLINIC | Age: 45
End: 2025-04-21

## 2025-04-21 RX ORDER — FLUCONAZOLE 150 MG/1
150 TABLET ORAL ONCE
Qty: 1 TABLET | Refills: 1 | Status: SHIPPED | OUTPATIENT
Start: 2025-04-21 | End: 2025-04-21

## 2025-04-22 RX ORDER — VALACYCLOVIR HYDROCHLORIDE 500 MG/1
500 TABLET, FILM COATED ORAL DAILY
Qty: 60 TABLET | Refills: 4 | Status: SHIPPED | OUTPATIENT
Start: 2025-04-22 | End: 2026-04-22

## 2025-04-23 ENCOUNTER — RESULTS FOLLOW-UP (OUTPATIENT)
Dept: OBSTETRICS AND GYNECOLOGY | Facility: CLINIC | Age: 45
End: 2025-04-23

## 2025-05-05 ENCOUNTER — HOSPITAL ENCOUNTER (OUTPATIENT)
Dept: RADIOLOGY | Facility: OTHER | Age: 45
Discharge: HOME | End: 2025-05-05
Payer: COMMERCIAL

## 2025-05-05 DIAGNOSIS — Z12.31 ENCOUNTER FOR SCREENING MAMMOGRAM FOR BREAST CANCER: ICD-10-CM

## 2025-05-05 PROCEDURE — 77063 BREAST TOMOSYNTHESIS BI: CPT | Mod: 26,,, | Performed by: RADIOLOGY

## 2025-05-05 PROCEDURE — 77067 SCR MAMMO BI INCL CAD: CPT | Mod: 26,,, | Performed by: RADIOLOGY

## 2025-05-05 PROCEDURE — 77063 BREAST TOMOSYNTHESIS BI: CPT | Mod: TC

## 2025-05-06 ENCOUNTER — RESULTS FOLLOW-UP (OUTPATIENT)
Dept: PRIMARY CARE CLINIC | Facility: CLINIC | Age: 45
End: 2025-05-06

## 2025-05-06 DIAGNOSIS — R92.8 ABNORMAL MAMMOGRAM OF RIGHT BREAST: Primary | ICD-10-CM

## 2025-05-07 ENCOUNTER — PATIENT MESSAGE (OUTPATIENT)
Dept: NEUROLOGY | Facility: CLINIC | Age: 45
End: 2025-05-07
Payer: COMMERCIAL

## 2025-05-15 ENCOUNTER — HOSPITAL ENCOUNTER (OUTPATIENT)
Dept: RADIOLOGY | Facility: OTHER | Age: 45
Discharge: HOME | End: 2025-05-15
Payer: COMMERCIAL

## 2025-05-15 DIAGNOSIS — R92.8 ABNORMAL MAMMOGRAM OF RIGHT BREAST: ICD-10-CM

## 2025-05-15 PROCEDURE — 76642 ULTRASOUND BREAST LIMITED: CPT | Mod: 26,RT,, | Performed by: RADIOLOGY

## 2025-05-15 PROCEDURE — 76642 ULTRASOUND BREAST LIMITED: CPT | Mod: TC,RT

## 2025-05-15 PROCEDURE — 77061 BREAST TOMOSYNTHESIS UNI: CPT | Mod: 26,RT,, | Performed by: RADIOLOGY

## 2025-05-15 PROCEDURE — 77061 BREAST TOMOSYNTHESIS UNI: CPT | Mod: TC,RT

## 2025-05-15 PROCEDURE — 77065 DX MAMMO INCL CAD UNI: CPT | Mod: 26,RT,, | Performed by: RADIOLOGY

## 2025-05-19 ENCOUNTER — RESULTS FOLLOW-UP (OUTPATIENT)
Dept: PRIMARY CARE CLINIC | Facility: CLINIC | Age: 45
End: 2025-05-19

## 2025-05-19 DIAGNOSIS — R92.8 ABNORMAL MAMMOGRAM OF RIGHT BREAST: Primary | ICD-10-CM

## 2025-06-02 DIAGNOSIS — G40.109 FOCAL EPILEPSY: ICD-10-CM

## 2025-06-02 RX ORDER — CLOBAZAM 20 MG/1
30 TABLET ORAL DAILY
Qty: 45 TABLET | Refills: 5 | Status: SHIPPED | OUTPATIENT
Start: 2025-06-02 | End: 2025-11-29

## 2025-06-17 ENCOUNTER — OFFICE VISIT (OUTPATIENT)
Dept: NEUROLOGY | Facility: CLINIC | Age: 45
End: 2025-06-17
Payer: COMMERCIAL

## 2025-06-17 ENCOUNTER — PATIENT MESSAGE (OUTPATIENT)
Dept: NEUROLOGY | Facility: CLINIC | Age: 45
End: 2025-06-17

## 2025-06-17 DIAGNOSIS — R53.82 CHRONIC FATIGUE: ICD-10-CM

## 2025-06-17 DIAGNOSIS — R94.31 ABNORMAL EKG: ICD-10-CM

## 2025-06-17 DIAGNOSIS — G40.109 FOCAL EPILEPSY: Primary | ICD-10-CM

## 2025-06-17 PROCEDURE — 98007 SYNCH AUDIO-VIDEO EST HI 40: CPT | Mod: 95,,,

## 2025-06-17 PROCEDURE — 1159F MED LIST DOCD IN RCRD: CPT | Mod: CPTII,95,,

## 2025-06-17 PROCEDURE — 3044F HG A1C LEVEL LT 7.0%: CPT | Mod: CPTII,95,,

## 2025-06-17 RX ORDER — LACOSAMIDE 50 MG/1
50 TABLET ORAL EVERY 12 HOURS
Qty: 60 TABLET | Refills: 5 | Status: SHIPPED | OUTPATIENT
Start: 2025-06-17 | End: 2025-12-14

## 2025-06-17 NOTE — PROGRESS NOTES
[facility]  Epilepsy [facility] Progress Note    [facility] - NEUROLOGY 7TH FL  [facility], [place]    Date: 6/17/25  Patient Name: Pernell Ly   MRN: [id number]   PCP: Zoey Nelson  Referring Provider: No ref. provider found    The patient location is: Home  The chief complaint leading to consultation is: Epilepsy  Visit type: Virtual visit with synchronous audio and video  Total time spent with patient: 46 minutes  Each patient to whom he or she provides medical services by telemedicine is:  (1) informed of the relationship between the physician and patient and the respective role of any other health care provider with respect to management of the patient; and (2) notified that he or she may decline to receive medical services by telemedicine and may withdraw from such care at any time.    Assessment:   Pernell Ly is a 45 y.o. female presenting in follow up for longstanding epilepsy. 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 Ambulatory EEG monitoring 9/2023 captured two electrographic seizures emanating from the left frontotemporal region as well as sharp waves in the left and right temporal regions despite previous regimen of lamotrigine 150mg BID and topiramate 50mg BID. She is very sensitive to medication side effects and did not tolerate increase in lamotrigine or topiramate (topiramate ultimately discontinued due to numbness/tingling side effect). Pregabalin resulted in memory issues and increased drowsiness. Current regimen: lamotrigine 150mg BID and clobazam 30mg qhs. Given persistent events, discussed trial of outpatient cross taper from lamotrigine (side effects + has not been very effective at current dose) to lacosamide 50mg BID. Noted 1st degree AV block on prior EKG 8/2024 -> will obtain updated EKG prior to next follow up to ensure no worsening w/ initiation of lacosamide. Levels next visit. Vit D. Follow up in 3 months or sooner with issues. Advised to reach  out with any concerns. Patient is comfortable with plan. All questions and concerns are addressed at this time.   Plan:     Problem List Items Addressed This Visit    None  Visit Diagnoses         Focal epilepsy    -  Primary    Relevant Medications    lacosamide (VIMPAT) 50 mg Tab      Chronic fatigue          Abnormal EKG        Relevant Orders    EKG 12-lead          I completed education on seizure first aid and safety. I recommended seizure precautions with regards to avoiding unsupervised water recreational activity or bathing in tubs, climbing or working at heights, operation of heavy or dangerous machinery, caution around fire and sources of high heat, as well as any other activity which could put a patient at danger in case of a seizure. I also reviewed the [facility] law and recommended that the patient not drive for 6 months in the event of breakthrough seizures.    Amber Ortiz PA-C   Neurology-Epilepsy  [facility]    Collaborating physician, Dr. Johnathan Weir, was available during today's encounter.     I spent approximately 56 minutes on the day of this encounter preparing to see the patient, obtaining and reviewing history and results, performing a medically appropriate exam, counseling and educating the patient/family/caregiver, documenting clinical information, coordinating care, and ordering medications, tests, procedures, and referrals.    Patient note was created using MModal Dictation.  Any errors in syntax or even information may not have been identified and edited on initial review prior to signing this note.  Subjective:   Patient seen in consultation at the request of No ref. provider found for the evaluation of epilepsy. A copy of this note will be sent to the referring physician.     Interval Events/ROS 6/17/2025:    Current ASM/SEs:   lamotrigine 150mg BID; SE tolerating ok but does not feel this medication has help w/ seizure control, bothersome SE on higher doses    Clobazam 30mg qhs; SE weight gain    Breakthrough seizures/events: had an episode while getting ready for [employer] this past Sunday -> woke up disoriented and confused, her sister told her the episode lasted about 5-10 minutes. Prior to this last event was 1-2 months ago while she was at work -> was talking to her boss then next thing she knew she woke up and being driven home    Driving: no    Otherwise, no fever, no cold symptoms, no current headache, no changes in vision, no new weakness, no chest pain, no shortness of breath, no nausea, no vomiting, no diarrhea, no constipation, no problems walking.    Recent Labs   Lab 12/31/22  2229 04/24/24  1555 08/08/24  1328 08/08/24  1634   Lamotrigine Lvl 5.1 6.6 3.9  --    Clobazam  --  189.0  --  280.0   Desmethylclobazam  --  415.0  --  600.0       Interval Events/ROS 1/21/2025:    Current AED/SEs:  lamotrigine 150mg BID and clobazam 30mg qhs; SE drowsiness otherwise tolerating well  Breakthrough seizures/events: 2 weeks ago - at work - introducing her to somebody then JASVIR, then when came to was told she had an episode   Intermittent Numbness L hand and L foot    Otherwise, no fever, no cold symptoms, no headache, no changes in vision, no new weakness, no chest pain, no shortness of breath, no nausea, no vomiting, no diarrhea, no constipation, no problems walking.      Interval Events/ROS 10/7/2024:    Current ASM/SEs: lamotrigine 150mg BID and clobazam 30mg qhs; SE drowsiness otherwise tolerating well  Breakthrough seizures/events: none since increasing clobazam to 30mg qhs 8/30/2024  Driving: no  Folic acid: no, s/p hysterectomy  Sleep: ok, daytime fatigue  Mood: no new issues    Taking vit d 1000 IU daily. Needs letter to give landlord. Otherwise, no fever, no cold symptoms, no headache, no changes in vision, no new weakness, no chest pain, no shortness of breath, no nausea, no vomiting, no diarrhea, no constipation, no tingling/numbness, no problems walking.       Interval Events/ROS 2024:    Current ASM/SEs: lamotrigine 150mg BID and clobazam 10mg qhs (started 2-3 weeks ago); no known SE; takes lamotrigine at 8:30am/pm & clobazam an hour later around 9:30pm    Breakthrough seizures/events: not that she knows of, 2x in the past week woke up w/ numbness/tingling sensation, mainly on the left side, slight improvement in nocturnal events since starting clobazam/discontinuing topiramate  Driving: no  Folic acid: no, s/p hysterectomy  Sleep: not great  Mood: stressed, tired, concerned and anxious about her health     Chronic headaches. Otherwise, no fever, no cold symptoms, no changes in vision, no new weakness, no chest pain, no shortness of breath, no nausea, no vomiting, no diarrhea, no constipation, no problems walking.      Interval Events/ROS 3/21/2024:    Current ASM/SEs:   - about 2 weeks ago self decreased lamotrigine back to 150mg BID as she thought it may have been worsening episodes of numbness/tingling  - topiramate 50mg BID; no known SE  - discontinued pregabalin (SE memory issues, drowsiness)     Breakthrough seizures/events:   - nightly events (4-5x per week) described as L sided numbness/tingling from head to toe that will awake her from sleep, accompanied by sensation of fearfulness, feels like her heart is racing, maintained awareness, numbness/tingling will last for around 20 minutes before it spontaneously resolves   - recent episode of staring and decreased responsiveness (3/8/24) while in the shower, occurred the night before her grandmother's , reports son thought things were abnormally quiet so came to check on her and found her staring off, helped her out of the shower onto the bed and called 911, pt states she remembers starting to shave her legs and then the next thing she remembers is waking up w/ EMS in front of her face, subsequently evaluated in the ED, workup overall unremarkable       Driving: no  Folic acid: no, s/p  hysterectomy  Sleep: significant trouble sleeping/insomnia since her grandmother very sadly passed away 2 weeks ago   Mood: stressed, tired, concerned and anxious about her health     Chronic headaches. Otherwise, no fever, no cold symptoms, no changes in vision, no new weakness, no chest pain, no shortness of breath, no nausea, no vomiting, no diarrhea, no constipation, no problems walking.      Interval Events/ROS 2/16/2024:    Current ASM/SEs:   Lamotrigine 150mg BID; no SE; did not tolerate 300mg BID  Topiramate 50mg BID; no SE  Pregabalin 50mg BID; SE memory issues, trouble following conversations w/ people, feels like she blanks out for a second, almost like a light switch (?seizure activity), drowsiness     Breakthrough seizures/events: 2 weeks ago -> seemed to wake up every other night w/ a headache, unsure if seizures have occurred    Driving: no  Sleep: overall good  Mood: some fluctuations in mood but is manageable  Activity: continues to attend work    Headaches. Chronic fatigue, pt had negative sleep study last year. Recent cough/congestion/sinus pressure. Otherwise, no fever, no changes in vision, no new weakness, no chest pain, no shortness of breath, no nausea, no vomiting, no diarrhea, no constipation, no tingling/numbness, no problems walking.      Interval Events/ROS 11/15/2023:    Current ASM/SEs: lamotrigine 150mg twice daily and topiramate 50mg twice daily; no SE  Breakthrough seizures/events: none that she knows of; discussed EEG results which captured 2 electrographic seizures that occurred unbeknownst to pt  Driving: no  Sleep: not great due to waking up w/ headaches; takes trazodone every once in a while  Mood: overall okay, denies anxiety/depression, some fluctuations in mood but is manageable    Persistent headaches -> physical therapy scheduled for next week. Vivid dreams sometimes. Otherwise, no fever, no cold symptoms, no changes in vision, no new weakness, no chest pain, no shortness  of breath, no nausea, no vomiting, no diarrhea, no constipation, no problems walking.     Interval Events/ROS 11/3/2023:    Current ASM/SEs: lamotrigine 150mg twice daily and topiramate 50mg twice daily  Breakthrough seizures/events: none since the addition of topiramate  Folic acid: s/p hysterectomy  Sleep: not great due to waking up w/ headaches; takes trazodone every once in a while  Mood: overall okay, denies anxiety/depression, some fluctuations in mood but is manageable    Persistent headaches described as numbness to L side of head, wakes her up during the night and is sometimes still there in the morning. Sleeps on her L side at night. Has taken tylenol 1-2 times for headache in the past week. Otherwise, no fever, no cold symptoms, no changes in vision, no new weakness, no chest pain, no shortness of breath, no nausea, no vomiting, no diarrhea, no constipation, no problems walking.     Interval Events/ROS 8/25/2023:    Current ASM/SEs: lamotrigine 150mg twice daily; SE drowsy  Breakthrough seizures/events: staring event 8/5/2023, no identifiable trigger, described she was in the bathroom brushing her teeth then suddenly realized she was in a different room, son told her she had toothpaste around her mouth; prior to this last event was 6/15/2023; no identifiable trigger; no convulsions  Driving: no  Folic acid: s/p hysterectomy  Sleep: not great due to waking up w/ headaches   Mood: fair    Frequently wakes up in the middle of the night with headache located to the top of her head, also often wakes up with CROOK in the morning. States she has not been taking anything at home for the pain. Headache typically resolves once she is up and moving for the day. Otherwise, no fever, no cold symptoms, no changes in vision, no new weakness, no chest pain, no shortness of breath, no nausea, no vomiting, no diarrhea, no constipation, no tingling/numbness, no problems walking.     Interval Events/ROS 6/20/2023:    Current  ASM/SEs: lamotrigine 150mg BID; no SE   Breakthrough seizures/events: last event was 6/15 while shopping in [employer], describes she was with her sister when she started staring off and wouldn't respond, 'came to' and her sister was very concerned, patient was confused, states her sister told her the event lasted >15 minutes (specifically clarified that the staring lasted >15 minutes and it took additional time for the patient to fully feel back to normal), no LOC or collapse, no abnormal movements, patient can't recall whether or not she was able to hear her sister during event; reports she has experienced staring episodes/sensation of lost time at least 3 times in the past 2 months, other episodes were not witnessed    Driving: no  Folic acid: no  Sleep: does not get much sleep, broken sleep, has tried melatonin in the past  Mood: chronic anxiety, notes some feelings of depression, stopped sertraline after 3 days (didn't like the idea of taking a mood medication, but feels more open to it now)    Intermittent headaches. Otherwise, no fever, no cold symptoms, no changes in vision, no new weakness, no chest pain, no shortness of breath, no nausea, no vomiting, no diarrhea, no constipation, no tingling/numbness, no problems walking.     Interval Events/ROS 4/12/2023:    Current ASM/SEs: lamotrigine 100mg BID; no SE  Breakthrough seizures/events:  event 4/10 -> states she remembers using the bathroom then all of a sudden she was in her bed being woken up by her dad, confused, felt off; does not recall walking back from the bathroom to her bed; son told her she seemed out of it; felt back to baseline ~30 min after event; no convulsions/abnormal movements; no identifiable trigger; current event frequency is around 1 per year   Driving: no  Folic acid: no   Sleep: chronic insomnia; follows w/ sleep medicine    Mood: anxious    Headaches. Otherwise, no fever, no cold symptoms, no changes in vision, no new weakness, no chest  pain, no shortness of breath, no nausea, no vomiting, no diarrhea, no constipation, no tingling/numbness, no problems walking.     HPI:   Ms. Pernell Ly is a 45 y.o. female who presents with a chief complaint of a longstanding seizure disorder.  Patient presents today to transfer her care from Dr. Dunham after being lost to follow-up for 2 years.  The patient reports that she had been doing well until the last few months when she has begun to experience episodes of loss of time.  The patient states that she will be alone and realized that several seconds have passed and she has lost awareness for a few minutes.  She endorses expressed compliance with her Lamictal.  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  She states that she believes is contributing to chronic daily tension headaches.  She states the headaches have become worse over the past few weeks and notes that she is taking ibuprofen twice a day every day for management of headaches.  She denies associated migrainous features or focal neurologic deficits.    Seizure Type: Supsected focal onset  Seizure Etiology:   L MTS  Current AEDs: Lamictal 100 mg BID    The patient is not accompanied by family who contribute to the history. This patient has 3 types of seizure as described below. The patient reports having seizures for years The patient reports to have stable seizure control. The seizure frequency is variable. The last seizure was last week . The patient does not report side effects from seizure medication.     Seizure Type 1:   Seizure Description: LOC, GTC (one out of sleep)  Aura:  De ja vu feeling  Associated Symptoms:  none  Seizure Frequency: 2 in lifetime  Last seizure: 2020-ish    Seizure Type 2:   Seizure Description: Incomprehensible speech lasting 14 minutes with loss of time and confusion  Aura: None  Associated Symptoms:  none  Seizure Frequency:  "One in lifetime  Last seizure: 2019    Seizure Type 3:   Seizure Description: "Goes out into space" for a minute, always happens when she's alone  Aura: None  Associated Symptoms: none  Seizure Frequency: Variable, often clusters every few months "seldom"   Last seizure: 1.5 weeks ago    Handedness: right  Seizure Onset Age: infancy  Seizure/ Epilepsy Risk Factors: childhood seizure  Birth/Developmental History: normal birth history and Normal developmental history  Seizure Triggers/ Provoking Features: stress  Previous Seizure Medications: lamotrigine (Lamictal, LTG) and zonisamide (Zonegran, ZNA)  Recent Med Changes: None  Other Treatments: None  Prior Episodes of Status: None  Psychiatric/Behavioral Comorbitidies: Anxiety  Surgical Candidacy:  Pending    Prior Studies:  EEG : Not done  vEEG/ EMU evaluation: Left MTS 2018, personally reviewed  MRI of brain: Not done  AED levels:  Not done  CT/CTA Scan: Unremarkable - personally reviewed  PET Scan: Not done  Neuropsychological Evaluation: Not done  DEXA Scan: Not done  Other studies: Not done    PAST MEDICAL HISTORY:  Past Medical History:   Diagnosis Date    Encounter for blood transfusion     Epilepsy     Fibroids     Focal epilepsy     Hypertension     Migraine        PAST SURGICAL HISTORY:  Past Surgical History:   Procedure Laterality Date     SECTION      x1    HYSTERECTOMY  2017    TUBAL LIGATION         CURRENT MEDS:  Current Outpatient Medications   Medication Sig Dispense Refill    albuterol (PROVENTIL/VENTOLIN HFA) 90 mcg/actuation inhaler Inhale 1-2 puffs into the lungs every 6 (six) hours as needed. Rescue 8 g 0    azelastine (ASTELIN) 137 mcg (0.1 %) nasal spray USE 1 SPRAY NASALLY TWICE DAILY 30 mL 0    beclomethasone dipropionate (QVAR REDIHALER) 40 mcg/actuation HFAB Take 2 puffs in AM and 2 puffs in PM 10.6 g 11    cloBAZam (ONFI) 20 mg Tab Take 1.5 tablets (30 mg total) by mouth once daily. 45 tablet 5    ibuprofen " (ADVIL,MOTRIN) 600 MG tablet Take 1 tablet (600 mg total) by mouth every 6 (six) hours as needed for Pain. 20 tablet 0    lamoTRIgine (LAMICTAL) 150 MG Tab TAKE 1 TABLET(150 MG) BY MOUTH TWICE DAILY 180 tablet 3    multivitamin with minerals tablet Take by mouth.      NIFEdipine (PROCARDIA-XL) 90 MG (OSM) 24 hr tablet TAKE 1 TABLET(90 MG) BY MOUTH DAILY 90 tablet 0    omeprazole (PRILOSEC) 20 MG capsule Take 20 mg by mouth daily as needed.      potassium chloride SA (K-DUR,KLOR-CON) 20 MEQ tablet Take 1 tablet (20 mEq total) by mouth 2 (two) times daily. 90 tablet 1    tiZANidine (ZANAFLEX) 2 MG tablet Take 1 tablet (2 mg total) by mouth nightly as needed (muscle spasms). 30 tablet 1    valACYclovir (VALTREX) 500 MG tablet Take 1 tablet (500 mg total) by mouth once daily. 60 tablet 4    levocetirizine (XYZAL) 5 MG tablet Take 5 mg by mouth every evening.       No current facility-administered medications for this visit.       ALLERGIES:  Review of patient's allergies indicates:  No Known Allergies    FAMILY HISTORY:  Family History   Problem Relation Name Age of Onset    Hypertension Father      Hypertension Sister      Hypertension Maternal Grandmother      Breast cancer Maternal Grandmother      Cancer Maternal Grandfather      Hypertension Paternal Grandmother      Colon cancer Maternal Cousin      Ovarian cancer Neg Hx         SOCIAL HISTORY:  Social History     Tobacco Use    Smoking status: Never     Passive exposure: Never    Smokeless tobacco: Never   Substance Use Topics    Alcohol use: No     Comment: Occasional; 3x /month    Drug use: No     Review of Systems:  12 system review of systems is negative except for the symptoms mentioned in HPI.     Objective:     There were no vitals filed for this visit.    General: awake, alert, NAD, well nourished   Eyes: no tearing, discharge, no erythema   ENT: moist mucous membranes of the oral cavity, nares patent    Neck: normal ROM  Cardiovascular: well  perfused  Lungs: Normal work of breathing, normal chest wall excursions  Skin: No rash, lesions, or breakdown on exposed skin  Psychiatry: Mood and affect are appropriate, anxious   Abdomen: non distended  Extremeties: not examined     Neurological   MENTAL STATUS: Alert and oriented to person, place, and time. Attention and concentration within normal limits. Speech without dysarthria, able to name and repeat without difficulty. Recent and remote memory within normal limits   CRANIAL NERVES: EOMI. Face symmetrical. Hearing grossly intact. Full shoulder shrug bilaterally. Tongue protrudes midline   SENSORY: no subjective deficits   MOTOR: gross motor movements smooth upper and lower extremities bilaterally   CEREBELLAR/COORDINATION/GAIT: gait steady, able to ambulate around the room without difficulty

## 2025-06-27 DIAGNOSIS — I10 ESSENTIAL HYPERTENSION: ICD-10-CM

## 2025-06-30 RX ORDER — NIFEDIPINE 90 MG/1
TABLET, EXTENDED RELEASE ORAL
Qty: 90 TABLET | Refills: 0 | Status: SHIPPED | OUTPATIENT
Start: 2025-06-30

## 2025-06-30 NOTE — TELEPHONE ENCOUNTER
Refill Encounter    PCP Visits: Recent Visits  Date Type Provider Dept   03/24/25 Office Visit Juliet Dick FNP-C [facility]   03/13/25 Office Visit Juliet Dick FNP-C [facility]   10/22/24 Office Visit Zoey Nelson MD [facility]   Showing recent visits within past 360 days and meeting all other requirements  Future Appointments  No visits were found meeting these conditions.  Showing future appointments within next 720 days and meeting all other requirements      Last 3 Blood Pressure:   BP Readings from Last 3 Encounters:   04/15/25 136/88   03/24/25 138/88   08/26/24 (!) 153/95     Preferred Pharmacy:   [facility]/pharmacy #[id number] - [place], [place] - [address]  [address]  [place] [address]  Phone: [phone] Fax: [phone]    [facility] - [facility] [address]  [address]  [address] [address]  Phone: [phone] Fax: [phone]    [id number] [address] [facility] [address] [facility] [address]  [address] [facility] [address]  Phone: [phone] Fax: [phone]    [id number] - [facility] [address] [facility] & [address] [address]  Phone: [phone] Fax: [phone]    Requested RX:  Requested Prescriptions     Pending Prescriptions Disp Refills    NIFEdipine (PROCARDIA-XL) 90 MG (OSM) 24 hr tablet [Pharmacy Med Name: NIFEDIPINE 90MG ER (XL/OSM) TABLET] 90 tablet 0     Sig: TAKE 1 TABLET(90 MG) BY MOUTH DAILY      RX Route: Normal

## (undated) DEVICE — INSERT CUSHIONPRONE VIEW LARGE

## (undated) DEVICE — PORT AIRSEAL 12/120MM LPI

## (undated) DEVICE — ELECTRODE REM PLYHSV RETURN 9

## (undated) DEVICE — SYR 10CC LUER LOCK

## (undated) DEVICE — SOL CLEARIFY VISUALIZATION LAP

## (undated) DEVICE — PACK LAPAROSCOPY BAPTIST

## (undated) DEVICE — SOL NS 1000CC

## (undated) DEVICE — TIP RUMI KOH-EFFICIENT 3.5

## (undated) DEVICE — SUT MCRYL PLUS 4-0 PS2 27IN

## (undated) DEVICE — DRESSING LEUKOPLAST FLEX 1X3IN

## (undated) DEVICE — KIT WING PAD POSITIONING

## (undated) DEVICE — SUT VICRYL PLUS 0 CT1 36IN

## (undated) DEVICE — SET CYSTO IRRIGATION UNIV SPIK

## (undated) DEVICE — UNDERGLOVE BIOGEL PI SZ 6.5 LF

## (undated) DEVICE — SOL ELECTROLUBE ANTI-STIC

## (undated) DEVICE — GLOVE BIOGEL SKINSENSE PI 6.5

## (undated) DEVICE — KIT PROCEDURE STER INLET CLOSU

## (undated) DEVICE — PAD PERINEAL SUPINE

## (undated) DEVICE — POSITIONER HEAD ADULT

## (undated) DEVICE — DRESSING GZ SURGICOUNT 4X8

## (undated) DEVICE — GLOVE BIOGEL SKINSENSE PI 6.0

## (undated) DEVICE — PAD ABD 8X10 STERILE

## (undated) DEVICE — IRRIGATOR ENDOSCOPY DISP.

## (undated) DEVICE — KIT ROBOTIC 3 ARM DA VINCI SI

## (undated) DEVICE — SEE MEDLINE ITEM 156923

## (undated) DEVICE — Device

## (undated) DEVICE — JELLY KY LUBRICATING 5G PACKET

## (undated) DEVICE — CLOSURE SKIN STERI STRIP 1/2X4

## (undated) DEVICE — SOL STRL WATER INJ 1000ML BG

## (undated) DEVICE — KIT SURGIFLO HEMOSTATIC MATRIX

## (undated) DEVICE — DRAPE STERI LONG

## (undated) DEVICE — NDL INSUF ULTRA VERESS 120MM

## (undated) DEVICE — SOL 9P NACL IRR PIC IL

## (undated) DEVICE — GLOVE BIOGEL SKINSENSE PI 7.0

## (undated) DEVICE — MANIPULATOR TIP RUMI ORANGE

## (undated) DEVICE — KIT HEMOSTAT 4.5ML VITAGEL

## (undated) DEVICE — SUT 0 8-27IN VICRYL PL CT-1

## (undated) DEVICE — COVER TIP CURVED SCISSORS XI

## (undated) DEVICE — UNDERGLOVES BIOGEL PI SZ 7 LF

## (undated) DEVICE — APPLICATOR LAPAROSCOPIC EXTEND

## (undated) DEVICE — SCISSOR 5MMX35CM DIRECT DRIVE

## (undated) DEVICE — TROCAR ENDOPATH XCEL 5MM 7.5CM

## (undated) DEVICE — SEAL CANNULA DA VINCI 12MM

## (undated) DEVICE — SET TRI-LUMEN FILTERED TUBE

## (undated) DEVICE — APPLICATOR ENDOSCOPIC

## (undated) DEVICE — TIP RUMI KOH-EFFICIENT 3.0

## (undated) DEVICE — CORD BIPOLAR 12 FOOT

## (undated) DEVICE — SEE MEDLINE ITEM 146296

## (undated) DEVICE — PENCIL ELECTROSURG HOLST W/BLD